# Patient Record
Sex: FEMALE | Race: WHITE | NOT HISPANIC OR LATINO | Employment: PART TIME | URBAN - METROPOLITAN AREA
[De-identification: names, ages, dates, MRNs, and addresses within clinical notes are randomized per-mention and may not be internally consistent; named-entity substitution may affect disease eponyms.]

---

## 2017-08-04 ENCOUNTER — GENERIC CONVERSION - ENCOUNTER (OUTPATIENT)
Dept: OTHER | Facility: OTHER | Age: 62
End: 2017-08-04

## 2017-08-04 DIAGNOSIS — N95.8 OTHER SPECIFIED MENOPAUSAL AND PERIMENOPAUSAL DISORDERS: ICD-10-CM

## 2017-11-01 ENCOUNTER — APPOINTMENT (EMERGENCY)
Dept: RADIOLOGY | Facility: HOSPITAL | Age: 62
DRG: 872 | End: 2017-11-01
Payer: COMMERCIAL

## 2017-11-01 ENCOUNTER — HOSPITAL ENCOUNTER (INPATIENT)
Facility: HOSPITAL | Age: 62
LOS: 4 days | Discharge: HOME | DRG: 872 | End: 2017-11-05
Attending: EMERGENCY MEDICINE | Admitting: INTERNAL MEDICINE
Payer: COMMERCIAL

## 2017-11-01 DIAGNOSIS — N12 PYELONEPHRITIS: Primary | ICD-10-CM

## 2017-11-01 DIAGNOSIS — R78.81 BACTEREMIA: ICD-10-CM

## 2017-11-01 PROBLEM — A41.9 SEPSIS (HCC): Status: ACTIVE | Noted: 2017-11-01

## 2017-11-01 PROBLEM — D64.9 ANEMIA: Status: ACTIVE | Noted: 2017-11-01

## 2017-11-01 LAB
ALBUMIN SERPL BCP-MCNC: 2.8 G/DL (ref 3.5–5)
ALP SERPL-CCNC: 131 U/L (ref 46–116)
ALT SERPL W P-5'-P-CCNC: 39 U/L (ref 12–78)
AMORPH URATE CRY URNS QL MICRO: ABNORMAL /HPF
ANION GAP SERPL CALCULATED.3IONS-SCNC: 10 MMOL/L (ref 4–13)
AST SERPL W P-5'-P-CCNC: 32 U/L (ref 5–45)
BACTERIA UR QL AUTO: ABNORMAL /HPF
BASOPHILS # BLD AUTO: 0 THOUSANDS/ΜL (ref 0–0.1)
BASOPHILS NFR BLD AUTO: 0 % (ref 0–1)
BILIRUB SERPL-MCNC: 0.9 MG/DL (ref 0.2–1)
BILIRUB UR QL STRIP: NEGATIVE
BUN SERPL-MCNC: 21 MG/DL (ref 5–25)
CALCIUM SERPL-MCNC: 9 MG/DL (ref 8.3–10.1)
CHLORIDE SERPL-SCNC: 106 MMOL/L (ref 100–108)
CLARITY UR: ABNORMAL
CO2 SERPL-SCNC: 24 MMOL/L (ref 21–32)
COLOR UR: YELLOW
CREAT SERPL-MCNC: 1.11 MG/DL (ref 0.6–1.3)
EOSINOPHIL # BLD AUTO: 0 THOUSAND/ΜL (ref 0–0.61)
EOSINOPHIL NFR BLD AUTO: 0 % (ref 0–6)
ERYTHROCYTE [DISTWIDTH] IN BLOOD BY AUTOMATED COUNT: 16.3 % (ref 11.6–15.1)
ERYTHROCYTE [SEDIMENTATION RATE] IN BLOOD: 45 MM/HOUR (ref 2–25)
FLUAV AG SPEC QL IA: NEGATIVE
FLUBV AG SPEC QL IA: NEGATIVE
GFR SERPL CREATININE-BSD FRML MDRD: 54 ML/MIN/1.73SQ M
GLUCOSE SERPL-MCNC: 168 MG/DL (ref 65–140)
GLUCOSE UR STRIP-MCNC: ABNORMAL MG/DL
HCT VFR BLD AUTO: 36 % (ref 37–47)
HGB BLD-MCNC: 11.7 G/DL (ref 12–16)
HGB UR QL STRIP.AUTO: ABNORMAL
KETONES UR STRIP-MCNC: NEGATIVE MG/DL
LACTATE SERPL-SCNC: 1.1 MMOL/L (ref 0.5–2)
LEUKOCYTE ESTERASE UR QL STRIP: ABNORMAL
LYMPHOCYTES # BLD AUTO: 0.5 THOUSANDS/ΜL (ref 0.6–4.47)
LYMPHOCYTES NFR BLD AUTO: 7 % (ref 14–44)
MCH RBC QN AUTO: 30 PG (ref 27–31)
MCHC RBC AUTO-ENTMCNC: 32.5 G/DL (ref 31.4–37.4)
MCV RBC AUTO: 92 FL (ref 82–98)
MONOCYTES # BLD AUTO: 0.5 THOUSAND/ΜL (ref 0.17–1.22)
MONOCYTES NFR BLD AUTO: 7 % (ref 4–12)
NEUTROPHILS # BLD AUTO: 6 THOUSANDS/ΜL (ref 1.85–7.62)
NEUTS SEG NFR BLD AUTO: 86 % (ref 43–75)
NITRITE UR QL STRIP: POSITIVE
NON-SQ EPI CELLS URNS QL MICRO: ABNORMAL /HPF
NRBC BLD AUTO-RTO: 0 /100 WBCS
PH UR STRIP.AUTO: 6 [PH] (ref 5–9)
PLATELET # BLD AUTO: 212 THOUSANDS/UL (ref 130–400)
PMV BLD AUTO: 6.6 FL (ref 8.9–12.7)
POTASSIUM SERPL-SCNC: 3.6 MMOL/L (ref 3.5–5.3)
PROT SERPL-MCNC: 6.4 G/DL (ref 6.4–8.2)
PROT UR STRIP-MCNC: ABNORMAL MG/DL
RBC # BLD AUTO: 3.92 MILLION/UL (ref 4.2–5.4)
RBC #/AREA URNS AUTO: ABNORMAL /HPF
SODIUM SERPL-SCNC: 140 MMOL/L (ref 136–145)
SP GR UR STRIP.AUTO: 1.01 (ref 1–1.03)
UROBILINOGEN UR QL STRIP.AUTO: 0.2 E.U./DL
WBC # BLD AUTO: 7 THOUSAND/UL (ref 4.8–10.8)
WBC #/AREA URNS AUTO: ABNORMAL /HPF

## 2017-11-01 PROCEDURE — 81001 URINALYSIS AUTO W/SCOPE: CPT | Performed by: EMERGENCY MEDICINE

## 2017-11-01 PROCEDURE — 99285 EMERGENCY DEPT VISIT HI MDM: CPT

## 2017-11-01 PROCEDURE — 87086 URINE CULTURE/COLONY COUNT: CPT | Performed by: EMERGENCY MEDICINE

## 2017-11-01 PROCEDURE — 80053 COMPREHEN METABOLIC PANEL: CPT | Performed by: EMERGENCY MEDICINE

## 2017-11-01 PROCEDURE — 85652 RBC SED RATE AUTOMATED: CPT | Performed by: EMERGENCY MEDICINE

## 2017-11-01 PROCEDURE — 96365 THER/PROPH/DIAG IV INF INIT: CPT

## 2017-11-01 PROCEDURE — 96361 HYDRATE IV INFUSION ADD-ON: CPT

## 2017-11-01 PROCEDURE — 93005 ELECTROCARDIOGRAM TRACING: CPT | Performed by: EMERGENCY MEDICINE

## 2017-11-01 PROCEDURE — 36415 COLL VENOUS BLD VENIPUNCTURE: CPT | Performed by: EMERGENCY MEDICINE

## 2017-11-01 PROCEDURE — 85025 COMPLETE CBC W/AUTO DIFF WBC: CPT | Performed by: EMERGENCY MEDICINE

## 2017-11-01 PROCEDURE — 87147 CULTURE TYPE IMMUNOLOGIC: CPT | Performed by: EMERGENCY MEDICINE

## 2017-11-01 PROCEDURE — 87798 DETECT AGENT NOS DNA AMP: CPT | Performed by: EMERGENCY MEDICINE

## 2017-11-01 PROCEDURE — 87400 INFLUENZA A/B EACH AG IA: CPT | Performed by: EMERGENCY MEDICINE

## 2017-11-01 PROCEDURE — 87186 SC STD MICRODIL/AGAR DIL: CPT | Performed by: EMERGENCY MEDICINE

## 2017-11-01 PROCEDURE — 71010 HB CHEST X-RAY 1 VIEW FRONTAL (PORTABLE): CPT

## 2017-11-01 PROCEDURE — 74177 CT ABD & PELVIS W/CONTRAST: CPT

## 2017-11-01 PROCEDURE — 83605 ASSAY OF LACTIC ACID: CPT | Performed by: EMERGENCY MEDICINE

## 2017-11-01 PROCEDURE — 87040 BLOOD CULTURE FOR BACTERIA: CPT | Performed by: EMERGENCY MEDICINE

## 2017-11-01 PROCEDURE — 87077 CULTURE AEROBIC IDENTIFY: CPT | Performed by: EMERGENCY MEDICINE

## 2017-11-01 RX ORDER — MAGNESIUM HYDROXIDE/ALUMINUM HYDROXICE/SIMETHICONE 120; 1200; 1200 MG/30ML; MG/30ML; MG/30ML
30 SUSPENSION ORAL EVERY 6 HOURS PRN
Status: DISCONTINUED | OUTPATIENT
Start: 2017-11-01 | End: 2017-11-05 | Stop reason: HOSPADM

## 2017-11-01 RX ORDER — ACETAMINOPHEN 325 MG/1
650 TABLET ORAL ONCE
Status: COMPLETED | OUTPATIENT
Start: 2017-11-01 | End: 2017-11-01

## 2017-11-01 RX ORDER — ONDANSETRON 2 MG/ML
4 INJECTION INTRAMUSCULAR; INTRAVENOUS EVERY 6 HOURS PRN
Status: DISCONTINUED | OUTPATIENT
Start: 2017-11-01 | End: 2017-11-05 | Stop reason: HOSPADM

## 2017-11-01 RX ORDER — LEVOTHYROXINE SODIUM 0.05 MG/1
50 TABLET ORAL
Status: DISCONTINUED | OUTPATIENT
Start: 2017-11-02 | End: 2017-11-05 | Stop reason: HOSPADM

## 2017-11-01 RX ORDER — SODIUM CHLORIDE 9 MG/ML
100 INJECTION, SOLUTION INTRAVENOUS CONTINUOUS
Status: DISCONTINUED | OUTPATIENT
Start: 2017-11-01 | End: 2017-11-04

## 2017-11-01 RX ORDER — ACETAMINOPHEN 325 MG/1
650 TABLET ORAL EVERY 6 HOURS PRN
Status: DISCONTINUED | OUTPATIENT
Start: 2017-11-01 | End: 2017-11-04

## 2017-11-01 RX ADMIN — ACETAMINOPHEN 650 MG: 325 TABLET, FILM COATED ORAL at 15:59

## 2017-11-01 RX ADMIN — CEFTRIAXONE 1000 MG: 1 INJECTION, SOLUTION INTRAVENOUS at 21:20

## 2017-11-01 RX ADMIN — SODIUM CHLORIDE 1000 ML: 0.9 INJECTION, SOLUTION INTRAVENOUS at 17:46

## 2017-11-01 RX ADMIN — IOHEXOL 100 ML: 350 INJECTION, SOLUTION INTRAVENOUS at 20:27

## 2017-11-01 RX ADMIN — ACETAMINOPHEN 650 MG: 325 TABLET, FILM COATED ORAL at 21:42

## 2017-11-01 NOTE — ED PROVIDER NOTES
History  Chief Complaint   Patient presents with    Fever - 9 weeks to 74 years     no appetite, nausea, ws sent to the ED by Dr Delma Link  was given Bystolic 87OT by Dr Delma Link for elevated BP     Patient was sent over from her PMD office today with a fever  Patient states she started Monday afternoon with chills but did not think she had a fever  She had no other symptoms  No congestion sore throat no cough no rashes  Yesterday she was able to go to work for a while but then had chills again  Patient has a urostomy but states that the urine other than looking a little concentrated was normal and not painful or turbid  She has no abdominal pain no diarrhea or vomiting no rash no leg edema or pain  Prior to Admission Medications   Prescriptions Last Dose Informant Patient Reported? Taking? Dapagliflozin Propanediol (FARXIGA PO) 11/1/2017 at Unknown time Self Yes Yes   Sig: Take by mouth daily Indications: Diabetes  levothyroxine 50 mcg tablet 11/1/2017 at Unknown time  Yes Yes   Sig: Take 50 mcg by mouth daily  metFORMIN (GLUCOPHAGE) 500 mg tablet Past Week at Unknown time  Yes Yes   Sig: Take 500 mg by mouth daily        Facility-Administered Medications: None       Past Medical History:   Diagnosis Date    Cholelithiasis     Deep vein thrombosis of left lower extremity ([facility] ) 1/11/2016    Diabetes mellitus ([facility] [address] [facility]     Endometrial cancer ([facility] [address] [facility]     History of urostomy 1/11/2016    Hypothyroid     In vitro fertilization     Kidney calculi     s/p urostomy       Past Surgical History:   Procedure Laterality Date    COLONOSCOPY W/ BIOPSIES N/A 12/2015    HEEL SPUR SURGERY Right 1996    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    LAPAROSCOPIC GASTRIC BANDING N/A 2006    [facility] [place]    [facility]    SLEEVE GASTROPLASTY N/A 09/2015    Dr Bassam Garcia, [facility]       Family History   Problem Relation Age of Onset    No Known Problems Mother     Kidney failure Father     Brain cancer Father     Diabetes Sister     Alcohol abuse Sister     Diabetes Brother      I have reviewed and agree with the history as documented  Social History   Substance Use Topics    Smoking status: Former Smoker     Packs/day: 1 50     Types: Cigarettes     Quit date: 1/11/1986    Smokeless tobacco: Former User    Alcohol use Yes      Comment: 1/month        Review of Systems   Constitutional: Positive for appetite change, chills and fever  Negative for diaphoresis and fatigue  HENT: Negative for congestion, ear pain, rhinorrhea, sore throat and trouble swallowing  Eyes: Negative for pain  Respiratory: Negative for cough and shortness of breath  Cardiovascular: Negative for chest pain and leg swelling  Gastrointestinal: Negative for abdominal pain, diarrhea, nausea and vomiting  Genitourinary: Negative for dysuria and flank pain  Musculoskeletal: Negative for back pain, joint swelling and myalgias  Skin: Negative for rash and wound  Neurological: Negative for headaches  All other systems reviewed and are negative  Physical Exam  ED Triage Vitals [11/01/17 1547]   Temperature Pulse Respirations Blood Pressure SpO2   (!) 102 8 °F (39 3 °C) 96 18 160/77 99 %      Temp Source Heart Rate Source Patient Position - Orthostatic VS BP Location FiO2 (%)   Tympanic Monitor Sitting Right arm --      Pain Score       No Pain           Orthostatic Vital Signs  Vitals:    11/01/17 1547   BP: 160/77   Pulse: 96   Patient Position - Orthostatic VS: Sitting       Physical Exam   Constitutional: She is oriented to person, place, and time  She appears well-developed and well-nourished  HENT:   Head: Normocephalic and atraumatic  Right Ear: External ear normal    Left Ear: External ear normal    Mouth/Throat: Oropharynx is clear and moist    Eyes: Conjunctivae and EOM are normal    Neck: Normal range of motion  Neck supple     Cardiovascular: Normal rate and regular rhythm  Pulmonary/Chest: Effort normal and breath sounds normal  No respiratory distress  She has no wheezes  Abdominal: Soft  Bowel sounds are normal  There is no tenderness  Urostomy site draining clear juan urine  There is no tenderness around the urostomy or anywhere in the abdomen   Musculoskeletal: Normal range of motion  She exhibits no edema or tenderness  Neurological: She is alert and oriented to person, place, and time  Skin: Skin is warm and dry  Psychiatric: She has a normal mood and affect  Her behavior is normal    Vitals reviewed  ED Medications  Medications   acetaminophen (TYLENOL) tablet 650 mg (650 mg Oral Given 11/1/17 5209)       Diagnostic Studies  Results Reviewed     None                 No orders to display              Procedures  Procedures       Phone Contacts  ED Phone Contact    ED Course  ED Course                                MDM  Number of Diagnoses or Management Options  Diagnosis management comments: Patient has fever and chills without an obvious source  Must consider urinary infection primarily I suppose  Will check lactic acid chest blood and urine tests    CritCare Time    Disposition  Final diagnoses:   None     ED Disposition     None      Follow-up Information    None       Patient's Medications   Discharge Prescriptions    No medications on file     No discharge procedures on file      ED Provider  Electronically Signed by           Earl Clinton MD  11/01/17 3005

## 2017-11-02 PROBLEM — N13.30 HYDROURETERONEPHROSIS: Status: ACTIVE | Noted: 2017-11-02

## 2017-11-02 LAB
ANION GAP SERPL CALCULATED.3IONS-SCNC: 7 MMOL/L (ref 4–13)
BASOPHILS # BLD AUTO: 0 THOUSANDS/ΜL (ref 0–0.1)
BASOPHILS NFR BLD AUTO: 0 % (ref 0–1)
BUN SERPL-MCNC: 16 MG/DL (ref 5–25)
CALCIUM SERPL-MCNC: 8.9 MG/DL (ref 8.3–10.1)
CHLORIDE SERPL-SCNC: 111 MMOL/L (ref 100–108)
CO2 SERPL-SCNC: 23 MMOL/L (ref 21–32)
CREAT SERPL-MCNC: 0.88 MG/DL (ref 0.6–1.3)
EOSINOPHIL # BLD AUTO: 0.1 THOUSAND/ΜL (ref 0–0.61)
EOSINOPHIL NFR BLD AUTO: 2 % (ref 0–6)
ERYTHROCYTE [DISTWIDTH] IN BLOOD BY AUTOMATED COUNT: 16.2 % (ref 11.6–15.1)
EST. AVERAGE GLUCOSE BLD GHB EST-MCNC: 169 MG/DL
FLUAV AG SPEC QL: NORMAL
FLUBV AG SPEC QL: NORMAL
GFR SERPL CREATININE-BSD FRML MDRD: 72 ML/MIN/1.73SQ M
GLUCOSE SERPL-MCNC: 117 MG/DL (ref 65–140)
GLUCOSE SERPL-MCNC: 124 MG/DL (ref 65–140)
GLUCOSE SERPL-MCNC: 132 MG/DL (ref 65–140)
GLUCOSE SERPL-MCNC: 200 MG/DL (ref 65–140)
GLUCOSE SERPL-MCNC: 270 MG/DL (ref 65–140)
HBA1C MFR BLD: 7.5 % (ref 4.2–6.3)
HCT VFR BLD AUTO: 36.3 % (ref 37–47)
HGB BLD-MCNC: 11.7 G/DL (ref 12–16)
LYMPHOCYTES # BLD AUTO: 0.7 THOUSANDS/ΜL (ref 0.6–4.47)
LYMPHOCYTES NFR BLD AUTO: 15 % (ref 14–44)
MAGNESIUM SERPL-MCNC: 1.8 MG/DL (ref 1.6–2.6)
MCH RBC QN AUTO: 29.9 PG (ref 27–31)
MCHC RBC AUTO-ENTMCNC: 32.3 G/DL (ref 31.4–37.4)
MCV RBC AUTO: 93 FL (ref 82–98)
MONOCYTES # BLD AUTO: 0.5 THOUSAND/ΜL (ref 0.17–1.22)
MONOCYTES NFR BLD AUTO: 10 % (ref 4–12)
NEUTROPHILS # BLD AUTO: 3.3 THOUSANDS/ΜL (ref 1.85–7.62)
NEUTS SEG NFR BLD AUTO: 73 % (ref 43–75)
NRBC BLD AUTO-RTO: 0 /100 WBCS
PLATELET # BLD AUTO: 195 THOUSANDS/UL (ref 130–400)
PMV BLD AUTO: 6.7 FL (ref 8.9–12.7)
POTASSIUM SERPL-SCNC: 3.8 MMOL/L (ref 3.5–5.3)
RBC # BLD AUTO: 3.92 MILLION/UL (ref 4.2–5.4)
RSV B RNA SPEC QL NAA+PROBE: NORMAL
SODIUM SERPL-SCNC: 141 MMOL/L (ref 136–145)
T4 FREE SERPL-MCNC: 1.06 NG/DL (ref 0.76–1.46)
WBC # BLD AUTO: 4.6 THOUSAND/UL (ref 4.8–10.8)

## 2017-11-02 PROCEDURE — 80048 BASIC METABOLIC PNL TOTAL CA: CPT | Performed by: NURSE PRACTITIONER

## 2017-11-02 PROCEDURE — 82948 REAGENT STRIP/BLOOD GLUCOSE: CPT

## 2017-11-02 PROCEDURE — 85025 COMPLETE CBC W/AUTO DIFF WBC: CPT | Performed by: NURSE PRACTITIONER

## 2017-11-02 PROCEDURE — 83735 ASSAY OF MAGNESIUM: CPT | Performed by: NURSE PRACTITIONER

## 2017-11-02 PROCEDURE — 83036 HEMOGLOBIN GLYCOSYLATED A1C: CPT | Performed by: NURSE PRACTITIONER

## 2017-11-02 PROCEDURE — 84439 ASSAY OF FREE THYROXINE: CPT | Performed by: NURSE PRACTITIONER

## 2017-11-02 PROCEDURE — 87081 CULTURE SCREEN ONLY: CPT | Performed by: INTERNAL MEDICINE

## 2017-11-02 RX ORDER — LACTOBACILLUS ACIDOPHILUS / LACTOBACILLUS BULGARICUS 100 MILLION CFU STRENGTH
1 GRANULES ORAL
Status: DISCONTINUED | OUTPATIENT
Start: 2017-11-02 | End: 2017-11-05 | Stop reason: HOSPADM

## 2017-11-02 RX ADMIN — LACTOBACILLUS ACIDOPHILUS / LACTOBACILLUS BULGARICUS 1 PACKET: 100 MILLION CFU STRENGTH GRANULES at 12:39

## 2017-11-02 RX ADMIN — ACETAMINOPHEN 650 MG: 325 TABLET, FILM COATED ORAL at 12:35

## 2017-11-02 RX ADMIN — ENOXAPARIN SODIUM 40 MG: 40 INJECTION SUBCUTANEOUS at 09:23

## 2017-11-02 RX ADMIN — SODIUM CHLORIDE 125 ML/HR: 0.9 INJECTION, SOLUTION INTRAVENOUS at 06:09

## 2017-11-02 RX ADMIN — CEFTRIAXONE 2000 MG: 2 INJECTION, SOLUTION INTRAVENOUS at 21:29

## 2017-11-02 RX ADMIN — LEVOTHYROXINE SODIUM 50 MCG: 50 TABLET ORAL at 06:02

## 2017-11-02 RX ADMIN — SODIUM CHLORIDE 125 ML/HR: 0.9 INJECTION, SOLUTION INTRAVENOUS at 00:02

## 2017-11-02 RX ADMIN — LACTOBACILLUS ACIDOPHILUS / LACTOBACILLUS BULGARICUS 1 PACKET: 100 MILLION CFU STRENGTH GRANULES at 16:33

## 2017-11-02 RX ADMIN — SODIUM CHLORIDE 100 ML/HR: 0.9 INJECTION, SOLUTION INTRAVENOUS at 19:27

## 2017-11-02 RX ADMIN — INSULIN LISPRO 2 UNITS: 100 INJECTION, SOLUTION INTRAVENOUS; SUBCUTANEOUS at 16:34

## 2017-11-02 RX ADMIN — INSULIN LISPRO 2 UNITS: 100 INJECTION, SOLUTION INTRAVENOUS; SUBCUTANEOUS at 21:30

## 2017-11-02 NOTE — ED NOTES
Patient states that she feels like the fever is back  Temp is 98 6 oral       Macho Tucker RN  11/01/17 6744
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
## 2017-11-02 NOTE — ED CARE HANDOFF
Emergency Department Sign Out Note        Sign out and transfer of care from **Dr Doretha Meyers*  See Separate Emergency Department note  The patient, Nii Sharp, was evaluated by the previous provider for *fever**  Workup Completed:  *none**    ED Course / Workup Pending (followup):  *labs, urine**                          ED Course      Procedures  MDM  Number of Diagnoses or Management Options  Diagnosis management comments: Pt  With pyelonephritis, rigors here, will admit for IV abx  CritCare Time      Disposition  Final diagnoses:   Pyelonephritis     Time reflects when diagnosis was documented in both MDM as applicable and the Disposition within this note     Time User Action Codes Description Comment    74/1/7046 69:07 PM Dalia CAMPBELL Add [E13] Pyelonephritis       ED Disposition     ED Disposition Condition Comment    Admit  Case was discussed with **the hospitalist NP* and the patient's admission status was agreed to be Admission Status: inpatient status         Follow-up Information    None       Patient's Medications   Discharge Prescriptions    No medications on file     No discharge procedures on file         ED Provider  Electronically Signed by

## 2017-11-02 NOTE — CASE MANAGEMENT
Continued Stay Review    Date: 17    Vitals:   Temp (24hrs), Av 2 °F (37 9 °C), Min:98 6 °F (37 °C), Max:102 8 °F (39 3 °C)     HR:  [68-96] 73  Resp:  [16-20] 18  BP: (136-160)/(70-79) 147/70  SpO2:  [95 %-99 %] 95 %  Body mass index is 37 82 kg/m²  HGBA1C   TSH T4 FREE  Medications:   Scheduled Meds:   cefTRIAXone 2,000 mg Intravenous Q24H   enoxaparin 40 mg Subcutaneous Daily   insulin lispro 1-5 Units Subcutaneous HS   insulin lispro 1-6 Units Subcutaneous TID AC   lactobacillus acidophilus-bulgaricus 1 packet Oral TID With Meals   levothyroxine 50 mcg Oral Early Morning     Continuous Infusions:   sodium chloride 100 mL/hr Last Rate: 100 mL/hr (17 0922)     PRN Meds:   acetaminophen    aluminum-magnesium hydroxide-simethicone    influenza vaccine    ondansetron    Abnormal Labs/Diagnostic Results:    WBC Thousand/uL 4 60*   HEMOGLOBIN g/dL 11 7*   HEMATOCRIT % 36 3*   URINE CX   URINE CULTURE   >100,000 cfu/ml Non lactose fermenting gram negative kiah*  --          Age/Sex: 63 y o  female     PER MD  Principal Problem:    Sepsis ([address] )  Active Problems:    Pyelonephritis    Anemia    Hydroureteronephrosis     63 y o  female with a history of T2DM, Endometrial cancer, s/p urostomy, hypothyroidism, DVT who was admitted on 2017 with high fever and left flank pain, persistent to sepsis 2/2 pyelonephritis  She is clinically improving with IV ceftriaxone and IVF hydration  Plan:  Continue ceftriaxone iv (Day#1 on 17)  F/u Ucx  Hold metformin  SSI  Probiotics  Levothyroxine     DVT Prophylaxis: on Lovenox sc  Code Status: Level 1 - Full Code  Disposition: anticipate d/c home once clinically improved
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
## 2017-11-02 NOTE — PROGRESS NOTES
[facility] Progress Note  Patient: Jasmine Kimball 63 y o  female   MRN: [id number]  PCP: Carlita Sheikh DO  Unit/Bed#: [address] [place] [address] Encounter: [id number]  Date Of Visit: 17    Subjective:   She is feeling moderately better  Fever trending down  Less left flank pain    Objective:   Vitals:   Temp (24hrs), Av 2 °F (37 9 °C), Min:98 6 °F (37 °C), Max:102 8 °F (39 3 °C)    HR:  [68-96] 73  Resp:  [16-20] 18  BP: (136-160)/(70-79) 147/70  SpO2:  [95 %-99 %] 95 %  Body mass index is 37 82 kg/m²         Intake/Output Summary (Last 24 hours) at 17 1244  Last data filed at 17 0609   Gross per 24 hour   Intake             2000 ml   Output              400 ml   Net             1600 ml       Physical Exam:   General: NAD  HEENT: EOMI, anicteric, oral moist, no oral thrush or mucosal lesion, neck supple, no mass or JVD  Chest: CTAB, no wheeze/rales  Cardiac: RRR, S1/S2, No murmur  Abd: s/p urostomy at RLQ, S/ND/NT/BS+  MSK: No LE pitting edema, pulses intact  Neuro: AAOx3, moving all extremities  Psychiatric: Mood with normal affect      Additional Data:     Labs:    Results from last 7 days  Lab Units 17  0602   WBC Thousand/uL 4 60*   HEMOGLOBIN g/dL 11 7*   HEMATOCRIT % 36 3*   PLATELETS Thousands/uL 195   NEUTROS PCT % 73   LYMPHS PCT % 15   MONOS PCT % 10   EOS PCT % 2       Results from last 7 days  Lab Units 17  0602 17  1712   SODIUM mmol/L 141 140   POTASSIUM mmol/L 3 8 3 6   CHLORIDE mmol/L 111* 106   CO2 mmol/L 23 24   BUN mg/dL 16 21   CREATININE mg/dL 0 88 1 11   CALCIUM mg/dL 8 9 9 0   TOTAL PROTEIN g/dL  --  6 4   BILIRUBIN TOTAL mg/dL  --  0 90   ALK PHOS U/L  --  131*   ALT U/L  --  39   AST U/L  --  32   GLUCOSE RANDOM mg/dL 124 168*           Recent Results (from the past 24 hour(s))   CBC and differential    Collection Time: 17  5:12 PM   Result Value Ref Range    WBC 7 00 4 80 - 10 80 Thousand/uL    RBC 3 92 (L) 4 20 - 5 40 Million/uL Hemoglobin 11 7 (L) 12 0 - 16 0 g/dL    Hematocrit 36 0 (L) 37 0 - 47 0 %    MCV 92 82 - 98 fL    MCH 30 0 27 0 - 31 0 pg    MCHC 32 5 31 4 - 37 4 g/dL    RDW 16 3 (H) 11 6 - 15 1 %    MPV 6 6 (L) 8 9 - 12 7 fL    Platelets 720 296 - 952 Thousands/uL    nRBC 0 /100 WBCs    Neutrophils Relative 86 (H) 43 - 75 %    Lymphocytes Relative 7 (L) 14 - 44 %    Monocytes Relative 7 4 - 12 %    Eosinophils Relative 0 0 - 6 %    Basophils Relative 0 0 - 1 %    Neutrophils Absolute 6 00 1 85 - 7 62 Thousands/µL    Lymphocytes Absolute 0 50 (L) 0 60 - 4 47 Thousands/µL    Monocytes Absolute 0 50 0 17 - 1 22 Thousand/µL    Eosinophils Absolute 0 00 0 00 - 0 61 Thousand/µL    Basophils Absolute 0 00 0 00 - 0 10 Thousands/µL   Sedimentation rate, automated    Collection Time: 11/01/17  5:12 PM   Result Value Ref Range    Sed Rate 45 (H) 2 - 25 mm/hour   Comprehensive metabolic panel    Collection Time: 11/01/17  5:12 PM   Result Value Ref Range    Sodium 140 136 - 145 mmol/L    Potassium 3 6 3 5 - 5 3 mmol/L    Chloride 106 100 - 108 mmol/L    CO2 24 21 - 32 mmol/L    Anion Gap 10 4 - 13 mmol/L    BUN 21 5 - 25 mg/dL    Creatinine 1 11 0 60 - 1 30 mg/dL    Glucose 168 (H) 65 - 140 mg/dL    Calcium 9 0 8 3 - 10 1 mg/dL    AST 32 5 - 45 U/L    ALT 39 12 - 78 U/L    Alkaline Phosphatase 131 (H) 46 - 116 U/L    Total Protein 6 4 6 4 - 8 2 g/dL    Albumin 2 8 (L) 3 5 - 5 0 g/dL    Total Bilirubin 0 90 0 20 - 1 00 mg/dL    eGFR 54 ml/min/1 73sq m   Rapid Influenza Screen with Reflex PCR (indicated for patients <2mo of age)    Collection Time: 11/01/17  5:14 PM   Result Value Ref Range    Rapid Influenza A Ag Negative Negative, Indeterminate    Rapid Influenza B Ag Negative Negative, Indeterminate   Lactic acid, plasma    Collection Time: 11/01/17  5:14 PM   Result Value Ref Range    LACTIC ACID 1 1 0 5 - 2 0 mmol/L   Influenza A/B and RSV by PCR (Indicated for patients > 2 mo of age)    Collection Time: 11/01/17  5:14 PM   Result Value Ref Range    INFLU A PCR None Detected None Detected    INFLU B PCR None Detected None Detected    RSV PCR None Detected None Detected   UA w Reflex to Microscopic w Reflex to Culture    Collection Time: 11/01/17  7:05 PM   Result Value Ref Range    Color, UA Yellow     Clarity, UA Cloudy     Specific [place], UA 1 010 1 000 - 1 030    pH, UA 6 0 5 0 - 9 0    Leukocytes, UA Small (A) Negative    Nitrite, UA Positive (A) Negative    Protein, UA 30 (1+) (A) Negative mg/dl    Glucose, UA >=1000 (1%) (A) Negative mg/dl    Ketones, UA Negative Negative mg/dl    Urobilinogen, UA 0 2 0 2, 1 0 E U /dl E U /dl    Bilirubin, UA Negative Negative    Blood, UA Small (A) Negative   Urine Microscopic    Collection Time: 11/01/17  7:05 PM   Result Value Ref Range    RBC, UA 4-10 (A) None Seen, 0-5 /hpf    WBC, UA Innumerable (A) None Seen, 0-5, 5-55, 5-65 /hpf    Epithelial Cells None Seen None Seen, Occasional /hpf    Bacteria, UA Innumerable (A) None Seen, Occasional /hpf    AMORPH URATES Occasional /hpf   Urine culture    Collection Time: 11/01/17  7:05 PM   Result Value Ref Range    Urine Culture (A)      >100,000 cfu/ml Non lactose fermenting gram negative kiah   Basic metabolic panel    Collection Time: 11/02/17  6:02 AM   Result Value Ref Range    Sodium 141 136 - 145 mmol/L    Potassium 3 8 3 5 - 5 3 mmol/L    Chloride 111 (H) 100 - 108 mmol/L    CO2 23 21 - 32 mmol/L    Anion Gap 7 4 - 13 mmol/L    BUN 16 5 - 25 mg/dL    Creatinine 0 88 0 60 - 1 30 mg/dL    Glucose 124 65 - 140 mg/dL    Calcium 8 9 8 3 - 10 1 mg/dL    eGFR 72 ml/min/1 73sq m   Magnesium    Collection Time: 11/02/17  6:02 AM   Result Value Ref Range    Magnesium 1 8 1 6 - 2 6 mg/dL   CBC and differential    Collection Time: 11/02/17  6:02 AM   Result Value Ref Range    WBC 4 60 (L) 4 80 - 10 80 Thousand/uL    RBC 3 92 (L) 4 20 - 5 40 Million/uL    Hemoglobin 11 7 (L) 12 0 - 16 0 g/dL    Hematocrit 36 3 (L) 37 0 - 47 0 %    MCV 93 82 - 98 fL    MCH 29 9 27 0 - 31 0 pg    MCHC 32 3 31 4 - 37 4 g/dL    RDW 16 2 (H) 11 6 - 15 1 %    MPV 6 7 (L) 8 9 - 12 7 fL    Platelets 804 561 - 855 Thousands/uL    nRBC 0 /100 WBCs    Neutrophils Relative 73 43 - 75 %    Lymphocytes Relative 15 14 - 44 %    Monocytes Relative 10 4 - 12 %    Eosinophils Relative 2 0 - 6 %    Basophils Relative 0 0 - 1 %    Neutrophils Absolute 3 30 1 85 - 7 62 Thousands/µL    Lymphocytes Absolute 0 70 0 60 - 4 47 Thousands/µL    Monocytes Absolute 0 50 0 17 - 1 22 Thousand/µL    Eosinophils Absolute 0 10 0 00 - 0 61 Thousand/µL    Basophils Absolute 0 00 0 00 - 0 10 Thousands/µL   Hemoglobin A1c    Collection Time: 11/02/17  6:08 AM   Result Value Ref Range    Hemoglobin A1C 7 5 (H) 4 2 - 6 3 %     mg/dl   T4, free    Collection Time: 11/02/17  6:08 AM   Result Value Ref Range    Free T4 1 06 0 76 - 1 46 ng/dL   Fingerstick Glucose (POCT)    Collection Time: 11/02/17  6:53 AM   Result Value Ref Range    POC Glucose 117 65 - 140 mg/dl   Fingerstick Glucose (POCT)    Collection Time: 11/02/17 11:30 AM   Result Value Ref Range    POC Glucose 132 65 - 140 mg/dl       Cultures:     Results from last 7 days  Lab Units 11/01/17  1905 11/01/17  1714   URINE CULTURE  >100,000 cfu/ml Non lactose fermenting gram negative kiah*  --    INFLUENZA A PCR   --  None Detected   INFLUENZA B PCR   --  None Detected   RSV PCR   --  None Detected       Imaging:  Xr Chest 1 View Portable    Result Date: 11/2/2017  Narrative: CHEST INDICATION:  fever  History taken directly from the electronic ordering system  COMPARISON:  8/30/2012 VIEWS:   AP frontal IMAGES:  1 FINDINGS:     Cardiomediastinal silhouette appears unremarkable  The lungs are clear  No pneumothorax or pleural effusion  Visualized osseous structures appear within normal limits for the patient's age  Impression: No active pulmonary disease   As per comments in the PACS workstation, findings are concordant with preliminary interpretation provided by the emergency room physician  Workstation performed: [id number]     Ct Abdomen Pelvis With Contrast    Result Date: 11/1/2017  Narrative: CT ABDOMEN AND PELVIS WITH IV CONTRAST INDICATION:  Pyelonephritis COMPARISON: CT abdomen pelvis 1/11/2016 TECHNIQUE:  CT examination of the abdomen and pelvis was performed  Reformatted images were created in axial, sagittal, and coronal planes  Radiation dose length product (DLP) for this visit:  1535 2 mGy-cm   This examination, like all CT scans performed in the [facility], was performed utilizing techniques to minimize radiation dose exposure, including the use of iterative  reconstruction and automated exposure control  IV Contrast:  100 mL of iohexol (OMNIPAQUE)     Enteric Contrast:  Enteric contrast was not administered  FINDINGS: ABDOMEN LOWER CHEST:  There is bibasilar dependent atelectasis  LIVER/BILIARY TREE:  Unremarkable  GALLBLADDER:  There are gallstone(s) within the gallbladder, without pericholecystic inflammatory changes  SPLEEN:  Unremarkable  PANCREAS:  Unremarkable  ADRENAL GLANDS:  Unremarkable  KIDNEYS/URETERS:  One or more sharply circumscribed subcentimeter renal hypodensities are noted  These lesions are too small to accurately characterize, but are statistically most likely to represent benign cortical renal cyst(s)  According to the guidelines published in the Arnie [employer] Paper of the ACR Incidental Findings Committee (Radiology 2010), no further workup of these lesions is recommended  There is a 4 mm nonobstructing left lower pole renal calculus  There is a right lower quadrant ileostomy  There is mild left-sided hydroureteronephrosis without evident obstructing calculus  STOMACH AND BOWEL:  There is colonic diverticulosis without evidence of acute diverticulitis  No evidence of bowel obstruction  Small bowel herniated into the ileostomy without evident complication  Small hiatal hernia    There are postsurgical changes  of the stomach  APPENDIX:  No findings to suggest appendicitis  ABDOMINOPELVIC CAVITY:  No ascites or free intraperitoneal air  No lymphadenopathy  VESSELS:  Unremarkable for patient's age  PELVIS REPRODUCTIVE ORGANS:  Patient is status post hysterectomy  URINARY BLADDER:  Unremarkable  ABDOMINAL WALL/INGUINAL REGIONS:  Status post right lower quadrant ileostomy  No inguinal hernia  OSSEOUS STRUCTURES:  No acute fracture or destructive osseous lesion  Impression: 1  Mild prominence of the left renal collecting system which is new from prior examination  No evident obstructing calculus  Differential considerations include recently passed calculus versus ascending infection or noncalcified obstructing lesion  2   Nonobstructing 4 mm left lower pole renal calculus  3   Cholelithiasis without other evidence of acute cholecystitis  4   There is herniation of small bowel at the right lower quadrant ileostomy site  No evidence of acute complication   Workstation performed: [id number]     Imaging Reports Reviewed by myself    Last 24 Hours Medication List:     Current Facility-Administered Medications:     acetaminophen (TYLENOL) tablet 650 mg, 650 mg, Oral, Q6H PRN, TRACY Harris, 650 mg at 11/02/17 1235    aluminum-magnesium hydroxide-simethicone (MYLANTA) 200-200-20 mg/5 mL oral suspension 30 mL, 30 mL, Oral, Q6H PRN, TRACY Harris    cefTRIAXone (ROCEPHIN) IVPB (premix) 2,000 mg, 2,000 mg, Intravenous, Q24H, TRACY Harris    enoxaparin (LOVENOX) subcutaneous injection 40 mg, 40 mg, Subcutaneous, Daily, TRACY Harris, 40 mg at 11/02/17 9460    influenza inactivated quadrivalent vaccine (FLULAVAL) IM injection 0 5 mL, 0 5 mL, Intramuscular, Prior to discharge, TRACY Harris    insulin lispro (HumaLOG) 100 units/mL subcutaneous injection 1-5 Units, 1-5 Units, Subcutaneous, HS, TRACY Harris    insulin lispro (HumaLOG) 100 units/mL subcutaneous injection 1-6 Units, 1-6 Units, Subcutaneous, TID AC **AND** Fingerstick Glucose (POCT), , , TID AC, Arturo Canchola CRNP    lactobacillus acidophilus-bulgaricus [facility]) packet 1 packet, 1 packet, Oral, TID With Meals, Noy Perez MD, 1 packet at 11/02/17 1239    levothyroxine tablet 50 mcg, 50 mcg, Oral, Early Morning, ALLAN HarrisNP, 50 mcg at 11/02/17 0602    ondansetron (ZOFRAN) injection 4 mg, 4 mg, Intravenous, Q6H PRN, Arturo Canchola, CRNP    sodium chloride 0 9 % infusion, 100 mL/hr, Intravenous, Continuous, Arturo Lawrencericookie CRNP, Last Rate: 100 mL/hr at 11/02/17 0922, 100 mL/hr at 11/02/17 3540     Assessment and Plans:  Hospital Problem List:   Principal Problem:    Sepsis [facility])  Active Problems:    Pyelonephritis    Anemia    Hydroureteronephrosis    63 y o  female with a history of T2DM, Endometrial cancer, s/p urostomy, hypothyroidism, DVT who was admitted on 11/1/2017 with high fever and left flank pain, persistent to sepsis 2/2 pyelonephritis  She is clinically improving with IV ceftriaxone and IVF hydration  Plan:  Continue ceftriaxone iv (Day#1 on 11/1/17)  F/u Ucx  Hold metformin  SSI  Probiotics  Levothyroxine    DVT Prophylaxis: on Lovenox sc  Code Status: Level 1 - Full Code  Disposition: anticipate d/c home once clinically improved  Patient Centered Rounds: I have performed bedside rounds with nursing staff today  Discussions with Specialists or Other Care Team Provider: Yes    Education and Discussions with Family / Patient:Yes    Time Spent for Care: 20 minutes  More than 50% of total time spent on counseling and coordination of care as described above      Current Length of Stay: 1 day(s)    Current Patient Status: Inpatient     Signed by:  Noy Perez MD  Attending Hospitalist  Page#: [phone] (Hours 7am to 7pm)  11/2/2017 12:44 PM

## 2017-11-02 NOTE — PLAN OF CARE
GENITOURINARY - ADULT     Urinary catheter remains patent Progressing        INFECTION - ADULT     Absence or prevention of progression during hospitalization Progressing        METABOLIC, FLUID AND ELECTROLYTES - ADULT     Glucose maintained within target range Progressing        Potential for Falls     Patient will remain free of falls Progressing        SAFETY ADULT     Patient will remain free of falls Progressing

## 2017-11-02 NOTE — ED CARE HANDOFF
Emergency Department Sign Out Note        Sign out and transfer of care from *Dr Michelle Estrada**  See Separate Emergency Department note  The patient, Eli Cruz, was evaluated by the previous provider for *fever**  Workup Completed:  *none**    ED Course / Workup Pending (followup):  *labs, urine, CT**                          ED Course      Procedures  MDM  Number of Diagnoses or Management Options  Pyelonephritis:   Diagnosis management comments: Will admit for IV abx and pyelonephritis  Pt  With recurrent rigors  CritCare Time      Disposition  Final diagnoses:   Pyelonephritis     Time reflects when diagnosis was documented in both MDM as applicable and the Disposition within this note     Time User Action Codes Description Comment    09/6/5902 34:39 PM Leeann CAMPBELL Add [L92] Pyelonephritis       ED Disposition     ED Disposition Condition Comment    Admit  Case was discussed with **the hospitalist NP* and the patient's admission status was agreed to be Admission Status: inpatient status         Follow-up Information    None       Patient's Medications   Discharge Prescriptions    No medications on file     No discharge procedures on file         ED Provider  Electronically Signed by

## 2017-11-02 NOTE — SEPSIS NOTE
Sepsis Note   Abdon Gutierrez 63 y o  female MRN: [id number]  Unit/Bed#: [facility] Encounter: [id number]            Initial Sepsis Screening     Row Name 11/01/17 7437                Is the patient's history suggestive of a new or worsening infection? (!)  Yes (Proceed)  -CY        Suspected source of infection urinary tract infection  -CY        Are two or more of the following signs & symptoms of infection both present and new to the patient? (!)  Yes (Proceed)  -CY        Indicate SIRS criteria Hyperthemia > 38 3C (100 9F); Tachycardia > 90 bpm  -CY        If the answer is yes to both questions, suspicion of sepsis is present  --        If severe sepsis is present AND tissue hypoperfusion perists in the hour after fluid resuscitation or lactate > 4, the patient meets criteria for SEPTIC SHOCK  --        Are any of the following organ dysfunction criteria present within 6 hours of suspected infection and SIRS criteria that are NOT considered to be chronic conditions?  No  -CY        Organ dysfunction  --        Date of presentation of severe sepsis  --        Time of presentation of severe sepsis  --        Tissue hypoperfusion persists in the hour after crystalloid fluid administration, evidenced, by either:  --        Was hypotension present within one hour of the conclusion of crystalloid fluid administration?  --        Date of presentation of septic shock  --        Time of presentation of septic shock  --          User Key  (r) = Recorded By, (t) = Taken By, (c) = Cosigned By    [address] Name Provider Type    [address], [address] Rody Londono Nurse Practitioner

## 2017-11-03 LAB
ANION GAP SERPL CALCULATED.3IONS-SCNC: 9 MMOL/L (ref 4–13)
BACTERIA UR CULT: ABNORMAL
BASOPHILS # BLD AUTO: 0 THOUSANDS/ΜL (ref 0–0.1)
BASOPHILS NFR BLD AUTO: 1 % (ref 0–1)
BUN SERPL-MCNC: 15 MG/DL (ref 5–25)
CALCIUM SERPL-MCNC: 8.9 MG/DL (ref 8.3–10.1)
CHLORIDE SERPL-SCNC: 112 MMOL/L (ref 100–108)
CO2 SERPL-SCNC: 23 MMOL/L (ref 21–32)
CREAT SERPL-MCNC: 0.88 MG/DL (ref 0.6–1.3)
EOSINOPHIL # BLD AUTO: 0.2 THOUSAND/ΜL (ref 0–0.61)
EOSINOPHIL NFR BLD AUTO: 4 % (ref 0–6)
ERYTHROCYTE [DISTWIDTH] IN BLOOD BY AUTOMATED COUNT: 16.1 % (ref 11.6–15.1)
GFR SERPL CREATININE-BSD FRML MDRD: 72 ML/MIN/1.73SQ M
GLUCOSE SERPL-MCNC: 142 MG/DL (ref 65–140)
GLUCOSE SERPL-MCNC: 146 MG/DL (ref 65–140)
GLUCOSE SERPL-MCNC: 148 MG/DL (ref 65–140)
GLUCOSE SERPL-MCNC: 163 MG/DL (ref 65–140)
GLUCOSE SERPL-MCNC: 249 MG/DL (ref 65–140)
HCT VFR BLD AUTO: 32.2 % (ref 37–47)
HGB BLD-MCNC: 10.5 G/DL (ref 12–16)
LYMPHOCYTES # BLD AUTO: 1 THOUSANDS/ΜL (ref 0.6–4.47)
LYMPHOCYTES NFR BLD AUTO: 22 % (ref 14–44)
MCH RBC QN AUTO: 29.8 PG (ref 27–31)
MCHC RBC AUTO-ENTMCNC: 32.5 G/DL (ref 31.4–37.4)
MCV RBC AUTO: 92 FL (ref 82–98)
MONOCYTES # BLD AUTO: 0.5 THOUSAND/ΜL (ref 0.17–1.22)
MONOCYTES NFR BLD AUTO: 12 % (ref 4–12)
MRSA NOSE QL CULT: NORMAL
NEUTROPHILS # BLD AUTO: 2.8 THOUSANDS/ΜL (ref 1.85–7.62)
NEUTS SEG NFR BLD AUTO: 62 % (ref 43–75)
NRBC BLD AUTO-RTO: 0 /100 WBCS
PLATELET # BLD AUTO: 195 THOUSANDS/UL (ref 130–400)
PMV BLD AUTO: 6.5 FL (ref 8.9–12.7)
POTASSIUM SERPL-SCNC: 3.6 MMOL/L (ref 3.5–5.3)
RBC # BLD AUTO: 3.51 MILLION/UL (ref 4.2–5.4)
SODIUM SERPL-SCNC: 144 MMOL/L (ref 136–145)
WBC # BLD AUTO: 4.4 THOUSAND/UL (ref 4.8–10.8)

## 2017-11-03 PROCEDURE — 87040 BLOOD CULTURE FOR BACTERIA: CPT | Performed by: STUDENT IN AN ORGANIZED HEALTH CARE EDUCATION/TRAINING PROGRAM

## 2017-11-03 PROCEDURE — 80048 BASIC METABOLIC PNL TOTAL CA: CPT | Performed by: STUDENT IN AN ORGANIZED HEALTH CARE EDUCATION/TRAINING PROGRAM

## 2017-11-03 PROCEDURE — 82948 REAGENT STRIP/BLOOD GLUCOSE: CPT

## 2017-11-03 PROCEDURE — 85025 COMPLETE CBC W/AUTO DIFF WBC: CPT | Performed by: STUDENT IN AN ORGANIZED HEALTH CARE EDUCATION/TRAINING PROGRAM

## 2017-11-03 RX ORDER — SUMATRIPTAN 25 MG/1
50 TABLET, FILM COATED ORAL ONCE
Status: COMPLETED | OUTPATIENT
Start: 2017-11-03 | End: 2017-11-03

## 2017-11-03 RX ORDER — IBUPROFEN 200 MG
200 TABLET ORAL ONCE AS NEEDED
Status: DISCONTINUED | OUTPATIENT
Start: 2017-11-03 | End: 2017-11-03

## 2017-11-03 RX ORDER — CIPROFLOXACIN 500 MG/1
500 TABLET, FILM COATED ORAL EVERY 12 HOURS SCHEDULED
Status: DISCONTINUED | OUTPATIENT
Start: 2017-11-03 | End: 2017-11-04

## 2017-11-03 RX ORDER — BUTALBITAL, ACETAMINOPHEN AND CAFFEINE 50; 325; 40 MG/1; MG/1; MG/1
1 TABLET ORAL ONCE AS NEEDED
Status: COMPLETED | OUTPATIENT
Start: 2017-11-03 | End: 2017-11-03

## 2017-11-03 RX ADMIN — SUMATRIPTAN SUCCINATE 50 MG: 25 TABLET ORAL at 13:15

## 2017-11-03 RX ADMIN — ACETAMINOPHEN 650 MG: 325 TABLET, FILM COATED ORAL at 00:22

## 2017-11-03 RX ADMIN — CEFEPIME HYDROCHLORIDE 2000 MG: 2 INJECTION, POWDER, FOR SOLUTION INTRAVENOUS at 21:05

## 2017-11-03 RX ADMIN — VANCOMYCIN HYDROCHLORIDE 1500 MG: 5 INJECTION, POWDER, LYOPHILIZED, FOR SOLUTION INTRAVENOUS at 06:50

## 2017-11-03 RX ADMIN — ACETAMINOPHEN 650 MG: 325 TABLET, FILM COATED ORAL at 10:01

## 2017-11-03 RX ADMIN — INSULIN LISPRO 3 UNITS: 100 INJECTION, SOLUTION INTRAVENOUS; SUBCUTANEOUS at 12:28

## 2017-11-03 RX ADMIN — LACTOBACILLUS ACIDOPHILUS / LACTOBACILLUS BULGARICUS 1 PACKET: 100 MILLION CFU STRENGTH GRANULES at 09:59

## 2017-11-03 RX ADMIN — INSULIN LISPRO 1 UNITS: 100 INJECTION, SOLUTION INTRAVENOUS; SUBCUTANEOUS at 16:56

## 2017-11-03 RX ADMIN — LACTOBACILLUS ACIDOPHILUS / LACTOBACILLUS BULGARICUS 1 PACKET: 100 MILLION CFU STRENGTH GRANULES at 16:55

## 2017-11-03 RX ADMIN — CIPROFLOXACIN 500 MG: 500 TABLET, FILM COATED ORAL at 21:05

## 2017-11-03 RX ADMIN — LEVOTHYROXINE SODIUM 50 MCG: 50 TABLET ORAL at 05:42

## 2017-11-03 RX ADMIN — CEFEPIME HYDROCHLORIDE 2000 MG: 2 INJECTION, POWDER, FOR SOLUTION INTRAVENOUS at 10:36

## 2017-11-03 RX ADMIN — SODIUM CHLORIDE 100 ML/HR: 0.9 INJECTION, SOLUTION INTRAVENOUS at 18:37

## 2017-11-03 RX ADMIN — SODIUM CHLORIDE 100 ML/HR: 0.9 INJECTION, SOLUTION INTRAVENOUS at 05:42

## 2017-11-03 RX ADMIN — ENOXAPARIN SODIUM 40 MG: 40 INJECTION SUBCUTANEOUS at 09:59

## 2017-11-03 RX ADMIN — LACTOBACILLUS ACIDOPHILUS / LACTOBACILLUS BULGARICUS 1 PACKET: 100 MILLION CFU STRENGTH GRANULES at 12:26

## 2017-11-03 RX ADMIN — BUTALBITAL, ACETAMINOPHEN, AND CAFFEINE 1 TABLET: 50; 325; 40 TABLET ORAL at 06:55

## 2017-11-03 RX ADMIN — CIPROFLOXACIN 500 MG: 500 TABLET, FILM COATED ORAL at 10:00

## 2017-11-03 NOTE — SOCIAL WORK
DASH discussion completed  Discussed goals of making sure pt's needs are met upon discharge, pt's preferences are taken into account, pt understands her health condition, medications and symptoms to watch for after returning home and pt is aware of any follow up appointments recommended by hospital physician  SPOKE WITH PT AT THE BEDSIDE  PT LIVES WITH SPOUSE/SO, HAS NO DME OR HHC  PT IS INDEPENDENT WITH HER OWN CARE, DOES DRIVE HERSELF    PT USES [facility] ON [address] IN [place], [place]

## 2017-11-03 NOTE — CONSULTS
Consultation - Infectious Disease   Pari Polo 63 y o  female MRN: [id number]  Unit/Bed#: [address] [place] [address] Encounter: [id number]        History of Present Illness   Physician Requesting Consult: Pretty Snell MD  Reason for Consult / Principal Problem:  Bacteremia    HPI: Pari Polo is a 63y o  year old female who has diabetes mellitus type 2, hypothyroidism, obesity, status post laparoscopic gastric banding(year 2006), sleeve gastroplasty(year 2015), remote history of endometrial cancer, status post radical hysterectomy (1999), urethral stricture from radiation treatment for endometrial cancer, which had required placement of urostomy (year 2004)  Her last hospital admission to [facility] system, in January 2016 for small bowel obstruction  She presented to [facility] Emergency Room on November 1, 2017, with complaints of chills, and fever that had started 2 days prior to seeking medical attention  She had gone to her primary physician Dr Sherrell Griffin, who then referred her to the hospital   In the emergency room she was noted to have a temperature of 102 8 degree F, she was tachycardic,  Urostomy was noted to be draining clear juan urine  She was hospitalized  She has been treated with ceftriaxone  Today, 1 of 2 blood cultures done on admission have revealed growth of gram-positive cocci in clusters  That is the reason for ID consult  Today, on further inquiry, reports that she started to have left flank pain 3 or 4 days prior to the abrupt onset of chills and fever on October 30th  She had been on a 1 week cruise to [facility], from where she had returned on October 28  While on the cruise she had started to experience the left flank pain, did not think much of it  She recalls, during the cruise, being in the hot tub but for only a very short period of time   By Monday October 30th, while she was at work, she started to feel tired and had chills  She went home early    The chills and fever persisted for the next couple of days, therefore, she decided to seek medical attention with primary physician  In the physician's office, the fever was high, there was also high blood pressure, he, therefore referred her to the ER  Since the hospital admission, she has felt better  She has only a mild discomfort in her left flank  She denies any shaking chills, now  She denies having any sore throat, cough or phlegm  She has not had any vomiting or diarrhea  Although, she has had a urostomy for nearly 13 years, she reports having no urinary tract infection during this time  In addition, has no history of renal colic  About a year ago, he was declared free of cancer, and was discharged from gynecological oncologist service  She has had morbid obesity, for which she had undergone gastric banding, lost 60 of 70 lb  A few years later, she had no further weight loss, more recently, she underwent removal of the gastric band and placement of a gastric sleeve  She reports having another 50 or 60 lb weight loss  Last week while on a cruise she gained 6 or 7 lb  She is a hairdresser  She is  lives at home with the   She has not had any children  She denies cigarette smoking, substance abuse, injection drug use or alcohol abuse  There is no history of breast or endometrial cancer in the female members of the family  Her father had brain cancer  Her mother has had skin cancer  The allergy to amoxicillin is described as yeast infection    Inpatient consult to Infectious Diseases  Consult performed by: Valeria Waite ordered by: Raquel Hull          Review of Systems   Constitutional: Positive for chills, fatigue and fever  HENT: Negative  Eyes: Negative  Respiratory: Negative  Cardiovascular: Negative  Gastrointestinal: Negative  Endocrine: Negative  Genitourinary: Positive for flank pain  Musculoskeletal: Positive for back pain  Skin: Negative  Allergic/Immunologic: Negative  Neurological: Positive for headaches  Reports migraine headaches often   Hematological: Negative  Psychiatric/Behavioral: Negative  Historical Information   Past Medical History:   Diagnosis Date    Cholelithiasis     [facility] 01/11/2004    on blood thinner for 3 years   Diabetes mellitus ([facility] )     type 2    Endometrial cancer ([facility] ) 1999    History of urostomy 01/11/2004    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   Hypothyroid     In vitro fertilization      Past Surgical History:   Procedure Laterality Date    COLONOSCOPY W/ BIOPSIES N/A 12/2015    HEEL SPUR SURGERY Right 1996    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    LAPAROSCOPIC [place]    RADICAL HYSTERECTOMY N/A 1999    [facility] endometrial [facility]      SLEEVE GASTROPLASTY N/A 09/2015    Dr Neri Clifton, [facility]     Social History   History   Alcohol Use    Yes     Comment: socially     History   Drug Use No     History   Smoking Status    Former Smoker    Packs/day: 1 50    Years: 10 00    Types: Cigarettes    Quit date: 1/11/1986   Smokeless Tobacco    Former User     Family History:   Family History   Problem Relation Age of Onset    No Known Problems Mother     Kidney failure Father     Brain cancer Father     Diabetes Sister     Alcohol abuse Sister     Diabetes Brother        Meds/Allergies     Current Facility-Administered Medications:     acetaminophen (TYLENOL) tablet 650 mg, 650 mg, Oral, Q6H PRN, TRACY Harris, 650 mg at 11/03/17 0022    aluminum-magnesium hydroxide-simethicone (MYLANTA) 200-200-20 mg/5 mL oral suspension 30 mL, 30 mL, Oral, Q6H PRN, TRACY Harris    cefTRIAXone (ROCEPHIN) IVPB (premix) 2,000 mg, 2,000 mg, Intravenous, Q24H, TRACY Harris, Last Rate: 100 mL/hr at 11/02/17 2129, 2,000 mg at 11/02/17 2129   enoxaparin (LOVENOX) subcutaneous injection 40 mg, 40 mg, Subcutaneous, Daily, TRACY Harris, 40 mg at 11/02/17 0833    influenza inactivated quadrivalent vaccine (FLULAVAL) IM injection 0 5 mL, 0 5 mL, Intramuscular, Prior to discharge, TRACY Harris    insulin lispro (HumaLOG) 100 units/mL subcutaneous injection 1-5 Units, 1-5 Units, Subcutaneous, HS, TRACY Harris, 2 Units at 11/02/17 2130    insulin lispro (HumaLOG) 100 units/mL subcutaneous injection 1-6 Units, 1-6 Units, Subcutaneous, TID AC, 2 Units at 11/02/17 1634 **AND** Fingerstick Glucose (POCT), , , TID AC, TRACY Harris    lactobacillus acidophilus-bulgaricus [facility]) packet 1 packet, 1 packet, Oral, TID With Meals, Rowan Jackson MD, 1 packet at 11/02/17 1633    levothyroxine tablet 50 mcg, 50 mcg, Oral, Early Morning, TRACY Harris, 50 mcg at 11/03/17 0542    ondansetron (ZOFRAN) injection 4 mg, 4 mg, Intravenous, Q6H PRN, TRACY Harris    sodium chloride 0 9 % infusion, 100 mL/hr, Intravenous, Continuous, TRACY Harris, Last Rate: 100 mL/hr at 11/03/17 0542, 100 mL/hr at 11/03/17 0542  Allergies   Allergen Reactions    Amoxicillin      all current active meds have been reviewed    PTA meds:    Prescriptions Prior to Admission   Medication Sig Dispense Refill Last Dose    Dapagliflozin Propanediol (FARXIGA PO) Take by mouth daily Indications: Diabetes  11/1/2017 at 0800    levothyroxine 50 mcg tablet Take 50 mcg by mouth daily     11/1/2017 at 630    metFORMIN (GLUCOPHAGE) 500 mg tablet Take 500 mg by mouth daily as needed     Past Week at Unknown time    and discontinued meds:   Medications Discontinued During This Encounter   Medication Reason    cefTRIAXone (ROCEPHIN) 1,000 mg in dextrose 5 % 50 mL IVPB     ibuprofen (MOTRIN) tablet 200 mg        PHYSICAL EXAM:  Vitals:    11/02/17 1612 11/02/17 2138 11/03/17 0009 11/03/17 0026   BP: 96/60   138/70   Pulse: 63  66    Resp:   16    Temp: 98 6 °F (37 °C) 98 9 °F (37 2 °C) 100 5 °F (38 1 °C)    TempSrc: Oral Oral Tympanic    SpO2: 95%  96%    Weight:       Height:         Body mass index is 37 82 kg/m²  Weight (last 2 days)     Date/Time   Weight    11/01/17 2334  106 (234 35)    11/01/17 1547  104 (230)            Physical Exam  This is a middle-age obese woman, alert and cheerful  Her maximum temperature was 102 8 degree F, in the ER, which is trending down, was 100 5 degree F overnight  Other vital signs have stabilized  HEENT exam:  Normocephalic head, no scleral icterus or conjunctivitis  No nasal or pharyngeal mucosal lesions and teeth are in good repair  Neck:  There is no jugular venous distention, lymphadenopathy, thyromegaly or focal tenderness  The neck is supple  Chest:  The heart sounds are regular with no murmurs  The lungs are clear to auscultation  There are no masses in the breast   Abdomen: There are scars from previous surgeries  There is right-sided urostomy in place, the urine appears clear an juan  There is mild tenderness in the left costovertebral angle  There is no other areas of tenderness and the abdomen is soft  There is no palpable organomegaly  Lower extremities: The calves are supple there is no acute arthritis  There are no ulcerations of the feet  There is no skin or soft tissue infection  Upper extremities:  No track marks or acute arthritis of phlebitis  Intake/Output Summary (Last 24 hours) at 11/03/17 0840  Last data filed at 11/03/17 4303   Gross per 24 hour   Intake             1000 ml   Output             2300 ml   Net            -1300 ml       Invasive Devices:   Peripheral IV 11/02/17 Left; Lower Wrist (Active)   Site Assessment Clean;Dry; Intact 11/2/2017  9:10 PM   Dressing Type Transparent 11/2/2017  9:10 PM   Line Status Infusing 11/2/2017  9:10 PM   Dressing Status Clean;Dry; Intact 11/2/2017  9:10 PM       Urostomy Ureterostomy right RUQ (Active)   Stomal Appliance 2 piece; Intact 11/2/2017  9:10 PM   Peristomal Assessment Clean; Intact 11/2/2017  9:10 PM   Treatment Placement checked 11/2/2017  9:10 PM   Output (mL) 400 mL 11/3/2017  6:37 AM         Lab Results:   Recent Results (from the past 96 hour(s))   Blood culture #1    Collection Time: 11/01/17  5:05 PM   Result Value Ref Range    Blood Culture      Gram Stain Result Gram positive cocci in clusters    Blood culture #2    Collection Time: 11/01/17  5:11 PM   Result Value Ref Range    Blood Culture No Growth at 24 hrs      CBC and differential    Collection Time: 11/01/17  5:12 PM   Result Value Ref Range    WBC 7 00 4 80 - 10 80 Thousand/uL    RBC 3 92 (L) 4 20 - 5 40 Million/uL    Hemoglobin 11 7 (L) 12 0 - 16 0 g/dL    Hematocrit 36 0 (L) 37 0 - 47 0 %    MCV 92 82 - 98 fL    MCH 30 0 27 0 - 31 0 pg    MCHC 32 5 31 4 - 37 4 g/dL    RDW 16 3 (H) 11 6 - 15 1 %    MPV 6 6 (L) 8 9 - 12 7 fL    Platelets 914 622 - 801 Thousands/uL    nRBC 0 /100 WBCs    Neutrophils Relative 86 (H) 43 - 75 %    Lymphocytes Relative 7 (L) 14 - 44 %    Monocytes Relative 7 4 - 12 %    Eosinophils Relative 0 0 - 6 %    Basophils Relative 0 0 - 1 %    Neutrophils Absolute 6 00 1 85 - 7 62 Thousands/µL    Lymphocytes Absolute 0 50 (L) 0 60 - 4 47 Thousands/µL    Monocytes Absolute 0 50 0 17 - 1 22 Thousand/µL    Eosinophils Absolute 0 00 0 00 - 0 61 Thousand/µL    Basophils Absolute 0 00 0 00 - 0 10 Thousands/µL   Sedimentation rate, automated    Collection Time: 11/01/17  5:12 PM   Result Value Ref Range    Sed Rate 45 (H) 2 - 25 mm/hour   Comprehensive metabolic panel    Collection Time: 11/01/17  5:12 PM   Result Value Ref Range    Sodium 140 136 - 145 mmol/L    Potassium 3 6 3 5 - 5 3 mmol/L    Chloride 106 100 - 108 mmol/L    CO2 24 21 - 32 mmol/L    Anion Gap 10 4 - 13 mmol/L    BUN 21 5 - 25 mg/dL    Creatinine 1 11 0 60 - 1 30 mg/dL    Glucose 168 (H) 65 - 140 mg/dL    Calcium 9 0 8 3 - 10 1 mg/dL    AST 32 5 - 45 U/L    ALT 39 12 - 78 U/L    Alkaline Phosphatase 131 (H) 46 - 116 U/L    Total Protein 6 4 6 4 - 8 2 g/dL    Albumin 2 8 (L) 3 5 - 5 0 g/dL    Total Bilirubin 0 90 0 20 - 1 00 mg/dL    eGFR 54 ml/min/1 73sq m   Rapid Influenza Screen with Reflex PCR (indicated for patients <2mo of age)    Collection Time: 11/01/17  5:14 PM   Result Value Ref Range    Rapid Influenza A Ag Negative Negative, Indeterminate    Rapid Influenza B Ag Negative Negative, Indeterminate   Lactic acid, plasma    Collection Time: 11/01/17  5:14 PM   Result Value Ref Range    LACTIC ACID 1 1 0 5 - 2 0 mmol/L   Influenza A/B and RSV by PCR (Indicated for patients > 2 mo of age)    Collection Time: 11/01/17  5:14 PM   Result Value Ref Range    INFLU A PCR None Detected None Detected    INFLU B PCR None Detected None Detected    RSV PCR None Detected None Detected   UA w Reflex to Microscopic w Reflex to Culture    Collection Time: 11/01/17  7:05 PM   Result Value Ref Range    Color, UA Yellow     Clarity, UA Cloudy     Specific [place], UA 1 010 1 000 - 1 030    pH, UA 6 0 5 0 - 9 0    Leukocytes, UA Small (A) Negative    Nitrite, UA Positive (A) Negative    Protein, UA 30 (1+) (A) Negative mg/dl    Glucose, UA >=1000 (1%) (A) Negative mg/dl    Ketones, UA Negative Negative mg/dl    Urobilinogen, UA 0 2 0 2, 1 0 E U /dl E U /dl    Bilirubin, UA Negative Negative    Blood, UA Small (A) Negative   Urine Microscopic    Collection Time: 11/01/17  7:05 PM   Result Value Ref Range    RBC, UA 4-10 (A) None Seen, 0-5 /hpf    WBC, UA Innumerable (A) None Seen, 0-5, 5-55, 5-65 /hpf    Epithelial Cells None Seen None Seen, Occasional /hpf    Bacteria, UA Innumerable (A) None Seen, Occasional /hpf    AMORPH URATES Occasional /hpf   Urine culture    Collection Time: 11/01/17  7:05 PM   Result Value Ref Range    Urine Culture (A)      >100,000 cfu/ml Non lactose fermenting gram negative kiah   Basic metabolic panel    Collection Time: 11/02/17  6:02 AM   Result Value Ref Range    Sodium 141 136 - 145 mmol/L    Potassium 3 8 3 5 - 5 3 mmol/L    Chloride 111 (H) 100 - 108 mmol/L    CO2 23 21 - 32 mmol/L    Anion Gap 7 4 - 13 mmol/L    BUN 16 5 - 25 mg/dL    Creatinine 0 88 0 60 - 1 30 mg/dL    Glucose 124 65 - 140 mg/dL    Calcium 8 9 8 3 - 10 1 mg/dL    eGFR 72 ml/min/1 73sq m   Magnesium    Collection Time: 11/02/17  6:02 AM   Result Value Ref Range    Magnesium 1 8 1 6 - 2 6 mg/dL   CBC and differential    Collection Time: 11/02/17  6:02 AM   Result Value Ref Range    WBC 4 60 (L) 4 80 - 10 80 Thousand/uL    RBC 3 92 (L) 4 20 - 5 40 Million/uL    Hemoglobin 11 7 (L) 12 0 - 16 0 g/dL    Hematocrit 36 3 (L) 37 0 - 47 0 %    MCV 93 82 - 98 fL    MCH 29 9 27 0 - 31 0 pg    MCHC 32 3 31 4 - 37 4 g/dL    RDW 16 2 (H) 11 6 - 15 1 %    MPV 6 7 (L) 8 9 - 12 7 fL    Platelets 555 251 - 559 Thousands/uL    nRBC 0 /100 WBCs    Neutrophils Relative 73 43 - 75 %    Lymphocytes Relative 15 14 - 44 %    Monocytes Relative 10 4 - 12 %    Eosinophils Relative 2 0 - 6 %    Basophils Relative 0 0 - 1 %    Neutrophils Absolute 3 30 1 85 - 7 62 Thousands/µL    Lymphocytes Absolute 0 70 0 60 - 4 47 Thousands/µL    Monocytes Absolute 0 50 0 17 - 1 22 Thousand/µL    Eosinophils Absolute 0 10 0 00 - 0 61 Thousand/µL    Basophils Absolute 0 00 0 00 - 0 10 Thousands/µL   Hemoglobin A1c    Collection Time: 11/02/17  6:08 AM   Result Value Ref Range    Hemoglobin A1C 7 5 (H) 4 2 - 6 3 %     mg/dl   T4, free    Collection Time: 11/02/17  6:08 AM   Result Value Ref Range    Free T4 1 06 0 76 - 1 46 ng/dL   Fingerstick Glucose (POCT)    Collection Time: 11/02/17  6:53 AM   Result Value Ref Range    POC Glucose 117 65 - 140 mg/dl   Fingerstick Glucose (POCT)    Collection Time: 11/02/17 11:30 AM   Result Value Ref Range    POC Glucose 132 65 - 140 mg/dl   Fingerstick Glucose (POCT)    Collection Time: 11/02/17  4:26 PM   Result Value Ref Range    POC Glucose 200 (H) 65 - 140 mg/dl   Fingerstick Glucose (POCT)    Collection Time: 11/02/17  8:52 PM   Result Value Ref Range    POC Glucose 270 (H) 65 - 140 mg/dl   CBC and differential    Collection Time: 11/03/17  6:52 AM   Result Value Ref Range    WBC 4 40 (L) 4 80 - 10 80 Thousand/uL    RBC 3 51 (L) 4 20 - 5 40 Million/uL    Hemoglobin 10 5 (L) 12 0 - 16 0 g/dL    Hematocrit 32 2 (L) 37 0 - 47 0 %    MCV 92 82 - 98 fL    MCH 29 8 27 0 - 31 0 pg    MCHC 32 5 31 4 - 37 4 g/dL    RDW 16 1 (H) 11 6 - 15 1 %    MPV 6 5 (L) 8 9 - 12 7 fL    Platelets 817 591 - 113 Thousands/uL    nRBC 0 /100 WBCs    Neutrophils Relative 62 43 - 75 %    Lymphocytes Relative 22 14 - 44 %    Monocytes Relative 12 4 - 12 %    Eosinophils Relative 4 0 - 6 %    Basophils Relative 1 0 - 1 %    Neutrophils Absolute 2 80 1 85 - 7 62 Thousands/µL    Lymphocytes Absolute 1 00 0 60 - 4 47 Thousands/µL    Monocytes Absolute 0 50 0 17 - 1 22 Thousand/µL    Eosinophils Absolute 0 20 0 00 - 0 61 Thousand/µL    Basophils Absolute 0 00 0 00 - 0 10 Thousands/µL   Basic metabolic panel    Collection Time: 11/03/17  6:52 AM   Result Value Ref Range    Sodium 144 136 - 145 mmol/L    Potassium 3 6 3 5 - 5 3 mmol/L    Chloride 112 (H) 100 - 108 mmol/L    CO2 23 21 - 32 mmol/L    Anion Gap 9 4 - 13 mmol/L    BUN 15 5 - 25 mg/dL    Creatinine 0 88 0 60 - 1 30 mg/dL    Glucose 142 (H) 65 - 140 mg/dL    Calcium 8 9 8 3 - 10 1 mg/dL    eGFR 72 ml/min/1 73sq m   Fingerstick Glucose (POCT)    Collection Time: 11/03/17  7:52 AM   Result Value Ref Range    POC Glucose 148 (H) 65 - 140 mg/dl     Cultures    Results from last 7 days  Lab Units 11/01/17  1905 11/01/17  1714 11/01/17  1711 11/01/17  1705   BLOOD CULTURE   --   --  No Growth at 24 hrs   --    GRAM STAIN RESULT   --   --   --  Gram positive cocci in clusters   URINE CULTURE  >100,000 cfu/ml Non lactose fermenting gram negative kiah*  --   --   --    INFLUENZA A PCR   --  None Detected  --   --    INFLUENZA B PCR   --  None Detected  --   --    RSV PCR   --  None Detected  --   -- HEPATITIS: No results found for: HAV, HEPAIGM, HEPBIGM, HEPBCAB, HBEAG, HEPCAB    PPD: No results found for: PPD    Urine Tox Screen: No results found for: PCP, TCA    I have personally reviewed pertinent labs  Imaging Studies:  Xr Chest 1 View Portable    Result Date: 11/2/2017  Narrative: CHEST INDICATION:  fever  History taken directly from the electronic ordering system  COMPARISON:  8/30/2012 VIEWS:   AP frontal IMAGES:  1 FINDINGS:     Cardiomediastinal silhouette appears unremarkable  The lungs are clear  No pneumothorax or pleural effusion  Visualized osseous structures appear within normal limits for the patient's age  Impression: No active pulmonary disease  As per comments in the PACS workstation, findings are concordant with preliminary interpretation provided by the emergency room physician  Workstation performed: [id number]     Ct Abdomen Pelvis With Contrast    Result Date: 11/1/2017  Narrative: CT ABDOMEN AND PELVIS WITH IV CONTRAST INDICATION:  Pyelonephritis COMPARISON: CT abdomen pelvis 1/11/2016 TECHNIQUE:  CT examination of the abdomen and pelvis was performed  Reformatted images were created in axial, sagittal, and coronal planes  Radiation dose length product (DLP) for this visit:  1535 2 mGy-cm   This examination, like all CT scans performed in the [facility], was performed utilizing techniques to minimize radiation dose exposure, including the use of iterative  reconstruction and automated exposure control  IV Contrast:  100 mL of iohexol (OMNIPAQUE)     Enteric Contrast:  Enteric contrast was not administered  FINDINGS: ABDOMEN LOWER CHEST:  There is bibasilar dependent atelectasis  LIVER/BILIARY TREE:  Unremarkable  GALLBLADDER:  There are gallstone(s) within the gallbladder, without pericholecystic inflammatory changes  SPLEEN:  Unremarkable  PANCREAS:  Unremarkable  ADRENAL GLANDS:  Unremarkable   KIDNEYS/URETERS:  One or more sharply circumscribed subcentimeter renal hypodensities are noted  These lesions are too small to accurately characterize, but are statistically most likely to represent benign cortical renal cyst(s)  According to the guidelines published in the [facility] Paper of the ACR Incidental Findings Committee (Radiology 2010), no further workup of these lesions is recommended  There is a 4 mm nonobstructing left lower pole renal calculus  There is a right lower quadrant ileostomy  There is mild left-sided hydroureteronephrosis without evident obstructing calculus  STOMACH AND BOWEL:  There is colonic diverticulosis without evidence of acute diverticulitis  No evidence of bowel obstruction  Small bowel herniated into the ileostomy without evident complication  Small hiatal hernia  There are postsurgical changes  of the stomach  APPENDIX:  No findings to suggest appendicitis  ABDOMINOPELVIC CAVITY:  No ascites or free intraperitoneal air  No lymphadenopathy  VESSELS:  Unremarkable for patient's age  PELVIS REPRODUCTIVE ORGANS:  Patient is status post hysterectomy  URINARY BLADDER:  Unremarkable  ABDOMINAL WALL/INGUINAL REGIONS:  Status post right lower quadrant ileostomy  No inguinal hernia  OSSEOUS STRUCTURES:  No acute fracture or destructive osseous lesion  Impression: 1  Mild prominence of the left renal collecting system which is new from prior examination  No evident obstructing calculus  Differential considerations include recently passed calculus versus ascending infection or noncalcified obstructing lesion  2   Nonobstructing 4 mm left lower pole renal calculus  3   Cholelithiasis without other evidence of acute cholecystitis  4   There is herniation of small bowel at the right lower quadrant ileostomy site  No evidence of acute complication  Workstation performed: [id number]       EKG, Pathology, and Other Studies: I have personally reviewed pertinent reports          Principal Problem:    Sepsis ([address] )  Active Problems:    Pyelonephritis    Anemia    Hydroureteronephrosis      Assessment/Plan   This is a middle-age woman with obesity, hypothyroidism, diabetes mellitus type 2, poorly controlled, remote history of endometrial cancer, status post total abdominal hysterectomy, subsequent radiation treatment following which she developed urethral stenosis, underwent urostomy procedure done in year 2004  She has also had bariatric surgery, with some success  She presented to the hospital with signs of sepsis, suspected to have left-sided pyelonephritis, microbial etiology, Gram-negative bacilli  The initial report is non fermenting Gram-negative bacilli, which suggests infection with Pseudomonas/Proteus or Serratia / Citrobacter (weak or slow lactose fermenters)  Recommend changing antimicrobial treatment from ceftriaxone to cefepime to cover for Pseudomonas   Add oral ciprofloxacin, which can be used for outpatient treatment for completion of therapy, if, the organism is sensitive to fluoroquinolones  Bacteremia, Gram-positive cocci in clusters most likely are probably the coagulase-negative staphylococci, the skin contaminants  Recommend no specific treatment for this  Thank you for the consultation    I will discuss it with the hospitalist

## 2017-11-03 NOTE — PROGRESS NOTES
[facility] Internal Medicine Progress Note  Patient: Avinash Norman 63 y o  female   MRN: [id number]  PCP: Xiang Burton DO  Unit/Bed#: [address] [place] [address] Encounter: [id number]  Date Of Visit: 17    Subjective:   Still fever 38 1 C am  Less left flank pain  She is feeling better overall    Objective:   Vitals:   Temp (24hrs), Av °F (37 2 °C), Min:97 8 °F (36 6 °C), Max:100 5 °F (38 1 °C)    HR:  [63-69] 69  Resp:  [16-18] 18  BP: ()/(60-73) 139/73  SpO2:  [95 %-96 %] 96 %  Body mass index is 37 82 kg/m²  Intake/Output Summary (Last 24 hours) at 17 1157  Last data filed at 17 3971   Gross per 24 hour   Intake             1000 ml   Output             2300 ml   Net            -1300 ml       Physical Exam:   General: NAD  HEENT: EOMI, anicteric, oral moist, no oral thrush or mucosal lesion, neck supple, no mass or JVD  Chest: CTAB, no wheeze/rales  Cardiac: RRR, S1/S2, No murmur  Abd: S/p urostomy at RLQ, S/ND/NT/BS+  MSK: No LE pitting edema, pulses intact  Neuro: AAOx3, moving all extremities  Psychiatric: Mood with normal affect      Additional Data:     Labs:    Results from last 7 days  Lab Units 17  0652   WBC Thousand/uL 4 40*   HEMOGLOBIN g/dL 10 5*   HEMATOCRIT % 32 2*   PLATELETS Thousands/uL 195   NEUTROS PCT % 62   LYMPHS PCT % 22   MONOS PCT % 12   EOS PCT % 4       Results from last 7 days  Lab Units 17  0652  17  1712   SODIUM mmol/L 144  < > 140   POTASSIUM mmol/L 3 6  < > 3 6   CHLORIDE mmol/L 112*  < > 106   CO2 mmol/L 23  < > 24   BUN mg/dL 15  < > 21   CREATININE mg/dL 0 88  < > 1 11   CALCIUM mg/dL 8 9  < > 9 0   TOTAL PROTEIN g/dL  --   --  6 4   BILIRUBIN TOTAL mg/dL  --   --  0 90   ALK PHOS U/L  --   --  131*   ALT U/L  --   --  39   AST U/L  --   --  32   GLUCOSE RANDOM mg/dL 142*  < > 168*   < > = values in this interval not displayed          Recent Results (from the past 24 hour(s))   Fingerstick Glucose (POCT)    Collection Time: 17  4:26 PM   Result Value Ref Range    POC Glucose 200 (H) 65 - 140 mg/dl   Fingerstick Glucose (POCT)    Collection Time: 11/02/17  8:52 PM   Result Value Ref Range    POC Glucose 270 (H) 65 - 140 mg/dl   CBC and differential    Collection Time: 11/03/17  6:52 AM   Result Value Ref Range    WBC 4 40 (L) 4 80 - 10 80 Thousand/uL    RBC 3 51 (L) 4 20 - 5 40 Million/uL    Hemoglobin 10 5 (L) 12 0 - 16 0 g/dL    Hematocrit 32 2 (L) 37 0 - 47 0 %    MCV 92 82 - 98 fL    MCH 29 8 27 0 - 31 0 pg    MCHC 32 5 31 4 - 37 4 g/dL    RDW 16 1 (H) 11 6 - 15 1 %    MPV 6 5 (L) 8 9 - 12 7 fL    Platelets 001 352 - 157 Thousands/uL    nRBC 0 /100 WBCs    Neutrophils Relative 62 43 - 75 %    Lymphocytes Relative 22 14 - 44 %    Monocytes Relative 12 4 - 12 %    Eosinophils Relative 4 0 - 6 %    Basophils Relative 1 0 - 1 %    Neutrophils Absolute 2 80 1 85 - 7 62 Thousands/µL    Lymphocytes Absolute 1 00 0 60 - 4 47 Thousands/µL    Monocytes Absolute 0 50 0 17 - 1 22 Thousand/µL    Eosinophils Absolute 0 20 0 00 - 0 61 Thousand/µL    Basophils Absolute 0 00 0 00 - 0 10 Thousands/µL   Basic metabolic panel    Collection Time: 11/03/17  6:52 AM   Result Value Ref Range    Sodium 144 136 - 145 mmol/L    Potassium 3 6 3 5 - 5 3 mmol/L    Chloride 112 (H) 100 - 108 mmol/L    CO2 23 21 - 32 mmol/L    Anion Gap 9 4 - 13 mmol/L    BUN 15 5 - 25 mg/dL    Creatinine 0 88 0 60 - 1 30 mg/dL    Glucose 142 (H) 65 - 140 mg/dL    Calcium 8 9 8 3 - 10 1 mg/dL    eGFR 72 ml/min/1 73sq m   Fingerstick Glucose (POCT)    Collection Time: 11/03/17  7:52 AM   Result Value Ref Range    POC Glucose 148 (H) 65 - 140 mg/dl   Fingerstick Glucose (POCT)    Collection Time: 11/03/17 11:28 AM   Result Value Ref Range    POC Glucose 249 (H) 65 - 140 mg/dl       Cultures:     Results from last 7 days  Lab Units 11/01/17  1905 11/01/17  1714 11/01/17  1711 11/01/17  1705   BLOOD CULTURE   --   --  No Growth at 24 hrs   --    GRAM STAIN RESULT   --   --   --  Gram positive cocci in clusters   URINE CULTURE  >100,000 cfu/ml Serratia marcescens*  --   --   --    INFLUENZA A PCR   --  None Detected  --   --    INFLUENZA B PCR   --  None Detected  --   --    RSV PCR   --  None Detected  --   --        Imaging:  Xr Chest 1 View Portable    Result Date: 11/2/2017  Narrative: CHEST INDICATION:  fever  History taken directly from the electronic ordering system  COMPARISON:  8/30/2012 VIEWS:   AP frontal IMAGES:  1 FINDINGS:     Cardiomediastinal silhouette appears unremarkable  The lungs are clear  No pneumothorax or pleural effusion  Visualized osseous structures appear within normal limits for the patient's age  Impression: No active pulmonary disease  As per comments in the PACS workstation, findings are concordant with preliminary interpretation provided by the emergency room physician  Workstation performed: [id number]     Ct Abdomen Pelvis With Contrast    Result Date: 11/1/2017  Narrative: CT ABDOMEN AND PELVIS WITH IV CONTRAST INDICATION:  Pyelonephritis COMPARISON: CT abdomen pelvis 1/11/2016 TECHNIQUE:  CT examination of the abdomen and pelvis was performed  Reformatted images were created in axial, sagittal, and coronal planes  Radiation dose length product (DLP) for this visit:  1535 2 mGy-cm   This examination, like all CT scans performed in the [facility], was performed utilizing techniques to minimize radiation dose exposure, including the use of iterative  reconstruction and automated exposure control  IV Contrast:  100 mL of iohexol (OMNIPAQUE)     Enteric Contrast:  Enteric contrast was not administered  FINDINGS: ABDOMEN LOWER CHEST:  There is bibasilar dependent atelectasis  LIVER/BILIARY TREE:  Unremarkable  GALLBLADDER:  There are gallstone(s) within the gallbladder, without pericholecystic inflammatory changes  SPLEEN:  Unremarkable  PANCREAS:  Unremarkable  ADRENAL GLANDS:  Unremarkable   KIDNEYS/URETERS:  One or more sharply circumscribed subcentimeter renal hypodensities are noted  These lesions are too small to accurately characterize, but are statistically most likely to represent benign cortical renal cyst(s)  According to the guidelines published in the  [place] Paper of the ACR Incidental Findings Committee (Radiology 2010), no further workup of these lesions is recommended  There is a 4 mm nonobstructing left lower pole renal calculus  There is a right lower quadrant ileostomy  There is mild left-sided hydroureteronephrosis without evident obstructing calculus  STOMACH AND BOWEL:  There is colonic diverticulosis without evidence of acute diverticulitis  No evidence of bowel obstruction  Small bowel herniated into the ileostomy without evident complication  Small hiatal hernia  There are postsurgical changes  of the stomach  APPENDIX:  No findings to suggest appendicitis  ABDOMINOPELVIC CAVITY:  No ascites or free intraperitoneal air  No lymphadenopathy  VESSELS:  Unremarkable for patient's age  PELVIS REPRODUCTIVE ORGANS:  Patient is status post hysterectomy  URINARY BLADDER:  Unremarkable  ABDOMINAL WALL/INGUINAL REGIONS:  Status post right lower quadrant ileostomy  No inguinal hernia  OSSEOUS STRUCTURES:  No acute fracture or destructive osseous lesion  Impression: 1  Mild prominence of the left renal collecting system which is new from prior examination  No evident obstructing calculus  Differential considerations include recently passed calculus versus ascending infection or noncalcified obstructing lesion  2   Nonobstructing 4 mm left lower pole renal calculus  3   Cholelithiasis without other evidence of acute cholecystitis  4   There is herniation of small bowel at the right lower quadrant ileostomy site  No evidence of acute complication   Workstation performed: [id number]     Imaging Reports Reviewed by myself    Last 24 Hours Medication List:     Current Facility-Administered Medications:    acetaminophen (TYLENOL) tablet 650 mg, 650 mg, Oral, Q6H PRN, TRACY Harris, 650 mg at 11/03/17 1001    aluminum-magnesium hydroxide-simethicone (MYLANTA) 200-200-20 mg/5 mL oral suspension 30 mL, 30 mL, Oral, Q6H PRN, TRACY Harris    cefepime (MAXIPIME) 2,000 mg in dextrose 5 % 50 mL IVPB, 2,000 mg, Intravenous, Q12H, Kay Bentley MD, Last Rate: 100 mL/hr at 11/03/17 1036, 2,000 mg at 11/03/17 1036    ciprofloxacin (CIPRO) tablet 500 mg, 500 mg, Oral, Q12H [facility], Kay Bentley MD, 500 mg at 11/03/17 1000    enoxaparin (LOVENOX) subcutaneous injection 40 mg, 40 mg, Subcutaneous, Daily, TRACY Harris, 40 mg at 11/03/17 0959    influenza inactivated quadrivalent vaccine (FLULAVAL) IM injection 0 5 mL, 0 5 mL, Intramuscular, Prior to discharge, TRACY Harris    insulin lispro (HumaLOG) 100 units/mL subcutaneous injection 1-5 Units, 1-5 Units, Subcutaneous, HS, TRACY Harris, 2 Units at 11/02/17 2130    insulin lispro (HumaLOG) 100 units/mL subcutaneous injection 1-6 Units, 1-6 Units, Subcutaneous, TID AC, 2 Units at 11/02/17 1634 **AND** Fingerstick Glucose (POCT), , , TID AC, TRACY Harris    lactobacillus acidophilus-bulgaricus [facility]) packet 1 packet, 1 packet, Oral, TID With Meals, Julienne August MD, 1 packet at 11/03/17 0959    levothyroxine tablet 50 mcg, 50 mcg, Oral, Early Morning, TRACY Harris, 50 mcg at 11/03/17 0542    ondansetron (ZOFRAN) injection 4 mg, 4 mg, Intravenous, Q6H PRN, TRACY Harris    sodium chloride 0 9 % infusion, 100 mL/hr, Intravenous, Continuous, TRACY Harris, Last Rate: 100 mL/hr at 11/03/17 0542, 100 mL/hr at 11/03/17 0542    SUMAtriptan (IMITREX) tablet 50 mg, 50 mg, Oral, Once, Julienne August MD     Assessment and Plans:  Hospital Problem List:   Principal Problem:    Sepsis ([facility] [address] )  Active Problems:    Pyelonephritis    Anemia    Hydroureteronephrosis    63 y o  female with a history of T2DM, Endometrial cancer, s/p urostomy, hypothyroidism, DVT who was admitted on 11/1/2017 with high fever and left flank pain, persistent to sepsis 2/2 pyelonephritis  Plan:  ID consult appreciated  Urine cx +ve for GNR non-lactose fermenting  Abx escalated to cefepime iv   F/u urine cx final result  Received vancomycin iv x 1 last night due to 1/2 bottle blood cx +ve for GPC in clusters, questionable contaminant   IVF hydration  Hold metformin  SSI  Probiotics  Levothyroxine    DVT Prophylaxis: on Lovenox sc  Code Status: Level 1 - Full Code  Disposition: anticipate d/c home once clinically improved  Patient Centered Rounds: I have performed bedside rounds with nursing staff today  Discussions with Specialists or Other Care Team Provider: Yes    Education and Discussions with Family / Patient:Yes    Time Spent for Care: 20 minutes  More than 50% of total time spent on counseling and coordination of care as described above      Current Length of Stay: 2 day(s)    Current Patient Status: Inpatient     Signed by:  Caitlin Silva MD  Attending Hospitalist  Page#: [phone] (Hours 7am to 7pm)  11/3/2017 11:57 AM

## 2017-11-03 NOTE — CASE MANAGEMENT
Continued Stay Review    Date: 11/3/17    Vitals:   Temp (24hrs), Av °F (37 2 °C), Min:97 8 °F (36 6 °C), Max:100 5 °F (38 1 °C)     HR:  [63-69] 69  Resp:  [16-18] 18  BP: ()/(60-73) 139/73  SpO2:  [95 %-96 %] 96 %  Body mass index is 37 82 kg/m²         CONSULT ID  BLD CX  IV VANCOMCIN X1  CBC DIFF BMP    Medications:   Scheduled Meds:   cefepime 2,000 mg Intravenous Q12H   ciprofloxacin 500 mg Oral Q12H BRETT   enoxaparin 40 mg Subcutaneous Daily   insulin lispro 1-5 Units Subcutaneous HS   insulin lispro 1-6 Units Subcutaneous TID AC   lactobacillus acidophilus-bulgaricus 1 packet Oral TID With Meals   levothyroxine 50 mcg Oral Early Morning     Continuous Infusions:   sodium chloride 100 mL/hr Last Rate: 100 mL/hr (17 0542)     PRN Meds:   acetaminophen    aluminum-magnesium hydroxide-simethicone    influenza vaccine    ondansetron    Abnormal Labs/Diagnostic Results:  GLUCOSE 142 WC 4 4 H/H 10  2    Age/Sex: 63 y o  female     PER MD  Notified that the pt had positive blood culture with gram positive cocci in clusters  This may be a contaminant however will give a dose of Vancomycin, consult ID and repeat blood cultures  PER ID  This is a middle-age woman with obesity, hypothyroidism, diabetes mellitus type 2, poorly controlled, remote history of endometrial cancer, status post total abdominal hysterectomy, subsequent radiation treatment following which she developed urethral stenosis, underwent urostomy procedure done in year   She has also had bariatric surgery, with some success  She presented to the hospital with signs of sepsis, suspected to have left-sided pyelonephritis, microbial etiology, Gram-negative bacilli  The initial report is non fermenting Gram-negative bacilli, which suggests infection with Pseudomonas/Proteus or Serratia / Citrobacter (weak or slow lactose fermenters)    Recommend changing antimicrobial treatment from ceftriaxone to cefepime to cover for Pseudomonas   Add oral ciprofloxacin, which can be used for outpatient treatment for completion of therapy, if, the organism is sensitive to fluoroquinolones  Bacteremia, Gram-positive cocci in clusters most likely are probably the coagulase-negative staphylococci, the skin contaminants  Recommend no specific treatment for this  PER MD  Still fever 38 1 C am  Less left flank pain  63 y o  female with a history of T2DM, Endometrial cancer, s/p urostomy, hypothyroidism, DVT who was admitted on 11/1/2017 with high fever and left flank pain, persistent to sepsis 2/2 pyelonephritis  Plan:  ID consult appreciated  Urine cx +ve for GNR non-lactose fermenting  Abx escalated to cefepime iv   F/u urine cx final result  Received vancomycin iv x 1 last night due to 1/2 bottle blood cx +ve for GPC in clusters, questionable contaminant   IVF hydration  Hold metformin  SSI  Probiotics  Levothyroxine     DVT Prophylaxis: on Lovenox sc    Code Status: Level 1 - Full Code  Disposition: anticipate d/c home once clinically improved

## 2017-11-03 NOTE — PLAN OF CARE
Problem: DISCHARGE PLANNING - CARE MANAGEMENT  Goal: Discharge to post-acute care or home with appropriate resources  INTERVENTIONS:  - Conduct assessment to determine patient/family and health care team treatment goals, and need for post-acute services based on payer coverage, community resources, and patient preferences, and barriers to discharge  - Address psychosocial, clinical, and financial barriers to discharge as identified in assessment in conjunction with the patient/family and health care team  - Arrange appropriate level of post-acute services according to patient's   needs and preference and payer coverage in collaboration with the physician and health care team  - Communicate with and update the patient/family, physician, and health care team regarding progress on the discharge plan  - Arrange appropriate transportation to post-acute venues  Carlos Wheat    Outcome: Progressing

## 2017-11-03 NOTE — PROGRESS NOTES
Notified that the pt had positive blood culture with gram positive cocci in clusters  This may be a contaminant however will give a dose of Vancomycin, consult ID and repeat blood cultures

## 2017-11-04 PROBLEM — I10 HTN (HYPERTENSION): Status: ACTIVE | Noted: 2017-11-04

## 2017-11-04 PROBLEM — G43.909 MIGRAINE: Status: ACTIVE | Noted: 2017-11-04

## 2017-11-04 LAB
GLUCOSE SERPL-MCNC: 138 MG/DL (ref 65–140)
GLUCOSE SERPL-MCNC: 142 MG/DL (ref 65–140)
GLUCOSE SERPL-MCNC: 171 MG/DL (ref 65–140)
GLUCOSE SERPL-MCNC: 229 MG/DL (ref 65–140)

## 2017-11-04 PROCEDURE — 82948 REAGENT STRIP/BLOOD GLUCOSE: CPT

## 2017-11-04 RX ORDER — BUTALBITAL, ACETAMINOPHEN AND CAFFEINE 50; 325; 40 MG/1; MG/1; MG/1
1 TABLET ORAL EVERY 6 HOURS PRN
Status: DISCONTINUED | OUTPATIENT
Start: 2017-11-04 | End: 2017-11-05 | Stop reason: HOSPADM

## 2017-11-04 RX ORDER — BUTALBITAL, ACETAMINOPHEN AND CAFFEINE 50; 325; 40 MG/1; MG/1; MG/1
1 TABLET ORAL ONCE AS NEEDED
Status: COMPLETED | OUTPATIENT
Start: 2017-11-04 | End: 2017-11-04

## 2017-11-04 RX ORDER — NEBIVOLOL 10 MG/1
10 TABLET ORAL DAILY
Status: ON HOLD | COMMUNITY
End: 2018-11-18 | Stop reason: CLARIF

## 2017-11-04 RX ORDER — BUTALBITAL, ACETAMINOPHEN AND CAFFEINE 50; 325; 40 MG/1; MG/1; MG/1
1 TABLET ORAL ONCE
Status: COMPLETED | OUTPATIENT
Start: 2017-11-04 | End: 2017-11-04

## 2017-11-04 RX ORDER — NEBIVOLOL 10 MG/1
10 TABLET ORAL DAILY
Status: DISCONTINUED | OUTPATIENT
Start: 2017-11-04 | End: 2017-11-05 | Stop reason: HOSPADM

## 2017-11-04 RX ORDER — SULFAMETHOXAZOLE AND TRIMETHOPRIM 800; 160 MG/1; MG/1
1 TABLET ORAL EVERY 12 HOURS SCHEDULED
Status: DISCONTINUED | OUTPATIENT
Start: 2017-11-04 | End: 2017-11-05 | Stop reason: HOSPADM

## 2017-11-04 RX ORDER — METFORMIN HYDROCHLORIDE 500 MG/1
500 TABLET, EXTENDED RELEASE ORAL
Status: DISCONTINUED | OUTPATIENT
Start: 2017-11-04 | End: 2017-11-05 | Stop reason: HOSPADM

## 2017-11-04 RX ORDER — ACETAMINOPHEN 325 MG/1
650 TABLET ORAL EVERY 6 HOURS PRN
Status: DISCONTINUED | OUTPATIENT
Start: 2017-11-04 | End: 2017-11-05 | Stop reason: HOSPADM

## 2017-11-04 RX ADMIN — SODIUM CHLORIDE 100 ML/HR: 0.9 INJECTION, SOLUTION INTRAVENOUS at 03:51

## 2017-11-04 RX ADMIN — BUTALBITAL, ACETAMINOPHEN, AND CAFFEINE 1 TABLET: 50; 325; 40 TABLET ORAL at 09:50

## 2017-11-04 RX ADMIN — ACETAMINOPHEN 650 MG: 325 TABLET, FILM COATED ORAL at 03:50

## 2017-11-04 RX ADMIN — LACTOBACILLUS ACIDOPHILUS / LACTOBACILLUS BULGARICUS 1 PACKET: 100 MILLION CFU STRENGTH GRANULES at 09:46

## 2017-11-04 RX ADMIN — ENOXAPARIN SODIUM 40 MG: 40 INJECTION SUBCUTANEOUS at 09:45

## 2017-11-04 RX ADMIN — METFORMIN HYDROCHLORIDE 500 MG: 500 TABLET, EXTENDED RELEASE ORAL at 20:13

## 2017-11-04 RX ADMIN — LEVOTHYROXINE SODIUM 50 MCG: 50 TABLET ORAL at 05:19

## 2017-11-04 RX ADMIN — SULFAMETHOXAZOLE AND TRIMETHOPRIM 1 TABLET: 800; 160 TABLET ORAL at 12:45

## 2017-11-04 RX ADMIN — NEBIVOLOL HYDROCHLORIDE 10 MG: 10 TABLET ORAL at 09:46

## 2017-11-04 RX ADMIN — CEFTRIAXONE 2000 MG: 2 INJECTION, SOLUTION INTRAVENOUS at 13:29

## 2017-11-04 RX ADMIN — LACTOBACILLUS ACIDOPHILUS / LACTOBACILLUS BULGARICUS 1 PACKET: 100 MILLION CFU STRENGTH GRANULES at 17:33

## 2017-11-04 RX ADMIN — INSULIN LISPRO 1 UNITS: 100 INJECTION, SOLUTION INTRAVENOUS; SUBCUTANEOUS at 12:33

## 2017-11-04 RX ADMIN — LACTOBACILLUS ACIDOPHILUS / LACTOBACILLUS BULGARICUS 1 PACKET: 100 MILLION CFU STRENGTH GRANULES at 12:45

## 2017-11-04 RX ADMIN — BUTALBITAL, ACETAMINOPHEN, AND CAFFEINE 1 TABLET: 50; 325; 40 TABLET ORAL at 05:46

## 2017-11-04 RX ADMIN — SULFAMETHOXAZOLE AND TRIMETHOPRIM 1 TABLET: 800; 160 TABLET ORAL at 20:13

## 2017-11-04 RX ADMIN — CIPROFLOXACIN 500 MG: 500 TABLET, FILM COATED ORAL at 09:47

## 2017-11-04 RX ADMIN — CEFEPIME HYDROCHLORIDE 2000 MG: 2 INJECTION, POWDER, FOR SOLUTION INTRAVENOUS at 12:32

## 2017-11-04 NOTE — PROGRESS NOTES
Progress Note - Infectious Disease   Emi Wagner 63 y o  female MRN: [id number]  Unit/Bed#: [address] [place] [address] Encounter: [id number]      Subjective/Objective   Subjective: The events seen last seen, were noted  Patient reports much less discomfort on the left side  No nausea vomiting  She feels better          Meds/Allergies     Current Facility-Administered Medications:     acetaminophen (TYLENOL) tablet 650 mg, 650 mg, Oral, Q6H PRN, TRACY Harris    aluminum-magnesium hydroxide-simethicone (MYLANTA) 200-200-20 mg/5 mL oral suspension 30 mL, 30 mL, Oral, Q6H PRN, TRACY Harris    butalbital-acetaminophen-caffeine (FIORICET,ESGIC) -40 mg per tablet 1 tablet, 1 tablet, Oral, Q6H PRN, Germain Chambers MD    cefepime (MAXIPIME) 2,000 mg in dextrose 5 % 50 mL IVPB, 2,000 mg, Intravenous, Q12H, Kay Bentley MD, Last Rate: 100 mL/hr at 11/03/17 2105, 2,000 mg at 11/03/17 2105    ciprofloxacin (CIPRO) tablet 500 mg, 500 mg, Oral, Q12H [facility], Kay Bentley MD, 500 mg at 11/04/17 0947    enoxaparin (LOVENOX) subcutaneous injection 40 mg, 40 mg, Subcutaneous, Daily, TRACY Harris, 40 mg at 11/04/17 0945    influenza inactivated quadrivalent vaccine (FLULAVAL) IM injection 0 5 mL, 0 5 mL, Intramuscular, Prior to discharge, TRACY Harris    insulin lispro (HumaLOG) 100 units/mL subcutaneous injection 1-5 Units, 1-5 Units, Subcutaneous, HS, TRACY Harris, 2 Units at 11/02/17 2130    insulin lispro (HumaLOG) 100 units/mL subcutaneous injection 1-6 Units, 1-6 Units, Subcutaneous, TID AC, 1 Units at 11/03/17 1656 **AND** Fingerstick Glucose (POCT), , , TID AC, TRACY Harris    lactobacillus acidophilus-bulgaricus [facility]) packet 1 packet, 1 packet, Oral, TID With Meals, Germain Chambers MD, 1 packet at 11/04/17 0946    levothyroxine tablet 50 mcg, 50 mcg, Oral, Early Morning, TRACY Harris, 50 mcg at 11/04/17 0519    nebivolol (BYSTOLIC) tablet 10 mg, 10 mg, Oral, Daily, Leandro Dylon Weinstein MD, 10 mg at 17 0946    ondansetron (ZOFRAN) injection 4 mg, 4 mg, Intravenous, Q6H PRN, TRACY Harris  Allergies   Allergen Reactions    Amoxicillin      all current active meds have been reviewed    PTA meds:    Prescriptions Prior to Admission   Medication Sig Dispense Refill Last Dose    Dapagliflozin Propanediol (FARXIGA PO) Take by mouth daily Indications: Diabetes  2017 at 0800    levothyroxine 50 mcg tablet Take 50 mcg by mouth daily  2017 at 630    metFORMIN (GLUCOPHAGE) 500 mg tablet Take 500 mg by mouth daily as needed     Past Week at Unknown time    nebivolol (BYSTOLIC) 10 mg tablet Take 10 mg by mouth daily   Past Week at Unknown time    and discontinued meds:   Medications Discontinued During This Encounter   Medication Reason    cefTRIAXone (ROCEPHIN) 1,000 mg in dextrose 5 % 50 mL IVPB     ibuprofen (MOTRIN) tablet 200 mg     cefTRIAXone (ROCEPHIN) IVPB (premix) 2,000 mg     sodium chloride 0 9 % infusion     acetaminophen (TYLENOL) tablet 650 mg        Physical Exam: Physical Exam    HR:  [50-68] 60  Resp:  [18-20] 20  BP: (144-181)/(70-94) 144/80  SpO2:  [96 %-98 %] 96 %  Body mass index is 37 82 kg/m²  Weight (last 2 days)     None        Temp (24hrs), Av 8 °F (37 1 °C), Min:98 2 °F (36 8 °C), Max:100 °F (37 8 °C)  Current: Temperature: 98 2 °F (36 8 °C)AGE@    Intake/Output Summary (Last 24 hours) at 17 1150  Last data filed at 17 0601   Gross per 24 hour   Intake                0 ml   Output             2150 ml   Net            -2150 ml    She is alert and comfortable  Her temperature is showing a downward trend, 102 8 degree F on the 1st hospital day, 100 5 degree F on the 2nd, and 100 degree F overnight yesterday  She is afebrile today  The heart sounds are regular  The lungs are clear  Abdomen is obese and soft and the left costovertebral angle tenderness is much improved  There are no palpable masses  Bowel sounds active    The calves are supple  The IV access was lost, has been reinstated now  Invasive Devices     Peripheral Intravenous Line            Peripheral IV 11/02/17 Left; Lower Wrist 1 day          Drain            Urostomy Ureterostomy right RUQ -- days                            Lab, Imaging and other studies:    Recent Results (from the past 96 hour(s))   Blood culture #1    Collection Time: 11/01/17  5:05 PM   Result Value Ref Range    Blood Culture      Blood Culture Staphylococcus coagulase negative (A)     Gram Stain Result Gram positive cocci in clusters    Blood culture #2    Collection Time: 11/01/17  5:11 PM   Result Value Ref Range    Blood Culture No Growth at 48 hrs      CBC and differential    Collection Time: 11/01/17  5:12 PM   Result Value Ref Range    WBC 7 00 4 80 - 10 80 Thousand/uL    RBC 3 92 (L) 4 20 - 5 40 Million/uL    Hemoglobin 11 7 (L) 12 0 - 16 0 g/dL    Hematocrit 36 0 (L) 37 0 - 47 0 %    MCV 92 82 - 98 fL    MCH 30 0 27 0 - 31 0 pg    MCHC 32 5 31 4 - 37 4 g/dL    RDW 16 3 (H) 11 6 - 15 1 %    MPV 6 6 (L) 8 9 - 12 7 fL    Platelets 675 289 - 814 Thousands/uL    nRBC 0 /100 WBCs    Neutrophils Relative 86 (H) 43 - 75 %    Lymphocytes Relative 7 (L) 14 - 44 %    Monocytes Relative 7 4 - 12 %    Eosinophils Relative 0 0 - 6 %    Basophils Relative 0 0 - 1 %    Neutrophils Absolute 6 00 1 85 - 7 62 Thousands/µL    Lymphocytes Absolute 0 50 (L) 0 60 - 4 47 Thousands/µL    Monocytes Absolute 0 50 0 17 - 1 22 Thousand/µL    Eosinophils Absolute 0 00 0 00 - 0 61 Thousand/µL    Basophils Absolute 0 00 0 00 - 0 10 Thousands/µL   Sedimentation rate, automated    Collection Time: 11/01/17  5:12 PM   Result Value Ref Range    Sed Rate 45 (H) 2 - 25 mm/hour   Comprehensive metabolic panel    Collection Time: 11/01/17  5:12 PM   Result Value Ref Range    Sodium 140 136 - 145 mmol/L    Potassium 3 6 3 5 - 5 3 mmol/L    Chloride 106 100 - 108 mmol/L    CO2 24 21 - 32 mmol/L    Anion Gap 10 4 - 13 mmol/L BUN 21 5 - 25 mg/dL    Creatinine 1 11 0 60 - 1 30 mg/dL    Glucose 168 (H) 65 - 140 mg/dL    Calcium 9 0 8 3 - 10 1 mg/dL    AST 32 5 - 45 U/L    ALT 39 12 - 78 U/L    Alkaline Phosphatase 131 (H) 46 - 116 U/L    Total Protein 6 4 6 4 - 8 2 g/dL    Albumin 2 8 (L) 3 5 - 5 0 g/dL    Total Bilirubin 0 90 0 20 - 1 00 mg/dL    eGFR 54 ml/min/1 73sq m   Rapid Influenza Screen with Reflex PCR (indicated for patients <2mo of age)    Collection Time: 11/01/17  5:14 PM   Result Value Ref Range    Rapid Influenza A Ag Negative Negative, Indeterminate    Rapid Influenza B Ag Negative Negative, Indeterminate   Lactic acid, plasma    Collection Time: 11/01/17  5:14 PM   Result Value Ref Range    LACTIC ACID 1 1 0 5 - 2 0 mmol/L   Influenza A/B and RSV by PCR (Indicated for patients > 2 mo of age)    Collection Time: 11/01/17  5:14 PM   Result Value Ref Range    INFLU A PCR None Detected None Detected    INFLU B PCR None Detected None Detected    RSV PCR None Detected None Detected   UA w Reflex to Microscopic w Reflex to Culture    Collection Time: 11/01/17  7:05 PM   Result Value Ref Range    Color, UA Yellow     Clarity, UA Cloudy     Specific Christopher, UA 1 010 1 000 - 1 030    pH, UA 6 0 5 0 - 9 0    Leukocytes, UA Small (A) Negative    Nitrite, UA Positive (A) Negative    Protein, UA 30 (1+) (A) Negative mg/dl    Glucose, UA >=1000 (1%) (A) Negative mg/dl    Ketones, UA Negative Negative mg/dl    Urobilinogen, UA 0 2 0 2, 1 0 E U /dl E U /dl    Bilirubin, UA Negative Negative    Blood, UA Small (A) Negative   Urine Microscopic    Collection Time: 11/01/17  7:05 PM   Result Value Ref Range    RBC, UA 4-10 (A) None Seen, 0-5 /hpf    WBC, UA Innumerable (A) None Seen, 0-5, 5-55, 5-65 /hpf    Epithelial Cells None Seen None Seen, Occasional /hpf    Bacteria, UA Innumerable (A) None Seen, Occasional /hpf    AMORPH URATES Occasional /hpf   Urine culture    Collection Time: 11/01/17  7:05 PM   Result Value Ref Range    Urine Culture >100,000 cfu/ml Serratia marcescens (A)        Susceptibility    Serratia marcescens - JEREMIAH     Amoxicillin + Clavulanate >16/8 Resistant ug/ml     Ampicillin ($$) >16 00 Resistant ug/ml     Ampicillin + Sulbactam ($) >16/8 Resistant ug/ml     Aztreonam ($$$)  <=8 Susceptible ug/ml     Cefazolin ($) >16 00 Resistant ug/ml     Cefotaxime ($) <=2 00 Susceptible ug/ml     Ceftazidime ($$) <=1 Susceptible ug/ml     Ceftriaxone ($$) <=8 00 Susceptible ug/ml     Cefuroxime ($$) >16 Resistant ug/ml     Ciprofloxacin ($)  <=1 00 Susceptible ug/ml     Ertapenem ($$$) <=2 0 Susceptible ug/ml     Gentamicin ($$) <=4 Susceptible ug/ml     Imipenem <=4 Susceptible ug/ml     Levofloxacin ($) <=2 00 Susceptible ug/ml     Meropenem ($$) <=4 00 Susceptible ug/ml     Nitrofurantoin >64 Resistant ug/ml     Piperacillin + Tazobactam ($$$) <=16 Susceptible ug/ml     Tetracycline 8 Intermediate ug/ml     Tobramycin ($) <=4 Susceptible ug/ml     Trimethoprim + Sulfamethoxazole ($$$) <=2/38 Susceptible ug/ml   MRSA culture    Collection Time: 11/02/17 12:01 AM   Result Value Ref Range    MRSA Culture Only       No Methicillin Resistant Staphlyococcus aureus (MRSA) isolated   Basic metabolic panel    Collection Time: 11/02/17  6:02 AM   Result Value Ref Range    Sodium 141 136 - 145 mmol/L    Potassium 3 8 3 5 - 5 3 mmol/L    Chloride 111 (H) 100 - 108 mmol/L    CO2 23 21 - 32 mmol/L    Anion Gap 7 4 - 13 mmol/L    BUN 16 5 - 25 mg/dL    Creatinine 0 88 0 60 - 1 30 mg/dL    Glucose 124 65 - 140 mg/dL    Calcium 8 9 8 3 - 10 1 mg/dL    eGFR 72 ml/min/1 73sq m   Magnesium    Collection Time: 11/02/17  6:02 AM   Result Value Ref Range    Magnesium 1 8 1 6 - 2 6 mg/dL   CBC and differential    Collection Time: 11/02/17  6:02 AM   Result Value Ref Range    WBC 4 60 (L) 4 80 - 10 80 Thousand/uL    RBC 3 92 (L) 4 20 - 5 40 Million/uL    Hemoglobin 11 7 (L) 12 0 - 16 0 g/dL    Hematocrit 36 3 (L) 37 0 - 47 0 %    MCV 93 82 - 98 fL    MCH 29 9 27 0 - 31 0 pg    MCHC 32 3 31 4 - 37 4 g/dL    RDW 16 2 (H) 11 6 - 15 1 %    MPV 6 7 (L) 8 9 - 12 7 fL    Platelets 345 422 - 409 Thousands/uL    nRBC 0 /100 WBCs    Neutrophils Relative 73 43 - 75 %    Lymphocytes Relative 15 14 - 44 %    Monocytes Relative 10 4 - 12 %    Eosinophils Relative 2 0 - 6 %    Basophils Relative 0 0 - 1 %    Neutrophils Absolute 3 30 1 85 - 7 62 Thousands/µL    Lymphocytes Absolute 0 70 0 60 - 4 47 Thousands/µL    Monocytes Absolute 0 50 0 17 - 1 22 Thousand/µL    Eosinophils Absolute 0 10 0 00 - 0 61 Thousand/µL    Basophils Absolute 0 00 0 00 - 0 10 Thousands/µL   Hemoglobin A1c    Collection Time: 11/02/17  6:08 AM   Result Value Ref Range    Hemoglobin A1C 7 5 (H) 4 2 - 6 3 %     mg/dl   T4, free    Collection Time: 11/02/17  6:08 AM   Result Value Ref Range    Free T4 1 06 0 76 - 1 46 ng/dL   Fingerstick Glucose (POCT)    Collection Time: 11/02/17  6:53 AM   Result Value Ref Range    POC Glucose 117 65 - 140 mg/dl   Fingerstick Glucose (POCT)    Collection Time: 11/02/17 11:30 AM   Result Value Ref Range    POC Glucose 132 65 - 140 mg/dl   Fingerstick Glucose (POCT)    Collection Time: 11/02/17  4:26 PM   Result Value Ref Range    POC Glucose 200 (H) 65 - 140 mg/dl   Fingerstick Glucose (POCT)    Collection Time: 11/02/17  8:52 PM   Result Value Ref Range    POC Glucose 270 (H) 65 - 140 mg/dl   CBC and differential    Collection Time: 11/03/17  6:52 AM   Result Value Ref Range    WBC 4 40 (L) 4 80 - 10 80 Thousand/uL    RBC 3 51 (L) 4 20 - 5 40 Million/uL    Hemoglobin 10 5 (L) 12 0 - 16 0 g/dL    Hematocrit 32 2 (L) 37 0 - 47 0 %    MCV 92 82 - 98 fL    MCH 29 8 27 0 - 31 0 pg    MCHC 32 5 31 4 - 37 4 g/dL    RDW 16 1 (H) 11 6 - 15 1 %    MPV 6 5 (L) 8 9 - 12 7 fL    Platelets 394 402 - 028 Thousands/uL    nRBC 0 /100 WBCs    Neutrophils Relative 62 43 - 75 %    Lymphocytes Relative 22 14 - 44 %    Monocytes Relative 12 4 - 12 %    Eosinophils Relative 4 0 - 6 % Basophils Relative 1 0 - 1 %    Neutrophils Absolute 2 80 1 85 - 7 62 Thousands/µL    Lymphocytes Absolute 1 00 0 60 - 4 47 Thousands/µL    Monocytes Absolute 0 50 0 17 - 1 22 Thousand/µL    Eosinophils Absolute 0 20 0 00 - 0 61 Thousand/µL    Basophils Absolute 0 00 0 00 - 0 10 Thousands/µL   Basic metabolic panel    Collection Time: 11/03/17  6:52 AM   Result Value Ref Range    Sodium 144 136 - 145 mmol/L    Potassium 3 6 3 5 - 5 3 mmol/L    Chloride 112 (H) 100 - 108 mmol/L    CO2 23 21 - 32 mmol/L    Anion Gap 9 4 - 13 mmol/L    BUN 15 5 - 25 mg/dL    Creatinine 0 88 0 60 - 1 30 mg/dL    Glucose 142 (H) 65 - 140 mg/dL    Calcium 8 9 8 3 - 10 1 mg/dL    eGFR 72 ml/min/1 73sq m   Fingerstick Glucose (POCT)    Collection Time: 11/03/17  7:52 AM   Result Value Ref Range    POC Glucose 148 (H) 65 - 140 mg/dl   Fingerstick Glucose (POCT)    Collection Time: 11/03/17 11:28 AM   Result Value Ref Range    POC Glucose 249 (H) 65 - 140 mg/dl   Fingerstick Glucose (POCT)    Collection Time: 11/03/17  4:22 PM   Result Value Ref Range    POC Glucose 163 (H) 65 - 140 mg/dl   Fingerstick Glucose (POCT)    Collection Time: 11/03/17  8:34 PM   Result Value Ref Range    POC Glucose 146 (H) 65 - 140 mg/dl   Fingerstick Glucose (POCT)    Collection Time: 11/04/17  7:43 AM   Result Value Ref Range    POC Glucose 138 65 - 140 mg/dl       Results from last 7 days  Lab Units 11/02/17  0602   MAGNESIUM mg/dL 1 8             No results found for: TROPONINT  ABG:No results found for: PHART, WBM0NIF, PO2ART, GEI5YEA, P2ITTAMU, BEART, SOURCE        Cultures    Results from last 7 days  Lab Units 11/02/17  0001 11/01/17  1905 11/01/17  1714 11/01/17  1711 11/01/17  1705   BLOOD CULTURE   --   --   --  No Growth at 48 hrs     Staphylococcus coagulase negative*   GRAM STAIN RESULT   --   --   --   --  Gram positive cocci in clusters   URINE CULTURE   --  >100,000 cfu/ml Serratia marcescens*  --   --   --    MRSA CULTURE ONLY  No Methicillin Resistant Staphlyococcus aureus (MRSA) isolated  --   --   --   --    INFLUENZA A PCR   --   --  None Detected  --   --    INFLUENZA B PCR   --   --  None Detected  --   --    RSV PCR   --   --  None Detected  --   --       Xr Chest 1 View Portable    Result Date: 11/2/2017  Narrative: CHEST INDICATION:  fever  History taken directly from the electronic ordering system  COMPARISON:  8/30/2012 VIEWS:   AP frontal IMAGES:  1 FINDINGS:     Cardiomediastinal silhouette appears unremarkable  The lungs are clear  No pneumothorax or pleural effusion  Visualized osseous structures appear within normal limits for the patient's age  Impression: No active pulmonary disease  As per comments in the PACS workstation, findings are concordant with preliminary interpretation provided by the emergency room physician  Workstation performed: [id number]     Ct Abdomen Pelvis With Contrast    Result Date: 11/1/2017  Narrative: CT ABDOMEN AND PELVIS WITH IV CONTRAST INDICATION:  Pyelonephritis COMPARISON: CT abdomen pelvis 1/11/2016 TECHNIQUE:  CT examination of the abdomen and pelvis was performed  Reformatted images were created in axial, sagittal, and coronal planes  Radiation dose length product (DLP) for this visit:  1535 2 mGy-cm   This examination, like all CT scans performed in the [facility], was performed utilizing techniques to minimize radiation dose exposure, including the use of iterative  reconstruction and automated exposure control  IV Contrast:  100 mL of iohexol (OMNIPAQUE)     Enteric Contrast:  Enteric contrast was not administered  FINDINGS: ABDOMEN LOWER CHEST:  There is bibasilar dependent atelectasis  LIVER/BILIARY TREE:  Unremarkable  GALLBLADDER:  There are gallstone(s) within the gallbladder, without pericholecystic inflammatory changes  SPLEEN:  Unremarkable  PANCREAS:  Unremarkable  ADRENAL GLANDS:  Unremarkable   KIDNEYS/URETERS:  One or more sharply circumscribed subcentimeter renal hypodensities are noted  These lesions are too small to accurately characterize, but are statistically most likely to represent benign cortical renal cyst(s)  According to the guidelines published in the [facility] Paper of the ACR Incidental Findings Committee (Radiology 2010), no further workup of these lesions is recommended  There is a 4 mm nonobstructing left lower pole renal calculus  There is a right lower quadrant ileostomy  There is mild left-sided hydroureteronephrosis without evident obstructing calculus  STOMACH AND BOWEL:  There is colonic diverticulosis without evidence of acute diverticulitis  No evidence of bowel obstruction  Small bowel herniated into the ileostomy without evident complication  Small hiatal hernia  There are postsurgical changes  of the stomach  APPENDIX:  No findings to suggest appendicitis  ABDOMINOPELVIC CAVITY:  No ascites or free intraperitoneal air  No lymphadenopathy  VESSELS:  Unremarkable for patient's age  PELVIS REPRODUCTIVE ORGANS:  Patient is status post hysterectomy  URINARY BLADDER:  Unremarkable  ABDOMINAL WALL/INGUINAL REGIONS:  Status post right lower quadrant ileostomy  No inguinal hernia  OSSEOUS STRUCTURES:  No acute fracture or destructive osseous lesion  Impression: 1  Mild prominence of the left renal collecting system which is new from prior examination  No evident obstructing calculus  Differential considerations include recently passed calculus versus ascending infection or noncalcified obstructing lesion  2   Nonobstructing 4 mm left lower pole renal calculus  3   Cholelithiasis without other evidence of acute cholecystitis  4   There is herniation of small bowel at the right lower quadrant ileostomy site  No evidence of acute complication  Workstation performed: [id number]     I have personally reviewed pertinent reports        Principal Problem:    Pyelonephritis  Active Problems:    History of urostomy    Sepsis ([address] ) Anemia    Hydroureteronephrosis    HTN (hypertension)    Migraine      Assessment/Plan:  Complicated urinary tract infection with sepsis, suspected left pyelonephritis, due to Serratia marcescens  The organism is sensitive to 3rd generation cephalosporins, fluoroquinolones, trimethoprim sulfamethoxazole  The signs of sepsis are abating  If she continues to improve, discharge can be planned for tomorrow, on oral antimicrobial agent to complete a 10 day course  Trimethoprim sulfamethoxazole may be safer then ciprofloxacin  I will make that change today  Given antimicrobial sensitivities,  ceftriaxone can be resumed and a dose given tomorrow morning prior to discharge  I will discuss with the hospitalist   The patient was counseled

## 2017-11-04 NOTE — PROGRESS NOTES
[facility] Progress Note  Patient: Lukas More 63 y o  female   MRN: [id number]  PCP: Agusto López DO  Unit/Bed#: [address] [place] [address] Encounter: [id number]  Date Of Visit: 17    Subjective:   Fever trending down 37 8 C last night  Feeling better  Left flank pain resolved    Objective:   Vitals:   Temp (24hrs), Av 1 °F (37 3 °C), Min:98 2 °F (36 8 °C), Max:100 °F (37 8 °C)    HR:  [58-68] 58  Resp:  [18-20] 20  BP: (137-181)/(75-94) 137/75  SpO2:  [95 %-96 %] 95 %  Body mass index is 37 82 kg/m²  Intake/Output Summary (Last 24 hours) at 17 1829  Last data filed at 17 1801   Gross per 24 hour   Intake                0 ml   Output             2350 ml   Net            -2350 ml       Physical Exam:   General: NAD  HEENT: EOMI, anicteric, oral moist, neck supple, no mass or JVD  Chest: CTAB, no wheeze/rales  Cardiac: RRR, S1/S2, No murmur  Abd: s/p urostomy at RLQ, S/ND/NT/BS+  MSK: No LE pitting edema, pulses intact  Neuro: AAOx3, moving all extremities  Psychiatric: Mood with normal affect    Additional Data:     Labs:    Results from last 7 days  Lab Units 17  0652   WBC Thousand/uL 4 40*   HEMOGLOBIN g/dL 10 5*   HEMATOCRIT % 32 2*   PLATELETS Thousands/uL 195   NEUTROS PCT % 62   LYMPHS PCT % 22   MONOS PCT % 12   EOS PCT % 4       Results from last 7 days  Lab Units 17  0652  17  1712   SODIUM mmol/L 144  < > 140   POTASSIUM mmol/L 3 6  < > 3 6   CHLORIDE mmol/L 112*  < > 106   CO2 mmol/L 23  < > 24   BUN mg/dL 15  < > 21   CREATININE mg/dL 0 88  < > 1 11   CALCIUM mg/dL 8 9  < > 9 0   TOTAL PROTEIN g/dL  --   --  6 4   BILIRUBIN TOTAL mg/dL  --   --  0 90   ALK PHOS U/L  --   --  131*   ALT U/L  --   --  39   AST U/L  --   --  32   GLUCOSE RANDOM mg/dL 142*  < > 168*   < > = values in this interval not displayed          Recent Results (from the past 24 hour(s))   Fingerstick Glucose (POCT)    Collection Time: 17  8:34 PM   Result Value Ref Range POC Glucose 146 (H) 65 - 140 mg/dl   Fingerstick Glucose (POCT)    Collection Time: 11/04/17  7:43 AM   Result Value Ref Range    POC Glucose 138 65 - 140 mg/dl   Fingerstick Glucose (POCT)    Collection Time: 11/04/17 12:00 PM   Result Value Ref Range    POC Glucose 171 (H) 65 - 140 mg/dl   Fingerstick Glucose (POCT)    Collection Time: 11/04/17  3:26 PM   Result Value Ref Range    POC Glucose 142 (H) 65 - 140 mg/dl       Cultures:     Results from last 7 days  Lab Units 11/03/17  0651 11/03/17  0602 11/01/17  1905 11/01/17  1714 11/01/17  1711 11/01/17  1705   BLOOD CULTURE  No Growth at 24 hrs  No Growth at 24 hrs   --   --  No Growth at 48 hrs  Staphylococcus coagulase negative*   GRAM STAIN RESULT   --   --   --   --   --  Gram positive cocci in clusters   URINE CULTURE   --   --  >100,000 cfu/ml Serratia marcescens*  --   --   --    INFLUENZA A PCR   --   --   --  None Detected  --   --    INFLUENZA B PCR   --   --   --  None Detected  --   --    RSV PCR   --   --   --  None Detected  --   --        Imaging:  Xr Chest 1 View Portable    Result Date: 11/2/2017  Narrative: CHEST INDICATION:  fever  History taken directly from the electronic ordering system  COMPARISON:  8/30/2012 VIEWS:   AP frontal IMAGES:  1 FINDINGS:     Cardiomediastinal silhouette appears unremarkable  The lungs are clear  No pneumothorax or pleural effusion  Visualized osseous structures appear within normal limits for the patient's age  Impression: No active pulmonary disease  As per comments in the PACS workstation, findings are concordant with preliminary interpretation provided by the emergency room physician  Workstation performed: [id number]     Ct Abdomen Pelvis With Contrast    Result Date: 11/1/2017  Narrative: CT ABDOMEN AND PELVIS WITH IV CONTRAST INDICATION:  Pyelonephritis COMPARISON: CT abdomen pelvis 1/11/2016 TECHNIQUE:  CT examination of the abdomen and pelvis was performed       Reformatted images were created in axial, sagittal, and coronal planes  Radiation dose length product (DLP) for this visit:  1535 2 mGy-cm   This examination, like all CT scans performed in the [facility], was performed utilizing techniques to minimize radiation dose exposure, including the use of iterative  reconstruction and automated exposure control  IV Contrast:  100 mL of iohexol (OMNIPAQUE)     Enteric Contrast:  Enteric contrast was not administered  FINDINGS: ABDOMEN LOWER CHEST:  There is bibasilar dependent atelectasis  LIVER/BILIARY TREE:  Unremarkable  GALLBLADDER:  There are gallstone(s) within the gallbladder, without pericholecystic inflammatory changes  SPLEEN:  Unremarkable  PANCREAS:  Unremarkable  ADRENAL GLANDS:  Unremarkable  KIDNEYS/URETERS:  One or more sharply circumscribed subcentimeter renal hypodensities are noted  These lesions are too small to accurately characterize, but are statistically most likely to represent benign cortical renal cyst(s)  According to the guidelines published in the [facility] Paper of the ACR Incidental Findings Committee (Radiology 2010), no further workup of these lesions is recommended  There is a 4 mm nonobstructing left lower pole renal calculus  There is a right lower quadrant ileostomy  There is mild left-sided hydroureteronephrosis without evident obstructing calculus  STOMACH AND BOWEL:  There is colonic diverticulosis without evidence of acute diverticulitis  No evidence of bowel obstruction  Small bowel herniated into the ileostomy without evident complication  Small hiatal hernia  There are postsurgical changes  of the stomach  APPENDIX:  No findings to suggest appendicitis  ABDOMINOPELVIC CAVITY:  No ascites or free intraperitoneal air  No lymphadenopathy  VESSELS:  Unremarkable for patient's age  PELVIS REPRODUCTIVE ORGANS:  Patient is status post hysterectomy  URINARY BLADDER:  Unremarkable   ABDOMINAL WALL/INGUINAL REGIONS:  Status post right lower quadrant ileostomy  No inguinal hernia  OSSEOUS STRUCTURES:  No acute fracture or destructive osseous lesion  Impression: 1  Mild prominence of the left renal collecting system which is new from prior examination  No evident obstructing calculus  Differential considerations include recently passed calculus versus ascending infection or noncalcified obstructing lesion  2   Nonobstructing 4 mm left lower pole renal calculus  3   Cholelithiasis without other evidence of acute cholecystitis  4   There is herniation of small bowel at the right lower quadrant ileostomy site  No evidence of acute complication   Workstation performed: [id number]     Imaging Reports Reviewed by myself    Last 24 Hours Medication List:     Current Facility-Administered Medications:     acetaminophen (TYLENOL) tablet 650 mg, 650 mg, Oral, Q6H PRN, TRACY Harris    aluminum-magnesium hydroxide-simethicone (MYLANTA) 027-377-06 mg/5 mL oral suspension 30 mL, 30 mL, Oral, Q6H PRN, TRACY Harris    butalbital-acetaminophen-caffeine (FIORICET,ESGIC) -40 mg per tablet 1 tablet, 1 tablet, Oral, Q6H PRN, Jennifer Grey MD    cefTRIAXone (ROCEPHIN) IVPB (premix) 2,000 mg, 2,000 mg, Intravenous, Q24H, Kay Bentley MD, Last Rate: 100 mL/hr at 11/04/17 1329, 2,000 mg at 11/04/17 1329    enoxaparin (LOVENOX) subcutaneous injection 40 mg, 40 mg, Subcutaneous, Daily, TRACY Harris, 40 mg at 11/04/17 0945    influenza inactivated quadrivalent vaccine (FLULAVAL) IM injection 0 5 mL, 0 5 mL, Intramuscular, Prior to discharge, TRACY Harris    insulin lispro (HumaLOG) 100 units/mL subcutaneous injection 1-5 Units, 1-5 Units, Subcutaneous, HS, TRACY Harris, 2 Units at 11/02/17 2130    insulin lispro (HumaLOG) 100 units/mL subcutaneous injection 1-6 Units, 1-6 Units, Subcutaneous, TID AC, 1 Units at 11/04/17 1233 **AND** Fingerstick Glucose (POCT), , , TID AC, TRACY Harris    lactobacillus acidophilus-bulgaricus [facility]) packet 1 packet, 1 packet, Oral, TID With Meals, Shereen Holley MD, 1 packet at 11/04/17 1733    levothyroxine tablet 50 mcg, 50 mcg, Oral, Early Morning, TRACY Harris, 50 mcg at 11/04/17 0519    metFORMIN (GLUCOPHAGE-XR) 24 hr tablet 500 mg, 500 mg, Oral, Daily With Dinner, Shereen Holley MD    nebivolol (BYSTOLIC) tablet 10 mg, 10 mg, Oral, Daily, Shereen Holley MD, 10 mg at 11/04/17 0946    ondansetron (ZOFRAN) injection 4 mg, 4 mg, Intravenous, Q6H PRN, TRACY Harris    sulfamethoxazole-trimethoprim (BACTRIM DS) 800-160 mg per tablet 1 tablet, 1 tablet, Oral, Q12H [address], Kay Bentley MD, 1 tablet at 11/04/17 1245     Assessment and Plans:  Hospital Problem List:   Principal Problem:    Pyelonephritis  Active Problems:    Sepsis ([facility] [address] )    Hydroureteronephrosis    History of urostomy    Anemia    HTN (hypertension)    Migraine    63 y o  female with a history of T2DM, Endometrial cancer, s/p urostomy, hypothyroidism, DVT who was admitted on 11/1/2017 with high fever and left flank pain, persistent to sepsis 2/2 pyelonephritis  Complicated UTI due to Serratia marcescens  Left pyelonephritis  Sepsis  History of urostomy  Hydroureteronephrosis, mild, likely chronic   Urine cx - Serratia marcescens  Blood cx - 1/2 bottles showing staph coag negative most likely contaminants  Repeat blood cx [facility]  Now on ceftriaxone iv, Bactrim po  Probiotics  D/c IVF today  If further improving, anticipate d/c home tomorrow  ID consult following appreciated    HTN  Continue home Bystolic    Migraine  Continue Fioricet prn    T2DM  Resume home metformin  SSI    Hypothyroidism  Levothyroxine    DVT Prophylaxis: on Lovenox sc  Code Status: Level 1 - Full Code  Disposition: anticipate d/c home once clinically improved  Patient Centered Rounds: I have performed bedside rounds with nursing staff today      Discussions with Specialists or Other Care Team Provider: Yes    Education and Discussions with Family / Patient:Yes    Time Spent for Care: 20 minutes  More than 50% of total time spent on counseling and coordination of care as described above      Current Length of Stay: 3 day(s)    Current Patient Status: Inpatient     Signed by:  Darren Earl MD  Attending Hospitalist  Page#: [phone] (Hours 7am to 7pm)  11/4/2017 6:29 PM

## 2017-11-04 NOTE — PLAN OF CARE
DISCHARGE PLANNING - CARE MANAGEMENT     Discharge to post-acute care or home with appropriate resources Progressing        GENITOURINARY - ADULT     Urinary catheter remains patent Progressing        INFECTION - ADULT     Absence or prevention of progression during hospitalization Progressing        METABOLIC, FLUID AND ELECTROLYTES - ADULT     Glucose maintained within target range Progressing        Potential for Falls     Patient will remain free of falls Progressing        SAFETY ADULT     Patient will remain free of falls Progressing

## 2017-11-05 VITALS
HEART RATE: 55 BPM | TEMPERATURE: 98.6 F | OXYGEN SATURATION: 95 % | BODY MASS INDEX: 37.66 KG/M2 | DIASTOLIC BLOOD PRESSURE: 72 MMHG | WEIGHT: 234.35 LBS | HEIGHT: 66 IN | SYSTOLIC BLOOD PRESSURE: 139 MMHG | RESPIRATION RATE: 20 BRPM

## 2017-11-05 LAB
BACTERIA BLD CULT: ABNORMAL
GLUCOSE SERPL-MCNC: 148 MG/DL (ref 65–140)
GRAM STN SPEC: ABNORMAL

## 2017-11-05 PROCEDURE — 82948 REAGENT STRIP/BLOOD GLUCOSE: CPT

## 2017-11-05 RX ORDER — SULFAMETHOXAZOLE AND TRIMETHOPRIM 800; 160 MG/1; MG/1
1 TABLET ORAL EVERY 12 HOURS SCHEDULED
Qty: 14 TABLET | Refills: 0 | Status: SHIPPED | OUTPATIENT
Start: 2017-11-05 | End: 2017-11-12

## 2017-11-05 RX ORDER — LACTOBACILLUS ACIDOPHILUS / LACTOBACILLUS BULGARICUS 100 MILLION CFU STRENGTH
1 GRANULES ORAL
Qty: 30 PACKET | Refills: 0 | Status: SHIPPED | OUTPATIENT
Start: 2017-11-05 | End: 2017-11-15

## 2017-11-05 RX ADMIN — CEFTRIAXONE 2000 MG: 2 INJECTION, SOLUTION INTRAVENOUS at 10:53

## 2017-11-05 RX ADMIN — ENOXAPARIN SODIUM 40 MG: 40 INJECTION SUBCUTANEOUS at 08:35

## 2017-11-05 RX ADMIN — LEVOTHYROXINE SODIUM 50 MCG: 50 TABLET ORAL at 05:22

## 2017-11-05 RX ADMIN — SULFAMETHOXAZOLE AND TRIMETHOPRIM 1 TABLET: 800; 160 TABLET ORAL at 08:35

## 2017-11-05 RX ADMIN — LACTOBACILLUS ACIDOPHILUS / LACTOBACILLUS BULGARICUS 1 PACKET: 100 MILLION CFU STRENGTH GRANULES at 08:35

## 2017-11-05 NOTE — PROGRESS NOTES
Progress Note - Infectious Disease   Idris Ledbetter 63 y o  female MRN: [id number]  Unit/Bed#: [address] [place] [address] Encounter: [id number]      Subjective/Objective   Subjective: The events since last seen were noted  She reports a restful night  She has no left-sided flank pain  No hematuria          Meds/Allergies     Current Facility-Administered Medications:     acetaminophen (TYLENOL) tablet 650 mg, 650 mg, Oral, Q6H PRN, TRACY Harris    aluminum-magnesium hydroxide-simethicone (MYLANTA) 200-200-20 mg/5 mL oral suspension 30 mL, 30 mL, Oral, Q6H PRN, TRACY Harris    butalbital-acetaminophen-caffeine (FIORICET,ESGIC) -40 mg per tablet 1 tablet, 1 tablet, Oral, Q6H PRN, Juliet Nielson MD    cefTRIAXone (ROCEPHIN) IVPB (premix) 2,000 mg, 2,000 mg, Intravenous, Q24H, Kay Bentley MD, Last Rate: 100 mL/hr at 11/04/17 1329, 2,000 mg at 11/04/17 1329    enoxaparin (LOVENOX) subcutaneous injection 40 mg, 40 mg, Subcutaneous, Daily, TRACY Harris, 40 mg at 11/04/17 0945    insulin lispro (HumaLOG) 100 units/mL subcutaneous injection 1-5 Units, 1-5 Units, Subcutaneous, HS, TRACY Harris, 2 Units at 11/02/17 2130    insulin lispro (HumaLOG) 100 units/mL subcutaneous injection 1-6 Units, 1-6 Units, Subcutaneous, TID AC, 1 Units at 11/04/17 1233 **AND** Fingerstick Glucose (POCT), , , TID AC, TRACY Harris    lactobacillus acidophilus-bulgaricus [facility]) packet 1 packet, 1 packet, Oral, TID With Meals, Juliet Nielson MD, 1 packet at 11/04/17 1733    levothyroxine tablet 50 mcg, 50 mcg, Oral, Early Morning, TRACY Harris, 50 mcg at 11/05/17 0522    metFORMIN (GLUCOPHAGE-XR) 24 hr tablet 500 mg, 500 mg, Oral, Daily With Dinner, Juliet Nielson MD, 500 mg at 11/04/17 2013    nebivolol (BYSTOLIC) tablet 10 mg, 10 mg, Oral, Daily, Juliet Nielson MD, 10 mg at 11/04/17 0946    ondansetron (ZOFRAN) injection 4 mg, 4 mg, Intravenous, Q6H PRN, TRACY Harris    sulfamethoxazole-trimethoprim (BACTRIM DS) 800-160 mg per tablet 1 tablet, 1 tablet, Oral, Q12H [address], Kay Bentley MD, 1 tablet at 17 2013  Allergies   Allergen Reactions    Amoxicillin      all current active meds have been reviewed    PTA meds:    Prescriptions Prior to Admission   Medication Sig Dispense Refill Last Dose    Dapagliflozin Propanediol (FARXIGA PO) Take by mouth daily Indications: Diabetes  2017 at 0800    levothyroxine 50 mcg tablet Take 50 mcg by mouth daily  2017 at 630    metFORMIN (GLUCOPHAGE) 500 mg tablet Take 500 mg by mouth daily as needed     Past Week at Unknown time    nebivolol (BYSTOLIC) 10 mg tablet Take 10 mg by mouth daily   Past Week at Unknown time    and discontinued meds:   Medications Discontinued During This Encounter   Medication Reason    cefTRIAXone (ROCEPHIN) 1,000 mg in dextrose 5 % 50 mL IVPB     ibuprofen (MOTRIN) tablet 200 mg     cefTRIAXone (ROCEPHIN) IVPB (premix) 2,000 mg     sodium chloride 0 9 % infusion     acetaminophen (TYLENOL) tablet 650 mg     cefepime (MAXIPIME) 2,000 mg in dextrose 5 % 50 mL IVPB     ciprofloxacin (CIPRO) tablet 500 mg     influenza inactivated quadrivalent vaccine (FLULAVAL) IM injection 0 5 mL        Physical Exam: Physical Exam    HR:  [52-58] 52  Resp:  [20] 20  BP: (137-179)/(75-90) 179/90  SpO2:  [95 %-97 %] 97 %  Body mass index is 37 82 kg/m²  Weight (last 2 days)     None        Temp (24hrs), Av 7 °F (37 1 °C), Min:98 2 °F (36 8 °C), Max:99 1 °F (37 3 °C)  Current: Temperature: 98 2 °F (36 8 °C)AGE@    Intake/Output Summary (Last 24 hours) at 17 0752  Last data filed at 17 0526   Gross per 24 hour   Intake              360 ml   Output             1750 ml   Net            -1390 ml    The patient's fever has essentially resolved, from 102 8 degree F down to 99 1 degree F  Other vital signs are stable  She is alert and comfortable  The lungs are clear  Heart sounds are regular    Abdomen is obese and soft and nontender  The urostomy stoma is pink and the urine is clear  There is no left costovertebral angle tenderness any more  There are no palpable masses  The calves are supple  Invasive Devices     Peripheral Intravenous Line            Peripheral IV 11/04/17 Left Antecubital less than 1 day          Drain            Urostomy Ureterostomy right RUQ -- days                            Lab, Imaging and other studies:    Recent Results (from the past 96 hour(s))   Blood culture #1    Collection Time: 11/01/17  5:05 PM   Result Value Ref Range    Blood Culture      Blood Culture Staphylococcus coagulase negative (A)     Gram Stain Result Gram positive cocci in clusters    Blood culture #2    Collection Time: 11/01/17  5:11 PM   Result Value Ref Range    Blood Culture No Growth at 72 hrs      CBC and differential    Collection Time: 11/01/17  5:12 PM   Result Value Ref Range    WBC 7 00 4 80 - 10 80 Thousand/uL    RBC 3 92 (L) 4 20 - 5 40 Million/uL    Hemoglobin 11 7 (L) 12 0 - 16 0 g/dL    Hematocrit 36 0 (L) 37 0 - 47 0 %    MCV 92 82 - 98 fL    MCH 30 0 27 0 - 31 0 pg    MCHC 32 5 31 4 - 37 4 g/dL    RDW 16 3 (H) 11 6 - 15 1 %    MPV 6 6 (L) 8 9 - 12 7 fL    Platelets 494 635 - 889 Thousands/uL    nRBC 0 /100 WBCs    Neutrophils Relative 86 (H) 43 - 75 %    Lymphocytes Relative 7 (L) 14 - 44 %    Monocytes Relative 7 4 - 12 %    Eosinophils Relative 0 0 - 6 %    Basophils Relative 0 0 - 1 %    Neutrophils Absolute 6 00 1 85 - 7 62 Thousands/µL    Lymphocytes Absolute 0 50 (L) 0 60 - 4 47 Thousands/µL    Monocytes Absolute 0 50 0 17 - 1 22 Thousand/µL    Eosinophils Absolute 0 00 0 00 - 0 61 Thousand/µL    Basophils Absolute 0 00 0 00 - 0 10 Thousands/µL   Sedimentation rate, automated    Collection Time: 11/01/17  5:12 PM   Result Value Ref Range    Sed Rate 45 (H) 2 - 25 mm/hour   Comprehensive metabolic panel    Collection Time: 11/01/17  5:12 PM   Result Value Ref Range    Sodium 140 136 - 145 mmol/L Potassium 3 6 3 5 - 5 3 mmol/L    Chloride 106 100 - 108 mmol/L    CO2 24 21 - 32 mmol/L    Anion Gap 10 4 - 13 mmol/L    BUN 21 5 - 25 mg/dL    Creatinine 1 11 0 60 - 1 30 mg/dL    Glucose 168 (H) 65 - 140 mg/dL    Calcium 9 0 8 3 - 10 1 mg/dL    AST 32 5 - 45 U/L    ALT 39 12 - 78 U/L    Alkaline Phosphatase 131 (H) 46 - 116 U/L    Total Protein 6 4 6 4 - 8 2 g/dL    Albumin 2 8 (L) 3 5 - 5 0 g/dL    Total Bilirubin 0 90 0 20 - 1 00 mg/dL    eGFR 54 ml/min/1 73sq m   Rapid Influenza Screen with Reflex PCR (indicated for patients <2mo of age)    Collection Time: 11/01/17  5:14 PM   Result Value Ref Range    Rapid Influenza A Ag Negative Negative, Indeterminate    Rapid Influenza B Ag Negative Negative, Indeterminate   Lactic acid, plasma    Collection Time: 11/01/17  5:14 PM   Result Value Ref Range    LACTIC ACID 1 1 0 5 - 2 0 mmol/L   Influenza A/B and RSV by PCR (Indicated for patients > 2 mo of age)    Collection Time: 11/01/17  5:14 PM   Result Value Ref Range    INFLU A PCR None Detected None Detected    INFLU B PCR None Detected None Detected    RSV PCR None Detected None Detected   UA w Reflex to Microscopic w Reflex to Culture    Collection Time: 11/01/17  7:05 PM   Result Value Ref Range    Color, UA Yellow     Clarity, UA Cloudy     Specific [place], UA 1 010 1 000 - 1 030    pH, UA 6 0 5 0 - 9 0    Leukocytes, UA Small (A) Negative    Nitrite, UA Positive (A) Negative    Protein, UA 30 (1+) (A) Negative mg/dl    Glucose, UA >=1000 (1%) (A) Negative mg/dl    Ketones, UA Negative Negative mg/dl    Urobilinogen, UA 0 2 0 2, 1 0 E U /dl E U /dl    Bilirubin, UA Negative Negative    Blood, UA Small (A) Negative   Urine Microscopic    Collection Time: 11/01/17  7:05 PM   Result Value Ref Range    RBC, UA 4-10 (A) None Seen, 0-5 /hpf    WBC, UA Innumerable (A) None Seen, 0-5, 5-55, 5-65 /hpf    Epithelial Cells None Seen None Seen, Occasional /hpf    Bacteria, UA Innumerable (A) None Seen, Occasional /hpf AMORPH URATES Occasional /hpf   Urine culture    Collection Time: 11/01/17  7:05 PM   Result Value Ref Range    Urine Culture >100,000 cfu/ml Serratia marcescens (A)        Susceptibility    Serratia marcescens - JEREMIAH     Amoxicillin + Clavulanate >16/8 Resistant ug/ml     Ampicillin ($$) >16 00 Resistant ug/ml     Ampicillin + Sulbactam ($) >16/8 Resistant ug/ml     Aztreonam ($$$)  <=8 Susceptible ug/ml     Cefazolin ($) >16 00 Resistant ug/ml     Cefotaxime ($) <=2 00 Susceptible ug/ml     Ceftazidime ($$) <=1 Susceptible ug/ml     Ceftriaxone ($$) <=8 00 Susceptible ug/ml     Cefuroxime ($$) >16 Resistant ug/ml     Ciprofloxacin ($)  <=1 00 Susceptible ug/ml     Ertapenem ($$$) <=2 0 Susceptible ug/ml     Gentamicin ($$) <=4 Susceptible ug/ml     Imipenem <=4 Susceptible ug/ml     Levofloxacin ($) <=2 00 Susceptible ug/ml     Meropenem ($$) <=4 00 Susceptible ug/ml     Nitrofurantoin >64 Resistant ug/ml     Piperacillin + Tazobactam ($$$) <=16 Susceptible ug/ml     Tetracycline 8 Intermediate ug/ml     Tobramycin ($) <=4 Susceptible ug/ml     Trimethoprim + Sulfamethoxazole ($$$) <=2/38 Susceptible ug/ml   MRSA culture    Collection Time: 11/02/17 12:01 AM   Result Value Ref Range    MRSA Culture Only       No Methicillin Resistant Staphlyococcus aureus (MRSA) isolated   Basic metabolic panel    Collection Time: 11/02/17  6:02 AM   Result Value Ref Range    Sodium 141 136 - 145 mmol/L    Potassium 3 8 3 5 - 5 3 mmol/L    Chloride 111 (H) 100 - 108 mmol/L    CO2 23 21 - 32 mmol/L    Anion Gap 7 4 - 13 mmol/L    BUN 16 5 - 25 mg/dL    Creatinine 0 88 0 60 - 1 30 mg/dL    Glucose 124 65 - 140 mg/dL    Calcium 8 9 8 3 - 10 1 mg/dL    eGFR 72 ml/min/1 73sq m   Magnesium    Collection Time: 11/02/17  6:02 AM   Result Value Ref Range    Magnesium 1 8 1 6 - 2 6 mg/dL   CBC and differential    Collection Time: 11/02/17  6:02 AM   Result Value Ref Range    WBC 4 60 (L) 4 80 - 10 80 Thousand/uL    RBC 3 92 (L) 4 20 - 5 40 Million/uL    Hemoglobin 11 7 (L) 12 0 - 16 0 g/dL    Hematocrit 36 3 (L) 37 0 - 47 0 %    MCV 93 82 - 98 fL    MCH 29 9 27 0 - 31 0 pg    MCHC 32 3 31 4 - 37 4 g/dL    RDW 16 2 (H) 11 6 - 15 1 %    MPV 6 7 (L) 8 9 - 12 7 fL    Platelets 624 883 - 066 Thousands/uL    nRBC 0 /100 WBCs    Neutrophils Relative 73 43 - 75 %    Lymphocytes Relative 15 14 - 44 %    Monocytes Relative 10 4 - 12 %    Eosinophils Relative 2 0 - 6 %    Basophils Relative 0 0 - 1 %    Neutrophils Absolute 3 30 1 85 - 7 62 Thousands/µL    Lymphocytes Absolute 0 70 0 60 - 4 47 Thousands/µL    Monocytes Absolute 0 50 0 17 - 1 22 Thousand/µL    Eosinophils Absolute 0 10 0 00 - 0 61 Thousand/µL    Basophils Absolute 0 00 0 00 - 0 10 Thousands/µL   Hemoglobin A1c    Collection Time: 11/02/17  6:08 AM   Result Value Ref Range    Hemoglobin A1C 7 5 (H) 4 2 - 6 3 %     mg/dl   T4, free    Collection Time: 11/02/17  6:08 AM   Result Value Ref Range    Free T4 1 06 0 76 - 1 46 ng/dL   Fingerstick Glucose (POCT)    Collection Time: 11/02/17  6:53 AM   Result Value Ref Range    POC Glucose 117 65 - 140 mg/dl   Fingerstick Glucose (POCT)    Collection Time: 11/02/17 11:30 AM   Result Value Ref Range    POC Glucose 132 65 - 140 mg/dl   Fingerstick Glucose (POCT)    Collection Time: 11/02/17  4:26 PM   Result Value Ref Range    POC Glucose 200 (H) 65 - 140 mg/dl   Fingerstick Glucose (POCT)    Collection Time: 11/02/17  8:52 PM   Result Value Ref Range    POC Glucose 270 (H) 65 - 140 mg/dl   Blood culture    Collection Time: 11/03/17  6:02 AM   Result Value Ref Range    Blood Culture No Growth at 24 hrs  Blood culture    Collection Time: 11/03/17  6:51 AM   Result Value Ref Range    Blood Culture No Growth at 24 hrs      CBC and differential    Collection Time: 11/03/17  6:52 AM   Result Value Ref Range    WBC 4 40 (L) 4 80 - 10 80 Thousand/uL    RBC 3 51 (L) 4 20 - 5 40 Million/uL    Hemoglobin 10 5 (L) 12 0 - 16 0 g/dL    Hematocrit 32 2 (L) 37 0 - 47 0 %    MCV 92 82 - 98 fL    MCH 29 8 27 0 - 31 0 pg    MCHC 32 5 31 4 - 37 4 g/dL    RDW 16 1 (H) 11 6 - 15 1 %    MPV 6 5 (L) 8 9 - 12 7 fL    Platelets 170 097 - 592 Thousands/uL    nRBC 0 /100 WBCs    Neutrophils Relative 62 43 - 75 %    Lymphocytes Relative 22 14 - 44 %    Monocytes Relative 12 4 - 12 %    Eosinophils Relative 4 0 - 6 %    Basophils Relative 1 0 - 1 %    Neutrophils Absolute 2 80 1 85 - 7 62 Thousands/µL    Lymphocytes Absolute 1 00 0 60 - 4 47 Thousands/µL    Monocytes Absolute 0 50 0 17 - 1 22 Thousand/µL    Eosinophils Absolute 0 20 0 00 - 0 61 Thousand/µL    Basophils Absolute 0 00 0 00 - 0 10 Thousands/µL   Basic metabolic panel    Collection Time: 11/03/17  6:52 AM   Result Value Ref Range    Sodium 144 136 - 145 mmol/L    Potassium 3 6 3 5 - 5 3 mmol/L    Chloride 112 (H) 100 - 108 mmol/L    CO2 23 21 - 32 mmol/L    Anion Gap 9 4 - 13 mmol/L    BUN 15 5 - 25 mg/dL    Creatinine 0 88 0 60 - 1 30 mg/dL    Glucose 142 (H) 65 - 140 mg/dL    Calcium 8 9 8 3 - 10 1 mg/dL    eGFR 72 ml/min/1 73sq m   Fingerstick Glucose (POCT)    Collection Time: 11/03/17  7:52 AM   Result Value Ref Range    POC Glucose 148 (H) 65 - 140 mg/dl   Fingerstick Glucose (POCT)    Collection Time: 11/03/17 11:28 AM   Result Value Ref Range    POC Glucose 249 (H) 65 - 140 mg/dl   Fingerstick Glucose (POCT)    Collection Time: 11/03/17  4:22 PM   Result Value Ref Range    POC Glucose 163 (H) 65 - 140 mg/dl   Fingerstick Glucose (POCT)    Collection Time: 11/03/17  8:34 PM   Result Value Ref Range    POC Glucose 146 (H) 65 - 140 mg/dl   Fingerstick Glucose (POCT)    Collection Time: 11/04/17  7:43 AM   Result Value Ref Range    POC Glucose 138 65 - 140 mg/dl   Fingerstick Glucose (POCT)    Collection Time: 11/04/17 12:00 PM   Result Value Ref Range    POC Glucose 171 (H) 65 - 140 mg/dl   Fingerstick Glucose (POCT)    Collection Time: 11/04/17  3:26 PM   Result Value Ref Range    POC Glucose 142 (H) 65 - 140 mg/dl   Fingerstick Glucose (POCT)    Collection Time: 11/04/17  8:16 PM   Result Value Ref Range    POC Glucose 229 (H) 65 - 140 mg/dl       Results from last 7 days  Lab Units 11/02/17  0602   MAGNESIUM mg/dL 1 8             No results found for: TROPONINT  ABG:No results found for: PHART, DPZ0OZV, PO2ART, JLL0SDS, H9EVEZOJ, BEART, SOURCE        Cultures    Results from last 7 days  Lab Units 11/03/17  0651 11/03/17  0602 11/02/17  0001 11/01/17  1905 11/01/17  1714 11/01/17  1711 11/01/17  1705   BLOOD CULTURE  No Growth at 24 hrs  No Growth at 24 hrs   --   --   --  No Growth at 72 hrs  Staphylococcus coagulase negative*   GRAM STAIN RESULT   --   --   --   --   --   --  Gram positive cocci in clusters   URINE CULTURE   --   --   --  >100,000 cfu/ml Serratia marcescens*  --   --   --    MRSA CULTURE ONLY   --   --  No Methicillin Resistant Staphlyococcus aureus (MRSA) isolated  --   --   --   --    INFLUENZA A PCR   --   --   --   --  None Detected  --   --    INFLUENZA B PCR   --   --   --   --  None Detected  --   --    RSV PCR   --   --   --   --  None Detected  --   --       Xr Chest 1 View Portable    Result Date: 11/2/2017  Narrative: CHEST INDICATION:  fever  History taken directly from the electronic ordering system  COMPARISON:  8/30/2012 VIEWS:   AP frontal IMAGES:  1 FINDINGS:     Cardiomediastinal silhouette appears unremarkable  The lungs are clear  No pneumothorax or pleural effusion  Visualized osseous structures appear within normal limits for the patient's age  Impression: No active pulmonary disease  As per comments in the PACS workstation, findings are concordant with preliminary interpretation provided by the emergency room physician   Workstation performed: [id number]     Ct Abdomen Pelvis With Contrast    Result Date: 11/1/2017  Narrative: CT ABDOMEN AND PELVIS WITH IV CONTRAST INDICATION:  Pyelonephritis COMPARISON: CT abdomen pelvis 1/11/2016 TECHNIQUE:  CT examination of the abdomen and pelvis was performed  Reformatted images were created in axial, sagittal, and coronal planes  Radiation dose length product (DLP) for this visit:  1535 2 mGy-cm   This examination, like all CT scans performed in the [facility], was performed utilizing techniques to minimize radiation dose exposure, including the use of iterative  reconstruction and automated exposure control  IV Contrast:  100 mL of iohexol (OMNIPAQUE)     Enteric Contrast:  Enteric contrast was not administered  FINDINGS: ABDOMEN LOWER CHEST:  There is bibasilar dependent atelectasis  LIVER/BILIARY TREE:  Unremarkable  GALLBLADDER:  There are gallstone(s) within the gallbladder, without pericholecystic inflammatory changes  SPLEEN:  Unremarkable  PANCREAS:  Unremarkable  ADRENAL GLANDS:  Unremarkable  KIDNEYS/URETERS:  One or more sharply circumscribed subcentimeter renal hypodensities are noted  These lesions are too small to accurately characterize, but are statistically most likely to represent benign cortical renal cyst(s)  According to the guidelines published in the [facility] Paper of the ACR Incidental Findings Committee (Radiology 2010), no further workup of these lesions is recommended  There is a 4 mm nonobstructing left lower pole renal calculus  There is a right lower quadrant ileostomy  There is mild left-sided hydroureteronephrosis without evident obstructing calculus  STOMACH AND BOWEL:  There is colonic diverticulosis without evidence of acute diverticulitis  No evidence of bowel obstruction  Small bowel herniated into the ileostomy without evident complication  Small hiatal hernia  There are postsurgical changes  of the stomach  APPENDIX:  No findings to suggest appendicitis  ABDOMINOPELVIC CAVITY:  No ascites or free intraperitoneal air  No lymphadenopathy  VESSELS:  Unremarkable for patient's age  PELVIS REPRODUCTIVE ORGANS:  Patient is status post hysterectomy   URINARY BLADDER: Unremarkable  ABDOMINAL WALL/INGUINAL REGIONS:  Status post right lower quadrant ileostomy  No inguinal hernia  OSSEOUS STRUCTURES:  No acute fracture or destructive osseous lesion  Impression: 1  Mild prominence of the left renal collecting system which is new from prior examination  No evident obstructing calculus  Differential considerations include recently passed calculus versus ascending infection or noncalcified obstructing lesion  2   Nonobstructing 4 mm left lower pole renal calculus  3   Cholelithiasis without other evidence of acute cholecystitis  4   There is herniation of small bowel at the right lower quadrant ileostomy site  No evidence of acute complication  Workstation performed: [id number]     I have personally reviewed pertinent reports  Principal Problem:    Pyelonephritis  Active Problems:    History of urostomy    Sepsis ([facility] [address] )    Anemia    Hydroureteronephrosis    HTN (hypertension)    Migraine      Assessment/Plan: This is a middle-age woman with hypertension, obesity, diabetes mellitus type 2, hypothyroidism, endometrial cancer, status post radical hysterectomy, subsequent radiation treatment for causing urethral stricture, treated with urostomy  She was hospitalized with sepsis due to complicated urinary tract infection with Serratia marcescens  The response to treatment has been good  She does not have any ongoing signs of sepsis  The treatment can be completed with oral Bactrim for 1 more week  Discharge can be planned  A dose of ceftriaxone can be administered ,earlier, today, to facilitate discharge  Patient was counseled to follow-up with her primary physician Dr Salcido, after discharge

## 2017-11-05 NOTE — DISCHARGE INSTRUCTIONS
Continue Bactrim DS oral twice daily for 7 more days (10-day course antibiotics) for complicated UTI  Adequate oral fluid intake  Follow up with PCP within 1 week

## 2017-11-05 NOTE — DISCHARGE SUMMARY
Discharge Summary - [address] [facility]  Patient Information: Maicol Hodges 63 y o  female MRN: [id number]  Unit/Bed#: [address] [address] Encounter: [id number]    Admission Date: 11/1/2017  Discharge Date: 11/5/2017    Admitting Physician: Sotero Malagon MD  Discharging Physician/Practitioner: Noy Perez MD  PCP: Alicia Moser DO    Reason for Admission: Fever - 9 weeks to 74 years (no appetite, nausea, ws sent to the ED by Dr Krishna Yoo  was given Bystolic 79BB by Dr Krishna Yoo for elevated BP)      Admission Diagnoses:  Pyelonephritis [N12]  Fever [R50 9]    Discharge Diagnoses:    Pyelonephritis    Sepsis    Hydroureteronephrosis    History of urostomy    Anemia    HTN (hypertension)    Migraine    Consultations During Hospital Stay:  IP CONSULT TO CASE MANAGEMENT  IP CONSULT TO INFECTIOUS DISEASES    Hospital Course:   63 y o  female with a history of T2DM, Endometrial cancer, s/p urostomy, hypothyroidism, DVT who was admitted on 11/1/2017 with high fever and left flank pain, persistent to sepsis 2/2 pyelonephritis      Complicated UTI due to Serratia marcescens  Left pyelonephritis  Sepsis, Likely early stage, resolved  History of urostomy  Hydroureteronephrosis, mild, likely chronic   Ceftriaxone iv initiated, switched to cefepime iv and vancomycin iv due to recurrent fevers  ID consulted  Blood cx - 1/2 bottles showing staph coag negative most likely contaminants  Repeat blood cx NGSF  Urine cx - Serratia marcescens  She is clinically improving, fever gradually resolved, left flank pain resovled  Switched to ceftriaxone iv, Bactrim po, she has been doing well  Continue Bactrim DS bid for 10-day course in total  Continue Probiotics     HTN  Improving with home Bystolic     Migraine  Improving with Fioricet prn     T2DM  On SSI  Resume home regimens     Hypothyroidism  Continue Levothyroxine     DVT Prophylaxis: on Lovenox sc    Code Status: Level 1 - Full Code      Imaging while in hospital:  Xr Chest 1 View Portable    Result Date: 11/2/2017  Narrative: CHEST INDICATION:  fever  History taken directly from the electronic ordering system  COMPARISON:  8/30/2012 VIEWS:   AP frontal IMAGES:  1 FINDINGS:     Cardiomediastinal silhouette appears unremarkable  The lungs are clear  No pneumothorax or pleural effusion  Visualized osseous structures appear within normal limits for the patient's age  Impression: No active pulmonary disease  As per comments in the PACS workstation, findings are concordant with preliminary interpretation provided by the emergency room physician  Workstation performed: [id number]     Ct Abdomen Pelvis With Contrast    Result Date: 11/1/2017  Narrative: CT ABDOMEN AND PELVIS WITH IV CONTRAST INDICATION:  Pyelonephritis COMPARISON: CT abdomen pelvis 1/11/2016 TECHNIQUE:  CT examination of the abdomen and pelvis was performed  Reformatted images were created in axial, sagittal, and coronal planes  Radiation dose length product (DLP) for this visit:  1535 2 mGy-cm   This examination, like all CT scans performed in the [facility], was performed utilizing techniques to minimize radiation dose exposure, including the use of iterative  reconstruction and automated exposure control  IV Contrast:  100 mL of iohexol (OMNIPAQUE)     Enteric Contrast:  Enteric contrast was not administered  FINDINGS: ABDOMEN LOWER CHEST:  There is bibasilar dependent atelectasis  LIVER/BILIARY TREE:  Unremarkable  GALLBLADDER:  There are gallstone(s) within the gallbladder, without pericholecystic inflammatory changes  SPLEEN:  Unremarkable  PANCREAS:  Unremarkable  ADRENAL GLANDS:  Unremarkable  KIDNEYS/URETERS:  One or more sharply circumscribed subcentimeter renal hypodensities are noted  These lesions are too small to accurately characterize, but are statistically most likely to represent benign cortical renal cyst(s)    According to the guidelines published in the [place] Paper of the ACR Incidental Findings Committee (Radiology 2010), no further workup of these lesions is recommended  There is a 4 mm nonobstructing left lower pole renal calculus  There is a right lower quadrant ileostomy  There is mild left-sided hydroureteronephrosis without evident obstructing calculus  STOMACH AND BOWEL:  There is colonic diverticulosis without evidence of acute diverticulitis  No evidence of bowel obstruction  Small bowel herniated into the ileostomy without evident complication  Small hiatal hernia  There are postsurgical changes  of the stomach  APPENDIX:  No findings to suggest appendicitis  ABDOMINOPELVIC CAVITY:  No ascites or free intraperitoneal air  No lymphadenopathy  VESSELS:  Unremarkable for patient's age  PELVIS REPRODUCTIVE ORGANS:  Patient is status post hysterectomy  URINARY BLADDER:  Unremarkable  ABDOMINAL WALL/INGUINAL REGIONS:  Status post right lower quadrant ileostomy  No inguinal hernia  OSSEOUS STRUCTURES:  No acute fracture or destructive osseous lesion  Impression: 1  Mild prominence of the left renal collecting system which is new from prior examination  No evident obstructing calculus  Differential considerations include recently passed calculus versus ascending infection or noncalcified obstructing lesion  2   Nonobstructing 4 mm left lower pole renal calculus  3   Cholelithiasis without other evidence of acute cholecystitis  4   There is herniation of small bowel at the right lower quadrant ileostomy site  No evidence of acute complication  Workstation performed: [id number]       Allergies:    Allergies   Allergen Reactions    Amoxicillin        Discharge Medications:  Current Discharge Medication List      START taking these medications    Details   lactobacillus acidophilus-bulgaricus [facility]) packet Take 1 packet by mouth 3 (three) times a day with meals for 10 days  Qty: 30 packet, Refills: 0      sulfamethoxazole-trimethoprim (BACTRIM DS) 800-160 mg per tablet Take 1 tablet by mouth every 12 (twelve) hours for 7 days  Qty: 14 tablet, Refills: 0           Current Discharge Medication List        Current Discharge Medication List      CONTINUE these medications which have NOT CHANGED    Details   Dapagliflozin Propanediol (FARXIGA PO) Take by mouth daily Indications: Diabetes  levothyroxine 50 mcg tablet Take 50 mcg by mouth daily  metFORMIN (GLUCOPHAGE) 500 mg tablet Take 500 mg by mouth daily as needed        nebivolol (BYSTOLIC) 10 mg tablet Take 10 mg by mouth daily           Current Discharge Medication List          Medications were reconciliated and instructions were given to Ms Arango at bedside prior to the discharge  Discharge Day Visit/Exam:   Subjective:  /72   Pulse 55   Temp 98 6 °F (37 °C) (Oral)   Resp 20   Ht 5' 6" (1 676 m)   Wt 106 kg (234 lb 5 6 oz)   SpO2 95%   Breastfeeding? No   BMI 37 82 kg/m²   General: NAD  HEENT: EOMI, anicteric, oral moist, neck supple, no mass or JVD  Chest: CTAB, no wheeze/rales  Cardiac: RRR, S1/S2, No murmur  Abd: s/p urostomy at RLQ, S/ND/NT/BS+  MSK: No LE pitting edema, pulses intact  Neuro: AAOx3, moving all extremities  Psychiatric: Mood with normal affect       Patient Instructions: Activity: activity as tolerated  Diet: regular diet  Wound Care: none needed    Discharge instructions/Information to patient and family:(Discharge Medications and Follow up):  See after visit summary for information provided to patient and family  Provisions for Follow-Up Care:  See after visit summary for information related to follow-up care and any pertinent home health orders  Follow-up Informations:  Alicia Moser DO  [address] [place] [address]  [phone]    Follow up in 1 week(s)  Complicated UTI     Disposition: Home    Planned Readmission: No     Discharge Statement:  I spent 20 minutes discharging the patient  This time was spent on the day of discharge   I had direct contact with the patient on the day of discharge  Greater than 50% of the total time was spent examining patient, answering all patient questions, arranging and discussing plan of care with patient as well as directly providing post-discharge instructions  Additional time then spent on discharge activities  Discharge Medications:  See after visit summary for reconciled discharge medications provided to patient and family

## 2017-11-06 LAB — BACTERIA BLD CULT: NORMAL

## 2017-11-06 NOTE — CASE MANAGEMENT
Continued Stay Review    Date: 17    Vitals:   Temp (24hrs), Av 1 °F (37 3 °C), Min:98 2 °F (36 8 °C), Max:100 °F (37 8 °C)     HR:  [58-68] 58  Resp:  [18-20] 20  BP: (137-181)/(75-94) 137/75  SpO2:  [95 %-96 %] 95 %  Body mass index is 37 82 kg/m²  Medications:   Scheduled Meds:   cefepime 2,000 mg Intravenous Q12H   ciprofloxacin 500 mg Oral Q12H BRETT   enoxaparin 40 mg Subcutaneous Daily   insulin lispro 1-5 Units Subcutaneous HS   insulin lispro 1-6 Units Subcutaneous TID AC   lactobacillus acidophilus-bulgaricus 1 packet Oral TID With Meals   levothyroxine 50 mcg Oral Early Morning      Continuous Infusions:   sodium chloride 100 mL/hr Last Rate: 100 mL/hr (17 0542)      PRN Meds:   acetaminophen    aluminum-magnesium hydroxide-simethicone    influenza vaccine    ondansetron      Age/Sex: 63 y o  female     PER ID  Complicated urinary tract infection with sepsis, suspected left pyelonephritis, due to Serratia marcescens  The organism is sensitive to 3rd generation cephalosporins, fluoroquinolones, trimethoprim sulfamethoxazole  The signs of sepsis are abating  If she continues to improve, discharge can be planned for tomorrow, on oral antimicrobial agent to complete a 10 day course  Trimethoprim sulfamethoxazole may be safer then ciprofloxacin  I will make that change today  Given antimicrobial sensitivities,  ceftriaxone can be resumed     PER MD  Complicated UTI due to Serratia marcescens  Left pyelonephritis  Sepsis  History of urostomy  Hydroureteronephrosis, mild, likely chronic   Urine cx - Serratia marcescens  Blood cx - 1/2 bottles showing staph coag negative most likely contaminants  Repeat blood cx NGSF    Now on ceftriaxone iv, Bactrim po  Probiotics  D/c IVF today  If further improving, anticipate d/c home tomorrow  ID consult following appreciated     HTN  Continue home Bystolic     Migraine  Continue Fioricet prn     T2DM  Resume home metformin  SSI     Hypothyroidism  Levothyroxine     DVT Prophylaxis: on Lovenox sc  Code Status: Level 1 - Full Code  Disposition: anticipate d/c home once clinically improved

## 2017-11-06 NOTE — CASE MANAGEMENT
Notification of Discharge  This is a Notification of Discharge from our facility [address]  Please be advised that this patient has been discharge from our facility  Below you will find the admission and discharge date and time including the patients disposition  PRESENTATION DATE: 11/1/2017  4:24 PM  IP ADMISSION DATE: 11/1/17 2233  DISCHARGE DATE: 11/5/2017 12:00 PM  DISPOSITION: Home/Self Care    [address] [facility] in the [facility] by Denny Ragsdale for 2017  Network Utilization Review Department  Phone: [phone]; Fax [phone]  ATTENTION: The Network Utilization Review Department is now centralized for our 7 Facilities  Make a note that we have a new phone and fax numbers for our Department  Please call with any questions or concerns to [phone] and carefully follow the prompts so that you are directed to the right person  All voicemails are confidential  Fax any determinations, approvals, denials, and requests for initial or continue stay review clinical to [phone]  Due to HIGH CALL volume, it would be easier if you could please send faxed requests to expedite your requests and in part, help us provide discharge notifications faster

## 2017-11-08 LAB
BACTERIA BLD CULT: NORMAL
BACTERIA BLD CULT: NORMAL

## 2018-01-22 VITALS
BODY MASS INDEX: 31.21 KG/M2 | WEIGHT: 218 LBS | SYSTOLIC BLOOD PRESSURE: 122 MMHG | HEIGHT: 70 IN | DIASTOLIC BLOOD PRESSURE: 80 MMHG

## 2018-05-16 ENCOUNTER — TRANSCRIBE ORDERS (OUTPATIENT)
Dept: ADMINISTRATIVE | Facility: HOSPITAL | Age: 63
End: 2018-05-16

## 2018-05-16 DIAGNOSIS — R52 PAIN: Primary | ICD-10-CM

## 2018-05-29 ENCOUNTER — TRANSCRIBE ORDERS (OUTPATIENT)
Dept: ADMINISTRATIVE | Facility: HOSPITAL | Age: 63
End: 2018-05-29

## 2018-05-29 ENCOUNTER — HOSPITAL ENCOUNTER (OUTPATIENT)
Dept: ULTRASOUND IMAGING | Facility: HOSPITAL | Age: 63
Discharge: HOME | End: 2018-05-29
Payer: COMMERCIAL

## 2018-05-29 ENCOUNTER — HOSPITAL ENCOUNTER (OUTPATIENT)
Dept: RADIOLOGY | Facility: HOSPITAL | Age: 63
Discharge: HOME | End: 2018-05-29
Payer: COMMERCIAL

## 2018-05-29 DIAGNOSIS — M54.2 CERVICALGIA: ICD-10-CM

## 2018-05-29 DIAGNOSIS — R52 PAIN: ICD-10-CM

## 2018-05-29 DIAGNOSIS — M54.2 CERVICALGIA: Primary | ICD-10-CM

## 2018-05-29 PROCEDURE — 72040 X-RAY EXAM NECK SPINE 2-3 VW: CPT

## 2018-05-29 PROCEDURE — 76700 US EXAM ABDOM COMPLETE: CPT

## 2018-11-17 ENCOUNTER — HOSPITAL ENCOUNTER (INPATIENT)
Facility: HOSPITAL | Age: 63
LOS: 1 days | Discharge: HOME | DRG: 390 | End: 2018-11-19
Attending: EMERGENCY MEDICINE | Admitting: SURGERY
Payer: COMMERCIAL

## 2018-11-17 ENCOUNTER — APPOINTMENT (EMERGENCY)
Dept: CT IMAGING | Facility: HOSPITAL | Age: 63
DRG: 390 | End: 2018-11-17
Payer: COMMERCIAL

## 2018-11-17 DIAGNOSIS — K56.609 SMALL BOWEL OBSTRUCTION (HCC): Primary | ICD-10-CM

## 2018-11-17 DIAGNOSIS — Z93.3 COLOSTOMY PRESENT (HCC): ICD-10-CM

## 2018-11-17 LAB
ALBUMIN SERPL BCP-MCNC: 4.1 G/DL (ref 3.5–5)
ALP SERPL-CCNC: 147 U/L (ref 46–116)
ALT SERPL W P-5'-P-CCNC: 33 U/L (ref 12–78)
ANION GAP SERPL CALCULATED.3IONS-SCNC: 11 MMOL/L (ref 4–13)
AST SERPL W P-5'-P-CCNC: 13 U/L (ref 5–45)
BASOPHILS # BLD AUTO: 0.04 THOUSANDS/ΜL (ref 0–0.1)
BASOPHILS NFR BLD AUTO: 0 % (ref 0–1)
BILIRUB SERPL-MCNC: 0.6 MG/DL (ref 0.2–1)
BUN SERPL-MCNC: 28 MG/DL (ref 5–25)
CALCIUM SERPL-MCNC: 10 MG/DL (ref 8.3–10.1)
CHLORIDE SERPL-SCNC: 108 MMOL/L (ref 100–108)
CK SERPL-CCNC: 63 U/L (ref 26–192)
CO2 SERPL-SCNC: 26 MMOL/L (ref 21–32)
CREAT SERPL-MCNC: 1.14 MG/DL (ref 0.6–1.3)
EOSINOPHIL # BLD AUTO: 0.3 THOUSAND/ΜL (ref 0–0.61)
EOSINOPHIL NFR BLD AUTO: 3 % (ref 0–6)
ERYTHROCYTE [DISTWIDTH] IN BLOOD BY AUTOMATED COUNT: 14.9 % (ref 11.6–15.1)
GFR SERPL CREATININE-BSD FRML MDRD: 52 ML/MIN/1.73SQ M
GLUCOSE SERPL-MCNC: 185 MG/DL (ref 65–140)
HCT VFR BLD AUTO: 42.1 % (ref 34.8–46.1)
HGB BLD-MCNC: 13.4 G/DL (ref 11.5–15.4)
IMM GRANULOCYTES # BLD AUTO: 0.03 THOUSAND/UL (ref 0–0.2)
IMM GRANULOCYTES NFR BLD AUTO: 0 % (ref 0–2)
LACTATE SERPL-SCNC: 2.2 MMOL/L (ref 0.5–2)
LIPASE SERPL-CCNC: 129 U/L (ref 73–393)
LYMPHOCYTES # BLD AUTO: 2.37 THOUSANDS/ΜL (ref 0.6–4.47)
LYMPHOCYTES NFR BLD AUTO: 21 % (ref 14–44)
MCH RBC QN AUTO: 30.1 PG (ref 26.8–34.3)
MCHC RBC AUTO-ENTMCNC: 31.8 G/DL (ref 31.4–37.4)
MCV RBC AUTO: 95 FL (ref 82–98)
MONOCYTES # BLD AUTO: 0.5 THOUSAND/ΜL (ref 0.17–1.22)
MONOCYTES NFR BLD AUTO: 4 % (ref 4–12)
NEUTROPHILS # BLD AUTO: 8.18 THOUSANDS/ΜL (ref 1.85–7.62)
NEUTS SEG NFR BLD AUTO: 72 % (ref 43–75)
NRBC BLD AUTO-RTO: 0 /100 WBCS
PLATELET # BLD AUTO: 251 THOUSANDS/UL (ref 149–390)
PMV BLD AUTO: 8.7 FL (ref 8.9–12.7)
POTASSIUM SERPL-SCNC: 4.3 MMOL/L (ref 3.5–5.3)
PROT SERPL-MCNC: 7.7 G/DL (ref 6.4–8.2)
RBC # BLD AUTO: 4.45 MILLION/UL (ref 3.81–5.12)
SODIUM SERPL-SCNC: 145 MMOL/L (ref 136–145)
TROPONIN I SERPL-MCNC: <0.02 NG/ML
TSH SERPL DL<=0.05 MIU/L-ACNC: 4.53 UIU/ML (ref 0.36–3.74)
WBC # BLD AUTO: 11.42 THOUSAND/UL (ref 4.31–10.16)

## 2018-11-17 PROCEDURE — 83605 ASSAY OF LACTIC ACID: CPT | Performed by: PHYSICIAN ASSISTANT

## 2018-11-17 PROCEDURE — 85025 COMPLETE CBC W/AUTO DIFF WBC: CPT

## 2018-11-17 PROCEDURE — 84484 ASSAY OF TROPONIN QUANT: CPT | Performed by: PHYSICIAN ASSISTANT

## 2018-11-17 PROCEDURE — 74177 CT ABD & PELVIS W/CONTRAST: CPT

## 2018-11-17 PROCEDURE — 96375 TX/PRO/DX INJ NEW DRUG ADDON: CPT

## 2018-11-17 PROCEDURE — 93005 ELECTROCARDIOGRAM TRACING: CPT

## 2018-11-17 PROCEDURE — 99285 EMERGENCY DEPT VISIT HI MDM: CPT

## 2018-11-17 PROCEDURE — 84443 ASSAY THYROID STIM HORMONE: CPT | Performed by: PHYSICIAN ASSISTANT

## 2018-11-17 PROCEDURE — C9113 INJ PANTOPRAZOLE SODIUM, VIA: HCPCS | Performed by: PHYSICIAN ASSISTANT

## 2018-11-17 PROCEDURE — 96376 TX/PRO/DX INJ SAME DRUG ADON: CPT

## 2018-11-17 PROCEDURE — 80053 COMPREHEN METABOLIC PANEL: CPT

## 2018-11-17 PROCEDURE — 96365 THER/PROPH/DIAG IV INF INIT: CPT

## 2018-11-17 PROCEDURE — 83690 ASSAY OF LIPASE: CPT

## 2018-11-17 PROCEDURE — 96361 HYDRATE IV INFUSION ADD-ON: CPT

## 2018-11-17 PROCEDURE — 36415 COLL VENOUS BLD VENIPUNCTURE: CPT

## 2018-11-17 PROCEDURE — 82550 ASSAY OF CK (CPK): CPT | Performed by: PHYSICIAN ASSISTANT

## 2018-11-17 RX ORDER — PANTOPRAZOLE SODIUM 40 MG/1
40 INJECTION, POWDER, FOR SOLUTION INTRAVENOUS ONCE
Status: COMPLETED | OUTPATIENT
Start: 2018-11-17 | End: 2018-11-17

## 2018-11-17 RX ORDER — HYDROMORPHONE HCL/PF 1 MG/ML
1 SYRINGE (ML) INJECTION ONCE
Status: COMPLETED | OUTPATIENT
Start: 2018-11-17 | End: 2018-11-17

## 2018-11-17 RX ORDER — ONDANSETRON 2 MG/ML
4 INJECTION INTRAMUSCULAR; INTRAVENOUS ONCE
Status: COMPLETED | OUTPATIENT
Start: 2018-11-17 | End: 2018-11-17

## 2018-11-17 RX ADMIN — SODIUM CHLORIDE 1000 ML: 0.9 INJECTION, SOLUTION INTRAVENOUS at 21:35

## 2018-11-17 RX ADMIN — SODIUM CHLORIDE 8 MG/HR: 9 INJECTION, SOLUTION INTRAVENOUS at 23:02

## 2018-11-17 RX ADMIN — HYDROMORPHONE HYDROCHLORIDE 1 MG: 1 INJECTION, SOLUTION INTRAMUSCULAR; INTRAVENOUS; SUBCUTANEOUS at 22:17

## 2018-11-17 RX ADMIN — IOHEXOL 100 ML: 350 INJECTION, SOLUTION INTRAVENOUS at 22:49

## 2018-11-17 RX ADMIN — ONDANSETRON 4 MG: 2 INJECTION INTRAMUSCULAR; INTRAVENOUS at 21:49

## 2018-11-17 RX ADMIN — PANTOPRAZOLE SODIUM 40 MG: 40 INJECTION, POWDER, FOR SOLUTION INTRAVENOUS at 22:18

## 2018-11-18 ENCOUNTER — APPOINTMENT (INPATIENT)
Dept: RADIOLOGY | Facility: HOSPITAL | Age: 63
DRG: 390 | End: 2018-11-18
Payer: COMMERCIAL

## 2018-11-18 LAB
ANION GAP SERPL CALCULATED.3IONS-SCNC: 12 MMOL/L (ref 4–13)
ANISOCYTOSIS BLD QL SMEAR: PRESENT
ATRIAL RATE: 73 BPM
BASOPHILS # BLD MANUAL: 0 THOUSAND/UL (ref 0–0.1)
BASOPHILS NFR MAR MANUAL: 0 % (ref 0–1)
BUN SERPL-MCNC: 30 MG/DL (ref 5–25)
CALCIUM SERPL-MCNC: 9 MG/DL (ref 8.3–10.1)
CHLORIDE SERPL-SCNC: 112 MMOL/L (ref 100–108)
CO2 SERPL-SCNC: 21 MMOL/L (ref 21–32)
CREAT SERPL-MCNC: 0.97 MG/DL (ref 0.6–1.3)
EOSINOPHIL # BLD MANUAL: 0 THOUSAND/UL (ref 0–0.4)
EOSINOPHIL NFR BLD MANUAL: 0 % (ref 0–6)
ERYTHROCYTE [DISTWIDTH] IN BLOOD BY AUTOMATED COUNT: 15.1 % (ref 11.6–15.1)
GFR SERPL CREATININE-BSD FRML MDRD: 63 ML/MIN/1.73SQ M
GLUCOSE SERPL-MCNC: 126 MG/DL (ref 65–140)
GLUCOSE SERPL-MCNC: 154 MG/DL (ref 65–140)
GLUCOSE SERPL-MCNC: 174 MG/DL (ref 65–140)
GLUCOSE SERPL-MCNC: 174 MG/DL (ref 65–140)
HCT VFR BLD AUTO: 38.9 % (ref 34.8–46.1)
HGB BLD-MCNC: 12.4 G/DL (ref 11.5–15.4)
LYMPHOCYTES # BLD AUTO: 0.54 THOUSAND/UL (ref 0.6–4.47)
LYMPHOCYTES # BLD AUTO: 5 % (ref 14–44)
MAGNESIUM SERPL-MCNC: 2 MG/DL (ref 1.6–2.6)
MCH RBC QN AUTO: 30.1 PG (ref 26.8–34.3)
MCHC RBC AUTO-ENTMCNC: 31.9 G/DL (ref 31.4–37.4)
MCV RBC AUTO: 94 FL (ref 82–98)
MONOCYTES # BLD AUTO: 0.65 THOUSAND/UL (ref 0–1.22)
MONOCYTES NFR BLD: 6 % (ref 4–12)
NEUTROPHILS # BLD MANUAL: 9.58 THOUSAND/UL (ref 1.85–7.62)
NEUTS BAND NFR BLD MANUAL: 18 % (ref 0–8)
NEUTS SEG NFR BLD AUTO: 71 % (ref 43–75)
NRBC BLD AUTO-RTO: 0 /100 WBCS
P AXIS: 62 DEGREES
PLATELET # BLD AUTO: 217 THOUSANDS/UL (ref 149–390)
PLATELET BLD QL SMEAR: ADEQUATE
PMV BLD AUTO: 9.1 FL (ref 8.9–12.7)
POTASSIUM SERPL-SCNC: 4.6 MMOL/L (ref 3.5–5.3)
PR INTERVAL: 182 MS
QRS AXIS: -16 DEGREES
QRSD INTERVAL: 86 MS
QT INTERVAL: 404 MS
QTC INTERVAL: 445 MS
RBC # BLD AUTO: 4.12 MILLION/UL (ref 3.81–5.12)
SODIUM SERPL-SCNC: 145 MMOL/L (ref 136–145)
T WAVE AXIS: 33 DEGREES
TOTAL CELLS COUNTED SPEC: 100
VENTRICULAR RATE: 73 BPM
WBC # BLD AUTO: 10.76 THOUSAND/UL (ref 4.31–10.16)

## 2018-11-18 PROCEDURE — 85027 COMPLETE CBC AUTOMATED: CPT | Performed by: SURGERY

## 2018-11-18 PROCEDURE — 82948 REAGENT STRIP/BLOOD GLUCOSE: CPT

## 2018-11-18 PROCEDURE — 85007 BL SMEAR W/DIFF WBC COUNT: CPT | Performed by: SURGERY

## 2018-11-18 PROCEDURE — 83735 ASSAY OF MAGNESIUM: CPT | Performed by: SURGERY

## 2018-11-18 PROCEDURE — C9113 INJ PANTOPRAZOLE SODIUM, VIA: HCPCS | Performed by: PHYSICIAN ASSISTANT

## 2018-11-18 PROCEDURE — 94760 N-INVAS EAR/PLS OXIMETRY 1: CPT

## 2018-11-18 PROCEDURE — 96366 THER/PROPH/DIAG IV INF ADDON: CPT

## 2018-11-18 PROCEDURE — 96376 TX/PRO/DX INJ SAME DRUG ADON: CPT

## 2018-11-18 PROCEDURE — 93010 ELECTROCARDIOGRAM REPORT: CPT | Performed by: INTERNAL MEDICINE

## 2018-11-18 PROCEDURE — 74018 RADEX ABDOMEN 1 VIEW: CPT

## 2018-11-18 PROCEDURE — 80048 BASIC METABOLIC PNL TOTAL CA: CPT | Performed by: SURGERY

## 2018-11-18 PROCEDURE — 94664 DEMO&/EVAL PT USE INHALER: CPT

## 2018-11-18 RX ORDER — HYDROMORPHONE HCL/PF 1 MG/ML
0.5 SYRINGE (ML) INJECTION EVERY 4 HOURS PRN
Status: DISCONTINUED | OUTPATIENT
Start: 2018-11-18 | End: 2018-11-19 | Stop reason: HOSPADM

## 2018-11-18 RX ORDER — PRAMIPEXOLE DIHYDROCHLORIDE 0.25 MG/1
0.25 TABLET ORAL
Status: DISCONTINUED | OUTPATIENT
Start: 2018-11-18 | End: 2018-11-19 | Stop reason: HOSPADM

## 2018-11-18 RX ORDER — OXYCODONE HYDROCHLORIDE AND ACETAMINOPHEN 5; 325 MG/1; MG/1
1 TABLET ORAL EVERY 4 HOURS PRN
Status: DISCONTINUED | OUTPATIENT
Start: 2018-11-18 | End: 2018-11-19 | Stop reason: HOSPADM

## 2018-11-18 RX ORDER — KETAMINE HYDROCHLORIDE 50 MG/ML
50 INJECTION, SOLUTION, CONCENTRATE INTRAMUSCULAR; INTRAVENOUS ONCE
Status: COMPLETED | OUTPATIENT
Start: 2018-11-18 | End: 2018-11-18

## 2018-11-18 RX ORDER — ONDANSETRON 2 MG/ML
4 INJECTION INTRAMUSCULAR; INTRAVENOUS EVERY 4 HOURS PRN
Status: DISCONTINUED | OUTPATIENT
Start: 2018-11-18 | End: 2018-11-19 | Stop reason: HOSPADM

## 2018-11-18 RX ORDER — PRAMIPEXOLE DIHYDROCHLORIDE 0.25 MG/1
0.25 TABLET ORAL
COMMUNITY

## 2018-11-18 RX ORDER — ACETAMINOPHEN 160 MG/5ML
650 SUSPENSION, ORAL (FINAL DOSE FORM) ORAL EVERY 6 HOURS PRN
Status: DISCONTINUED | OUTPATIENT
Start: 2018-11-18 | End: 2018-11-19 | Stop reason: HOSPADM

## 2018-11-18 RX ORDER — NEBIVOLOL 10 MG/1
10 TABLET ORAL DAILY
Status: DISCONTINUED | OUTPATIENT
Start: 2018-11-18 | End: 2018-11-19 | Stop reason: HOSPADM

## 2018-11-18 RX ORDER — ACETAMINOPHEN 325 MG/1
650 TABLET ORAL EVERY 6 HOURS PRN
Status: DISCONTINUED | OUTPATIENT
Start: 2018-11-18 | End: 2018-11-18

## 2018-11-18 RX ORDER — LACTULOSE 20 G/30ML
20 SOLUTION ORAL ONCE
Status: COMPLETED | OUTPATIENT
Start: 2018-11-18 | End: 2018-11-18

## 2018-11-18 RX ORDER — HEPARIN SODIUM 5000 [USP'U]/ML
5000 INJECTION, SOLUTION INTRAVENOUS; SUBCUTANEOUS EVERY 8 HOURS SCHEDULED
Status: DISCONTINUED | OUTPATIENT
Start: 2018-11-18 | End: 2018-11-19 | Stop reason: HOSPADM

## 2018-11-18 RX ORDER — DOCUSATE SODIUM 100 MG/1
100 CAPSULE, LIQUID FILLED ORAL 2 TIMES DAILY PRN
Status: DISCONTINUED | OUTPATIENT
Start: 2018-11-18 | End: 2018-11-19 | Stop reason: HOSPADM

## 2018-11-18 RX ORDER — KETOROLAC TROMETHAMINE 30 MG/ML
30 INJECTION, SOLUTION INTRAMUSCULAR; INTRAVENOUS ONCE
Status: COMPLETED | OUTPATIENT
Start: 2018-11-18 | End: 2018-11-18

## 2018-11-18 RX ORDER — KETAMINE HYDROCHLORIDE 50 MG/ML
25 INJECTION, SOLUTION, CONCENTRATE INTRAMUSCULAR; INTRAVENOUS ONCE
Status: COMPLETED | OUTPATIENT
Start: 2018-11-18 | End: 2018-11-18

## 2018-11-18 RX ORDER — LEVOTHYROXINE SODIUM 0.05 MG/1
50 TABLET ORAL
Status: DISCONTINUED | OUTPATIENT
Start: 2018-11-18 | End: 2018-11-19 | Stop reason: HOSPADM

## 2018-11-18 RX ORDER — ONDANSETRON 2 MG/ML
4 INJECTION INTRAMUSCULAR; INTRAVENOUS ONCE
Status: COMPLETED | OUTPATIENT
Start: 2018-11-18 | End: 2018-11-18

## 2018-11-18 RX ORDER — HYDROMORPHONE HCL/PF 1 MG/ML
1 SYRINGE (ML) INJECTION ONCE
Status: COMPLETED | OUTPATIENT
Start: 2018-11-18 | End: 2018-11-18

## 2018-11-18 RX ORDER — DIPHENHYDRAMINE HYDROCHLORIDE 50 MG/ML
25 INJECTION INTRAMUSCULAR; INTRAVENOUS EVERY 6 HOURS PRN
Status: DISCONTINUED | OUTPATIENT
Start: 2018-11-18 | End: 2018-11-19 | Stop reason: HOSPADM

## 2018-11-18 RX ORDER — SODIUM CHLORIDE 9 MG/ML
125 INJECTION, SOLUTION INTRAVENOUS CONTINUOUS
Status: DISCONTINUED | OUTPATIENT
Start: 2018-11-18 | End: 2018-11-18 | Stop reason: HOSPADM

## 2018-11-18 RX ORDER — SODIUM CHLORIDE 9 MG/ML
125 INJECTION, SOLUTION INTRAVENOUS CONTINUOUS
Status: DISCONTINUED | OUTPATIENT
Start: 2018-11-18 | End: 2018-11-19

## 2018-11-18 RX ORDER — PRAMIPEXOLE DIHYDROCHLORIDE 0.25 MG/1
0.25 TABLET ORAL
Status: DISCONTINUED | OUTPATIENT
Start: 2018-11-18 | End: 2018-11-18

## 2018-11-18 RX ADMIN — HYDROMORPHONE HYDROCHLORIDE 1 MG: 1 INJECTION, SOLUTION INTRAMUSCULAR; INTRAVENOUS; SUBCUTANEOUS at 00:21

## 2018-11-18 RX ADMIN — SODIUM CHLORIDE 8 MG/HR: 9 INJECTION, SOLUTION INTRAVENOUS at 08:23

## 2018-11-18 RX ADMIN — KETAMINE HYDROCHLORIDE 25 MG: 50 INJECTION, SOLUTION INTRAMUSCULAR; INTRAVENOUS at 01:58

## 2018-11-18 RX ADMIN — KETOROLAC TROMETHAMINE 30 MG: 30 INJECTION, SOLUTION INTRAMUSCULAR at 10:44

## 2018-11-18 RX ADMIN — INSULIN LISPRO 1 UNITS: 100 INJECTION, SOLUTION INTRAVENOUS; SUBCUTANEOUS at 06:48

## 2018-11-18 RX ADMIN — ONDANSETRON 4 MG: 2 INJECTION INTRAMUSCULAR; INTRAVENOUS at 00:21

## 2018-11-18 RX ADMIN — KETAMINE HYDROCHLORIDE 50 MG: 50 INJECTION, SOLUTION INTRAMUSCULAR; INTRAVENOUS at 01:36

## 2018-11-18 RX ADMIN — SODIUM CHLORIDE 1000 ML: 0.9 INJECTION, SOLUTION INTRAVENOUS at 00:57

## 2018-11-18 RX ADMIN — INSULIN LISPRO 1 UNITS: 100 INJECTION, SOLUTION INTRAVENOUS; SUBCUTANEOUS at 12:11

## 2018-11-18 RX ADMIN — Medication 2 SPRAY: at 08:22

## 2018-11-18 RX ADMIN — LIDOCAINE HYDROCHLORIDE: 20 JELLY TOPICAL at 01:50

## 2018-11-18 RX ADMIN — ACETAMINOPHEN 650 MG: 325 TABLET ORAL at 08:28

## 2018-11-18 RX ADMIN — PRAMIPEXOLE DIHYDROCHLORIDE 0.25 MG: 0.25 TABLET ORAL at 17:53

## 2018-11-18 RX ADMIN — LACTULOSE 20 G: 10 SOLUTION ORAL at 10:44

## 2018-11-18 RX ADMIN — LEVOTHYROXINE SODIUM 50 MCG: 50 TABLET ORAL at 05:22

## 2018-11-18 RX ADMIN — HEPARIN SODIUM 5000 UNITS: 5000 INJECTION, SOLUTION INTRAVENOUS; SUBCUTANEOUS at 05:22

## 2018-11-18 RX ADMIN — HEPARIN SODIUM 5000 UNITS: 5000 INJECTION, SOLUTION INTRAVENOUS; SUBCUTANEOUS at 14:02

## 2018-11-18 RX ADMIN — SODIUM CHLORIDE 125 ML/HR: 0.9 INJECTION, SOLUTION INTRAVENOUS at 17:44

## 2018-11-18 RX ADMIN — SODIUM CHLORIDE 125 ML/HR: 0.9 INJECTION, SOLUTION INTRAVENOUS at 03:33

## 2018-11-18 RX ADMIN — SODIUM CHLORIDE 8 MG/HR: 9 INJECTION, SOLUTION INTRAVENOUS at 17:44

## 2018-11-18 RX ADMIN — OXYCODONE AND ACETAMINOPHEN 1 TABLET: 5; 325 TABLET ORAL at 20:27

## 2018-11-18 NOTE — PLAN OF CARE
DISCHARGE PLANNING     Discharge to home or other facility with appropriate resources Progressing        GASTROINTESTINAL - ADULT     Minimal or absence of nausea and/or vomiting Progressing     Maintains or returns to baseline bowel function Progressing     Maintains adequate nutritional intake Progressing     Establish and maintain optimal ostomy function Progressing        METABOLIC, FLUID AND ELECTROLYTES - ADULT     Electrolytes maintained within normal limits Progressing     Fluid balance maintained Progressing     Glucose maintained within target range Progressing        PAIN - ADULT     Verbalizes/displays adequate comfort level or baseline comfort level Progressing        SAFETY ADULT     Patient will remain free of falls Progressing     Maintain or return to baseline ADL function Progressing     Maintain or return mobility status to optimal level Progressing

## 2018-11-18 NOTE — RESPIRATORY THERAPY NOTE
RT Protocol Note  Candy Allen 66 y o  female MRN: [id number]  Unit/Bed#: -01 Encounter: [id number]    Assessment    Active Problems:    * No active hospital problems  *      Home Pulmonary Medications:     11/18/18 0315   Respiratory Protocol   Protocol Initiated? Yes   Protocol Selection Respiratory   Language Barrier? No   Medical & Social History Reviewed? Yes   Diagnostic Studies Reviewed? Yes   Physical Assessment Performed? Yes   Respiratory Plan No distress/Pulmonary history; Discontinue Protocol   Respiratory Assessment   Assessment Type Assess only   General Appearance Alert; Awake   Respiratory Pattern Normal   Chest Assessment Chest expansion symmetrical   Bilateral Breath Sounds Diminished;Clear   Cough Non-productive   Resp Comments pt assessed per protocol  pt states has no pulm hx  B/s dimished clear  will discontinue protocol   Additional Assessments   SpO2 98 %          Past Medical History:   Diagnosis Date    Cholelithiasis     Deep vein thrombosis of left lower extremity ([facility] [address] ) 01/11/2004    on blood thinner for 3 years   Diabetes mellitus ([facility] [address] )     type 2    Endometrial cancer ([facility] [address] ) 1999    History of urostomy 01/11/2004    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      Hypothyroid     In vitro fertilization      Social History     Social History    Marital status: /Civil Union     Spouse name: Francisco Vo Number of children: 0    Years of education: 15     Social History Main Topics    Smoking status: Former Smoker     Packs/day: 1 50     Years: 10 00     Types: Cigarettes     Quit date: 1/11/1986    Smokeless tobacco: Former User    Alcohol use Yes      Comment: socially    Drug use: No    Sexual activity: No      Comment: s/p hysterectomy     Other Topics Concern    None     Social History Narrative    None       Subjective         Objective    Physical Exam:   Assessment Type: Assess only  General Appearance: Alert, Awake  Respiratory Pattern: Normal  Chest Assessment: Chest expansion symmetrical  Bilateral Breath Sounds: Diminished, Clear  Cough: Non-productive    Vitals:  Blood pressure 169/78, pulse 88, temperature 97 7 °F (36 5 °C), temperature source Oral, resp  rate 16, SpO2 98 %, not currently breastfeeding  Imaging and other studies: I have personally reviewed pertinent reports  Plan    Respiratory Plan: No distress/Pulmonary history, Discontinue Protocol        Resp Comments: pt assessed per protocol  pt states has no pulm hx  B/s dimished clear   will discontinue protocol

## 2018-11-18 NOTE — ED NOTES
Patient is resting comfortably   Ketamine is wearing off and she is being prepared for movement to 4th after PA confirms Xray tube placement     Diana Rosario RN  11/18/18 0106

## 2018-11-18 NOTE — ED PROVIDER NOTES
History  Chief Complaint   Patient presents with    Abdominal Pain     pt reports generalized abd pain that feels similar to intestines twisting last year at this time  reports vomiting and severe pain  Carrie Aldana ([id number]) is a 66 y o   female Adult patient, presenting to the Emergency Department, accompanied by , who presents with a chief complaint of Patient presents with: Abdominal Pain: pt reports generalized abd pain that feels similar to intestines twisting last year at this time  Reports vomiting and severe pain  Abdominal Pain  -Started 2 hours PTA  -Patient has a noted history of SBO, which required a colostomy and bowel resection 14 years ago  -Patient also had a urostomy performed at the same time, as well as an illial pouch, due to peritonitis at the time of the prior SBO  -patient describes her abdominal pain as sharp, and primarily located to the periumbilical region, with radiation laterally  -Vomiting, started 2 hours ago, feculant and bile stained  -Patient has not been able to tolerate PO intake for the past 4 hours  -No noted diarrhea  -Continues to be able to pass flattus  -No fevers  -No pain with urination    Medications: As per EHR, which was reviewed  Allergies: No reported drug allergies, No reported food allergies, No reported environmental allergies  Overnight Hospitalizations: As noted in HPI  Vaccinations: Vaccinations UTD, as per Patient/Parent  Past Medical History: As per EHR, which was reviewed  Past Surgical History: As per EHR, which was reviewed  Code Status: Prior            Prior to Admission Medications   Prescriptions Last Dose Informant Patient Reported? Taking? Dapagliflozin Propanediol (FARXIGA PO) 11/18/2018 at 1730 Self Yes Yes   Sig: Take by mouth daily Indications: Diabetes  levothyroxine 50 mcg tablet 11/18/2018 at 1730  Yes Yes   Sig: Take 50 mcg by mouth daily     metFORMIN (GLUCOPHAGE) 500 mg tablet 11/18/2018 at 1730  Yes Yes   Sig: Take 500 mg by mouth daily as needed     pramipexole (MIRAPEX) 0 25 mg tablet   Yes Yes   Sig: Take 0 25 mg by mouth daily at bedtime      Facility-Administered Medications: None       Past Medical History:   Diagnosis Date    Cholelithiasis     Deep vein thrombosis of left lower extremity ([facility] [address] ) 01/11/2004    on blood thinner for 3 years   Diabetes mellitus (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   Hypothyroid     In vitro fertilization        Past Surgical History:   Procedure Laterality Date    COLONOSCOPY W/ BIOPSIES N/A 12/2015    HEEL SPUR SURGERY Right 1996    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    LAPAROSCOPIC GASTRIC BANDING N/A 2006    [facility] [place]    RADICAL HYSTERECTOMY N/A 1999    [facility] endometrial [facility]   SLEEVE GASTROPLASTY N/A 09/2015    Dr Jorge Joyner, [facility]       Family History   Problem Relation Age of Onset    No Known Problems Mother     Kidney failure Father     Brain cancer Father     Diabetes Sister     Alcohol abuse Sister     Diabetes Brother      I have reviewed and agree with the history as documented  Social History   Substance Use Topics    Smoking status: Former Smoker     Packs/day: 1 50     Years: 10 00     Types: Cigarettes     Quit date: 1/11/1986    Smokeless tobacco: Former User    Alcohol use Yes      Comment: socially        Review of Systems  Review of Systems: The Patient/Parent Denies the following: Negative, Except as noted in HPI  Physical Exam  Physical Exam  General: 66 y o  female patient, who appears their stated age, in moderate distress  Skin: No rashes, masses, or lesions noted  HEENT: Atraumatic & Normocephalic  External ears normal, with no noted abnormalities or deformities  Bilateral canals examined, without noted edema or discomfort  No pain while pulling the tragus   TM well visualized bilaterally, with no noted obstruction, effusion, erythema, or air fluid levels  No noted enlargement of the mastoid processes bilaterally  EOMI, PERRL, Conjunctiva without injection bilaterally  No conjunctival drainage noted bilaterally  Nares patent bilaterally, with no noted obstructions, erythema, or drainage  No noted rhinorrhea  Pharynx well visualized, with no exudate noted in the posterior pharynx  Tonsils are not enlarged  Gingival surfaces are within normal limits  Neck: Soft, supple, and non-tender  No enlargement of the anterior cervical, posterior cervical, or occipital lymph notes  Cardiac: Regular rate and rhythm, with no noted murmurs, rubs, or gallops  Pulmonary: Normal Appearance  Clear to ascultation, with no noted rales, rhonchi, or wheezes  Abdomen: Abnormal inspection of the abdomen: colostomy tube in place, Normal percussion to all quadrents, Decreased Intensity Bowel Sounds, Negative pain with light palpation, Positive pain with deep palpation: diffusely, Positive Cough Test (Indicating possible acute abdomen or peritonitis), Positive Heel tap test (Indicating possible acute abdomen or peritonitis), Negative Sims Sign, Negative CVA Tenderness Bilaterally  MSK: Joint ROM grossly normal, actively and passively, to all extremities  No noted joint swelling  Normal Gait  Neuro: Cranial nerves 2-12 grossly intact  Normal sensation grossly  Psych: Normal affect and responsiveness           Vital Signs  ED Triage Vitals   Temperature Pulse Respirations Blood Pressure SpO2   11/17/18 2131 11/17/18 2127 11/17/18 2127 11/17/18 2127 11/17/18 2127   (!) 96 5 °F (35 8 °C) 100 (!) 24 (!) 234/117 100 %      Temp Source Heart Rate Source Patient Position - Orthostatic VS BP Location FiO2 (%)   11/17/18 2127 11/17/18 2127 11/17/18 2322 11/17/18 2322 --   Oral Monitor Sitting Right arm       Pain Score       11/17/18 2127       Worst Possible Pain           Vitals:    11/18/18 0230 11/18/18 0358 11/18/18 2300 11/19/18 0812   BP: 169/78 150/72 144/76 132/70   Pulse: 88 90 73 60   Patient Position - Orthostatic VS: Lying Lying Lying Sitting       Visual Acuity      ED Medications  Medications   sodium chloride 0 9 % bolus 1,000 mL (0 mL Intravenous Stopped 11/18/18 0108)   ondansetron (ZOFRAN) injection 4 mg (4 mg Intravenous Given 11/17/18 2149)   HYDROmorphone (DILAUDID) injection 1 mg (1 mg Intravenous Given 11/17/18 2217)   pantoprazole (PROTONIX) injection 40 mg (40 mg Intravenous Given 11/17/18 2218)   sodium chloride 0 9 % bolus 1,000 mL (0 mL Intravenous Stopped 11/18/18 0233)   iohexol (OMNIPAQUE) 350 MG/ML injection (MULTI-DOSE) 100 mL (100 mL Intravenous Given 11/17/18 2249)   ondansetron (ZOFRAN) injection 4 mg (4 mg Intravenous Given 11/18/18 0021)   HYDROmorphone (DILAUDID) injection 1 mg (1 mg Intravenous Given 11/18/18 0021)   lidocaine (XYLOCAINE) 2 % topical gel ( Topical Given 11/18/18 0150)   ketamine (KETALAR) 50 mg/mL 50 mg (50 mg Intramuscular Given 11/18/18 0136)   ketamine (KETALAR) 50 mg/mL 25 mg (25 mg Intravenous Given 11/18/18 0158)   lactulose 20 g/30 mL oral solution 20 g (20 g Per NG Tube Given 11/18/18 1044)   ketorolac (TORADOL) injection 30 mg (30 mg Intravenous Given 11/18/18 1044)   potassium chloride (K-DUR,KLOR-CON) CR tablet 20 mEq (20 mEq Oral Given 11/19/18 0734)       Diagnostic Studies  Results Reviewed     Procedure Component Value Units Date/Time    Lactic acid, plasma [[id number]]  (Abnormal) Collected:  11/17/18 2309    Lab Status:  Final result Specimen:  Blood from Arm, Right Updated:  11/17/18 2356     LACTIC ACID 2 2 (HH) mmol/L     Narrative:         Result may be elevated if tourniquet was used during collection      Troponin I [[id number]]  (Normal) Collected:  11/17/18 2309    Lab Status:  Final result Specimen:  Blood from Arm, Right Updated:  11/17/18 2335     Troponin I <0 02 ng/mL     CK [[id number]]  (Normal) Collected:  11/17/18 2132    Lab Status: Final result Specimen:  Blood from Arm, Left Updated:  11/17/18 2320     Total CK 63 U/L     TSH, 3rd generation [[id number]]  (Abnormal) Collected:  11/17/18 2132    Lab Status:  Final result Specimen:  Blood from Arm, Left Updated:  11/17/18 2303     TSH 3RD GENERATON 4 530 (H) uIU/mL     Narrative:         Patients undergoing fluorescein dye angiography may retain small amounts of fluorescein in the body for 48-72 hours post procedure  Samples containing fluorescein can produce falsely depressed TSH values  If the patient had this procedure,a specimen should be resubmitted post fluorescein clearance  The recommended reference ranges for TSH during pregnancy are as follows:  First trimester 0 1 to 2 5 uIU/mL  Second trimester  0 2 to 3 0 uIU/mL  Third trimester 0 3 to 3 0 uIU/m      Comprehensive metabolic panel [[id number]]  (Abnormal) Collected:  11/17/18 2132    Lab Status:  Final result Specimen:  Blood from Arm, Left Updated:  11/17/18 2157     Sodium 145 mmol/L      Potassium 4 3 mmol/L      Chloride 108 mmol/L      CO2 26 mmol/L      ANION GAP 11 mmol/L      BUN 28 (H) mg/dL      Creatinine 1 14 mg/dL      Glucose 185 (H) mg/dL      Calcium 10 0 mg/dL      AST 13 U/L      ALT 33 U/L      Alkaline Phosphatase 147 (H) U/L      Total Protein 7 7 g/dL      Albumin 4 1 g/dL      Total Bilirubin 0 60 mg/dL      eGFR 52 ml/min/1 73sq m     Narrative:         National Kidney Disease Education Program recommendations are as follows:  GFR calculation is accurate only with a steady state creatinine  Chronic Kidney disease less than 60 ml/min/1 73 sq  meters  Kidney failure less than 15 ml/min/1 73 sq  meters      Lipase [[id number]]  (Normal) Collected:  11/17/18 2132    Lab Status:  Final result Specimen:  Blood from Arm, Left Updated:  11/17/18 2157     Lipase 129 u/L     CBC and differential [[id number]]  (Abnormal) Collected:  11/17/18 2134    Lab Status:  Final result Specimen:  Blood from Arm, Left Updated: 11/17/18 2140     WBC 11 42 (H) Thousand/uL      RBC 4 45 Million/uL      Hemoglobin 13 4 g/dL      Hematocrit 42 1 %      MCV 95 fL      MCH 30 1 pg      MCHC 31 8 g/dL      RDW 14 9 %      MPV 8 7 (L) fL      Platelets 920 Thousands/uL      nRBC 0 /100 WBCs      Neutrophils Relative 72 %      Immat GRANS % 0 %      Lymphocytes Relative 21 %      Monocytes Relative 4 %      Eosinophils Relative 3 %      Basophils Relative 0 %      Neutrophils Absolute 8 18 (H) Thousands/µL      Immature Grans Absolute 0 03 Thousand/uL      Lymphocytes Absolute 2 37 Thousands/µL      Monocytes Absolute 0 50 Thousand/µL      Eosinophils Absolute 0 30 Thousand/µL      Basophils Absolute 0 04 Thousands/µL                  XR abdomen 1 view kub   Final Result by Reyna Suero MD (11/18 5870)      The tip, and side-port, the nasogastric tube overlie the body of the stomach  Nasogastric tube position appears appropriate  Workstation performed: [id number]         CT abdomen pelvis w contrast   ED Interpretation by Macey Hendrix PA-C (11/18 3031)   FINDINGS:       ABDOMEN       LOWER CHEST:  No clinically significant abnormality identified in the visualized lower chest        LIVER/BILIARY TREE:  Unremarkable        GALLBLADDER:  Physiologically distended gallbladder with approximately 1 3 cm stone within the gallbladder lumen; otherwise unremarkable        SPLEEN:  Unremarkable        PANCREAS:  Moderate fatty replacement; otherwise unremarkable       ADRENAL GLANDS:  Unremarkable        KIDNEYS/URETERS:  Bilateral renal cortical scarring is noted  Tiny nonobstructing stones in the lower pole of the left kidney  There appears to be an ileal conduit in the right lower abdomen but there is no hydronephrosis  The bilateral kidneys    otherwise appear unremarkable        STOMACH AND BOWEL:  Small amount of stool in the colon  Some postsurgical changes are seen in the proximal transverse colon    There are multiple mild to moderately dilated loops of small bowel in the left hemiabdomen  There appears to be a transition to more normal caliber loops of small bowel in the left lower abdomen (coronal image 48, series 601)  The bowel loops distal to this appear collapsed  Some of the collapsed small bowel loops herniate into the area of the right lower quadrant ostomy  Evidence of prior gastric sleeve  Otherwise grossly unremarkable        APPENDIX: Not clearly visualized but no findings to suggest appendicitis        ABDOMINOPELVIC CAVITY:  Multiple postsurgical clips are seen in the abdomen and especially the pelvis  No intraperitoneal free air  No ascites or discrete adenopathy        VESSELS:  Mild atherosclerosis; no aortic aneurysm        PELVIS       REPRODUCTIVE ORGANS:  Uterus not well seen, suspect prior hysterectomy        URINARY BLADDER:  Present and grossly normal in appearance       ABDOMINAL WALL/INGUINAL REGIONS:  Some incisional scarring is seen in the lower abdomen and pelvis anteriorly  Otherwise grossly unremarkable        OSSEOUS STRUCTURES:  No acute fracture or destructive osseous lesion  There is sacralization of L5 on the left  Some facet joint arthropathy is seen in the lower lumbar spine with some mild anterolisthesis of approximately 3 to 4 mm of L4 on L5, probably secondary to facet joint arthropathy at this level  Some multilevel osteophytosis of the spine is noted which is most prominent in the thoracic region        IMPRESSION:       Multiple mild to moderately dilated loops of small bowel in the left hemiabdomen  There appears to be a transition to more normal caliber loops of small bowel in the left lower abdomen (coronal image 48, series 601)  The bowel loops distal to this    appear collapsed and the colon is relatively underdistended    The findings taken together are highly suspicious for a small bowel obstruction        Cholelithiasis without discrete evidence of acute cholecystitis, fatty replacement of pancreas, bilateral renal cortical scarring, left-sided nephrolithiasis without hydronephrosis, and other findings as above  Final Result by Hilda Marks DO (11/18 0031)      Multiple mild to moderately dilated loops of small bowel in the left hemiabdomen  There appears to be a transition to more normal caliber loops of small bowel in the left lower abdomen (coronal image 48, series 601)  The bowel loops distal to this    appear collapsed and the colon is relatively underdistended  The findings taken together are highly suspicious for a small bowel obstruction  Cholelithiasis without discrete evidence of acute cholecystitis, fatty replacement of pancreas, bilateral renal cortical scarring, left-sided nephrolithiasis without hydronephrosis, and other findings as above  Findings discussed with Dr Christopher Lynch by Dr Moira Castellanos at 12:30 AM on 11/18/2018  Workstation performed: [id number]                    Procedures  ECG 12 Lead Documentation  Date/Time: 11/17/2018 10:50 PM  Performed by: Marisol Ramos  Authorized by: Marisol Ramos     Indications / Diagnosis:  Abdominal pain  ECG reviewed by me, the ED Provider: yes    Patient location:  ED  Previous ECG:     Previous ECG:  Compared to current    Comparison ECG info:  1 November 2017, 4:53 p m :  Rate 82, normal sinus rhythm, normal AL interval, normal QRS duration, normal ST segment duration, no noted ST segment elevation or depression, left axis deviation, no noted ectopic beats      Similarity:  No change    Comparison to cardiac monitor: Yes    Interpretation:     Interpretation: normal    Rate:     ECG rate:  73    ECG rate assessment: normal    Rhythm:     Rhythm: sinus rhythm    Ectopy:     Ectopy: none    QRS:     QRS axis:  Left    QRS intervals:  Normal  Conduction:     Conduction: normal    ST segments:     ST segments:  Normal  T waves:     T waves: normal             Phone Contacts  ED Phone Contact    ED Course                               MDM  Number of Diagnoses or Management Options  Colostomy present [facility]): new and requires workup  Small bowel obstruction [facility]): new and requires workup  Diagnosis management comments: Most likely diagnosis is recurrent small bowel obstruction  Primarily consideration in diagnosis include the presence of diffuse abdominal pain, positive cough test, positive periumbilical tenderness, vomiting feculent emesis, as well as evidence of SBO on CT scan  As such, contact was made with the surgical resident , whom agreed that the patient should be admitted, and an NG tube be placed  But, as the patent states that she does not have a desire to have an NG tube placed again  As such, placement was discussed with the patient, including potential sedative agent utilization for its placement, and the patient agreed  As such, the patient was given Ketamine, IV, by the provider, with uneventful placement of the NG tube  Upon its placement, there was noted positive stomach sounds with air bolus, as well as positive aspiration of billious gastric contents  At the time of transfer, the patient was stable in no distress, with no current pain          Amount and/or Complexity of Data Reviewed  Clinical lab tests: ordered and reviewed  Tests in the radiology section of CPT®: ordered and reviewed  Tests in the medicine section of CPT®: reviewed and ordered  Discussion of test results with the performing providers: yes  Decide to obtain previous medical records or to obtain history from someone other than the patient: yes  Obtain history from someone other than the patient: yes  Review and summarize past medical records: yes  Discuss the patient with other providers: yes  Independent visualization of images, tracings, or specimens: yes    Risk of Complications, Morbidity, and/or Mortality  Presenting problems: high  Diagnostic procedures: high  Management options: high    Patient Progress  Patient progress: stable    CritCare Time    Disposition  Final diagnoses:   Small bowel obstruction ([address] )   Colostomy present ([address] )     Time reflects when diagnosis was documented in both MDM as applicable and the Disposition within this note     Time User Action Codes Description Comment    11/18/2018 12:47 AM Chase Leblanc Add [K56 609] Small bowel obstruction ([facility] [address] )     11/18/2018 12:47 AM Tanja Molina Add [Z93 3] Colostomy present [facility])       ED Disposition     ED Disposition Condition Comment    Admit  Case was discussed with General surgery resident and the patient's admission status was agreed to be Admission Status: inpatient status to the service of Dr Leana Goldberg  Follow-up Information    None         Discharge Medication List as of 11/19/2018 12:43 PM      CONTINUE these medications which have NOT CHANGED    Details   Dapagliflozin Propanediol (FARXIGA PO) Take by mouth daily Indications: Diabetes  , Until Discontinued, Historical Med      levothyroxine 50 mcg tablet Take 50 mcg by mouth daily  , Until Discontinued, Historical Med      metFORMIN (GLUCOPHAGE) 500 mg tablet Take 500 mg by mouth daily as needed  , Historical Med      pramipexole (MIRAPEX) 0 25 mg tablet Take 0 25 mg by mouth daily at bedtime, Historical Med         STOP taking these medications       nebivolol (BYSTOLIC) 10 mg tablet Comments:   Reason for Stopping:               Outpatient Discharge Orders  Discharge Diet     Activity as tolerated     Call provider for:  persistent nausea or vomiting     Call provider for:  severe uncontrolled pain         ED Provider  Electronically Signed by           Sadi Lancaster PA-C  11/20/18 0697

## 2018-11-18 NOTE — H&P
H&P Exam - General Surgery   Rigoberto Vaughn 66 y o  female MRN: [id number]  Unit/Bed#: -01 Encounter: [id number]    Assessment/Plan     Assessment:  68-year-old female with history of hysterectomy for endometrial cancer complicated by ureteral stricture from radiation and cystectomy with ileal conduit    Patient presenting with small bowel obstruction      Plan:  NPO and NG tube  Will order Chloraseptic spray  Continue IV fluids  Patient is to be ambulating hallways    It appears on CT scan that patient may have a peristomal hernia in the location of her ileal conduit  There are loops of bowel proximally in the left upper quadrant that appeared to be dilated and it does appear that there is loop of decompressed bowel going into her parastomal hernia  This area is soft and nontender  There is no skin changes  Patient is known to have a parastomal hernia    Will continue conservative management at this time    History of Present Illness     HPI:  Rigoberto Vaughn is a 66 y o  female who presents with 1 day of abdominal pain and nausea  The patient describes the pain as similar to when she previously had a small bowel obstruction at this time last year  She does report vomiting and pain however after be reddened the emergency department placing NG tube she does think that her pain is slightly improved  Patient still not passing any flatus  Patient is a complex past medical and past surgical history including endometrial cancer status post a hysterectomy in 1989  The patient subsequently developed a ureteral stricture following radiation and ultimately required a cystectomy with ileal conduit  She does have a known parastomal hernia  Patient does have a history of a small-bowel obstruction last yea, this is resolved with conservative non operative measures    Review of Systems   Constitutional: Negative for chills and fever  HENT: Negative for congestion      Respiratory: Negative for cough and shortness of breath  Cardiovascular: Negative for chest pain  Gastrointestinal: Positive for abdominal pain, nausea and vomiting  Negative for constipation and diarrhea  Genitourinary: Negative for dysuria and urgency  Ileal conduit has been functioning normally   Musculoskeletal: Negative for arthralgias  Skin: Negative for rash  Neurological: Negative for numbness and headaches  Psychiatric/Behavioral: Negative for confusion  Historical Information   Past Medical History:   Diagnosis Date    Cholelithiasis     Deep vein thrombosis of left lower extremity ([facility] ) 01/11/2004    on blood thinner for 3 years   Diabetes mellitus [facility] )     type 2    Endometrial cancer ([facility] ) 1999    History of urostomy 01/11/2004    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   Hypothyroid     In vitro fertilization      Past Surgical History:   Procedure Laterality Date    COLONOSCOPY W/ BIOPSIES N/A 12/2015    HEEL SPUR SURGERY Right 1996    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    LAPAROSCOPIC GASTRIC BANDING [place]/A 2006    [place]    RADICAL HYSTERECTOMY N/A 1999    [facility]      SLEEVE GASTROPLASTY N/A 09/2015    Dr Gianfranco Francis, [facility]     Social History   History   Alcohol Use    Yes     Comment: socially     History   Drug Use No     History   Smoking Status    Former Smoker    Packs/day: 1 50    Years: 10 00    Types: Cigarettes    Quit date: 1/11/1986   Smokeless Tobacco    Former User     Family History: non-contributory    Meds/Allergies   all medications and allergies reviewed  Allergies   Allergen Reactions    Amoxicillin     Penicillins        Objective   First Vitals:   Blood Pressure: (!) 234/117 (11/17/18 2127)  Pulse: 100 (11/17/18 2127)  Temperature: (!) 96 5 °F (35 8 °C) (11/17/18 2131)  Temp Source: Oral (11/17/18 2127)  Respirations: (!) 24 (11/17/18 2127)  Weight - Scale: 106 kg (233 lb 4 oz) (11/18/18 0304)  SpO2: 100 % (11/17/18 2127)    Current Vitals:   Blood Pressure: 150/72 (11/18/18 0358)  Pulse: 90 (11/18/18 0358)  Temperature: 97 6 °F (36 4 °C) (11/18/18 0358)  Temp Source: Oral (11/18/18 0358)  Respirations: 16 (11/18/18 0358)  Weight - Scale: 106 kg (233 lb 4 oz) (11/18/18 0600)  SpO2: 95 % (11/18/18 0358)      Intake/Output Summary (Last 24 hours) at 11/18/18 0739  Last data filed at 11/18/18 0647   Gross per 24 hour   Intake          2045 17 ml   Output             1300 ml   Net           745 17 ml       Invasive Devices     Peripheral Intravenous Line            Peripheral IV 11/17/18 Left Antecubital less than 1 day          Drain            Urostomy Ureterostomy right RUQ -- days    NG/OG/Enteral Tube Nasogastric 16 Fr Right nares less than 1 day                Physical Exam   Constitutional: She is oriented to person, place, and time  She appears well-developed and well-nourished  HENT:   Head: Normocephalic  Eyes: Pupils are equal, round, and reactive to light  Neck: Normal range of motion  Neck supple  Cardiovascular: Normal rate and regular rhythm  Pulmonary/Chest: Effort normal    Abdominal: Soft  Bowel sounds are normal  She exhibits no distension  There is tenderness  There is no rebound and no guarding  Abdomen is soft and nontender  Minimally distended with some tympany noted left upper quadrant  The iliac conduit is noted to be pink and raised  There is not appear to be any skin changes circumferentially  I could appreciate what appears to be a small peristomal hernia although does appear to be reducible and soft   Neurological: She is alert and oriented to person, place, and time  Skin: Skin is warm  Lab Results:   I have personally reviewed pertinent lab results    , CBC:   Lab Results   Component Value Date    WBC 10 76 (H) 11/18/2018    HGB 12 4 11/18/2018    HCT 38 9 11/18/2018    MCV 94 11/18/2018     11/18/2018    MCH 30 1 11/18/2018    MCHC 31 9 11/18/2018    RDW 15 1 11/18/2018    MPV 9 1 11/18/2018    NRBC 0 11/18/2018   , CMP:   Lab Results   Component Value Date    SODIUM 145 11/18/2018    K 4 6 11/18/2018     (H) 11/18/2018    CO2 21 11/18/2018    BUN 30 (H) 11/18/2018    CREATININE 0 97 11/18/2018    CALCIUM 9 0 11/18/2018    AST 13 11/17/2018    ALT 33 11/17/2018    ALKPHOS 147 (H) 11/17/2018    EGFR 63 11/18/2018     Imaging: I have personally reviewed pertinent reports  EKG, Pathology, and Other Studies: I have personally reviewed pertinent reports  Code Status: Level 1 - Full Code  Advance Directive and Living Will:      Power of :    POLST:      Counseling / Coordination of Care  Total floor / unit time spent today 30 minutes  Greater than 50% of total time was spent with the patient and / or family counseling and / or coordination of care  A description of the counseling / coordination of care:  30

## 2018-11-19 VITALS
HEART RATE: 60 BPM | DIASTOLIC BLOOD PRESSURE: 70 MMHG | BODY MASS INDEX: 37.72 KG/M2 | OXYGEN SATURATION: 97 % | WEIGHT: 233.69 LBS | TEMPERATURE: 98 F | RESPIRATION RATE: 18 BRPM | SYSTOLIC BLOOD PRESSURE: 132 MMHG

## 2018-11-19 LAB
ANION GAP SERPL CALCULATED.3IONS-SCNC: 7 MMOL/L (ref 4–13)
BASOPHILS # BLD AUTO: 0.03 THOUSANDS/ΜL (ref 0–0.1)
BASOPHILS NFR BLD AUTO: 1 % (ref 0–1)
BUN SERPL-MCNC: 26 MG/DL (ref 5–25)
CALCIUM SERPL-MCNC: 8.5 MG/DL (ref 8.3–10.1)
CHLORIDE SERPL-SCNC: 115 MMOL/L (ref 100–108)
CO2 SERPL-SCNC: 23 MMOL/L (ref 21–32)
CREAT SERPL-MCNC: 0.83 MG/DL (ref 0.6–1.3)
EOSINOPHIL # BLD AUTO: 0.31 THOUSAND/ΜL (ref 0–0.61)
EOSINOPHIL NFR BLD AUTO: 6 % (ref 0–6)
ERYTHROCYTE [DISTWIDTH] IN BLOOD BY AUTOMATED COUNT: 15.4 % (ref 11.6–15.1)
GFR SERPL CREATININE-BSD FRML MDRD: 76 ML/MIN/1.73SQ M
GLUCOSE SERPL-MCNC: 117 MG/DL (ref 65–140)
GLUCOSE SERPL-MCNC: 124 MG/DL (ref 65–140)
GLUCOSE SERPL-MCNC: 125 MG/DL (ref 65–140)
GLUCOSE SERPL-MCNC: 152 MG/DL (ref 65–140)
HCT VFR BLD AUTO: 33.8 % (ref 34.8–46.1)
HGB BLD-MCNC: 10.7 G/DL (ref 11.5–15.4)
IMM GRANULOCYTES # BLD AUTO: 0.01 THOUSAND/UL (ref 0–0.2)
IMM GRANULOCYTES NFR BLD AUTO: 0 % (ref 0–2)
LYMPHOCYTES # BLD AUTO: 1.5 THOUSANDS/ΜL (ref 0.6–4.47)
LYMPHOCYTES NFR BLD AUTO: 30 % (ref 14–44)
MCH RBC QN AUTO: 30.2 PG (ref 26.8–34.3)
MCHC RBC AUTO-ENTMCNC: 31.7 G/DL (ref 31.4–37.4)
MCV RBC AUTO: 96 FL (ref 82–98)
MONOCYTES # BLD AUTO: 0.47 THOUSAND/ΜL (ref 0.17–1.22)
MONOCYTES NFR BLD AUTO: 9 % (ref 4–12)
NEUTROPHILS # BLD AUTO: 2.76 THOUSANDS/ΜL (ref 1.85–7.62)
NEUTS SEG NFR BLD AUTO: 54 % (ref 43–75)
NRBC BLD AUTO-RTO: 0 /100 WBCS
PLATELET # BLD AUTO: 177 THOUSANDS/UL (ref 149–390)
PMV BLD AUTO: 8.5 FL (ref 8.9–12.7)
POTASSIUM SERPL-SCNC: 3.9 MMOL/L (ref 3.5–5.3)
RBC # BLD AUTO: 3.54 MILLION/UL (ref 3.81–5.12)
SODIUM SERPL-SCNC: 145 MMOL/L (ref 136–145)
WBC # BLD AUTO: 5.08 THOUSAND/UL (ref 4.31–10.16)

## 2018-11-19 PROCEDURE — C9113 INJ PANTOPRAZOLE SODIUM, VIA: HCPCS | Performed by: PHYSICIAN ASSISTANT

## 2018-11-19 PROCEDURE — 80048 BASIC METABOLIC PNL TOTAL CA: CPT | Performed by: SURGERY

## 2018-11-19 PROCEDURE — 82948 REAGENT STRIP/BLOOD GLUCOSE: CPT

## 2018-11-19 PROCEDURE — 85025 COMPLETE CBC W/AUTO DIFF WBC: CPT | Performed by: SURGERY

## 2018-11-19 RX ORDER — POTASSIUM CHLORIDE 20 MEQ/1
20 TABLET, EXTENDED RELEASE ORAL ONCE
Status: COMPLETED | OUTPATIENT
Start: 2018-11-19 | End: 2018-11-19

## 2018-11-19 RX ORDER — DEXTROSE, SODIUM CHLORIDE, AND POTASSIUM CHLORIDE 5; .45; .15 G/100ML; G/100ML; G/100ML
75 INJECTION INTRAVENOUS CONTINUOUS
Status: DISCONTINUED | OUTPATIENT
Start: 2018-11-19 | End: 2018-11-19 | Stop reason: HOSPADM

## 2018-11-19 RX ADMIN — HEPARIN SODIUM 5000 UNITS: 5000 INJECTION, SOLUTION INTRAVENOUS; SUBCUTANEOUS at 05:16

## 2018-11-19 RX ADMIN — LEVOTHYROXINE SODIUM 50 MCG: 50 TABLET ORAL at 05:16

## 2018-11-19 RX ADMIN — DEXTROSE, SODIUM CHLORIDE, AND POTASSIUM CHLORIDE 75 ML/HR: 5; .45; .15 INJECTION INTRAVENOUS at 08:27

## 2018-11-19 RX ADMIN — SODIUM CHLORIDE 8 MG/HR: 9 INJECTION, SOLUTION INTRAVENOUS at 03:52

## 2018-11-19 RX ADMIN — POTASSIUM CHLORIDE 20 MEQ: 1500 TABLET, EXTENDED RELEASE ORAL at 07:34

## 2018-11-19 NOTE — ASSESSMENT & PLAN NOTE
NGT out    Clears  Decrease IVF  Replete electrolytes  Prn pain control  IS/OOB/ambulate  DVT ppx: [facility]

## 2018-11-19 NOTE — UTILIZATION REVIEW
Initial Clinical Review    Admission: Date/Time/Statement: 11/18/18 @ 0048 Inpatient Written     Orders Placed This Encounter   Procedures    Inpatient Admission (expected length of stay for this patient is greater than two midnights)     Standing Status:   Standing     Number of Occurrences:   1     Order Specific Question:   Admitting Physician     Answer:   Shane Lynne [[id number]]     Order Specific Question:   Level of Care     Answer:   Med Surg [[id number]]     Order Specific Question:   Estimated length of stay     Answer:   More than 2 Midnights     Order Specific Question:   Certification     Answer:   I certify that inpatient services are medically necessary for this patient for a duration of greater than two midnights  See H&P and MD Progress Notes for additional information about the patient's course of treatment  ED: Date/Time/Mode of Arrival:   ED Arrival Information     Expected Arrival Acuity Means of Arrival Escorted By Service Admission Type    - 11/17/2018 20:54 Emergent Walk-In Family Member Surgery-General Emergency    Arrival Complaint    SEVERE ABDOMINAL PAIN          Chief Complaint:   Chief Complaint   Patient presents with    Abdominal Pain     pt reports generalized abd pain that feels similar to intestines twisting last year at this time  reports vomiting and severe pain  History of Illness: Je Hayden is a 66 y o  female who presents with 1 day of abdominal pain and nausea  The patient describes the pain as similar to when she previously had a small bowel obstruction at this time last year  She does report vomiting and pain however after the emergency department placing NG tube she does think that her pain is slightly improved  Patient still not passing any flatus      ED Vital Signs:   ED Triage Vitals   Temperature Pulse Respirations Blood Pressure SpO2   11/17/18 2131 11/17/18 2127 11/17/18 2127 11/17/18 2127 11/17/18 2127   (!) 96 5 °F (35 8 °C) 100 (!) 24 (!) 234/117 100 %      Temp Source Heart Rate Source Patient Position - Orthostatic VS BP Location FiO2 (%)   11/17/18 2127 11/17/18 2127 11/17/18 2322 11/17/18 2322 --   Oral Monitor Sitting Right arm       Pain Score       11/17/18 2127       Worst Possible Pain        Wt Readings from Last 1 Encounters:   11/19/18 106 kg (233 lb 11 oz)       Vital Signs (abnormal):   Date/Time  BP   11/18/18 0215   194/88   11/18/18 0200   207/97   11/18/18 0135   174/89   11/18/18 0058   174/89   11/17/18 2322   186/95     Abnormal Labs/Diagnostic Test Results:   WBC 10 76 (H)      (H)   BUN 30 (H)   ALKPHOS 147 (H)     CT AP:    Multiple mild to moderately dilated loops of small bowel in the left hemiabdomen  There appears to be a transition to more normal caliber loops of small bowel in the left lower abdomen  The bowel loops distal to this   appear collapsed and the colon is relatively underdistended  The findings taken together are highly suspicious for a small bowel obstruction      Cholelithiasis without discrete evidence of acute cholecystitis, fatty replacement of pancreas, bilateral renal cortical scarring, left-sided nephrolithiasis without hydronephrosis  XR Abd KUB:  The tip, and side-port, the nasogastric tube overlie the body of the stomach  Nasogastric tube position appears appropriate      ED Treatment:   Medication Administration from 11/17/2018 2054 to 11/18/2018 0302       Date/Time Order Dose Route Action     11/17/2018 2135 sodium chloride 0 9 % bolus 1,000 mL 1,000 mL Intravenous New Bag     11/17/2018 2149 ondansetron (ZOFRAN) injection 4 mg 4 mg Intravenous Given     11/17/2018 2217 HYDROmorphone (DILAUDID) injection 1 mg 1 mg Intravenous Given     11/17/2018 2218 pantoprazole (PROTONIX) injection 40 mg 40 mg Intravenous Given     11/17/2018 2302 pantoprazole (PROTONIX) 80 mg in sodium chloride 0 9 % 100 mL infusion 8 mg/hr Intravenous New Bag     11/18/2018 0057 sodium chloride 0 9 % bolus 1,000 mL 1,000 mL Intravenous New Bag     11/17/2018 2249 iohexol (OMNIPAQUE) 350 MG/ML injection (MULTI-DOSE) 100 mL 100 mL Intravenous Given     11/18/2018 0021 ondansetron (ZOFRAN) injection 4 mg 4 mg Intravenous Given     11/18/2018 0021 HYDROmorphone (DILAUDID) injection 1 mg 1 mg Intravenous Given     11/18/2018 0150 lidocaine (XYLOCAINE) 2 % topical gel   Topical Given     11/18/2018 0136 ketamine (KETALAR) 50 mg/mL 50 mg 50 mg Intramuscular Given     11/18/2018 0158 ketamine (KETALAR) 50 mg/mL 25 mg 25 mg Intravenous Given          Past Medical/Surgical History:    Active Ambulatory Problems     Diagnosis Date Noted    History of urostomy 01/11/2016    Endometrial cancer ([place] [address] ) 01/11/2016    In vitro fertilization     Sepsis ([place] [address] ) 11/01/2017    Pyelonephritis 11/01/2017    Anemia 11/01/2017    Hydroureteronephrosis 11/02/2017    HTN (hypertension) 11/04/2017    Migraine 11/04/2017     Resolved Ambulatory Problems     Diagnosis Date Noted    Small bowel obstruction ([facility] ) 01/11/2016    Diabetes mellitus ([place] [address] ) 01/11/2016    Hypothyroid 01/11/2016    Deep vein thrombosis of left lower extremity ([facility] ) 01/11/2016     Past Medical History:   Diagnosis Date    Cholelithiasis     [facility] left lower [facility] ) 01/11/2004    Diabetes mellitus ([place] [address] )     Endometrial cancer ([place] [address] ) 1999    History of urostomy 01/11/2004    Hypothyroid     In vitro fertilization        Admitting Diagnosis: Small bowel obstruction ([place] [address] ) [K56 609]  Abdominal pain [R10 9]  Colostomy present ([place] [address] ) [Z93 3]    Age/Sex: 66 y o  female    Assessment:  72-year-old female with history of hysterectomy for endometrial cancer complicated by ureteral stricture from radiation and cystectomy with ileal conduit  Patient presenting with small bowel obstruction     Plan:  NPO and NG tube  Will order Chloraseptic spray  Continue IV fluids  Patient is to be ambulating hallways    Admission Orders:  NPO  NGT  OOB as steffanie  Accuchecks QAC and QHS with coverage  Daily weights, I/O  Sequential compression device    Scheduled Meds:   Current Facility-Administered Medications:  acetaminophen 650 mg Oral Q6H PRN   dextrose 5 % and sodium chloride 0 45 % with KCl 20 mEq/L 75 mL/hr Intravenous Continuous   diphenhydrAMINE 25 mg Intravenous Q6H PRN   docusate sodium 100 mg Oral BID PRN   heparin (porcine) 5,000 Units Subcutaneous Q8H [address]   HYDROmorphone 0 5 mg Intravenous Q4H PRN   insulin lispro 1-5 Units Subcutaneous Q6H [address]   levothyroxine 50 mcg Oral Early Morning   nebivolol 10 mg Oral Daily   ondansetron 4 mg Intravenous Q4H PRN   oxyCODONE-acetaminophen 1 tablet Oral Q4H PRN   pantoprozole (PROTONIX) infusion (Continuous) 8 mg/hr Intravenous Continuous   phenol 2 spray Mouth/Throat Q2H PRN   pramipexole 0 25 mg Oral After Dinner     Continuous Infusions:     NSS @125mlhr    dextrose 5 % and sodium chloride 0 45 % with KCl 20 mEq/L 75 mL/hr Last Rate: 75 mL/hr (11/19/18 0827)   pantoprozole (PROTONIX) infusion (Continuous) 8 mg/hr Last Rate: 8 mg/hr (11/19/18 0352)     PRN Meds:   acetaminophen    diphenhydrAMINE    docusate sodium    HYDROmorphone    ondansetron    oxyCODONE-acetaminophen    phenol    [address] Utilization Review Department  Phone: [phone]; Fax [phone]  [email or web address]  ATTENTION: Please call with any questions or concerns to [phone]  and carefully listen to the prompts so that you are directed to the right person  Send all requests for admission clinical reviews, approved or denied determinations and any other requests to fax [phone]   All voicemails are confidential

## 2018-11-19 NOTE — PROGRESS NOTES
Progress Note - Janeth Hemphill 1955, 66 y o  female MRN: [id number]    Unit/Bed#: -01 Encounter: [id number]    Primary Care Provider: Dylon Álvarez,    Date and time admitted to hospital: 11/17/2018  9:24 PM        * Small bowel obstruction ([address] )   Assessment & Plan    NGT out    Clears  Decrease IVF  Replete electrolytes  Prn pain control  IS/OOB/ambulate  DVT ppx: [facility]                 Subjective/Objective   Chief Complaint:     Subjective: FUNMI  No N/V  +F, no BM  Walking  Objective:     Blood pressure 144/76, pulse 73, temperature 98 5 °F (36 9 °C), temperature source Oral, resp  rate 18, weight 106 kg (233 lb 11 oz), SpO2 97 %, not currently breastfeeding  ,Body mass index is 37 72 kg/m²  Intake/Output Summary (Last 24 hours) at 11/19/18 0713  Last data filed at 11/19/18 4905   Gross per 24 hour   Intake          3602 25 ml   Output             1770 ml   Net          1832 25 ml       Invasive Devices     Peripheral Intravenous Line            Peripheral IV 11/17/18 Left Antecubital 1 day          Drain            Urostomy Ureterostomy right RUQ -- days                Physical Exam: NAD  AAOX3  Normal respiratory effort  Soft, NT, ND  Ileal conduit pink and functioning  No c/c/e      Lab, Imaging and other studies:  I have personally reviewed pertinent lab results    , CBC:   Lab Results   Component Value Date    WBC 5 08 11/19/2018    HGB 10 7 (L) 11/19/2018    HCT 33 8 (L) 11/19/2018    MCV 96 11/19/2018     11/19/2018    MCH 30 2 11/19/2018    MCHC 31 7 11/19/2018    RDW 15 4 (H) 11/19/2018    MPV 8 5 (L) 11/19/2018    NRBC 0 11/19/2018   , CMP:   Lab Results   Component Value Date    SODIUM 145 11/19/2018    K 3 9 11/19/2018     (H) 11/19/2018    CO2 23 11/19/2018    BUN 26 (H) 11/19/2018    CREATININE 0 83 11/19/2018    CALCIUM 8 5 11/19/2018    EGFR 76 11/19/2018     VTE Pharmacologic Prophylaxis: Heparin  VTE Mechanical Prophylaxis: sequential compression device

## 2018-11-21 ENCOUNTER — OFFICE VISIT (OUTPATIENT)
Dept: SURGERY | Facility: CLINIC | Age: 63
End: 2018-11-21
Payer: COMMERCIAL

## 2018-11-21 VITALS
WEIGHT: 226 LBS | RESPIRATION RATE: 18 BRPM | HEART RATE: 60 BPM | SYSTOLIC BLOOD PRESSURE: 152 MMHG | DIASTOLIC BLOOD PRESSURE: 82 MMHG | HEIGHT: 67 IN | BODY MASS INDEX: 35.47 KG/M2

## 2018-11-21 DIAGNOSIS — K56.50 SMALL BOWEL OBSTRUCTION DUE TO ADHESIONS (HCC): Primary | ICD-10-CM

## 2018-11-21 PROCEDURE — 99213 OFFICE O/P EST LOW 20 MIN: CPT | Performed by: SPECIALIST

## 2018-11-21 RX ORDER — FLUCONAZOLE 150 MG/1
TABLET ORAL DAILY
COMMUNITY
Start: 2017-08-25 | End: 2019-01-16

## 2018-11-21 RX ORDER — IBUPROFEN 800 MG/1
TABLET ORAL
Refills: 1 | COMMUNITY
Start: 2018-11-06 | End: 2021-01-19 | Stop reason: HOSPADM

## 2018-11-21 RX ORDER — CELECOXIB 200 MG/1
200 CAPSULE ORAL DAILY
Refills: 0 | COMMUNITY
Start: 2018-09-05 | End: 2019-01-16

## 2018-11-21 RX ORDER — ACETAMINOPHEN AND CODEINE PHOSPHATE 300; 30 MG/1; MG/1
300 TABLET ORAL 2 TIMES DAILY PRN
Refills: 0 | COMMUNITY
Start: 2018-10-24 | End: 2019-01-16

## 2018-11-21 RX ORDER — EMPAGLIFLOZIN 25 MG/1
25 TABLET, FILM COATED ORAL DAILY
Refills: 4 | COMMUNITY
Start: 2018-11-13 | End: 2021-01-19 | Stop reason: HOSPADM

## 2018-11-21 RX ORDER — LEVOTHYROXINE SODIUM 75 MCG
75 TABLET ORAL
COMMUNITY
Start: 2018-10-08

## 2018-11-21 NOTE — PROGRESS NOTES
Anmol Keyes is a previous patient of ours on whom we removed a failed lap band and converted to laparoscopic sleeve gastrectomy September 2015  At that time she weighed 255 lb and currently weighs 226 lb  She has had only fair result post sleeve gastrectomy  She is also status post radical hysterectomy for carcinoma of the uterus  She had subsequent radiation therapy of the pelvis  Unfortunately this caused significant injury of her bladder and urethra which ultimately necessitated a total cystectomy with ileal conduit  She and her conduit were living in peaceful coexistence until fairly recently when she developed small bowel obstruction x2  Most recently she was admitted to [facility] a few days ago with signs and symptoms of a small-bowel obstruction  She underwent CT scan of the abdomen demonstrated dilated small bowel and was felt to be a potential transition point in the left lower quadrant  She improved with conservative therapy i e  NG tube decompression, IV fluids, pain medicine etc   She was started on a diet was discharged and is here today for follow-up visit  Today she says she feels fine  She says her bowels are moving fairly well although she has a little constipated  When admitted she had severe vomiting which has significantly improved  She is tolerating her diet which consists of light fair  Her conduit is functioning well  Her abdomen is not painful  Physical exam:  Middle-aged white female awake alert no distress    Abdomen scars consistent with previous surgery  Midline incision  Laparoscopic incisions  Ileal conduit in right lower quadrant that is functioning  Abdomen is nontender  She has normoactive bowel sounds  CT scan abdomen and pelvis:  Reviewed films  She does have fairly significantly dilated small bowel and what appears to be decompressed bowel distal to this    In the left lower quadrant just above the pelvis there appears to be a possible transition point  She also has a parastomal hernia  Impression:  Recurrent small-bowel obstruction x2  The last time was fairly significant as per the patient  She has had a lot of surgery and of course she has adhesions from this  Plan:  Elective diagnostic laparoscopy with lysis of adhesions would be the best course for her  Of course it is best when she is asymptomatic  She was just discharged few days ago  I would like to do in the next few weeks but she has commitment to the new year  At this time the plan will be as the bring her in early next year in January and schedule for surgery  Of course if she develops any increasing distension nausea vomiting etc she should notify the office and she would be dealt with immediately  This point she is discharged    Will be seen in early January

## 2019-01-02 ENCOUNTER — OFFICE VISIT (OUTPATIENT)
Dept: SURGERY | Facility: CLINIC | Age: 64
End: 2019-01-02
Payer: COMMERCIAL

## 2019-01-02 VITALS
HEIGHT: 66 IN | HEART RATE: 62 BPM | BODY MASS INDEX: 35.03 KG/M2 | DIASTOLIC BLOOD PRESSURE: 76 MMHG | WEIGHT: 218 LBS | SYSTOLIC BLOOD PRESSURE: 124 MMHG | RESPIRATION RATE: 14 BRPM

## 2019-01-02 DIAGNOSIS — K66.0 ABDOMINAL ADHESIONS: Primary | ICD-10-CM

## 2019-01-02 PROCEDURE — 99214 OFFICE O/P EST MOD 30 MIN: CPT | Performed by: SPECIALIST

## 2019-01-02 RX ORDER — ALPRAZOLAM 0.5 MG/1
0.25 TABLET ORAL
Refills: 2 | COMMUNITY
Start: 2018-12-03

## 2019-01-15 NOTE — H&P
Chief Complaint:  Intermittent small bowel obstruction      History of Present Illness: The patient Cecilio Yates is a previous patient of ours on whom we removed a failed lap band and converted to a laparoscopic sleeve gastrectomy September 2015  At that time she weighed 255 lb current weighs about 219  She is status post radical hysterectomy for carcinoma of the uterus  She had subsequent radiation therapy of the pelvis  Unfortunately this caused significant injury to her bladder and urethra which ultimately necessitated a total cystectomy and an ileal conduit  She in her ileal conduit with living and peaceful coexistence until over the past several months when she developed a small-bowel obstruction on 2 separate occasions  Her most recent attack occurred in November and she was admitted to the [facility] for few days  The obligatory CT scan demonstrated some dilated small bowel and was felt to be a potential transition point left lower quadrant of the abdomen  Conservative therapy i e  IV fluids, analgesics, NG tube decompression etc was successful in relieving the bowel obstruction  She was discharged and she was seen in the office at that time to discuss her problem  Obviously she has adhesions from her previous surgery mainly the hysterectomy and cystectomy  Each attack of obstruction was worse than the 1 before  She needs undergo a diagnostic laparoscopy and lysis of adhesions  This of course is better when done elective situation as opposed to an emergency situation  She wanted to wait until the new year and she is here today to schedule a diagnostic laparoscopy lysis of adhesions  Past Medical History:   Past Medical History:   Diagnosis Date    Cholelithiasis     Deep vein thrombosis of left lower extremity ([address] ) 01/11/2004    on blood thinner for 3 years      Diabetes mellitus ([facility] [address] )     type 2    Endometrial cancer ([facility] [address] ) 1999    History of urostomy 01/11/2004 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   Hypothyroid     In vitro fertilization          Past Surgical History:    Past Surgical History:   Procedure Laterality Date    COLONOSCOPY W/ BIOPSIES N/A 12/2015    HEEL SPUR SURGERY Right 1996    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    LAPAROSCOPIC GASTRIC BANDING N/A 2006    [facility] [place]    RADICAL HYSTERECTOMY N/A 1999    [facility] endometrial cancer   SLEEVE GASTROPLASTY N/A 09/2015    Dr Nicole Mayers, [facility]    Marlee Rota THUMB ARTHROPLASTY           Allergies: Allergies   Allergen Reactions    Amoxicillin Rash    Penicillins Rash         Medications:    Current Outpatient Prescriptions:     ALPRAZolam (XANAX) 0 5 mg tablet, Take 0 5 mg by mouth daily, Disp: , Rfl: 2    JARDIANCE 25 MG TABS, Take 25 mg by mouth daily, Disp: , Rfl: 4    metFORMIN (GLUCOPHAGE) 1000 MG tablet, Take by mouth, Disp: , Rfl:     pramipexole (MIRAPEX) 0 25 mg tablet, Take 0 25 mg by mouth daily at bedtime, Disp: , Rfl:     SYNTHROID 75 MCG tablet, , Disp: , Rfl:     acetaminophen-codeine (TYLENOL #3) 300-30 mg per tablet, Take 300 tablets by mouth 2 (two) times a day as needed Every 4 to 6 hours as needed for pain, Disp: , Rfl: 0    celecoxib (CeleBREX) 200 mg capsule, Take 200 mg by mouth daily, Disp: , Rfl: 0    Dapagliflozin Propanediol (FARXIGA PO), Take by mouth daily Indications: Diabetes  , Disp: , Rfl:     fluconazole (DIFLUCAN) 150 mg tablet, Take by mouth Daily, Disp: , Rfl:     ibuprofen (MOTRIN) 800 mg tablet, TAKE 1 TABLET BY MOUTH EVERY 8 HOURS WITH FOOD AS NEEDED FOR PAIN, Disp: , Rfl: 1      Social History:  Social History     History   Alcohol Use    Yes     Comment: socially     History   Drug Use No     History   Smoking Status    Former Smoker    Packs/day: 1 50    Years: 10 00    Types: Cigarettes    Quit date: 1/11/1986   Smokeless Tobacco    Former User         Family History:    Family History   Problem Relation Age of Onset    No Known Problems Mother     Kidney failure Father     Brain cancer Father     Diabetes Sister     Alcohol abuse Sister     Diabetes Brother          Review of Systems:    negative  No weight loss fever chills night sweats chest pain recent nausea vomiting diarrhea constipation shortness of breath    Vitals:  Vitals:    01/02/19 0937   BP: 124/76   Pulse: 62   Resp: 14       Physical Exam:  The patient is a middle-aged moderately obese white female awake alert no distress  Vital signs as above  Skin warm dry  Head normocephalic and atraumatic  Eyes VAHE a m  intact  Ears and nose within normal limits  Throat gag reflex intact  Neck no masses or thyromegaly  Back no CVA or spinal tenderness  Lungs clear to a and P  Cor regular rate and rhythm no murmurs carotid bruits  Abdomen slightly obese multiple scars are noted  She has multiple laparoscopic incisions in the upper abdomen  She has a midline incision from above the umbilicus around it to down below  She has a left upper quadrant transverse incision and she has a Pfannenstiel incision  There is an ileal conduit in the right lower quadrant with a viable stoma and clear yellow urine  Extremities negative CC  Neurologically A&O x3 cranial nerves 2-12 intact      Lab Results: I have personally reviewed pertinent reports  See below  Imaging: I have personally reviewed pertinent reports  EKG, Pathology, and Other Studies: I have personally reviewed pertinent reports  No visits with results within 1 Day(s) from this visit     Latest known visit with results is:   Admission on 11/17/2018, Discharged on 11/19/2018   Component Date Value    WBC 11/17/2018 11 42*    RBC 11/17/2018 4 45     Hemoglobin 11/17/2018 13 4     Hematocrit 11/17/2018 42 1     MCV 11/17/2018 95     MCH 11/17/2018 30 1     MCHC 11/17/2018 31 8     RDW 11/17/2018 14 9     MPV 11/17/2018 8 7*    Platelets 74/91/6630 251     nRBC 11/17/2018 0     Neutrophils Relative 11/17/2018 72     Immat GRANS % 11/17/2018 0     Lymphocytes Relative 11/17/2018 21     Monocytes Relative 11/17/2018 4     Eosinophils Relative 11/17/2018 3     Basophils Relative 11/17/2018 0     Neutrophils Absolute 11/17/2018 8 18*    Immature Grans Absolute 11/17/2018 0 03     Lymphocytes Absolute 11/17/2018 2 37     Monocytes Absolute 11/17/2018 0 50     Eosinophils Absolute 11/17/2018 0 30     Basophils Absolute 11/17/2018 0 04     Sodium 11/17/2018 145     Potassium 11/17/2018 4 3     Chloride 11/17/2018 108     CO2 11/17/2018 26     ANION GAP 11/17/2018 11     BUN 11/17/2018 28*    Creatinine 11/17/2018 1 14     Glucose 11/17/2018 185*    Calcium 11/17/2018 10 0     AST 11/17/2018 13     ALT 11/17/2018 33     Alkaline Phosphatase 11/17/2018 147*    Total Protein 11/17/2018 7 7     Albumin 11/17/2018 4 1     Total Bilirubin 11/17/2018 0 60     eGFR 11/17/2018 52     Lipase 11/17/2018 129     LACTIC ACID 11/17/2018 2 2*    Total CK 11/17/2018 63     Troponin I 11/17/2018 <0 02     TSH 3RD GENERATON 11/17/2018 4 530*    WBC 11/18/2018 10 76*    RBC 11/18/2018 4 12     Hemoglobin 11/18/2018 12 4     Hematocrit 11/18/2018 38 9     MCV 11/18/2018 94     MCH 11/18/2018 30 1     MCHC 11/18/2018 31 9     RDW 11/18/2018 15 1     MPV 11/18/2018 9 1     Platelets 95/37/3662 217     nRBC 11/18/2018 0     Sodium 11/18/2018 145     Potassium 11/18/2018 4 6     Chloride 11/18/2018 112*    CO2 11/18/2018 21     ANION GAP 11/18/2018 12     BUN 11/18/2018 30*    Creatinine 11/18/2018 0 97     Glucose 11/18/2018 174*    Calcium 11/18/2018 9 0     eGFR 11/18/2018 63     Magnesium 11/18/2018 2 0     POC Glucose 11/18/2018 174*    Segmented % 11/18/2018 71     Bands % 11/18/2018 18*    Lymphocytes % 11/18/2018 5*    Monocytes % 11/18/2018 6     Eosinophils, % 11/18/2018 0     Basophils % 11/18/2018 0     Absolute Neutrophils 11/18/2018 9 58*    Lymphocytes Absolute 11/18/2018 0 54*    Monocytes Absolute 11/18/2018 0 65     Eosinophils Absolute 11/18/2018 0 00     Basophils Absolute 11/18/2018 0 00     Total Counted 11/18/2018 100     Anisocytosis 11/18/2018 Present     Platelet Estimate 68/57/4208 Adequate     POC Glucose 11/18/2018 154*    Ventricular Rate 11/17/2018 73     Atrial Rate 11/17/2018 73     CT Interval 11/17/2018 182     QRSD Interval 11/17/2018 86     QT Interval 11/17/2018 404     QTC Interval 11/17/2018 445     P Axis 11/17/2018 62     QRS Axis 11/17/2018 -16     T Wave Axis 11/17/2018 33     POC Glucose 11/18/2018 126     POC Glucose 11/19/2018 125     WBC 11/19/2018 5 08     RBC 11/19/2018 3 54*    Hemoglobin 11/19/2018 10 7*    Hematocrit 11/19/2018 33 8*    MCV 11/19/2018 96     MCH 11/19/2018 30 2     MCHC 11/19/2018 31 7     RDW 11/19/2018 15 4*    MPV 11/19/2018 8 5*    Platelets 16/31/8096 177     nRBC 11/19/2018 0     Neutrophils Relative 11/19/2018 54     Immat GRANS % 11/19/2018 0     Lymphocytes Relative 11/19/2018 30     Monocytes Relative 11/19/2018 9     Eosinophils Relative 11/19/2018 6     Basophils Relative 11/19/2018 1     Neutrophils Absolute 11/19/2018 2 76     Immature Grans Absolute 11/19/2018 0 01     Lymphocytes Absolute 11/19/2018 1 50     Monocytes Absolute 11/19/2018 0 47     Eosinophils Absolute 11/19/2018 0 31     Basophils Absolute 11/19/2018 0 03     Sodium 11/19/2018 145     Potassium 11/19/2018 3 9     Chloride 11/19/2018 115*    CO2 11/19/2018 23     ANION GAP 11/19/2018 7     BUN 11/19/2018 26*    Creatinine 11/19/2018 0 83     Glucose 11/19/2018 124     Calcium 11/19/2018 8 5     eGFR 11/19/2018 76     POC Glucose 11/19/2018 117     POC Glucose 11/19/2018 152*         Impression:  Recurrent small bowel obstructions from adhesions  Plan:  Laparoscopic lysis of adhesions the earliest possible date

## 2019-01-16 ENCOUNTER — ANESTHESIA EVENT (OUTPATIENT)
Dept: PERIOP | Facility: HOSPITAL | Age: 64
End: 2019-01-16
Payer: COMMERCIAL

## 2019-01-16 RX ORDER — CLINDAMYCIN HYDROCHLORIDE 150 MG/1
300 CAPSULE ORAL EVERY 8 HOURS SCHEDULED
COMMUNITY
End: 2021-01-19 | Stop reason: HOSPADM

## 2019-01-16 NOTE — PRE-PROCEDURE INSTRUCTIONS
Pre-op Showering Instructions for Surgery Patients    Before your operation, you play an important role in decreasing your risk for infection by washing with special antiseptic soap  This is an effective way to reduce bacteria on the skin which may help to prevent infections at the surgical site  Please read the following directions in advance  1  In the week before your operation, purchase a 4 ounce bottle of antiseptic soap containing chlorhexidine gluconate (CHG)  4%  Some brand names include: Aplicare®, Endure, and Hibiclens®  The cost is usually less than $5 00   For your convenience, the [employer] carries the soap   It may also be available at your doctors office or pre-admission testing center, and at most retail pharmacies   If you are allergic or sensitive to soaps containing CHG, please let your doctor know so another antiseptic can be suggested   CHG antiseptic soap is for external use only  2  The day before your operation, follow these instructions carefully to get ready   Please clean linens (sheets) on your bed; you should sleep on clean sheets after your evening shower   Get clean towels and washcloth ready - you need enough for 2 showers   Set aside clean underwear, pajamas, and clothing to wear after the showers     Reminders:   DO NOT use any other soap or body rinse on your skin during or after the antiseptic showers   DO NOT use lotion, powder, deodorant, or perfume/aftershave of any kind on your skin after your antiseptic shower   DO NOT shave any body parts in the 24 hours/day before your operation   DO NOT get the antiseptic soap in your eyes, ears, nose, mouth, or vaginal area    3  You will need to shower the night before AND the morning of your surgery  Shower 1:   The first evening before the operation, take the first shower   First, shampoo your hair with regular shampoo and rinse it completely before you use the antiseptic soap   After washing and rinsing your hair, rinse your body   Next, use a clean washcloth to apply the antiseptic soap and wash your body from the neck down to your toes using ½ bottle of the antiseptic soap  You will use the other ½ bottle for the second shower   Clean the area where your incision will be; lather this area well for about 2 minutes   If you are having head or neck surgery, wash areas with the antiseptic soap   Rinse yourself completely with running water   Use a clean towel to dry off   Wear clean underwear and clothing/pajamas  Shower 2   The morning of your operation, take the second shower following the same steps as Shower 1 using the second ½ of the bottle of antiseptic soap   Use clean cloths and towels to wash and dry yourself   Wear clean underwear and clothing    No outpatient prescriptions have been marked as taking for the 1/17/19 encounter [facility] Encounter)

## 2019-01-16 NOTE — ANESTHESIA PREPROCEDURE EVALUATION
Review of Systems/Medical History  Patient summary reviewed  Chart reviewed  No history of anesthetic complications     Cardiovascular  Exercise tolerance (METS): >4,  Hypertension controlled,    Pulmonary  Smoker (quit 86) ex-smoker  , Sleep apnea ,        GI/Hepatic    GERD well controlled, Bariatric surgery (sleeve 15  40lb wt loss),        Kidney stones, Kidney disease (history of pyelonephritis ) , Genitourinary malignancy cystotomy,        Endo/Other  Diabetes type 2 Oral agent, History of thyroid disease , hypothyroidism,      GYN    Hysterectomy, Uterine cancer (had radiation),        Hematology  Anemia (history of transfusions) anemia of chronic disease,     Musculoskeletal       Neurology    Headaches,    Psychology           Physical Exam    Airway    Mallampati score: II         Dental       Cardiovascular  Cardiovascular exam normal    Pulmonary  Pulmonary exam normal     Other Findings        Anesthesia Plan  ASA Score- 3     Anesthesia Type- general with ASA Monitors  Additional Monitors:   Airway Plan: ETT  Comment: Pt has JANNET  Plan Factors-Patient not instructed to abstain from smoking on day of procedure  Patient did not smoke on day of surgery  Induction- intravenous  Postoperative Plan- Plan for postoperative opioid use  Informed Consent- Anesthetic plan and risks discussed with patient and daughter  I personally reviewed this patient with the CRNA  Discussed and agreed on the Anesthesia Plan with the CRNA  Karrie Nissen

## 2019-01-17 ENCOUNTER — HOSPITAL ENCOUNTER (OUTPATIENT)
Facility: HOSPITAL | Age: 64
Setting detail: OUTPATIENT SURGERY
Discharge: HOME | End: 2019-01-17
Attending: SPECIALIST | Admitting: SPECIALIST
Payer: COMMERCIAL

## 2019-01-17 ENCOUNTER — ANESTHESIA (OUTPATIENT)
Dept: PERIOP | Facility: HOSPITAL | Age: 64
End: 2019-01-17
Payer: COMMERCIAL

## 2019-01-17 VITALS
HEART RATE: 52 BPM | DIASTOLIC BLOOD PRESSURE: 73 MMHG | TEMPERATURE: 96.3 F | BODY MASS INDEX: 34.14 KG/M2 | HEIGHT: 67 IN | OXYGEN SATURATION: 99 % | RESPIRATION RATE: 18 BRPM | SYSTOLIC BLOOD PRESSURE: 143 MMHG

## 2019-01-17 DIAGNOSIS — K66.0 ABDOMINAL ADHESIONS: Primary | ICD-10-CM

## 2019-01-17 LAB
ABO GROUP BLD: NORMAL
ABO GROUP BLD: NORMAL
BLD GP AB SCN SERPL QL: NEGATIVE
GLUCOSE SERPL-MCNC: 138 MG/DL (ref 70–99)
RH BLD: NEGATIVE
RH BLD: NEGATIVE
SPECIMEN EXPIRATION DATE: NORMAL

## 2019-01-17 PROCEDURE — 86850 RBC ANTIBODY SCREEN: CPT | Performed by: ANESTHESIOLOGY

## 2019-01-17 PROCEDURE — 86901 BLOOD TYPING SEROLOGIC RH(D): CPT | Performed by: ANESTHESIOLOGY

## 2019-01-17 PROCEDURE — 82948 REAGENT STRIP/BLOOD GLUCOSE: CPT

## 2019-01-17 PROCEDURE — 44180 LAP ENTEROLYSIS: CPT | Performed by: SPECIALIST

## 2019-01-17 PROCEDURE — 86900 BLOOD TYPING SEROLOGIC ABO: CPT | Performed by: ANESTHESIOLOGY

## 2019-01-17 RX ORDER — ROCURONIUM BROMIDE 10 MG/ML
INJECTION, SOLUTION INTRAVENOUS AS NEEDED
Status: DISCONTINUED | OUTPATIENT
Start: 2019-01-17 | End: 2019-01-17 | Stop reason: SURG

## 2019-01-17 RX ORDER — FENTANYL CITRATE/PF 50 MCG/ML
25 SYRINGE (ML) INJECTION
Status: DISCONTINUED | OUTPATIENT
Start: 2019-01-17 | End: 2019-01-17 | Stop reason: HOSPADM

## 2019-01-17 RX ORDER — HEPARIN SODIUM 5000 [USP'U]/ML
INJECTION, SOLUTION INTRAVENOUS; SUBCUTANEOUS AS NEEDED
Status: DISCONTINUED | OUTPATIENT
Start: 2019-01-17 | End: 2019-01-17 | Stop reason: SURG

## 2019-01-17 RX ORDER — LIDOCAINE HYDROCHLORIDE 10 MG/ML
INJECTION, SOLUTION INFILTRATION; PERINEURAL AS NEEDED
Status: DISCONTINUED | OUTPATIENT
Start: 2019-01-17 | End: 2019-01-17 | Stop reason: SURG

## 2019-01-17 RX ORDER — CEFAZOLIN SODIUM 2 G/50ML
2000 SOLUTION INTRAVENOUS ONCE
Status: COMPLETED | OUTPATIENT
Start: 2019-01-17 | End: 2019-01-17

## 2019-01-17 RX ORDER — DIPHENHYDRAMINE HYDROCHLORIDE 50 MG/ML
12.5 INJECTION INTRAMUSCULAR; INTRAVENOUS ONCE
Status: DISCONTINUED | OUTPATIENT
Start: 2019-01-17 | End: 2019-01-17 | Stop reason: HOSPADM

## 2019-01-17 RX ORDER — ONDANSETRON 2 MG/ML
INJECTION INTRAMUSCULAR; INTRAVENOUS AS NEEDED
Status: DISCONTINUED | OUTPATIENT
Start: 2019-01-17 | End: 2019-01-17 | Stop reason: SURG

## 2019-01-17 RX ORDER — KETOROLAC TROMETHAMINE 30 MG/ML
INJECTION, SOLUTION INTRAMUSCULAR; INTRAVENOUS AS NEEDED
Status: DISCONTINUED | OUTPATIENT
Start: 2019-01-17 | End: 2019-01-17 | Stop reason: SURG

## 2019-01-17 RX ORDER — MIDAZOLAM HYDROCHLORIDE 1 MG/ML
INJECTION INTRAMUSCULAR; INTRAVENOUS AS NEEDED
Status: DISCONTINUED | OUTPATIENT
Start: 2019-01-17 | End: 2019-01-17 | Stop reason: SURG

## 2019-01-17 RX ORDER — OXYCODONE HYDROCHLORIDE AND ACETAMINOPHEN 5; 325 MG/1; MG/1
2 TABLET ORAL EVERY 4 HOURS PRN
Status: DISCONTINUED | OUTPATIENT
Start: 2019-01-17 | End: 2019-01-17 | Stop reason: HOSPADM

## 2019-01-17 RX ORDER — MAGNESIUM HYDROXIDE 1200 MG/15ML
LIQUID ORAL AS NEEDED
Status: DISCONTINUED | OUTPATIENT
Start: 2019-01-17 | End: 2019-01-17 | Stop reason: HOSPADM

## 2019-01-17 RX ORDER — FENTANYL CITRATE 50 UG/ML
INJECTION, SOLUTION INTRAMUSCULAR; INTRAVENOUS AS NEEDED
Status: DISCONTINUED | OUTPATIENT
Start: 2019-01-17 | End: 2019-01-17 | Stop reason: SURG

## 2019-01-17 RX ORDER — PROPOFOL 10 MG/ML
INJECTION, EMULSION INTRAVENOUS AS NEEDED
Status: DISCONTINUED | OUTPATIENT
Start: 2019-01-17 | End: 2019-01-17 | Stop reason: SURG

## 2019-01-17 RX ORDER — ONDANSETRON 2 MG/ML
4 INJECTION INTRAMUSCULAR; INTRAVENOUS EVERY 6 HOURS PRN
Status: DISCONTINUED | OUTPATIENT
Start: 2019-01-17 | End: 2019-01-17 | Stop reason: HOSPADM

## 2019-01-17 RX ORDER — SODIUM CHLORIDE, SODIUM LACTATE, POTASSIUM CHLORIDE, CALCIUM CHLORIDE 600; 310; 30; 20 MG/100ML; MG/100ML; MG/100ML; MG/100ML
75 INJECTION, SOLUTION INTRAVENOUS CONTINUOUS
Status: DISCONTINUED | OUTPATIENT
Start: 2019-01-17 | End: 2019-01-17 | Stop reason: HOSPADM

## 2019-01-17 RX ORDER — NEOSTIGMINE METHYLSULFATE 1 MG/ML
INJECTION INTRAVENOUS AS NEEDED
Status: DISCONTINUED | OUTPATIENT
Start: 2019-01-17 | End: 2019-01-17 | Stop reason: SURG

## 2019-01-17 RX ORDER — OXYCODONE HYDROCHLORIDE AND ACETAMINOPHEN 5; 325 MG/1; MG/1
1 TABLET ORAL EVERY 4 HOURS PRN
Qty: 20 TABLET | Refills: 0 | Status: SHIPPED | OUTPATIENT
Start: 2019-01-17 | End: 2019-01-31 | Stop reason: HOSPADM

## 2019-01-17 RX ORDER — FENTANYL CITRATE/PF 50 MCG/ML
12.5 SYRINGE (ML) INJECTION
Status: DISCONTINUED | OUTPATIENT
Start: 2019-01-17 | End: 2019-01-17 | Stop reason: HOSPADM

## 2019-01-17 RX ORDER — SODIUM CHLORIDE 9 MG/ML
INJECTION, SOLUTION INTRAVENOUS AS NEEDED
Status: DISCONTINUED | OUTPATIENT
Start: 2019-01-17 | End: 2019-01-17 | Stop reason: HOSPADM

## 2019-01-17 RX ORDER — OXYCODONE HYDROCHLORIDE AND ACETAMINOPHEN 5; 325 MG/1; MG/1
1 TABLET ORAL EVERY 4 HOURS PRN
Status: DISCONTINUED | OUTPATIENT
Start: 2019-01-17 | End: 2019-01-17 | Stop reason: HOSPADM

## 2019-01-17 RX ORDER — GLYCOPYRROLATE 0.2 MG/ML
INJECTION INTRAMUSCULAR; INTRAVENOUS AS NEEDED
Status: DISCONTINUED | OUTPATIENT
Start: 2019-01-17 | End: 2019-01-17 | Stop reason: SURG

## 2019-01-17 RX ORDER — DEXAMETHASONE SODIUM PHOSPHATE 4 MG/ML
4 INJECTION, SOLUTION INTRA-ARTICULAR; INTRALESIONAL; INTRAMUSCULAR; INTRAVENOUS; SOFT TISSUE ONCE AS NEEDED
Status: COMPLETED | OUTPATIENT
Start: 2019-01-17 | End: 2019-01-17

## 2019-01-17 RX ORDER — BUPIVACAINE HYDROCHLORIDE 5 MG/ML
INJECTION, SOLUTION PERINEURAL AS NEEDED
Status: DISCONTINUED | OUTPATIENT
Start: 2019-01-17 | End: 2019-01-17 | Stop reason: HOSPADM

## 2019-01-17 RX ADMIN — CEFAZOLIN SODIUM 2000 MG: 2 SOLUTION INTRAVENOUS at 08:15

## 2019-01-17 RX ADMIN — DEXAMETHASONE SODIUM PHOSPHATE 4 MG: 4 INJECTION, SOLUTION INTRAMUSCULAR; INTRAVENOUS at 12:44

## 2019-01-17 RX ADMIN — ROCURONIUM BROMIDE 10 MG: 10 INJECTION INTRAVENOUS at 10:39

## 2019-01-17 RX ADMIN — HEPARIN SODIUM 5000 UNITS: 5000 INJECTION, SOLUTION INTRAVENOUS; SUBCUTANEOUS at 08:12

## 2019-01-17 RX ADMIN — KETOROLAC TROMETHAMINE 15 MG: 30 INJECTION, SOLUTION INTRAMUSCULAR; INTRAVENOUS at 11:35

## 2019-01-17 RX ADMIN — FENTANYL CITRATE 50 MCG: 50 INJECTION INTRAMUSCULAR; INTRAVENOUS at 08:52

## 2019-01-17 RX ADMIN — ROCURONIUM BROMIDE 10 MG: 10 INJECTION INTRAVENOUS at 09:50

## 2019-01-17 RX ADMIN — SODIUM CHLORIDE, POTASSIUM CHLORIDE, SODIUM LACTATE AND CALCIUM CHLORIDE 75 ML/HR: 600; 310; 30; 20 INJECTION, SOLUTION INTRAVENOUS at 07:39

## 2019-01-17 RX ADMIN — OXYCODONE HYDROCHLORIDE AND ACETAMINOPHEN 1 TABLET: 5; 325 TABLET ORAL at 14:06

## 2019-01-17 RX ADMIN — FENTANYL CITRATE 50 MCG: 50 INJECTION INTRAMUSCULAR; INTRAVENOUS at 09:08

## 2019-01-17 RX ADMIN — SODIUM CHLORIDE, POTASSIUM CHLORIDE, SODIUM LACTATE AND CALCIUM CHLORIDE: 600; 310; 30; 20 INJECTION, SOLUTION INTRAVENOUS at 10:14

## 2019-01-17 RX ADMIN — FENTANYL CITRATE 50 MCG: 50 INJECTION INTRAMUSCULAR; INTRAVENOUS at 09:50

## 2019-01-17 RX ADMIN — FENTANYL CITRATE 50 MCG: 50 INJECTION INTRAMUSCULAR; INTRAVENOUS at 10:39

## 2019-01-17 RX ADMIN — GLYCOPYRROLATE 0.4 MG: 0.2 INJECTION, SOLUTION INTRAMUSCULAR; INTRAVENOUS at 11:31

## 2019-01-17 RX ADMIN — FENTANYL CITRATE 50 MCG: 50 INJECTION INTRAMUSCULAR; INTRAVENOUS at 09:12

## 2019-01-17 RX ADMIN — PROPOFOL 200 MG: 10 INJECTION, EMULSION INTRAVENOUS at 08:05

## 2019-01-17 RX ADMIN — ONDANSETRON HYDROCHLORIDE 4 MG: 2 INJECTION, SOLUTION INTRAMUSCULAR; INTRAVENOUS at 11:23

## 2019-01-17 RX ADMIN — ROCURONIUM BROMIDE 50 MG: 10 INJECTION INTRAVENOUS at 08:05

## 2019-01-17 RX ADMIN — LIDOCAINE HYDROCHLORIDE 45 MG: 10 INJECTION, SOLUTION INFILTRATION; PERINEURAL at 08:05

## 2019-01-17 RX ADMIN — DEXAMETHASONE SODIUM PHOSPHATE 4 MG: 10 INJECTION INTRAMUSCULAR; INTRAVENOUS at 08:23

## 2019-01-17 RX ADMIN — FENTANYL CITRATE 50 MCG: 50 INJECTION INTRAMUSCULAR; INTRAVENOUS at 08:05

## 2019-01-17 RX ADMIN — NEOSTIGMINE METHYLSULFATE 3 MG: 1 INJECTION INTRAVENOUS at 11:31

## 2019-01-17 RX ADMIN — MIDAZOLAM HYDROCHLORIDE 2 MG: 1 INJECTION, SOLUTION INTRAMUSCULAR; INTRAVENOUS at 08:02

## 2019-01-17 NOTE — OP NOTE
OPERATIVE REPORT  PATIENT NAME: Elis Scott    :  1955  MRN: [id number]  Pt Location:  OR ROOM 07    SURGERY DATE: 2019    Surgeon(s) and Role:     * Mary Lunsford MD - Primary    Preop Diagnosis:  Abdominal adhesions [K66 0] partial small-bowel obstruction    Post-Op Diagnosis Codes:     * Abdominal adhesions [K66 0] partial small-bowel obstruction    Procedure(s) (LRB):  LAPAROSCOPIC LYSIS OF ADHESIONS (N/A)    Specimen(s):  * No specimens in log *    Estimated Blood Loss:   Minimal    Drains:  Urostomy Ureterostomy right RUQ (Active)   Stomal Appliance 1 piece 2019 11:42 AM   Stoma Assessment [place] 2019 11:42 AM   Stoma Shape Round 2019 11:42 AM   Peristomal Assessment Clean 2019 11:42 AM   Number of days:        Anesthesia Type:   General    Operative Indications:  Abdominal adhesions [K66 0]  Partial small-bowel obstruction    Operative Findings:  A significant amount of adhesions from the small bowel to the surrounding small bowel, omentum, anterior abdominal wall  Predominantly in the left lower quadrant  Complications:   None    Procedure and Technique:  Patient brought to operating room placed on the operating table in a supine position  Under adequate general endotracheal anesthesia the abdomen was prepped and draped in usual sterile fashion  The urostomy in the right lower quadrant was bagged prepped and then draped from the field  Small incision made left subcostal area  The Veress needle was inserted and was insufflated with CO2 to 15 mmHg  Needle was removed 5 mm port is inserted  5 mm for 30 degree scope was placed  The abdomen was visually explored  There was noted be no trauma from needle port placement  A significant amount of adhesions were encountered  The omentum was immediately identified and densely adherent to the anterior abdominal wall    The left lower quadrant was visualized and the small bowel appeared to be mildly dilated and kandy a fairly vigorously  A significant amount of the end of the small bowel was adherent to the anterior abdominal wall with some obvious kinking  An additional port is placed in the left lower quadrant and left mid quadrant  These are 5 mm ports placed under direct vision  At that point methodically lysis of adhesions proceeded  Sharp and blunt dissection was used to free up the small bowel mainly from the anterior abdominal wall with there appeared to be the most adhesions and the small bowel appeared to be kinked  These were taken down and additional adhesions were also lysed  Left lower quadrant was cleared of adhesions and dissection proceeded medially  Once again the patient had an ileal conduit and the right lower quadrant so care was taken not to injure this or any of the bowel involved with this  Placing the camera through various ports the abdomen was inspected from various different vantage point  Once again adhesions were lysed  The omentum was fairly densely adherent to the anterior abdominal wall  The majority of this was left alone since it was not contributing to bowel obstruction  The lysis proceeded with out any trauma to the bowel itself  The area was irrigated with saline solution  After proceeding for over an hour and lysing these adhesions the bowel was then run  Ligament of Treitz was identified and the bowel was run distally to this  An additional it fairly significant adhesion was was encountered deep and after carefully retracting the bowel these adhesions were lysed freeing up this bowel  The bowel was run again and what much farther distally where there was no further dilated bowel and was decompressed  The ileal conduit was once again avoided so as not to injure it  The areas irrigated with saline solution  The bowel was inspected and was noted be intact  There was no bleeding noted at this time  At that point all CO2 and ports removed    0 5% Marcaine was used to infiltrate the 5 mm ports  Skin is closed for Monocryl in a subcuticular fashion  Benzoin Steri-Strips applied    Estimated blood was minimal patient tolerated the procedure well delivered to the recovery room stable condition   I was present for the entire procedure    Patient Disposition:  PACU     SIGNATURE: Destiny Slater MD  DATE: January 17, 2019  TIME: 11:52 AM

## 2019-01-17 NOTE — ANESTHESIA POSTPROCEDURE EVALUATION
Post-Op Assessment Note      CV Status:  Stable    Mental Status:  Alert and awake    Hydration Status:  Euvolemic    PONV Controlled:  Controlled    Airway Patency:  Patent  Airway: intubated    Post Op Vitals Reviewed: Yes          Staff: Anesthesiologist, CRNA           BP     Temp     Pulse     Resp      SpO2

## 2019-01-17 NOTE — NURSING NOTE
Dr Rosa Bowie here to see patient and review discharge instructions with both patient and sister  Both verbalized understanding

## 2019-01-17 NOTE — H&P (VIEW-ONLY)
Chief Complaint:  Intermittent small bowel obstruction      History of Present Illness: The patient Lolly Jones is a previous patient of ours on whom we removed a failed lap band and converted to a laparoscopic sleeve gastrectomy September 2015  At that time she weighed 255 lb current weighs about 219  She is status post radical hysterectomy for carcinoma of the uterus  She had subsequent radiation therapy of the pelvis  Unfortunately this caused significant injury to her bladder and urethra which ultimately necessitated a total cystectomy and an ileal conduit  She in her ileal conduit with living and peaceful coexistence until over the past several months when she developed a small-bowel obstruction on 2 separate occasions  Her most recent attack occurred in November and she was admitted to the [facility] for few days  The obligatory CT scan demonstrated some dilated small bowel and was felt to be a potential transition point left lower quadrant of the abdomen  Conservative therapy i e  IV fluids, analgesics, NG tube decompression etc was successful in relieving the bowel obstruction  She was discharged and she was seen in the office at that time to discuss her problem  Obviously she has adhesions from her previous surgery mainly the hysterectomy and cystectomy  Each attack of obstruction was worse than the 1 before  She needs undergo a diagnostic laparoscopy and lysis of adhesions  This of course is better when done elective situation as opposed to an emergency situation  She wanted to wait until the new year and she is here today to schedule a diagnostic laparoscopy lysis of adhesions  Past Medical History:   Past Medical History:   Diagnosis Date    Cholelithiasis     Deep vein thrombosis of left lower extremity ([facility] ) 01/11/2004    on blood thinner for 3 years      Diabetes mellitus ([facility] )     type 2    Endometrial cancer ([facility] [address] ) 1999    History of urostomy 01/11/2004 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   Hypothyroid     In vitro fertilization          Past Surgical History:    Past Surgical History:   Procedure Laterality Date    COLONOSCOPY W/ BIOPSIES N/A 12/2015    HEEL SPUR SURGERY Right 1996    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    LAPAROSCOPIC GASTRIC BANDING N/A 2006    [facility]    RADICAL HYSTERECTOMY N/A 1999    [facility] endometrial cancer   SLEEVE GASTROPLASTY N/A 09/2015    [facility]    Marlee Rota THUMB ARTHROPLASTY           Allergies: Allergies   Allergen Reactions    Amoxicillin Rash    Penicillins Rash         Medications:    Current Outpatient Prescriptions:     ALPRAZolam (XANAX) 0 5 mg tablet, Take 0 5 mg by mouth daily, Disp: , Rfl: 2    JARDIANCE 25 MG TABS, Take 25 mg by mouth daily, Disp: , Rfl: 4    metFORMIN (GLUCOPHAGE) 1000 MG tablet, Take by mouth, Disp: , Rfl:     pramipexole (MIRAPEX) 0 25 mg tablet, Take 0 25 mg by mouth daily at bedtime, Disp: , Rfl:     SYNTHROID 75 MCG tablet, , Disp: , Rfl:     acetaminophen-codeine (TYLENOL #3) 300-30 mg per tablet, Take 300 tablets by mouth 2 (two) times a day as needed Every 4 to 6 hours as needed for pain, Disp: , Rfl: 0    celecoxib (CeleBREX) 200 mg capsule, Take 200 mg by mouth daily, Disp: , Rfl: 0    Dapagliflozin Propanediol (FARXIGA PO), Take by mouth daily Indications: Diabetes  , Disp: , Rfl:     fluconazole (DIFLUCAN) 150 mg tablet, Take by mouth Daily, Disp: , Rfl:     ibuprofen (MOTRIN) 800 mg tablet, TAKE 1 TABLET BY MOUTH EVERY 8 HOURS WITH FOOD AS NEEDED FOR PAIN, Disp: , Rfl: 1      Social History:  Social History     History   Alcohol Use    Yes     Comment: socially     History   Drug Use No     History   Smoking Status    Former Smoker    Packs/day: 1 50    Years: 10 00    Types: Cigarettes    Quit date: 1/11/1986   Smokeless Tobacco    Former User         Family History:    Family History   Problem Relation Age of Onset    No Known Problems Mother     Kidney failure Father     Brain cancer Father     Diabetes Sister     Alcohol abuse Sister     Diabetes Brother          Review of Systems:    negative  No weight loss fever chills night sweats chest pain recent nausea vomiting diarrhea constipation shortness of breath    Vitals:  Vitals:    01/02/19 0937   BP: 124/76   Pulse: 62   Resp: 14       Physical Exam:  The patient is a middle-aged moderately obese white female awake alert no distress  Vital signs as above  Skin warm dry  Head normocephalic and atraumatic  Eyes VAHE a m  intact  Ears and nose within normal limits  Throat gag reflex intact  Neck no masses or thyromegaly  Back no CVA or spinal tenderness  Lungs clear to a and P  Cor regular rate and rhythm no murmurs carotid bruits  Abdomen slightly obese multiple scars are noted  She has multiple laparoscopic incisions in the upper abdomen  She has a midline incision from above the umbilicus around it to down below  She has a left upper quadrant transverse incision and she has a Pfannenstiel incision  There is an ileal conduit in the right lower quadrant with a viable stoma and clear yellow urine  Extremities negative CC  Neurologically A&O x3 cranial nerves 2-12 intact      Lab Results: I have personally reviewed pertinent reports  See below  Imaging: I have personally reviewed pertinent reports  EKG, Pathology, and Other Studies: I have personally reviewed pertinent reports  No visits with results within 1 Day(s) from this visit     Latest known visit with results is:   Admission on 11/17/2018, Discharged on 11/19/2018   Component Date Value    WBC 11/17/2018 11 42*    RBC 11/17/2018 4 45     Hemoglobin 11/17/2018 13 4     Hematocrit 11/17/2018 42 1     MCV 11/17/2018 95     MCH 11/17/2018 30 1     MCHC 11/17/2018 31 8     RDW 11/17/2018 14 9     MPV 11/17/2018 8 7*    Platelets 54/39/2849 251     nRBC 11/17/2018 0     Neutrophils Relative 11/17/2018 72     Immat GRANS % 11/17/2018 0     Lymphocytes Relative 11/17/2018 21     Monocytes Relative 11/17/2018 4     Eosinophils Relative 11/17/2018 3     Basophils Relative 11/17/2018 0     Neutrophils Absolute 11/17/2018 8 18*    Immature Grans Absolute 11/17/2018 0 03     Lymphocytes Absolute 11/17/2018 2 37     Monocytes Absolute 11/17/2018 0 50     Eosinophils Absolute 11/17/2018 0 30     Basophils Absolute 11/17/2018 0 04     Sodium 11/17/2018 145     Potassium 11/17/2018 4 3     Chloride 11/17/2018 108     CO2 11/17/2018 26     ANION GAP 11/17/2018 11     BUN 11/17/2018 28*    Creatinine 11/17/2018 1 14     Glucose 11/17/2018 185*    Calcium 11/17/2018 10 0     AST 11/17/2018 13     ALT 11/17/2018 33     Alkaline Phosphatase 11/17/2018 147*    Total Protein 11/17/2018 7 7     Albumin 11/17/2018 4 1     Total Bilirubin 11/17/2018 0 60     eGFR 11/17/2018 52     Lipase 11/17/2018 129     LACTIC ACID 11/17/2018 2 2*    Total CK 11/17/2018 63     Troponin I 11/17/2018 <0 02     TSH 3RD GENERATON 11/17/2018 4 530*    WBC 11/18/2018 10 76*    RBC 11/18/2018 4 12     Hemoglobin 11/18/2018 12 4     Hematocrit 11/18/2018 38 9     MCV 11/18/2018 94     MCH 11/18/2018 30 1     MCHC 11/18/2018 31 9     RDW 11/18/2018 15 1     MPV 11/18/2018 9 1     Platelets 42/94/4591 217     nRBC 11/18/2018 0     Sodium 11/18/2018 145     Potassium 11/18/2018 4 6     Chloride 11/18/2018 112*    CO2 11/18/2018 21     ANION GAP 11/18/2018 12     BUN 11/18/2018 30*    Creatinine 11/18/2018 0 97     Glucose 11/18/2018 174*    Calcium 11/18/2018 9 0     eGFR 11/18/2018 63     Magnesium 11/18/2018 2 0     POC Glucose 11/18/2018 174*    Segmented % 11/18/2018 71     Bands % 11/18/2018 18*    Lymphocytes % 11/18/2018 5*    Monocytes % 11/18/2018 6     Eosinophils, % 11/18/2018 0     Basophils % 11/18/2018 0     Absolute Neutrophils 11/18/2018 9 58*    Lymphocytes Absolute 11/18/2018 0 54*    Monocytes Absolute 11/18/2018 0 65     Eosinophils Absolute 11/18/2018 0 00     Basophils Absolute 11/18/2018 0 00     Total Counted 11/18/2018 100     Anisocytosis 11/18/2018 Present     Platelet Estimate 99/20/0729 Adequate     POC Glucose 11/18/2018 154*    Ventricular Rate 11/17/2018 73     Atrial Rate 11/17/2018 73     WA Interval 11/17/2018 182     QRSD Interval 11/17/2018 86     QT Interval 11/17/2018 404     QTC Interval 11/17/2018 445     P Axis 11/17/2018 62     QRS Axis 11/17/2018 -16     T Wave Axis 11/17/2018 33     POC Glucose 11/18/2018 126     POC Glucose 11/19/2018 125     WBC 11/19/2018 5 08     RBC 11/19/2018 3 54*    Hemoglobin 11/19/2018 10 7*    Hematocrit 11/19/2018 33 8*    MCV 11/19/2018 96     MCH 11/19/2018 30 2     MCHC 11/19/2018 31 7     RDW 11/19/2018 15 4*    MPV 11/19/2018 8 5*    Platelets 79/46/2795 177     nRBC 11/19/2018 0     Neutrophils Relative 11/19/2018 54     Immat GRANS % 11/19/2018 0     Lymphocytes Relative 11/19/2018 30     Monocytes Relative 11/19/2018 9     Eosinophils Relative 11/19/2018 6     Basophils Relative 11/19/2018 1     Neutrophils Absolute 11/19/2018 2 76     Immature Grans Absolute 11/19/2018 0 01     Lymphocytes Absolute 11/19/2018 1 50     Monocytes Absolute 11/19/2018 0 47     Eosinophils Absolute 11/19/2018 0 31     Basophils Absolute 11/19/2018 0 03     Sodium 11/19/2018 145     Potassium 11/19/2018 3 9     Chloride 11/19/2018 115*    CO2 11/19/2018 23     ANION GAP 11/19/2018 7     BUN 11/19/2018 26*    Creatinine 11/19/2018 0 83     Glucose 11/19/2018 124     Calcium 11/19/2018 8 5     eGFR 11/19/2018 76     POC Glucose 11/19/2018 117     POC Glucose 11/19/2018 152*         Impression:  Recurrent small bowel obstructions from adhesions  Plan:  Laparoscopic lysis of adhesions the earliest possible date

## 2019-01-17 NOTE — ANESTHESIA POSTPROCEDURE EVALUATION
Post-Op Assessment Note      CV Status:  Stable    Mental Status:  Alert and awake    Hydration Status:  Euvolemic    PONV Controlled:  Controlled    Airway Patency:  Patent    Post Op Vitals Reviewed: Yes          Staff: GAEL           BP (!) 187/104 (01/17/19 1142)    Temp  98 2   Pulse  79   Resp 20 (01/17/19 1142)    SpO2   97

## 2019-01-22 ENCOUNTER — TELEPHONE (OUTPATIENT)
Dept: SURGERY | Facility: CLINIC | Age: 64
End: 2019-01-22

## 2019-01-22 NOTE — TELEPHONE ENCOUNTER
Called and spoke to Pt  In regards to post op health  She states that she is doing well, but just today started to have regular B M's    Blayne Ahumada to refer to her Post Op discharge sheet, and call this office for concerns    Reminded about follow up apppointment on January 30, 2019 at 4:15 pm

## 2019-01-27 ENCOUNTER — TELEPHONE (OUTPATIENT)
Dept: OTHER | Facility: OTHER | Age: 64
End: 2019-01-27

## 2019-01-27 ENCOUNTER — HOSPITAL ENCOUNTER (INPATIENT)
Facility: HOSPITAL | Age: 64
LOS: 3 days | Discharge: HOME | DRG: 390 | End: 2019-01-31
Attending: EMERGENCY MEDICINE | Admitting: SURGERY
Payer: COMMERCIAL

## 2019-01-27 ENCOUNTER — APPOINTMENT (EMERGENCY)
Dept: CT IMAGING | Facility: HOSPITAL | Age: 64
DRG: 390 | End: 2019-01-27
Payer: COMMERCIAL

## 2019-01-27 DIAGNOSIS — E87.0 HYPERNATREMIA: ICD-10-CM

## 2019-01-27 DIAGNOSIS — K56.609 SMALL BOWEL OBSTRUCTION (HCC): Primary | ICD-10-CM

## 2019-01-27 DIAGNOSIS — K04.7 TOOTH INFECTION: ICD-10-CM

## 2019-01-27 PROCEDURE — 99285 EMERGENCY DEPT VISIT HI MDM: CPT

## 2019-01-27 PROCEDURE — 96375 TX/PRO/DX INJ NEW DRUG ADDON: CPT

## 2019-01-27 PROCEDURE — 96374 THER/PROPH/DIAG INJ IV PUSH: CPT

## 2019-01-27 PROCEDURE — 93005 ELECTROCARDIOGRAM TRACING: CPT

## 2019-01-27 PROCEDURE — 96372 THER/PROPH/DIAG INJ SC/IM: CPT

## 2019-01-27 PROCEDURE — 36415 COLL VENOUS BLD VENIPUNCTURE: CPT | Performed by: EMERGENCY MEDICINE

## 2019-01-27 PROCEDURE — 85025 COMPLETE CBC W/AUTO DIFF WBC: CPT | Performed by: EMERGENCY MEDICINE

## 2019-01-27 RX ORDER — ACETAMINOPHEN 325 MG/1
975 TABLET ORAL ONCE
Status: COMPLETED | OUTPATIENT
Start: 2019-01-27 | End: 2019-01-27

## 2019-01-27 RX ORDER — ONDANSETRON 2 MG/ML
4 INJECTION INTRAMUSCULAR; INTRAVENOUS ONCE
Status: COMPLETED | OUTPATIENT
Start: 2019-01-27 | End: 2019-01-27

## 2019-01-27 RX ORDER — OLANZAPINE 10 MG/1
5 INJECTION, POWDER, LYOPHILIZED, FOR SOLUTION INTRAMUSCULAR ONCE
Status: COMPLETED | OUTPATIENT
Start: 2019-01-27 | End: 2019-01-27

## 2019-01-27 RX ORDER — HYDROMORPHONE HCL/PF 1 MG/ML
0.5 SYRINGE (ML) INJECTION ONCE
Status: COMPLETED | OUTPATIENT
Start: 2019-01-27 | End: 2019-01-27

## 2019-01-27 RX ADMIN — HYDROMORPHONE HYDROCHLORIDE 0.5 MG: 1 INJECTION, SOLUTION INTRAMUSCULAR; INTRAVENOUS; SUBCUTANEOUS at 23:21

## 2019-01-27 RX ADMIN — ACETAMINOPHEN 975 MG: 325 TABLET ORAL at 23:21

## 2019-01-27 RX ADMIN — WATER 2.1 ML: 1 INJECTION INTRAMUSCULAR; INTRAVENOUS; SUBCUTANEOUS at 23:22

## 2019-01-27 RX ADMIN — OLANZAPINE 5 MG: 10 INJECTION, POWDER, FOR SOLUTION INTRAMUSCULAR at 23:21

## 2019-01-27 RX ADMIN — ONDANSETRON 4 MG: 2 INJECTION INTRAMUSCULAR; INTRAVENOUS at 23:38

## 2019-01-28 ENCOUNTER — APPOINTMENT (INPATIENT)
Dept: RADIOLOGY | Facility: HOSPITAL | Age: 64
DRG: 390 | End: 2019-01-28
Payer: COMMERCIAL

## 2019-01-28 ENCOUNTER — APPOINTMENT (EMERGENCY)
Dept: CT IMAGING | Facility: HOSPITAL | Age: 64
DRG: 390 | End: 2019-01-28
Payer: COMMERCIAL

## 2019-01-28 ENCOUNTER — APPOINTMENT (INPATIENT)
Dept: CT IMAGING | Facility: HOSPITAL | Age: 64
DRG: 390 | End: 2019-01-28
Payer: COMMERCIAL

## 2019-01-28 PROBLEM — K04.7 TOOTH INFECTION: Status: ACTIVE | Noted: 2019-01-28

## 2019-01-28 LAB
ALBUMIN SERPL BCP-MCNC: 3.4 G/DL (ref 3.5–5)
ALP SERPL-CCNC: 116 U/L (ref 46–116)
ALT SERPL W P-5'-P-CCNC: 25 U/L (ref 12–78)
ANION GAP SERPL CALCULATED.3IONS-SCNC: 12 MMOL/L (ref 4–13)
APTT PPP: 21 SECONDS (ref 26–38)
AST SERPL W P-5'-P-CCNC: 10 U/L (ref 5–45)
BACTERIA UR QL AUTO: ABNORMAL /HPF
BASOPHILS # BLD AUTO: 0.07 THOUSANDS/ΜL (ref 0–0.1)
BASOPHILS NFR BLD AUTO: 1 % (ref 0–1)
BILIRUB SERPL-MCNC: 0.8 MG/DL (ref 0.2–1)
BILIRUB UR QL STRIP: NEGATIVE
BUN SERPL-MCNC: 25 MG/DL (ref 5–25)
CALCIUM SERPL-MCNC: 9.5 MG/DL (ref 8.3–10.1)
CHLORIDE SERPL-SCNC: 107 MMOL/L (ref 100–108)
CLARITY UR: ABNORMAL
CO2 SERPL-SCNC: 23 MMOL/L (ref 21–32)
COLOR UR: YELLOW
CREAT SERPL-MCNC: 1.06 MG/DL (ref 0.6–1.3)
EOSINOPHIL # BLD AUTO: 0.42 THOUSAND/ΜL (ref 0–0.61)
EOSINOPHIL NFR BLD AUTO: 3 % (ref 0–6)
ERYTHROCYTE [DISTWIDTH] IN BLOOD BY AUTOMATED COUNT: 14.4 % (ref 11.6–15.1)
GFR SERPL CREATININE-BSD FRML MDRD: 57 ML/MIN/1.73SQ M
GLUCOSE SERPL-MCNC: 118 MG/DL (ref 65–140)
GLUCOSE SERPL-MCNC: 129 MG/DL (ref 65–140)
GLUCOSE SERPL-MCNC: 136 MG/DL (ref 65–140)
GLUCOSE SERPL-MCNC: 193 MG/DL (ref 65–140)
GLUCOSE UR STRIP-MCNC: ABNORMAL MG/DL
HCT VFR BLD AUTO: 43.2 % (ref 34.8–46.1)
HGB BLD-MCNC: 13.7 G/DL (ref 11.5–15.4)
HGB UR QL STRIP.AUTO: NEGATIVE
IMM GRANULOCYTES # BLD AUTO: 0.09 THOUSAND/UL (ref 0–0.2)
IMM GRANULOCYTES NFR BLD AUTO: 1 % (ref 0–2)
INR PPP: 0.99 (ref 0.86–1.17)
KETONES UR STRIP-MCNC: ABNORMAL MG/DL
LEUKOCYTE ESTERASE UR QL STRIP: ABNORMAL
LIPASE SERPL-CCNC: 224 U/L (ref 73–393)
LYMPHOCYTES # BLD AUTO: 1.29 THOUSANDS/ΜL (ref 0.6–4.47)
LYMPHOCYTES NFR BLD AUTO: 8 % (ref 14–44)
MCH RBC QN AUTO: 29.8 PG (ref 26.8–34.3)
MCHC RBC AUTO-ENTMCNC: 31.7 G/DL (ref 31.4–37.4)
MCV RBC AUTO: 94 FL (ref 82–98)
MONOCYTES # BLD AUTO: 0.89 THOUSAND/ΜL (ref 0.17–1.22)
MONOCYTES NFR BLD AUTO: 6 % (ref 4–12)
NEUTROPHILS # BLD AUTO: 12.76 THOUSANDS/ΜL (ref 1.85–7.62)
NEUTS SEG NFR BLD AUTO: 81 % (ref 43–75)
NITRITE UR QL STRIP: POSITIVE
NON-SQ EPI CELLS URNS QL MICRO: ABNORMAL /HPF
NRBC BLD AUTO-RTO: 0 /100 WBCS
PH UR STRIP.AUTO: 8.5 [PH] (ref 4.5–8)
PLATELET # BLD AUTO: 287 THOUSANDS/UL (ref 149–390)
PMV BLD AUTO: 9 FL (ref 8.9–12.7)
POTASSIUM SERPL-SCNC: 4.3 MMOL/L (ref 3.5–5.3)
PROT SERPL-MCNC: 6.8 G/DL (ref 6.4–8.2)
PROT UR STRIP-MCNC: NEGATIVE MG/DL
PROTHROMBIN TIME: 12.8 SECONDS (ref 11.8–14.2)
RBC # BLD AUTO: 4.59 MILLION/UL (ref 3.81–5.12)
RBC #/AREA URNS AUTO: ABNORMAL /HPF
SODIUM SERPL-SCNC: 142 MMOL/L (ref 136–145)
SP GR UR STRIP.AUTO: 1.01 (ref 1–1.03)
TRI-PHOS CRY URNS QL MICRO: ABNORMAL /HPF
UROBILINOGEN UR QL STRIP.AUTO: 0.2 E.U./DL
WBC # BLD AUTO: 15.52 THOUSAND/UL (ref 4.31–10.16)
WBC #/AREA URNS AUTO: ABNORMAL /HPF

## 2019-01-28 PROCEDURE — 96376 TX/PRO/DX INJ SAME DRUG ADON: CPT

## 2019-01-28 PROCEDURE — 36415 COLL VENOUS BLD VENIPUNCTURE: CPT | Performed by: EMERGENCY MEDICINE

## 2019-01-28 PROCEDURE — 82948 REAGENT STRIP/BLOOD GLUCOSE: CPT

## 2019-01-28 PROCEDURE — 74177 CT ABD & PELVIS W/CONTRAST: CPT

## 2019-01-28 PROCEDURE — 70487 CT MAXILLOFACIAL W/DYE: CPT

## 2019-01-28 PROCEDURE — 85610 PROTHROMBIN TIME: CPT | Performed by: EMERGENCY MEDICINE

## 2019-01-28 PROCEDURE — 83690 ASSAY OF LIPASE: CPT | Performed by: EMERGENCY MEDICINE

## 2019-01-28 PROCEDURE — 85730 THROMBOPLASTIN TIME PARTIAL: CPT | Performed by: EMERGENCY MEDICINE

## 2019-01-28 PROCEDURE — 96374 THER/PROPH/DIAG INJ IV PUSH: CPT

## 2019-01-28 PROCEDURE — 80053 COMPREHEN METABOLIC PANEL: CPT | Performed by: EMERGENCY MEDICINE

## 2019-01-28 PROCEDURE — 71045 X-RAY EXAM CHEST 1 VIEW: CPT

## 2019-01-28 PROCEDURE — 81001 URINALYSIS AUTO W/SCOPE: CPT | Performed by: EMERGENCY MEDICINE

## 2019-01-28 RX ORDER — ONDANSETRON 2 MG/ML
4 INJECTION INTRAMUSCULAR; INTRAVENOUS EVERY 4 HOURS PRN
Status: DISCONTINUED | OUTPATIENT
Start: 2019-01-28 | End: 2019-01-31 | Stop reason: HOSPADM

## 2019-01-28 RX ORDER — PANTOPRAZOLE SODIUM 40 MG/1
40 INJECTION, POWDER, FOR SOLUTION INTRAVENOUS
Status: DISCONTINUED | OUTPATIENT
Start: 2019-01-28 | End: 2019-01-28

## 2019-01-28 RX ORDER — ONDANSETRON 2 MG/ML
4 INJECTION INTRAMUSCULAR; INTRAVENOUS EVERY 4 HOURS PRN
Status: DISCONTINUED | OUTPATIENT
Start: 2019-01-28 | End: 2019-01-28

## 2019-01-28 RX ORDER — SODIUM CHLORIDE, SODIUM LACTATE, POTASSIUM CHLORIDE, CALCIUM CHLORIDE 600; 310; 30; 20 MG/100ML; MG/100ML; MG/100ML; MG/100ML
125 INJECTION, SOLUTION INTRAVENOUS CONTINUOUS
Status: DISCONTINUED | OUTPATIENT
Start: 2019-01-28 | End: 2019-01-28

## 2019-01-28 RX ORDER — CLINDAMYCIN PHOSPHATE 600 MG/50ML
600 INJECTION INTRAVENOUS EVERY 8 HOURS
Status: DISCONTINUED | OUTPATIENT
Start: 2019-01-28 | End: 2019-01-31

## 2019-01-28 RX ORDER — LORAZEPAM 2 MG/ML
0.5 INJECTION INTRAMUSCULAR ONCE
Status: COMPLETED | OUTPATIENT
Start: 2019-01-28 | End: 2019-01-28

## 2019-01-28 RX ORDER — SODIUM CHLORIDE, SODIUM LACTATE, POTASSIUM CHLORIDE, CALCIUM CHLORIDE 600; 310; 30; 20 MG/100ML; MG/100ML; MG/100ML; MG/100ML
50 INJECTION, SOLUTION INTRAVENOUS CONTINUOUS
Status: DISCONTINUED | OUTPATIENT
Start: 2019-01-28 | End: 2019-01-31

## 2019-01-28 RX ORDER — PRAMIPEXOLE DIHYDROCHLORIDE 0.25 MG/1
0.25 TABLET ORAL
Status: DISCONTINUED | OUTPATIENT
Start: 2019-01-28 | End: 2019-01-31 | Stop reason: HOSPADM

## 2019-01-28 RX ORDER — HYDROMORPHONE HCL/PF 1 MG/ML
1 SYRINGE (ML) INJECTION
Status: DISCONTINUED | OUTPATIENT
Start: 2019-01-28 | End: 2019-01-31

## 2019-01-28 RX ORDER — HYDROMORPHONE HCL/PF 1 MG/ML
0.5 SYRINGE (ML) INJECTION
Status: DISCONTINUED | OUTPATIENT
Start: 2019-01-28 | End: 2019-01-31

## 2019-01-28 RX ORDER — HYDROMORPHONE HCL/PF 1 MG/ML
0.2 SYRINGE (ML) INJECTION
Status: DISCONTINUED | OUTPATIENT
Start: 2019-01-28 | End: 2019-01-31

## 2019-01-28 RX ORDER — LIDOCAINE HYDROCHLORIDE 20 MG/ML
JELLY TOPICAL ONCE
Status: COMPLETED | OUTPATIENT
Start: 2019-01-28 | End: 2019-01-28

## 2019-01-28 RX ORDER — HYDROMORPHONE HCL/PF 1 MG/ML
0.5 SYRINGE (ML) INJECTION EVERY 6 HOURS PRN
Status: DISCONTINUED | OUTPATIENT
Start: 2019-01-28 | End: 2019-01-28

## 2019-01-28 RX ORDER — LORAZEPAM 2 MG/ML
1 INJECTION INTRAMUSCULAR ONCE
Status: COMPLETED | OUTPATIENT
Start: 2019-01-28 | End: 2019-01-28

## 2019-01-28 RX ADMIN — CLINDAMYCIN PHOSPHATE 600 MG: 600 INJECTION, SOLUTION INTRAVENOUS at 09:37

## 2019-01-28 RX ADMIN — IOHEXOL 100 ML: 350 INJECTION, SOLUTION INTRAVENOUS at 02:00

## 2019-01-28 RX ADMIN — LORAZEPAM 1 MG: 2 INJECTION INTRAMUSCULAR; INTRAVENOUS at 04:46

## 2019-01-28 RX ADMIN — LORAZEPAM 0.5 MG: 2 INJECTION INTRAMUSCULAR; INTRAVENOUS at 05:30

## 2019-01-28 RX ADMIN — LIDOCAINE HYDROCHLORIDE: 20 JELLY TOPICAL at 04:46

## 2019-01-28 RX ADMIN — HYDROMORPHONE HYDROCHLORIDE 0.2 MG: 1 INJECTION, SOLUTION INTRAMUSCULAR; INTRAVENOUS; SUBCUTANEOUS at 11:37

## 2019-01-28 RX ADMIN — HYDROMORPHONE HYDROCHLORIDE 0.5 MG: 1 INJECTION, SOLUTION INTRAMUSCULAR; INTRAVENOUS; SUBCUTANEOUS at 00:39

## 2019-01-28 RX ADMIN — ENOXAPARIN SODIUM 40 MG: 40 INJECTION SUBCUTANEOUS at 09:39

## 2019-01-28 RX ADMIN — HYDROMORPHONE HYDROCHLORIDE 0.5 MG: 1 INJECTION, SOLUTION INTRAMUSCULAR; INTRAVENOUS; SUBCUTANEOUS at 16:10

## 2019-01-28 RX ADMIN — PRAMIPEXOLE DIHYDROCHLORIDE 0.25 MG: 0.25 TABLET ORAL at 21:09

## 2019-01-28 RX ADMIN — CLINDAMYCIN PHOSPHATE 600 MG: 600 INJECTION, SOLUTION INTRAVENOUS at 16:11

## 2019-01-28 RX ADMIN — SODIUM CHLORIDE, SODIUM LACTATE, POTASSIUM CHLORIDE, AND CALCIUM CHLORIDE 125 ML/HR: .6; .31; .03; .02 INJECTION, SOLUTION INTRAVENOUS at 09:34

## 2019-01-28 RX ADMIN — TOPICAL ANESTHETIC 2 SPRAY: 200 SPRAY DENTAL; PERIODONTAL at 04:46

## 2019-01-28 RX ADMIN — HYDROMORPHONE HYDROCHLORIDE 0.5 MG: 1 INJECTION, SOLUTION INTRAMUSCULAR; INTRAVENOUS; SUBCUTANEOUS at 21:25

## 2019-01-28 RX ADMIN — IOHEXOL 85 ML: 350 INJECTION, SOLUTION INTRAVENOUS at 17:22

## 2019-01-28 NOTE — TELEPHONE ENCOUNTER
Patient states her pain is worse then before her surgery  She is on her way to [facility] at this time

## 2019-01-28 NOTE — H&P
H&P Exam - General Surgery   Elis Scott 66 y o  female MRN: [id number]  Unit/Bed#:  Encounter: [id number]    Assessment/Plan     66year old female    * Small bowel obstruction ([address] )   Assessment & Plan    - 01/17/2019 (Dr Bebe Ramirez) elective diagnostic laparoscopy with lysis of adhesions  - patient now admitted with ileus vs small bowel obstruction, presumably from adhesions that were incompletely lysed in OR  - NG tube to medium continuous suction  - check KUB for NG tube placement since patient hx of gastric sleeve  - throat spray, OK for sips of clears  - LR @ 125mL/hr  - UGI w/ SBFT tomorrow   - U/A sample taken in ED was from ileal conduit urine per patient, and it would be expected to have positive bacteria  - patient refusing PICC line at this time, will continue with PIV access     Tooth infection   Assessment & Plan    - continue clindamycin (started as oral prescription outpatient)  - consult OMFS (dental does not come to [facility]) since patient reports multiple crowns and root canals and that she has ongoing significant tooth pain with pieces of crown falling off     Diabetes [facility])   Assessment & Plan    Lab Results   Component Value Date    HGBA1C 7 5 (H) 11/02/2017       No results for input(s): POCGLU in the last 72 hours  Blood Sugar Average: Last 72 hrs:     - obtain Hgb A1c tomorrow   - insulin correction scale while NG tube in place         Hypothyroidism   Assessment & Plan    - hold home synthroid while NPO with NGT           Charbel Max MD PGY-5  8:59 AM  01/28/19      History of Present Illness     HPI:  Elis Scott is a 66 y o  female who presents with abdominal pain  The patient is a former lap band patient who had a sleeve gastrectomy performed in 2015  The patient has lost weight since that time  The patient had a radical hysterectomy for uterine cancer  She did require radiation afterwards    There was bladder urethral injury which required a cystectomy and ileal conduit creation  The patient has had multiple bowel obstructions since this time  Most recently, she was admitted to our hospital in November of last year with a bowel obstruction  She did have a transition point left lower quadrant  She was successfully decompressed with NG tube and was discharged  She returned to her bariatric surgeon and 2 weeks ago on 01/17/2019 (Dr Yordan Yañez) she had a elective diagnostic laparoscopy with lysis of adhesions, most of which would left lower quadrant per the op report  Since this time, the patient reports she has had ongoing abdominal pain which is worsened  She is now constipated and felt she had obstruction so she came to emergency department yesterday  Of note, the patient also had a tooth ache which she was evaluated for at a urgent care center was started on oral clindamycin as an outpatient  She reports that she has had multiple root canals and crowns placed in the past, and that a crown on the left side of her mouth has been falling off in pieces  Review of Systems   Constitutional: Negative for fever  HENT: Negative for sore throat  Eyes: Negative for visual disturbance  Respiratory: Negative for shortness of breath  Cardiovascular: Negative for chest pain  Gastrointestinal: Positive for abdominal pain and nausea  Negative for vomiting  Endocrine: Negative for polyuria  Genitourinary: Negative for dysuria  Musculoskeletal: Negative for arthralgias  Skin: Negative for rash  Allergic/Immunologic: Negative for environmental allergies  Neurological: Negative for dizziness  Hematological: Negative for adenopathy  Psychiatric/Behavioral: The patient is not nervous/anxious  Historical Information   Past Medical History:   Diagnosis Date    Anemia     Bowel obstruction ([facility] ) 2018    Cholelithiasis     Deep vein thrombosis of left lower extremity ([facility] ) 01/11/2004    on blood thinner for 3 years      Diabetes mellitus ([facility] ) type 2    Endometrial cancer ([facility] [address] ) 1999    GERD (gastroesophageal reflux disease)     History of transfusion 1999    History of urostomy 01/11/2004    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   Hypothyroid     In vitro fertilization     Kidney stone     Migraines     Muscle weakness     abdominal    Sleep apnea     in past prior to weight loss     Past Surgical History:   Procedure Laterality Date    COLONOSCOPY      COLONOSCOPY W/ BIOPSIES N/A 12/2015    HEEL SPUR SURGERY Right 1996    HYSTERECTOMY      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    LAPAROSCOPIC GASTRIC BANDING N/A 2006    Latoya Mckeon    NV LAP, SURG ENTEROLYSIS N/A 1/17/2019    Procedure: LAPAROSCOPIC LYSIS OF ADHESIONS;  Surgeon: Christina Rich MD;  Location: [facility] MAIN OR;  Service: General    RADICAL HYSTERECTOMY N/A [address] [place] [address] endometrial cancer      SLEEVE GASTROPLASTY N/A 09/2015    Dr Argelia Erickson, [address]       Social History   History   Alcohol Use    Yes     Comment: socially     History   Drug Use No     History   Smoking Status    Former Smoker    Packs/day: 1 50    Years: 10 00    Types: Cigarettes    Quit date: 1/11/1986   Smokeless Tobacco    Former User     Family History: non-contributory    Meds/Allergies   all medications and allergies reviewed  Allergies   Allergen Reactions    Amoxicillin Rash    Penicillins Rash       Objective   First Vitals:   Blood Pressure: (!) 179/101 (01/27/19 2247)  Pulse: 87 (01/27/19 2247)  Temperature: 97 9 °F (36 6 °C) (01/27/19 2247)  Temp Source: Oral (01/27/19 2247)  Respirations: 22 (01/27/19 2247)  Height: 5' 6" (167 6 cm) (01/28/19 0604)  Weight - Scale: 101 kg (221 lb 9 oz) (01/27/19 2248)  SpO2: 99 % (01/27/19 2247)    Current Vitals:   Blood Pressure: 157/76 (01/28/19 0700)  Pulse: 97 (01/28/19 0700)  Temperature: 98 4 °F (36 9 °C) (01/28/19 0700)  Temp Source: Oral (01/28/19 0700)  Respirations: 20 (01/28/19 0700)  Height: 5' 6" (167 6 cm) (01/28/19 0604)  Weight - Scale: 101 kg (222 lb 7 1 oz) (01/28/19 0604)  SpO2: 97 % (01/28/19 0700)      Intake/Output Summary (Last 24 hours) at 01/28/19 0859  Last data filed at 01/28/19 0601   Gross per 24 hour   Intake                0 ml   Output              375 ml   Net             -375 ml       Invasive Devices     Peripheral Intravenous Line            Peripheral IV 01/28/19 Left Arm less than 1 day          Drain            Urostomy Ureterostomy right RUQ -- days    NG/OG/Enteral Tube 18 Fr Left nares less than 1 day                Physical Exam   Constitutional: She is oriented to person, place, and time  She appears well-developed and well-nourished  HENT:   Head: Normocephalic and atraumatic  Tenderness along left mandible   Eyes: Pupils are equal, round, and reactive to light  Neck: Normal range of motion  No tracheal deviation present  Cardiovascular: Normal rate and regular rhythm  Pulmonary/Chest: Effort normal  She has no wheezes  Abdominal: Soft  She exhibits no distension  There is tenderness (midepigastric)  There is no guarding  Reducible hernia right abdomen  Ileal conduit stoma on right abdomen pink w/ light-yellow urine in bag  Incisions c/d/i   Musculoskeletal: Normal range of motion  She exhibits no edema  Neurological: She is alert and oriented to person, place, and time  No cranial nerve deficit  Skin: Skin is warm  No rash noted  Psychiatric: She has a normal mood and affect  Vitals reviewed        Lab Results:   CBC:   Lab Results   Component Value Date    WBC 15 52 (H) 01/27/2019    HGB 13 7 01/27/2019    HCT 43 2 01/27/2019    MCV 94 01/27/2019     01/27/2019    MCH 29 8 01/27/2019    MCHC 31 7 01/27/2019    RDW 14 4 01/27/2019    MPV 9 0 01/27/2019    NRBC 0 01/27/2019   , CMP:   Lab Results   Component Value Date    SODIUM 142 01/28/2019    K 4 3 01/28/2019     01/28/2019 CO2 23 01/28/2019    BUN 25 01/28/2019    CREATININE 1 06 01/28/2019    CALCIUM 9 5 01/28/2019    AST 10 01/28/2019    ALT 25 01/28/2019    ALKPHOS 116 01/28/2019    EGFR 57 01/28/2019   , Coagulation:   Lab Results   Component Value Date    INR 0 99 01/28/2019   , Urinalysis:   Lab Results   Component Value Date    COLORU Yellow 01/28/2019    CLARITYU Slightly Cloudy 01/28/2019    SPECGRAV 1 010 01/28/2019    PHUR 8 5 (H) 01/28/2019    LEUKOCYTESUR (A) 01/28/2019     Elevated glucose may cause decreased leukocyte values  See urine microscopic for [facility] result/    NITRITE Positive (A) 01/28/2019    GLUCOSEU >=1000 (1%) (A) 01/28/2019    KETONESU Trace (A) 01/28/2019    BILIRUBINUR Negative 01/28/2019    BLOODU Negative 01/28/2019   , Amylase: No results found for: AMYLASE, Lipase:   Lab Results   Component Value Date    LIPASE 224 01/28/2019     Imaging: I have personally reviewed pertinent films in PACS     CT abdomen pelvis with contrast   Final Result by Carollee Olszewski, DO (01/28 0690)      Multiple mildly dilated loops of small bowel are seen extending to the left midabdomen  There is a transition to essentially collapsed small bowel loops in the left mid/lower abdomen, the distal colon is essentially collapsed  Findings taken together    are suspicious for developing small bowel obstruction in the appropriate clinical setting  Cholelithiasis without discrete evidence of acute cholecystitis, colonic diverticulosis, left-sided nephrolithiasis, and other findings as above        Findings are consistent with the preliminary report from [facility] which was provided shortly after completion of the exam                Workstation performed: [id number]         FL UGI/sm bowel    (Results Pending)   XR chest portable    (Results Pending)     EKG, Pathology, and Other Studies: I have personally reviewed pertinent films in PACS         Code Status: Level 1 - Full Code  Advance Directive and Living Will: Power of :    POLST:      Counseling / Coordination of Care  Total floor / unit time spent today 30 minutes  Greater than 50% of total time was spent with the patient and / or family counseling and / or coordination of care  A description of the counseling / coordination of care: plan of care

## 2019-01-28 NOTE — ASSESSMENT & PLAN NOTE
- 01/17/2019 (Dr Светлана Aleajndre) elective diagnostic laparoscopy with lysis of adhesions  - patient now admitted with ileus vs small bowel obstruction, presumably from adhesions that were incompletely lysed in OR  - ileus/SBO has resolved  - recommend soft diet for two weeks  - patient will f/u with Dr Светлана Alejandre  - d/c home today

## 2019-01-28 NOTE — ED NOTES
Called  to give report, receiving nurse not able to take report @ the moment, will call back for report in 5 mns       Luna Mitchell, RN  01/28/19 3868

## 2019-01-28 NOTE — PROGRESS NOTES
Oral and Maxillofacial Surgery Note:    67 y/o F admitted to [facility] due to abdominal pain  Per report she also has dental pain and "crumbling crown "  She was seen at an urgent care and given Clindamycin  [address] was consulted but unable to evaluate at [facility]  OMS consulted  Please obtain CT Facial Bones with contrast to evaluate for abscess  If chronic caries and no facial swelling may have extraction as outpatient  If tooth is restorable, patient will need to establish care with a General Dentist for restorative treatment      Brijesh Barr DDS

## 2019-01-28 NOTE — ED PROVIDER NOTES
History  Chief Complaint   Patient presents with    Abdominal Pain     Pt presents to ED for evaluation and treatment of abdominal pain which began a few hours ago  Recent abdominal surgery with lysis of adhesions  HPI      Patient is a 56-year-old female that reports to the emergency department with abdominal pain, achy, severe  She reports a history of prior episodes of abdominal pain, similar to this  She has a history of abdominal surgery with lysis of adhesions  No chest pain  No shortness of breath  No dysuria, hematuria, vaginal discharge, itching, burning  On exam, she does have abdominal tenderness  Medical decision makin-year-old female, will workup for acute intra-abdominal process, obstruction, diverticulitis, volvulus  MDM well appearing 66 yof, will admit for small bowel obstruction  Dicussed plan with surgery  Prior to Admission Medications   Prescriptions Last Dose Informant Patient Reported? Taking?    ALPRAZolam (XANAX) 0 5 mg tablet  Self Yes Yes   Sig: Take 0 5 mg by mouth daily at bedtime as needed     JARDIANCE 25 MG TABS  Self Yes Yes   Sig: Take 25 mg by mouth daily   SYNTHROID 75 MCG tablet   Yes Yes   Sig: Take 75 mcg by mouth daily in the early morning     clindamycin (CLEOCIN) 150 mg capsule   Yes Yes   Sig: Take 300 mg by mouth every 8 (eight) hours   ibuprofen (MOTRIN) 800 mg tablet   Yes Yes   Sig: TAKE 1 TABLET BY MOUTH EVERY 8 HOURS WITH FOOD AS NEEDED FOR PAIN   metFORMIN (GLUCOPHAGE) 1000 MG tablet  Self Yes Yes   Sig: Take 500 mg by mouth 2 (two) times a day with meals     oxyCODONE-acetaminophen (PERCOCET) 5-325 mg per tablet   No Yes   Sig: Take 1 tablet by mouth every 4 (four) hours as needed for severe pain for up to 10 days Max Daily Amount: 6 tablets   pramipexole (MIRAPEX) 0 25 mg tablet  Self Yes Yes   Sig: Take 0 25 mg by mouth daily at bedtime      Facility-Administered Medications: None       Past Medical History:   Diagnosis Date  Anemia     Bowel obstruction ([facility] ) 2018    Cholelithiasis     Deep vein thrombosis of left lower extremity ([facility] ) 01/11/2004    on blood thinner for 3 years   Diabetes mellitus ([facility] )     type 2    Endometrial cancer ([facility] ) 1999    GERD (gastroesophageal reflux disease)     History of transfusion 1999    History of urostomy 01/11/2004    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   Hypothyroid     In vitro fertilization     Kidney stone     Migraines     Muscle weakness     abdominal    Sleep apnea     in past prior to weight loss       Past Surgical History:   Procedure Laterality Date    COLONOSCOPY      COLONOSCOPY W/ BIOPSIES N/A 12/2015    HEEL SPUR SURGERY Right 1996    HYSTERECTOMY      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    LAPAROSCOPIC GASTRIC BANDING N/A 2006    [facility] LAP, SURG ENTEROLYSIS N/A 1/17/2019    Procedure: LAPAROSCOPIC LYSIS OF ADHESIONS;  Surgeon: Ernie Kendrick MD;  Location: [address] [place] [address];  Service: General    RADICAL HYSTERECTOMY N/A [address] [facility] endometrial cancer   SLEEVE GASTROPLASTY N/A 09/2015    Dr Maira Peterson, [facility]    [facility]         Family History   Problem Relation Age of Onset    No Known Problems Mother     Kidney failure Father     Brain cancer Father     Diabetes Sister     Alcohol abuse Sister     Diabetes Brother      I have reviewed and agree with the history as documented  Social History   Substance Use Topics    Smoking status: Former Smoker     Packs/day: 1 50     Years: 10 00     Types: Cigarettes     Quit date: 1/11/1986    Smokeless tobacco: Former User    Alcohol use Yes      Comment: socially        Review of Systems   Gastrointestinal: Positive for abdominal pain  All other systems reviewed and are negative  Physical Exam  Physical Exam   Constitutional: She is oriented to person, place, and time   She appears well-developed and well-nourished  HENT:   Head: Normocephalic and atraumatic  Right Ear: External ear normal    Left Ear: External ear normal    Eyes: Conjunctivae and EOM are normal    Neck: Normal range of motion  Neck supple  No JVD present  No tracheal deviation present  Cardiovascular: Normal rate, regular rhythm and normal heart sounds  Pulmonary/Chest: Effort normal  No respiratory distress  She has no wheezes  She has no rales  Abdominal: Soft  Bowel sounds are normal  There is tenderness  There is no rebound and no guarding  Musculoskeletal: She exhibits no edema or tenderness  Neurological: She is alert and oriented to person, place, and time  Skin: Skin is warm and dry  No rash noted  No erythema  Psychiatric: She has a normal mood and affect  Thought content normal    Nursing note and vitals reviewed        Vital Signs  ED Triage Vitals [01/27/19 2247]   Temperature Pulse Respirations Blood Pressure SpO2   97 9 °F (36 6 °C) 87 22 (!) 179/101 99 %      Temp Source Heart Rate Source Patient Position - Orthostatic VS BP Location FiO2 (%)   Oral Monitor Lying Right arm --      Pain Score       Worst Possible Pain           Vitals:    01/28/19 0245 01/28/19 0246 01/28/19 0512 01/28/19 0604   BP:  (!) 171/79 141/91 144/78   Pulse: 104 (!) 106 (!) 109 98   Patient Position - Orthostatic VS:  Lying Sitting Lying       Visual Acuity      ED Medications  Medications   HYDROmorphone (DILAUDID) injection 0 5 mg (0 5 mg Intravenous Given 1/28/19 0039)   lactated ringers infusion (not administered)   ondansetron (ZOFRAN) injection 4 mg (not administered)   enoxaparin (LOVENOX) subcutaneous injection 40 mg (not administered)   pantoprazole (PROTONIX) injection 40 mg (not administered)   phenol (CHLORASEPTIC) 1 4 % mucosal liquid 1 spray (not administered)   OLANZapine (ZyPREXA) IM injection 5 mg (5 mg Intramuscular Given 1/27/19 2321)   acetaminophen (TYLENOL) tablet 975 mg (975 mg Oral Given 1/27/19 2321)   HYDROmorphone (DILAUDID) injection 0 5 mg (0 5 mg Intravenous Given 1/27/19 2321)   sterile water injection **ADS Override Pull** (2 1 mL  Given 1/27/19 2322)   ondansetron (ZOFRAN) injection 4 mg (4 mg Intravenous Given 1/27/19 2338)   iohexol (OMNIPAQUE) 350 MG/ML injection (MULTI-DOSE) 100 mL (100 mL Intravenous Given 1/28/19 0200)   lidocaine (URO-JET) 2 % topical gel ( Topical Given 1/28/19 0446)   benzocaine (HURRICAINE) 20 % mucosal spray 2 spray (2 sprays Mucosal Given 1/28/19 0446)   LORazepam (ATIVAN) 2 mg/mL injection 1 mg (1 mg Intravenous Given 1/28/19 0446)   LORazepam (ATIVAN) 2 mg/mL injection 0 5 mg (0 5 mg Intravenous Given 1/28/19 0530)       Diagnostic Studies  Results Reviewed     Procedure Component Value Units Date/Time    Urine Microscopic [[id number]]  (Abnormal) Collected:  01/28/19 0340    Lab Status:  Final result Specimen:  Urine Updated:  01/28/19 0343     RBC, UA None Seen /hpf      WBC, UA 4-10 (A) /hpf      Epithelial Cells None Seen /hpf      Bacteria, UA Innumerable (A) /hpf      Triplep Phos Deneen, UA Occasional /hpf     UA w Reflex to Microscopic w Reflex to Culture [[id number]]  (Abnormal) Collected:  01/28/19 0340    Lab Status:  Final result Specimen:  Urine Updated:  01/28/19 0341     Color, UA Yellow     Clarity, UA Slightly Cloudy     Specific [place], UA 1 010     pH, UA 8 5 (H)     Leukocytes, UA Elevated glucose may cause decreased leukocyte values   See urine microscopic for [facility] result/ (A)     Nitrite, UA Positive (A)     Protein, UA Negative mg/dl      Glucose, UA >=1000 (1%) (A) mg/dl      Ketones, UA Trace (A) mg/dl      Urobilinogen, UA 0 2 E U /dl      Bilirubin, UA Negative     Blood, UA Negative    Comprehensive metabolic panel [[id number]]  (Abnormal) Collected:  01/28/19 0039    Lab Status:  Final result Specimen:  Blood from Arm, Left Updated:  01/28/19 0106     Sodium 142 mmol/L      Potassium 4 3 mmol/L      Chloride 107 mmol/L      CO2 23 mmol/L ANION GAP 12 mmol/L      BUN 25 mg/dL      Creatinine 1 06 mg/dL      Glucose 193 (H) mg/dL      Calcium 9 5 mg/dL      AST 10 U/L      ALT 25 U/L      Alkaline Phosphatase 116 U/L      Total Protein 6 8 g/dL      Albumin 3 4 (L) g/dL      Total Bilirubin 0 80 mg/dL      eGFR 57 ml/min/1 73sq m     Narrative:         National Kidney Disease Education Program recommendations are as follows:  GFR calculation is accurate only with a steady state creatinine  Chronic Kidney disease less than 60 ml/min/1 73 sq  meters  Kidney failure less than 15 ml/min/1 73 sq  meters      Lipase [[id number]]  (Normal) Collected:  01/28/19 0039    Lab Status:  Final result Specimen:  Blood from Arm, Left Updated:  01/28/19 0106     Lipase 224 u/L     APTT [[id number]]  (Abnormal) Collected:  01/28/19 0039    Lab Status:  Final result Specimen:  Blood from Arm, Left Updated:  01/28/19 0103     PTT 21 (L) seconds     Protime-INR [[id number]]  (Normal) Collected:  01/28/19 0039    Lab Status:  Final result Specimen:  Blood from Arm, Left Updated:  01/28/19 0103     Protime 12 8 seconds      INR 0 99    CBC and differential [[id number]]  (Abnormal) Collected:  01/27/19 2349    Lab Status:  Final result Specimen:  Blood from Arm, Left Updated:  01/28/19 0006     WBC 15 52 (H) Thousand/uL      RBC 4 59 Million/uL      Hemoglobin 13 7 g/dL      Hematocrit 43 2 %      MCV 94 fL      MCH 29 8 pg      MCHC 31 7 g/dL      RDW 14 4 %      MPV 9 0 fL      Platelets 931 Thousands/uL      nRBC 0 /100 WBCs      Neutrophils Relative 81 (H) %      Immat GRANS % 1 %      Lymphocytes Relative 8 (L) %      Monocytes Relative 6 %      Eosinophils Relative 3 %      Basophils Relative 1 %      Neutrophils Absolute 12 76 (H) Thousands/µL      Immature Grans Absolute 0 09 Thousand/uL      Lymphocytes Absolute 1 29 Thousands/µL      Monocytes Absolute 0 89 Thousand/µL      Eosinophils Absolute 0 42 Thousand/µL      Basophils Absolute 0 07 Thousands/µL CT abdomen pelvis with contrast    (Results Pending)              Procedures  Procedures       Phone Contacts  ED Phone Contact    ED Course  ED Course as of Jan 28 0638   Mon Jan 28, 2019   5723 MPRESSION:  Small bowel loops are dilated measuring up to 4 1 cm with a collapsed appearance of distal loops  Findings may reflect a small bowel obstruction and clinical correlation is recommended  Consider  effusions are likely etiology  6047 Dr Fernanda Mayberry is patients surgeon                                MDM  CritCare Time    Disposition  Final diagnoses:   Small bowel obstruction ([address] )     Time reflects when diagnosis was documented in both MDM as applicable and the Disposition within this note     Time User Action Codes Description Comment    1/28/2019  4:47 AM Wellington Avalos Add [K56 609] Small bowel obstruction [facility])       ED Disposition     ED Disposition Condition Comment    Admit  Case was discussed with ivonne and the patient's admission status was agreed to be Admission Status: inpatient status to the service of Dr Carter Purchase           Follow-up Information    None         Current Discharge Medication List      CONTINUE these medications which have NOT CHANGED    Details   ALPRAZolam (XANAX) 0 5 mg tablet Take 0 5 mg by mouth daily at bedtime as needed    Refills: 2      clindamycin (CLEOCIN) 150 mg capsule Take 300 mg by mouth every 8 (eight) hours      ibuprofen (MOTRIN) 800 mg tablet TAKE 1 TABLET BY MOUTH EVERY 8 HOURS WITH FOOD AS NEEDED FOR PAIN  Refills: 1      JARDIANCE 25 MG TABS Take 25 mg by mouth daily  Refills: 4      metFORMIN (GLUCOPHAGE) 1000 MG tablet Take 500 mg by mouth 2 (two) times a day with meals        oxyCODONE-acetaminophen (PERCOCET) 5-325 mg per tablet Take 1 tablet by mouth every 4 (four) hours as needed for severe pain for up to 10 days Max Daily Amount: 6 tablets  Qty: 20 tablet, Refills: 0    Associated Diagnoses: Abdominal adhesions      pramipexole (MIRAPEX) 0 25 mg tablet Take 0 25 mg by mouth daily at bedtime      SYNTHROID 75 MCG tablet Take 75 mcg by mouth daily in the early morning             No discharge procedures on file      ED Provider  Electronically Signed by           Vassie Opitz, MD  01/28/19 3046

## 2019-01-28 NOTE — UTILIZATION REVIEW
Network Utilization Review Department  Phone: [phone]; Fax [phone]  [email or web address]  ATTENTION: Please call with any questions or concerns to [phone]  and carefully listen to the prompts so that you are directed to the right person  Send all requests for admission clinical reviews, approved or denied determinations and any other requests to fax [phone]  All voicemails are confidential   Initial Clinical Review    Admission: Date/Time/Statement:  Inpatient 1/28/19 @ 0448   Orders Placed This Encounter   Procedures    Inpatient Admission (expected length of stay for this patient is greater than two midnights)     Standing Status:   Standing     Number of Occurrences:   1     Order Specific Question:   Admitting Physician     Answer:   Purvi Hoang [[id number]]     Order Specific Question:   Level of Care     Answer:   Med Surg [[id number]]     Order Specific Question:   Estimated length of stay     Answer:   More than 2 Midnights     Order Specific Question:   Certification     Answer:   I certify that inpatient services are medically necessary for this patient for a duration of greater than two midnights  See H&P and MD Progress Notes for additional information about the patient's course of treatment  ED: Date/Time/Mode of Arrival:   ED Arrival Information     Expected Arrival Acuity Means of Arrival Escorted By Service Admission Type    - 1/27/2019 22:36 Emergent Walk-In Family Member Surgery-General Emergency    Arrival Complaint    ABD PAIN        Chief Complaint:   Chief Complaint   Patient presents with    Abdominal Pain     Pt presents to ED for evaluation and treatment of abdominal pain which began a few hours ago  Recent abdominal surgery with lysis of adhesions  History of Illness: 66 y o  Female presents with worsening achy, severe abdominal pain & subjective constipation   PMHx of gastric sleeve ( Lap band) in 2015, total KENYATTA for uterine cancer requiring a cystectomy and ileal conduit creation  The patient has experienced multiple bowel obstructions since this time  She has returned to her Bariatric surgeon 2 weeks ago & on 1/17/19 had an elective diagnostic laparoscopy with lysis of adhesions, most in left lower quadrant  She incidentally reports being on Oral Clindamycin for a tooth ache (on left side of her mouth she reports a crown falling into pieces)  ED Vital Signs:   ED Triage Vitals [01/27/19 2247]   Temperature Pulse Respirations Blood Pressure SpO2   97 9 °F (36 6 °C) 87 22 (!) 179/101 99 %      Temp Source Heart Rate Source Patient Position - Orthostatic VS BP Location FiO2 (%)   Oral Monitor Lying Right arm --      Pain Score       Worst Possible Pain        Wt Readings from Last 1 Encounters:   01/28/19 101 kg (222 lb 7 1 oz)     Vital Signs (abnormal):   01/28/19 0512  109 141/91   01/28/19 0246  106  171/79   01/28/19 0200 100  171/79   01/27/19 2247 87  179/101         Pertinent Labs/Diagnostic Test Results: 1/27 glucose 193, albumi 3 4, wbc 15 52, ANC 12 76, PTT 21;    1/28/2019 urinalysis:   Color, UA Yellow    Clarity, UA Slightly Cloudy    Specific [place], UA 1 010    pH, UA 8 5     Leukocytes, UA Elevated glucose may cause decreased leukocyt         Nitrite, UA Positive     Protein, UA Negative    Glucose, UA >=1000 (1%)     Ketones, UA Trace     Bacteria innumerable;  CT abdomen/pelvis:  Multiple mildly dilated loops of small bowel are seen extending to the left midabdomen  Cassandria Cantu is a transition to essentially collapsed small bowel loops in the left mid/lower abdomen, the distal colon is essentially collapsed   Findings taken together   are suspicious for developing small bowel obstruction in the appropriate clinical setting    ED Treatment:   Medication Administration from 01/27/2019 2236 to 01/28/2019 0604       Date/Time Order Dose Route Action     01/27/2019 2321 OLANZapine (ZyPREXA) IM injection 5 mg 5 mg Intramuscular Given 01/27/2019 2321 acetaminophen (TYLENOL) tablet 975 mg 975 mg Oral Given     01/27/2019 2321 HYDROmorphone (DILAUDID) injection 0 5 mg 0 5 mg Intravenous Given     01/27/2019 2322 sterile water injection **ADS Override Pull** 2 1 mL  Given     01/27/2019 2338 ondansetron (ZOFRAN) injection 4 mg 4 mg Intravenous Given     01/28/2019 0039 HYDROmorphone (DILAUDID) injection 0 5 mg 0 5 mg Intravenous Given     01/28/2019 0446 lidocaine (URO-JET) 2 % topical gel   Topical Given     01/28/2019 0446 benzocaine (HURRICAINE) 20 % mucosal spray 2 spray 2 spray Mucosal Given     01/28/2019 0446 LORazepam (ATIVAN) 2 mg/mL injection 1 mg 1 mg Intravenous Given     01/28/2019 0530 LORazepam (ATIVAN) 2 mg/mL injection 0 5 mg 0 5 mg Intravenous Given        Past Medical/Surgical History:    Active Ambulatory Problems     Diagnosis Date Noted    Small bowel obstruction ([facility] ) 01/11/2016    Diabetes ([facility] ) 01/11/2016    History of urostomy 01/11/2016    Endometrial cancer ([facility] ) 01/11/2016    Hypothyroidism 01/11/2016    In vitro fertilization     Sepsis ([facility] ) 11/01/2017    Pyelonephritis 11/01/2017    Anemia 11/01/2017    Hydroureteronephrosis 11/02/2017    HTN (hypertension) 11/04/2017    Migraine 11/04/2017    Abdominal adhesions 01/02/2019       Past Medical History:   Diagnosis Date    Anemia     Bowel obstruction ([facility] ) 2018    Cholelithiasis     Deep vein thrombosis of left lower extremity ([facility] ) 01/11/2004    Diabetes mellitus ([facility] )     Endometrial cancer ([facility] ) 1999    GERD (gastroesophageal reflux disease)     History of transfusion 1999    History of urostomy 01/11/2004    Hypothyroid     In vitro fertilization     Kidney stone     Migraines     Muscle weakness     Sleep apnea      Admitting Diagnosis: Small bowel obstruction ([facility] ) [K56 609]  Abdominal pain [R10 9]     Age/Sex: 66 y o  female     Assessment/Plan: 1/28/2019 General surgery:  66year old female         * Small bowel obstruction ([facility] ) Assessment & Plan     - 01/17/2019 (Dr Fadumo Mendoaz) elective diagnostic laparoscopy with lysis of adhesions  - patient now admitted with ileus vs small bowel obstruction, presumably from adhesions that were incompletely lysed in OR  - NG tube to medium continuous suction  - check KUB for NG tube placement since patient hx of gastric sleeve  - throat spray, OK for sips of clears  - LR @ 125mL/hr  - UGI w/ SBFT tomorrow   - U/A sample taken in ED was from ileal conduit urine per patient, and it would be expected to have positive bacteria  - patient refusing PICC line at this time, will continue with PIV access      Tooth infection   Assessment & Plan     - continue clindamycin (started as oral prescription outpatient)  - consult OMFS (dental does not come to [facility]) since patient reports multiple crowns and root canals and that she has ongoing significant tooth pain with pieces of crown falling off      Diabetes [facility])   Assessment & Plan             Lab Results   Component Value Date     HGBA1C 7 5 (H) 11/02/2017         No results for input(s): POCGLU in the last 72 hours      Blood Sugar Average: Last 72 hrs:     - obtain Hgb A1c tomorrow   - insulin correction scale while NG tube in place          Admission Orders:  Peripheral IV  Cardio-pulmonary monitoring  NPO  NGT to lo suction  FL UGI sm bowel  XR chest  I/O  Vital routine  Inpatient to Oral and Maxillofacial surgery  Cough & deep breath  scd      Scheduled Meds:   Current Facility-Administered Medications:  clindamycin 600 mg Intravenous Q8H Laxmi Storm MD Last Rate: 600 mg (01/28/19 0937)   enoxaparin 40 mg Subcutaneous Daily Chi Mitchell DO    HYDROmorphone 0 2 mg Intravenous Q3H PRN Laxmi Storm MD    HYDROmorphone 0 5 mg Intravenous Q3H PRN Laxmi Storm MD    HYDROmorphone 1 mg Intravenous Q3H PRN Laxmi Storm MD    insulin lispro 1-6 Units Subcutaneous Q6H [address] Laxmi Storm MD    lactated ringers 125 mL/hr Intravenous Continuous Kelsey Funes MD Last Rate: 125 mL/hr (01/28/19 0934)   ondansetron 4 mg Intravenous Q4H PRN Kelsey Funes MD    phenol 1 spray Mouth/Throat Q2H PRN Kelsey Funes MD      Continuous Infusions:   lactated ringers 125 mL/hr Last Rate: 125 mL/hr (01/28/19 0934)

## 2019-01-28 NOTE — ASSESSMENT & PLAN NOTE
Lab Results   Component Value Date    HGBA1C 7 5 (H) 11/02/2017       No results for input(s): POCGLU in the last 72 hours      Blood Sugar Average: Last 72 hrs:     - obtain Hgb A1c 7 1 at admission  - restart home diabetes meds

## 2019-01-29 ENCOUNTER — APPOINTMENT (INPATIENT)
Dept: RADIOLOGY | Facility: HOSPITAL | Age: 64
DRG: 390 | End: 2019-01-29
Payer: COMMERCIAL

## 2019-01-29 LAB
ANION GAP SERPL CALCULATED.3IONS-SCNC: 7 MMOL/L (ref 4–13)
ATRIAL RATE: 70 BPM
BASOPHILS # BLD AUTO: 0.03 THOUSANDS/ΜL (ref 0–0.1)
BASOPHILS NFR BLD AUTO: 0 % (ref 0–1)
BUN SERPL-MCNC: 21 MG/DL (ref 5–25)
CALCIUM SERPL-MCNC: 9.1 MG/DL (ref 8.3–10.1)
CHLORIDE SERPL-SCNC: 109 MMOL/L (ref 100–108)
CO2 SERPL-SCNC: 27 MMOL/L (ref 21–32)
CREAT SERPL-MCNC: 0.84 MG/DL (ref 0.6–1.3)
EOSINOPHIL # BLD AUTO: 0.38 THOUSAND/ΜL (ref 0–0.61)
EOSINOPHIL NFR BLD AUTO: 5 % (ref 0–6)
ERYTHROCYTE [DISTWIDTH] IN BLOOD BY AUTOMATED COUNT: 14.6 % (ref 11.6–15.1)
EST. AVERAGE GLUCOSE BLD GHB EST-MCNC: 157 MG/DL
GFR SERPL CREATININE-BSD FRML MDRD: 75 ML/MIN/1.73SQ M
GLUCOSE SERPL-MCNC: 102 MG/DL (ref 65–140)
GLUCOSE SERPL-MCNC: 120 MG/DL (ref 65–140)
GLUCOSE SERPL-MCNC: 129 MG/DL (ref 65–140)
GLUCOSE SERPL-MCNC: 129 MG/DL (ref 65–140)
GLUCOSE SERPL-MCNC: 239 MG/DL (ref 65–140)
HBA1C MFR BLD: 7.1 % (ref 4.2–6.3)
HCT VFR BLD AUTO: 39 % (ref 34.8–46.1)
HGB BLD-MCNC: 12.2 G/DL (ref 11.5–15.4)
IMM GRANULOCYTES # BLD AUTO: 0.01 THOUSAND/UL (ref 0–0.2)
IMM GRANULOCYTES NFR BLD AUTO: 0 % (ref 0–2)
LYMPHOCYTES # BLD AUTO: 1.17 THOUSANDS/ΜL (ref 0.6–4.47)
LYMPHOCYTES NFR BLD AUTO: 15 % (ref 14–44)
MAGNESIUM SERPL-MCNC: 2 MG/DL (ref 1.6–2.6)
MCH RBC QN AUTO: 29.9 PG (ref 26.8–34.3)
MCHC RBC AUTO-ENTMCNC: 31.3 G/DL (ref 31.4–37.4)
MCV RBC AUTO: 96 FL (ref 82–98)
MONOCYTES # BLD AUTO: 0.74 THOUSAND/ΜL (ref 0.17–1.22)
MONOCYTES NFR BLD AUTO: 9 % (ref 4–12)
NEUTROPHILS # BLD AUTO: 5.66 THOUSANDS/ΜL (ref 1.85–7.62)
NEUTS SEG NFR BLD AUTO: 71 % (ref 43–75)
NRBC BLD AUTO-RTO: 0 /100 WBCS
P AXIS: 58 DEGREES
PLATELET # BLD AUTO: 241 THOUSANDS/UL (ref 149–390)
PMV BLD AUTO: 8.6 FL (ref 8.9–12.7)
POTASSIUM SERPL-SCNC: 4.4 MMOL/L (ref 3.5–5.3)
PR INTERVAL: 168 MS
QRS AXIS: -29 DEGREES
QRSD INTERVAL: 74 MS
QT INTERVAL: 372 MS
QTC INTERVAL: 401 MS
RBC # BLD AUTO: 4.08 MILLION/UL (ref 3.81–5.12)
SODIUM SERPL-SCNC: 143 MMOL/L (ref 136–145)
T WAVE AXIS: 48 DEGREES
VENTRICULAR RATE: 70 BPM
WBC # BLD AUTO: 7.99 THOUSAND/UL (ref 4.31–10.16)

## 2019-01-29 PROCEDURE — 82948 REAGENT STRIP/BLOOD GLUCOSE: CPT

## 2019-01-29 PROCEDURE — 74245 HB X-RAY UPPER GI&SMALL INTEST: CPT

## 2019-01-29 PROCEDURE — 83735 ASSAY OF MAGNESIUM: CPT | Performed by: SURGERY

## 2019-01-29 PROCEDURE — 80048 BASIC METABOLIC PNL TOTAL CA: CPT | Performed by: SURGERY

## 2019-01-29 PROCEDURE — 85025 COMPLETE CBC W/AUTO DIFF WBC: CPT | Performed by: SURGERY

## 2019-01-29 PROCEDURE — 93010 ELECTROCARDIOGRAM REPORT: CPT | Performed by: INTERNAL MEDICINE

## 2019-01-29 PROCEDURE — 83036 HEMOGLOBIN GLYCOSYLATED A1C: CPT | Performed by: SURGERY

## 2019-01-29 RX ORDER — FLUCONAZOLE 100 MG/1
150 TABLET ORAL ONCE
Status: COMPLETED | OUTPATIENT
Start: 2019-01-29 | End: 2019-01-29

## 2019-01-29 RX ADMIN — PRAMIPEXOLE DIHYDROCHLORIDE 0.25 MG: 0.25 TABLET ORAL at 20:27

## 2019-01-29 RX ADMIN — FLUCONAZOLE 150 MG: 100 TABLET ORAL at 21:27

## 2019-01-29 RX ADMIN — INSULIN LISPRO 3 UNITS: 100 INJECTION, SOLUTION INTRAVENOUS; SUBCUTANEOUS at 20:08

## 2019-01-29 RX ADMIN — HYDROMORPHONE HYDROCHLORIDE 0.5 MG: 1 INJECTION, SOLUTION INTRAMUSCULAR; INTRAVENOUS; SUBCUTANEOUS at 15:40

## 2019-01-29 RX ADMIN — ENOXAPARIN SODIUM 40 MG: 40 INJECTION SUBCUTANEOUS at 11:45

## 2019-01-29 RX ADMIN — CLINDAMYCIN PHOSPHATE 600 MG: 600 INJECTION, SOLUTION INTRAVENOUS at 11:44

## 2019-01-29 RX ADMIN — CLINDAMYCIN PHOSPHATE 600 MG: 600 INJECTION, SOLUTION INTRAVENOUS at 00:26

## 2019-01-29 RX ADMIN — CLINDAMYCIN PHOSPHATE 600 MG: 600 INJECTION, SOLUTION INTRAVENOUS at 16:39

## 2019-01-29 RX ADMIN — HYDROMORPHONE HYDROCHLORIDE 0.5 MG: 1 INJECTION, SOLUTION INTRAMUSCULAR; INTRAVENOUS; SUBCUTANEOUS at 11:44

## 2019-01-29 RX ADMIN — Medication 1 SPRAY: at 15:44

## 2019-01-29 RX ADMIN — HYDROMORPHONE HYDROCHLORIDE 0.2 MG: 1 INJECTION, SOLUTION INTRAMUSCULAR; INTRAVENOUS; SUBCUTANEOUS at 07:20

## 2019-01-29 RX ADMIN — HYDROMORPHONE HYDROCHLORIDE 0.2 MG: 1 INJECTION, SOLUTION INTRAMUSCULAR; INTRAVENOUS; SUBCUTANEOUS at 01:26

## 2019-01-29 NOTE — PROGRESS NOTES
Progress Note -Surgery ANGY Knotts Harrell 66 y o  female MRN: [id number]  Unit/Bed#: -Tomy Encounter: [id number]    ASSESSMENT/PLAN:  Problem List     * (Principal)Small bowel obstruction (HCC)    Diabetes ([address] )    Lab Results   Component Value Date    HGBA1C 7 1 (H) 01/29/2019       Recent Labs      01/28/19   1759  01/29/19   0013  01/29/19   0746  01/29/19   1208   POCGLU  129  129  120  102       Blood Sugar Average: Last 72 hrs:  (P) 812 [id number]        History of urostomy    Endometrial cancer (HCC) (Chronic)    Hypothyroidism (Chronic)    HTN (hypertension)    Abdominal adhesions    Tooth infection   65 yo F with history of ovarian ca, s/p urostomy admitted with obstruction on cat scan  SBFT this am pending  She has had no drainage from NGT  Denies flatus or BM  WBC has normalized  · Encouraged OOB and ambulation  · Pain meds PRN  · Continue NGT  · Follow up on SBFT  · Abx for tooth infection  · Continue NPO and RISS     VTE Pharmacologic Prophylaxis: Sequential compression device (Venodyne)  and Enoxaparin (Lovenox)    Subjective/Objective     Subjective:   Feels tired, not sleeping well  No N/V   NGT in place with minimal drainage    Objective/Physical Exam: Blood pressure 137/87, pulse 84, temperature 98 7 °F (37 1 °C), temperature source Oral, resp  rate 18, height 5' 6" (1 676 m), weight 101 kg (222 lb 7 1 oz), SpO2 96 %, not currently breastfeeding  ,Body mass index is 35 9 kg/m²      General appearance: alert and oriented, in no acute distress, NGT in place  Heart: regular rate and rhythm, S1, S2 normal, no murmur, click, rub or gallop  Lungs: clear to auscultation bilaterally  Abdomen: soft, non-tender; bowel sounds normal; no masses,  no organomegaly, urostomy functioning well    Current Facility-Administered Medications:     clindamycin (CLEOCIN) IVPB (premix) 600 mg, 600 mg, Intravenous, Q8H, Angela Galarza MD, Last Rate: 100 mL/hr at 01/29/19 1144, 600 mg at 01/29/19 1144   enoxaparin (LOVENOX) subcutaneous injection 40 mg, 40 mg, Subcutaneous, Daily, Chi Mitchell, DO, 40 mg at 01/29/19 1145    HYDROmorphone (DILAUDID) injection 0 2 mg, 0 2 mg, Intravenous, Q3H PRN, Maribeth Howell MD, 0 2 mg at 01/29/19 0720    HYDROmorphone (DILAUDID) injection 0 5 mg, 0 5 mg, Intravenous, Q3H PRN, Maribeth Howell MD, 0 5 mg at 01/29/19 1144    HYDROmorphone (DILAUDID) injection 1 mg, 1 mg, Intravenous, Q3H PRN, Maribeth Howell MD    insulin lispro (HumaLOG) 100 units/mL subcutaneous injection 1-6 Units, 1-6 Units, Subcutaneous, Q6H [address] **AND** Fingerstick Glucose (POCT), , , Q6H, Maribeth Howell MD    lactated ringers infusion, 125 mL/hr, Intravenous, Continuous, Maribeth Howell MD, Last Rate: 125 mL/hr at 01/28/19 0934, 125 mL/hr at 01/28/19 0934    ondansetron (ZOFRAN) injection 4 mg, 4 mg, Intravenous, Q4H PRN, Maribeth Howell MD    phenol (CHLORASEPTIC) 1 4 % mucosal liquid 1 spray, 1 spray, Mouth/Throat, Q2H PRN, Maribeth Howell MD    pramipexole (MIRAPEX) tablet 0 25 mg, 0 25 mg, Oral, HS, Keisha Chow PA-C, 0 25 mg at 01/28/19 2109      Intake/Output Summary (Last 24 hours) at 01/29/19 1255  Last data filed at 01/29/19 0801   Gross per 24 hour   Intake           259 58 ml   Output             2445 ml   Net         -2185 42 ml       Lab, Imaging and other studies:  Admission on 01/27/2019   Component Date Value Ref Range Status    Sodium 01/28/2019 142  136 - 145 mmol/L Final    Potassium 01/28/2019 4 3  3 5 - 5 3 mmol/L Final    Chloride 01/28/2019 107  100 - 108 mmol/L Final    CO2 01/28/2019 23  21 - 32 mmol/L Final    ANION GAP 01/28/2019 12  4 - 13 mmol/L Final    BUN 01/28/2019 25  5 - 25 mg/dL Final    Creatinine 01/28/2019 1 06  0 60 - 1 30 mg/dL Final    Glucose 01/28/2019 193* 65 - 140 mg/dL Final    Calcium 01/28/2019 9 5  8 3 - 10 1 mg/dL Final    AST 01/28/2019 10  5 - 45 U/L Final    ALT 01/28/2019 25  12 - 78 U/L Final    Alkaline Phosphatase 01/28/2019 116  46 - 116 U/L Final    Total Protein 01/28/2019 6 8  6 4 - 8 2 g/dL Final    Albumin 01/28/2019 3 4* 3 5 - 5 0 g/dL Final    Total Bilirubin 01/28/2019 0 80  0 20 - 1 00 mg/dL Final    eGFR 01/28/2019 57  ml/min/1 73sq m Final    WBC 01/27/2019 15 52* 4 31 - 10 16 Thousand/uL Final    RBC 01/27/2019 4 59  3 81 - 5 12 Million/uL Final    Hemoglobin 01/27/2019 13 7  11 5 - 15 4 g/dL Final    Hematocrit 01/27/2019 43 2  34 8 - 46 1 % Final    MCV 01/27/2019 94  82 - 98 fL Final    MCH 01/27/2019 29 8  26 8 - 34 3 pg Final    MCHC 01/27/2019 31 7  31 4 - 37 4 g/dL Final    RDW 01/27/2019 14 4  11 6 - 15 1 % Final    MPV 01/27/2019 9 0  8 9 - 12 7 fL Final    Platelets 88/18/9874 287  149 - 390 Thousands/uL Final    nRBC 01/27/2019 0  /100 WBCs Final    Neutrophils Relative 01/27/2019 81* 43 - 75 % Final    Immat GRANS % 01/27/2019 1  0 - 2 % Final    Lymphocytes Relative 01/27/2019 8* 14 - 44 % Final    Monocytes Relative 01/27/2019 6  4 - 12 % Final    Eosinophils Relative 01/27/2019 3  0 - 6 % Final    Basophils Relative 01/27/2019 1  0 - 1 % Final    Neutrophils Absolute 01/27/2019 12 76* 1 85 - 7 62 Thousands/µL Final    Immature Grans Absolute 01/27/2019 0 09  0 00 - 0 20 Thousand/uL Final    Lymphocytes Absolute 01/27/2019 1 29  0 60 - 4 47 Thousands/µL Final    Monocytes Absolute 01/27/2019 0 89  0 17 - 1 22 Thousand/µL Final    Eosinophils Absolute 01/27/2019 0 42  0 00 - 0 61 Thousand/µL Final    Basophils Absolute 01/27/2019 0 07  0 00 - 0 10 Thousands/µL Final    Protime 01/28/2019 12 8  11 8 - 14 2 seconds Final    INR 01/28/2019 0 99  0 86 - 1 17 Final    PTT 01/28/2019 21* 26 - 38 seconds Final    Lipase 01/28/2019 224  73 - 393 u/L Final    Color, UA 01/28/2019 Yellow   Final    Clarity,  01/28/2019 Slightly Cloudy   Final    Specific [place],  01/28/2019 1 010  1 003 - 1 030 Final    pH,  01/28/2019 8 5* 4 5 - 8 0 Final    Leukocytes,  01/28/2019 Elevated glucose may cause decreased leukocyte values   See urine microscopic for [facility] result/* Negative Final    Nitrite, UA 01/28/2019 Positive* Negative Final    Protein, UA 01/28/2019 Negative  Negative mg/dl Final    Glucose, UA 01/28/2019 >=1000 (1%)* Negative mg/dl Final    Ketones, UA 01/28/2019 Trace* Negative mg/dl Final    Urobilinogen, UA 01/28/2019 0 2  0 2, 1 0 E U /dl E U /dl Final    Bilirubin, UA 01/28/2019 Negative  Negative Final    Blood, UA 01/28/2019 Negative  Negative Final    RBC, UA 01/28/2019 None Seen  None Seen, 0-5 /hpf Final    WBC, UA 01/28/2019 4-10* None Seen, 0-5, 5-55, 5-65 /hpf Final    Epithelial Cells 01/28/2019 None Seen  None Seen, Occasional /hpf Final    Bacteria, UA 01/28/2019 Innumerable* None Seen, Occasional /hpf Final    Triplep Phos Denene, UA 01/28/2019 Occasional  /hpf Final    POC Glucose 01/28/2019 136  65 - 140 mg/dl Final    POC Glucose 01/28/2019 118  65 - 140 mg/dl Final    POC Glucose 01/28/2019 129  65 - 140 mg/dl Final    POC Glucose 01/29/2019 129  65 - 140 mg/dl Final    Sodium 01/29/2019 143  136 - 145 mmol/L Final    Potassium 01/29/2019 4 4  3 5 - 5 3 mmol/L Final    Chloride 01/29/2019 109* 100 - 108 mmol/L Final    CO2 01/29/2019 27  21 - 32 mmol/L Final    ANION GAP 01/29/2019 7  4 - 13 mmol/L Final    BUN 01/29/2019 21  5 - 25 mg/dL Final    Creatinine 01/29/2019 0 84  0 60 - 1 30 mg/dL Final    Glucose 01/29/2019 129  65 - 140 mg/dL Final    Calcium 01/29/2019 9 1  8 3 - 10 1 mg/dL Final    eGFR 01/29/2019 75  ml/min/1 73sq m Final    WBC 01/29/2019 7 99  4 31 - 10 16 Thousand/uL Final    RBC 01/29/2019 4 08  3 81 - 5 12 Million/uL Final    Hemoglobin 01/29/2019 12 2  11 5 - 15 4 g/dL Final    Hematocrit 01/29/2019 39 0  34 8 - 46 1 % Final    MCV 01/29/2019 96  82 - 98 fL Final    MCH 01/29/2019 29 9  26 8 - 34 3 pg Final    MCHC 01/29/2019 31 3* 31 4 - 37 4 g/dL Final    RDW 01/29/2019 14 6  11 6 - 15 1 % Final    MPV 01/29/2019 8 6* 8 9 - 12 7 fL Final    Platelets 62/52/8519 241  149 - 390 Thousands/uL Final    nRBC 01/29/2019 0  /100 WBCs Final    Neutrophils Relative 01/29/2019 71  43 - 75 % Final    Immat GRANS % 01/29/2019 0  0 - 2 % Final    Lymphocytes Relative 01/29/2019 15  14 - 44 % Final    Monocytes Relative 01/29/2019 9  4 - 12 % Final    Eosinophils Relative 01/29/2019 5  0 - 6 % Final    Basophils Relative 01/29/2019 0  0 - 1 % Final    Neutrophils Absolute 01/29/2019 5 66  1 85 - 7 62 Thousands/µL Final    Immature Grans Absolute 01/29/2019 0 01  0 00 - 0 20 Thousand/uL Final    Lymphocytes Absolute 01/29/2019 1 17  0 60 - 4 47 Thousands/µL Final    Monocytes Absolute 01/29/2019 0 74  0 17 - 1 22 Thousand/µL Final    Eosinophils Absolute 01/29/2019 0 38  0 00 - 0 61 Thousand/µL Final    Basophils Absolute 01/29/2019 0 03  0 00 - 0 10 Thousands/µL Final    Magnesium 01/29/2019 2 0  1 6 - 2 6 mg/dL Final    Hemoglobin A1C 01/29/2019 7 1* 4 2 - 6 3 % Final    EAG 01/29/2019 157  mg/dl Final    POC Glucose 01/29/2019 120  65 - 140 mg/dl Final    POC Glucose 01/29/2019 102  65 - 140 mg/dl Final

## 2019-01-29 NOTE — CONSULTS
Patient Name: Ed Rodriguez YOB: 1955    Medical Record No : [id number]     Admit/Registration Date: 1/27/2019 10:39 PM  Date of Consult: 01/28/19      Oral and Maxillofacial Surgery Consult Note    Assessment:  66 y o  female with chronic carious and fractured tooth #19 and ongoing root canal re-treatment teeth #12,13,14  Patient has established care with a general dentist Dr Jaylan Shields and endodontist Dr Irvin Larios  She has plans to have extraction of tooth #19 with her general dentist      Plan/Recs:  -Complete Clindamycin course as rx by general dentist (1 wk course)  -Maintain good oral hygiene  -Follow up with general dentist upon discharge, may be seen at [address] [place] [address],[address] [place] [address] for extraction of tooth #19 if she so desires  -No inpatient surgical interventions planned    -----------------------------------------    Chief Complaint:  Dental Pain    HPI:  66 y o  female  admitted to [facility] due to abdominal pain  Per report she also has dental pain and "crumbling crown "  She was seen at an urgent care and given Clindamycin because her general dentist (Dr Marry Ovalles) office was closed that day  She reports having ongoing treatment with her general dentist as well as an endodontist for upper left molar root canal re-treatments and has plans for extraction lower left molar as outpatient  [address] was consulted but unable to evaluate at [facility]  [facility] consulted  Pre-admission records reviewed  PMH/PSH/Meds/Allergies Reviewed  Past Medical History:   Diagnosis Date    Anemia     Bowel obstruction ([facility] [address] ) 2018    Cholelithiasis     Deep vein thrombosis of left lower extremity ([facility] [address] ) 01/11/2004    on blood thinner for 3 years   Diabetes mellitus ([facility] [address] )     type 2    Endometrial cancer ([facility] [address] ) 1999    GERD (gastroesophageal reflux disease)     History of transfusion 1999    History of urostomy 01/11/2004    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      Hypothyroid  In vitro fertilization     Kidney stone     Migraines     Muscle weakness     abdominal    Sleep apnea     in past prior to weight loss     Past Surgical History:   Procedure Laterality Date    COLONOSCOPY      COLONOSCOPY W/ BIOPSIES N/A 12/2015    HEEL SPUR SURGERY Right 1996    HYSTERECTOMY      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    LAPAROSCOPIC GASTRIC BANDING N/A 2006    Latoya Mckeon    AL LAP, SURG ENTEROLYSIS N/A 1/17/2019    Procedure: LAPAROSCOPIC LYSIS OF ADHESIONS;  Surgeon: Ernie Kendrick MD;  Location: [address] [place] [address];  Service: General    RADICAL HYSTERECTOMY N/A [address] [place] [address] endometrial cancer   SLEEVE GASTROPLASTY N/A 09/2015    Dr Maira [facility], [facility]    [address] ARTHROPLASTY         Allergies   Allergen Reactions    Amoxicillin Rash    Penicillins Rash       Social History     Social History    Marital status: /Civil Union     Spouse name: Des Diaz Number of children: 0    Years of education: 15     Occupational History    Not on file       Social History Main Topics    Smoking status: Former Smoker     Packs/day: 1 50     Years: 10 00     Types: Cigarettes     Quit date: 1/11/1986    Smokeless tobacco: Former User    Alcohol use Yes      Comment: socially    Drug use: No    Sexual activity: No      Comment: s/p hysterectomy     Other Topics Concern    Not on file     Social History Narrative    No narrative on file       Scheduled Medications    Current Facility-Administered Medications:  clindamycin 600 mg Intravenous Q8H Malinda Morales MD Last Rate: 600 mg (01/28/19 1611)   enoxaparin 40 mg Subcutaneous Daily Chi Mitchell DO    HYDROmorphone 0 2 mg Intravenous Q3H PRN Malinda Morales MD    HYDROmorphone 0 5 mg Intravenous Q3H PRN Malinda Morales MD    HYDROmorphone 1 mg Intravenous Q3H PRN Malinda Morales MD    insulin lispro 1-6 Units Subcutaneous Q6H [address] Malinda Morales MD    lactated ringers 125 mL/hr Intravenous Continuous Genoveva Villegas MD Last Rate: 125 mL/hr (01/28/19 0934)   ondansetron 4 mg Intravenous Q4H PRN Genoveva Villegas MD    phenol 1 spray Mouth/Throat Q2H PRN Genoveva Villegas MD    pramipexole 0 25 mg Oral HS Dilia Aburto PA-C        PRN Medications  HYDROmorphone    HYDROmorphone    HYDROmorphone    ondansetron    phenol    Medication Infusions    lactated ringers 125 mL/hr Last Rate: 125 mL/hr (01/28/19 0934)       Review of Systems:   All systems reviewed and were negative except for: Ears, nose, mouth, throat, and face: positive for LL intermittent tooth pain; denies facial swelling     Vitals:    Temp:  [97 7 °F (36 5 °C)-99 4 °F (37 4 °C)] 99 4 °F (37 4 °C)  HR:  [] 90  Resp:  [16-22] 16  BP: (134-179)/() 171/82  Wt Readings from Last 1 Encounters:   01/28/19 101 kg (222 lb 7 1 oz)     Ht Readings from Last 1 Encounters:   01/28/19 5' 6" (1 676 m)     Body mass index is 35 9 kg/m²  Respiratory    Lab Data (Last 4 hours)    None         O2/Vent Data (Last 4 hours)    None              Patient Lines/Drains/Airways Status    Active Airway     None              I/O:  Current Diet Order:        Diet Orders            Start     Ordered    01/28/19 0844  Room Service  Once     Question:  Type of Service  Answer:  Room Service - Appropriate with Assistance    01/28/19 0843    01/28/19 0755  Diet NPO; Sips of clear liquids  Diet effective now     Question Answer Comment   Diet Type NPO    NPO Except: Sips of clear liquids    RD to adjust diet per protocol?  Yes        01/28/19 0757           Intake/Output Summary (Last 24 hours) at 01/28/19 2202  Last data filed at 01/28/19 2145   Gross per 24 hour   Intake           643 75 ml   Output             1800 ml   Net         -1156 25 ml       Labs:    Results from last 7 days  Lab Units 01/27/19  2349   WBC Thousand/uL 15 52*   HEMOGLOBIN g/dL 13 7   HEMATOCRIT % 43 2   PLATELETS Thousands/uL 287       Results from last 7 days  Lab Units 01/28/19  0039   POTASSIUM mmol/L 4 3   CHLORIDE mmol/L 107   CO2 mmol/L 23   BUN mg/dL 25   CREATININE mg/dL 1 06   CALCIUM mg/dL 9 5       Results from last 7 days  Lab Units 01/28/19  0039   INR  0 99   PTT seconds 21*         Pain Management Panel     There is no flowsheet data to display  Imaging:   CT Facial Bones: chronically carious and root canal treated tooth #19; rct tx teeth #12,13,14; no abscess/collections observed, no facial edema  IMPRESSION (official read):  Dental caries with no significant periapical lucency or osseous abnormality of the maxilla or mandible  No superimposed soft tissue infection  Physical Exam:   General: Integment: skin warm and dry, patient is WD/WN, Voice quality: slightly muffled due to NGT, in NAD  Neuro Exam: AAO x 3, CN V,VII grossly intact  Head: Normocephalic  Face: No facial edema  Ears: Pinna wnl bilaterally, no otorrhea, hearing grossly intact   Eyes/Periorbital: Pupils equal, round, reactive to light and Extraocular movements intact  Nose: Left NGT present, rhinorrhea right nares  Oral Exam:Lips and mucosal surfaces wnl, floor of mouth is soft with no palpable masses, tongue protrusion is midline and has full range of motion, no pharyngeal edema or exudate   Dentalalveolar Exam: multiple restorations present; grossly carious and fractured tooth #19-no vestibular edema, non mobile and occlusion stable, OJ 4 cm, lateral excursive movements wnl   Lymph/Neck Exam: Neck is soft, trachea is midline  Cardiovascular: regular rate and rhythm   Respiratory: symmetric chest rise   GI/Genitourinary: Defer   Extremities: No gross peripheral edema       Brijesh Camarena DDS

## 2019-01-29 NOTE — QUICK NOTE
I reviewed small-bowel follow-through with Radiology  Contrast did get through to the right colon  However it did take approximately 8 hr  Upon review of the CT scan she may have some mild radiation changes evidence by some thickened mid small bowel  NG tube removed    Will start clear liquids, toast, crackers

## 2019-01-30 LAB
GLUCOSE SERPL-MCNC: 195 MG/DL (ref 65–140)
GLUCOSE SERPL-MCNC: 248 MG/DL (ref 65–140)
GLUCOSE SERPL-MCNC: 89 MG/DL (ref 65–140)
GLUCOSE SERPL-MCNC: 94 MG/DL (ref 65–140)
GLUCOSE SERPL-MCNC: 97 MG/DL (ref 65–140)

## 2019-01-30 PROCEDURE — 82948 REAGENT STRIP/BLOOD GLUCOSE: CPT

## 2019-01-30 RX ADMIN — CLINDAMYCIN PHOSPHATE 600 MG: 600 INJECTION, SOLUTION INTRAVENOUS at 01:22

## 2019-01-30 RX ADMIN — INSULIN LISPRO 2 UNITS: 100 INJECTION, SOLUTION INTRAVENOUS; SUBCUTANEOUS at 11:41

## 2019-01-30 RX ADMIN — CLINDAMYCIN PHOSPHATE 600 MG: 600 INJECTION, SOLUTION INTRAVENOUS at 16:40

## 2019-01-30 RX ADMIN — ENOXAPARIN SODIUM 40 MG: 40 INJECTION SUBCUTANEOUS at 09:37

## 2019-01-30 RX ADMIN — SODIUM CHLORIDE, SODIUM LACTATE, POTASSIUM CHLORIDE, AND CALCIUM CHLORIDE 125 ML/HR: .6; .31; .03; .02 INJECTION, SOLUTION INTRAVENOUS at 03:34

## 2019-01-30 RX ADMIN — SODIUM CHLORIDE, SODIUM LACTATE, POTASSIUM CHLORIDE, AND CALCIUM CHLORIDE 50 ML/HR: .6; .31; .03; .02 INJECTION, SOLUTION INTRAVENOUS at 14:03

## 2019-01-30 RX ADMIN — CLINDAMYCIN PHOSPHATE 600 MG: 600 INJECTION, SOLUTION INTRAVENOUS at 09:38

## 2019-01-30 RX ADMIN — PRAMIPEXOLE DIHYDROCHLORIDE 0.25 MG: 0.25 TABLET ORAL at 20:03

## 2019-01-30 NOTE — PROGRESS NOTES
Progress Note - General Surgery   Wanda Tucker 66 y o  female MRN: [id number]  Unit/Bed#: -01 Encounter: [id number]    Assessment:  63F with SBO  SBFT demonstrates distal contrast  She had NAEON    Plan:  -advance diet to fulls, toast, crackers  -advance further if tolerating later today  -probable discharge 1/31    Subjective/Objective   Chief Complaint: none    Subjective: having BMs and flatus  Denies nausea, vomiting, fevers, chills    Objective:   Blood pressure 149/99, pulse 79, temperature 98 4 °F (36 9 °C), temperature source Oral, resp  rate 18, height 5' 6" (1 676 m), weight 101 kg (222 lb 7 1 oz), SpO2 96 %, not currently breastfeeding  ,Body mass index is 35 9 kg/m²  Intake/Output Summary (Last 24 hours) at 01/30/19 0743  Last data filed at 01/29/19 1610   Gross per 24 hour   Intake                0 ml   Output             1000 ml   Net            -1000 ml       Invasive Devices     Peripheral Intravenous Line            Long-Dwell Peripheral IV (Midline) 08/28/62 Left Basilic 1 day          Drain            Urostomy Ureterostomy right RUQ -- days    NG/OG/Enteral Tube 18 Fr Left nares 2 days                Physical Exam:   General: No acute distress  HEENT: Normocephalic, atraumatic   Neck: Normal ROM  No tracheal deviation  Cardio: Normal rate, regular rhythm  Pulm: Normal respiratory effort  Abdomen: Soft, mildly tender in RLQ, non-distended  Ostomy with ileal conduit in place in RLQ with urine and gas in bag at this time  Extremities: GARRIDO, No edema  Neuro: Cranial nerves II-XII intact  Psych: Normal affect      Lab, Imaging and other studies:I have personally reviewed pertinent lab results      VTE Pharmacologic Prophylaxis: Enoxaparin (Lovenox)  VTE Mechanical Prophylaxis: sequential compression device

## 2019-01-31 VITALS
WEIGHT: 222.44 LBS | HEART RATE: 59 BPM | RESPIRATION RATE: 18 BRPM | HEIGHT: 66 IN | SYSTOLIC BLOOD PRESSURE: 149 MMHG | OXYGEN SATURATION: 98 % | TEMPERATURE: 97.9 F | BODY MASS INDEX: 35.75 KG/M2 | DIASTOLIC BLOOD PRESSURE: 84 MMHG

## 2019-01-31 LAB
GLUCOSE SERPL-MCNC: 121 MG/DL (ref 65–140)
GLUCOSE SERPL-MCNC: 279 MG/DL (ref 65–140)

## 2019-01-31 PROCEDURE — 82948 REAGENT STRIP/BLOOD GLUCOSE: CPT

## 2019-01-31 RX ORDER — CLINDAMYCIN HYDROCHLORIDE 150 MG/1
300 CAPSULE ORAL EVERY 8 HOURS SCHEDULED
Status: DISCONTINUED | OUTPATIENT
Start: 2019-01-31 | End: 2019-01-31

## 2019-01-31 RX ORDER — ALPRAZOLAM 0.5 MG/1
0.5 TABLET ORAL
Status: DISCONTINUED | OUTPATIENT
Start: 2019-01-31 | End: 2019-01-31 | Stop reason: HOSPADM

## 2019-01-31 RX ORDER — LEVOTHYROXINE SODIUM 0.07 MG/1
75 TABLET ORAL
Status: DISCONTINUED | OUTPATIENT
Start: 2019-01-31 | End: 2019-01-31 | Stop reason: HOSPADM

## 2019-01-31 RX ORDER — ALPRAZOLAM 0.5 MG/1
0.5 TABLET ORAL
Status: DISCONTINUED | OUTPATIENT
Start: 2019-01-31 | End: 2019-01-31

## 2019-01-31 RX ORDER — CLINDAMYCIN HYDROCHLORIDE 150 MG/1
300 CAPSULE ORAL EVERY 8 HOURS SCHEDULED
Status: DISCONTINUED | OUTPATIENT
Start: 2019-01-31 | End: 2019-01-31 | Stop reason: HOSPADM

## 2019-01-31 RX ADMIN — ALPRAZOLAM 0.5 MG: 0.5 TABLET ORAL at 01:05

## 2019-01-31 RX ADMIN — CLINDAMYCIN PHOSPHATE 600 MG: 600 INJECTION, SOLUTION INTRAVENOUS at 00:50

## 2019-01-31 RX ADMIN — CLINDAMYCIN PHOSPHATE 600 MG: 600 INJECTION, SOLUTION INTRAVENOUS at 08:11

## 2019-01-31 RX ADMIN — ENOXAPARIN SODIUM 40 MG: 40 INJECTION SUBCUTANEOUS at 08:11

## 2019-01-31 RX ADMIN — INSULIN LISPRO 4 UNITS: 100 INJECTION, SOLUTION INTRAVENOUS; SUBCUTANEOUS at 11:32

## 2019-01-31 NOTE — NURSING NOTE
Patient awake and alert this am, OOB in bathroom changing iileostomy appliance, offered c/o slight cramping in abdomen,  PRN pain med offered at this time patient refused stating " Its not that bad", patient sitting in bed side recliner at this time with call bell in reach

## 2019-01-31 NOTE — PROGRESS NOTES
Progress Note - General Surgery  Simmons Jhonatan 66 y o  female MRN: [id number]  Unit/Bed#: -Tomy Encounter: [id number]    Assessment/Plan:  66y o -year-old female     * Small bowel obstruction ([address] )   Assessment & Plan    - 01/17/2019 (Dr Reynold Resendez) elective diagnostic laparoscopy with lysis of adhesions  - patient now admitted with ileus vs small bowel obstruction, presumably from adhesions that were incompletely lysed in OR  - ileus/SBO has resolved  - recommend soft diet for two weeks  - patient will f/u with Dr Reynold Resendez  - d/c home today     Tooth infection   Assessment & Plan    - continue clindamycin (started as oral prescription outpatient)  - f/u with dentist     Diabetes [facility])   Assessment & Plan    Lab Results   Component Value Date    HGBA1C 7 5 (H) 11/02/2017       No results for input(s): POCGLU in the last 72 hours  Blood Sugar Average: Last 72 hrs:     - obtain Hgb A1c 7 1 at admission  - restart home diabetes meds         Hypothyroidism   Assessment & Plan    - restart home synthroid           Sharon Parker MD PGY-5  9:14 AM  01/31/19      Subjective:  Patient feels some fullness in upper abdomen but is otherwise feeling well  Mostly improved  Passing flatus  Tolerating moderate amount of PO  Objective:  Patient Vitals for the past 24 hrs:   BP Temp Temp src Pulse Resp SpO2   01/31/19 0805 149/84 97 9 °F (36 6 °C) Oral 59 18 98 %   01/30/19 2258 133/75 98 °F (36 7 °C) Oral 70 18 99 %   01/30/19 1529 146/85 98 3 °F (36 8 °C) Oral 59 18 98 %          Diet Orders            Start     Ordered    01/30/19 1458  Diet Surgical; Surgical Soft/Lite Meal  Diet effective now     Question Answer Comment   Diet Type Surgical    Surgical Surgical Soft/Lite Meal    RD to adjust diet per protocol?  Yes        01/30/19 1457    01/28/19 0844  Room Service  Once     Question:  Type of Service  Answer:  Room Service - Appropriate with Assistance    01/28/19 0843          I/O       01/29 0701 - 01/30 0700 01/30 0701 - 01/31 0700 01/31 0701 - 02/01 0700    Urine (mL/kg/hr) 1000 (0 4) 1400 (0 6)     Emesis/NG output 0      Total Output 1000 1400      Net -1000 -1400                   Physical Exam:  General: NAD  Cardiovascular: RRR  Respiratory: breath sounds b/l  Abdomen: soft, ND, NT, incision c/d/i  Extremities: no edema    Medications:    Current Facility-Administered Medications:  ALPRAZolam 0 5 mg Oral HS PRN Miguel Wheeler MD    clindamycin 600 mg Intravenous Q8H Miguel Wheeler MD Last Rate: 600 mg (01/31/19 1160)   enoxaparin 40 mg Subcutaneous Daily Chi Mitchell DO    HYDROmorphone 0 2 mg Intravenous Q3H PRN Miguel Wheeler MD    HYDROmorphone 0 5 mg Intravenous Q3H PRN Miguel Wheeler MD    HYDROmorphone 1 mg Intravenous Q3H PRN Miguel Wheeler MD    insulin lispro 1-6 Units Subcutaneous TID AC Miguel Wheeler, MD    ondansetron 4 mg Intravenous Q4H PRN Miguel Wheeler, MD    phenol 1 spray Mouth/Throat Q2H PRN Miguel Wheeler, MD    pramipexole 0 25 mg Oral HS Keisha Chow PA-C         ALPRAZolam 0 5 mg HS PRN   HYDROmorphone 0 2 mg Q3H PRN   HYDROmorphone 0 5 mg Q3H PRN   HYDROmorphone 1 mg Q3H PRN   ondansetron 4 mg Q4H PRN   phenol 1 spray Q2H PRN       Laboratory results:   CBC: No results found for: WBC, HGB, HCT, MCV, PLT, ADJUSTEDWBC, MCH, MCHC, RDW, MPV, NRBC, CMP: No results found for: SODIUM, K, CL, CO2, ANIONGAP, BUN, CREATININE, GLUCOSE, CALCIUM, AST, ALT, ALKPHOS, PROT, BILITOT, EGFR, Coagulation: No results found for: PT, INR, APTT, Urinalysis: No results found for: COLORU, CLARITYU, SPECGRAV, PHUR, LEUKOCYTESUR, NITRITE, PROTEINUA, GLUCOSEU, KETONESU, BILIRUBINUR, BLOODU, Amylase: No results found for: AMYLASE, Lipase: No results found for: LIPASE    Imaging results:  FL UGI/sm bowel   Final Result by Inocencia Laws DO (01/29 1737)   Could not evaluate the esophagus -since contrast was administered via the NG tube        Unremarkable stomach status post gastric sleeve bariatric surgery  Normal proximal small bowel caliber and normal peristalsis  Irregular luminal narrowing of loops of mid small bowel in the left lower abdomen as seen on the prior CT which are nonobstructing  Normal sized small bowel loops in the right lower quadrant with loop of unobstructed small bowel in a right lower abdominal wall parastomal hernia  Normal-appearing terminal ileum  Opacification of large bowel after 8 hours confirms there is not a complete obstruction  I personally discussed this study with Dr Brittany Zaldivar on 1/29/2019 at 4:11 PM                Workstation performed: [id number]         CT facial bones w contrast   Final Result by Maikol Bustamante DO (01/28 1932)      Dental caries with no significant periapical lucency or osseous abnormality of the maxilla or mandible  No superimposed soft tissue infection  Workstation performed: [id number]         XR chest portable   Final Result by Joya Torres MD (01/28 9557)      No acute cardiopulmonary disease  Nasogastric tube tip overlying the stomach  Workstation performed: [id number]         CT abdomen pelvis with contrast   Final Result by Toby Uriarte DO (01/28 9330)      Multiple mildly dilated loops of small bowel are seen extending to the left midabdomen  There is a transition to essentially collapsed small bowel loops in the left mid/lower abdomen, the distal colon is essentially collapsed  Findings taken together    are suspicious for developing small bowel obstruction in the appropriate clinical setting  Cholelithiasis without discrete evidence of acute cholecystitis, colonic diverticulosis, left-sided nephrolithiasis, and other findings as above        Findings are consistent with the preliminary report from [facility] which was provided shortly after completion of the exam                Workstation performed: [id number]

## 2019-01-31 NOTE — DISCHARGE SUMMARY
Discharge Summary - Samara Rajput 66 y o  female MRN: [id number]    Unit/Bed#: -01 Encounter: [id number]    Admission Date: 1/27/2019     Discharge Date: 01/31/19    Admitting Diagnosis: Small bowel obstruction ([address] ) [K56 609]  Abdominal pain [R10 9]    Secondary Diagnosis:   Past Medical History:   Diagnosis Date    Anemia     Bowel obstruction ([place] [address] ) 2018    Cholelithiasis     Deep vein thrombosis of left lower extremity ([place] [address] ) 01/11/2004    on blood thinner for 3 years   Diabetes mellitus ([place] [address] )     type 2    Endometrial cancer ([place] [address] ) 1999    GERD (gastroesophageal reflux disease)     History of transfusion 1999    History of urostomy 01/11/2004    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   Hypothyroid     In vitro fertilization     Kidney stone     Migraines     Muscle weakness     abdominal    Sleep apnea     in past prior to weight loss       Discharge Diagnosis: Same    Procedures Performed:     Consults: Oral and Maxillofacial Surgery    Hospital Course: The patient is a 30-year-old female who has a history of sleeve gastrectomy performed in 2015  The patient also had a radical hysterectomy for uterine cancer in the past, which required radiation afterwards  The patient intraoperatively in her hysterectomy had a bladder urethral injury which required a cystectomy and ileal conduit creation  The patient has a history of bowel obstructions after these events  Most recently, patient had been admitted of juan with a bowel obstruction which resolved with NG tube  The patient follow up with her bariatric surgeon, Dr Israel Luong, and she underwent a elective diagnostic laparoscopy with lysis of adhesions 2 weeks prior to presenting to the emergency department  The patient was admitted for treatment of an ileus after her operation    Of note, the patient also had a toothache with crown pieces falling off which she has been diagnosed as an outpatient at a walk-in clinic as having a tooth abscess for which she was prescribed oral clindamycin  Upon admission to the hospital, an NG tube was placed  Oral maxillofacial surgery consulted for her potential tooth abscess, and they felt that her symptoms were likely due to a chronic dental haider as well as the tooth fracture  The patient has a dentist, Dr Nain Tejada, and has plans for subsequent tooth extraction  OMFS recommended completing the clindamycin course and follow up with her general dentist   The patient began having bowel function again, and the NG tube was discontinued  She was advanced to regular diet and discharged home  She has plans to follow up with her bariatric surgeon, Dr Mcarthur Quinn  She may follow up with general surgery as needed  Disposition: none  Call physician for temperature over 101, wound redness or discharge, vomiting, or intolerance to diet  Discharge Medications:  See after visit summary for reconciled discharge medications provided to patient and family  This text is generated with voice recognition software  There may be translation, syntax,  or grammatical errors  If you have any questions, please contact the dictating provider

## 2019-01-31 NOTE — DISCHARGE INSTRUCTIONS
Complete your outpatient tooth abscess antibiotics and follow up with your dentist       Bowel Obstruction   WHAT YOU NEED TO KNOW:   A bowel obstruction occurs when your large or small intestine is completely or partly blocked  The blockage prevents food and waste from passing through normally  DISCHARGE INSTRUCTIONS:   Follow up with your healthcare provider as directed:  Write down your questions so you remember to ask them during your visits  Contact your healthcare provider if:   · You have nausea and are vomiting  · Your abdomen is enlarged  · You cannot pass a bowel movement or gas  · You lose weight without trying  · You have blood in your bowel movement  · You have questions or concerns about your condition or care  Return to the emergency department if:   · You have severe abdominal pain that does not get better  · Your heart is beating faster than normal for you  · You have a fever  © 2017 [address] Information is for End User's use only and may not be sold, redistributed or otherwise used for commercial purposes  All illustrations and images included in CareNotes® are the copyrighted property of A D A M , Inc  or Denny Ragsdale  The above information is an  only  It is not intended as medical advice for individual conditions or treatments  Talk to your doctor, nurse or pharmacist before following any medical regimen to see if it is safe and effective for you

## 2019-02-01 ENCOUNTER — DOCUMENTATION (OUTPATIENT)
Dept: OBGYN CLINIC | Facility: CLINIC | Age: 64
End: 2019-02-01

## 2019-02-04 DIAGNOSIS — B37.2 YEAST DERMATITIS: Primary | ICD-10-CM

## 2019-02-04 RX ORDER — FLUCONAZOLE 150 MG/1
150 TABLET ORAL DAILY
Qty: 5 TABLET | Refills: 1 | Status: SHIPPED | OUTPATIENT
Start: 2019-02-04 | End: 2019-02-09

## 2019-02-05 ENCOUNTER — TELEPHONE (OUTPATIENT)
Dept: SURGERY | Facility: CLINIC | Age: 64
End: 2019-02-05

## 2019-02-05 NOTE — TELEPHONE ENCOUNTER
I spoke with Salas Rizo today  She is feeling better since her discharge from the hospital on 1/31/19    She will call the office to reschedule her postop visit after she takes care of some dental work that needs to be done first

## 2019-03-08 NOTE — UTILIZATION REVIEW
The portal is showing a start date of 11/17/18, this is INCORRECT  Please update request to start on 11/18/18, and send us an updated determination once the change has taken place  UR Dept fax [phone]  #[phone]    Notification of Inpatient Admission/Inpatient Authorization Request  This is a Notification of Inpatient Admission/Request for Inpatient Authorization for our facility [address] [place] [address],[address]  Be advised that this patient was admitted to our facility under Inpatient Status  Please contact the Utilization Review Department where the patient is receiving care services for additional admission information  Place of Service Code: 24   Place of Service Name: Inpatient Hospital  Presentation Date & Time: 11/17/2018  9:24 PM  Inpatient Admission Date & Time: 11/18/18 0048  Discharge Date & Time: 11/19/2018  4:11 PM   Discharge Disposition (if discharged): Home/Self Care  Attending Physician: Ramses Small [[id number]]  Admission Orders (From admission, onward)    Ordered        11/18/18 0048  Inpatient Admission (expected length of stay for this patient is greater than two midnights)  Once     Order ID Start Status   [id number] 11/18/18 0049 Completed                Facility: [facility]  Utilization Review Department  Phone: [phone]; Fax [phone]  [email or web address]  ATTENTION: Please call with any questions or concerns to [phone]  and carefully listen to the prompts so that you are directed to the right person  Send all requests for admission clinical reviews, approved or denied determinations and any other requests to fax [phone]   All voicemails are confidential        Leontine Kocher, RN   Registered Nurse   Utilization Review   Utilization Review   Addendum   Date of Service:  11/19/2018 12:59 PM                        Show:Clear all  [x]Manual[x]Template[x]Copied    Added by:  Siva Cedeno RN      []Agustin for details      Initial Clinical Review     Admission: Date/Time/Statement: 11/18/18 @ 0048 Inpatient Written            Orders Placed This Encounter   Procedures    Inpatient Admission (expected length of stay for this patient is greater than two midnights)       Standing Status:   Standing       Number of Occurrences:   1       Order Specific Question:   Admitting Physician       Answer:   Jonn Bobby [[id number]]       Order Specific Question:   Level of Care       Answer:   Med Surg [16]       Order Specific Question:   Estimated length of stay       Answer:   More than 2 Midnights       Order Specific Question:   Certification       Answer:   I certify that inpatient services are medically necessary for this patient for a duration of greater than two midnights  See H&P and MD Progress Notes for additional information about the patient's course of treatment             ED: Date/Time/Mode of Arrival:   ED Arrival Information      Expected Arrival Acuity Means of Arrival Escorted By Service Admission Type     - 11/17/2018 20:54 Emergent Walk-In [facility] Surgery-General Emergency     Arrival Complaint     SEVERE ABDOMINAL PAIN             Chief Complaint:        Chief Complaint   Patient presents with    Abdominal Pain       pt reports generalized abd pain that feels similar to intestines twisting last year at this time   reports vomiting and severe pain          History of Illness: Douglas Arango is a 66 y o  female who presents with 1 day of abdominal pain and nausea   The patient describes the pain as similar to when she previously had a small bowel obstruction at this time last year  Ranjityvon Prince does report vomiting and pain however after the emergency department placing NG tube she does think that her pain is slightly improved   Patient still not passing any flatus      ED Vital Signs:            ED Triage Vitals   Temperature Pulse Respirations Blood Pressure SpO2   11/17/18 2131 11/17/18 2127 11/17/18 2127 11/17/18 2127 11/17/18 2127   (!) 96 5 °F (35 8 °C) 100 (!) 24 (!) 234/117 100 %       Temp Source Heart Rate Source Patient Position - Orthostatic VS BP Location FiO2 (%)   11/17/18 2127 11/17/18 2127 11/17/18 2322 11/17/18 2322 --   Oral Monitor Sitting Right arm         Pain Score           11/17/18 2127           Worst Possible Pain                Wt Readings from Last 1 Encounters:   11/19/18 106 kg (233 lb 11 oz)         Vital Signs (abnormal):   Date/Time   BP   11/18/18 0215    194/88   11/18/18 0200    207/97   11/18/18 0135    174/89   11/18/18 0058    174/89   11/17/18 2322    186/95      Abnormal Labs/Diagnostic Test Results:   WBC 10 76 (H)       (H)   BUN 30 (H)   ALKPHOS 147 (H)      CT AP:    Multiple mild to moderately dilated loops of small bowel in the left hemiabdomen   There appears to be a transition to more normal caliber loops of small bowel in the left lower abdomen   The bowel loops distal to this   appear collapsed and the colon is relatively underdistended   The findings taken together are highly suspicious for a small bowel obstruction      Cholelithiasis without discrete evidence of acute cholecystitis, fatty replacement of pancreas, bilateral renal cortical scarring, left-sided nephrolithiasis without hydronephrosis      XR Abd KUB:  The tip, and side-port, the nasogastric tube overlie the body of the stomach   Nasogastric tube position appears appropriate      ED Treatment:             Medication Administration from 11/17/2018 2054 to 11/18/2018 0302        Date/Time Order Dose Route Action       11/17/2018 2135 sodium chloride 0 9 % bolus 1,000 mL 1,000 mL Intravenous New Bag       11/17/2018 2149 ondansetron (ZOFRAN) injection 4 mg 4 mg Intravenous Given       11/17/2018 2217 HYDROmorphone (DILAUDID) injection 1 mg 1 mg Intravenous Given       11/17/2018 2218 pantoprazole (PROTONIX) injection 40 mg 40 mg Intravenous Given       11/17/2018 2302 pantoprazole (PROTONIX) 80 mg in sodium chloride 0 9 % 100 mL infusion 8 mg/hr Intravenous New Bag       11/18/2018 0057 sodium chloride 0 9 % bolus 1,000 mL 1,000 mL Intravenous New Bag       11/17/2018 2249 iohexol (OMNIPAQUE) 350 MG/ML injection (MULTI-DOSE) 100 mL 100 mL Intravenous Given       11/18/2018 0021 ondansetron (ZOFRAN) injection 4 mg 4 mg Intravenous Given       11/18/2018 0021 HYDROmorphone (DILAUDID) injection 1 mg 1 mg Intravenous Given       11/18/2018 0150 lidocaine (XYLOCAINE) 2 % topical gel   Topical Given       11/18/2018 0136 ketamine (KETALAR) 50 mg/mL 50 mg 50 mg Intramuscular Given       11/18/2018 0158 ketamine (KETALAR) 50 mg/mL 25 mg 25 mg Intravenous Given             Past Medical/Surgical History:         Active Ambulatory Problems     Diagnosis Date Noted    History of urostomy 01/11/2016    Endometrial cancer ([place] [address] ) 01/11/2016    In vitro fertilization      Sepsis ([facility] ) 11/01/2017    Pyelonephritis 11/01/2017    Anemia 11/01/2017    Hydroureteronephrosis 11/02/2017    HTN (hypertension) 11/04/2017    Migraine 11/04/2017           Resolved Ambulatory Problems     Diagnosis Date Noted    Small bowel obstruction ([facility] ) 01/11/2016    Diabetes mellitus ([facility] ) 01/11/2016    Hypothyroid 01/11/2016    Deep vein thrombosis of left lower extremity ([facility] ) 01/11/2016           Past Medical History:   Diagnosis Date    Cholelithiasis      [facility] ) 01/11/2004    Diabetes mellitus ([place] [address] )      Endometrial cancer ([place] [address] ) 1999    History of urostomy 01/11/2004    Hypothyroid      In vitro fertilization           Admitting Diagnosis: Small bowel obstruction (HCC) [K56 609]  Abdominal pain [R10 9]  Colostomy present ([place] [address] ) [Z93 3]     Age/Sex: 66 y o  female     Assessment:  71-year-old female with history of hysterectomy for endometrial cancer complicated by ureteral stricture from radiation and cystectomy with ileal conduit  Patient presenting with small bowel obstruction     Plan:  NPO and NG tube  Will order Chloraseptic spray  Continue IV fluids  Patient is to be ambulating hallways     Admission Orders:  NPO  NGT  OOB as steffanie  Accuchecks QAC and QHS with coverage  Daily weights, I/O  Sequential compression device     Scheduled Meds:   Current Facility-Administered Medications:  acetaminophen 650 mg Oral Q6H PRN   dextrose 5 % and sodium chloride 0 45 % with KCl 20 mEq/L 75 mL/hr Intravenous Continuous   diphenhydrAMINE 25 mg Intravenous Q6H PRN   docusate sodium 100 mg Oral BID PRN   heparin (porcine) 5,000 Units Subcutaneous Q8H [address]   HYDROmorphone 0 5 mg Intravenous Q4H PRN   insulin lispro 1-5 Units Subcutaneous Q6H [address]   levothyroxine 50 mcg Oral Early Morning   nebivolol 10 mg Oral Daily   ondansetron 4 mg Intravenous Q4H PRN   oxyCODONE-acetaminophen 1 tablet Oral Q4H PRN   pantoprozole (PROTONIX) infusion (Continuous) 8 mg/hr Intravenous Continuous   phenol 2 spray Mouth/Throat Q2H PRN   pramipexole 0 25 mg Oral After Dinner      Continuous Infusions:      NSS @125mlhr     dextrose 5 % and sodium chloride 0 45 % with KCl 20 mEq/L 75 mL/hr Last Rate: 75 mL/hr (11/19/18 0827)   pantoprozole (PROTONIX) infusion (Continuous) 8 mg/hr Last Rate: 8 mg/hr (11/19/18 0352)      PRN Meds:   acetaminophen    diphenhydrAMINE    docusate sodium    HYDROmorphone    ondansetron    oxyCODONE-acetaminophen    phenol     [address]  Utilization Review Department  Phone: [phone]; Fax [phone]  [email or web address]  ATTENTION: Please call with any questions or concerns to [phone]  and carefully listen to the prompts so that you are directed to the right person  Send all requests for admission clinical reviews, approved or denied determinations and any other requests to fax [phone]   All voicemails are confidential              Revision History

## 2019-05-21 ENCOUNTER — OFFICE VISIT (OUTPATIENT)
Dept: UROLOGY | Facility: AMBULATORY SURGERY CENTER | Age: 64
End: 2019-05-21
Payer: COMMERCIAL

## 2019-05-21 VITALS
HEIGHT: 67 IN | WEIGHT: 220.6 LBS | DIASTOLIC BLOOD PRESSURE: 98 MMHG | BODY MASS INDEX: 34.62 KG/M2 | HEART RATE: 70 BPM | SYSTOLIC BLOOD PRESSURE: 138 MMHG

## 2019-05-21 DIAGNOSIS — C54.1 MALIGNANT NEOPLASM OF ENDOMETRIUM (HCC): Primary | ICD-10-CM

## 2019-05-21 PROCEDURE — 99244 OFF/OP CNSLTJ NEW/EST MOD 40: CPT | Performed by: UROLOGY

## 2019-10-07 DIAGNOSIS — B37.2 YEAST DERMATITIS: Primary | ICD-10-CM

## 2019-10-07 RX ORDER — FLUCONAZOLE 150 MG/1
150 TABLET ORAL DAILY PRN
Qty: 12 TABLET | Refills: 1 | Status: SHIPPED | OUTPATIENT
Start: 2019-10-07 | End: 2019-10-10

## 2020-06-01 ENCOUNTER — TELEMEDICINE (OUTPATIENT)
Dept: UROLOGY | Facility: AMBULATORY SURGERY CENTER | Age: 65
End: 2020-06-01
Payer: COMMERCIAL

## 2020-06-01 DIAGNOSIS — Z98.890 HISTORY OF UROSTOMY: Primary | ICD-10-CM

## 2020-06-01 PROCEDURE — 99213 OFFICE O/P EST LOW 20 MIN: CPT | Performed by: NURSE PRACTITIONER

## 2020-11-13 DIAGNOSIS — Z20.822 CLOSE EXPOSURE TO COVID-19 VIRUS: ICD-10-CM

## 2020-11-13 PROCEDURE — U0003 INFECTIOUS AGENT DETECTION BY NUCLEIC ACID (DNA OR RNA); SEVERE ACUTE RESPIRATORY SYNDROME CORONAVIRUS 2 (SARS-COV-2) (CORONAVIRUS DISEASE [COVID-19]), AMPLIFIED PROBE TECHNIQUE, MAKING USE OF HIGH THROUGHPUT TECHNOLOGIES AS DESCRIBED BY CMS-2020-01-R: HCPCS | Performed by: FAMILY MEDICINE

## 2020-11-14 LAB — SARS-COV-2 RNA SPEC QL NAA+PROBE: NOT DETECTED

## 2020-11-27 DIAGNOSIS — Z20.822 CLOSE EXPOSURE TO COVID-19 VIRUS: ICD-10-CM

## 2020-11-27 PROCEDURE — U0003 INFECTIOUS AGENT DETECTION BY NUCLEIC ACID (DNA OR RNA); SEVERE ACUTE RESPIRATORY SYNDROME CORONAVIRUS 2 (SARS-COV-2) (CORONAVIRUS DISEASE [COVID-19]), AMPLIFIED PROBE TECHNIQUE, MAKING USE OF HIGH THROUGHPUT TECHNOLOGIES AS DESCRIBED BY CMS-2020-01-R: HCPCS | Performed by: FAMILY MEDICINE

## 2020-11-28 LAB — SARS-COV-2 RNA SPEC QL NAA+PROBE: DETECTED

## 2020-12-03 DIAGNOSIS — U07.1 LAB TEST POSITIVE FOR DETECTION OF COVID-19 VIRUS: ICD-10-CM

## 2020-12-03 PROCEDURE — U0003 INFECTIOUS AGENT DETECTION BY NUCLEIC ACID (DNA OR RNA); SEVERE ACUTE RESPIRATORY SYNDROME CORONAVIRUS 2 (SARS-COV-2) (CORONAVIRUS DISEASE [COVID-19]), AMPLIFIED PROBE TECHNIQUE, MAKING USE OF HIGH THROUGHPUT TECHNOLOGIES AS DESCRIBED BY CMS-2020-01-R: HCPCS | Performed by: FAMILY MEDICINE

## 2020-12-04 LAB — SARS-COV-2 RNA SPEC QL NAA+PROBE: NOT DETECTED

## 2021-01-15 ENCOUNTER — APPOINTMENT (EMERGENCY)
Dept: CT IMAGING | Facility: HOSPITAL | Age: 66
DRG: 872 | End: 2021-01-15
Payer: COMMERCIAL

## 2021-01-15 ENCOUNTER — HOSPITAL ENCOUNTER (INPATIENT)
Facility: HOSPITAL | Age: 66
LOS: 4 days | Discharge: HOME | DRG: 872 | End: 2021-01-19
Attending: EMERGENCY MEDICINE | Admitting: INTERNAL MEDICINE
Payer: COMMERCIAL

## 2021-01-15 ENCOUNTER — APPOINTMENT (EMERGENCY)
Dept: RADIOLOGY | Facility: HOSPITAL | Age: 66
DRG: 872 | End: 2021-01-15
Payer: COMMERCIAL

## 2021-01-15 DIAGNOSIS — A41.9 SEPSIS (HCC): ICD-10-CM

## 2021-01-15 DIAGNOSIS — N12 PYELONEPHRITIS: Primary | ICD-10-CM

## 2021-01-15 DIAGNOSIS — N13.30 HYDROURETERONEPHROSIS: ICD-10-CM

## 2021-01-15 DIAGNOSIS — N13.30 HYDRONEPHROSIS: ICD-10-CM

## 2021-01-15 LAB
ALBUMIN SERPL BCP-MCNC: 3.9 G/DL (ref 3.5–5)
ALP SERPL-CCNC: 135 U/L (ref 46–116)
ALT SERPL W P-5'-P-CCNC: 22 U/L (ref 12–78)
ANION GAP SERPL CALCULATED.3IONS-SCNC: 11 MMOL/L (ref 4–13)
AST SERPL W P-5'-P-CCNC: 11 U/L (ref 5–45)
BASOPHILS # BLD AUTO: 0.03 THOUSANDS/ΜL (ref 0–0.1)
BASOPHILS NFR BLD AUTO: 0 % (ref 0–1)
BILIRUB SERPL-MCNC: 0.38 MG/DL (ref 0.2–1)
BUN SERPL-MCNC: 34 MG/DL (ref 5–25)
CALCIUM SERPL-MCNC: 10.2 MG/DL (ref 8.3–10.1)
CHLORIDE SERPL-SCNC: 107 MMOL/L (ref 100–108)
CO2 SERPL-SCNC: 22 MMOL/L (ref 21–32)
CREAT SERPL-MCNC: 1.17 MG/DL (ref 0.6–1.3)
EOSINOPHIL # BLD AUTO: 0.2 THOUSAND/ΜL (ref 0–0.61)
EOSINOPHIL NFR BLD AUTO: 3 % (ref 0–6)
ERYTHROCYTE [DISTWIDTH] IN BLOOD BY AUTOMATED COUNT: 16.2 % (ref 11.6–15.1)
GFR SERPL CREATININE-BSD FRML MDRD: 50 ML/MIN/1.73SQ M
GLUCOSE SERPL-MCNC: 199 MG/DL (ref 65–140)
HCT VFR BLD AUTO: 41.7 % (ref 34.8–46.1)
HGB BLD-MCNC: 12.9 G/DL (ref 11.5–15.4)
IMM GRANULOCYTES # BLD AUTO: 0.02 THOUSAND/UL (ref 0–0.2)
IMM GRANULOCYTES NFR BLD AUTO: 0 % (ref 0–2)
LACTATE SERPL-SCNC: 1.5 MMOL/L (ref 0.5–2)
LACTATE SERPL-SCNC: 2.9 MMOL/L (ref 0.5–2)
LYMPHOCYTES # BLD AUTO: 0.68 THOUSANDS/ΜL (ref 0.6–4.47)
LYMPHOCYTES NFR BLD AUTO: 9 % (ref 14–44)
MCH RBC QN AUTO: 29.3 PG (ref 26.8–34.3)
MCHC RBC AUTO-ENTMCNC: 30.9 G/DL (ref 31.4–37.4)
MCV RBC AUTO: 95 FL (ref 82–98)
MONOCYTES # BLD AUTO: 0.04 THOUSAND/ΜL (ref 0.17–1.22)
MONOCYTES NFR BLD AUTO: 1 % (ref 4–12)
NEUTROPHILS # BLD AUTO: 6.56 THOUSANDS/ΜL (ref 1.85–7.62)
NEUTS SEG NFR BLD AUTO: 87 % (ref 43–75)
NRBC BLD AUTO-RTO: 0 /100 WBCS
PLATELET # BLD AUTO: 256 THOUSANDS/UL (ref 149–390)
PMV BLD AUTO: 9.7 FL (ref 8.9–12.7)
POTASSIUM SERPL-SCNC: 4.2 MMOL/L (ref 3.5–5.3)
PROT SERPL-MCNC: 8 G/DL (ref 6.4–8.2)
RBC # BLD AUTO: 4.4 MILLION/UL (ref 3.81–5.12)
SODIUM SERPL-SCNC: 140 MMOL/L (ref 136–145)
WBC # BLD AUTO: 7.53 THOUSAND/UL (ref 4.31–10.16)

## 2021-01-15 PROCEDURE — 80053 COMPREHEN METABOLIC PANEL: CPT | Performed by: EMERGENCY MEDICINE

## 2021-01-15 PROCEDURE — 99285 EMERGENCY DEPT VISIT HI MDM: CPT | Performed by: EMERGENCY MEDICINE

## 2021-01-15 PROCEDURE — 96376 TX/PRO/DX INJ SAME DRUG ADON: CPT

## 2021-01-15 PROCEDURE — 96361 HYDRATE IV INFUSION ADD-ON: CPT

## 2021-01-15 PROCEDURE — G1004 CDSM NDSC: HCPCS

## 2021-01-15 PROCEDURE — 36415 COLL VENOUS BLD VENIPUNCTURE: CPT | Performed by: EMERGENCY MEDICINE

## 2021-01-15 PROCEDURE — 83605 ASSAY OF LACTIC ACID: CPT | Performed by: EMERGENCY MEDICINE

## 2021-01-15 PROCEDURE — 85025 COMPLETE CBC W/AUTO DIFF WBC: CPT | Performed by: EMERGENCY MEDICINE

## 2021-01-15 PROCEDURE — 96365 THER/PROPH/DIAG IV INF INIT: CPT

## 2021-01-15 PROCEDURE — 74177 CT ABD & PELVIS W/CONTRAST: CPT

## 2021-01-15 PROCEDURE — 99285 EMERGENCY DEPT VISIT HI MDM: CPT

## 2021-01-15 PROCEDURE — 96375 TX/PRO/DX INJ NEW DRUG ADDON: CPT

## 2021-01-15 PROCEDURE — 93005 ELECTROCARDIOGRAM TRACING: CPT

## 2021-01-15 PROCEDURE — 71045 X-RAY EXAM CHEST 1 VIEW: CPT

## 2021-01-15 RX ORDER — MORPHINE SULFATE 10 MG/ML
6 INJECTION, SOLUTION INTRAMUSCULAR; INTRAVENOUS ONCE
Status: COMPLETED | OUTPATIENT
Start: 2021-01-15 | End: 2021-01-15

## 2021-01-15 RX ORDER — HYDROMORPHONE HCL/PF 1 MG/ML
1 SYRINGE (ML) INJECTION ONCE
Status: COMPLETED | OUTPATIENT
Start: 2021-01-15 | End: 2021-01-15

## 2021-01-15 RX ORDER — ONDANSETRON 2 MG/ML
4 INJECTION INTRAMUSCULAR; INTRAVENOUS ONCE
Status: COMPLETED | OUTPATIENT
Start: 2021-01-15 | End: 2021-01-15

## 2021-01-15 RX ADMIN — MORPHINE SULFATE 6 MG: 10 INJECTION INTRAVENOUS at 18:28

## 2021-01-15 RX ADMIN — SODIUM CHLORIDE 1000 ML: 0.9 INJECTION, SOLUTION INTRAVENOUS at 19:16

## 2021-01-15 RX ADMIN — IOHEXOL 100 ML: 350 INJECTION, SOLUTION INTRAVENOUS at 19:48

## 2021-01-15 RX ADMIN — CEFEPIME HYDROCHLORIDE 2000 MG: 2 INJECTION, POWDER, FOR SOLUTION INTRAVENOUS at 21:08

## 2021-01-15 RX ADMIN — ONDANSETRON 4 MG: 2 INJECTION INTRAMUSCULAR; INTRAVENOUS at 18:25

## 2021-01-15 RX ADMIN — HYDROMORPHONE HYDROCHLORIDE 1 MG: 1 INJECTION, SOLUTION INTRAMUSCULAR; INTRAVENOUS; SUBCUTANEOUS at 19:13

## 2021-01-15 RX ADMIN — MORPHINE SULFATE 6 MG: 10 INJECTION INTRAVENOUS at 20:36

## 2021-01-15 RX ADMIN — SODIUM CHLORIDE 1000 ML: 0.9 INJECTION, SOLUTION INTRAVENOUS at 18:25

## 2021-01-16 PROBLEM — R79.89 ELEVATED LACTIC ACID LEVEL: Status: ACTIVE | Noted: 2021-01-16

## 2021-01-16 LAB
ANION GAP SERPL CALCULATED.3IONS-SCNC: 10 MMOL/L (ref 4–13)
ATRIAL RATE: 106 BPM
BACTERIA UR QL AUTO: ABNORMAL /HPF
BASOPHILS # BLD MANUAL: 0 THOUSAND/UL (ref 0–0.1)
BASOPHILS NFR MAR MANUAL: 0 % (ref 0–1)
BILIRUB UR QL STRIP: NEGATIVE
BUN SERPL-MCNC: 29 MG/DL (ref 5–25)
CALCIUM SERPL-MCNC: 8.8 MG/DL (ref 8.3–10.1)
CHLORIDE SERPL-SCNC: 109 MMOL/L (ref 100–108)
CLARITY UR: ABNORMAL
CO2 SERPL-SCNC: 21 MMOL/L (ref 21–32)
COLOR UR: YELLOW
CREAT SERPL-MCNC: 1.17 MG/DL (ref 0.6–1.3)
EOSINOPHIL # BLD MANUAL: 0 THOUSAND/UL (ref 0–0.4)
EOSINOPHIL NFR BLD MANUAL: 0 % (ref 0–6)
ERYTHROCYTE [DISTWIDTH] IN BLOOD BY AUTOMATED COUNT: 16.4 % (ref 11.6–15.1)
GFR SERPL CREATININE-BSD FRML MDRD: 50 ML/MIN/1.73SQ M
GLUCOSE SERPL-MCNC: 121 MG/DL (ref 65–140)
GLUCOSE SERPL-MCNC: 122 MG/DL (ref 65–140)
GLUCOSE SERPL-MCNC: 158 MG/DL (ref 65–140)
GLUCOSE SERPL-MCNC: 166 MG/DL (ref 65–140)
GLUCOSE SERPL-MCNC: 196 MG/DL (ref 65–140)
GLUCOSE SERPL-MCNC: 229 MG/DL (ref 65–140)
GLUCOSE UR STRIP-MCNC: ABNORMAL MG/DL
HCT VFR BLD AUTO: 35.3 % (ref 34.8–46.1)
HGB BLD-MCNC: 10.7 G/DL (ref 11.5–15.4)
HGB UR QL STRIP.AUTO: ABNORMAL
KETONES UR STRIP-MCNC: ABNORMAL MG/DL
LEUKOCYTE ESTERASE UR QL STRIP: ABNORMAL
LYMPHOCYTES # BLD AUTO: 0.19 THOUSAND/UL (ref 0.6–4.47)
LYMPHOCYTES # BLD AUTO: 1 % (ref 14–44)
MCH RBC QN AUTO: 29.2 PG (ref 26.8–34.3)
MCHC RBC AUTO-ENTMCNC: 30.3 G/DL (ref 31.4–37.4)
MCV RBC AUTO: 96 FL (ref 82–98)
METAMYELOCYTES NFR BLD MANUAL: 6 % (ref 0–1)
MONOCYTES # BLD AUTO: 0.39 THOUSAND/UL (ref 0–1.22)
MONOCYTES NFR BLD: 2 % (ref 4–12)
NEUTROPHILS # BLD MANUAL: 17.71 THOUSAND/UL (ref 1.85–7.62)
NEUTS BAND NFR BLD MANUAL: 7 % (ref 0–8)
NEUTS SEG NFR BLD AUTO: 84 % (ref 43–75)
NITRITE UR QL STRIP: NEGATIVE
NON-SQ EPI CELLS URNS QL MICRO: ABNORMAL /HPF
NRBC BLD AUTO-RTO: 0 /100 WBCS
P AXIS: 57 DEGREES
PH UR STRIP.AUTO: 6.5 [PH] (ref 4.5–8)
PLATELET # BLD AUTO: 204 THOUSANDS/UL (ref 149–390)
PLATELET BLD QL SMEAR: ADEQUATE
PMV BLD AUTO: 8.9 FL (ref 8.9–12.7)
POLYCHROMASIA BLD QL SMEAR: PRESENT
POTASSIUM SERPL-SCNC: 5 MMOL/L (ref 3.5–5.3)
PR INTERVAL: 174 MS
PROCALCITONIN SERPL-MCNC: 40.13 NG/ML
PROT UR STRIP-MCNC: ABNORMAL MG/DL
QRS AXIS: -12 DEGREES
QRSD INTERVAL: 74 MS
QT INTERVAL: 320 MS
QTC INTERVAL: 415 MS
RBC # BLD AUTO: 3.67 MILLION/UL (ref 3.81–5.12)
RBC #/AREA URNS AUTO: ABNORMAL /HPF
SODIUM SERPL-SCNC: 140 MMOL/L (ref 136–145)
SP GR UR STRIP.AUTO: 1.01 (ref 1–1.03)
T WAVE AXIS: 29 DEGREES
TOTAL CELLS COUNTED SPEC: 100
UROBILINOGEN UR QL STRIP.AUTO: 0.2 E.U./DL
VENTRICULAR RATE: 101 BPM
WBC # BLD AUTO: 19.46 THOUSAND/UL (ref 4.31–10.16)
WBC #/AREA URNS AUTO: ABNORMAL /HPF

## 2021-01-16 PROCEDURE — 93010 ELECTROCARDIOGRAM REPORT: CPT | Performed by: INTERNAL MEDICINE

## 2021-01-16 PROCEDURE — 84145 PROCALCITONIN (PCT): CPT | Performed by: PHYSICIAN ASSISTANT

## 2021-01-16 PROCEDURE — 36415 COLL VENOUS BLD VENIPUNCTURE: CPT | Performed by: PHYSICIAN ASSISTANT

## 2021-01-16 PROCEDURE — 81001 URINALYSIS AUTO W/SCOPE: CPT

## 2021-01-16 PROCEDURE — 82948 REAGENT STRIP/BLOOD GLUCOSE: CPT

## 2021-01-16 PROCEDURE — 99232 SBSQ HOSP IP/OBS MODERATE 35: CPT | Performed by: PHYSICIAN ASSISTANT

## 2021-01-16 PROCEDURE — 87077 CULTURE AEROBIC IDENTIFY: CPT

## 2021-01-16 PROCEDURE — 99223 1ST HOSP IP/OBS HIGH 75: CPT | Performed by: INTERNAL MEDICINE

## 2021-01-16 PROCEDURE — 85027 COMPLETE CBC AUTOMATED: CPT | Performed by: PHYSICIAN ASSISTANT

## 2021-01-16 PROCEDURE — 80048 BASIC METABOLIC PNL TOTAL CA: CPT | Performed by: PHYSICIAN ASSISTANT

## 2021-01-16 PROCEDURE — 87186 SC STD MICRODIL/AGAR DIL: CPT

## 2021-01-16 PROCEDURE — 87086 URINE CULTURE/COLONY COUNT: CPT

## 2021-01-16 PROCEDURE — 85007 BL SMEAR W/DIFF WBC COUNT: CPT | Performed by: PHYSICIAN ASSISTANT

## 2021-01-16 RX ORDER — SODIUM CHLORIDE 9 MG/ML
75 INJECTION, SOLUTION INTRAVENOUS CONTINUOUS
Status: DISCONTINUED | OUTPATIENT
Start: 2021-01-16 | End: 2021-01-18

## 2021-01-16 RX ORDER — ONDANSETRON 2 MG/ML
4 INJECTION INTRAMUSCULAR; INTRAVENOUS EVERY 6 HOURS PRN
Status: DISCONTINUED | OUTPATIENT
Start: 2021-01-16 | End: 2021-01-19 | Stop reason: HOSPADM

## 2021-01-16 RX ORDER — ALPRAZOLAM 0.5 MG/1
0.5 TABLET ORAL
Status: DISCONTINUED | OUTPATIENT
Start: 2021-01-16 | End: 2021-01-19 | Stop reason: HOSPADM

## 2021-01-16 RX ORDER — PRAMIPEXOLE DIHYDROCHLORIDE 0.25 MG/1
0.25 TABLET ORAL
Status: DISCONTINUED | OUTPATIENT
Start: 2021-01-16 | End: 2021-01-19 | Stop reason: HOSPADM

## 2021-01-16 RX ORDER — ACETAMINOPHEN 325 MG/1
650 TABLET ORAL EVERY 6 HOURS PRN
Status: DISCONTINUED | OUTPATIENT
Start: 2021-01-16 | End: 2021-01-19 | Stop reason: HOSPADM

## 2021-01-16 RX ORDER — LEVOTHYROXINE SODIUM 0.07 MG/1
75 TABLET ORAL
Status: DISCONTINUED | OUTPATIENT
Start: 2021-01-16 | End: 2021-01-19 | Stop reason: HOSPADM

## 2021-01-16 RX ORDER — DIPHENHYDRAMINE HYDROCHLORIDE 50 MG/ML
25 INJECTION INTRAMUSCULAR; INTRAVENOUS EVERY 8 HOURS PRN
Status: DISCONTINUED | OUTPATIENT
Start: 2021-01-16 | End: 2021-01-19 | Stop reason: HOSPADM

## 2021-01-16 RX ORDER — FLUCONAZOLE 100 MG/1
200 TABLET ORAL DAILY
Status: DISCONTINUED | OUTPATIENT
Start: 2021-01-16 | End: 2021-01-17

## 2021-01-16 RX ORDER — METOCLOPRAMIDE HYDROCHLORIDE 5 MG/ML
10 INJECTION INTRAMUSCULAR; INTRAVENOUS EVERY 8 HOURS SCHEDULED
Status: DISCONTINUED | OUTPATIENT
Start: 2021-01-16 | End: 2021-01-17

## 2021-01-16 RX ORDER — MAGNESIUM HYDROXIDE/ALUMINUM HYDROXICE/SIMETHICONE 120; 1200; 1200 MG/30ML; MG/30ML; MG/30ML
30 SUSPENSION ORAL EVERY 6 HOURS PRN
Status: DISCONTINUED | OUTPATIENT
Start: 2021-01-16 | End: 2021-01-19 | Stop reason: HOSPADM

## 2021-01-16 RX ORDER — PANTOPRAZOLE SODIUM 40 MG/1
40 TABLET, DELAYED RELEASE ORAL
Status: DISCONTINUED | OUTPATIENT
Start: 2021-01-16 | End: 2021-01-19 | Stop reason: HOSPADM

## 2021-01-16 RX ADMIN — FLUCONAZOLE 200 MG: 100 TABLET ORAL at 10:32

## 2021-01-16 RX ADMIN — ENOXAPARIN SODIUM 40 MG: 40 INJECTION SUBCUTANEOUS at 10:32

## 2021-01-16 RX ADMIN — METOCLOPRAMIDE HYDROCHLORIDE 10 MG: 5 INJECTION INTRAMUSCULAR; INTRAVENOUS at 19:54

## 2021-01-16 RX ADMIN — PRAMIPEXOLE DIHYDROCHLORIDE 0.25 MG: 0.25 TABLET ORAL at 21:35

## 2021-01-16 RX ADMIN — INSULIN LISPRO 1 UNITS: 100 INJECTION, SOLUTION INTRAVENOUS; SUBCUTANEOUS at 22:59

## 2021-01-16 RX ADMIN — ACETAMINOPHEN 650 MG: 325 TABLET, FILM COATED ORAL at 11:04

## 2021-01-16 RX ADMIN — ACETAMINOPHEN 650 MG: 325 TABLET, FILM COATED ORAL at 22:59

## 2021-01-16 RX ADMIN — SODIUM CHLORIDE 75 ML/HR: 0.9 INJECTION, SOLUTION INTRAVENOUS at 19:55

## 2021-01-16 RX ADMIN — LEVOTHYROXINE SODIUM 75 MCG: 75 TABLET ORAL at 06:55

## 2021-01-16 RX ADMIN — PANTOPRAZOLE SODIUM 40 MG: 40 TABLET, DELAYED RELEASE ORAL at 19:54

## 2021-01-16 RX ADMIN — CEFTRIAXONE SODIUM 2000 MG: 10 INJECTION, POWDER, FOR SOLUTION INTRAVENOUS at 10:33

## 2021-01-16 RX ADMIN — ACETAMINOPHEN 650 MG: 325 TABLET, FILM COATED ORAL at 06:50

## 2021-01-16 RX ADMIN — CEFEPIME HYDROCHLORIDE 2000 MG: 2 INJECTION, POWDER, FOR SOLUTION INTRAVENOUS at 23:04

## 2021-01-16 NOTE — ASSESSMENT & PLAN NOTE
Presents with L flank plain that radiates to the LUQ  Pt has right ureteral diversion ileostomy  Tachycardic, otherwise VS stable  CT abdomen - increasing hydronephrosis, minimal on the right, moderate on the left on the left now with new delayed nephrogram and perinephric fluid suggesting leak   Caliber of the left ureter changes as it traverses the midline, possibly adhesion   No intraluminal filling defects   Nonobstructing stable left lower pole calculus  UA - small leukocytes, negative nitrites, innumerable WBC on micro  Lactic 2 9 --> 1 5  Received 3L NS bolus in ED  Cefepime 2g q12h x 10 days  Will also give prophylactic fluconazole as pt has hx of yeast infections with abx  Urology consult  May require nephrostomy drain

## 2021-01-16 NOTE — ASSESSMENT & PLAN NOTE
Lab Results   Component Value Date    HGBA1C 7 1 (H) 01/29/2019       Recent Labs     01/16/21  0053   POCGLU 229*       Blood Sugar Average: Last 72 hrs:  (P) 229     · In the setting of complicated urinary tract infections, Jardiance must be completely discontinued  · Hold Metformin  · Monitor on Accu-Cheks with sliding scale coverage and consider addition of Lantus during hospitalization if needed    Thus far her sugars have been stable  · Repeat A1c

## 2021-01-16 NOTE — ED PROVIDER NOTES
History  Chief Complaint   Patient presents with    Abdominal Pain     pt complains of abdomen pain and L flank pain pt has urostomy     62 yo female with h/o right ureteral diversion ileostomy and cystectomy (2004) and multiple SBO presents with sudden onset left flank pain associated with nausea and vomiting  No h/o fever  No change in quality and quantity of urosotomy output  Pain does radiate to LUQ but no radiation down to labia or groin  No change in bowel movements  Denies sick contacts, travel to COVID endemic areas, or changes in taste/smell  History provided by:  Relative, medical records and patient   used: No    Abdominal Pain  Pain location:  L flank  Pain quality: sharp    Pain radiates to:  Epigastric region and LUQ  Onset quality:  Sudden  Timing:  Constant  Progression:  Worsening  Chronicity:  New  Context: previous surgery and retching    Context: not recent travel, not sick contacts and not suspicious food intake    Relieved by:  Nothing  Worsened by:  Nothing  Ineffective treatments:  None tried  Associated symptoms: anorexia, nausea and vomiting    Associated symptoms: no chest pain, no chills, no diarrhea, no dysuria, no fever, no hematemesis, no hematochezia, no hematuria, no shortness of breath, no sore throat, no vaginal bleeding and no vaginal discharge    Risk factors: being elderly    Risk factors: no recent hospitalization        Prior to Admission Medications   Prescriptions Last Dose Informant Patient Reported? Taking?    ALPRAZolam (XANAX) 0 5 mg tablet Past Week at Unknown time Self Yes Yes   Sig: Take 0 5 mg by mouth daily at bedtime as needed     JARDIANCE 25 MG TABS 1/15/2021 at Unknown time Self Yes Yes   Sig: Take 25 mg by mouth daily   SYNTHROID 75 MCG tablet 1/15/2021 at Unknown time  Yes Yes   Sig: Take 75 mcg by mouth daily in the early morning     clindamycin (CLEOCIN) 150 mg capsule Not Taking at Unknown time  Yes No   Sig: Take 300 mg by mouth every 8 (eight) hours   ibuprofen (MOTRIN) 800 mg tablet Not Taking at Unknown time  Yes No   Sig: TAKE 1 TABLET BY MOUTH EVERY 8 HOURS WITH FOOD AS NEEDED FOR PAIN   metFORMIN (GLUCOPHAGE) 1000 MG tablet 1/15/2021 at Unknown time Self Yes Yes   Sig: Take 500 mg by mouth 2 (two) times a day with meals     pramipexole (MIRAPEX) 0 25 mg tablet 1/14/2021 at Unknown time Self Yes Yes   Sig: Take 0 25 mg by mouth daily at bedtime      Facility-Administered Medications: None       Past Medical History:   Diagnosis Date    Anemia     Back pain     Bowel obstruction ([facility] ) 2018    Cholelithiasis     [facility] lower [facility] ) 01/11/2004    on blood thinner for 3 years   Diabetes mellitus ([facility] )     type 2    Endometrial cancer ([facility] ) 1999    GERD (gastroesophageal reflux disease)     Gross hematuria     History of transfusion 1999    History of urostomy 01/11/2004    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   Hypothyroid     In vitro fertilization     Kidney stone     Migraines     Muscle weakness     abdominal    Personal history of irradiation     Renal cyst     Renal stone     Sleep apnea     in past prior to weight loss       Past Surgical History:   Procedure Laterality Date    COLONOSCOPY      COLONOSCOPY W/ BIOPSIES N/A 12/2015    HEEL SPUR SURGERY Right 1996    HYSTERECTOMY      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    LAPAROSCOPIC GASTRIC BANDING N/A 2006    [place], [address] OTHER SURGICAL HISTORY  2004    Urine shunt to intestine, transverse colon    TX LAP, SURG ENTEROLYSIS N/A 1/17/2019    Procedure: LAPAROSCOPIC LYSIS OF ADHESIONS;  Surgeon: Lissy Padilla MD;  Location: [address] [place] [address] OR;  Service: Shana Carter [address] [place] [address] endometrial cancer      SLEEVE GASTROPLASTY N/A 09/2015    Dr Yamile Landers, [facility]    URETER REVISION  2010    WEILBY THUMB ARTHROPLASTY      [address] [facility] Family History   Problem Relation Age of Onset    No Known Problems Mother     Kidney failure Father     Brain cancer Father     Kidney cancer Father     Diabetes Sister     Alcohol abuse Sister     Diabetes Brother      I have reviewed and agree with the history as documented  E-Cigarette/Vaping     E-Cigarette/Vaping Substances     Social History     Tobacco Use    Smoking status: Former Smoker     Packs/day: 1 50     Years: 10 00     Pack years: 15 00     Types: Cigarettes     Quit date: 1986     Years since quittin 0    Smokeless tobacco: Former User   Substance Use Topics    Alcohol use: Yes     Comment: socially    Drug use: No       Review of Systems   Constitutional: Negative for chills and fever  HENT: Negative for sore throat  Respiratory: Negative for shortness of breath  Cardiovascular: Negative for chest pain  Gastrointestinal: Positive for abdominal pain, anorexia, nausea and vomiting  Negative for diarrhea, hematemesis and hematochezia  Genitourinary: Negative for dysuria, hematuria, vaginal bleeding and vaginal discharge  All other systems reviewed and are negative  Physical Exam  Physical Exam  Vitals signs and nursing note reviewed  Constitutional:       General: She is in acute distress  Appearance: She is well-developed  She is not diaphoretic  HENT:      Head: Normocephalic and atraumatic  Eyes:      Conjunctiva/sclera: Conjunctivae normal    Neck:      Musculoskeletal: Normal range of motion  Cardiovascular:      Rate and Rhythm: Normal rate and regular rhythm  Heart sounds: Normal heart sounds  No murmur  Pulmonary:      Effort: Pulmonary effort is normal  No respiratory distress  Breath sounds: Normal breath sounds  Abdominal:      Palpations: Abdomen is soft  Tenderness: There is abdominal tenderness in the epigastric area and left upper quadrant  There is left CVA tenderness   There is no right CVA tenderness, guarding or rebound  Negative signs include Sims's sign and McBurney's sign  Musculoskeletal: Normal range of motion  General: No deformity  Skin:     General: Skin is warm and dry  Capillary Refill: Capillary refill takes less than 2 seconds  Neurological:      Mental Status: She is alert and oriented to person, place, and time  Psychiatric:         Behavior: Behavior normal          Thought Content:  Thought content normal          Judgment: Judgment normal          Vital Signs  ED Triage Vitals   Temperature Pulse Respirations Blood Pressure SpO2   01/15/21 1831 01/15/21 1744 01/15/21 1744 01/15/21 1835 01/15/21 1744   99 2 °F (37 3 °C) 100 16 137/67 97 %      Temp Source Heart Rate Source Patient Position - Orthostatic VS BP Location FiO2 (%)   01/15/21 1831 -- 01/15/21 1835 01/15/21 1835 --   Oral  Lying Right arm       Pain Score       01/15/21 1744       Worst Possible Pain           Vitals:    01/15/21 1744 01/15/21 1835   BP:  137/67   Pulse: 100    Patient Position - Orthostatic VS:  Lying         Visual Acuity      ED Medications  Medications   sodium chloride 0 9 % bolus 1,000 mL (1,000 mL Intravenous New Bag 1/15/21 1825)   sodium chloride 0 9 % bolus 1,000 mL (has no administration in time range)   HYDROmorphone (DILAUDID) injection 1 mg (has no administration in time range)   morphine (PF) 10 mg/mL injection 6 mg (6 mg Intravenous Given 1/15/21 1828)   ondansetron (ZOFRAN) injection 4 mg (4 mg Intravenous Given 1/15/21 1825)       Diagnostic Studies  Results Reviewed     Procedure Component Value Units Date/Time    CBC and differential [[id number]]  (Abnormal) Collected: 01/15/21 1850    Lab Status: Final result Specimen: Blood from Arm, Right Updated: 01/15/21 1902     WBC 7 53 Thousand/uL      RBC 4 40 Million/uL      Hemoglobin 12 9 g/dL      Hematocrit 41 7 %      MCV 95 fL      MCH 29 3 pg      MCHC 30 9 g/dL      RDW 16 2 %      MPV 9 7 fL      Platelets 884 Thousands/uL nRBC 0 /100 WBCs      Neutrophils Relative 87 %      Immat GRANS % 0 %      Lymphocytes Relative 9 %      Monocytes Relative 1 %      Eosinophils Relative 3 %      Basophils Relative 0 %      Neutrophils Absolute 6 56 Thousands/µL      Immature Grans Absolute 0 02 Thousand/uL      Lymphocytes Absolute 0 68 Thousands/µL      Monocytes Absolute 0 04 Thousand/µL      Eosinophils Absolute 0 20 Thousand/µL      Basophils Absolute 0 03 Thousands/µL     Comprehensive metabolic panel [[id number]] Collected: 01/15/21 1850    Lab Status: In process Specimen: Blood from Arm, Right Updated: 01/15/21 1855    Lactic acid [[id number]] Collected: 01/15/21 1850    Lab Status:  In process Specimen: Blood from Arm, Right Updated: 01/15/21 1855                 CT abdomen pelvis with contrast    (Results Pending)              Procedures  ECG 12 Lead Documentation Only    Date/Time: 1/15/2021 7:11 PM  Performed by: Davie Mike MD  Authorized by: Davie Mike MD     Indications / Diagnosis:  Tachycardia  ECG reviewed by me, the ED Provider: yes    Patient location:  ED  Previous ECG:     Previous ECG:  Compared to current    Comparison ECG info:  1/27/2019    Similarity:  No change    Comparison to cardiac monitor: Yes    Interpretation:     Interpretation: normal    Rate:     ECG rate:  101 BPM    ECG rate assessment: tachycardic    Rhythm:     Rhythm: sinus rhythm    Ectopy:     Ectopy: none    QRS:     QRS axis:  Normal  Conduction:     Conduction: normal    ST segments:     ST segments:  Normal  T waves:     T waves: normal    Comments:      No acute ischemia             ED Course  ED Course as of Nabil 15 2220   Fri Nabil 15, 2021   1930 wnl   CBC and differential(!)   1930 Mildly elevated glucose, calcium, and alk phos, mild CRISTINA   Comprehensive metabolic panel(!)   2646 elevated   LACTIC ACID(!!): 2 9 2055 IMPRESSION:     Patient with known right ureteral diversion ileostomy, increasing hydronephrosis, minimal on the right, moderate on the left on the left now with new delayed nephrogram and perinephric fluid suggesting leak  Caliber of the left ureter changes as it   traverses the midline, possibly adhesion  No intraluminal filling defects  Nonobstructing stable left lower pole calculus      Cholelithiasis without evidence of cholecystitis      Nonobstructing loop of small bowel herniated within the right-sided ostomy site      The study was marked in EPIC for immediate notification       CT abdomen pelvis with contrast   2104 CT findings discussed with urology who will review CT and get back  Will initiate antibiotics        2117 Discussed with Dr Yessi Lorea, possibly pyelo versus ureteral stricture (less likely) recommends admission, fluids and antibiotics possible IR placement of nephrostomy  No need to for transfer at this point  2142 mproved   LACTIC ACID: 1 5                                           MDM  Number of Diagnoses or Management Options  Hydronephrosis:   Pyelonephritis:   Diagnosis management comments: 62 yo female with h/o right ureteral diversion ileostomy and cystectomy also multiple SBO 2/2 adhesions presents now with acute left flank pain, CT with increasing hydronephrosis, discussed with urology who think pyelo versus stricture, may need IR placement of nephrostomy but for now antibiotics and admission           Amount and/or Complexity of Data Reviewed  Clinical lab tests: ordered and reviewed  Tests in the radiology section of CPT®: ordered and reviewed  Tests in the medicine section of CPT®: ordered and reviewed  Review and summarize past medical records: yes  Independent visualization of images, tracings, or specimens: yes    Risk of Complications, Morbidity, and/or Mortality  Presenting problems: moderate  Diagnostic procedures: moderate  Management options: moderate    Patient Progress  Patient progress: improved      Disposition  Final diagnoses:   None     ED Disposition     None      Follow-up Information    None         Patient's Medications   Discharge Prescriptions    No medications on file     No discharge procedures on file      PDMP Review     None          ED Provider  Electronically Signed by           Cari Hung MD  01/15/21 8477

## 2021-01-16 NOTE — UTILIZATION REVIEW
Initial Clinical Review    Admission: Date/Time/Statement:   Admission Orders (From admission, onward)     Ordered        01/15/21 2143  Inpatient Admission  Once                   Orders Placed This Encounter   Procedures    Inpatient Admission     Standing Status:   Standing     Number of Occurrences:   1     Order Specific Question:   Level of Care     Answer:   Med Surg [16]     Order Specific Question:   Estimated length of stay     Answer:   More than 2 Midnights     Order Specific Question:   Certification     Answer:   I certify that inpatient services are medically necessary for this patient for a duration of greater than two midnights  See H&P and MD Progress Notes for additional information about the patient's course of treatment  ED Arrival Information     Expected Arrival Acuity Means of Arrival Escorted By Service Admission Type    - 1/15/2021 17:26 Urgent Walk-In Spouse General Medicine Urgent    Arrival Complaint    ABDOMINAL PAIN        Chief Complaint   Patient presents with    Abdominal Pain     pt complains of abdomen pain and L flank pain pt has urostomy     Assessment/Plan: this is a 63year old female from home to ED admitted inpatient due to pyelonephritis  History of right ureteral diversion ileostomy dn cystectomy  Presented due to sudden left flank pain with nausea and vomiting starting prior to arrival    On exam abdominal tenderness in epigastric and left upper quadrant  Left CVA tenderness  Lactic acid 2 9  Ct abdomen showed  increasing hydronephrosis, minimal on the right, moderate on the left on the left now with new delayed nephrogram and perinephric fluid suggesting leak  In the ED given IV analgesia and antiemetic, 3 liter IVF bolus, urine culture sent and cefepime started  Urology consulted  Plan is  Ceftriaxone IV and follow cultures, start fluconazole as history of yeast infections  May need nephrostomy drain       1/16/2021 per urology - Patient with left pyelonephritis, left hydronephrosis with history of Ileal conduit urinary diversion (2004) secondary to ureteral strictures/pelvic radiation for uterine cancer  Plan is continued hydration, broad spectrum antibiotics, pain control  If patient becomes febrile or decompensates, may need left percutaneous nephrostomy  ED Triage Vitals   Temperature Pulse Respirations Blood Pressure SpO2   01/15/21 1831 01/15/21 1744 01/15/21 1744 01/15/21 1835 01/15/21 1744   99 2 °F (37 3 °C) 100 16 137/67 97 %      Temp Source Heart Rate Source Patient Position - Orthostatic VS BP Location FiO2 (%)   01/15/21 1831 01/15/21 2349 01/15/21 1835 01/15/21 1835 --   Oral Monitor Lying Right arm       Pain Score       01/15/21 1744       Worst Possible Pain          Wt Readings from Last 1 Encounters:   01/15/21 103 kg (226 lb)     Additional Vital Signs:   01/16/21 0838  98 5 °F (36 9 °C)  81  18  129/60  87  100 %      Nasal cannula  Lying   01/16/21 0816    75  18  93/55    100 %      None (Room air)  Lying   01/15/21 2349    102  18  110/56  77  97 %  28  2 L/min  Nasal cannula  Lying   01/15/21 2230    106Abnormal   18  154/83  113  96 %  28  2 L/min  Nasal cannula     01/15/21 2130    110Abnormal   18  138/74  99  98 %           01/15/21 2031    104  18  127/64  88  91 %      None (Room air)     01/15/21 1930    106Abnormal     169/62  89  96 %      None (Room air         Pertinent Labs/Diagnostic Test Results:   1/15/2021  Ct abdomen Patient with known right ureteral diversion ileostomy, increasing hydronephrosis, minimal on the right, moderate on the left on the left now with new delayed nephrogram and perinephric fluid suggesting leak  Caliber of the left ureter changes as it traverses the midline, possibly adhesion  No intraluminal filling defects  Nonobstructing stable left lower pole calculus    Cholelithiasis without evidence of cholecystitis    Nonobstructing loop of small bowel herniated within the right-sided ostomy site  1/15/2021 CxR - No acute cardiopulmonary disease      1/15/2021 EKG Sinus tachycardia  Low voltage QRS  Borderline ECG  When compared with ECG of 27-JAN-2019 23:15,  No significant change was found    Results from last 7 days   Lab Units 01/16/21  0515 01/15/21  1850   WBC Thousand/uL 19 46* 7 53   HEMOGLOBIN g/dL 10 7* 12 9   HEMATOCRIT % 35 3 41 7   PLATELETS Thousands/uL 204 256   NEUTROS ABS Thousands/µL  --  6 56   BANDS PCT % 7  --      Results from last 7 days   Lab Units 01/16/21  0515 01/15/21  1850   SODIUM mmol/L 140 140   POTASSIUM mmol/L 5 0 4 2   CHLORIDE mmol/L 109* 107   CO2 mmol/L 21 22   ANION GAP mmol/L 10 11   BUN mg/dL 29* 34*   CREATININE mg/dL 1 17 1 17   EGFR ml/min/1 73sq m 50 50   CALCIUM mg/dL 8 8 10 2*     Results from last 7 days   Lab Units 01/15/21  1850   AST U/L 11   ALT U/L 22   ALK PHOS U/L 135*   TOTAL PROTEIN g/dL 8 0   ALBUMIN g/dL 3 9   TOTAL BILIRUBIN mg/dL 0 38     Results from last 7 days   Lab Units 01/16/21  1059 01/16/21  0739 01/16/21  0053   POC GLUCOSE mg/dl 121 158* 229*     Results from last 7 days   Lab Units 01/16/21  0515 01/15/21  1850   GLUCOSE RANDOM mg/dL 196* 199*     Results from last 7 days   Lab Units 01/16/21  0515   PROCALCITONIN ng/ml 40 13*     Results from last 7 days   Lab Units 01/15/21  2104 01/15/21  1850   LACTIC ACID mmol/L 1 5 2 9*     Results from last 7 days   Lab Units 01/16/21  0047   CLARITY UA  Cloudy   COLOR UA  Yellow   SPEC GRAV UA  1 010   PH UA  6 5   GLUCOSE UA mg/dl >=1000 (1%)*   KETONES UA mg/dl Trace*   BLOOD UA  Moderate*   PROTEIN UA mg/dl 30 (1+)*   NITRITE UA  Negative   BILIRUBIN UA  Negative   UROBILINOGEN UA E U /dl 0 2   LEUKOCYTES UA  Small*   WBC UA /hpf Innumerable*   RBC UA /hpf 1-2   BACTERIA UA /hpf Occasional   EPITHELIAL CELLS WET PREP /hpf None Seen     Results from last 7 days   Lab Units 01/16/21  0515   TOTAL COUNTED  100     ED Treatment:   Medication Administration from 01/15/2021 1726 to 01/16/2021 0836       Date/Time Order Dose Route Action Comments     01/15/2021 1828 morphine (PF) 10 mg/mL injection 6 mg 6 mg Intravenous Given      01/15/2021 1825 sodium chloride 0 9 % bolus 1,000 mL 1,000 mL Intravenous New Bag      01/15/2021 1825 ondansetron (ZOFRAN) injection 4 mg 4 mg Intravenous Given      01/15/2021 1916 sodium chloride 0 9 % bolus 1,000 mL 1,000 mL Intravenous New Bag      01/15/2021 1913 HYDROmorphone (DILAUDID) injection 1 mg 1 mg Intravenous Given      01/15/2021 2036 morphine (PF) 10 mg/mL injection 6 mg 6 mg Intravenous Given      01/15/2021 2108 cefepime (MAXIPIME) 2 g/50 mL dextrose IVPB 2,000 mg Intravenous New Bag      01/16/2021 0655 levothyroxine tablet 75 mcg 75 mcg Oral Given      01/16/2021 0650 acetaminophen (TYLENOL) tablet 650 mg 650 mg Oral Given         Past Medical History:   Diagnosis Date    Anemia     Back pain     [facility] 2018    Cholelithiasis     Deep vein thrombosis of left lower extremity ([facility] ) 01/11/2004    on blood thinner for 3 years   Diabetes mellitus ([facility] )     type 2    Endometrial cancer ([facility] ) 1999    GERD (gastroesophageal reflux disease)     Gross hematuria     History of transfusion 1999    History of urostomy 01/11/2004    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      Hypothyroid     In vitro fertilization     Kidney stone     Migraines     Muscle weakness     abdominal    Personal history of irradiation     Renal cyst     Renal stone     Sleep apnea     in past prior to weight loss     Present on Admission:   Endometrial cancer ([facility] )   Hypothyroidism   Type 2 diabetes mellitus ([facility] )   Pyelonephritis      Admitting Diagnosis: Hydronephrosis [N13 30]  Abdominal pain [R10 9]  Pyelonephritis [N12]  Age/Sex: 63 y o  female  Admission Orders:  1/15/2021 2143 inpatient   Scheduled Medications:  cefTRIAXone, 2,000 mg, Intravenous, Q24H  Empagliflozin, 25 mg, Oral, Daily  enoxaparin, 40 mg, Subcutaneous, Daily  fluconazole, 200 mg, Oral, Daily  insulin lispro, 1-6 Units, Subcutaneous, 4x Daily (AC & HS)  levothyroxine, 75 mcg, Oral, Early Morning  pramipexole, 0 25 mg, Oral, HS  sodium chloride, 1,000 mL, Intravenous, Once 1/16/2021 0234       Continuous IV Infusions: none     PRN Meds:  acetaminophen, 650 mg, Oral, Q6H PRN - used x 2   ALPRAZolam, 0 5 mg, Oral, HS PRN  aluminum-magnesium hydroxide-simethicone, 30 mL, Oral, Q6H PRN  ondansetron, 4 mg, Intravenous, Q6H PRN        IP CONSULT TO UROLOGY      Network Utilization Review Department  ATTENTION: Please call with any questions or concerns to [phone] and carefully listen to the prompts so that you are directed to the right person  All voicemails are confidential   Tatianna Pagan all requests for admission clinical reviews, approved or denied determinations and any other requests to dedicated fax number below belonging to the campus where the patient is receiving treatment   List of dedicated fax numbers for the Facilities:  [address] DENIALS (Administrative/Medical Necessity) [phone]   [address] (Maternity/NICU/Pediatrics) [phone]   [address] Gildardo Cabello 0692 (Priya Lamb "[address]) [address] [place] [address] Roberto Lemon 1481 [address] [place] [address] [phone]

## 2021-01-16 NOTE — ASSESSMENT & PLAN NOTE
Presents with L flank plain that radiates to the LUQ  Pt has right ureteral diversion ileostomy  Tachycardic, otherwise VS stable  CT abdomen - increasing hydronephrosis, minimal on the right, moderate on the left on the left now with new delayed nephrogram and perinephric fluid suggesting leak   Caliber of the left ureter changes as it traverses the midline, possibly adhesion   No intraluminal filling defects   Nonobstructing stable left lower pole calculus  UA - small leukocytes, negative nitrites, innumerable WBC on micro  Lactic 2 9 --> 1 5  Received 3L NS bolus in ED  Cefepime 2g q12h x 10 days  Urology consult  May require nephrostomy drain

## 2021-01-16 NOTE — H&P
H&P- Patricia Ramp 1955, 63 y o  female MRN: [id number]    Unit/Bed#: FT 04 Encounter: [id number]    Primary Care Provider: Senthil Wade DO   Date and time admitted to hospital: 1/15/2021  5:48 PM        * Pyelonephritis  Assessment & Plan  Presents with L flank plain that radiates to the LUQ  Pt has right ureteral diversion ileostomy  Tachycardic, otherwise VS stable  CT abdomen - increasing hydronephrosis, minimal on the right, moderate on the left on the left now with new delayed nephrogram and perinephric fluid suggesting leak   Caliber of the left ureter changes as it traverses the midline, possibly adhesion   No intraluminal filling defects   Nonobstructing stable left lower pole calculus  UA - small leukocytes, negative nitrites, innumerable WBC on micro  Lactic 2 9 --> 1 5  Received 3L NS bolus in ED  Ceftriaxone 2g daily  Will also give prophylactic fluconazole as pt has hx of yeast infections with abx  Urology consult  May require nephrostomy drain      Elevated lactic acid level  Assessment & Plan  Secondary to infx vs dehydration  Initial lactic acid 2 9 --> 1 5  Pt received 3L NS bolus    Type 2 diabetes mellitus ([facility] [address] )  Assessment & Plan  Lab Results   Component Value Date    HGBA1C 7 1 (H) 01/29/2019       Recent Labs     01/16/21  0053   POCGLU 229*       Blood Sugar Average: Last 72 hrs:  (P) 229     Continue Jardiance  Hold Metformin  Sliding scale  Accuchecks with meals and qHS  UA - glucose >1000, +1 protein, trace ketones  Will likely need to start ACEi for proteinuria after acute illness resolves      Hypothyroidism  Assessment & Plan  Continue synthroid 75mcg daily    Endometrial cancer ([facility] [address] )  Assessment & Plan  S/p total hysterectomy and bilateral salpingoophorectomy     VTE Prophylaxis: Enoxaparin (Lovenox)  / sequential compression device   Code Status:  Level 1-full code  Discussion with family:  Discussed with patient    Anticipated Length of Stay:  Patient will be admitted on an Inpatient basis with an anticipated length of stay of  greater than 2 midnights  Justification for Hospital Stay:  Sepsis secondary to pyelonephritis with complicated surgical history requiring further evaluation by Urology    Total Time for Visit, including Counseling / Coordination of Care: 30 minutes  Greater than 50% of this total time spent on direct patient counseling and coordination of care  Chief Complaint:   Left flank pain    History of Present Illness:    Robyn Faith is a 63 y o  female with PMH of endometrial cancer s/p KENYATTA and b/l salpingo-oophorectomy, T2DM, hypothyroidism, hx of SBO due to adhesions who presents with left flank pain x1 day  Patient reports 9/10 pain that started in the left flank and radiated to left upper quadrant of the abdomen  Patient has a right ureteral diversion ileostomy which is draining clear yellow non-turbid urine  No change in the amount of output from ileostomy  Patient denies fevers, chills, nausea, vomiting, diarrhea, chest pain, shortness of breath  Denies sick contacts  Review of Systems:    Review of Systems   Constitutional: Negative for appetite change, chills, fatigue and fever  HENT: Negative for ear pain, sore throat and trouble swallowing  Eyes: Negative for visual disturbance  Respiratory: Negative for cough, chest tightness, shortness of breath and wheezing  Cardiovascular: Negative for chest pain, palpitations and leg swelling  Gastrointestinal: Positive for abdominal pain (LLQ)  Negative for abdominal distention, diarrhea, nausea and vomiting  Endocrine: Negative  Genitourinary: Positive for flank pain (left)  Negative for dysuria  Musculoskeletal: Negative for gait problem and myalgias  Skin: Negative for pallor  Allergic/Immunologic: Negative for immunocompromised state  Neurological: Negative for dizziness, syncope, light-headedness, numbness and headaches         Past Medical and Surgical History:     Past Medical History:   Diagnosis Date    Anemia     Back pain     Bowel obstruction ([facility] [address] ) 2018    Cholelithiasis     Deep vein thrombosis of left lower extremity ([facility] [address] ) 01/11/2004    on blood thinner for 3 years   [email or web address]     type 2    Endometrial cancer ([facility] ) 1999    GERD (gastroesophageal reflux disease)     Gross hematuria     History of transfusion 1999    History of urostomy 01/11/2004    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   Hypothyroid     In vitro fertilization     Kidney stone     Migraines     Muscle weakness     abdominal    Personal history of irradiation     Renal cyst     Renal stone     Sleep apnea     in past prior to weight loss       Past Surgical History:   Procedure Laterality Date    COLONOSCOPY      COLONOSCOPY W/ BIOPSIES N/A 12/2015    HEEL SPUR SURGERY Right 1996    HYSTERECTOMY      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    LAPAROSCOPIC GASTRIC BANDING N/A 2006    [place], [address] OTHER SURGICAL HISTORY  2004    Urine shunt to intestine, transverse colon    [place] LAP, SURG ENTEROLYSIS N/A 1/17/2019    Procedure: LAPAROSCOPIC LYSIS OF ADHESIONS;  Surgeon: Reema Sanabria MD;  Location: [facility] MAIN OR;  Service: [address] endometrial cancer   SLEEVE GASTROPLASTY N/A 09/2015    Dr Encarnacion Montgomery, [facility]    URETER REVISION  2010    WEILBY THUMB ARTHROPLASTY      [address]         Meds/Allergies:    Prior to Admission medications    Medication Sig Start Date End Date Taking?  Authorizing Provider   ALPRAZolam [place]) 0 5 mg tablet Take 0 5 mg by mouth daily at bedtime as needed   12/3/18  Yes Historical Provider, MD   JARDIANCE 25 MG TABS Take 25 mg by mouth daily 11/13/18  Yes Historical Provider, MD   metFORMIN (GLUCOPHAGE) 1000 MG tablet Take 500 mg by mouth 2 (two) times a day with meals     Yes Historical Provider, MD   pramipexole (MIRAPEX) 0 25 mg tablet Take 0 25 mg by mouth daily at bedtime   Yes Historical Provider, MD   SYNTHROID 75 MCG tablet Take 75 mcg by mouth daily in the early morning   10/8/18  Yes Historical Provider, MD   clindamycin (CLEOCIN) 150 mg capsule Take 300 mg by mouth every 8 (eight) hours    Historical Provider, MD   ibuprofen (MOTRIN) 800 mg tablet TAKE 1 TABLET BY MOUTH EVERY 8 HOURS WITH FOOD AS NEEDED FOR PAIN 18   Historical Provider, MD     I have reviewed home medications with patient personally  Allergies: Allergies   Allergen Reactions    Amoxicillin Rash    Penicillins Rash       Social History:     Marital Status: /Civil Union   Occupation:  Noncontributory  Patient Pre-hospital Living Situation:  Independent  Patient Pre-hospital Level of Mobility:  Independent  Patient Pre-hospital Diet Restrictions:  Diabetic  Substance Use History:   Social History     Substance and Sexual Activity   Alcohol Use Yes    Comment: socially     Social History     Tobacco Use   Smoking Status Former Smoker    Packs/day: 1 50    Years: 10 00    Pack years: 15 00    Types: Cigarettes    Quit date: 1986    Years since quittin 0   Smokeless Tobacco Former User     Social History     Substance and Sexual Activity   Drug Use No       Family History:    non-contributory    Physical Exam:     Vitals:   Blood Pressure: 110/56 (01/15/21 2349)  Pulse: 102 (01/15/21 2349)  Temperature: 99 2 °F (37 3 °C) (01/15/21 1831)  Temp Source: Oral (01/15/21 1831)  Respirations: 18 (01/15/21 2349)  Height: 5' 6 5" (168 9 cm) (01/15/21 1744)  Weight - Scale: 103 kg (226 lb) (01/15/21 1744)  SpO2: 97 % (01/15/21 2349)    Physical Exam  Vitals signs and nursing note reviewed  Constitutional:       Appearance: Normal appearance  HENT:      Head: Normocephalic and atraumatic  Mouth/Throat:      Mouth: Mucous membranes are moist       Pharynx: Oropharynx is clear  No oropharyngeal exudate     Eyes:      Extraocular Movements: Extraocular movements intact  Cardiovascular:      Rate and Rhythm: Normal rate and regular rhythm  Pulses: Normal pulses  Heart sounds: Normal heart sounds  No murmur  No friction rub  No gallop  Pulmonary:      Effort: Pulmonary effort is normal  No respiratory distress  Breath sounds: Normal breath sounds  No stridor  No wheezing or rales  Abdominal:      General: Abdomen is flat  Bowel sounds are normal  There is no distension  Palpations: Abdomen is soft  Tenderness: There is abdominal tenderness (TTP LUQ)  There is left CVA tenderness  Musculoskeletal:      Right lower leg: No edema  Left lower leg: No edema  Skin:     General: Skin is warm and dry  Neurological:      General: No focal deficit present  Mental Status: She is alert and oriented to person, place, and time  Additional Data:     Lab Results: I have personally reviewed pertinent reports  Results from last 7 days   Lab Units 01/15/21  1850   WBC Thousand/uL 7 53   HEMOGLOBIN g/dL 12 9   HEMATOCRIT % 41 7   PLATELETS Thousands/uL 256   NEUTROS PCT % 87*   LYMPHS PCT % 9*   MONOS PCT % 1*   EOS PCT % 3     Results from last 7 days   Lab Units 01/15/21  1850   SODIUM mmol/L 140   POTASSIUM mmol/L 4 2   CHLORIDE mmol/L 107   CO2 mmol/L 22   BUN mg/dL 34*   CREATININE mg/dL 1 17   ANION GAP mmol/L 11   CALCIUM mg/dL 10 2*   ALBUMIN g/dL 3 9   TOTAL BILIRUBIN mg/dL 0 38   ALK PHOS U/L 135*   ALT U/L 22   AST U/L 11   GLUCOSE RANDOM mg/dL 199*         Results from last 7 days   Lab Units 01/16/21  0053   POC GLUCOSE mg/dl 229*         Results from last 7 days   Lab Units 01/15/21  2104 01/15/21  1850   LACTIC ACID mmol/L 1 5 2 9*       Imaging: I have personally reviewed pertinent reports        XR chest 1 view portable   ED Interpretation by Oanh Baker MD (01/15 2147)   nad      CT abdomen pelvis with contrast   Final Result by Catrina Villa MD (01/15 2034)      Patient with known right ureteral diversion ileostomy, increasing hydronephrosis, minimal on the right, moderate on the left on the left now with new delayed nephrogram and perinephric fluid suggesting leak  Caliber of the left ureter changes as it    traverses the midline, possibly adhesion  No intraluminal filling defects  Nonobstructing stable left lower pole calculus  Cholelithiasis without evidence of cholecystitis  Nonobstructing loop of small bowel herniated within the right-sided ostomy site  The study was marked in [facility] for immediate notification  Workstation performed: [id number]             EKG, Pathology, and Other Studies Reviewed on Admission:   · EKG:  NSR,     Allscripts / Epic Records Reviewed: Yes     ** Please Note: This note has been constructed using a voice recognition system   **

## 2021-01-16 NOTE — ASSESSMENT & PLAN NOTE
Lab Results   Component Value Date    HGBA1C 7 1 (H) 01/29/2019       Recent Labs     01/16/21  0053   POCGLU 229*       Blood Sugar Average: Last 72 hrs:  (P) 229     Continue Jardiance  Hold Metformin  Sliding scale  Accuchecks with meals and qHS  UA - glucose >1000, +1 protein, trace ketones  Will likely need to start ACEi for proteinuria after acute illness resolves

## 2021-01-16 NOTE — CONSULTS
UROLOGY CONSULTATION NOTE     Patient Identifiers: Patricia Bradshaw (MRN [id number])  Service Requesting Consultation:  Internal Medicine  Service Providing Consultation:  Urology, Nori Fowler PA-C    Date of Service: 1/16/2021    Reason for Consultation:  Urostomy, hydronephrosis    History of Present Illness:     Patricia Bradshaw is a 63 y o  old female patient known by Dr Safia Farris with a history uterine cancer status post TAHBSO followed by pelvic radiation  Patient developed bilateral ureteral strictures and ultimately underwent diverting ileal conduit performed in 2004 by Dr Ivy Flores  Patient did have some intermittent episodes of small-bowel obstruction thereafter  Patient presented to the emergency department yesterday, 01/15/2020 secondary to left flank pain radiating to LUQ, nausea, and vomiting  She denies any change in urostomy output  Patient was noted to have lactic acidosis initially 2 9 upon presentation decreasing down to 1 5 with normal saline bolus  A CT of the abdomen and pelvis was obtained revealing minimal right hydronephrosis however moderate left hydronephrosis with a delayed nephrogram and perinephric fluid  Patient's renal function remains relatively preserved at 1 17, previously 0 84 in 2019  She does demonstrate increase in leukocytosis overnight with the white blood cell up to 19  Patient was very mildly tachycardic overnight, however vital signs stable this morning and patient remains afebrile  Patient states that upon presentation, pain 10/10, now down to 3/10 and intermittent  Did have some nausea and nonbilious emesis yesterday, however improving  Denies any fevers but has intermittent chills  Denies any hematuria per her urostomy  Denies any urethral drainage       Past Medical, Past Surgical History:     Past Medical History:   Diagnosis Date    Anemia     Back pain     Bowel obstruction ([address] ) 2018    Cholelithiasis     Deep vein thrombosis of left lower extremity ([facility] [address] ) 01/11/2004    on blood thinner for 3 years   Diabetes mellitus ([facility] [address] )     type 2    [facility]    GERD (gastroesophageal reflux disease)     Gross hematuria     History of transfusion 1999    History of urostomy 01/11/2004    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   Hypothyroid     In vitro fertilization     Kidney stone     Migraines     Muscle weakness     abdominal    Personal history of irradiation     Renal cyst     Renal stone     Sleep apnea     in past prior to weight loss   :    Past Surgical History:   Procedure Laterality Date    COLONOSCOPY      COLONOSCOPY W/ BIOPSIES N/A 12/2015    HEEL SPUR SURGERY Right 1996    HYSTERECTOMY      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    LAPAROSCOPIC GASTRIC BANDING N/A 2006    [place], [address] OTHER SURGICAL HISTORY  2004    Urine shunt to intestine, transverse colon    CT LAP, SURG ENTEROLYSIS N/A 1/17/2019    Procedure: LAPAROSCOPIC LYSIS OF ADHESIONS;  Surgeon: Benny Thomas MD;  Location: [address] [place] [address];  Service: [address] endometrial cancer      SLEEVE GASTROPLASTY N/A 09/2015    Dr Yanni Cardenas, [facility]    URETER REVISION  2010    WEILBY THUMB ARTHROPLASTY      [facility] ARTHROPLASTY     :    Medications, Allergies:     Current Facility-Administered Medications:     acetaminophen (TYLENOL) tablet 650 mg, 650 mg, Oral, Q6H PRN, Dave Marques PA-C, 650 mg at 01/16/21 0650    ALPRAZolam Loye Lessen) tablet 0 5 mg, 0 5 mg, Oral, HS PRN, Dave Marques PA-C    aluminum-magnesium hydroxide-simethicone (MYLANTA) oral suspension 30 mL, 30 mL, Oral, Q6H PRN, Dave Marques PA-C    cefTRIAXone (ROCEPHIN) 2,000 mg in dextrose 5 % 50 mL IVPB, 2,000 mg, Intravenous, Q24H, Dave Marques PA-C    Empagliflozin TABS 25 mg, 25 mg, Oral, Daily, Dave Marques PA-C    enoxaparin (LOVENOX) subcutaneous injection 40 mg, 40 mg, Subcutaneous, Daily, CHECO Wallace    fluconazole (DIFLUCAN) tablet 200 mg, 200 mg, Oral, Daily, CHECO Wallace    insulin lispro (HumaLOG) 100 units/mL subcutaneous injection 1-6 Units, 1-6 Units, Subcutaneous, 4x Daily (AC & HS) **AND** Fingerstick Glucose (POCT), , , 4x Daily AC and at bedtime, CHECO Wallace    levothyroxine tablet 75 mcg, 75 mcg, Oral, Early Morning, CHECO Wallace, 75 mcg at 21 0655    ondansetron (ZOFRAN) injection 4 mg, 4 mg, Intravenous, Q6H PRN, CHECO Wallace    pramipexole (MIRAPEX) tablet 0 25 mg, 0 25 mg, Oral, HS, CHECO Wallace    sodium chloride 0 9 % bolus 1,000 mL, 1,000 mL, Intravenous, Once, Mirna Ovalles MD    Allergies: Allergies   Allergen Reactions    Amoxicillin Rash    Penicillins Rash   :    Social and Family History:   Social History:   Social History     Tobacco Use    Smoking status: Former Smoker     Packs/day: 1 50     Years: 10 00     Pack years: 15 00     Types: Cigarettes     Quit date: 1986     Years since quittin 0    Smokeless tobacco: Former User   Substance Use Topics    Alcohol use: Yes     Comment: socially    Drug use: No        Social History     Tobacco Use   Smoking Status Former Smoker    Packs/day: 1 50    Years: 10 00    Pack years: 15 00    Types: Cigarettes    Quit date: 1986    Years since quittin 0   Smokeless Tobacco Former User       Family History:  Family History   Problem Relation Age of Onset    No Known Problems Mother     Kidney failure Father     Brain cancer Father     Kidney cancer Father     Diabetes Sister     Alcohol abuse Sister     Diabetes Brother    :     Review of Systems:     General: positive chills, negative fever  Cardiac: Negative for chest pain  Pulmonary: Negative for shortness of breath  Gastrointestinal: Abdominal pain negative    Nausea, vomiting, or diarrhea positive,  Genitourinary: left flank pain  All other systems queried were negative  Physical Exam:   General: Patient is pleasant and in NAD  Awake and alert  BP 93/55 (BP Location: Right arm)   Pulse 75   Temp 99 2 °F (37 3 °C) (Oral)   Resp 18   Ht 5' 6 5" (1 689 m)   Wt 103 kg (226 lb)   SpO2 100%   BMI 35 93 kg/m²   Cardiac: Peripheral edema: negative  Pulmonary: Non-labored breathing  Abdomen: Soft, non-tender, non-distended  RLQ urostomy, pink/patent stoma drainage clear yellow urine  Genitourinary: positive CVA tenderness, negative suprapubic tenderness  Labs:     Lab Results   Component Value Date    HGB 10 7 (L) 01/16/2021    HCT 35 3 01/16/2021    WBC 19 46 (H) 01/16/2021     01/16/2021   ]    Lab Results   Component Value Date    K 5 0 01/16/2021     (H) 01/16/2021    CO2 21 01/16/2021    BUN 29 (H) 01/16/2021    CREATININE 1 17 01/16/2021    CALCIUM 8 8 01/16/2021   ]    Imaging:     CT ABDOMEN AND PELVIS WITH IV CONTRAST     INDICATION:   l;eft flank pain      COMPARISON:  January 2019     TECHNIQUE:  CT examination of the abdomen and pelvis was performed  Axial, sagittal, and coronal 2D reformatted images were created from the source data and submitted for interpretation      Radiation dose length product (DLP) for this visit:  1014 mGy-cm     This examination, like all CT scans performed in the [facility], was performed utilizing techniques to minimize radiation dose exposure, including the use of iterative   reconstruction and automated exposure control      IV Contrast:  100 mL of iohexol (OMNIPAQUE)  Enteric Contrast:  Enteric contrast was not administered      FINDINGS:     ABDOMEN     LOWER CHEST:  No clinically significant abnormality identified in the visualized lower chest      LIVER/BILIARY TREE:  Unremarkable      GALLBLADDER:  There are gallstone(s) within the gallbladder, without pericholecystic inflammatory changes      SPLEEN:  Unremarkable      PANCREAS:  Unremarkable      ADRENAL GLANDS: Unremarkable      KIDNEYS/URETERS:  There is increasing hydronephrosis, mild to moderate on the left  Mild fullness on the right  On the left also showing mild relative delayed nephrogram and moderate perinephric fluid stranding suggesting possible urine leak  The left   ureter is dilated to the point at which it traverses the midline, possibly adhesion at this level  Right-sided ileal conduit and ureterostomy noted        Nonobstructing 6 mm calculus noted in the left lower pole        STOMACH AND BOWEL:  Loop of nonobstructed small bowel noted herniated within the right ileostomy site  Prior gastric sleeve procedure noted  Bowel otherwise within normal limits      APPENDIX:  Not identified      ABDOMINOPELVIC CAVITY:  No ascites  No pneumoperitoneum  No lymphadenopathy      VESSELS:  Unremarkable for patient's age      PELVIS     REPRODUCTIVE ORGANS:  Surgical changes of prior hysterectomy      URINARY BLADDER:  Unremarkable      ABDOMINAL WALL/INGUINAL REGIONS:  Unremarkable      OSSEOUS STRUCTURES:  No acute fracture or destructive osseous lesion      IMPRESSION:     Patient with known right ureteral diversion ileostomy, increasing hydronephrosis, minimal on the right, moderate on the left on the left now with new delayed nephrogram and perinephric fluid suggesting leak  Caliber of the left ureter changes as it   traverses the midline, possibly adhesion  No intraluminal filling defects    Nonobstructing stable left lower pole calculus      Cholelithiasis without evidence of cholecystitis      Nonobstructing loop of small bowel herniated within the right-sided ostomy site        ASSESSMENT:     Left Pyelonephritis  Left Hydronephrosis  Ileal conduit urinary diversion (2004) secondary to ureteral strictures/pelvic radiation for uterine cancer     PLAN:     - continue hydration, broad spectrum antibiotics, pain control, and monitoring I&O  - if pain worsens, renal function worsens, patient becomes febrile or decompensates, may require left percutaneous nephrostomy tube placement with interventional radiology  Reviewed with patient and she verbalized understanding    - if improving over the next 24-48 hours with conservative measures, then may pursue outpatient loopogram  Radiology unable to perform over weekend  - I reviewed with patient her outpatient diabetic medication of Jardiance is an SGLT2 inhibitor  We discussed avoiding this in the future as she can have increased risk of pyelonephritis, especially given her history of urinary diversion  Patient verbalized understanding  Thank you for allowing me to participate in this patients care  Please do not hesitate to call with any additional questions    [employer], PA-C

## 2021-01-16 NOTE — ASSESSMENT & PLAN NOTE
Presents with L flank plain that radiates to the LUQ  Pt has right ureteral diversion ileostomy  Tachycardic, otherwise VS stable  CT abdomen - increasing hydronephrosis, minimal on the right, moderate on the left on the left now with new delayed nephrogram and perinephric fluid suggesting leak   Caliber of the left ureter changes as it traverses the midline, possibly adhesion   No intraluminal filling defects   Nonobstructing stable left lower pole calculus  UA - small leukocytes, negative nitrites, innumerable WBC on micro  Lactic 2 9 --> 1 5  Received 3L NS bolus in ED  Ceftriaxone 2g daily  Will also give prophylactic fluconazole as pt has hx of yeast infections with abx  Urology consult  May require nephrostomy drain

## 2021-01-16 NOTE — PLAN OF CARE
Problem: Potential for Falls  Goal: Patient will remain free of falls  Description: INTERVENTIONS:  - Assess patient frequently for physical needs  -  Identify cognitive and physical deficits and behaviors that affect risk of falls    -  [place] fall precautions as indicated by assessment   - Educate patient/family on patient safety including physical limitations  - Instruct patient to call for assistance with activity based on assessment  - Modify environment to reduce risk of injury  - Consider OT/PT consult to assist with strengthening/mobility  Outcome: Progressing     Problem: PAIN - ADULT  Goal: Verbalizes/displays adequate comfort level or baseline comfort level  Description: Interventions:  - Encourage patient to monitor pain and request assistance  - Assess pain using appropriate pain scale  - Administer analgesics based on type and severity of pain and evaluate response  - Implement non-pharmacological measures as appropriate and evaluate response  - Consider cultural and social influences on pain and pain management  - Notify physician/advanced practitioner if interventions unsuccessful or patient reports new pain  Outcome: Progressing     Problem: INFECTION - ADULT  Goal: Absence or prevention of progression during hospitalization  Description: INTERVENTIONS:  - Assess and monitor for signs and symptoms of infection  - Monitor lab/diagnostic results  - Monitor all insertion sites, i e  indwelling lines, tubes, and drains  - Monitor endotracheal if appropriate and nasal secretions for changes in amount and color  - [place] appropriate cooling/warming therapies per order  - Administer medications as ordered  - Instruct and encourage patient and family to use good hand hygiene technique  - Identify and instruct in appropriate isolation precautions for identified infection/condition  Outcome: Progressing

## 2021-01-16 NOTE — ED NOTES
Patient placed on 3L [facility], [address] [place] [address]  01/15/21 1931

## 2021-01-17 ENCOUNTER — APPOINTMENT (INPATIENT)
Dept: RADIOLOGY | Facility: HOSPITAL | Age: 66
DRG: 872 | End: 2021-01-17
Attending: RADIOLOGY
Payer: COMMERCIAL

## 2021-01-17 PROBLEM — E66.812 CLASS 2 OBESITY IN ADULT: Status: ACTIVE | Noted: 2021-01-17

## 2021-01-17 PROBLEM — E66.9 CLASS 2 OBESITY IN ADULT: Status: ACTIVE | Noted: 2021-01-17

## 2021-01-17 LAB
ANION GAP SERPL CALCULATED.3IONS-SCNC: 11 MMOL/L (ref 4–13)
BASOPHILS # BLD AUTO: 0.04 THOUSANDS/ΜL (ref 0–0.1)
BASOPHILS NFR BLD AUTO: 0 % (ref 0–1)
BUN SERPL-MCNC: 21 MG/DL (ref 5–25)
CALCIUM SERPL-MCNC: 9.2 MG/DL (ref 8.3–10.1)
CHLORIDE SERPL-SCNC: 105 MMOL/L (ref 100–108)
CO2 SERPL-SCNC: 20 MMOL/L (ref 21–32)
CREAT SERPL-MCNC: 1.31 MG/DL (ref 0.6–1.3)
EOSINOPHIL # BLD AUTO: 0.02 THOUSAND/ΜL (ref 0–0.61)
EOSINOPHIL NFR BLD AUTO: 0 % (ref 0–6)
ERYTHROCYTE [DISTWIDTH] IN BLOOD BY AUTOMATED COUNT: 16.5 % (ref 11.6–15.1)
GFR SERPL CREATININE-BSD FRML MDRD: 43 ML/MIN/1.73SQ M
GLUCOSE SERPL-MCNC: 139 MG/DL (ref 65–140)
GLUCOSE SERPL-MCNC: 146 MG/DL (ref 65–140)
GLUCOSE SERPL-MCNC: 169 MG/DL (ref 65–140)
GLUCOSE SERPL-MCNC: 198 MG/DL (ref 65–140)
GLUCOSE SERPL-MCNC: 99 MG/DL (ref 65–140)
HCT VFR BLD AUTO: 34.4 % (ref 34.8–46.1)
HGB BLD-MCNC: 10.8 G/DL (ref 11.5–15.4)
IMM GRANULOCYTES # BLD AUTO: 0.06 THOUSAND/UL (ref 0–0.2)
IMM GRANULOCYTES NFR BLD AUTO: 1 % (ref 0–2)
LYMPHOCYTES # BLD AUTO: 0.78 THOUSANDS/ΜL (ref 0.6–4.47)
LYMPHOCYTES NFR BLD AUTO: 6 % (ref 14–44)
MCH RBC QN AUTO: 29.5 PG (ref 26.8–34.3)
MCHC RBC AUTO-ENTMCNC: 31.4 G/DL (ref 31.4–37.4)
MCV RBC AUTO: 94 FL (ref 82–98)
MONOCYTES # BLD AUTO: 0.78 THOUSAND/ΜL (ref 0.17–1.22)
MONOCYTES NFR BLD AUTO: 6 % (ref 4–12)
NEUTROPHILS # BLD AUTO: 11.3 THOUSANDS/ΜL (ref 1.85–7.62)
NEUTS SEG NFR BLD AUTO: 87 % (ref 43–75)
NRBC BLD AUTO-RTO: 0 /100 WBCS
PLATELET # BLD AUTO: 176 THOUSANDS/UL (ref 149–390)
PMV BLD AUTO: 9.7 FL (ref 8.9–12.7)
POTASSIUM SERPL-SCNC: 4 MMOL/L (ref 3.5–5.3)
PROCALCITONIN SERPL-MCNC: 32.92 NG/ML
RBC # BLD AUTO: 3.66 MILLION/UL (ref 3.81–5.12)
SODIUM SERPL-SCNC: 136 MMOL/L (ref 136–145)
WBC # BLD AUTO: 12.98 THOUSAND/UL (ref 4.31–10.16)

## 2021-01-17 PROCEDURE — 82948 REAGENT STRIP/BLOOD GLUCOSE: CPT

## 2021-01-17 PROCEDURE — 99232 SBSQ HOSP IP/OBS MODERATE 35: CPT | Performed by: INTERNAL MEDICINE

## 2021-01-17 PROCEDURE — 85025 COMPLETE CBC W/AUTO DIFF WBC: CPT | Performed by: PHYSICIAN ASSISTANT

## 2021-01-17 PROCEDURE — 84145 PROCALCITONIN (PCT): CPT | Performed by: PHYSICIAN ASSISTANT

## 2021-01-17 PROCEDURE — 99232 SBSQ HOSP IP/OBS MODERATE 35: CPT | Performed by: UROLOGY

## 2021-01-17 PROCEDURE — 87040 BLOOD CULTURE FOR BACTERIA: CPT | Performed by: PHYSICIAN ASSISTANT

## 2021-01-17 PROCEDURE — 80048 BASIC METABOLIC PNL TOTAL CA: CPT | Performed by: PHYSICIAN ASSISTANT

## 2021-01-17 PROCEDURE — NC001 PR NO CHARGE: Performed by: RADIOLOGY

## 2021-01-17 RX ORDER — METOCLOPRAMIDE HYDROCHLORIDE 5 MG/ML
10 INJECTION INTRAMUSCULAR; INTRAVENOUS EVERY 6 HOURS PRN
Status: DISCONTINUED | OUTPATIENT
Start: 2021-01-17 | End: 2021-01-19 | Stop reason: HOSPADM

## 2021-01-17 RX ORDER — HEPARIN SODIUM 5000 [USP'U]/ML
5000 INJECTION, SOLUTION INTRAVENOUS; SUBCUTANEOUS EVERY 8 HOURS SCHEDULED
Status: DISCONTINUED | OUTPATIENT
Start: 2021-01-17 | End: 2021-01-17

## 2021-01-17 RX ADMIN — CEFEPIME HYDROCHLORIDE 2000 MG: 2 INJECTION, POWDER, FOR SOLUTION INTRAVENOUS at 16:42

## 2021-01-17 RX ADMIN — ACETAMINOPHEN 650 MG: 325 TABLET, FILM COATED ORAL at 11:30

## 2021-01-17 RX ADMIN — INSULIN LISPRO 2 UNITS: 100 INJECTION, SOLUTION INTRAVENOUS; SUBCUTANEOUS at 14:20

## 2021-01-17 RX ADMIN — METOCLOPRAMIDE HYDROCHLORIDE 10 MG: 5 INJECTION INTRAMUSCULAR; INTRAVENOUS at 13:04

## 2021-01-17 RX ADMIN — PRAMIPEXOLE DIHYDROCHLORIDE 0.25 MG: 0.25 TABLET ORAL at 22:10

## 2021-01-17 RX ADMIN — MORPHINE SULFATE 1 MG: 2 INJECTION, SOLUTION INTRAMUSCULAR; INTRAVENOUS at 13:03

## 2021-01-17 RX ADMIN — FLUCONAZOLE 200 MG: 100 TABLET ORAL at 11:32

## 2021-01-17 RX ADMIN — LEVOTHYROXINE SODIUM 75 MCG: 75 TABLET ORAL at 06:14

## 2021-01-17 RX ADMIN — PANTOPRAZOLE SODIUM 40 MG: 40 TABLET, DELAYED RELEASE ORAL at 06:14

## 2021-01-17 RX ADMIN — PANTOPRAZOLE SODIUM 40 MG: 40 TABLET, DELAYED RELEASE ORAL at 16:51

## 2021-01-17 NOTE — SEDATION DOCUMENTATION
Called in for evaluation for PCN with anesthesia support  However, patient had a full breakfast at 1000 per the primary RN so procedure will be done at a later time

## 2021-01-17 NOTE — PROGRESS NOTES
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 strictures/pelvic radiation for uterine cancer     Plan:   - febrile overnight with persistent flank pain  - will plan for IR placement of left percutaneous nephrostomy tube  Reviewed with the patient future goals for anterograde pyelogram and capping trial once infection has resolved  Patient verbalized understanding  -NPO until PCN placed  Subjective:     Febrile overnight, Tmax 102, chills, and sweating  Improved this morning   Slightly worsening and constant left flank pain rating 4-5/10    Objective:     VITALS:    Vitals:    01/17/21 0724   BP: 130/83   Pulse: 93   Resp: 16   Temp: 98 6 °F (37 °C)   SpO2: 97%           LABS:  Lab Results   Component Value Date    HGB 10 8 (L) 01/17/2021    HCT 34 4 (L) 01/17/2021    WBC 12 98 (H) 01/17/2021     01/17/2021   ]    Lab Results   Component Value Date    K 5 0 01/16/2021     (H) 01/16/2021    CO2 21 01/16/2021    BUN 29 (H) 01/16/2021    CREATININE 1 17 01/16/2021    CALCIUM 8 8 01/16/2021   ]    INPATIENT MEDS:    Current Facility-Administered Medications:     acetaminophen (TYLENOL) tablet 650 mg, 650 mg, Oral, Q6H PRN, Minerva Vaughn PA-C, 650 mg at 01/16/21 9629    ALPRAZolam (XANAX) tablet 0 5 mg, 0 5 mg, Oral, HS PRN, Minerva Vaughn PA-C    aluminum-magnesium hydroxide-simethicone (MYLANTA) oral suspension 30 mL, 30 mL, Oral, Q6H PRN, Minerva Vaughn PA-C    cefepime (MAXIPIME) 2,000 mg in dextrose 5 % 50 mL IVPB, 2,000 mg, Intravenous, Q12H, Wilber Gomez MD, Stopped at 01/16/21 6724    diphenhydrAMINE (BENADRYL) injection 25 mg, 25 mg, Intravenous, Q8H PRN, Wilber Gomez MD    fluconazole (DIFLUCAN) tablet 200 mg, 200 mg, Oral, Daily, Minerva Vaughn PA-C, 200 mg at 01/16/21 1191   insulin lispro (HumaLOG) 100 units/mL subcutaneous injection 1-6 Units, 1-6 Units, Subcutaneous, 4x Daily (AC & HS), 1 Units at 01/16/21 2259 **AND** Fingerstick Glucose (POCT), , , 4x Daily AC and at bedtime, Alexander Costa PA-C    levothyroxine tablet 75 mcg, 75 mcg, Oral, Early Morning, Alexander Costa PA-C, 75 mcg at 01/17/21 3784    metoclopramide (REGLAN) injection 10 mg, 10 mg, Intravenous, Q8H [address], Esau Willis MD, 10 mg at 01/16/21 1954    morphine injection 1 mg, 1 mg, Intravenous, Q2H PRN, Yvrose Myers PA-C    ondansetron (ZOFRAN) injection 4 mg, 4 mg, Intravenous, Q6H PRN, Alexander Costa PA-C    pantoprazole (PROTONIX) EC tablet 40 mg, 40 mg, Oral, BID AC, Esau Willis MD, 40 mg at 01/17/21 1904    pramipexole (MIRAPEX) tablet 0 25 mg, 0 25 mg, Oral, HS, Alexander Costa PA-C, 0 25 mg at 01/16/21 2135    sodium chloride 0 9 % infusion, 75 mL/hr, Intravenous, Continuous, Esau Willis MD, Last Rate: 75 mL/hr at 01/16/21 1955, 75 mL/hr at 01/16/21 1955      Physical Exam:   /83   Pulse 93   Temp 98 6 °F (37 °C)   Resp 16   Ht 5' 6 5" (1 689 m)   Wt 103 kg (226 lb)   SpO2 97%   BMI 35 93 kg/m²   GEN: slightly diaphoretic on examination, sheets slightly damp, A&Ox3  RESP: breathing comfortably with no accessory muscle use  CV: no significant peripheral edema  ABD: soft, non-tender, non-distended   Left CVA pain  DRAINS: urinary diversion draining clear yellow urine

## 2021-01-17 NOTE — PROGRESS NOTES
Progress Note - Melquiades Hassan 1955, 63 y o  female MRN: [id number]    Unit/Bed#: W -01 Encounter: [id number]    Primary Care Provider: Anjelica Leblanc DO   Date and time admitted to hospital: 1/15/2021  5:48 PM  * Sepsis due to pyelonephritis [facility])  Assessment & Plan  · Presented with L flank plain  Suspected pyelonephritis is source of sepsis  Met sepsis criteria present on admission due to fever, leukocytosis, lactic acidosis, tachycardia  · CT A/P "Increasing hydronephrosis, minimal on the right, moderate on the left on the left now with new delayed nephrogram and perinephric fluid suggesting leak  Caliber of the left ureter changes as it traverses the midline, possibly adhesion  No intraluminal filling defects  Nonobstructing stable left lower pole calculus "  · Continue IV broad-spectrum antibiotics with cefepime until urine culture becomes available  · Continue IV fluids and supportive measures for pain  · Need blood cultures which are being sent today  · Urology consult appreciated--they have asked for IR consult for percutaneous nephrostomy  · Trend CBC BMP  · Procal 40 1      Type 2 diabetes mellitus ([facility] [address] )  Assessment & Plan  Lab Results   Component Value Date    HGBA1C 7 1 (H) 01/29/2019       Recent Labs     01/16/21  0053   POCGLU 229*       Blood Sugar Average: Last 72 hrs:  (P) 229     · In the setting of complicated urinary tract infections, Jardiance must be completely discontinued  · Hold Metformin  · Monitor on Accu-Cheks with sliding scale coverage and consider addition of Lantus during hospitalization if needed    Thus far her sugars have been stable  · Repeat A1c      Class 2 obesity in adult  Assessment & Plan  · BMI 35 9, this serves as a comorbidity to her overall state    History of urostomy  Assessment & Plan  · 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 strictures/pelvic radiation for uterine cancer     VTE Pharmacologic Prophylaxis:   Pharmacologic:  VTE score calculated personally noted to be 5  Patient should have addition of DVT prophylaxis immediately  Will add subcu heparin  Mechanical VTE Prophylaxis in Place: Yes    Patient Centered Rounds: I have performed bedside rounds with nursing staff today  Discussions with Specialists or Other Care Team Provider:  Rounded with urology    Education and Discussions with Family / Patient:  Offered but patient declined    Time Spent for Care: 30 minutes  More than 50% of total time spent on counseling and coordination of care as described above  Current Length of Stay: 2 day(s)    Current Patient Status: Inpatient   Certification Statement: The patient will continue to require additional inpatient hospital stay due to Sepsis    Discharge Plan:  Not stable for discharge due to need for ongoing IV antibiotic    Code Status: Level 1 - Full Code      Subjective:   Patient complaining of pain and asking if she can have IV morphine again  Objective:     Vitals:   Temp (24hrs), Av 7 °F (38 2 °C), Min:98 6 °F (37 °C), Max:102 2 °F (39 °C)    Temp:  [98 6 °F (37 °C)-102 2 °F (39 °C)] 98 6 °F (37 °C)  HR:  [] 93  Resp:  [16] 16  BP: (130-142)/(82-83) 130/83  SpO2:  [93 %-97 %] 97 %  Body mass index is 35 93 kg/m²  Input and Output Summary (last 24 hours): Intake/Output Summary (Last 24 hours) at 2021 1148  Last data filed at 2021 0900  Gross per 24 hour   Intake 1817 5 ml   Output 1905 ml   Net -87 5 ml       Physical Exam:     Physical Exam  Vitals signs reviewed  Constitutional:       General: She is not in acute distress  Appearance: She is obese  She is not ill-appearing, toxic-appearing or diaphoretic  Eyes:      Conjunctiva/sclera: Conjunctivae normal    Cardiovascular:      Rate and Rhythm: Normal rate and regular rhythm  Heart sounds: No murmur  Pulmonary:      Effort: Pulmonary effort is normal  No respiratory distress  Breath sounds: Normal breath sounds   No stridor  No wheezing or rhonchi  Abdominal:      General: Bowel sounds are normal  There is no distension  Palpations: Abdomen is soft  Tenderness: There is no abdominal tenderness  There is no guarding  Skin:     General: Skin is warm and dry  Coloration: Skin is not jaundiced or pale  Findings: No bruising, erythema, lesion or rash  Neurological:      General: No focal deficit present  Mental Status: She is alert  Comments: Awake alert interactive no evidence of confusion   Psychiatric:         Mood and Affect: Mood normal          Thought Content: Thought content normal          Additional Data:     Labs:    Results from last 7 days   Lab Units 01/17/21  0853 01/16/21  0515   WBC Thousand/uL 12 98* 19 46*   HEMOGLOBIN g/dL 10 8* 10 7*   HEMATOCRIT % 34 4* 35 3   PLATELETS Thousands/uL 176 204   BANDS PCT %  --  7   NEUTROS PCT % 87*  --    LYMPHS PCT % 6*  --    LYMPHO PCT %  --  1*   MONOS PCT % 6  --    MONO PCT %  --  2*   EOS PCT % 0 0     Results from last 7 days   Lab Units 01/16/21  0515 01/15/21  1850   SODIUM mmol/L 140 140   POTASSIUM mmol/L 5 0 4 2   CHLORIDE mmol/L 109* 107   CO2 mmol/L 21 22   BUN mg/dL 29* 34*   CREATININE mg/dL 1 17 1 17   ANION GAP mmol/L 10 11   CALCIUM mg/dL 8 8 10 2*   ALBUMIN g/dL  --  3 9   TOTAL BILIRUBIN mg/dL  --  0 38   ALK PHOS U/L  --  135*   ALT U/L  --  22   AST U/L  --  11   GLUCOSE RANDOM mg/dL 196* 199*         Results from last 7 days   Lab Units 01/17/21  1046 01/17/21  0754 01/16/21  2140 01/16/21  1656 01/16/21  1059 01/16/21  0739 01/16/21  0053   POC GLUCOSE mg/dl 198* 139 166* 122 121 158* 229*         Results from last 7 days   Lab Units 01/16/21  0515 01/15/21  2104 01/15/21  1850   LACTIC ACID mmol/L  --  1 5 2 9*   PROCALCITONIN ng/ml 40 13*  --   --      * I Have Reviewed All Lab Data Listed Above  * Additional Pertinent Lab Tests Reviewed:  [address] Admission Reviewed    Imaging:    Imaging Reports Reviewed Today Include: CT scan  Imaging Personally Reviewed by Myself Includes:      Recent Cultures (last 7 days):     Results from last 7 days   Lab Units 01/16/21  0047   URINE CULTURE  Culture results to follow  Last 24 Hours Medication List:   Current Facility-Administered Medications   Medication Dose Route Frequency Provider Last Rate    acetaminophen  650 mg Oral Q6H PRN Levonne [place], [facility]      ALPRAZolam  0 5 mg Oral HS PRN Levonne [place], [facility]      aluminum-magnesium hydroxide-simethicone  30 mL Oral Q6H PRN Levonne [place], [facility]      cefepime  2,000 mg Intravenous Q12H Kin Bar MD Stopped (01/16/21 2324)    diphenhydrAMINE  25 mg Intravenous Q8H PRN Kin Bar MD      fluconazole  200 mg Oral Daily Levonne Javon Gomez      insulin lispro  1-6 Units Subcutaneous 4x Daily (AC & HS) Levonne [place], [facility]      levothyroxine  75 mcg Oral Early Morning Levonne [place], [facility]      metoclopramide  10 mg Intravenous [facility] Kin Bar MD      morphine injection  1 mg Intravenous Q2H PRN Yvrose Myers PA-C      ondansetron  4 mg Intravenous Q6H PRN Levonne [place], [facility]      pantoprazole  40 mg Oral BID AC Kin Bar MD      pramipexole  0 25 mg Oral HS Levonne [place], [facility]      sodium chloride  75 mL/hr Intravenous Continuous Kin Bar MD 75 mL/hr (01/16/21 1955)        Today, Patient Was Seen By: Iraj Larkin PA-C    ** Please Note: Dictation voice to text software may have been used in the creation of this document   **

## 2021-01-17 NOTE — ASSESSMENT & PLAN NOTE
· Presented with L flank plain  Suspected pyelonephritis is source of sepsis  Met sepsis criteria present on admission due to fever, leukocytosis, lactic acidosis, tachycardia  · CT A/P "Increasing hydronephrosis, minimal on the right, moderate on the left on the left now with new delayed nephrogram and perinephric fluid suggesting leak  Caliber of the left ureter changes as it traverses the midline, possibly adhesion  No intraluminal filling defects    Nonobstructing stable left lower pole calculus "  · Continue IV broad-spectrum antibiotics with cefepime until urine culture becomes available  · Continue IV fluids and supportive measures for pain  · Need blood cultures which are being sent today  · Urology consult appreciated--they have asked for IR consult for percutaneous nephrostomy  · Trend CBC BMP  · Procal 40 1

## 2021-01-17 NOTE — ASSESSMENT & PLAN NOTE
· 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 strictures/pelvic radiation for uterine cancer

## 2021-01-17 NOTE — CONSULTS
INTERPROFESSIONAL (PHONE) CONSULTATION - Interventional Radiology  José Miguel Whelan 63 y o  female MRN: [id number]  Unit/Bed#: W -01 Encounter: [id number]    IR has been consulted to evaluate the patient, determine the appropriate procedure, and whether or not a procedure can and should be performed regarding the care of José Miguel Whelan  We were consulted by urology concerning José Miguel Whelan, and to possibly perform a percutaneous nephrostomy if medically appropriate for the patient  Consults  01/17/21      Assessment/Recommendation:    64 yo female presenting to the [place] [address] Emergency room with abdominal pain  History of right ureteral diversion ileostomy and cystectomy (2004) and multiple SBO presents with sudden onset left flank pain associated with nausea and vomiting  No h/o fever  No change in quality and quantity of urosotomy output  Pain does radiate to LUQ but no radiation down to labia or groin  No change in bowel movements  Denies sick contacts, travel to COVID endemic areas, or changes in taste/smell  Patient had a CT scan which demonstrated mild  Hydronephrosis on the right and moderate on the left  Urology service is following and giving antibiotics  Will work with Urology service to coordinate timing and urgency  Total time spent in review of data, discussion with requesting provider and rendering advice was 35 minutes     Patient or appropriate family member was verbally informed by urology of this consultative service on their behalf to provide more timely access to specialty care in lieu of an in person consultation  They were informed it is a billable service unless the it was determined an in person follow up was medically necessary by me within the next 14 days at which time only the in person consult would be billed  Verbal consent was obtained  Thank you for allowing Interventional Radiology to participate in the care of José Miguel Whelan   Please don't hesitate to call or TigerText us with any questions       Karlee Gerard, DO

## 2021-01-18 ENCOUNTER — APPOINTMENT (INPATIENT)
Dept: RADIOLOGY | Facility: HOSPITAL | Age: 66
DRG: 872 | End: 2021-01-18
Attending: RADIOLOGY
Payer: COMMERCIAL

## 2021-01-18 ENCOUNTER — ANESTHESIA (INPATIENT)
Dept: RADIOLOGY | Facility: HOSPITAL | Age: 66
DRG: 872 | End: 2021-01-18
Payer: COMMERCIAL

## 2021-01-18 ENCOUNTER — ANESTHESIA EVENT (INPATIENT)
Dept: RADIOLOGY | Facility: HOSPITAL | Age: 66
DRG: 872 | End: 2021-01-18
Payer: COMMERCIAL

## 2021-01-18 VITALS — HEART RATE: 105 BPM

## 2021-01-18 LAB
ANION GAP SERPL CALCULATED.3IONS-SCNC: 10 MMOL/L (ref 4–13)
BACTERIA UR CULT: ABNORMAL
BASOPHILS # BLD AUTO: 0.03 THOUSANDS/ΜL (ref 0–0.1)
BASOPHILS NFR BLD AUTO: 0 % (ref 0–1)
BUN SERPL-MCNC: 20 MG/DL (ref 5–25)
CALCIUM SERPL-MCNC: 9.7 MG/DL (ref 8.3–10.1)
CHLORIDE SERPL-SCNC: 110 MMOL/L (ref 100–108)
CO2 SERPL-SCNC: 21 MMOL/L (ref 21–32)
CREAT SERPL-MCNC: 1.01 MG/DL (ref 0.6–1.3)
EOSINOPHIL # BLD AUTO: 0.19 THOUSAND/ΜL (ref 0–0.61)
EOSINOPHIL NFR BLD AUTO: 2 % (ref 0–6)
ERYTHROCYTE [DISTWIDTH] IN BLOOD BY AUTOMATED COUNT: 16.1 % (ref 11.6–15.1)
EST. AVERAGE GLUCOSE BLD GHB EST-MCNC: 157 MG/DL
GFR SERPL CREATININE-BSD FRML MDRD: 59 ML/MIN/1.73SQ M
GLUCOSE SERPL-MCNC: 108 MG/DL (ref 65–140)
GLUCOSE SERPL-MCNC: 114 MG/DL (ref 65–140)
GLUCOSE SERPL-MCNC: 134 MG/DL (ref 65–140)
GLUCOSE SERPL-MCNC: 194 MG/DL (ref 65–140)
GLUCOSE SERPL-MCNC: 98 MG/DL (ref 65–140)
GLUCOSE SERPL-MCNC: 99 MG/DL (ref 65–140)
HBA1C MFR BLD: 7.1 %
HCT VFR BLD AUTO: 33.4 % (ref 34.8–46.1)
HGB BLD-MCNC: 10.5 G/DL (ref 11.5–15.4)
IMM GRANULOCYTES # BLD AUTO: 0.04 THOUSAND/UL (ref 0–0.2)
IMM GRANULOCYTES NFR BLD AUTO: 0 % (ref 0–2)
INR PPP: 1.06 (ref 0.84–1.19)
LYMPHOCYTES # BLD AUTO: 0.91 THOUSANDS/ΜL (ref 0.6–4.47)
LYMPHOCYTES NFR BLD AUTO: 10 % (ref 14–44)
MCH RBC QN AUTO: 29.2 PG (ref 26.8–34.3)
MCHC RBC AUTO-ENTMCNC: 31.4 G/DL (ref 31.4–37.4)
MCV RBC AUTO: 93 FL (ref 82–98)
MONOCYTES # BLD AUTO: 0.88 THOUSAND/ΜL (ref 0.17–1.22)
MONOCYTES NFR BLD AUTO: 10 % (ref 4–12)
NEUTROPHILS # BLD AUTO: 7.06 THOUSANDS/ΜL (ref 1.85–7.62)
NEUTS SEG NFR BLD AUTO: 78 % (ref 43–75)
NRBC BLD AUTO-RTO: 0 /100 WBCS
PLATELET # BLD AUTO: 170 THOUSANDS/UL (ref 149–390)
PMV BLD AUTO: 9 FL (ref 8.9–12.7)
POTASSIUM SERPL-SCNC: 4.1 MMOL/L (ref 3.5–5.3)
PROTHROMBIN TIME: 13.9 SECONDS (ref 11.6–14.5)
RBC # BLD AUTO: 3.59 MILLION/UL (ref 3.81–5.12)
SODIUM SERPL-SCNC: 141 MMOL/L (ref 136–145)
WBC # BLD AUTO: 9.11 THOUSAND/UL (ref 4.31–10.16)

## 2021-01-18 PROCEDURE — C1894 INTRO/SHEATH, NON-LASER: HCPCS

## 2021-01-18 PROCEDURE — 50432 PLMT NEPHROSTOMY CATHETER: CPT | Performed by: RADIOLOGY

## 2021-01-18 PROCEDURE — 82948 REAGENT STRIP/BLOOD GLUCOSE: CPT

## 2021-01-18 PROCEDURE — 83036 HEMOGLOBIN GLYCOSYLATED A1C: CPT | Performed by: PHYSICIAN ASSISTANT

## 2021-01-18 PROCEDURE — 85610 PROTHROMBIN TIME: CPT | Performed by: RADIOLOGY

## 2021-01-18 PROCEDURE — C1729 CATH, DRAINAGE: HCPCS

## 2021-01-18 PROCEDURE — 80048 BASIC METABOLIC PNL TOTAL CA: CPT | Performed by: PHYSICIAN ASSISTANT

## 2021-01-18 PROCEDURE — 50432 PLMT NEPHROSTOMY CATHETER: CPT

## 2021-01-18 PROCEDURE — 0T9130Z DRAINAGE OF LEFT KIDNEY WITH DRAINAGE DEVICE, PERCUTANEOUS APPROACH: ICD-10-PCS | Performed by: RADIOLOGY

## 2021-01-18 PROCEDURE — 99232 SBSQ HOSP IP/OBS MODERATE 35: CPT | Performed by: PHYSICIAN ASSISTANT

## 2021-01-18 PROCEDURE — 85025 COMPLETE CBC W/AUTO DIFF WBC: CPT | Performed by: PHYSICIAN ASSISTANT

## 2021-01-18 PROCEDURE — 87086 URINE CULTURE/COLONY COUNT: CPT | Performed by: RADIOLOGY

## 2021-01-18 RX ORDER — CLINDAMYCIN PHOSPHATE 900 MG/50ML
INJECTION INTRAVENOUS AS NEEDED
Status: DISCONTINUED | OUTPATIENT
Start: 2021-01-18 | End: 2021-01-18

## 2021-01-18 RX ORDER — HYDROMORPHONE HCL/PF 1 MG/ML
0.5 SYRINGE (ML) INJECTION
Status: DISCONTINUED | OUTPATIENT
Start: 2021-01-18 | End: 2021-01-19 | Stop reason: HOSPADM

## 2021-01-18 RX ORDER — SODIUM CHLORIDE 9 MG/ML
INJECTION, SOLUTION INTRAVENOUS CONTINUOUS PRN
Status: DISCONTINUED | OUTPATIENT
Start: 2021-01-18 | End: 2021-01-18

## 2021-01-18 RX ORDER — ONDANSETRON 2 MG/ML
INJECTION INTRAMUSCULAR; INTRAVENOUS AS NEEDED
Status: DISCONTINUED | OUTPATIENT
Start: 2021-01-18 | End: 2021-01-18

## 2021-01-18 RX ORDER — METOCLOPRAMIDE HYDROCHLORIDE 5 MG/ML
10 INJECTION INTRAMUSCULAR; INTRAVENOUS ONCE AS NEEDED
Status: DISCONTINUED | OUTPATIENT
Start: 2021-01-18 | End: 2021-01-19 | Stop reason: HOSPADM

## 2021-01-18 RX ORDER — HEPARIN SODIUM 5000 [USP'U]/ML
5000 INJECTION, SOLUTION INTRAVENOUS; SUBCUTANEOUS EVERY 8 HOURS SCHEDULED
Status: DISCONTINUED | OUTPATIENT
Start: 2021-01-18 | End: 2021-01-19 | Stop reason: HOSPADM

## 2021-01-18 RX ORDER — LIDOCAINE WITH 8.4% SOD BICARB 0.9%(10ML)
SYRINGE (ML) INJECTION CODE/TRAUMA/SEDATION MEDICATION
Status: COMPLETED | OUTPATIENT
Start: 2021-01-18 | End: 2021-01-18

## 2021-01-18 RX ORDER — LEVOFLOXACIN 750 MG/1
750 TABLET ORAL EVERY 24 HOURS
Status: DISCONTINUED | OUTPATIENT
Start: 2021-01-18 | End: 2021-01-18

## 2021-01-18 RX ORDER — FENTANYL CITRATE 50 UG/ML
INJECTION, SOLUTION INTRAMUSCULAR; INTRAVENOUS AS NEEDED
Status: DISCONTINUED | OUTPATIENT
Start: 2021-01-18 | End: 2021-01-18

## 2021-01-18 RX ORDER — ONDANSETRON 2 MG/ML
4 INJECTION INTRAMUSCULAR; INTRAVENOUS ONCE AS NEEDED
Status: DISCONTINUED | OUTPATIENT
Start: 2021-01-18 | End: 2021-01-19 | Stop reason: HOSPADM

## 2021-01-18 RX ORDER — LEVOFLOXACIN 750 MG/1
750 TABLET ORAL EVERY 24 HOURS
Status: DISCONTINUED | OUTPATIENT
Start: 2021-01-18 | End: 2021-01-19 | Stop reason: HOSPADM

## 2021-01-18 RX ORDER — PROPOFOL 10 MG/ML
INJECTION, EMULSION INTRAVENOUS AS NEEDED
Status: DISCONTINUED | OUTPATIENT
Start: 2021-01-18 | End: 2021-01-18

## 2021-01-18 RX ORDER — SUCCINYLCHOLINE/SOD CL,ISO/PF 100 MG/5ML
SYRINGE (ML) INTRAVENOUS AS NEEDED
Status: DISCONTINUED | OUTPATIENT
Start: 2021-01-18 | End: 2021-01-18

## 2021-01-18 RX ORDER — SODIUM CHLORIDE 9 MG/ML
75 INJECTION, SOLUTION INTRAVENOUS CONTINUOUS
Status: DISCONTINUED | OUTPATIENT
Start: 2021-01-18 | End: 2021-01-18

## 2021-01-18 RX ORDER — LIDOCAINE HYDROCHLORIDE 10 MG/ML
INJECTION, SOLUTION EPIDURAL; INFILTRATION; INTRACAUDAL; PERINEURAL AS NEEDED
Status: DISCONTINUED | OUTPATIENT
Start: 2021-01-18 | End: 2021-01-18

## 2021-01-18 RX ADMIN — Medication 5 ML: at 16:35

## 2021-01-18 RX ADMIN — FENTANYL CITRATE 25 MCG: 50 INJECTION, SOLUTION INTRAMUSCULAR; INTRAVENOUS at 16:36

## 2021-01-18 RX ADMIN — SODIUM CHLORIDE: 9 INJECTION, SOLUTION INTRAVENOUS at 16:15

## 2021-01-18 RX ADMIN — LEVOTHYROXINE SODIUM 75 MCG: 75 TABLET ORAL at 06:07

## 2021-01-18 RX ADMIN — IOHEXOL 12 ML: 350 INJECTION, SOLUTION INTRAVENOUS at 16:48

## 2021-01-18 RX ADMIN — LEVOFLOXACIN 750 MG: 750 TABLET, FILM COATED ORAL at 17:57

## 2021-01-18 RX ADMIN — PROPOFOL 170 MG: 10 INJECTION, EMULSION INTRAVENOUS at 16:17

## 2021-01-18 RX ADMIN — PRAMIPEXOLE DIHYDROCHLORIDE 0.25 MG: 0.25 TABLET ORAL at 20:58

## 2021-01-18 RX ADMIN — ONDANSETRON 4 MG: 2 INJECTION INTRAMUSCULAR; INTRAVENOUS at 16:43

## 2021-01-18 RX ADMIN — CLINDAMYCIN IN 5 PERCENT DEXTROSE 900 MG: 18 INJECTION, SOLUTION INTRAVENOUS at 16:26

## 2021-01-18 RX ADMIN — PANTOPRAZOLE SODIUM 40 MG: 40 TABLET, DELAYED RELEASE ORAL at 17:57

## 2021-01-18 RX ADMIN — CEFEPIME HYDROCHLORIDE 2000 MG: 2 INJECTION, POWDER, FOR SOLUTION INTRAVENOUS at 00:30

## 2021-01-18 RX ADMIN — INSULIN LISPRO 2 UNITS: 100 INJECTION, SOLUTION INTRAVENOUS; SUBCUTANEOUS at 22:19

## 2021-01-18 RX ADMIN — MORPHINE SULFATE 1 MG: 2 INJECTION, SOLUTION INTRAMUSCULAR; INTRAVENOUS at 20:32

## 2021-01-18 RX ADMIN — LIDOCAINE HYDROCHLORIDE 50 MG: 10 INJECTION, SOLUTION EPIDURAL; INFILTRATION; INTRACAUDAL at 16:17

## 2021-01-18 RX ADMIN — HEPARIN SODIUM 5000 UNITS: 5000 INJECTION INTRAVENOUS; SUBCUTANEOUS at 20:57

## 2021-01-18 RX ADMIN — Medication 100 MG: at 16:17

## 2021-01-18 RX ADMIN — FENTANYL CITRATE 25 MCG: 50 INJECTION, SOLUTION INTRAMUSCULAR; INTRAVENOUS at 16:42

## 2021-01-18 RX ADMIN — PANTOPRAZOLE SODIUM 40 MG: 40 TABLET, DELAYED RELEASE ORAL at 06:08

## 2021-01-18 RX ADMIN — FENTANYL CITRATE 50 MCG: 50 INJECTION, SOLUTION INTRAMUSCULAR; INTRAVENOUS at 16:17

## 2021-01-18 NOTE — ANESTHESIA POSTPROCEDURE EVALUATION
Post-Op Assessment Note    CV Status:  Stable  Pain Score: 0    Pain management: adequate     Mental Status:  Alert and awake   Hydration Status:  Euvolemic   PONV Controlled:  Controlled   Airway Patency:  Patent      Post Op Vitals Reviewed: Yes      Staff: CRNA         No complications documented      /75 (01/18/21 1704)    Temp 98 3 °F (36 8 °C) (01/18/21 1704)    Pulse 93 (01/18/21 1704)   Resp 16 (01/18/21 1704)    SpO2 99 % (01/18/21 1704)

## 2021-01-18 NOTE — PLAN OF CARE
Problem: Potential for Falls  Goal: Patient will remain free of falls  Description: INTERVENTIONS:  - Assess patient frequently for physical needs  -  Identify cognitive and physical deficits and behaviors that affect risk of falls    -  [place] fall precautions as indicated by assessment   - Educate patient/family on patient safety including physical limitations  - Instruct patient to call for assistance with activity based on assessment  - Modify environment to reduce risk of injury  - Consider OT/PT consult to assist with strengthening/mobility  Outcome: Progressing     Problem: PAIN - ADULT  Goal: Verbalizes/displays adequate comfort level or baseline comfort level  Description: Interventions:  - Encourage patient to monitor pain and request assistance  - Assess pain using appropriate pain scale  - Administer analgesics based on type and severity of pain and evaluate response  - Implement non-pharmacological measures as appropriate and evaluate response  - Consider cultural and social influences on pain and pain management  - Notify physician/advanced practitioner if interventions unsuccessful or patient reports new pain  Outcome: Progressing     Problem: INFECTION - ADULT  Goal: Absence or prevention of progression during hospitalization  Description: INTERVENTIONS:  - Assess and monitor for signs and symptoms of infection  - Monitor lab/diagnostic results  - Monitor all insertion sites, i e  indwelling lines, tubes, and drains  - Monitor endotracheal if appropriate and nasal secretions for changes in amount and color  - [place] appropriate cooling/warming therapies per order  - Administer medications as ordered  - Instruct and encourage patient and family to use good hand hygiene technique  - Identify and instruct in appropriate isolation precautions for identified infection/condition  Outcome: Progressing

## 2021-01-18 NOTE — ANESTHESIA PREPROCEDURE EVALUATION
Procedure:  IR NEPHROSTOMY TUBE PLACEMENT    Relevant Problems   CARDIO   (+) HTN (hypertension)      ENDO   (+) Hypothyroidism   (+) Type 2 diabetes mellitus (HCC)      GI/HEPATIC   (+) Small bowel obstruction (HCC)      /RENAL   (+) Hydroureteronephrosis      GYN   (+) Endometrial cancer (HCC)      HEMATOLOGY   (+) Anemia      Other   (+) Class 2 obesity in adult        Physical Exam    Airway    Mallampati score: II  TM Distance: >3 FB  Neck ROM: full     Dental       Cardiovascular      Pulmonary      Other Findings        Anesthesia Plan  ASA Score- 2     Anesthesia Type- general with ASA Monitors  Additional Monitors:   Airway Plan: ETT  Plan Factors-    Chart reviewed  Induction- intravenous  Postoperative Plan-     Informed Consent- Anesthetic plan and risks discussed with patient  I personally reviewed this patient with the CRNA  Discussed and agreed on the Anesthesia Plan with the CRNA  Gordo Cornell

## 2021-01-18 NOTE — ASSESSMENT & PLAN NOTE
Lab Results   Component Value Date    HGBA1C 7 1 (H) 01/18/2021       Recent Labs     01/17/21  1629 01/17/21  2122 01/18/21  0751 01/18/21  1112   POCGLU 99 146* 134 98       Blood Sugar Average: Last 72 hrs:  (P) 137 [id number]     · In the setting of complicated urinary tract infections, Jardiance must be completely discontinued  · Hold Metformin during hospital stay    Resume at discharge  · Overall blood sugars reasonably well controlled

## 2021-01-18 NOTE — SEDATION DOCUMENTATION
Procedure ended  8fr left PCN tube placed without incident  Dressing to site  Patient tolerated well and transferred to PACU via bed

## 2021-01-18 NOTE — DISCHARGE INSTRUCTIONS
Nephrostomy Tube Care     WHAT YOU NEED TO KNOW:   A nephrostomy tube is a catheter (thin plastic tube) that is inserted through your skin and into your kidney  The nephrostomy tube drains urine from your kidney into a collecting bag outside your body  You may need a nephrostomy tube when something is blocking the normal flow of urine  A nephrostomy tube may be used for a short or a long period of time  The nephrostomy tube comes out of your back, so you will need someone to help care for your nephrostomy tube  DISCHARGE INSTRUCTIONS:      How to clean the skin around the nephrostomy tube and change the bandage:  Since the nephrostomy tube comes out of your back, you will not be able to care for it by yourself  Ask someone to follow the general directions below to check and care for your nephrostomy tube  Gather the items you will need  Disposable (single use) under-pad, and a clean washcloth  ¨ Plain soap, warm water, and new medical gloves  ¨ Sterile gauze bandages  ¨ Clear adhesive dressing or medical tape  ¨ Skin barrier  ¨ Protective skin film  ¨ Trash bag  · Remove the old bandage, and check the tube entry site  ¨ Have the patient lie on his side with the nephrostomy tube entry site facing up  Place the under-pad where it will catch drainage as you are working with the nephrostomy tube  ¨ Wash your hands with soap and water  Put on new medical gloves  ¨ Gently remove the old bandage, without pulling on the tube  Do this by holding the skin beside the tube with one hand  With the other hand, gently remove sticky tape and the skin barrier by pulling in the same direction as hair growth  Do not touch the side of the bandage that is placed over or around the tube  Throw the bandage and skin barrier away in a trash bag  ¨ Look for signs of infection, such as skin redness and swelling  Report any skin changes to healthcare providers  ¨ Clean the tube entry site      ¨ Hold the tube in place to keep it from being pulled out while you are cleaning around it  ¨ You will need to clean the area twice  For the first cleaning, wet a new gauze bandage with soap and water  Begin at the entry site of the tube  Wipe the skin in circles, moving away from the entry site  Remove blood and any other material with the gauze  Do this as often as needed  Use a new gauze bandage each time you clean the area, moving away from the entry site  ¨ For the second cleaning, wet a new gauze bandage with water  Begin at the entry site of the tube  Wipe the skin in circles, moving away from the entry site  Use a new gauze bandage each time you clean the area, moving away from the entry site  ¨ Gently pat the skin with a clean washcloth to dry it  · Apply the skin barrier and bandages  ¨ Roll up a bandage to make it thick, and place it under  the place where the tube enters the skin  Place it to support the tube, and stop it from kinking or bending  Tape the bandage in place, and apply more bandages if directed by a healthcare provider  ¨ Bring the tubing forward to the front and tape it to the skin  Do not stretch the tube tight, because this may pull the nephrostomy tube out  How often to change the bandage  Change the bandage around the tube, every other day  If your bandages  get dirty or wet, change them right away, and as often as needed  If your nephrostomy tube is to be used for a long period of time, the tube needs to be changed every 2 to 3 months  Healthcare providers will tell you when you need to make an appointment to have your tube changed  How to care for the urine drainage bag:   · Ask if you need to measure and write down how much urine is in the bag before you empty it  Drain urine out of the drainage bag when it is ½ to ? full  Open the spout at the bottom of the bag to empty the urine into the toilet  · You may need to detach the drainage bag from the nephrostomy tube to change it    If so, attach a new drainage bag tightly to the nephrostomy tube  ·   How to prevent problems with your nephrostomy tube:   · Change bandages, directed  This helps to prevent infection  Throw away or clean your drainage bag as directed by your healthcare provider  · Wipe the connecting ends of the drainage bag with alcohol before you reconnect the bag to the tube  This helps prevent infection  Keep the tube taped to your skin and connected to a drainage bag placed below the level of your kidneys  This helps prevent urine from backing up into your kidneys  You may wear a small drainage bag strapped to your leg to let you move around more easily  · Check the catheter to be sure it is in place after you change your clothes or do other activities  Do not wear tight clothing over the tube  Place the tubing over your thigh rather than under it when you are sitting down  Be sure that nothing is pulling on the nephrostomy tube when you move around  · Change positions if you see little or no urine in your drainage bag  Check to see if the urine tube is twisted or bent  Be sure that you are not sitting or lying on the tube  If there are no kinks and there is little or no urine in the drainage bag, tell your healthcare provider  · Flush out the tube as directed  Some tubes get flushed one time a day with 10 mls of NSS You will be given a prescription for the flushes  To flush the nephrostomy tube, clean both connections with alcohol swap  Twist off the drainage bag tube and twist the saline syringe into the nephrostomy tube and flush briskly  Remove the syringe and twist the drainage bag tube back into the nephrostomy tube  · Keep the site covered while you shower  Tape a piece of clear adhesive plastic over the dressing to keep it dry while you shower  Do not take tub baths      Contact Interventional Radiology at [phone] [facility] PATIENTS: Contact Interventional Radiology at [phone]) [facility] PATIENTS: Contact Interventional Radiology at [phone]) if:  · The skin around the nephrostomy tube is red, swollen, itches, or has a rash  · You have a fever greater than 101 or chills  · You have lower back or hip pain  · There are changes in how your urine looks or smells  · You have little or no urine draining from the nephrostomy tube  · You have nausea and are vomiting  · The black toney on your tube has moved, or the tube is longer than when it was put in    · You have questions or concerns about your condition or care  · The nephrostomy tube comes out completely  · There is blood, pus, or a bad smell coming from the place where the tube enters your skin  · Urine is leaking around the tube  The following pharmacies carry the flush syringes  [facility]                     [facility]       [address]  Phone [phone]            Phone [phone]  [address] & [facility]                      [address]                                 [phone]  Phone [phone]            Phone [phone]    [facility]                                                                         [facility] [phone]  [address]   Phone [phone]

## 2021-01-18 NOTE — BRIEF OP NOTE (RAD/CATH)
INTERVENTIONAL RADIOLOGY PROCEDURE NOTE    Date: 1/18/2021    Procedure: IR NEPHROSTOMY TUBE PLACEMENT    Preoperative diagnosis:   1  Pyelonephritis    2  Hydronephrosis         Postoperative diagnosis: Same  Surgeon: Miguel Snell MD     Assistant: None  No qualified resident was available  Blood loss: 1 mL    Specimens: 15 mL purulent urine aspirate sent to lab  Findings: Successful left PCN placement using 8 5 Fr catheter  Complications: None immediate      Anesthesia: local and general

## 2021-01-18 NOTE — ASSESSMENT & PLAN NOTE
· Presented with L flank plain  Suspected pyelonephritis is source of sepsis  Met sepsis criteria present on admission due to fever, leukocytosis, lactic acidosis, tachycardia  · CT A/P "Increasing hydronephrosis, minimal on the right, moderate on the left on the left now with new delayed nephrogram and perinephric fluid suggesting leak  Caliber of the left ureter changes as it traverses the midline, possibly adhesion  No intraluminal filling defects  Nonobstructing stable left lower pole calculus "  · Received 4 doses of IV cefepime; reviewed final urine culture and sensitivity, greater than 100,000 colonies of Enterococcus faecalis and Serratia marcescens both of which are susceptible to Levaquin  Would transition to p o   Levaquin to complete a 7-10 day course    · Continue IV fluids and supportive measures for pain  · Blood cultures negative at 24 hours  · Urology consult appreciated--they asked for IR consult for percutaneous nephrostomy which will be done late this afternoon  · Trended CBC/BMP; noted resolution of leukocytosis  · Procal 40 1 now improving

## 2021-01-18 NOTE — PROGRESS NOTES
Progress Note - Priyanka Amaya 1955, 63 y o  female MRN: [id number]    Unit/Bed#: W -01 Encounter: [id number]    Primary Care Provider: Ruben Simmons, DO   Date and time admitted to hospital: 1/15/2021  5:48 PM  * Sepsis due to pyelonephritis [facility])  Assessment & Plan  · Presented with L flank plain  Suspected pyelonephritis is source of sepsis  Met sepsis criteria present on admission due to fever, leukocytosis, lactic acidosis, tachycardia  · CT A/P "Increasing hydronephrosis, minimal on the right, moderate on the left on the left now with new delayed nephrogram and perinephric fluid suggesting leak  Caliber of the left ureter changes as it traverses the midline, possibly adhesion  No intraluminal filling defects  Nonobstructing stable left lower pole calculus "  · Received 4 doses of IV cefepime; reviewed final urine culture and sensitivity, greater than 100,000 colonies of Enterococcus faecalis and Serratia marcescens both of which are susceptible to Levaquin  Would transition to p o  Levaquin to complete a 7-10 day course    · Continue IV fluids and supportive measures for pain  · Blood cultures negative at 24 hours  · Urology consult appreciated--they asked for IR consult for percutaneous nephrostomy which will be done late this afternoon  · Trended CBC/BMP; noted resolution of leukocytosis  · Procal 40 1 now improving      Type 2 diabetes mellitus [facility])  Assessment & Plan  Lab Results   Component Value Date    HGBA1C 7 1 (H) 01/18/2021       Recent Labs     01/17/21  1629 01/17/21  2122 01/18/21  0751 01/18/21  1112   POCGLU 99 146* 134 98       Blood Sugar Average: Last 72 hrs:  (P) 626 [id number]     · In the setting of complicated urinary tract infections, Jardiance must be completely discontinued  · Hold Metformin during hospital stay    Resume at discharge  · Overall blood sugars reasonably well controlled      Class 2 obesity in adult  Assessment & Plan  · BMI 35 9, this serves as a comorbidity to her overall state    History of urostomy  Assessment & Plan  · 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/pelvic radiation for uterine cancer     VTE Pharmacologic Prophylaxis:   Pharmacologic: Heparin  Mechanical VTE Prophylaxis in Place: Yes    Patient Centered Rounds: I have performed bedside rounds with nursing staff today  Discussions with Specialists or Other Care Team Provider: case mgmt, pharmacy, IR    Education and Discussions with Family / Patient:  at work    Time Spent for Care: 25 min  More than 50% of total time spent on counseling and coordination of care as described above  Current Length of Stay: 3 day(s)    Current Patient Status: Inpatient   Certification Statement: The patient will continue to require additional inpatient hospital stay due to PCN later today, await blood cultures to be 48 hours    Discharge Plan: anticipate dc tomorrow    Code Status: Level 1 - Full Code    Subjective:   Patient reports she is no longer having any pain and overall is feeling much better  No additional events of fever noted  Biggest complaint this morning was being nothing by mouth all day waiting for procedure this afternoon as she is very thirsty    Objective:     Vitals:   Temp (24hrs), Av 1 °F (36 7 °C), Min:97 1 °F (36 2 °C), Max:98 6 °F (37 °C)    Temp:  [97 1 °F (36 2 °C)-98 6 °F (37 °C)] 98 1 °F (36 7 °C)  HR:  [79-86] 79  Resp:  [15] 15  BP: (126-130)/(71-76) 126/76  SpO2:  [95 %-99 %] 96 %  Body mass index is 35 93 kg/m²  Input and Output Summary (last 24 hours): Intake/Output Summary (Last 24 hours) at 2021 1309  Last data filed at 2021 0900  Gross per 24 hour   Intake 582 5 ml   Output 2625 ml   Net -2042 5 ml       Physical Exam:     Physical Exam  Vitals signs reviewed  Constitutional:       General: She is not in acute distress  Appearance: Normal appearance  She is obese   She is not ill-appearing, toxic-appearing or diaphoretic  Comments: Well appearing   Eyes:      General: No scleral icterus  Right eye: No discharge  Left eye: No discharge  Conjunctiva/sclera: Conjunctivae normal    Cardiovascular:      Rate and Rhythm: Normal rate and regular rhythm  Heart sounds: No murmur  Pulmonary:      Effort: No respiratory distress  Breath sounds: No wheezing  Abdominal:      General: Bowel sounds are normal  There is no distension  Palpations: Abdomen is soft  Tenderness: There is no abdominal tenderness  There is no right CVA tenderness, left CVA tenderness or guarding  Musculoskeletal:      Right lower leg: No edema  Left lower leg: No edema  Skin:     General: Skin is warm and dry  Coloration: Skin is not jaundiced or pale  Findings: No bruising or lesion  Neurological:      General: No focal deficit present  Mental Status: She is alert  Comments: Awake alert interactive, good historian no confusion   Psychiatric:         Mood and Affect: Mood normal          Behavior: Behavior normal          Thought Content: Thought content normal          Judgment: Judgment normal            Additional Data:     Labs:    Results from last 7 days   Lab Units 01/18/21  0608  01/16/21  0515   WBC Thousand/uL 9 11   < > 19 46*   HEMOGLOBIN g/dL 10 5*   < > 10 7*   HEMATOCRIT % 33 4*   < > 35 3   PLATELETS Thousands/uL 170   < > 204   BANDS PCT %  --   --  7   NEUTROS PCT % 78*   < >  --    LYMPHS PCT % 10*   < >  --    LYMPHO PCT %  --   --  1*   MONOS PCT % 10   < >  --    MONO PCT %  --   --  2*   EOS PCT % 2   < > 0    < > = values in this interval not displayed       Results from last 7 days   Lab Units 01/18/21  0608  01/15/21  1850   SODIUM mmol/L 141   < > 140   POTASSIUM mmol/L 4 1   < > 4 2   CHLORIDE mmol/L 110*   < > 107   CO2 mmol/L 21   < > 22   BUN mg/dL 20   < > 34*   CREATININE mg/dL 1 01   < > 1 17   ANION GAP mmol/L 10   < > 11 CALCIUM mg/dL 9 7   < > 10 2*   ALBUMIN g/dL  --   --  3 9   TOTAL BILIRUBIN mg/dL  --   --  0 38   ALK PHOS U/L  --   --  135*   ALT U/L  --   --  22   AST U/L  --   --  11   GLUCOSE RANDOM mg/dL 114   < > 199*    < > = values in this interval not displayed  Results from last 7 days   Lab Units 01/18/21  1247   INR  1 06     Results from last 7 days   Lab Units 01/18/21  1112 01/18/21  0751 01/17/21  2122 01/17/21  1629 01/17/21  1046 01/17/21  0754 01/16/21  2140 01/16/21  1656 01/16/21  1059 01/16/21  0739 01/16/21  0053   POC GLUCOSE mg/dl 98 134 146* 99 198* 139 166* 122 121 158* 229*     Results from last 7 days   Lab Units 01/18/21  0608   HEMOGLOBIN A1C % 7 1*     Results from last 7 days   Lab Units 01/17/21  1303 01/16/21  0515 01/15/21  2104 01/15/21  1850   LACTIC ACID mmol/L  --   --  1 5 2 9*   PROCALCITONIN ng/ml 32 92* 40 13*  --   --        * I Have Reviewed All Lab Data Listed Above  * Additional Pertinent Lab Tests Reviewed: Vesna 68 Admission Reviewed    Imaging:    Imaging Reports Reviewed Today Include:   Imaging Personally Reviewed by Myself Includes:      Recent Cultures (last 7 days):     Results from last 7 days   Lab Units 01/17/21  1303 01/17/21  0854 01/16/21  0047   BLOOD CULTURE  Received in Microbiology Lab  Culture in Progress   No Growth at 24 hrs   --    URINE CULTURE   --   --  >100,000 cfu/ml Enterococcus faecalis*  >100,000 cfu/ml Serratia marcescens*  10,000-19,000 cfu/ml        Last 24 Hours Medication List:   Current Facility-Administered Medications   Medication Dose Route Frequency Provider Last Rate    acetaminophen  650 mg Oral Q6H PRN Genoveva Johana PA-C      ALPRAZolam  0 5 mg Oral HS PRN Genoveva Johana, PA-C      aluminum-magnesium hydroxide-simethicone  30 mL Oral Q6H PRN Genoveva Johana PA-C      diphenhydrAMINE  25 mg Intravenous Q8H PRN Gregor Saldana MD      heparin (porcine)  5,000 Units Subcutaneous [facility] Daria Frances [facility] Myers      insulin lispro  1-6 Units Subcutaneous 4x Daily (AC & HS) Dave Marques PA-C      levofloxacin  750 mg Oral Q24H Yvrose Myers PA-C      levothyroxine  75 mcg Oral Early Morning Javon Mckeon      metoclopramide  10 mg Intravenous Q6H PRN Christine Erazo PA-C      morphine injection  1 mg Intravenous Q2H PRN Yvrose Myers PA-C      ondansetron  4 mg Intravenous Q6H PRN Dave Marques PA-C      pantoprazole  40 mg Oral BID AC Lucille Henriquez MD      pramipexole  0 25 mg Oral HS Dave Marques PA-C      sodium chloride  75 mL/hr Intravenous Continuous Lucille Henriquez MD 75 mL/hr (01/17/21 1400)        Today, Patient Was Seen By: Christine Erazo PA-C    ** Please Note: Dictation voice to text software may have been used in the creation of this document   **

## 2021-01-19 ENCOUNTER — TELEPHONE (OUTPATIENT)
Dept: UROLOGY | Facility: AMBULATORY SURGERY CENTER | Age: 66
End: 2021-01-19

## 2021-01-19 ENCOUNTER — TELEPHONE (OUTPATIENT)
Dept: UROLOGY | Facility: CLINIC | Age: 66
End: 2021-01-19

## 2021-01-19 VITALS
BODY MASS INDEX: 35.47 KG/M2 | DIASTOLIC BLOOD PRESSURE: 91 MMHG | HEART RATE: 70 BPM | WEIGHT: 226 LBS | HEIGHT: 67 IN | SYSTOLIC BLOOD PRESSURE: 149 MMHG | RESPIRATION RATE: 16 BRPM | OXYGEN SATURATION: 97 % | TEMPERATURE: 98.3 F

## 2021-01-19 PROBLEM — R51.9 HEADACHE: Status: ACTIVE | Noted: 2021-01-19

## 2021-01-19 LAB
BACTERIA UR CULT: NORMAL
GLUCOSE SERPL-MCNC: 111 MG/DL (ref 65–140)

## 2021-01-19 PROCEDURE — 82948 REAGENT STRIP/BLOOD GLUCOSE: CPT

## 2021-01-19 PROCEDURE — 99239 HOSP IP/OBS DSCHRG MGMT >30: CPT | Performed by: PHYSICIAN ASSISTANT

## 2021-01-19 RX ORDER — ACETAMINOPHEN 325 MG/1
650 TABLET ORAL EVERY 6 HOURS PRN
Refills: 0
Start: 2021-01-19

## 2021-01-19 RX ORDER — LEVOFLOXACIN 750 MG/1
750 TABLET ORAL EVERY 24 HOURS
Qty: 6 TABLET | Refills: 0 | Status: SHIPPED | OUTPATIENT
Start: 2021-01-19 | End: 2021-01-25

## 2021-01-19 RX ADMIN — LEVOTHYROXINE SODIUM 75 MCG: 75 TABLET ORAL at 05:51

## 2021-01-19 RX ADMIN — HEPARIN SODIUM 5000 UNITS: 5000 INJECTION INTRAVENOUS; SUBCUTANEOUS at 05:51

## 2021-01-19 RX ADMIN — PANTOPRAZOLE SODIUM 40 MG: 40 TABLET, DELAYED RELEASE ORAL at 06:00

## 2021-01-19 RX ADMIN — ACETAMINOPHEN 650 MG: 325 TABLET, FILM COATED ORAL at 08:17

## 2021-01-19 NOTE — ASSESSMENT & PLAN NOTE
· Presented with L flank plain  Suspected pyelonephritis is source of sepsis  Met sepsis criteria present on admission due to fever, leukocytosis, lactic acidosis, tachycardia  Now improving  · CT A/P "Increasing hydronephrosis, minimal on the right, moderate on the left on the left now with new delayed nephrogram and perinephric fluid suggesting leak  Caliber of the left ureter changes as it traverses the midline, possibly adhesion  No intraluminal filling defects  Nonobstructing stable left lower pole calculus "  · Received 4 days of antbx (IV CTX to cefepime to levaquin); reviewed final urine culture and sensitivity, greater than 100,000 colonies of Enterococcus faecalis and Serratia marcescens both of which are susceptible to Levaquin  Would transition to p o  Levaquin for 6 more days  I spoke with patient about this and reviewed potential benefits and side effects of the antibiotic    Patient was agreeable  · Provided IV fluids and supportive measures for pain  · Blood cultures negative at >24 hours  · Urology consult appreciated--they asked for IR consult for percutaneous nephrostomy which was done late yesterday afternoon  · Trended CBC/BMP; noted resolution of leukocytosis  · Procal 40 1 initially now improving

## 2021-01-19 NOTE — ASSESSMENT & PLAN NOTE
· Patient complaining of headache but believes related to being in the hospital   Continue p r n   Tylenol

## 2021-01-19 NOTE — DISCHARGE SUMMARY
[facility] 1955[facility] 63 [facility]  [facility] MRN: [id number]    Unit/Bed#: W -01 Encounter: [id number]    Primary Care Provider: Karmen Watts DO   Date and time admitted to hospital: 1/15/2021  5:48 PM  * Sepsis due to pyelonephritis [facility])  Assessment & Plan  · Presented with L flank plain  Suspected pyelonephritis is source of sepsis  Met sepsis criteria present on admission due to fever, leukocytosis, lactic acidosis, tachycardia  Now improving  · CT A/P "Increasing hydronephrosis, minimal on the right, moderate on the left on the left now with new delayed nephrogram and perinephric fluid suggesting leak  Caliber of the left ureter changes as it traverses the midline, possibly adhesion  No intraluminal filling defects  Nonobstructing stable left lower pole calculus "  · Received 4 days of antbx (IV CTX to cefepime to levaquin); reviewed final urine culture and sensitivity, greater than 100,000 colonies of Enterococcus faecalis and Serratia marcescens both of which are susceptible to Levaquin  Would transition to p o  Levaquin for 6 more days  I spoke with patient about this and reviewed potential benefits and side effects of the antibiotic  Patient was agreeable  · Provided IV fluids and supportive measures for pain  · Blood cultures negative at >24 hours  · Urology consult appreciated--they asked for IR consult for percutaneous nephrostomy which was done late yesterday afternoon  · Trended CBC/BMP; noted resolution of leukocytosis  · Procal 40 1 initially now improving      Headache  Assessment & Plan  · Patient complaining of headache but believes related to being in the hospital   Continue p r n   Tylenol    Type 2 diabetes mellitus [facility])  Assessment & Plan  Lab Results   Component Value Date    HGBA1C 7 1 (H) 01/18/2021       Recent Labs     01/18/21  1343 01/18/21  1746 01/18/21  2130 01/19/21  0710   POCGLU 99 108 194* 111       Blood Sugar Average: Last 72 hrs:  (P) 533 [id number] · In the setting of complicated urinary tract infections, Jardiance must be completely discontinued  · Held Metformin during hospital stay  Resumed at discharge, consider increasing dose in future  · Overall blood sugars reasonably well controlled      Class 2 obesity in adult  Assessment & Plan  · BMI 35 9, this serves as a comorbidity to her overall state    History of urostomy  Assessment & Plan  · 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 strictures/pelvic radiation for uterine cancer     Discharging Physician / Practitioner: Holden Gannon PA-C  PCP: Ruben Simmons,   Admission Date:   Admission Orders (From admission, onward)     Ordered        01/15/21 2143  Inpatient Admission  Once                   Discharge Date: 01/19/21    Resolved Problems  Date Reviewed: 1/19/2021    None          Consultations During Hospital Stay:  · Urology    Procedures Performed:   · Left Percutaneous nephrostomy  · CT scan abdomen and pelvis with contrast  · Chest x-ray    Significant Findings / Test Results:   · As above    Incidental Findings:   · None     Test Results Pending at Discharge (will require follow up): · None     Outpatient Tests Requested:  · None    Complications:  None    Reason for Admission:  Flank pain    Hospital Course:     Priyanka Amaya is a 63 y o  female patient with underlying history of morbid obesity, type 2 diabetes, and urostomy who originally presented to the hospital on 1/15/2021 due to left flank pain radiating into the left upper quadrant  CT scan of the abdomen and pelvis on admission showed increasing hydronephrosis on the left  Patient met sepsis criteria on admission and was diagnosed with pyelonephritis  She was started on ceftriaxone and subsequently escalated to IV cefepime due to ongoing fever    She was seen in consultation by Urology who recommended that IR place a percutaneous nephrostomy on the left which was then done on the late afternoon January 18th   Patient was responding well to antibiotics and urine culture came back showing sensitivity to both species for Levaquin  She was transitioned to Levaquin and recommended to complete a 10 day course of antibiotics  In the meantime she was also taken off Jardiance due to this severe urinary infection and can follow-up with her PCP for further diabetes management though her A1c showed good control at 7 1    Please see above list of diagnoses and related plan for additional information  Condition at Discharge: stable     Discharge Day Visit / Exam:     Subjective:  Patient complaining of a headache which she attributes to simply being in the hospital   Having a little bit of pain at the site of the nephrostomy but otherwise doing quite well  She is pleased by how clear the urine is coming out and is not having any fever or chills  She feels well and is ready to go  Vitals: Blood Pressure: 149/91 (01/19/21 0712)  Pulse: 70 (01/19/21 0712)  Temperature: 98 3 °F (36 8 °C) (01/19/21 0712)  Temp Source: Oral (01/18/21 0749)  Respirations: 16 (01/19/21 0712)  Height: 5' 6 5" (168 9 cm) (01/15/21 1744)  Weight - Scale: 103 kg (226 lb) (01/15/21 1744)  SpO2: 97 % (01/19/21 8405)  Exam:   Physical Exam  Vitals signs reviewed  Constitutional:       General: She is not in acute distress  Appearance: She is obese  She is not ill-appearing, toxic-appearing or diaphoretic  Comments: Well-appearing female, nontoxic and not in any distress   HENT:      Nose: No congestion  Eyes:      General: No scleral icterus  Right eye: No discharge  Left eye: No discharge  Conjunctiva/sclera: Conjunctivae normal    Cardiovascular:      Rate and Rhythm: Normal rate and regular rhythm  Heart sounds: No murmur  Pulmonary:      Effort: No respiratory distress  Breath sounds: No stridor  No wheezing or rhonchi  Abdominal:      General: Bowel sounds are normal  There is no distension  Palpations: Abdomen is soft  Tenderness: There is no abdominal tenderness  There is no guarding  Genitourinary:     Comments: Percutaneous nephrostomy draining clear yellow urine  Dressing in place  Musculoskeletal:      Right lower leg: No edema  Skin:     General: Skin is warm and dry  Coloration: Skin is not jaundiced or pale  Findings: No bruising, erythema, lesion or rash  Neurological:      General: No focal deficit present  Mental Status: She is alert  Psychiatric:         Mood and Affect: Mood normal          Behavior: Behavior normal          Thought Content: Thought content normal          Judgment: Judgment normal          Discussion with Family:  Patient declined,  at work    Discharge instructions/Information to patient and family:   See after visit summary for information provided to patient and family  Provisions for Follow-Up Care:  See after visit summary for information related to follow-up care and any pertinent home health orders  Disposition:  Home    Planned Readmission: none     Discharge Statement:  I spent 35 minutes discharging the patient  This time was spent on the day of discharge  I had direct contact with the patient on the day of discharge  Greater than 50% of the total time was spent examining patient, answering all patient questions, arranging and discussing plan of care with patient as well as directly providing post-discharge instructions  Additional time then spent on discharge activities  Bedside nursing rounds performed  Case discussed with Interventional Radiology and case management    Discharge Medications:  See after visit summary for reconciled discharge medications provided to patient and family        ** Please Note: This note has been constructed using a voice recognition system **

## 2021-01-19 NOTE — UTILIZATION REVIEW
Notification of Discharge  This is a Notification of Discharge from our facility [address]  Please be advised that this patient has been discharge from our facility  Below you will find the admission and discharge date and time including the patients disposition  PRESENTATION DATE: 1/15/2021  5:48 PM  OBS ADMISSION DATE:   IP ADMISSION DATE: 1/15/21 2143   DISCHARGE DATE: 1/19/2021 10:13 AM  DISPOSITION: Home/Self Care Home/Self Care   Admission Orders listed below:  Admission Orders (From admission, onward)     Ordered        01/15/21 2143  Inpatient Admission  Once                   Please contact the UR Department if additional information is required to close this patient's authorization/case  [address] [employer] Utilization Review Department  Main: [phone] x carefully listen to the prompts  All voicemails are confidential   [email or web address]  Send all requests for admission clinical reviews, approved or denied determinations and any other requests to dedicated fax number below belonging to the campus where the patient is receiving treatment   List of dedicated fax numbers:  [address] DENIALS (Administrative/Medical Necessity) [phone]   [address] (Maternity/NICU/Pediatrics) [phone]   Ayla Yan [phone]   Kalyani Bai [phone]   [facility] [phone]   [address] [phone]   [facility]  [phone]   [address] [facility] [address]  [address] [facility] [address] [facility] [phone]

## 2021-01-19 NOTE — DISCHARGE INSTR - AVS FIRST PAGE
Dear He Garcia,     It was our pleasure to care for you here at [facility]  It is our hope that we were always able to exceed the expected standards for your care during your stay  You were hospitalized due to kidney infection  You were cared for on the left 4th floor by Alona Candelario PA-C under the service of Israel Garner MD with the [facility] who covers for your primary care physician (PCP), Michael Juarez DO, while you were hospitalized  If you have any questions or concerns related to this hospitalization, you may contact us at [phone]  For follow up as well as any medication refills, we recommend that you follow up with your primary care physician  A registered nurse will reach out to you by phone within a few days after your discharge to answer any additional questions that you may have after going home  However, at this time we provide for you here, the most important instructions / recommendations at discharge:     · Notable Medication Adjustments -   ·  due to the seriousness kidney infection you got, stop Jardiance  You can continue metformin  Talk to your doctor about possibly needing to increase the dose in the future  · Starting today take 6 more doses of Levaquin 750 mg daily to finish your antibiotic treatment regimen  · Testing Required after Discharge -   · Monitor your blood sugars  · Important follow up information -   · Continue Tylenol as needed for headache  · Other Instructions -   · Follow-up with Interventional Radiology and Neurology for percutaneous nephrostomy management  · Please review this entire after visit summary as additional general instructions including medication list, appointments, activity, diet, any pertinent wound care, and other additional recommendations from your care team that may be provided for you        Sincerely,     Alona Candelario PA-C

## 2021-01-19 NOTE — ASSESSMENT & PLAN NOTE
Lab Results   Component Value Date    HGBA1C 7 1 (H) 01/18/2021       Recent Labs     01/18/21  1343 01/18/21  1746 01/18/21  2130 01/19/21  0710   POCGLU 99 108 194* 111       Blood Sugar Average: Last 72 hrs:  (P) 818 [id number]     · In the setting of complicated urinary tract infections, Jardiance must be completely discontinued  · Held Metformin during hospital stay    Resumed at discharge, consider increasing dose in future  · Overall blood sugars reasonably well controlled

## 2021-01-19 NOTE — TELEPHONE ENCOUNTER
Patient requesting to see Dr Mandy Cisse for follow up  Patient had IR tube placement yesterday stated instructs to be seen in one week

## 2021-01-19 NOTE — PLAN OF CARE
Problem: Potential for Falls  Goal: Patient will remain free of falls  Description: INTERVENTIONS:  - Assess patient frequently for physical needs  -  Identify cognitive and physical deficits and behaviors that affect risk of falls    -  [place] fall precautions as indicated by assessment   - Educate patient/family on patient safety including physical limitations  - Instruct patient to call for assistance with activity based on assessment  - Modify environment to reduce risk of injury  - Consider OT/PT consult to assist with strengthening/mobility  Outcome: Progressing     Problem: PAIN - ADULT  Goal: Verbalizes/displays adequate comfort level or baseline comfort level  Description: Interventions:  - Encourage patient to monitor pain and request assistance  - Assess pain using appropriate pain scale  - Administer analgesics based on type and severity of pain and evaluate response  - Implement non-pharmacological measures as appropriate and evaluate response  - Consider cultural and social influences on pain and pain management  - Notify physician/advanced practitioner if interventions unsuccessful or patient reports new pain  Outcome: Progressing     Problem: INFECTION - ADULT  Goal: Absence or prevention of progression during hospitalization  Description: INTERVENTIONS:  - Assess and monitor for signs and symptoms of infection  - Monitor lab/diagnostic results  - Monitor all insertion sites, i e  indwelling lines, tubes, and drains  - Monitor endotracheal if appropriate and nasal secretions for changes in amount and color  - [place] appropriate cooling/warming therapies per order  - Administer medications as ordered  - Instruct and encourage patient and family to use good hand hygiene technique  - Identify and instruct in appropriate isolation precautions for identified infection/condition  Outcome: Progressing

## 2021-01-19 NOTE — CASE MANAGEMENT
CM spoke with patient at the bedside  CM introduced self and role  CM discussed VNA at discharge for SN needs  Patient educated on [place] [facility]  Patient provided teaching and extra supplies at the bedside per nursing  Patient declined VNA referral  Patient stated she feels comfortable caring for the PCN tube  Patient has been caring for there urostomy at home without difficultly  Patient stated her  is at home to help  Patient has transport at discharge  No other questions/concerns noted at this time  Nursing updated with plan of care

## 2021-01-21 NOTE — TELEPHONE ENCOUNTER
Patient called back to confirm but wanted to know why she can not be seen in 1 week    Advised as per doctor she is to come in on 02/01

## 2021-01-21 NOTE — TELEPHONE ENCOUNTER
Please advise patient her 1/27/21 appt changed to 2/1/21 at 4pm  Dr Agata Bales wanted to ensure he had enough time for discussion with her, and the 2/1/21 appt is a 30 minute spot

## 2021-01-22 LAB
BACTERIA BLD CULT: NORMAL
BACTERIA BLD CULT: NORMAL

## 2021-01-31 ENCOUNTER — TELEPHONE (OUTPATIENT)
Dept: UROLOGY | Facility: CLINIC | Age: 66
End: 2021-01-31
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
## 2021-02-01 ENCOUNTER — OFFICE VISIT (OUTPATIENT)
Dept: UROLOGY | Facility: CLINIC | Age: 66
End: 2021-02-01
Payer: COMMERCIAL

## 2021-02-01 DIAGNOSIS — N99.89 URETERAL-ILEAL LOOP ANASTOMOTIC STRICTURE: Primary | ICD-10-CM

## 2021-02-01 PROCEDURE — 99214 OFFICE O/P EST MOD 30 MIN: CPT | Performed by: UROLOGY

## 2021-02-01 RX ORDER — LEVOFLOXACIN 5 MG/ML
750 INJECTION, SOLUTION INTRAVENOUS ONCE
Status: CANCELLED | OUTPATIENT
Start: 2021-02-01 | End: 2021-02-01

## 2021-02-01 NOTE — PROGRESS NOTES
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    #2 Status post ileal conduit urinary diversion decades ago with outside provider  - patient believes she underwent to subsequent surgical revisions likely related to stomal complications    #3 Severe ureterocele in [facility] anastomotic stricture, left side associated with obstructive pyelonephritis  - s/p PCN    Had extensive conversation with the patient and her  today  I showed them the images from her CT scan and antegrade nephrostogram   He understands that her left kidney is not draining properly into her ileal conduit and she is at risk for additional infections and loss of the kidney  Today we discussed 2 options which include:   -exploratory laparotomy with revision/reimplantation of left ureter, which I believe would be best accomplished by a reconstructive urologist   -attempted endoscopic management with antegrade ureteroscopy    The pros and cons, success rates, and expected recovery time and risks of each modality were discussed extensively  At the present time Paige Schwartz would like to move forward with attempted endoscopic management  This is certainly very reasonable, certainly less morbid, though we did discuss that has a lower rate of long-term and short-term success for  He would be comfortable with a retrograde single-J stent if needed to maintain patency if the procedure is successful  Discussed options for management of the patient's ureteral stone  We discussed surgical options including ureteroscopy and shock wave lithotripsy  In addition we discussed conservative management with medical expulsive therapy  The patient has elected to undergo ureteroscopy  I discussed with the patients risks and benefits and alternatives to ureteroscopy with laser lithotripsy    The risks include bleeding, infection, injury to the urethra, bladder, ureter or kidney, risk of a staged procedure, risk of stricture, risk ofrecurrent stricture, injury to the conduit necessitating laparotomy, risk of loss of kidney, risks of anesthesia including DVT, PE, MI and death  The patient understands that a ureteral stent will likely be left in place at the time of the procedure  We reviewed the expected postoperative care  Patient will be scheduled for percutaneous antegrade left ureteroscopy with laser incision and/or balloon dilation of ureteral stricture, and exchange or removal of her nephrostomy/nephroureteral stent placement      Problem List Items Addressed This Visit     None      Visit Diagnoses     Ureteral-ileal loop anastomotic stricture    -  Primary    Relevant Orders    Case request operating room: anterograde  URETEROSCOPY WITH treatment of ureteral stricture INSERTION STENT URETERAL, removal vs exchange  of nephrostomy (Completed)               Reason for visit is   Chief Complaint   Patient presents with    Virtual Regular Visit        Encounter provider Sheri Sunshine MD    Provider located at [address] [place] [id number]      Recent Visits  Date Type Provider Dept   01/31/21 Telephone [facility] [place] Comment recent visits within past 7 days and meeting all other requirements     Today's Visits  Date Type Provider Dept   02/01/21 Office Visit Sheri Sunshine MD [facility]   Showing today's visits and meeting all other requirements     Future Appointments  No visits were found meeting these conditions  Showing future appointments within next 150 days and meeting all other requirements        The patient was identified by name and date of birth  Rafita Dao was informed that this is a telemedicine visit and that the visit is being conducted through [facility] and patient was informed that this is not a secure, HIPAA-compliant platform  She agrees to proceed  Lian Abdi My office door was closed  No one else was in the room  She acknowledged consent and understanding of privacy and security of the video platform  The patient has agreed to participate and understands they can discontinue the visit at any time  Patient is aware this is a billable service  Subjective  Cora Sweeney is a 63 y o  female returns in follow-up for left ureteral-enteric anastomotic stricture status post nephrostomy tube   In brief this is a very pleasant 61-year-old patient who developed defunctionalized bladder and likely ureteral stricture disease following prior external beam and brachytherapy for gynecologic cancer  He underwent a cystectomy with ileal conduit urinary diversion many years ago with Dr Amberly Reid    He recently was admitted to the hospital with a complicated urinary tract infection and moderate hydroureteronephrosis on the left side  He underwent percutaneous nephrostomy tube placement  Anterograde nephrostogram reveals severe stenosis of the anastomosis  Patient infectious symptoms have resolved  She has significant discomfort from the 2  She has had no additional fevers or systemic infectious symptoms  HPI     Past Medical History:   Diagnosis Date    Anemia     Back pain     Bowel obstruction ([address] ) 2018    Cholelithiasis     Deep vein thrombosis of left lower extremity ([facility] ) 01/11/2004    on blood thinner for 3 years   Diabetes mellitus ([facility] )     type 2    Endometrial cancer ([facility] ) 1999    GERD (gastroesophageal reflux disease)     Gross hematuria     History of transfusion 1999    History of urostomy 01/11/2004    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      Hypothyroid     In vitro fertilization     Kidney stone     Migraines     Muscle weakness     abdominal    Personal history of irradiation     Renal cyst     Renal stone     Sleep apnea     in past prior to weight loss       Past Surgical History:   Procedure Laterality Date    COLONOSCOPY      COLONOSCOPY W/ BIOPSIES N/A 12/2015    HEEL SPUR SURGERY Right 1996    HYSTERECTOMY      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    IR NEPHROSTOMY TUBE PLACEMENT  1/18/2021    LAPAROSCOPIC GASTRIC BANDING N/A 2006    [place], [address] OTHER SURGICAL HISTORY  2004    Urine shunt to intestine, transverse colon    CO LAP, SURG ENTEROLYSIS N/A 1/17/2019    Procedure: LAPAROSCOPIC LYSIS OF ADHESIONS;  Surgeon: Linda Acosta MD;  Location: [address] [place] [address] OR;  Service: General    RADICAL HYSTERECTOMY N/A 1999    [facility] endometrial cancer   SLEEVE GASTROPLASTY N/A 09/2015    Dr Ashish Chavira, [facility]    URETER REVISION  2010    WEILBY THUMB ARTHROPLASTY      WEILBY THUMB ARTHROPLASTY         Current Outpatient Medications   Medication Sig Dispense Refill    acetaminophen (TYLENOL) 325 mg tablet Take 2 tablets (650 mg total) by mouth every 6 (six) hours as needed for mild pain, headaches or fever  0    ALPRAZolam (XANAX) 0 5 mg tablet Take 0 5 mg by mouth daily at bedtime as needed    2    metFORMIN (GLUCOPHAGE) 1000 MG tablet Take 500 mg by mouth 2 (two) times a day with meals        pramipexole (MIRAPEX) 0 25 mg tablet Take 0 25 mg by mouth daily at bedtime      SYNTHROID 75 MCG tablet Take 75 mcg by mouth daily in the early morning         No current facility-administered medications for this visit  Allergies   Allergen Reactions    Amoxicillin Rash    Penicillins Rash       Review of Systems   Constitutional: Negative for activity change and fatigue  HENT: Negative for congestion  Eyes: Negative for visual disturbance  Respiratory: Negative for shortness of breath and wheezing  Cardiovascular: Negative for chest pain and leg swelling  Gastrointestinal: Negative for abdominal pain  Endocrine: Negative for polyuria  Genitourinary: Positive for flank pain  Negative for dysuria, hematuria and urgency     Musculoskeletal: Negative for back pain    Allergic/Immunologic: Negative for immunocompromised state  Neurological: Negative for dizziness and numbness  Psychiatric/Behavioral: Negative for dysphoric mood  All other systems reviewed and are negative  Video Exam    There were no vitals filed for this visit  Physical Exam  Vitals signs and nursing note reviewed  Exam conducted with a chaperone present  Constitutional:       General: She is not in acute distress  Appearance: She is well-developed  HENT:      Head: Normocephalic and atraumatic  Neck:      Musculoskeletal: Normal range of motion  Pulmonary:      Effort: Pulmonary effort is normal       Breath sounds: Normal breath sounds  Abdominal:      General: There is no distension  Palpations: Abdomen is soft  Tenderness: There is no abdominal tenderness  Genitourinary:     Comments: Negative CVA tenderness    Negative Suprapubic Tenderness  Musculoskeletal: Normal range of motion  Skin:     General: Skin is warm  Neurological:      Mental Status: She is alert and oriented to person, place, and time  Cranial Nerves: No cranial nerve deficit  Psychiatric:         Behavior: Behavior normal           I spent 35 minutes with patient today in which greater than 50% of the time was spent in counseling/coordination of care regarding minimally invasive and reconstructive iptio s      VIRTUAL VISIT DISCLAIMER    Brandon Coy acknowledges that she has consented to an online visit or consultation  She understands that the online visit is based solely on information provided by her, and that, in the absence of a face-to-face physical evaluation by the physician, the diagnosis she receives is both limited and provisional in terms of accuracy and completeness  This is not intended to replace a full medical face-to-face evaluation by the physician  Brandon Coy understands and accepts these terms

## 2021-02-02 ENCOUNTER — PREP FOR PROCEDURE (OUTPATIENT)
Dept: UROLOGY | Facility: CLINIC | Age: 66
End: 2021-02-02

## 2021-02-02 ENCOUNTER — TELEPHONE (OUTPATIENT)
Dept: UROLOGY | Facility: CLINIC | Age: 66
End: 2021-02-02

## 2021-02-02 NOTE — TELEPHONE ENCOUNTER
Spoke with patient and she is sched for 2/16 at [facility] with [address]  She is aware she will need a  and the hospital will contact her day prior with time of arrival  No PATs or clearances are needed  I am mailing her a surgical packet and she will contact us with any questions or concerns

## 2021-02-08 ENCOUNTER — ANESTHESIA EVENT (OUTPATIENT)
Dept: PERIOP | Facility: HOSPITAL | Age: 66
End: 2021-02-08
Payer: COMMERCIAL

## 2021-02-08 RX ORDER — PANTOPRAZOLE SODIUM 40 MG/1
40 TABLET, DELAYED RELEASE ORAL
COMMUNITY

## 2021-02-08 NOTE — TELEPHONE ENCOUNTER
Dr Jade Whaley office called to advised patient is scheduled for pre clearance today at 4:45 pm   Advised ekg and labs r complete  Office asked for ekg to be faxed over to [phone]    Sent

## 2021-02-08 NOTE — TELEPHONE ENCOUNTER
Spoke to Dr Wood office they are calling patient to roslyn her in for med clearance due to her retaining fluid  I am pending phone call from their office in regards to status of appt

## 2021-02-08 NOTE — PRE-PROCEDURE INSTRUCTIONS
Pre-Surgery Instructions:   Medication Instructions    acetaminophen (TYLENOL) 325 mg tablet Instructed patient per Anesthesia Guidelines   ALPRAZolam (XANAX) 0 5 mg tablet Instructed to take per normal schedule    Ascorbic Acid (VITAMIN C PO) Instructed patient per Anesthesia Guidelines   BIOTIN PO Instructed patient per Anesthesia Guidelines   Calcium Polycarbophil (FIBER-CAPS PO) Instructed patient per Anesthesia Guidelines   Coenzyme Q10 (CO Q-10 PO) Instructed patient per Anesthesia Guidelines   Cyanocobalamin (B-12 PO) Instructed patient per Anesthesia Guidelines   metFORMIN (GLUCOPHAGE) 1000 MG tablet Instructed to take per normal schedule except DOS    pantoprazole (PROTONIX) 40 mg tablet Instructed to take per normal schedule including DOS with sips water    pramipexole (MIRAPEX) 0 25 mg tablet Instructed to take per normal schedule    Probiotic Product (PROBIOTIC DAILY PO) Instructed to take per normal schedule except DOS    SYNTHROID 75 MCG tablet Instructed to take per normal schedule except DOS    Preop Instructions per Nancy Ovens protocol,medications per anesthesia guidelines and showering instructions per Nancy Ovens protocol using her own soap/Shampoo reviewed  Pt  Verbalized understanding of current visitor restrictions  Instructed to avoid all ASA and OTC Vit/Supp 1 week prior to surgery and to avoid NSAIDs 3 days prior to surgery  Tylenol ok to take prn  Pt  Verbalized an understanding of all instructions reviewed and offers no concerns at this time

## 2021-02-16 ENCOUNTER — ANESTHESIA (OUTPATIENT)
Dept: PERIOP | Facility: HOSPITAL | Age: 66
End: 2021-02-16
Payer: COMMERCIAL

## 2021-02-16 ENCOUNTER — TELEPHONE (OUTPATIENT)
Dept: UROLOGY | Facility: CLINIC | Age: 66
End: 2021-02-16

## 2021-02-16 ENCOUNTER — HOSPITAL ENCOUNTER (OUTPATIENT)
Facility: HOSPITAL | Age: 66
Setting detail: OUTPATIENT SURGERY
Discharge: HOME | End: 2021-02-16
Attending: UROLOGY | Admitting: UROLOGY
Payer: COMMERCIAL

## 2021-02-16 ENCOUNTER — APPOINTMENT (OUTPATIENT)
Dept: RADIOLOGY | Facility: HOSPITAL | Age: 66
End: 2021-02-16
Payer: COMMERCIAL

## 2021-02-16 VITALS
BODY MASS INDEX: 36.88 KG/M2 | TEMPERATURE: 98.1 F | SYSTOLIC BLOOD PRESSURE: 141 MMHG | HEART RATE: 64 BPM | RESPIRATION RATE: 18 BRPM | DIASTOLIC BLOOD PRESSURE: 66 MMHG | WEIGHT: 235 LBS | HEIGHT: 67 IN | OXYGEN SATURATION: 94 %

## 2021-02-16 VITALS — HEART RATE: 81 BPM

## 2021-02-16 DIAGNOSIS — N13.30 HYDROURETERONEPHROSIS: Primary | ICD-10-CM

## 2021-02-16 DIAGNOSIS — N99.89 URETERAL-ILEAL LOOP ANASTOMOTIC STRICTURE: Primary | ICD-10-CM

## 2021-02-16 LAB
GLUCOSE SERPL-MCNC: 145 MG/DL (ref 65–140)
GLUCOSE SERPL-MCNC: 147 MG/DL (ref 65–140)

## 2021-02-16 PROCEDURE — C1894 INTRO/SHEATH, NON-LASER: HCPCS | Performed by: UROLOGY

## 2021-02-16 PROCEDURE — C1726 CATH, BAL DIL, NON-VASCULAR: HCPCS | Performed by: UROLOGY

## 2021-02-16 PROCEDURE — 50706 BALLOON DILATE URTRL STRIX: CPT | Performed by: UROLOGY

## 2021-02-16 PROCEDURE — 76000 FLUOROSCOPY <1 HR PHYS/QHP: CPT

## 2021-02-16 PROCEDURE — C1758 CATHETER, URETERAL: HCPCS | Performed by: UROLOGY

## 2021-02-16 PROCEDURE — C1769 GUIDE WIRE: HCPCS | Performed by: UROLOGY

## 2021-02-16 PROCEDURE — 50387 CHANGE NEPHROURETERAL CATH: CPT | Performed by: UROLOGY

## 2021-02-16 PROCEDURE — 50951 ENDOSCOPY OF URETER: CPT | Performed by: UROLOGY

## 2021-02-16 PROCEDURE — NC001 PR NO CHARGE: Performed by: UROLOGY

## 2021-02-16 PROCEDURE — 82948 REAGENT STRIP/BLOOD GLUCOSE: CPT

## 2021-02-16 RX ORDER — FENTANYL CITRATE/PF 50 MCG/ML
25 SYRINGE (ML) INJECTION
Status: DISCONTINUED | OUTPATIENT
Start: 2021-02-16 | End: 2021-02-16 | Stop reason: HOSPADM

## 2021-02-16 RX ORDER — SODIUM CHLORIDE, SODIUM LACTATE, POTASSIUM CHLORIDE, CALCIUM CHLORIDE 600; 310; 30; 20 MG/100ML; MG/100ML; MG/100ML; MG/100ML
100 INJECTION, SOLUTION INTRAVENOUS CONTINUOUS
Status: DISCONTINUED | OUTPATIENT
Start: 2021-02-16 | End: 2021-02-16 | Stop reason: HOSPADM

## 2021-02-16 RX ORDER — FENTANYL CITRATE 50 UG/ML
INJECTION, SOLUTION INTRAMUSCULAR; INTRAVENOUS AS NEEDED
Status: DISCONTINUED | OUTPATIENT
Start: 2021-02-16 | End: 2021-02-16

## 2021-02-16 RX ORDER — OXYCODONE HYDROCHLORIDE 5 MG/1
5 TABLET ORAL EVERY 4 HOURS PRN
Qty: 5 TABLET | Refills: 0 | Status: SHIPPED | OUTPATIENT
Start: 2021-02-16 | End: 2021-02-21

## 2021-02-16 RX ORDER — SUCCINYLCHOLINE/SOD CL,ISO/PF 100 MG/5ML
SYRINGE (ML) INTRAVENOUS AS NEEDED
Status: DISCONTINUED | OUTPATIENT
Start: 2021-02-16 | End: 2021-02-16

## 2021-02-16 RX ORDER — OXYCODONE HYDROCHLORIDE 5 MG/1
5 TABLET ORAL EVERY 4 HOURS PRN
Status: DISCONTINUED | OUTPATIENT
Start: 2021-02-16 | End: 2021-02-16 | Stop reason: HOSPADM

## 2021-02-16 RX ORDER — ONDANSETRON 2 MG/ML
4 INJECTION INTRAMUSCULAR; INTRAVENOUS ONCE AS NEEDED
Status: COMPLETED | OUTPATIENT
Start: 2021-02-16 | End: 2021-02-16

## 2021-02-16 RX ORDER — SODIUM CHLORIDE, SODIUM LACTATE, POTASSIUM CHLORIDE, CALCIUM CHLORIDE 600; 310; 30; 20 MG/100ML; MG/100ML; MG/100ML; MG/100ML
INJECTION, SOLUTION INTRAVENOUS CONTINUOUS PRN
Status: DISCONTINUED | OUTPATIENT
Start: 2021-02-16 | End: 2021-02-16

## 2021-02-16 RX ORDER — LEVOFLOXACIN 5 MG/ML
750 INJECTION, SOLUTION INTRAVENOUS ONCE
Status: COMPLETED | OUTPATIENT
Start: 2021-02-16 | End: 2021-02-16

## 2021-02-16 RX ORDER — TAMSULOSIN HYDROCHLORIDE 0.4 MG/1
0.4 CAPSULE ORAL
Qty: 15 CAPSULE | Refills: 0 | Status: ON HOLD | OUTPATIENT
Start: 2021-02-16 | End: 2021-03-23

## 2021-02-16 RX ORDER — DOCUSATE SODIUM 100 MG/1
100 CAPSULE, LIQUID FILLED ORAL 2 TIMES DAILY
Qty: 30 CAPSULE | Refills: 0 | Status: SHIPPED | OUTPATIENT
Start: 2021-02-16 | End: 2021-04-21

## 2021-02-16 RX ORDER — NAPROXEN 500 MG/1
500 TABLET ORAL 2 TIMES DAILY WITH MEALS
Qty: 10 TABLET | Refills: 0 | Status: SHIPPED | OUTPATIENT
Start: 2021-02-16 | End: 2021-02-21

## 2021-02-16 RX ORDER — SODIUM CHLORIDE 9 MG/ML
INJECTION, SOLUTION INTRAVENOUS AS NEEDED
Status: DISCONTINUED | OUTPATIENT
Start: 2021-02-16 | End: 2021-02-16 | Stop reason: HOSPADM

## 2021-02-16 RX ORDER — PROPOFOL 10 MG/ML
INJECTION, EMULSION INTRAVENOUS AS NEEDED
Status: DISCONTINUED | OUTPATIENT
Start: 2021-02-16 | End: 2021-02-16

## 2021-02-16 RX ORDER — NITROFURANTOIN 25; 75 MG/1; MG/1
100 CAPSULE ORAL 2 TIMES DAILY
Qty: 14 CAPSULE | Refills: 0 | Status: SHIPPED | OUTPATIENT
Start: 2021-02-16 | End: 2021-02-23

## 2021-02-16 RX ORDER — HYDROMORPHONE HCL/PF 1 MG/ML
0.2 SYRINGE (ML) INJECTION
Status: DISCONTINUED | OUTPATIENT
Start: 2021-02-16 | End: 2021-02-16 | Stop reason: HOSPADM

## 2021-02-16 RX ORDER — PROMETHAZINE HYDROCHLORIDE 25 MG/ML
12.5 INJECTION, SOLUTION INTRAMUSCULAR; INTRAVENOUS ONCE AS NEEDED
Status: DISCONTINUED | OUTPATIENT
Start: 2021-02-16 | End: 2021-02-16 | Stop reason: HOSPADM

## 2021-02-16 RX ORDER — DEXAMETHASONE SODIUM PHOSPHATE 4 MG/ML
INJECTION, SOLUTION INTRA-ARTICULAR; INTRALESIONAL; INTRAMUSCULAR; INTRAVENOUS; SOFT TISSUE AS NEEDED
Status: DISCONTINUED | OUTPATIENT
Start: 2021-02-16 | End: 2021-02-16

## 2021-02-16 RX ORDER — ROCURONIUM BROMIDE 10 MG/ML
INJECTION, SOLUTION INTRAVENOUS AS NEEDED
Status: DISCONTINUED | OUTPATIENT
Start: 2021-02-16 | End: 2021-02-16

## 2021-02-16 RX ORDER — ACETAMINOPHEN 325 MG/1
650 TABLET ORAL EVERY 4 HOURS PRN
Qty: 30 TABLET | Refills: 0
Start: 2021-02-16

## 2021-02-16 RX ORDER — LIDOCAINE HYDROCHLORIDE 10 MG/ML
INJECTION, SOLUTION EPIDURAL; INFILTRATION; INTRACAUDAL; PERINEURAL AS NEEDED
Status: DISCONTINUED | OUTPATIENT
Start: 2021-02-16 | End: 2021-02-16

## 2021-02-16 RX ORDER — ONDANSETRON 2 MG/ML
INJECTION INTRAMUSCULAR; INTRAVENOUS AS NEEDED
Status: DISCONTINUED | OUTPATIENT
Start: 2021-02-16 | End: 2021-02-16

## 2021-02-16 RX ORDER — MIDAZOLAM HYDROCHLORIDE 2 MG/2ML
INJECTION, SOLUTION INTRAMUSCULAR; INTRAVENOUS AS NEEDED
Status: DISCONTINUED | OUTPATIENT
Start: 2021-02-16 | End: 2021-02-16

## 2021-02-16 RX ORDER — LABETALOL 20 MG/4 ML (5 MG/ML) INTRAVENOUS SYRINGE
10 ONCE
Status: DISCONTINUED | OUTPATIENT
Start: 2021-02-16 | End: 2021-02-16 | Stop reason: HOSPADM

## 2021-02-16 RX ADMIN — FENTANYL CITRATE 25 MCG: 50 INJECTION INTRAMUSCULAR; INTRAVENOUS at 14:04

## 2021-02-16 RX ADMIN — FENTANYL CITRATE 25 MCG: 50 INJECTION INTRAMUSCULAR; INTRAVENOUS at 14:40

## 2021-02-16 RX ADMIN — ONDANSETRON 4 MG: 2 INJECTION INTRAMUSCULAR; INTRAVENOUS at 12:16

## 2021-02-16 RX ADMIN — Medication 100 MG: at 12:10

## 2021-02-16 RX ADMIN — ROCURONIUM BROMIDE 25 MG: 10 SOLUTION INTRAVENOUS at 12:20

## 2021-02-16 RX ADMIN — ONDANSETRON 4 MG: 2 INJECTION INTRAMUSCULAR; INTRAVENOUS at 13:50

## 2021-02-16 RX ADMIN — FENTANYL CITRATE 25 MCG: 50 INJECTION INTRAMUSCULAR; INTRAVENOUS at 14:29

## 2021-02-16 RX ADMIN — LIDOCAINE HYDROCHLORIDE 50 MG: 10 INJECTION, SOLUTION EPIDURAL; INFILTRATION; INTRACAUDAL at 12:10

## 2021-02-16 RX ADMIN — MIDAZOLAM HYDROCHLORIDE 1 MG: 1 INJECTION, SOLUTION INTRAMUSCULAR; INTRAVENOUS at 12:08

## 2021-02-16 RX ADMIN — PROPOFOL 150 MG: 10 INJECTION, EMULSION INTRAVENOUS at 12:10

## 2021-02-16 RX ADMIN — MIDAZOLAM HYDROCHLORIDE 1 MG: 1 INJECTION, SOLUTION INTRAMUSCULAR; INTRAVENOUS at 12:06

## 2021-02-16 RX ADMIN — IOHEXOL 130 ML: 240 INJECTION, SOLUTION INTRATHECAL; INTRAVASCULAR; INTRAVENOUS; ORAL at 13:44

## 2021-02-16 RX ADMIN — LEVOFLOXACIN 750 MG: 5 INJECTION, SOLUTION INTRAVENOUS at 11:47

## 2021-02-16 RX ADMIN — FENTANYL CITRATE 100 MCG: 50 INJECTION, SOLUTION INTRAMUSCULAR; INTRAVENOUS at 12:10

## 2021-02-16 RX ADMIN — FENTANYL CITRATE 25 MCG: 50 INJECTION INTRAMUSCULAR; INTRAVENOUS at 14:34

## 2021-02-16 RX ADMIN — SUGAMMADEX 200 MG: 100 INJECTION, SOLUTION INTRAVENOUS at 13:30

## 2021-02-16 RX ADMIN — SODIUM CHLORIDE, SODIUM LACTATE, POTASSIUM CHLORIDE, AND CALCIUM CHLORIDE: .6; .31; .03; .02 INJECTION, SOLUTION INTRAVENOUS at 12:06

## 2021-02-16 RX ADMIN — PROPOFOL 50 MG: 10 INJECTION, EMULSION INTRAVENOUS at 13:21

## 2021-02-16 RX ADMIN — DEXAMETHASONE SODIUM PHOSPHATE 4 MG: 4 INJECTION INTRA-ARTICULAR; INTRALESIONAL; INTRAMUSCULAR; INTRAVENOUS; SOFT TISSUE at 12:16

## 2021-02-16 NOTE — ANESTHESIA PREPROCEDURE EVALUATION
Procedure:  anterograde  URETEROSCOPY WITH treatment of ureteral stricture INSERTION STENT URETERAL, removal vs exchange  of nephrostomy (Left Bladder)    Relevant Problems   CARDIO   (+) Deep vein thrombosis of left lower extremity (HCC) (Resolved)   (+) HTN (hypertension)   (+) Migraine      ENDO   (+) Hypothyroidism   (+) Type 2 diabetes mellitus (HCC)      GI/HEPATIC   (+) Small bowel obstruction (HCC)      /RENAL   (+) Hydroureteronephrosis      GYN   (+) Endometrial cancer (HCC)      HEMATOLOGY   (+) Anemia      NEURO/PSYCH   (+) Headache      Other   (+) Class 2 obesity in adult   (+) History of urostomy   (+) Pyelonephritis   (+) Sepsis due to pyelonephritis [facility])        Physical Exam    Airway    Mallampati score: II  TM Distance: >3 FB  Neck ROM: full     Dental   No notable dental hx implants,     Cardiovascular  Rhythm: regular, Rate: normal, Cardiovascular exam normal    Pulmonary  Pulmonary exam normal     Other Findings        Anesthesia Plan  ASA Score- 3     Anesthesia Type- general with ASA Monitors  Additional Monitors:   Airway Plan: ETT  Plan Factors-Exercise tolerance (METS): >4 METS  Chart reviewed  Existing labs reviewed  Patient summary reviewed  Patient is not a current smoker  Patient did not smoke on day of surgery  Induction- intravenous and rapid sequence induction  Postoperative Plan- Plan for postoperative opioid use  Planned trial extubation    Informed Consent- Anesthetic plan and risks discussed with patient  I personally reviewed this patient with the CRNA  Discussed and agreed on the Anesthesia Plan with the CRNA  Pascual Chapman
patient's belongings returned

## 2021-02-16 NOTE — OP NOTE
Operative Note     PATIENT:  Cora Sweeney (MRN [id number])    DATE OF PROCEDURE:   2/16/2021    PRE-OP DIAGNOSIS:   1)  Left uretero enteric anastomotic stricture   2  End stage radiation cystitis/radiation ureteritis, status post  priorcystectomy and ileal conduit   3  Indwelling left nephrostomy tube  4  Recent history of urosepsis    POST-OP DIAGNOSIS:   1)  Left uretero enteric anastomotic stricture   2  End stage radiation cystitis/radiation ureteritis, status post  priorcystectomy and ileal conduit  3  Indwelling left nephrostomy tube  4  Recent history of urosepsis    PROCEDURES PERFORMED:  1)   Left Percutaneous nephroscopy  2)  Left Anterograde ureteroscopy  3)  Antegrade nephrostogram with fluoroscopic interpretation   4  Balloon dilation of ureteral stricture  5  Removal of nephrostomy tube  6  Placement of percutaneous nephroureteral stent (PCNU)    SURGEON:  Shelly Zamorano MD    ASSISTANTS:    NOTE:  There were no qualified teaching residents to assist with this case    ANESTHESIA: General     COMPLICATIONS:   None    ANTIBIOTICS:   Levaquin    INTRAOPERATIVE THROMBOEMBOLISM PROPHYLAXIS:  Pneumatic compression stockings       OPERATIVE FINDINGS:  -  Initial antegrade nephrostogram did demonstrate ureteral stricture at the level of the ureterocele interrogate anastomosis  When pressurized, there was proper transit of contrast into the ileal conduit  - percutaneous nephroscopy as well as antegrade ureteroscopy is performed  There are no evidence of carcinoma within the upper tract or ureter   - the site of stricture is visualized and appears consistent with an ischemic stricture  -The stricture is successfully treated with balloon dilation    Follow-up antegrade nephrostogram shows no extravasation outside of the urinary tract   -A postoperative percutaneous nephroureteral stent (PCNU)  Is placed postoperatively and left capped      INDICATIONS FOR PROCEDURE:  Cora Sweeney is an 63 y o  old female  With a distant history of a recalcitrant defunctionalized bladder as well as radiation ureteritis  She has a history of radiation treatment for endometrial cancer many decades ago  She ultimately underwent an ileal conduit urinary diversion with an outside urologist  She recently presented with urosepsis and imaging findings of  Left hydroureteronephrosis  She was managed acutely with a nephrostomy tube  Of note antegrade nephrostogram from the time of tube placement showed the absence of any contrast transit down into the ileal conduit  I reviewed options with the patient and she ultimately elected for attempted endoscopic management through a percutaneous antegrade approach  We discussed the procedure in detail, the alternatives, and the risks, and they signed informed consent to proceed  PROCEDURE IN DETAIL:   The patient was identified and brought to the OR  Antibiotic prophylaxis and DVT prophylaxis were administered  General anesthesia was induced on the stretcher  A Carl catheter was placed in the standard fashion  Patient was then repositioned prone on the hybrid operating room table with care taken to pad all pressure points  The flank was prepped and draped in the usual sterile fashion using DuraPrep  I began the procedure by performing a anterograde nephrostogram   Findings as described above  Specifically there was appropriate transit of contrast into the ileal conduit however this was visualized only when the system was injected under high pressure  No extravasation was noted  I next proceeded to thread a Bard Solo Plus wire through the nephrostomy tube and down the ureter  This was accomplished using an angle tip open-ended catheter  I was able to advance this wire through the  Stricture in directly into the ileal conduit, confirming its positioning under fluoroscopic guidance  Next a dual-lumen catheter was introduced and a 2nd working wire was placed    This wire was left coiled within the distal ureter  At this point after dilating the tract I was able to place a 10/12 [email or web address] ureteral access sheath in an antegrade fashion down into the distal ureter  I then introduced a 5 3 [employer] Vashti flexible ureteral scope through this  I was able to evaluate the distal ureter under vision  The site of stricture was noted and photographed  There was no evidence of urothelial carcinoma here  This appeared to be an ischemic stricture  Under vision I then cannulated the stricture with a 2nd working wire and this was seen to enter the ileal conduit under fluoroscopy  I repeated an antegrade nephrostogram again at this point and confirmed that there was no extravasation  Intraoperative findings do suggest a stricture that would be amenable to a balloon dilation  An 25 [place]ian by 4 cm balloon was selected and under fluoroscopy positioned to span the length of the stricture which is present at the anastomosis to the ileal conduit  This is dilated to 18 atmospheres under fluoroscopic guidance, and left to remain open for 5 minutes  Before deflating and removing  Antegrade nephrostogram was again performed which shows more robust drainage of contrast into the healthy appearing ileal conduit  Again no extravasation is noted  I did elect to drain the system with a percutaneous nephroureteral stent, 8 [email or web address]  The distal coil was positioned into the ileal conduit and the proximal coil positioned in the left renal pelvis  I did a left to to leave the tube capped at the conclusion of the case  ESTIMATED BLOOD LOSS:  Minimal      DRAINS:   Nephrostomy Left 8 Fr   (Active)       Urostomy Ureterostomy right RUQ (Active)       Percutaneous Nephroureteral Tube (PCNU) Right 1 8 5 Fr 22 Cm (Active)       SPECIMENS:   * No orders in the log *     IMPLANTS:   * No implants in log *     COMPLICATIONS: none    DISPOSITION: PACU      PLAN:    Patient will be discharged home with the nephroureteral stent clamped neck is     I will arrange for a tube check with Interventional Radiology, antegrade nephrostogram with planned removal of PCNU,  And will then arrange for CT urogram 6 weeks thereafter

## 2021-02-16 NOTE — TELEPHONE ENCOUNTER
Patient is status post successful dilation of ureteral stricture involving her ileal conduit    Please help to schedule the following:  -antegrade nephrostogram with Interventional Radiology in 3 weeks  Her nephroureteral stent can be removed at that time  Order has been placed    -CT urogram 6 weeks thereafter  -can follow-up with me or AP after the CT

## 2021-02-16 NOTE — DISCHARGE INSTRUCTIONS
Mrs Desi Alonzo:    Your surgery went very well the scar tissue within your left ureter was successfully treated using a scope and a balloon to dilate  Please keep your nephroureteral stent clean and dry  It does not need to be flushed     I will schedule you for a removal of this tube with Interventional Radiology in approximately 3 weeks,  And we will then follow with a CT scan 6 weeks after to ensure good drainage of the kidney     take care, be well, please call with any questions     Hilary Patel MD,PhD  [facility]  [phone]          Ask your healthcare provider how your skin should be cleaned and what skin barriers and attachment devices to use  · Gather the items you will need  ? Disposable (single use) under-pad, and a clean washcloth    ? Plain soap, warm water, and new medical gloves    ? Sterile gauze bandages    ? Clear adhesive dressing or medical tape    ? Skin barrier    ? Tube attachment device     ? Protective skin film    ? Hydrogen peroxide and saline solution (if ordered by a healthcare provider)    ? Medicine for your skin (if ordered by a healthcare provider)     ? Trash bag    · Check the tube entry site  ? Place the under-pad where it will catch drainage as you are working with the nephrostomy tube  ? Wash your hands with soap and water  Put on new medical gloves  ? Gently remove the old bandage and skin barrier  Do this by holding the skin beside the tube with one hand  With the other hand, gently remove sticky tape and the skin barrier by pulling in the same direction as hair growth  Do not touch the side of the bandage that is placed over or around the tube  Throw the bandage and skin barrier away in a trash bag     ? Look for signs of infection, such as skin redness and swelling  Report any skin changes to healthcare providers  ? There may be a black toney on the tube to toney the place where the tube enters the skin   Check to see that the black toney is next to the skin  If it is further down the tube, the tube has moved  A healthcare provider needs to put it back in  · Clean the catheter site  ? Hold the tube in place to keep it from being pulled out while you are cleaning around it  ? You will need to clean the area twice  For the first cleaning, wet a new gauze bandage with soap and water  You may be directed to use hydrogen peroxide instead  Begin at the entry site of the tube  Wipe the skin in circles, moving away from the entry site  Remove blood and any other material with the gauze  Do this as often as needed  Use a new gauze bandage each time you clean the area, moving away from the entry site  ? For the second cleaning, wet a new gauze bandage with water  You may be directed to use saline solution instead  Begin at the entry site of the tube  Wipe the skin in circles, moving away from the entry site  Use a new gauze bandage each time you clean the area, moving away from the entry site  ? Gently pat the skin with a clean washcloth to dry it  Put medicine on the skin if directed by a healthcare provider  · Apply the skin barrier and bandages  ? Cut an opening in the center of the skin barrier large enough to fit around the tube  Cut a slit from the outside edge of the skin barrier to the center opening so that you can fit it around the nephrostomy tube  Place the skin barrier around the nephrostomy tube  Make sure the skin barrier is not touching stitches that may be holding the tube in place  Put a protective skin film over the skin barrier if directed by a healthcare provider  ? Roll up a bandage to make it thick, and wrap it around the place where the tube enters the skin  Place it to support the tube, and stop it from kinking or bending  Tape the bandage in place, and apply more bandages if directed by a healthcare provider  ?  An attachment device may be placed over the bandages to help keep the nephrostomy tube in place  ? Bring the tubing forward to the front and tape it to the skin  Do not stretch the tube tight, because this may pull the nephrostomy tube out  How often to change the bandage, skin barrier, and tube attachment device:  Change the bandage around the tube, the bolsters, skin barriers, and tube attachments at least every 7 days  If your bandages, barriers, or devices get dirty or wet, change them right away, and as often as needed  If your nephrostomy tube is to be used for a long period of time, the tube needs to be changed every 2 to 3 months  Healthcare providers will tell you when you need to make an appointment to have your tube changed  Other things to know:   · Drink 2 to 3 liters of liquid each day  unless you were told to limit liquids because of another condition  This amount is equal to about 8 to 12 (eight-ounce) cups of liquid  There should be 30 to 60 milliliters of urine draining into the bag each hour  A large amount of urine that drains over a shorter period of time should be reported  For example, 2,000 milliliters (2 liters) of urine draining out over 8 hours could be a sign of problems  · Keep the site covered while you shower  Tape a piece of clear adhesive plastic over the dressing to keep it dry while you shower  Do not take tub baths  Contact your healthcare provider if:   · The skin around the nephrostomy tube is red, swollen, itches, or has a rash  · You have a fever  · You have lower back or hip pain  · There are changes in how your urine looks or smells  · You have large amounts of urine draining into the drainage bag over a short period of time  · You have little or no urine draining from the nephrostomy tube  · You have nausea and are vomiting  · The black toney on your tube has moved, or the tube is longer than when it was put in      · You have questions or concerns about your condition or care      Return to the emergency department if:   · The nephrostomy tube comes out completely  · There is blood, pus, or a bad smell coming from the place where the tube enters your skin  · Urine is leaking around the tube 10 days after the tube was placed  © Copyright [address] Information is for End User's use only and may not be sold, redistributed or otherwise used for commercial purposes  All illustrations and images included in CareNotes® are the copyrighted property of A [employer] M [employer]  or [employer]  The above information is an  only  It is not intended as medical advice for individual conditions or treatments  Talk to your doctor, nurse or pharmacist before following any medical regimen to see if it is safe and effective for you

## 2021-02-16 NOTE — H&P
UROLOGY HISTORY AND PHYSICAL     Patient Identifiers: Jose Luis Parks (MRN [id number])      Date of Service: 2/16/2021        ASSESSMENT:     63 y o  old female with   Left ureteral enteric anastomotic stricture, indwelling left percutaneous nephrostomy tube presents for antegrade ureteroscopy and attempted endoscopic management  discussed risks benefits and alternatives  Informed consent has been obtained        PLAN:      to OR for left percutaneous antegrade ureteroscopy with attempted balloon dilation and placement of a percutaneous nephroureteral stent and exchange of nephrostomy tube      History of Present Illness:     Jose Luis Parks is a 63 y o  old with a history of  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    Past Medical, Past Surgical History:     Past Medical History:   Diagnosis Date    Anemia     Back pain     Bowel obstruction ([facility] [address] ) 4422,6109    Cholelithiasis     Deep vein thrombosis of left lower extremity ([facility] [address] ) 01/11/2004    on blood thinner for 3 years   Diabetes mellitus ([facility] [address] )     type 2    Disease of thyroid gland     Endometrial cancer ([facility] [address] ) 1999    GERD (gastroesophageal reflux disease)     Gross hematuria     History of transfusion 1999    Post Hysterectomy    History of urostomy 01/11/2004    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      Hypothyroid     In vitro fertilization     Kidney stone     Migraines     Muscle weakness     abdominal    Personal history of irradiation     Renal cyst     Sleep apnea     Resolved due to Weight Loss   :    Past Surgical History:   Procedure Laterality Date    ACHILLES TENDON REPAIR Right     COLONOSCOPY      COLONOSCOPY W/ BIOPSIES N/A 12/2015    HEEL SPUR SURGERY Right 1996    HYSTERECTOMY      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    IR NEPHROSTOMY TUBE Writer spoke with patient and she states she is no longer eligible for the $ 25 co pay cards for [address]  She had to get a supplemetal insurance for medicare now medications not covered and will cost over 4000 a month Patient will call her Insurance to see what is covered . And let our ofice know.   PLACEMENT  2021    LAPAROSCOPIC GASTRIC BANDING N/A     [place], [address] OTHER SURGICAL HISTORY      Urine shunt to intestine, transverse colon    [place], SURG ENTEROLYSIS N/A 2019    Procedure: LAPAROSCOPIC LYSIS OF ADHESIONS;  Surgeon: Mina Rose MD;  Location: [address] [place] [address] OR;  Service: Shana  Bo Carter [address] [place] [address] endometrial cancer   SLEEVE GASTROPLASTY N/A 2015    Dr Kae Villarreal, [facility]    URETER REVISION      WEILBY THUMB ARTHROPLASTY      WEILBY THUMB ARTHROPLASTY     :    Medications, Allergies:     Current Facility-Administered Medications:     levofloxacin (LEVAQUIN) IVPB (premix in dextrose) 750 mg 150 mL, 750 mg, Intravenous, Once, Ayan Jacobs MD    Allergies: Allergies   Allergen Reactions    Amoxicillin Rash    Penicillins Rash    Adhesive [Medical Tape] Rash   :    Social and Family History:   Social History:   Social History     Tobacco Use    Smoking status: Former Smoker     Packs/day: 1 50     Years: 10 00     Pack years: 15 00     Types: Cigarettes     Quit date: 1986     Years since quittin 1    Smokeless tobacco: Former User   Substance Use Topics    Alcohol use: Yes     Frequency: Monthly or less     Drinks per session: 1 or 2     Binge frequency: Never     Comment: Rare Socially    Drug use: No        Social History     Tobacco Use   Smoking Status Former Smoker    Packs/day: 1 50    Years: 10 00    Pack years: 15 00    Types: Cigarettes    Quit date: 1986    Years since quittin 1   Smokeless Tobacco Former User       Family History:  Family History   Problem Relation Age of Onset    No Known Problems Mother     Kidney failure Father     Brain cancer Father     Kidney cancer Father     Diabetes Sister     Alcohol abuse Sister     Diabetes Brother    :     Review of Systems:     General: Fever, chills, or night sweats: negative  Cardiac: Negative for chest pain      Pulmonary: Negative for shortness of breath  Gastrointestinal: Abdominal pain negative  Nausea, vomiting, or diarrhea negative  Genitourinary: See HPI above  Patient does nothave hematuria  All other systems queried were negative  Physical Exam:   General: Patient is pleasant and in NAD  Awake and alert  BP (!) 198/87   Pulse 77   Temp 97 9 °F (36 6 °C) (Temporal)   Resp 20   Ht 5' 6 5" (1 689 m)   Wt 107 kg (235 lb)   SpO2 98%   BMI 37 36 kg/m²   Cardiac: Peripheral edema: negative  Pulmonary: Non-labored breathing  Abdomen: Soft, non-tender, non-distended  No surgical scars  No masses, tenderness, hernias noted  Genitourinary: negative CVA tenderness, negative suprapubic tenderness  Labs:     Lab Results   Component Value Date    HGB 10 5 (L) 01/18/2021    HCT 33 4 (L) 01/18/2021    WBC 9 11 01/18/2021     01/18/2021   ]    Lab Results   Component Value Date    K 4 1 01/18/2021     (H) 01/18/2021    CO2 21 01/18/2021    BUN 20 01/18/2021    CREATININE 1 01 01/18/2021    CALCIUM 9 7 01/18/2021   ]    Imaging:   I personally reviewed the images and report of the following studies, and reviewed them with the patient:        Thank you for allowing me to participate in this patients care  Please do not hesitate to call with any additional questions    Ericka Chavez MD

## 2021-02-16 NOTE — ANESTHESIA POSTPROCEDURE EVALUATION
Post-Op Assessment Note    CV Status:  Stable  Pain Score: 0    Pain management: adequate     Mental Status:  Sleepy and awake   Hydration Status:  Euvolemic   PONV Controlled:  Controlled   Airway Patency:  Patent      Post Op Vitals Reviewed: Yes      Staff: CRNA         No complications documented      BP  186/105   Temp   99 2   Pulse 82 nsr   Resp 16   SpO2 100% FM 4lpm

## 2021-02-17 DIAGNOSIS — N13.30 HYDROURETERONEPHROSIS: Primary | ICD-10-CM

## 2021-02-18 NOTE — TELEPHONE ENCOUNTER
IR apt is scheduled for 3/11  I spoke to pt and she will call central scheduling to schedule CT around 4/22  When she has apt she will call the office to schedule fu

## 2021-02-28 ENCOUNTER — TELEPHONE (OUTPATIENT)
Dept: OTHER | Facility: HOSPITAL | Age: 66
End: 2021-02-28

## 2021-02-28 ENCOUNTER — NURSE TRIAGE (OUTPATIENT)
Dept: OTHER | Facility: OTHER | Age: 66
End: 2021-02-28

## 2021-02-28 DIAGNOSIS — R31.9 URINARY TRACT INFECTION WITH HEMATURIA, SITE UNSPECIFIED: Primary | ICD-10-CM

## 2021-02-28 DIAGNOSIS — N23 RENAL COLIC ON LEFT SIDE: Primary | ICD-10-CM

## 2021-02-28 DIAGNOSIS — N39.0 URINARY TRACT INFECTION WITH HEMATURIA, SITE UNSPECIFIED: Primary | ICD-10-CM

## 2021-02-28 RX ORDER — OXYCODONE HYDROCHLORIDE 5 MG/1
5 TABLET ORAL EVERY 6 HOURS
Qty: 5 TABLET | Refills: 0 | Status: SHIPPED | OUTPATIENT
Start: 2021-02-28 | End: 2021-03-02

## 2021-02-28 RX ORDER — LEVOFLOXACIN 750 MG/1
750 TABLET ORAL EVERY 24 HOURS
Qty: 5 TABLET | Refills: 0 | Status: SHIPPED | OUTPATIENT
Start: 2021-02-28 | End: 2021-03-05

## 2021-02-28 NOTE — TELEPHONE ENCOUNTER
Communicated with on-call provider TRACY John for [place] [address] [facility] and informed of patient's symptoms  She stated she would look at patient's chart and start her on an antibiotic, and that patient should call office for appointment if her symptoms worsen after starting medication  Called patient back and informed of above  I confirmed patient's preferred pharmacy; patient also requested a refill of oxycodone 5 mg tablets if possible  Communicated patient's request to TRACY

## 2021-02-28 NOTE — TELEPHONE ENCOUNTER
Reason for Disposition   [1] Caller has URGENT question AND [2] triager unable to answer question    Answer Assessment - Initial Assessment Questions  1  LOCATION: "Where does it hurt?" (e g , left, right)      Left    2  ONSET: "When did the pain start?"      Saturday 2/27/21, but worsened overnight    3  SEVERITY: "How bad is the pain?" (e g , Scale 1-10; mild, moderate, or severe)    - MILD (1-3): doesn't interfere with normal activities     - MODERATE (4-7): interferes with normal activities or awakens from sleep     - SEVERE (8-10): excruciating pain and patient unable to do normal activities (stays in bed)              7/10 and worsening (took oxycodone at 0700 this morning)    4  PATTERN: "Does the pain come and go, or is it constant?"       Constant    5  CAUSE: "What do you think is causing the pain?"      Recent urologic procedure    6  OTHER SYMPTOMS:  "Do you have any other symptoms?" (e g , fever, abdominal pain, vomiting, leg weakness, burning with urination, blood in urine)      One episode of vomiting, has some blood in urine but this is not new since procedure, temperature up to approx 100F    Answer Assessment - Initial Assessment Questions  1  SYMPTOM: "What's the main symptom you're concerned about?" (e g , pain, fever, vomiting)      L flank pain, low grade temperature (approx 100F), 1 episode of vomiting    2  ONSET: "When did symptoms start?"      Saturday 2/27/21    3  SURGERY: "What surgery was performed?"      Ureteroscopy, dilation of stricture, stent placement    4  DATE of SURGERY: "When was surgery performed?"       2/16/21    5  ANESTHESIA: " What type of anesthesia did you have?" (e g , general, spinal, epidural, local)        6  PAIN: "Is there any pain?" If so, ask: "How bad is it?"  (Scale 1-10; or mild, moderate, severe)      7/10 L flank pain    7   FEVER: "Do you have a fever?" If so, ask: "What is your temperature, how was it measured, and when did it start?"      Low grade temp of approx 100 0 F    8  VOMITING: "Is there any vomiting?" If yes, ask: "How many times?"      One episode of vomiting    9  BLEEDING: "Is there any bleeding?" If so, ask: "How much?" and "Where?"      Has blood in urine but this has been constant since procedure, and is not worse    10   OTHER SYMPTOMS: "Do you have any other symptoms?" (e g , drainage from wound, painful urination, constipation)        No other symptoms    Protocols used: POST-OP SYMPTOMS AND QUESTIONS-ADULT-, FLANK PAIN-ADULT-

## 2021-02-28 NOTE — TELEPHONE ENCOUNTER
Patient called in to healthcall RN  Had left stent placement 2/16  Having increase left flank pain, low grade temp and one episode of vomiting  Urine cultures reviewed from 1/16/21  Levaquin ordered based off previous culture  Instructed to come to ED for evaluation if symptoms worsen and patient has no resolution with antibiotics

## 2021-02-28 NOTE — TELEPHONE ENCOUNTER
Regardin64 Y/O POST OP VOMIT TEMP 99 9 PAIN  ----- Message from Masood Sauceda sent at 2021 11:32 AM EST -----  She had a procedure on  now began to have a lot of pain the meds are only helping slightly   She has been vomiting and fever of 99 9

## 2021-03-09 NOTE — TELEPHONE ENCOUNTER
Patient called in and advised that she has a nephrostomy tube and would  Like to know how long she has to keep that in for because it cause her pain standing up to long  Please advise

## 2021-03-09 NOTE — TELEPHONE ENCOUNTER
Called and spoke with patient at this time  Advised appt with interventional radiology on 3/11/21 is for antegrade nephrostogram and PCNU removal  Patient grateful to hear this  She had no further questions or concerns

## 2021-03-11 ENCOUNTER — PREP FOR PROCEDURE (OUTPATIENT)
Dept: INTERVENTIONAL RADIOLOGY/VASCULAR | Facility: CLINIC | Age: 66
End: 2021-03-11

## 2021-03-11 ENCOUNTER — HOSPITAL ENCOUNTER (OUTPATIENT)
Dept: RADIOLOGY | Facility: HOSPITAL | Age: 66
Discharge: HOME | End: 2021-03-11
Attending: RADIOLOGY | Admitting: RADIOLOGY
Payer: COMMERCIAL

## 2021-03-11 DIAGNOSIS — N13.30 HYDROURETERONEPHROSIS: ICD-10-CM

## 2021-03-11 DIAGNOSIS — N13.30 HYDROURETERONEPHROSIS: Primary | ICD-10-CM

## 2021-03-11 DIAGNOSIS — Z98.890 HISTORY OF UROSTOMY: Primary | ICD-10-CM

## 2021-03-11 PROCEDURE — 75984 XRAY CONTROL CATHETER CHANGE: CPT

## 2021-03-11 PROCEDURE — 50387 CHANGE NEPHROURETERAL CATH: CPT | Performed by: RADIOLOGY

## 2021-03-11 PROCEDURE — C1769 GUIDE WIRE: HCPCS

## 2021-03-11 PROCEDURE — 50688 CHANGE OF URETER TUBE/STENT: CPT

## 2021-03-11 PROCEDURE — 87086 URINE CULTURE/COLONY COUNT: CPT | Performed by: RADIOLOGY

## 2021-03-11 PROCEDURE — 87186 SC STD MICRODIL/AGAR DIL: CPT | Performed by: RADIOLOGY

## 2021-03-11 RX ORDER — SODIUM CHLORIDE 9 MG/ML
10 INJECTION INTRAVENOUS DAILY
Qty: 300 ML | Refills: 3 | Status: SHIPPED | OUTPATIENT
Start: 2021-03-11 | End: 2021-04-10

## 2021-03-11 RX ORDER — LIDOCAINE WITH 8.4% SOD BICARB 0.9%(10ML)
SYRINGE (ML) INJECTION CODE/TRAUMA/SEDATION MEDICATION
Status: COMPLETED | OUTPATIENT
Start: 2021-03-11 | End: 2021-03-11

## 2021-03-11 RX ORDER — LEVOFLOXACIN 5 MG/ML
500 INJECTION, SOLUTION INTRAVENOUS ONCE
Status: CANCELLED | OUTPATIENT
Start: 2021-03-11

## 2021-03-11 RX ADMIN — Medication 14 ML: at 09:02

## 2021-03-11 RX ADMIN — IOHEXOL 20 ML: 350 INJECTION, SOLUTION INTRAVENOUS at 09:34

## 2021-03-11 NOTE — PROCEDURES
Antegrade PCNU tube to ileostomy successfully changed  Patient tolerated fairly well and D/C home in stable condition  AVS reviewed

## 2021-03-11 NOTE — DISCHARGE INSTRUCTIONS
Nephrostomy Tube Care     WHAT YOU NEED TO KNOW:   A nephrostomy tube is a catheter (thin plastic tube) that is inserted through your skin and into your kidney  The nephrostomy tube drains urine from your kidney into a collecting bag outside your body  You may need a nephrostomy tube when something is blocking the normal flow of urine  A nephrostomy tube may be used for a short or a long period of time  The nephrostomy tube comes out of your back, so you will need someone to help care for your nephrostomy tube  DISCHARGE INSTRUCTIONS:      How to clean the skin around the nephrostomy tube and change the bandage:  Since the nephrostomy tube comes out of your back, you will not be able to care for it by yourself  Ask someone to follow the general directions below to check and care for your nephrostomy tube  Gather the items you will need  Disposable (single use) under-pad, and a clean washcloth  ¨ Plain soap, warm water, and new medical gloves  ¨ Sterile gauze bandages  ¨ Clear adhesive dressing or medical tape  ¨ Skin barrier  ¨ Protective skin film  ¨ Trash bag  · Remove the old bandage, and check the tube entry site  ¨ Have the patient lie on his side with the nephrostomy tube entry site facing up  Place the under-pad where it will catch drainage as you are working with the nephrostomy tube  ¨ Wash your hands with soap and water  Put on new medical gloves  ¨ Gently remove the old bandage, without pulling on the tube  Do this by holding the skin beside the tube with one hand  With the other hand, gently remove sticky tape and the skin barrier by pulling in the same direction as hair growth  Do not touch the side of the bandage that is placed over or around the tube  Throw the bandage and skin barrier away in a trash bag  ¨ Look for signs of infection, such as skin redness and swelling  Report any skin changes to healthcare providers  ¨ Clean the tube entry site      ¨ Hold the tube in place to keep it from being pulled out while you are cleaning around it  ¨ You will need to clean the area twice  For the first cleaning, wet a new gauze bandage with soap and water  Begin at the entry site of the tube  Wipe the skin in circles, moving away from the entry site  Remove blood and any other material with the gauze  Do this as often as needed  Use a new gauze bandage each time you clean the area, moving away from the entry site  ¨ For the second cleaning, wet a new gauze bandage with water  Begin at the entry site of the tube  Wipe the skin in circles, moving away from the entry site  Use a new gauze bandage each time you clean the area, moving away from the entry site  ¨ Gently pat the skin with a clean washcloth to dry it  · Apply the skin barrier and bandages  ¨ Roll up a bandage to make it thick, and place it under  the place where the tube enters the skin  Place it to support the tube, and stop it from kinking or bending  Tape the bandage in place, and apply more bandages if directed by a healthcare provider  ¨ Bring the tubing forward to the front and tape it to the skin  Do not stretch the tube tight, because this may pull the nephrostomy tube out  How often to change the bandage  Change the bandage around the tube, every other day  If your bandages  get dirty or wet, change them right away, and as often as needed  If your nephrostomy tube is to be used for a long period of time, the tube needs to be changed every 2 to 3 months  Healthcare providers will tell you when you need to make an appointment to have your tube changed  How to care for the urine drainage bag:   · Ask if you need to measure and write down how much urine is in the bag before you empty it  Drain urine out of the drainage bag when it is ½ to ? full  Open the spout at the bottom of the bag to empty the urine into the toilet  · You may need to detach the drainage bag from the nephrostomy tube to change it    If so, attach a new drainage bag tightly to the nephrostomy tube  ·   How to prevent problems with your nephrostomy tube:   · Change bandages, directed  This helps to prevent infection  Throw away or clean your drainage bag as directed by your healthcare provider  · Wipe the connecting ends of the drainage bag with alcohol before you reconnect the bag to the tube  This helps prevent infection  Keep the tube taped to your skin and connected to a drainage bag placed below the level of your kidneys  This helps prevent urine from backing up into your kidneys  You may wear a small drainage bag strapped to your leg to let you move around more easily  · Check the catheter to be sure it is in place after you change your clothes or do other activities  Do not wear tight clothing over the tube  Place the tubing over your thigh rather than under it when you are sitting down  Be sure that nothing is pulling on the nephrostomy tube when you move around  · Change positions if you see little or no urine in your drainage bag  Check to see if the urine tube is twisted or bent  Be sure that you are not sitting or lying on the tube  If there are no kinks and there is little or no urine in the drainage bag, tell your healthcare provider  · Flush out the tube as directed  Some tubes get flushed one time a day with 10 mls of NSS You will be given a prescription for the flushes  To flush the nephrostomy tube, clean both connections with alcohol swap  Twist off the drainage bag tube and twist the saline syringe into the nephrostomy tube and flush briskly  Remove the syringe and twist the drainage bag tube back into the nephrostomy tube  · Keep the site covered while you shower  Tape a piece of clear adhesive plastic over the dressing to keep it dry while you shower  Do not take tub baths      Contact Interventional Radiology at [phone] [facility] PATIENTS: Contact Interventional Radiology at [phone]) David Ackerman PATIENTS: Contact Interventional Radiology at [phone]) if:  · The skin around the nephrostomy tube is red, swollen, itches, or has a rash  · You have a fever greater than 101 or chills  · You have lower back or hip pain  · There are changes in how your urine looks or smells  · You have little or no urine draining from the nephrostomy tube  · You have nausea and are vomiting  · The black toney on your tube has moved, or the tube is longer than when it was put in    · You have questions or concerns about your condition or care  · The nephrostomy tube comes out completely  · There is blood, pus, or a bad smell coming from the place where the tube enters your skin  · Urine is leaking around the tube  The following pharmacies carry the flush syringes  [facility]                     [facility]       [address] [facility]                    [id number] [place] [address]  Phone [phone]            Phone [phone]  [address] & [facility]                      [address]                                 [phone]  Phone [phone]            Phone [phone]    [facility]                                                                         [facility] [phone]  [facility] [address]    [facility]   Phone [phone]

## 2021-03-11 NOTE — BRIEF OP NOTE (RAD/CATH)
INTERVENTIONAL RADIOLOGY PROCEDURE NOTE    Date: 3/11/2021    Procedure: IR NEPHROURETERAL STENT CHECK/CHANGE/REPOSITION    Preoperative diagnosis:   1  Hydroureteronephrosis         Postoperative diagnosis: Same  Surgeon: Rosendo Rodrigues MD     Assistant: None  No qualified resident was available  Blood loss: 0    Specimens: cloudy urine for culture    Findings:     Previously placed right PCNU is in place    On initial injection there is no contrast passage into the conduit from the tube, only hydronephrosis results    Urine was cloudy this was sent for culture    Tube removed over wire and sheath injection from the mid ureter reveals hydronephrosis but positive contrast passage in the distal ureter    Injection from the kidney unfortunately results in further hydronephrosis with to and fro motion in the proximal ureter and no passage into the conduit    Given lack of forward flow from the kidney without a tube I elected to replace a 8 5 x 22 PCNU    Injection of this reveals flow through the lumen into the conduit    We will plan for daily 10 cc flushes and discuss next steps with urology    Patient will require sedation for future exchanges/manipulation    Complications: None immediate      Anesthesia:  Local

## 2021-03-12 ENCOUNTER — TELEPHONE (OUTPATIENT)
Dept: UROLOGY | Facility: CLINIC | Age: 66
End: 2021-03-12

## 2021-03-12 NOTE — TELEPHONE ENCOUNTER
Patient was to have antegrade nephrostogram yesterday and PCNU removal  Per IR notes:  Specimens: cloudy urine for culture     Findings:      Previously placed right PCNU is in place     On initial injection there is no contrast passage into the conduit from the tube, only hydronephrosis results     Urine was cloudy this was sent for culture     Tube removed over wire and sheath injection from the mid ureter reveals hydronephrosis but positive contrast passage in the distal ureter     Injection from the kidney unfortunately results in further hydronephrosis with to and fro motion in the proximal ureter and no passage into the conduit     Given lack of forward flow from the kidney without a tube I elected to replace a 8 5 x 22 PCNU     Injection of this reveals flow through the lumen into the conduit     We will plan for daily 10 cc flushes and discuss next steps with urology     Patient will require sedation for future exchanges/manipulation     Plan: We capped the tube and instructed the patient to flush 10 mL every day  She was instructed to convert to bag drainage for increasing pain, fever or chills  After discussion with Dr Mallory Andrade, plan is to bring the patient back for conversion to a retrograde percutaneous nephroureterostomy through the conduit  She will require sedation for future manipulation of this catheter due to discomfort  Workstation performed: [id number]    Patient called into call center at this time with complaints of confusion with nephrostomy tube flushes  [facility] back line was called and call transferred to RIGID  Patient reports she does not have a bag attached anymore and was told to flush nephrostomy tube  She assumes they meant daily  Reviewed yes, she should flush with 10mL saline daily  She reports she can't figure out how to get the cap off to flush it  Offered a quick nurse visit for flushing teaching  She accepted

## 2021-03-13 LAB — BACTERIA UR CULT: ABNORMAL

## 2021-03-15 ENCOUNTER — TELEPHONE (OUTPATIENT)
Dept: RADIOLOGY | Facility: HOSPITAL | Age: 66
End: 2021-03-15

## 2021-03-15 NOTE — PRE-PROCEDURE INSTRUCTIONS
Phone Consult completed:Pre procedure instructions reviewed with verbal understanding  Allergies,meds,NPO, and ride  Approximate arrival time given,SDS phone call evening before procedure   COVID screening completed !st shot received

## 2021-03-22 ENCOUNTER — TELEPHONE (OUTPATIENT)
Dept: SURGERY | Facility: HOSPITAL | Age: 66
End: 2021-03-22

## 2021-03-23 ENCOUNTER — HOSPITAL ENCOUNTER (OUTPATIENT)
Dept: RADIOLOGY | Facility: HOSPITAL | Age: 66
Discharge: HOME | End: 2021-03-23
Attending: RADIOLOGY | Admitting: RADIOLOGY
Payer: COMMERCIAL

## 2021-03-23 VITALS
WEIGHT: 220 LBS | OXYGEN SATURATION: 100 % | BODY MASS INDEX: 34.53 KG/M2 | HEIGHT: 67 IN | SYSTOLIC BLOOD PRESSURE: 147 MMHG | HEART RATE: 69 BPM | TEMPERATURE: 97.7 F | RESPIRATION RATE: 13 BRPM | DIASTOLIC BLOOD PRESSURE: 66 MMHG

## 2021-03-23 DIAGNOSIS — N13.30 HYDROURETERONEPHROSIS: ICD-10-CM

## 2021-03-23 PROCEDURE — 75984 XRAY CONTROL CATHETER CHANGE: CPT | Performed by: RADIOLOGY

## 2021-03-23 PROCEDURE — C1894 INTRO/SHEATH, NON-LASER: HCPCS

## 2021-03-23 PROCEDURE — C1769 GUIDE WIRE: HCPCS

## 2021-03-23 PROCEDURE — 75984 XRAY CONTROL CATHETER CHANGE: CPT

## 2021-03-23 PROCEDURE — 99024 POSTOP FOLLOW-UP VISIT: CPT | Performed by: RADIOLOGY

## 2021-03-23 PROCEDURE — C1729 CATH, DRAINAGE: HCPCS

## 2021-03-23 PROCEDURE — 50688 CHANGE OF URETER TUBE/STENT: CPT | Performed by: RADIOLOGY

## 2021-03-23 PROCEDURE — 50688 CHANGE OF URETER TUBE/STENT: CPT

## 2021-03-23 RX ORDER — DIPHENHYDRAMINE HYDROCHLORIDE 50 MG/ML
INJECTION INTRAMUSCULAR; INTRAVENOUS CODE/TRAUMA/SEDATION MEDICATION
Status: COMPLETED | OUTPATIENT
Start: 2021-03-23 | End: 2021-03-23

## 2021-03-23 RX ORDER — LEVOFLOXACIN 5 MG/ML
500 INJECTION, SOLUTION INTRAVENOUS ONCE
Status: COMPLETED | OUTPATIENT
Start: 2021-03-23 | End: 2021-03-23

## 2021-03-23 RX ORDER — FENTANYL CITRATE 50 UG/ML
INJECTION, SOLUTION INTRAMUSCULAR; INTRAVENOUS CODE/TRAUMA/SEDATION MEDICATION
Status: COMPLETED | OUTPATIENT
Start: 2021-03-23 | End: 2021-03-23

## 2021-03-23 RX ORDER — MIDAZOLAM HYDROCHLORIDE 2 MG/2ML
INJECTION, SOLUTION INTRAMUSCULAR; INTRAVENOUS CODE/TRAUMA/SEDATION MEDICATION
Status: COMPLETED | OUTPATIENT
Start: 2021-03-23 | End: 2021-03-23

## 2021-03-23 RX ADMIN — MIDAZOLAM 0.5 MG: 1 INJECTION INTRAMUSCULAR; INTRAVENOUS at 09:09

## 2021-03-23 RX ADMIN — LEVOFLOXACIN 500 MG: 5 INJECTION, SOLUTION INTRAVENOUS at 08:18

## 2021-03-23 RX ADMIN — MIDAZOLAM 0.5 MG: 1 INJECTION INTRAMUSCULAR; INTRAVENOUS at 08:57

## 2021-03-23 RX ADMIN — FENTANYL CITRATE 25 MCG: 50 INJECTION INTRAMUSCULAR; INTRAVENOUS at 09:09

## 2021-03-23 RX ADMIN — FENTANYL CITRATE 25 MCG: 50 INJECTION INTRAMUSCULAR; INTRAVENOUS at 08:57

## 2021-03-23 RX ADMIN — MIDAZOLAM 0.5 MG: 1 INJECTION INTRAMUSCULAR; INTRAVENOUS at 08:48

## 2021-03-23 RX ADMIN — MIDAZOLAM 0.5 MG: 1 INJECTION INTRAMUSCULAR; INTRAVENOUS at 08:31

## 2021-03-23 RX ADMIN — MIDAZOLAM 0.5 MG: 1 INJECTION INTRAMUSCULAR; INTRAVENOUS at 08:39

## 2021-03-23 RX ADMIN — FENTANYL CITRATE 25 MCG: 50 INJECTION INTRAMUSCULAR; INTRAVENOUS at 08:48

## 2021-03-23 RX ADMIN — FENTANYL CITRATE 25 MCG: 50 INJECTION INTRAMUSCULAR; INTRAVENOUS at 08:39

## 2021-03-23 RX ADMIN — DIPHENHYDRAMINE HYDROCHLORIDE 25 MG: 50 INJECTION, SOLUTION INTRAMUSCULAR; INTRAVENOUS at 09:01

## 2021-03-23 RX ADMIN — IOHEXOL 20 ML: 350 INJECTION, SOLUTION INTRAVENOUS at 09:30

## 2021-03-23 RX ADMIN — FENTANYL CITRATE 25 MCG: 50 INJECTION INTRAMUSCULAR; INTRAVENOUS at 08:31

## 2021-03-23 NOTE — NURSING NOTE
Pt reports being a difficult stick  Two RNs Theodora Rucker and Glenny wells) examined pt's arms for potential IV sites  No potential IV sites noted on R arm  Olivier made attempt to insert IV on L arm  No IV access obtained  MUKUND Vogt) made aware

## 2021-03-23 NOTE — H&P
Interventional Radiology  History and Physical 3/23/2021     Elana Pan   1955   [id number]    Assessment/Plan:    65F w/ conduit and stricture - now w/ R PCNU    She has done well capped other than pain    Will convert to retrograde tube    Risks of sepsis, loss of access discussed    Will give levofloxain basec on prior culture    sedation  NPO  MP2 ASA3    Problem List Items Addressed This Visit        Genitourinary    Hydroureteronephrosis    Relevant Orders    IR nephroureteral stent check/change/reposition             Subjective:     Patient ID: Elana Pan is a 63 y o  female  History of Present Illness    As above      BP (!) 171/77   Pulse 77   Temp 97 7 °F (36 5 °C) (Oral)   Resp 21   Ht 5' 6 5" (1 689 m)   Wt 99 8 kg (220 lb)   SpO2 99%   BMI 34 98 kg/m²       Review of Systems      Past Medical History:   Diagnosis Date    Anemia     Back pain     Bowel obstruction ([address] ) 5443,4750    Cholelithiasis     Deep vein thrombosis of left lower extremity ([address] ) 01/11/2004    on blood thinner for 3 years   Diabetes mellitus ([address] )     type 2    Disease of thyroid gland     Endometrial cancer ([address] ) 1999    GERD (gastroesophageal reflux disease)     Gross hematuria     History of transfusion 1999    Post Hysterectomy    History of urostomy 01/11/2004    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      Hypothyroid     In vitro fertilization     Kidney stone     Migraines     Muscle weakness     abdominal    Personal history of irradiation     Renal cyst     Sleep apnea     Resolved due to Weight Loss        Past Surgical History:   Procedure Laterality Date    ACHILLES TENDON REPAIR Right     COLONOSCOPY      COLONOSCOPY W/ BIOPSIES N/A 12/2015    HEEL SPUR SURGERY Right 1996    HYSTERECTOMY      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    IR NEPHROSTOMY TUBE PLACEMENT  1/18/2021    LAPAROSCOPIC GASTRIC BANDING N/A 2006    Brian Garrett  OTHER SURGICAL HISTORY      Urine shunt to intestine, transverse colon    PCNL Left 2021    Procedure: NEPHROLITHOTOMY  PERCUTANEOUS (PCNL); Surgeon: Jerome Robbins MD;  [facility];  Service: Urology    SC CYSTO/URETERO W/LITHOTRIPSY &INDWELL STENT INSRT Left 2021    Procedure: anterograde  URETEROSCOPY with dilation of ureteral stricture, INSERTION STENT URETERAL, exchange  of nephrostomy tube; Surgeon: Jerome Robbins MD;  [facility];  Service: Urology    SC LAP, SURG ENTEROLYSIS N/A 2019    Procedure: LAPAROSCOPIC LYSIS OF ADHESIONS;  Surgeon: Shelley Merritt MD;  Location: [facility] MAIN OR;  Service: Shana Medina[address] endometrial cancer      SLEEVE GASTROPLASTY N/A 2015    Dr Kurtis Ratliff[facility]    URETER REVISION      [facility]      [address] THUMB ARTHROPLASTY          Social History     Tobacco Use   Smoking Status Former Smoker    Packs/day: 1 50    Years: 10 00    Pack years: 15 00    Types: Cigarettes    Quit date: 1986    Years since quittin 2   Smokeless Tobacco Former User        Social History     Substance and Sexual Activity   Alcohol Use Yes    Frequency: Monthly or less    Drinks per session: 1 or 2    Binge frequency: Never    Comment: Rare Socially        Social History     Substance and Sexual Activity   Drug Use No        Allergies   Allergen Reactions    Amoxicillin Rash    Penicillins Rash    Adhesive [Medical Tape] Rash       Current Facility-Administered Medications   Medication Dose Route Frequency Provider Last Rate Last Admin    fentanyl citrate (PF) 100 MCG/2ML   Intravenous Code/Trauma/Sedation Med Jaime Chin MD   25 mcg at 21 0839    levofloxacin (LEVAQUIN) IVPB (premix in dextrose) 500 mg 100 mL  500 mg Intravenous Once Jaime Chin  mL/hr at 21 0818 500 mg at 21 0818    midazolam (VERSED) injection    Code/Trauma/Sedation Med Rosendo Rodrigues MD   0 5 mg at 03/23/21 0839          Objective:    Vitals:    03/23/21 0729 03/23/21 0830 03/23/21 0835 03/23/21 0840   BP: (!) 178/89 (!) 183/88 (!) 175/82 (!) 171/77   BP Location: Right arm      Pulse: 72 69 75 77   Resp: 18 14 12 21   Temp: 97 7 °F (36 5 °C)      TempSrc: Oral      SpO2: 100% 99% 100% 99%   Weight: 99 8 kg (220 lb)      Height: 5' 6 5" (1 689 m)           Physical Exam      No results found for: BNP   Lab Results   Component Value Date    WBC 9 11 01/18/2021    HGB 10 5 (L) 01/18/2021    HCT 33 4 (L) 01/18/2021    MCV 93 01/18/2021     01/18/2021     Lab Results   Component Value Date    INR 1 06 01/18/2021    INR 0 99 01/28/2019    PROTIME 13 9 01/18/2021    PROTIME 12 8 01/28/2019     Lab Results   Component Value Date    PTT 21 (L) 01/28/2019         I have personally reviewed pertinent imaging and laboratory results  Code Status: Prior  Advance Directive and Living Will:      Power of :    POLST:      This text is generated with voice recognition software  There may be translation, syntax,  or grammatical errors  If you have any questions, please contact the dictating provider

## 2021-03-23 NOTE — BRIEF OP NOTE (RAD/CATH)
INTERVENTIONAL RADIOLOGY PROCEDURE NOTE    Date: 3/23/2021    Procedure: IR NEPHROURETERAL STENT CHECK/CHANGE/REPOSITION    Preoperative diagnosis:   1  Hydroureteronephrosis         Postoperative diagnosis: Same  Surgeon: Hugo Zhong MD     Assistant: None  No qualified resident was available  Blood loss: 0    Specimens: 0     Findings:  Conversion of percutaneous nephroureterostomy to retrograde 10 Persian 45 cm nephroureterostomy through the conduit    Complications: None immediate    Antibiotics:  Levofloxacin  Anesthesia: conscious sedation

## 2021-03-23 NOTE — DISCHARGE INSTRUCTIONS
Nephrostomy Tube Care     WHAT YOU NEED TO KNOW:   A nephrostomy tube is a catheter (thin plastic tube) that is inserted through your skin and into your kidney  The nephrostomy tube drains urine from your kidney into a collecting bag outside your body  You may need a nephrostomy tube when something is blocking the normal flow of urine  A nephrostomy tube may be used for a short or a long period of time  The nephrostomy tube comes out of your back, so you will need someone to help care for your nephrostomy tube  DISCHARGE INSTRUCTIONS:     FLUSH 10cc into the nephrostomy every day     How to clean the skin around the nephrostomy tube and change the bandage:  Since the nephrostomy tube comes out of your back, you will not be able to care for it by yourself  Ask someone to follow the general directions below to check and care for your nephrostomy tube  Gather the items you will need  Disposable (single use) under-pad, and a clean washcloth  ¨ Plain soap, warm water, and new medical gloves  ¨ Sterile gauze bandages  ¨ Clear adhesive dressing or medical tape  ¨ Skin barrier  ¨ Protective skin film  ¨ Trash bag  · Remove the old bandage, and check the tube entry site  ¨ Have the patient lie on his side with the nephrostomy tube entry site facing up  Place the under-pad where it will catch drainage as you are working with the nephrostomy tube  ¨ Wash your hands with soap and water  Put on new medical gloves  ¨ Gently remove the old bandage, without pulling on the tube  Do this by holding the skin beside the tube with one hand  With the other hand, gently remove sticky tape and the skin barrier by pulling in the same direction as hair growth  Do not touch the side of the bandage that is placed over or around the tube  Throw the bandage and skin barrier away in a trash bag  ¨ Look for signs of infection, such as skin redness and swelling  Report any skin changes to healthcare providers    ¨ Clean the tube entry site  ¨ Hold the tube in place to keep it from being pulled out while you are cleaning around it  ¨ You will need to clean the area twice  For the first cleaning, wet a new gauze bandage with soap and water  Begin at the entry site of the tube  Wipe the skin in circles, moving away from the entry site  Remove blood and any other material with the gauze  Do this as often as needed  Use a new gauze bandage each time you clean the area, moving away from the entry site  ¨ For the second cleaning, wet a new gauze bandage with water  Begin at the entry site of the tube  Wipe the skin in circles, moving away from the entry site  Use a new gauze bandage each time you clean the area, moving away from the entry site  ¨ Gently pat the skin with a clean washcloth to dry it  · Apply the skin barrier and bandages  ¨ Roll up a bandage to make it thick, and place it under  the place where the tube enters the skin  Place it to support the tube, and stop it from kinking or bending  Tape the bandage in place, and apply more bandages if directed by a healthcare provider  ¨ Bring the tubing forward to the front and tape it to the skin  Do not stretch the tube tight, because this may pull the nephrostomy tube out  How often to change the bandage  Change the bandage around the tube, every other day  If your bandages  get dirty or wet, change them right away, and as often as needed  If your nephrostomy tube is to be used for a long period of time, the tube needs to be changed every 2 to 3 months  Healthcare providers will tell you when you need to make an appointment to have your tube changed  How to care for the urine drainage bag:   · Ask if you need to measure and write down how much urine is in the bag before you empty it  Drain urine out of the drainage bag when it is ½ to ? full  Open the spout at the bottom of the bag to empty the urine into the toilet     · You may need to detach the drainage bag from the nephrostomy tube to change it    If so, attach a new drainage bag tightly to the nephrostomy tube  ·   How to prevent problems with your nephrostomy tube:   · Change bandages, directed  This helps to prevent infection  Throw away or clean your drainage bag as directed by your healthcare provider  · Wipe the connecting ends of the drainage bag with alcohol before you reconnect the bag to the tube  This helps prevent infection  Keep the tube taped to your skin and connected to a drainage bag placed below the level of your kidneys  This helps prevent urine from backing up into your kidneys  You may wear a small drainage bag strapped to your leg to let you move around more easily  · Check the catheter to be sure it is in place after you change your clothes or do other activities  Do not wear tight clothing over the tube  Place the tubing over your thigh rather than under it when you are sitting down  Be sure that nothing is pulling on the nephrostomy tube when you move around  · Change positions if you see little or no urine in your drainage bag  Check to see if the urine tube is twisted or bent  Be sure that you are not sitting or lying on the tube  If there are no kinks and there is little or no urine in the drainage bag, tell your healthcare provider  · Flush out the tube as directed  Some tubes get flushed one time a day with 10 mls of NSS You will be given a prescription for the flushes  To flush the nephrostomy tube, clean both connections with alcohol swap  Twist off the drainage bag tube and twist the saline syringe into the nephrostomy tube and flush briskly  Remove the syringe and twist the drainage bag tube back into the nephrostomy tube  · Keep the site covered while you shower  Tape a piece of clear adhesive plastic over the dressing to keep it dry while you shower  Do not take tub baths      Contact Interventional Radiology at [phone] [facility] PATIENTS: Contact Interventional Radiology at [phone]) Neda Dubin PATIENTS: Contact Interventional Radiology at [phone]) if:  · The skin around the nephrostomy tube is red, swollen, itches, or has a rash  · You have a fever greater than 101 or chills  · You have lower back or hip pain  · There are changes in how your urine looks or smells  · You have little or no urine draining from the nephrostomy tube  · You have nausea and are vomiting  · The black toney on your tube has moved, or the tube is longer than when it was put in    · You have questions or concerns about your condition or care  · The nephrostomy tube comes out completely  · There is blood, pus, or a bad smell coming from the place where the tube enters your skin  · Urine is leaking around the tube  The following pharmacies carry the flush syringes  [facility]                     [facility]       [address]  Phone [phone]            Phone [phone]  [address] & [facility]                      [address]                                 [phone]  Phone [phone]            Phone [phone]    [facility]                                                                         [facility] [phone]  [facility] [facility]   Phone [phone]        Nephrostomy Tube Care     WHAT YOU NEED TO KNOW:   A nephrostomy tube is a catheter (thin plastic tube) that is inserted through your skin and into your kidney   The nephrostomy tube drains urine from your kidney into a collecting bag outside your body  You may need a nephrostomy tube when something is blocking the normal flow of urine  A nephrostomy tube may be used for a short or a long period of time  The nephrostomy tube comes out of your back, so you will need someone to help care for your nephrostomy tube  DISCHARGE INSTRUCTIONS:     Resume your normal diet  Small sips of flat soda will help with mild nausea  How to clean the skin around the nephrostomy tube and change the bandage:  Since the nephrostomy tube comes out of your back, you will not be able to care for it by yourself  Ask someone to follow the general directions below to check and care for your nephrostomy tube  Gather the items you will need  Disposable (single use) under-pad, and a clean washcloth  ¨ Plain soap, warm water, and new medical gloves  ¨ Sterile gauze bandages  ¨ Clear adhesive dressing or medical tape  ¨ Skin barrier  ¨ Protective skin film  ¨ Trash bag  · Remove the old bandage, and check the tube entry site  ¨ Have the patient lie on his side with the nephrostomy tube entry site facing up  Place the under-pad where it will catch drainage as you are working with the nephrostomy tube  ¨ Wash your hands with soap and water  Put on new medical gloves  ¨ Gently remove the old bandage, without pulling on the tube  Do this by holding the skin beside the tube with one hand  With the other hand, gently remove sticky tape and the skin barrier by pulling in the same direction as hair growth  Do not touch the side of the bandage that is placed over or around the tube  Throw the bandage and skin barrier away in a trash bag  ¨ Look for signs of infection, such as skin redness and swelling  Report any skin changes to healthcare providers  ¨ Clean the tube entry site  ¨ Hold the tube in place to keep it from being pulled out while you are cleaning around it  ¨ You will need to clean the area twice   For the first cleaning, wet a new gauze bandage with soap and water  Begin at the entry site of the tube  Wipe the skin in circles, moving away from the entry site  Remove blood and any other material with the gauze  Do this as often as needed  Use a new gauze bandage each time you clean the area, moving away from the entry site  ¨ For the second cleaning, wet a new gauze bandage with water  Begin at the entry site of the tube  Wipe the skin in circles, moving away from the entry site  Use a new gauze bandage each time you clean the area, moving away from the entry site  ¨ Gently pat the skin with a clean washcloth to dry it  · Apply the skin barrier and bandages  ¨ Roll up a bandage to make it thick, and place it under  the place where the tube enters the skin  Place it to support the tube, and stop it from kinking or bending  Tape the bandage in place, and apply more bandages if directed by a healthcare provider  ¨ Bring the tubing forward to the front and tape it to the skin  Do not stretch the tube tight, because this may pull the nephrostomy tube out  How often to change the bandage  Change the bandage around the tube, every other day  If your bandages  get dirty or wet, change them right away, and as often as needed  If your nephrostomy tube is to be used for a long period of time, the tube needs to be changed every 2 to 3 months  Healthcare providers will tell you when you need to make an appointment to have your tube changed  How to care for the urine drainage bag:   · Ask if you need to measure and write down how much urine is in the bag before you empty it  Drain urine out of the drainage bag when it is ½ to ? full  Open the spout at the bottom of the bag to empty the urine into the toilet  · You may need to detach the drainage bag from the nephrostomy tube to change it    If so, attach a new drainage bag tightly to the nephrostomy tube     ·   How to prevent problems with your nephrostomy tube: · Change bandages, directed  This helps to prevent infection  Throw away or clean your drainage bag as directed by your healthcare provider  · Wipe the connecting ends of the drainage bag with alcohol before you reconnect the bag to the tube  This helps prevent infection  Keep the tube taped to your skin and connected to a drainage bag placed below the level of your kidneys  This helps prevent urine from backing up into your kidneys  You may wear a small drainage bag strapped to your leg to let you move around more easily  · Check the catheter to be sure it is in place after you change your clothes or do other activities  Do not wear tight clothing over the tube  Place the tubing over your thigh rather than under it when you are sitting down  Be sure that nothing is pulling on the nephrostomy tube when you move around  · Change positions if you see little or no urine in your drainage bag  Check to see if the urine tube is twisted or bent  Be sure that you are not sitting or lying on the tube  If there are no kinks and there is little or no urine in the drainage bag, tell your healthcare provider  · Flush out the tube as directed  Some tubes get flushed one time a day with 10 mls of NSS You will be given a prescription for the flushes  To flush the nephrostomy tube, clean both connections with alcohol swap  Twist off the drainage bag tube and twist the saline syringe into the nephrostomy tube and flush briskly  Remove the syringe and twist the drainage bag tube back into the nephrostomy tube  · Keep the site covered while you shower  Tape a piece of clear adhesive plastic over the dressing to keep it dry while you shower  Do not take tub baths      Contact Interventional Radiology at [phone] [facility] PATIENTS: Contact Interventional Radiology at [phone]) [facility] PATIENTS: Contact Interventional Radiology at [phone]) if:  · The skin around the nephrostomy tube is red, swollen, itches, or has a rash  · You have a fever  · You have lower back or hip pain  · There are changes in how your urine looks or smells  · You have little or no urine draining from the nephrostomy tube  · You have nausea and are vomiting  · The black toney on your tube has moved, or the tube is longer than when it was put in    · You have questions or concerns about your condition or care  · The nephrostomy tube comes out completely  · There is blood, pus, or a bad smell coming from the place where the tube enters your skin  · Urine is leaking around the tube  Tube Capping Trial        If your tube is capped and has no drainage bag, the urine will flow through the ureter         and into the bladder for you to urinate normally  This is called a capping trial                    Continue to flush your tube one time per day  You will have an extra drainage bag to       keep at home in case the capping trial fails  Call the IR department if you experience        any of the following: Pain, fever greater than 101  Persistent nausea or vomiting  The following pharmacies carry the flush syringes  [facility]                     [facility]       [address]  Phone [phone]            Phone [phone]  [address] & [facility]                      [address] [facility]                                 [phone]  Phone [phone]            Phone [phone]    [facility] [phone]  [address]    [facility]   Phone [phone]

## 2021-03-23 NOTE — SEDATION DOCUMENTATION
Patient tolerated internalization of PCNU tube  Clean dry dressing to left flank  New ostomy bag applied to an ilial conduit  Bed rest start time now  Verbal report given short stay

## 2021-04-05 ENCOUNTER — TELEPHONE (OUTPATIENT)
Dept: UROLOGY | Facility: CLINIC | Age: 66
End: 2021-04-05

## 2021-04-06 ENCOUNTER — OFFICE VISIT (OUTPATIENT)
Dept: UROLOGY | Facility: CLINIC | Age: 66
End: 2021-04-06
Payer: COMMERCIAL

## 2021-04-06 DIAGNOSIS — N99.89 URETERAL-ILEAL LOOP ANASTOMOTIC STRICTURE: ICD-10-CM

## 2021-04-06 DIAGNOSIS — Z98.890 HISTORY OF UROSTOMY: Primary | ICD-10-CM

## 2021-04-06 PROCEDURE — 99214 OFFICE O/P EST MOD 30 MIN: CPT | Performed by: UROLOGY

## 2021-04-06 NOTE — TELEPHONE ENCOUNTER
Pt w ureteral stricture, recent successful IR procedure, but still has a stent  Her  has a followup with me Tuesday at 4:15  Lets ask Mrs Miah Siegel if she would like to be seen at that time as well so I can review plan of care with her? For timing, she Can be double booked w her       Thx

## 2021-04-06 NOTE — PROGRESS NOTES
Referring Physician: Carlos Street DO  A copy of this note was sent to the referring physician  Diagnoses and all orders for this visit:    History of urostomy    Ureteral-ileal loop anastomotic stricture            Assessment and plan:       #1 complex defunctionalized bladder and ureteral stricture disease     #2 Status post ileal conduit urinary diversion decades ago with outside provider  - patient believes she underwent to subsequent surgical revisions likely related to stomal complications     #3 Severe ureteroenteric anastomotic stricture, left side associated with obstructive pyelonephritis  - s/p Antegrade ureteroscopy with balloon dilation  -  Currently with retrograde single-J ureteral stent     I reviewed the images with the patient including her intraoperative images, and subsequent antegrade nephrostogram   I believe there remains an excellent chance that the patient can have good antegrade drainage without her permanent stent  We discussed repeating imaging to ensure resolution of hydronephrosis and consideration of a trial without the stent afterwards  He understands that if she develops recurrent stricture ring, this would require nephrostomy tube replacement and either again converting to a permanent retrograde single-J ureteral stent or referral to a reconstructive urologist for consideration of conduit revision  Her preference is for a trial without the stent, And I agree this is very reasonable  Plan is as follows:  -Patient is scheduled for a CT scan on April 23rd   - he will follow-up with our advanced practitioner team shortly thereafter  I will review the scans prior to this date  If the CT shows resolved hydronephrosis, her ureteral stent can be removed in the office  He was instructed to bring a fresh stomal appliance    We would plan to administer 1 g of Rocephin intramuscular at the time of the procedure and obtain a renal ultrasound 6 weeks after removal            Saintclair Lah, MD      Chief Complaint      follow-up ureteral stricture      History of Present Illness     Jen Montano is a 63 y o  Female with a complex urologic history returns in follow-up  In brief she developed significant defunctionalized bladder and ureteral stricture disease following surgical resection and pelvic radiation therapy for advanced endometrial cancer  She underwent a cystectomy and ileal conduit urinary diversion many years ago with Dr Yonatan Locke  He did well for decades and was recently admitted with new onset left hydroureteronephrosis and pending urosepsis  He was initially managed with a percutaneous nephrostomy tube  After reviewing options she ultimately elected for an anterograde attempted endoscopic management  Antegrade ureteroscopy with balloon dilation of stricture was performed by me on February 16th  Procedure revealed successful antegrade nephrostogram both before and after balloon dilation  However subsequent antegrade nephrostogram showed persistent obstruction in the patient with symptomatic  A percutaneous nephroureteral stent was placed successfully and this was recently converted to a retrograde ureteral catheter  she is feeling well  She has  returned to regular work and family activities  SHe presents today to discuss next steps    Detailed Urologic History     - please refer to HPI    Review of Systems     Review of Systems   Constitutional: Negative for activity change and fatigue  HENT: Negative for congestion  Eyes: Negative for visual disturbance  Respiratory: Negative for shortness of breath and wheezing  Cardiovascular: Negative for chest pain and leg swelling  Gastrointestinal: Negative for abdominal pain  Endocrine: Positive for polyuria  Genitourinary: Positive for dysuria  Negative for flank pain, hematuria and urgency  Musculoskeletal: Negative for back pain     Allergic/Immunologic: Negative for immunocompromised state  Neurological: Negative for dizziness and numbness  Psychiatric/Behavioral: Negative for dysphoric mood  All other systems reviewed and are negative  Allergies     Allergies   Allergen Reactions    Amoxicillin Rash    Penicillins Rash    Adhesive [Medical Tape] Rash       Physical Exam     Physical Exam  Constitutional:       Appearance: She is well-developed  HENT:      Head: Normocephalic and atraumatic  Eyes:      Pupils: Pupils are equal, round, and reactive to light  Neck:      Musculoskeletal: Normal range of motion  Cardiovascular:      Comments: Negative lower extremity edema  Pulmonary:      Effort: Pulmonary effort is normal       Breath sounds: Normal breath sounds  Abdominal:      Palpations: Abdomen is soft  Genitourinary:     Vagina: Normal       Comments: Single-J ureteral stent is noted  Prior nephrostomy site is noted and is well healed, no drainage  Musculoskeletal: Normal range of motion  Skin:     General: Skin is warm  Neurological:      Mental Status: She is alert and oriented to person, place, and time  Vital Signs  There were no vitals filed for this visit        Current Medications       Current Outpatient Medications:     acetaminophen (TYLENOL) 325 mg tablet, Take 2 tablets (650 mg total) by mouth every 6 (six) hours as needed for mild pain, headaches or fever, Disp: , Rfl: 0    acetaminophen (TYLENOL) 325 mg tablet, Take 2 tablets (650 mg total) by mouth every 4 (four) hours as needed for mild pain, Disp: 30 tablet, Rfl: 0    ALPRAZolam (XANAX) 0 5 mg tablet, Take 0 25 mg by mouth daily at bedtime as needed Takes 1/2 0 5mg = 0 25 mg @ bedtime prn, Disp: , Rfl: 2    Ascorbic Acid (VITAMIN C PO), Take by mouth daily, Disp: , Rfl:     BIOTIN PO, Take by mouth daily, Disp: , Rfl:     Calcium Polycarbophil (FIBER-CAPS PO), Take by mouth daily, Disp: , Rfl:     Coenzyme Q10 (CO Q-10 PO), Take by mouth daily, Disp: , Rfl:     Cyanocobalamin (B-12 PO), Take by mouth daily, Disp: , Rfl:     docusate sodium (COLACE) 100 mg capsule, Take 1 capsule (100 mg total) by mouth 2 (two) times a day for 15 days, Disp: 30 capsule, Rfl: 0    metFORMIN (GLUCOPHAGE) 1000 MG tablet, Take 500 mg by mouth 2 (two) times a day with meals  , Disp: , Rfl:     naproxen (NAPROSYN) 500 mg tablet, Take 1 tablet (500 mg total) by mouth 2 (two) times a day with meals for 5 days For pain, Disp: 10 tablet, Rfl: 0    pantoprazole (PROTONIX) 40 mg tablet, Take 40 mg by mouth daily in the early morning, Disp: , Rfl:     pramipexole (MIRAPEX) 0 25 mg tablet, Take 0 25 mg by mouth daily at bedtime, Disp: , Rfl:     Probiotic Product (PROBIOTIC DAILY PO), Take by mouth, Disp: , Rfl:     sodium chloride, PF, 0 9 %, 10 mL by Intracatheter route daily, Disp: 300 mL, Rfl: 3    SYNTHROID 75 MCG tablet, Take 75 mcg by mouth daily in the early morning  , Disp: , Rfl:       Active Problems     Patient Active Problem List   Diagnosis    [facility] obstruction [facility]    Type 2 diabetes mellitus ([facility]    History of urostomy    Endometrial cancer ([facility]    Hypothyroidism    In vitro fertilization    Sepsis due to pyelonephritis ([facility] )    Pyelonephritis    Anemia    Hydroureteronephrosis    HTN (hypertension)    Migraine    Abdominal adhesions    Tooth infection    Elevated lactic acid level    Class 2 obesity in adult    Headache    Ureteral-ileal loop anastomotic stricture         Past Medical History     Past Medical History:   Diagnosis Date    Anemia     Back pain     Bowel obstruction ([address] ) 5803,4842    Cholelithiasis     Deep vein thrombosis of left lower extremity ([address] ) 01/11/2004    on blood thinner for 3 years      Diabetes mellitus ([address] )     type 2    Disease of thyroid gland     Endometrial cancer ([address] ) 1999    GERD (gastroesophageal reflux disease)     Gross hematuria     History of transfusion 1999    Post Hysterectomy    History of urostomy 01/11/2004    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   Hypothyroid     In vitro fertilization     Kidney stone     Migraines     Muscle weakness     abdominal    Personal history of irradiation     Renal cyst     Sleep apnea     Resolved due to Weight Loss         Surgical History     Past Surgical History:   Procedure Laterality Date    ACHILLES TENDON REPAIR Right     COLONOSCOPY      COLONOSCOPY W/ BIOPSIES N/A 12/2015    HEEL SPUR SURGERY Right 1996    HYSTERECTOMY      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    IR NEPHROSTOMY TUBE PLACEMENT  1/18/2021    LAPAROSCOPIC GASTRIC BANDING N/A 2006    [place], [address] OTHER SURGICAL HISTORY  2004    Urine shunt to intestine, transverse colon    PCNL Left 2/16/2021    Procedure: NEPHROLITHOTOMY  PERCUTANEOUS (PCNL); Surgeon: Theodore Carmichael MD;  [facility];  Service: Urology    ME CYSTO/URETERO W/LITHOTRIPSY &INDWELL STENT INSRT Left 2/16/2021    Procedure: anterograde  URETEROSCOPY with dilation of ureteral stricture, INSERTION STENT URETERAL, exchange  of nephrostomy tube; Surgeon: Theodore Carmichael MD;  [facility];  Service: Urology    ME LAP, SURG ENTEROLYSIS N/A 1/17/2019    Procedure: LAPAROSCOPIC LYSIS OF ADHESIONS;  Surgeon: Aretha Lou MD;  Location: [address] [place] [address] MAIN OR;  Service: Shana Soriano [address] endometrial cancer      SLEEVE GASTROPLASTY N/A 09/2015    Dr Shahbaz Mendez, [facility]    URETER REVISION  2010   [address] ARTHROPLASTY           Family History     Family History   Problem Relation Age of Onset    No Known Problems Mother     Kidney failure Father     Brain cancer Father     Kidney cancer Father     Diabetes Sister     Alcohol abuse Sister     Diabetes Brother          Social History     Social History     Social History     Tobacco Use   Smoking Status Former Smoker    Packs/day: 1 50    Years: 10 00    Pack years: 15 00    Types: Cigarettes    Quit date: 1986    Years since quittin 2   Smokeless Tobacco Former User         Pertinent Lab Values     Lab Results   Component Value Date    CREATININE 2021       No results found for: PSA    @RESULTRCNT(1H])@      Pertinent Imaging      - n/a    Portions of the record may have been created with voice recognition software   Occasional wrong word or "sound a like" substitutions may have occurred due to the inherent limitations of voice recognition software   Read the chart carefully and recognize, using context, where substitutions have occurred

## 2021-04-21 DIAGNOSIS — N13.30 HYDROURETERONEPHROSIS: ICD-10-CM

## 2021-04-21 RX ORDER — DOCUSATE SODIUM 100 MG/1
100 CAPSULE, LIQUID FILLED ORAL 2 TIMES DAILY
Qty: 30 CAPSULE | Refills: 0 | Status: SHIPPED | OUTPATIENT
Start: 2021-04-21 | End: 2021-05-06

## 2021-04-23 ENCOUNTER — HOSPITAL ENCOUNTER (OUTPATIENT)
Dept: CT IMAGING | Facility: HOSPITAL | Age: 66
Discharge: HOME | End: 2021-04-23
Attending: UROLOGY
Payer: COMMERCIAL

## 2021-04-23 DIAGNOSIS — N99.89 URETERAL-ILEAL LOOP ANASTOMOTIC STRICTURE: ICD-10-CM

## 2021-04-23 PROCEDURE — G1004 CDSM NDSC: HCPCS

## 2021-04-23 PROCEDURE — 74178 CT ABD&PLV WO CNTR FLWD CNTR: CPT

## 2021-04-23 RX ADMIN — IOHEXOL 100 ML: 350 INJECTION, SOLUTION INTRAVENOUS at 10:22

## 2021-05-04 ENCOUNTER — TELEPHONE (OUTPATIENT)
Dept: UROLOGY | Facility: CLINIC | Age: 66
End: 2021-05-04

## 2021-05-04 NOTE — TELEPHONE ENCOUNTER
----- Message from Alida Domingo MD sent at 4/30/2021  8:53 PM EDT -----  Regarding: stent pull  Would you be willing to pull a stent from her ileal conduit? It comes right out (like a cystectomy stent but no stitch)   She should have rocephin during removal    She is seeing keven for this but not for another week  Can be done anytime next week based on her CT    I would then like to see her in 3-4 weeks with a JANEY    Thank you!

## 2021-05-04 NOTE — TELEPHONE ENCOUNTER
Called and left detailed message per consent  Advised Dr Inocencio Corona advised we can remove stent sooner based on CT results and this can be a nurse visit  Asking for a call back to schedule nurse visit for tomorrow, otherwise can keep scheduled appt with Vic KESSLER on 5/6/21

## 2021-05-05 ENCOUNTER — PROCEDURE VISIT (OUTPATIENT)
Dept: UROLOGY | Facility: CLINIC | Age: 66
End: 2021-05-05
Payer: COMMERCIAL

## 2021-05-05 VITALS — HEIGHT: 67 IN | BODY MASS INDEX: 34.53 KG/M2 | WEIGHT: 220 LBS | RESPIRATION RATE: 18 BRPM

## 2021-05-05 DIAGNOSIS — N23 RENAL COLIC ON LEFT SIDE: ICD-10-CM

## 2021-05-05 DIAGNOSIS — N13.30 HYDROURETERONEPHROSIS: Primary | ICD-10-CM

## 2021-05-05 DIAGNOSIS — N99.89 URETERAL-ILEAL LOOP ANASTOMOTIC STRICTURE: ICD-10-CM

## 2021-05-05 PROCEDURE — NC001 PR NO CHARGE

## 2021-05-05 PROCEDURE — 96372 THER/PROPH/DIAG INJ SC/IM: CPT

## 2021-05-05 RX ORDER — CEFTRIAXONE 1 G/1
1000 INJECTION, POWDER, FOR SOLUTION INTRAMUSCULAR; INTRAVENOUS EVERY 24 HOURS
Status: SHIPPED | OUTPATIENT
Start: 2021-05-05

## 2021-05-05 RX ORDER — DULAGLUTIDE 0.75 MG/.5ML
0.75 INJECTION, SOLUTION SUBCUTANEOUS WEEKLY
COMMUNITY
Start: 2021-03-25

## 2021-05-05 RX ORDER — CEFTRIAXONE 1 G/1
1000 INJECTION, POWDER, FOR SOLUTION INTRAMUSCULAR; INTRAVENOUS EVERY 24 HOURS
Status: DISCONTINUED | OUTPATIENT
Start: 2021-05-05 | End: 2021-05-05

## 2021-05-05 RX ADMIN — CEFTRIAXONE 1000 MG: 1 INJECTION, POWDER, FOR SOLUTION INTRAMUSCULAR; INTRAVENOUS at 14:59

## 2021-05-05 NOTE — PROGRESS NOTES
5/5/2021    Kylee Jaeger is a 61 y o  female  [id number]    Diagnosis:  Chief Complaint     history of urostomy; ureteral-ileal loop anastomotic stricture          Patient of Dr Rina Chandler in the [place] office  Patient presents for single J ureteral stent removal from urostomy stoma s/p placement on 3/11/21  Recent CT reviewed by Dr Rina Chandler and plan for removal and trial without stent as hydronephrosis is resolved  Plan:  Patient will follow up as scheduled in 4 week with Dr Dayton Schwartz to be done prior  Procedure:  Medication administration:    Patient given 1G rocephin prior to removal per instructions by Dr Rina Chandler  Patient tolerated this well  Administrations This Visit     cefTRIAXone (ROCEPHIN) injection 1,000 mg     Admin Date  05/05/2021 Action  Given Dose  1000 mg Route  Intramuscular Administered By  Therese Hatchet, RN                Single J ureteral stent noted to be protruding from stoma  Stent was cut and then removed without incident  Patient tolerated well  Post stent removal instructions reviewed   She was happy to come in earlier for her removal  YOSELYN Ho PA-C was present in room during removal      Vitals:    05/05/21 1502   Resp: 18   Weight: 99 8 kg (220 lb)   Height: 5' 6 5" (1 689 m)         Therese Hatchet, RN BSN

## 2021-05-21 ENCOUNTER — HOSPITAL ENCOUNTER (OUTPATIENT)
Dept: ULTRASOUND IMAGING | Facility: HOSPITAL | Age: 66
Discharge: HOME | End: 2021-05-21
Payer: COMMERCIAL

## 2021-05-21 DIAGNOSIS — N13.30 HYDROURETERONEPHROSIS: ICD-10-CM

## 2021-05-21 PROCEDURE — 76770 US EXAM ABDO BACK WALL COMP: CPT

## 2021-06-07 ENCOUNTER — OFFICE VISIT (OUTPATIENT)
Dept: UROLOGY | Facility: CLINIC | Age: 66
End: 2021-06-07
Payer: COMMERCIAL

## 2021-06-07 VITALS
BODY MASS INDEX: 35.51 KG/M2 | DIASTOLIC BLOOD PRESSURE: 72 MMHG | HEIGHT: 66 IN | SYSTOLIC BLOOD PRESSURE: 134 MMHG | HEART RATE: 97 BPM

## 2021-06-07 DIAGNOSIS — N99.89 URETERAL-ILEAL LOOP ANASTOMOTIC STRICTURE: Primary | ICD-10-CM

## 2021-06-07 PROCEDURE — 99213 OFFICE O/P EST LOW 20 MIN: CPT | Performed by: UROLOGY

## 2021-06-07 RX ORDER — BLOOD-GLUCOSE METER
KIT MISCELLANEOUS
COMMUNITY
Start: 2021-05-14

## 2021-06-07 NOTE — PROGRESS NOTES
Referring Physician: Kennedy Amador DO  A copy of this note was sent to the referring physician  Diagnoses and all orders for this visit:    Ureteral-ileal loop anastomotic stricture  -     US kidney and bladder; Future  -     Creatinine, serum; Future    Other orders  -     FREESTYLE LITE test strip            Assessment and plan:       #1 complex defunctionalized bladder and ureteral stricture disease     #2 Status post ileal conduit urinary diversion decades ago with outside provider  - patient believes she underwent to subsequent surgical revisions likely related to stomal complications     #3 Severe ureteroenteric anastomotic stricture, left side associated with obstructive pyelonephritis  - s/p Antegrade ureteroscopy with balloon dilation  -   Follow-up ultrasound shows complete resolution of hydronephrosis    [facility]  Is doing very well from a urologic perspective  Following her minimally invasive endoscopic management of her ischemic left ureteral enteric anastomotic stricture, he is completely asymptomatic, and has resolution of hydronephrosis on ultrasound  This point I have recommended continued close monitoring  Shee will follow up for repeat renal ultrasound and creatinine in 3 months  If that remains negative we will plan for imaging twice yearly going forward        Awais Estrada MD      Chief Complaint      follow-up ureteral stricture      History of Present Illness     Abdifatah Rincon is a 61 y o  Female with a complex urologic history returns in follow-up  In brief she developed significant defunctionalized bladder and ureteral stricture disease following surgical resection and pelvic radiation therapy for advanced endometrial cancer  She underwent a cystectomy and ileal conduit urinary diversion many years ago with Dr Shantal Mendez  SHe did well for decades and was recently admitted with new onset left hydroureteronephrosis and pending urosepsis    He was initially managed with a percutaneous nephrostomy tube  After reviewing options she ultimately elected for an anterograde attempted endoscopic management  Antegrade ureteroscopy with balloon dilation of stricture was performed by me on February 16th  Procedure revealed successful antegrade nephrostogram both before and after balloon dilation  follow-up antegrade nephrostogram did show some persistent poor drainage and she was ultimately converted to a retrograde single-J ureteral stent for approximately 4 weeks time  This has since been removed  She presents with a follow-up renal ultrasound  She remains asymptomatic, she has no pain in her abdomen or flank  Shee has had no hematuria or interval UTIs    Detailed Urologic History     - please refer to HPI    Review of Systems     Review of Systems   Constitutional: Negative for activity change and fatigue  HENT: Negative for congestion  Eyes: Negative for visual disturbance  Respiratory: Negative for shortness of breath and wheezing  Cardiovascular: Negative for chest pain and leg swelling  Gastrointestinal: Negative for abdominal pain  Endocrine: Positive for polyuria  Genitourinary: Positive for dysuria  Negative for flank pain, hematuria and urgency  Musculoskeletal: Negative for back pain  Allergic/Immunologic: Negative for immunocompromised state  Neurological: Negative for dizziness and numbness  Psychiatric/Behavioral: Negative for dysphoric mood  All other systems reviewed and are negative  Allergies     Allergies   Allergen Reactions    Amoxicillin Rash    Penicillins Rash    Adhesive [Medical Tape] Rash       Physical Exam     Physical Exam  Constitutional:       Appearance: She is well-developed  HENT:      Head: Normocephalic and atraumatic  Eyes:      Pupils: Pupils are equal, round, and reactive to light  Neck:      Musculoskeletal: Normal range of motion     Cardiovascular:      Comments: Negative lower extremity edema  Pulmonary:      Effort: Pulmonary effort is normal       Breath sounds: Normal breath sounds  Abdominal:      Palpations: Abdomen is soft  Genitourinary:     Vagina: Normal    Musculoskeletal: Normal range of motion  Skin:     General: Skin is warm  Neurological:      Mental Status: She is alert and oriented to person, place, and time               Vital Signs  Vitals:    06/07/21 1440   BP: 134/72   Pulse: 97   Height: 5' 6" (1 676 m)         Current Medications       Current Outpatient Medications:     acetaminophen (TYLENOL) 325 mg tablet, Take 2 tablets (650 mg total) by mouth every 6 (six) hours as needed for mild pain, headaches or fever, Disp: , Rfl: 0    acetaminophen (TYLENOL) 325 mg tablet, Take 2 tablets (650 mg total) by mouth every 4 (four) hours as needed for mild pain, Disp: 30 tablet, Rfl: 0    ALPRAZolam (XANAX) 0 5 mg tablet, Take 0 25 mg by mouth daily at bedtime as needed Takes 1/2 0 5mg = 0 25 mg @ bedtime prn, Disp: , Rfl: 2    Ascorbic Acid (VITAMIN C PO), Take by mouth daily, Disp: , Rfl:     BIOTIN PO, Take by mouth daily, Disp: , Rfl:     Calcium Polycarbophil (FIBER-CAPS PO), Take by mouth daily, Disp: , Rfl:     Coenzyme Q10 (CO Q-10 PO), Take by mouth daily, Disp: , Rfl:     Cyanocobalamin (B-12 PO), Take by mouth daily, Disp: , Rfl:     FREESTYLE LITE test strip, , Disp: , Rfl:     metFORMIN (GLUCOPHAGE) 1000 MG tablet, Take 500 mg by mouth 2 (two) times a day with meals  , Disp: , Rfl:     pantoprazole (PROTONIX) 40 mg tablet, Take 40 mg by mouth daily in the early morning, Disp: , Rfl:     pramipexole (MIRAPEX) 0 25 mg tablet, Take 0 25 mg by mouth daily at bedtime, Disp: , Rfl:     Probiotic Product (PROBIOTIC DAILY PO), Take by mouth, Disp: , Rfl:     SYNTHROID 75 MCG tablet, Take 75 mcg by mouth daily in the early morning  , Disp: , Rfl:     Trulicity 8 80 AN/3 8MY SOPN, Inject 0 75 mg under the skin once a week, Disp: , Rfl:     docusate sodium (COLACE) 100 mg capsule, TAKE 1 CAPSULE (100 MG TOTAL) BY MOUTH 2 (TWO) TIMES A DAY FOR 15 DAYS (Patient not taking: Reported on 5/5/2021), Disp: 30 capsule, Rfl: 0    naproxen (NAPROSYN) 500 mg tablet, Take 1 tablet (500 mg total) by mouth 2 (two) times a day with meals for 5 days For pain, Disp: 10 tablet, Rfl: 0    sodium chloride, PF, 0 9 %, 10 mL by Intracatheter route daily, Disp: 300 mL, Rfl: 3    Current Facility-Administered Medications:     cefTRIAXone (ROCEPHIN) injection 1,000 mg, 1,000 mg, Intramuscular, Q24H, Oren Jef, PA-C, 1,000 mg at 05/05/21 9575      Active Problems     Patient Active Problem List   Diagnosis    Small bowel obstruction (HCC)    Type 2 diabetes mellitus ([address] )    History of urostomy    Endometrial cancer ([facility]    Hypothyroidism    In vitro fertilization    Sepsis due to pyelonephritis ([address] )    Pyelonephritis    Anemia    Hydroureteronephrosis    HTN (hypertension)    Migraine    Abdominal adhesions    Tooth infection    Elevated lactic acid level    Class 2 obesity in adult    Headache    Ureteral-ileal loop anastomotic stricture         Past Medical History     Past Medical History:   Diagnosis Date    Anemia     Back pain     Bowel obstruction ([address] ) 5499,1573    Cholelithiasis     Deep vein thrombosis of left lower extremity ([address] ) 01/11/2004    on blood thinner for 3 years   Diabetes mellitus ([address] )     type 2    Disease of thyroid gland     Endometrial cancer ([address] ) 1999    GERD (gastroesophageal reflux disease)     Gross hematuria     History of transfusion 1999    Post Hysterectomy    History of urostomy 01/11/2004    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      Hypothyroid     In vitro fertilization     Kidney stone     Migraines     Muscle weakness     abdominal    Personal history of irradiation     Renal cyst     Sleep apnea     Resolved due to Weight Loss         Surgical History     Past Surgical History: Procedure Laterality Date    ACHILLES TENDON REPAIR Right     COLONOSCOPY      COLONOSCOPY W/ BIOPSIES N/A 2015    HEEL SPUR SURGERY Right     HYSTERECTOMY      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    IR NEPHROSTOMY TUBE PLACEMENT  2021    LAPAROSCOPIC GASTRIC BANDING N/A     [facility], [address] OTHER SURGICAL HISTORY      Urine shunt to intestine, transverse colon    PCNL Left 2021    Procedure: NEPHROLITHOTOMY  PERCUTANEOUS (PCNL); Surgeon: Ezra Hammond MD;  [facility];  Service: Urology    ID CYSTO/URETERO W/LITHOTRIPSY &INDWELL STENT INSRT Left 2021    Procedure: anterograde  URETEROSCOPY with dilation of ureteral stricture, INSERTION STENT URETERAL, exchange  of nephrostomy tube; Surgeon: Ezra Hammond MD;  [facility];  Service: Urology    ID LAP, SURG ENTEROLYSIS N/A 2019    Procedure: LAPAROSCOPIC LYSIS OF ADHESIONS;  Surgeon: Matthew Araujo MD;  Location: [facility] MAIN OR;  Service: Shana  [address] endometrial cancer      SLEEVE GASTROPLASTY N/A 2015    Dr Nelson Lopes, [facility]    URETER REVISION      [address] THUMB ARTHROPLASTY      [address] THUMB ARTHROPLASTY           Family History     Family History   Problem Relation Age of Onset    No Known Problems Mother     Kidney failure Father     Brain cancer Father     Kidney cancer Father     Diabetes Sister     Alcohol abuse Sister     Diabetes Brother          Social History     Social History     Social History     Tobacco Use   Smoking Status Former Smoker    Packs/day: 1 50    Years: 10 00    Pack years: 15 00    Types: Cigarettes    Quit date: 1986    Years since quittin 4   Smokeless Tobacco Former User         Pertinent Lab Values     Lab Results   Component Value Date    CREATININE 1 2021       No results found for: PSA    @RESULTRCNT(1H])@      Pertinent Imaging      - n/a    Portions of the record may have been created with voice recognition software   Occasional wrong word or "sound a like" substitutions may have occurred due to the inherent limitations of voice recognition software   Read the chart carefully and recognize, using context, where substitutions have occurred

## 2021-08-23 ENCOUNTER — HOSPITAL ENCOUNTER (OUTPATIENT)
Dept: ULTRASOUND IMAGING | Facility: HOSPITAL | Age: 66
Discharge: HOME | End: 2021-08-23
Attending: UROLOGY
Payer: COMMERCIAL

## 2021-08-23 DIAGNOSIS — N99.89 URETERAL-ILEAL LOOP ANASTOMOTIC STRICTURE: ICD-10-CM

## 2021-08-23 PROCEDURE — 76770 US EXAM ABDO BACK WALL COMP: CPT

## 2021-10-02 ENCOUNTER — IMMUNIZATIONS (OUTPATIENT)
Dept: FAMILY MEDICINE CLINIC | Facility: HOSPITAL | Age: 66
End: 2021-10-02

## 2021-10-02 DIAGNOSIS — Z23 ENCOUNTER FOR IMMUNIZATION: Primary | ICD-10-CM

## 2021-10-02 PROCEDURE — 0001A SARS-COV-2 / COVID-19 MRNA VACCINE (PFIZER-BIONTECH) 30 MCG: CPT

## 2021-10-02 PROCEDURE — 91300 SARS-COV-2 / COVID-19 MRNA VACCINE (PFIZER-BIONTECH) 30 MCG: CPT

## 2022-04-01 ENCOUNTER — TELEPHONE (OUTPATIENT)
Dept: GASTROENTEROLOGY | Facility: CLINIC | Age: 67
End: 2022-04-01

## 2022-04-01 ENCOUNTER — PREP FOR PROCEDURE (OUTPATIENT)
Dept: GASTROENTEROLOGY | Facility: CLINIC | Age: 67
End: 2022-04-01

## 2022-04-01 DIAGNOSIS — Z86.010 HISTORY OF COLON POLYPS: Primary | ICD-10-CM

## 2022-04-01 NOTE — TELEPHONE ENCOUNTER
Donal Solo 29 Assessment    Name: Berlin Carson  YOB: 1955  Last Height: 5' 6" (1 676 m)  Last weight: 99 8 kg (220lbs)  BMI: 35 51 kg/m²  Procedure: Colon  Diagnosis: hx of polyps, poor prep  Date of procedure: 6/6/22  Prep: mag citrate, miralax, dul, 2 days clear liquids  Responsible : yes  Phone#: [phone]  Name completing form: [facility]  Date form completed: 04/01/22    If the patient answers yes to any of these questions, schedule in a hospital  Are you pregnant: No  Do you rely on a wheelchair for mobility: No  Have you been diagnosed with End Stage Renal Disease (ESRD): No  Do you need oxygen during the day: No  Have you had a heart attack or stroke within the past three months: No  Have you had a seizure within the past three months: No  Have you ever been informed by anesthesia that you have a difficult airway: No  Additional Questions  Have you had any cardiac testing or are under the care of a Cardiologist (see cardiac list): No  Cardiac list:   Do you have an implanted cardiac defibrillator: No (Comment:  This patient should be scheduled in the hospital)    Have any bleeding problems, such as anemia or hemophilia (If patient has H&H result below 8, schedule in hospital   H&H must be within 30 days of procedure): No    Had an organ transplant within the past 3 months: No    Do you have any present infections: No  Do you get short of breath when walking a few blocks: No  Have you been diagnosed with diabetes: Yes  Comments (provide cardiac provider information if applicable):

## 2022-05-27 ENCOUNTER — TELEPHONE (OUTPATIENT)
Dept: GASTROENTEROLOGY | Facility: CLINIC | Age: 67
End: 2022-05-27

## 2022-05-27 NOTE — TELEPHONE ENCOUNTER
Spoke to pt's  confirming pt's colonoscopy/EGD scheduled on 6/6/22 with Dr Yusuf Velez at [facility]  I informed that [facility] would be calling her 1-2 days prior with arrival time  I informed pt of clear liquid diet 2 days prior as well as doing the bowel cleansing preparation  I also informed that pt will need a  the day of the procedure due to being under sedation  Pt did not have any questions

## 2022-06-03 ENCOUNTER — NURSE TRIAGE (OUTPATIENT)
Dept: OTHER | Facility: OTHER | Age: 67
End: 2022-06-03

## 2022-06-03 NOTE — TELEPHONE ENCOUNTER
Answer Assessment - Initial Assessment Questions  1  REASON FOR CALL or QUESTION: "What is your reason for calling today?" or "How can I best help you?" or "What question do you have that I can help answer?"     " I have guestions about my prep "  started going over 2 day prep with Summer Pipes and times to start prep are different than our 2 day instructions so she has more questions and needs to know what time the Colonoscopy is      Protocols used: INFORMATION ONLY CALL - NO TRIAGE[facility]

## 2022-06-03 NOTE — TELEPHONE ENCOUNTER
Regarding: Colonscopy prep instruction  ----- Message from Adrianna eSaman RN sent at 6/3/2022 10:31 AM EDT -----  "I have a colonoscopy on Monday and have to do the two day prep   I have some questions about how to take my prep "

## 2022-06-06 ENCOUNTER — ANESTHESIA EVENT (OUTPATIENT)
Dept: GASTROENTEROLOGY | Facility: AMBULATORY SURGERY CENTER | Age: 67
End: 2022-06-06

## 2022-06-06 ENCOUNTER — ANESTHESIA (OUTPATIENT)
Dept: GASTROENTEROLOGY | Facility: AMBULATORY SURGERY CENTER | Age: 67
End: 2022-06-06

## 2022-06-06 ENCOUNTER — HOSPITAL ENCOUNTER (OUTPATIENT)
Dept: GASTROENTEROLOGY | Facility: AMBULATORY SURGERY CENTER | Age: 67
Discharge: HOME | End: 2022-06-06
Payer: COMMERCIAL

## 2022-06-06 VITALS
DIASTOLIC BLOOD PRESSURE: 92 MMHG | OXYGEN SATURATION: 100 % | WEIGHT: 219 LBS | TEMPERATURE: 96.4 F | HEART RATE: 68 BPM | RESPIRATION RATE: 18 BRPM | SYSTOLIC BLOOD PRESSURE: 138 MMHG | BODY MASS INDEX: 34.37 KG/M2 | HEIGHT: 67 IN

## 2022-06-06 DIAGNOSIS — Z86.010 HISTORY OF COLON POLYPS: ICD-10-CM

## 2022-06-06 PROCEDURE — 45380 COLONOSCOPY AND BIOPSY: CPT | Performed by: INTERNAL MEDICINE

## 2022-06-06 PROCEDURE — 88305 TISSUE EXAM BY PATHOLOGIST: CPT | Performed by: PATHOLOGY

## 2022-06-06 PROCEDURE — 45385 COLONOSCOPY W/LESION REMOVAL: CPT | Performed by: INTERNAL MEDICINE

## 2022-06-06 RX ORDER — SODIUM CHLORIDE 9 MG/ML
INJECTION, SOLUTION INTRAVENOUS CONTINUOUS PRN
Status: DISCONTINUED | OUTPATIENT
Start: 2022-06-06 | End: 2022-06-06

## 2022-06-06 RX ORDER — SODIUM CHLORIDE 9 MG/ML
100 INJECTION, SOLUTION INTRAVENOUS CONTINUOUS
Status: DISCONTINUED | OUTPATIENT
Start: 2022-06-06 | End: 2022-06-10 | Stop reason: HOSPADM

## 2022-06-06 RX ORDER — PROPOFOL 10 MG/ML
INJECTION, EMULSION INTRAVENOUS AS NEEDED
Status: DISCONTINUED | OUTPATIENT
Start: 2022-06-06 | End: 2022-06-06

## 2022-06-06 RX ORDER — LIDOCAINE HYDROCHLORIDE 10 MG/ML
INJECTION, SOLUTION EPIDURAL; INFILTRATION; INTRACAUDAL; PERINEURAL AS NEEDED
Status: DISCONTINUED | OUTPATIENT
Start: 2022-06-06 | End: 2022-06-06

## 2022-06-06 RX ADMIN — PROPOFOL 50 MG: 10 INJECTION, EMULSION INTRAVENOUS at 09:14

## 2022-06-06 RX ADMIN — PROPOFOL 50 MG: 10 INJECTION, EMULSION INTRAVENOUS at 09:08

## 2022-06-06 RX ADMIN — PROPOFOL 50 MG: 10 INJECTION, EMULSION INTRAVENOUS at 09:17

## 2022-06-06 RX ADMIN — SODIUM CHLORIDE: 9 INJECTION, SOLUTION INTRAVENOUS at 08:53

## 2022-06-06 RX ADMIN — PROPOFOL 100 MG: 10 INJECTION, EMULSION INTRAVENOUS at 09:07

## 2022-06-06 RX ADMIN — PROPOFOL 30 MG: 10 INJECTION, EMULSION INTRAVENOUS at 09:10

## 2022-06-06 RX ADMIN — PROPOFOL 50 MG: 10 INJECTION, EMULSION INTRAVENOUS at 09:12

## 2022-06-06 RX ADMIN — PROPOFOL 20 MG: 10 INJECTION, EMULSION INTRAVENOUS at 09:09

## 2022-06-06 RX ADMIN — LIDOCAINE HYDROCHLORIDE 50 MG: 10 INJECTION, SOLUTION EPIDURAL; INFILTRATION; INTRACAUDAL; PERINEURAL at 09:07

## 2022-06-06 NOTE — INTERVAL H&P NOTE
H&P reviewed  After examining the patient I find no changes in the patients condition since the H&P had been written      Vitals:    06/06/22 0833   BP: 166/99   Pulse: 72   Resp: 18   Temp: (!) 96 4 °F (35 8 °C)   SpO2: 99%

## 2022-06-06 NOTE — H&P
History and Physical - [facility]  Sam Avery 74 y o  female MRN: [id number]                  HPI: Sam Avery is a 74y o  year old female who presents for colonoscopy  History of colon polyp  History of uterine cancer  Status post radiation therapy  Patient urostomy  REVIEW OF SYSTEMS: Per the HPI, and otherwise unremarkable  Historical Information   Past Medical History:   Diagnosis Date    Anemia     Back pain     Bowel obstruction ([facility] [facility] ) 7667,1961    Cancer ([facility] )     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    Cholelithiasis     Colon polyp     Deep vein thrombosis of left lower extremity ([facility] ) 01/11/2004    on blood thinner for 3 years   Diabetes mellitus ([facility] )     type 2 BS 6/6/22 @ 0600 was 145    Disease of thyroid gland     Endometrial cancer ([facility] [facility] ) 1999    GERD (gastroesophageal reflux disease)     Gross hematuria     Hiatal hernia     History of transfusion 1999    Post Hysterectomy    History of urostomy 01/11/2004    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   Hypothyroid     In vitro fertilization     Kidney stone     Migraines     Muscle weakness     abdominal    Personal history of irradiation     Renal cyst     Sleep apnea     Resolved due to Weight Loss     Past Surgical History:   Procedure Laterality Date    ACHILLES TENDON REPAIR Right     APPENDECTOMY      COLONOSCOPY      COLONOSCOPY W/ BIOPSIES N/A 12/2015    EGD      HEEL SPUR SURGERY Right 1996    HYSTERECTOMY      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    IR NEPHROSTOMY TUBE PLACEMENT  01/18/2021    LAPAROSCOPIC GASTRIC BANDING N/A 2006    [place], [address] OTHER SURGICAL HISTORY  2004    Urine shunt to intestine, transverse colon    PCNL Left 02/16/2021    Procedure: NEPHROLITHOTOMY  PERCUTANEOUS (PCNL);   Surgeon: Michele West MD;  [facility];  Service: Urology    NV CYSTO/URETERO W/LITHOTRIPSY &INDWELL STENT INSRT Left 2021    Procedure: anterograde  URETEROSCOPY with dilation of ureteral stricture, INSERTION STENT URETERAL, exchange  of nephrostomy tube; Surgeon: Sheri Sunshine MD;  [facility];  Service: Urology    SC LAP, SURG ENTEROLYSIS N/A 2019    Procedure: LAPAROSCOPIC LYSIS OF ADHESIONS;  Surgeon: Lavinia Romero MD;  Location: [address] [place] [address] MAIN OR;  Service: Shana Carter [address] [place] [address] endometrial cancer      SLEEVE GASTROPLASTY N/A 2015    Dr Isaiah More, [facility]    URETER REVISION      [facility] [address] THUMB ARTHROPLASTY       Social History   Social History     Substance and Sexual Activity   Alcohol Use Yes    Comment: Rare Socially     Social History     Substance and Sexual Activity   Drug Use No     Social History     Tobacco Use   Smoking Status Former Smoker    Packs/day: 1 50    Years: 10 00    Pack years: 15 00    Types: Cigarettes    Quit date: 1986    Years since quittin 4   Smokeless Tobacco Former User     Family History   Problem Relation Age of Onset    No Known Problems Mother     Kidney failure Father     Brain cancer Father     Kidney cancer Father     Diabetes Sister     Alcohol abuse Sister     Diabetes Brother        Meds/Allergies       Current Outpatient Medications:     ALPRAZolam (XANAX) 0 5 mg tablet    BIOTIN PO    Cyanocobalamin (B-12 PO)    metFORMIN (GLUCOPHAGE) 1000 MG tablet    pantoprazole (PROTONIX) 40 mg tablet    pramipexole (MIRAPEX) 0 25 mg tablet    Probiotic Product (PROBIOTIC DAILY PO)    SYNTHROID 75 MCG tablet    Trulicity 8 71 TK/4 2HE SOPN    acetaminophen (TYLENOL) 325 mg tablet    acetaminophen (TYLENOL) 325 mg tablet    Ascorbic Acid (VITAMIN C PO)    Calcium Polycarbophil (FIBER-CAPS PO)    Coenzyme Q10 (CO Q-10 PO)    docusate sodium (COLACE) 100 mg capsule    FREESTYLE LITE test strip    naproxen (NAPROSYN) 500 mg tablet    sodium chloride, PF, 0 9 %    Current Facility-Administered Medications:     cefTRIAXone (ROCEPHIN) injection 1,000 mg, 1,000 mg, Intramuscular, Q24H, 1,000 mg at 05/05/21 9037    Allergies   Allergen Reactions    Amoxicillin Rash    Penicillins Rash    Adhesive [Medical Tape] Rash       Objective     /99   Pulse 72   Temp (!) 96 4 °F (35 8 °C) (Temporal)   Resp 18   Ht 5' 7" (1 702 m)   Wt 99 3 kg (219 lb)   SpO2 99%   BMI 34 30 kg/m²       PHYSICAL EXAM    Gen: NAD  Head: NCAT  CV: RRR  CHEST: Clear  ABD: soft, NT/ND  EXT: no edema      ASSESSMENT/PLAN:  This is a 74y o  year old female here for colonoscopy, and she is stable and optimized for her procedure

## 2022-06-06 NOTE — ANESTHESIA PREPROCEDURE EVALUATION
Procedure:  COLONOSCOPY    Relevant Problems   CARDIO   (+) HTN (hypertension)   (+) Migraine      ENDO   (+) Hypothyroidism   (+) Type 2 diabetes mellitus (HCC)      GI/HEPATIC   (+) Small bowel obstruction (HCC)      /RENAL   (+) Hydroureteronephrosis      GYN   (+) Endometrial cancer (HCC)      HEMATOLOGY   (+) Anemia      NEURO/PSYCH   (+) Headache   (+) Migraine        Physical Exam    Airway    Mallampati score: II  TM Distance: >3 FB  Neck ROM: full     Dental       Cardiovascular      Pulmonary      Other Findings        Anesthesia Plan  ASA Score- 2     Anesthesia Type- IV sedation with anesthesia with ASA Monitors  Additional Monitors:   Airway Plan:           Plan Factors-Exercise tolerance (METS): >4 METS  Chart reviewed  EKG reviewed  Imaging results reviewed  Existing labs reviewed  Patient summary reviewed  Induction- intravenous  Postoperative Plan- Plan for postoperative opioid use  Informed Consent- Anesthetic plan and risks discussed with patient  I personally reviewed this patient with the CRNA  Discussed and agreed on the Anesthesia Plan with the CRNA  Gordo Cornell

## 2022-06-06 NOTE — DISCHARGE SUMMARY
Discharge Summary - Lona Reeves 74 y o  female MRN: [id number]    Unit/Bed#:  Encounter: [id number]    Admission Date:  06/06/2022    Admitting Diagnosis: History of colon polyps [Z86 010]    HPI:  History of colon polyp  History of uterine cancer and surgery  History of radiation treatment    Procedures Performed: No orders of the defined types were placed in this encounter  Summary of Hospital Course: Tolerated procedure    Significant Findings, Care, Treatment and Services Provided:  Colon polyp removed  Diverticulosis noted  Questionable anastomotic site distal to the cecum  Complications:  None    Discharge Diagnosis:  See above    Medical Problems             Resolved Problems  Date Reviewed: 1/19/2021   None                 Condition at Discharge: good         Discharge instructions/Information to patient and family:   See after visit summary for information provided to patient and family  Provisions for Follow-Up Care:  See after visit summary for information related to follow-up care and any pertinent home health orders        PCP: Melissa Merchant DO    Disposition: Home

## 2022-06-06 NOTE — ANESTHESIA POSTPROCEDURE EVALUATION
Post-Op Assessment Note    CV Status:  Stable  Pain Score: 0    Pain management: adequate     Mental Status:  Arousable   Hydration Status:  Euvolemic   PONV Controlled:  Controlled   Airway Patency:  Patent      Post Op Vitals Reviewed: Yes      Staff: Anesthesiologist, CRNA         No complications documented      BP   106/65   Temp     Pulse 72   Resp 12   SpO2   96

## 2022-06-14 ENCOUNTER — OFFICE VISIT (OUTPATIENT)
Dept: UROLOGY | Facility: CLINIC | Age: 67
End: 2022-06-14
Payer: COMMERCIAL

## 2022-06-14 VITALS
WEIGHT: 220 LBS | BODY MASS INDEX: 34.53 KG/M2 | DIASTOLIC BLOOD PRESSURE: 76 MMHG | OXYGEN SATURATION: 99 % | HEART RATE: 81 BPM | SYSTOLIC BLOOD PRESSURE: 124 MMHG | HEIGHT: 67 IN

## 2022-06-14 DIAGNOSIS — N13.1 HYDRONEPHROSIS WITH URETERAL STRICTURE, NOT ELSEWHERE CLASSIFIED: Primary | ICD-10-CM

## 2022-06-14 PROCEDURE — 99213 OFFICE O/P EST LOW 20 MIN: CPT | Performed by: PHYSICIAN ASSISTANT

## 2022-06-14 RX ORDER — DULAGLUTIDE 4.5 MG/.5ML
INJECTION, SOLUTION SUBCUTANEOUS
COMMUNITY
Start: 2022-05-24

## 2022-10-12 PROBLEM — A41.9 SEPSIS (HCC): Status: RESOLVED | Noted: 2017-11-01 | Resolved: 2022-10-12

## 2022-10-12 PROBLEM — N12 PYELONEPHRITIS: Status: RESOLVED | Noted: 2017-11-01 | Resolved: 2022-10-12

## 2023-04-03 ENCOUNTER — APPOINTMENT (EMERGENCY)
Dept: VASCULAR ULTRASOUND | Facility: HOSPITAL | Age: 68
End: 2023-04-03

## 2023-04-03 ENCOUNTER — HOSPITAL ENCOUNTER (EMERGENCY)
Facility: HOSPITAL | Age: 68
Discharge: HOME | End: 2023-04-03
Attending: EMERGENCY MEDICINE

## 2023-04-03 ENCOUNTER — APPOINTMENT (EMERGENCY)
Dept: RADIOLOGY | Facility: HOSPITAL | Age: 68
End: 2023-04-03

## 2023-04-03 VITALS
OXYGEN SATURATION: 99 % | BODY MASS INDEX: 34.46 KG/M2 | RESPIRATION RATE: 18 BRPM | HEART RATE: 80 BPM | SYSTOLIC BLOOD PRESSURE: 195 MMHG | WEIGHT: 220 LBS | DIASTOLIC BLOOD PRESSURE: 98 MMHG | TEMPERATURE: 98.1 F

## 2023-04-03 DIAGNOSIS — I82.409 DVT (DEEP VENOUS THROMBOSIS) (HCC): Primary | ICD-10-CM

## 2023-04-03 LAB
ANION GAP SERPL CALCULATED.3IONS-SCNC: 7 MMOL/L (ref 4–13)
BASOPHILS # BLD AUTO: 0.05 THOUSANDS/ÂΜL (ref 0–0.1)
BASOPHILS NFR BLD AUTO: 1 % (ref 0–1)
BUN SERPL-MCNC: 30 MG/DL (ref 5–25)
CALCIUM SERPL-MCNC: 10.3 MG/DL (ref 8.4–10.2)
CHLORIDE SERPL-SCNC: 110 MMOL/L (ref 96–108)
CO2 SERPL-SCNC: 23 MMOL/L (ref 21–32)
CREAT SERPL-MCNC: 1.12 MG/DL (ref 0.6–1.3)
EOSINOPHIL # BLD AUTO: 0.26 THOUSAND/ÂΜL (ref 0–0.61)
EOSINOPHIL NFR BLD AUTO: 4 % (ref 0–6)
ERYTHROCYTE [DISTWIDTH] IN BLOOD BY AUTOMATED COUNT: 15.3 % (ref 11.6–15.1)
GFR SERPL CREATININE-BSD FRML MDRD: 51 ML/MIN/1.73SQ M
GLUCOSE SERPL-MCNC: 98 MG/DL (ref 65–140)
HCT VFR BLD AUTO: 36.2 % (ref 34.8–46.1)
HGB BLD-MCNC: 11.4 G/DL (ref 11.5–15.4)
IMM GRANULOCYTES # BLD AUTO: 0.02 THOUSAND/UL (ref 0–0.2)
IMM GRANULOCYTES NFR BLD AUTO: 0 % (ref 0–2)
LYMPHOCYTES # BLD AUTO: 2.12 THOUSANDS/ÂΜL (ref 0.6–4.47)
LYMPHOCYTES NFR BLD AUTO: 29 % (ref 14–44)
MCH RBC QN AUTO: 30.6 PG (ref 26.8–34.3)
MCHC RBC AUTO-ENTMCNC: 31.5 G/DL (ref 31.4–37.4)
MCV RBC AUTO: 97 FL (ref 82–98)
MONOCYTES # BLD AUTO: 0.62 THOUSAND/ÂΜL (ref 0.17–1.22)
MONOCYTES NFR BLD AUTO: 8 % (ref 4–12)
NEUTROPHILS # BLD AUTO: 4.37 THOUSANDS/ÂΜL (ref 1.85–7.62)
NEUTS SEG NFR BLD AUTO: 58 % (ref 43–75)
NRBC BLD AUTO-RTO: 0 /100 WBCS
PLATELET # BLD AUTO: 227 THOUSANDS/UL (ref 149–390)
PMV BLD AUTO: 9 FL (ref 8.9–12.7)
POTASSIUM SERPL-SCNC: 4.6 MMOL/L (ref 3.5–5.3)
RBC # BLD AUTO: 3.72 MILLION/UL (ref 3.81–5.12)
SODIUM SERPL-SCNC: 140 MMOL/L (ref 135–147)
URATE SERPL-MCNC: 7.1 MG/DL (ref 2–7.5)
WBC # BLD AUTO: 7.44 THOUSAND/UL (ref 4.31–10.16)

## 2023-04-03 RX ORDER — RIVAROXABAN 15 MG-20MG
KIT ORAL
Qty: 51 EACH | Refills: 0 | Status: SHIPPED | OUTPATIENT
Start: 2023-04-03

## 2023-04-03 RX ADMIN — RIVAROXABAN 15 MG: 15 TABLET, FILM COATED ORAL at 20:45

## 2023-04-04 ENCOUNTER — TELEPHONE (OUTPATIENT)
Dept: HEMATOLOGY ONCOLOGY | Facility: CLINIC | Age: 68
End: 2023-04-04

## 2023-04-04 LAB — DEPRECATED AT III PPP: 117 % OF NORMAL (ref 92–136)

## 2023-04-04 NOTE — TELEPHONE ENCOUNTER
I called Elida Corbin to schedule a new patient appointment with Hematology/Medical Oncology or Surgical Oncology  Call Outcome:  I left a voicemail making patient aware of their referral and instructing them to call back [facility] phone number [phone] to schedule    Referral Outcome:   Another attempt to schedule will be made to schedule patient

## 2023-04-04 NOTE — ED PROVIDER NOTES
History  Chief Complaint   Patient presents with   • Foot Swelling     Pt reports R foot swelling since Saturday, states had pedicure done and that was when it started  Hx diabetes, denies fevers/redness      65-year-old female presents to the emergency department with reports of right lower extremity pain  States that she came back from a cruise 2 weeks ago and has noticed over the past several days that her right lower extremity seems to be slightly swollen around her ankle  States that it is also tender to touch  First noticed symptoms when she was getting a pedicure done 2 to 3 days ago  Has not noticed any redness of the lower extremity  [facility] prior to arrival and was unsure of a diagnosis  Patient states that she has a history of a previous DVT in the left lower extremity after a surgery approximately 22 years ago  States that she took a blood thinner for 1 year prior to this being discontinued  Has not had any other clots since that time  No history of gout  History provided by:  Patient   used: No        Prior to Admission Medications   Prescriptions Last Dose Informant Patient Reported? Taking?    ALPRAZolam (XANAX) 0 5 mg tablet   Yes No   Sig: Take 0 25 mg by mouth daily at bedtime as needed Takes 1/2 0 5mg = 0 25 mg @ bedtime prn   Ascorbic Acid (VITAMIN C PO)   Yes No   Sig: Take by mouth daily   BIOTIN PO   Yes No   Sig: Take by mouth daily   Calcium Polycarbophil (FIBER-CAPS PO)   Yes No   Sig: Take by mouth daily   Coenzyme Q10 (CO Q-10 PO)   Yes No   Sig: Take by mouth daily   Cyanocobalamin (B-12 PO)   Yes No   Sig: Take by mouth daily   FREESTYLE LITE test strip   Yes No   Probiotic Product (PROBIOTIC DAILY PO)   Yes No   Sig: Take by mouth   SYNTHROID 75 MCG tablet   Yes No   Sig: Take 75 mcg by mouth daily in the early morning     Trulicity 7 04 IU/5 8UP SOPN   Yes No   Sig: Inject 0 75 mg under the skin once a week   Patient not taking: Reported on 7/16/2965   Trulicity 4 5 QP/0 9WY SOPN   Yes No   Sig: INJECT UNDER THE SKIN 4 5 MG WEEKLY AS DIRECTED   acetaminophen (TYLENOL) 325 mg tablet   No No   Sig: Take 2 tablets (650 mg total) by mouth every 6 (six) hours as needed for mild pain, headaches or fever   Patient not taking: Reported on 6/14/2022   acetaminophen (TYLENOL) 325 mg tablet   No No   Sig: Take 2 tablets (650 mg total) by mouth every 4 (four) hours as needed for mild pain   Patient not taking: Reported on 6/14/2022   docusate sodium (COLACE) 100 mg capsule   No No   Sig: TAKE 1 CAPSULE (100 MG TOTAL) BY MOUTH 2 (TWO) TIMES A DAY FOR 15 DAYS   Patient not taking: Reported on 5/5/2021   metFORMIN (GLUCOPHAGE) 1000 MG tablet   Yes No   Sig: Take 500 mg by mouth 2 (two) times a day with meals     naproxen (NAPROSYN) 500 mg tablet   No No   Sig: Take 1 tablet (500 mg total) by mouth 2 (two) times a day with meals for 5 days For pain   pantoprazole (PROTONIX) 40 mg tablet   Yes No   Sig: Take 40 mg by mouth daily in the early morning   pramipexole (MIRAPEX) 0 25 mg tablet   Yes No   Sig: Take 0 25 mg by mouth daily at bedtime   sodium chloride, PF, 0 9 %   No No   Sig: 10 mL by Intracatheter route daily      Facility-Administered Medications Last Administration Doses Remaining   cefTRIAXone (ROCEPHIN) injection 1,000 mg 5/5/2021  2:59 PM           Past Medical History:   Diagnosis Date   • Anemia    • Back pain    • Bowel obstruction (HCC) 3015,8102   • Cancer ([facility] )     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   • Cholelithiasis    • Colon polyp    • Deep vein thrombosis of left lower extremity ([facility] ) 01/11/2004    on blood thinner for 3 years     • Diabetes mellitus ([facility] )     type 2 BS 6/6/22 @ 0600 was 145   • Disease of thyroid gland    • Endometrial cancer ([facility] ) 1999   • GERD (gastroesophageal reflux disease)    • Gross hematuria    • Hiatal hernia    • History of transfusion 1999    Post Hysterectomy   • History of urostomy 01/11/2004    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  • Hypothyroid    • In vitro fertilization    • Kidney stone    • Migraines    • Muscle weakness     abdominal   • Personal history of irradiation    • Renal cyst    • Sleep apnea     Resolved due to Weight Loss       Past Surgical History:   Procedure Laterality Date   • ACHILLES TENDON REPAIR Right    • APPENDECTOMY     • COLONOSCOPY      6/6/22   • COLONOSCOPY W/ BIOPSIES N/A 12/2015   • EGD     • HEEL SPUR SURGERY Right 1996   • HYSTERECTOMY     • 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   • IR NEPHROSTOMY TUBE PLACEMENT  01/18/2021   • LAPAROSCOPIC GASTRIC BANDING N/A 2006    [place], [place]   • OTHER SURGICAL HISTORY  2004    Urine shunt to intestine, transverse colon   • PCNL Left 02/16/2021    Procedure: NEPHROLITHOTOMY  PERCUTANEOUS (PCNL); Surgeon: Claudeen Pretzel, MD;  Location: [facility];  Service: Urology   • TX CYSTO/URETERO W/LITHOTRIPSY &INDWELL STENT INSRT Left 02/16/2021    Procedure: anterograde  URETEROSCOPY with dilation of ureteral stricture, INSERTION STENT URETERAL, exchange  of nephrostomy tube; Surgeon: Claudeen Pretzel, MD;  Location: [facility];  Service: Urology   • TX LAPAROSCOPY ENTEROLYSIS SEPARATE PROCEDURE N/A 01/17/2019    Procedure: LAPAROSCOPIC LYSIS OF ADHESIONS;  Surgeon: Lamonte Domingo MD;  Location: [address] [place] [address] MAIN OR;  Service: General   • RADICAL HYSTERECTOMY N/A [address] [place], [place] [address]for endometrial cancer  • SLEEVE GASTROPLASTY N/A 09/2015    Dr Gladis Canas, [facility]   • URETER REVISION  2010   • WEILBY THUMB ARTHROPLASTY     • WEILBY THUMB ARTHROPLASTY         Family History   Problem Relation Age of Onset   • No Known Problems Mother    • Kidney failure Father    • Brain cancer Father    • Kidney cancer Father    • Diabetes Sister    • Alcohol abuse Sister    • Diabetes Brother      I have reviewed and agree with the history as documented      E-Cigarette/Vaping   • E-Cigarette Use Never User E-Cigarette/Vaping Substances     Social History     Tobacco Use   • Smoking status: Former     Packs/day: 1 50     Years: 10 00     Pack years: 15 00     Types: Cigarettes     Quit date: 1986     Years since quittin 2   • Smokeless tobacco: Former   Vaping Use   • Vaping Use: Never used   Substance Use Topics   • Alcohol use: Yes     Comment: Rare Socially   • Drug use: No       Review of Systems   Constitutional: Negative for chills and fever  HENT: Negative for congestion, dental problem and sore throat  Respiratory: Negative for cough  Cardiovascular: Positive for leg swelling  Negative for chest pain  Gastrointestinal: Negative for abdominal pain  Musculoskeletal: Positive for arthralgias  Negative for back pain and neck pain  Ankle pain, leg pain   Skin: Negative for rash and wound  All other systems reviewed and are negative  Physical Exam  Physical Exam  Vitals and nursing note reviewed  Constitutional:       Appearance: She is well-developed  HENT:      Head: Normocephalic and atraumatic  Cardiovascular:      Rate and Rhythm: Normal rate and regular rhythm  Pulmonary:      Effort: Pulmonary effort is normal  No respiratory distress  Breath sounds: No wheezing, rhonchi or rales  Musculoskeletal:      Right lower le+ Edema present  Left lower le+ Edema present  Comments: Mild edema of lower extremities which seems to be worse in the right distal lower extremity  No posterior calf pain to palpation  Negative Homans' sign  No distal sensory or circulatory deficit  Skin:     General: Skin is warm and dry  Neurological:      Mental Status: She is alert and oriented to person, place, and time     Psychiatric:         Mood and Affect: Mood normal          Behavior: Behavior normal          Vital Signs  ED Triage Vitals   Temperature Pulse Respirations Blood Pressure SpO2   23 1824 23 1824 23 18223 18223 1824   98 1 °F (36 7 °C) 80 18 (!) 195/98 99 %      Temp Source Heart Rate Source Patient Position - Orthostatic VS BP Location FiO2 (%)   04/03/23 1824 04/03/23 1824 04/03/23 1824 04/03/23 1824 --   Oral Monitor Sitting Right arm       Pain Score       04/03/23 1826       10 - Worst Possible Pain           Vitals:    04/03/23 1824   BP: (!) 195/98   Pulse: 80   Patient Position - Orthostatic VS: Sitting         Visual Acuity      ED Medications  Medications   rivaroxaban (XARELTO) tablet 15 mg (has no administration in time range)       Diagnostic Studies  Results Reviewed     Procedure Component Value Units Date/Time    Antithrombin III Activity [[id number]] Collected: 04/03/23 2041    Lab Status: No result Specimen: Blood from Arm, Right     Cardiolipin antibody [[id number]] Collected: 04/03/23 2041    Lab Status: No result Specimen: Blood from Arm, Right     Factor 5 leiden [[id number]] Collected: 04/03/23 2041    Lab Status: No result Specimen: Blood from Arm, Right     Lupus anticoagulant [[id number]] Collected: 04/03/23 2041    Lab Status: No result Specimen: Blood from Arm, Right     Protein C activity [[id number]] Collected: 04/03/23 2041    Lab Status: No result Specimen: Blood from Arm, Right     Protein S activity [[id number]] Collected: 04/03/23 2041    Lab Status: No result Specimen: Blood from Arm, Right     Protein S Antigen, Total & Free [[id number]] Collected: 04/03/23 2041    Lab Status: No result Specimen: Blood from Arm, Right     Prothrombin gene mutation [[id number]] Collected: 04/03/23 2041    Lab Status: No result Specimen: Blood from Arm, Right     Beta-2 glycoprotein antibodies [[id number]] Collected: 04/03/23 2041    Lab Status: No result Specimen: Blood from Arm, Right     Basic metabolic panel [[id number]]  (Abnormal) Collected: 04/03/23 2000    Lab Status: Final result Specimen: Blood from Arm, Left Updated: 04/03/23 2026     Sodium 140 mmol/L      Potassium 4 6 mmol/L      Chloride 110 mmol/L CO2 23 mmol/L      ANION GAP 7 mmol/L      BUN 30 mg/dL      Creatinine 1 12 mg/dL      Glucose 98 mg/dL      Calcium 10 3 mg/dL      eGFR 51 ml/min/1 73sq m     Narrative:      [place] guidelines for Chronic Kidney Disease (CKD):   •  Stage 1 with normal or high GFR (GFR > 90 mL/min/1 73 square meters)  •  Stage 2 Mild CKD (GFR = 60-89 mL/min/1 73 square meters)  •  Stage 3A Moderate CKD (GFR = 45-59 mL/min/1 73 square meters)  •  Stage 3B Moderate CKD (GFR = 30-44 mL/min/1 73 square meters)  •  Stage 4 Severe CKD (GFR = 15-29 mL/min/1 73 square meters)  •  Stage 5 End Stage CKD (GFR <15 mL/min/1 73 square meters)  Note: GFR calculation is accurate only with a steady state creatinine    Uric acid [[id number]]  (Normal) Collected: 04/03/23 2000    Lab Status: Final result Specimen: Blood from Arm, Left Updated: 04/03/23 2026     Uric Acid 7 1 mg/dL     CBC and differential [[id number]]  (Abnormal) Collected: 04/03/23 2000    Lab Status: Final result Specimen: Blood from Arm, Left Updated: 04/03/23 2007     WBC 7 44 Thousand/uL      RBC 3 72 Million/uL      Hemoglobin 11 4 g/dL      Hematocrit 36 2 %      MCV 97 fL      MCH 30 6 pg      MCHC 31 5 g/dL      RDW 15 3 %      MPV 9 0 fL      Platelets 328 Thousands/uL      nRBC 0 /100 WBCs      Neutrophils Relative 58 %      Immat GRANS % 0 %      Lymphocytes Relative 29 %      Monocytes Relative 8 %      Eosinophils Relative 4 %      Basophils Relative 1 %      Neutrophils Absolute 4 37 Thousands/µL      Immature Grans Absolute 0 02 Thousand/uL      Lymphocytes Absolute 2 12 Thousands/µL      Monocytes Absolute 0 62 Thousand/µL      Eosinophils Absolute 0 26 Thousand/µL      Basophils Absolute 0 05 Thousands/µL                  XR ankle 3+ views RIGHT   ED Interpretation by Tyson Pillai PA-C (04/03 1932)   No fracture or signs of bony destruction      VAS lower limb venous duplex study, unilateral/limited    (Results Pending) Procedures  Procedures         ED Course                                             Medical Decision Making  Differential diagnosis includes but not limited to: DVT, gout, arthritis, bursitis, tendinitis  Cellulitis less likely    DVT (deep venous thrombosis) ([facility] ): acute illness or injury  Amount and/or Complexity of Data Reviewed  Labs: ordered  Decision-making details documented in ED Course  Radiology: ordered and independent interpretation performed  Decision-making details documented in ED Course  Risk  Prescription drug management  Disposition  Final diagnoses:   DVT (deep venous thrombosis) ([facility] )     Time reflects when diagnosis was documented in both MDM as applicable and the Disposition within this note     Time User Action Codes Description Comment    4/3/2023  8:04 PM lEmer Ruiz Add [I82 [id number]] DVT (deep venous thrombosis) [facility])       ED Disposition     ED Disposition   Discharge    Condition   Stable    Date/Time   Mon Apr 3, 2023  8:01 PM    Comment   Joaquín Arango discharge to home/self care  Follow-up Information     [facility] Specialties Details [facility] DO [facility]   [address] [address] [phone]            Patient's Medications   Discharge Prescriptions    RIVAROXABAN (XARELTO STARTER PACK) 15 & 20 MG STARTER PACK    Take 15 mg by mouth twice daily for 21 days, then 20 mg once daily thereafter         Start Date: 4/3/2023  End Date: --       Order Dose: --       Quantity: 46 each    Refills: 0           PDMP Review     None          ED Provider  Electronically Signed by           Lisa Montoya PA-C  04/03/23 2043

## 2023-04-05 ENCOUNTER — TELEPHONE (OUTPATIENT)
Dept: HEMATOLOGY ONCOLOGY | Facility: CLINIC | Age: 68
End: 2023-04-05

## 2023-04-05 NOTE — TELEPHONE ENCOUNTER
Appointment Confirmation (to confirm pre existing appointments - ONLY)  No need to route   Who is calling to confirm?   Patient   ` N/A   Appointment with  TRACY Sherman   Appointment date & time 4/11/23 @ 2pm   Appointment location [facility]   Patient verbilized understanding Yes

## 2023-04-06 LAB
APTT SCREEN TO CONFIRM RATIO: 1.04 RATIO (ref 0–1.34)
CONFIRM APTT/NORMAL: 43.1 SEC (ref 0–47.6)
LA PPP-IMP: NORMAL
PROT S ACT/NOR PPP: 133 % (ref 61–136)
PROT S PPP-ACNC: 137 % (ref 60–150)
SCREEN APTT: 20.4 SEC (ref 0–43.5)
SCREEN DRVVT: 41.7 SEC (ref 0–47)
THROMBIN TIME: 17 SEC (ref 0–23)

## 2023-04-26 ENCOUNTER — HOSPITAL ENCOUNTER (OUTPATIENT)
Dept: ULTRASOUND IMAGING | Facility: HOSPITAL | Age: 68
Discharge: HOME | End: 2023-04-26

## 2023-04-26 DIAGNOSIS — N13.1 HYDRONEPHROSIS WITH URETERAL STRICTURE, NOT ELSEWHERE CLASSIFIED: ICD-10-CM

## 2023-06-23 ENCOUNTER — APPOINTMENT (EMERGENCY)
Dept: VASCULAR ULTRASOUND | Facility: HOSPITAL | Age: 68
DRG: 301 | End: 2023-06-23
Payer: COMMERCIAL

## 2023-06-23 ENCOUNTER — APPOINTMENT (EMERGENCY)
Dept: RADIOLOGY | Facility: HOSPITAL | Age: 68
DRG: 301 | End: 2023-06-23
Payer: COMMERCIAL

## 2023-06-23 ENCOUNTER — HOSPITAL ENCOUNTER (INPATIENT)
Facility: HOSPITAL | Age: 68
LOS: 4 days | Discharge: HOME | DRG: 301 | End: 2023-06-27
Attending: EMERGENCY MEDICINE | Admitting: INTERNAL MEDICINE
Payer: COMMERCIAL

## 2023-06-23 DIAGNOSIS — M87.9 BONE INFARCTION (HCC): ICD-10-CM

## 2023-06-23 DIAGNOSIS — I82.411 ACUTE DEEP VEIN THROMBOSIS (DVT) OF FEMORAL VEIN OF RIGHT LOWER EXTREMITY (HCC): Primary | ICD-10-CM

## 2023-06-23 DIAGNOSIS — M79.604 RIGHT LEG PAIN: ICD-10-CM

## 2023-06-23 DIAGNOSIS — S86.009A ACHILLES TENDON INJURY: ICD-10-CM

## 2023-06-23 LAB
ALBUMIN SERPL BCP-MCNC: 3.9 G/DL (ref 3.5–5)
ALP SERPL-CCNC: 112 U/L (ref 34–104)
ALT SERPL W P-5'-P-CCNC: 16 U/L (ref 7–52)
ANION GAP SERPL CALCULATED.3IONS-SCNC: 5 MMOL/L
APTT PPP: 28 SECONDS (ref 23–37)
AST SERPL W P-5'-P-CCNC: 15 U/L (ref 13–39)
BASOPHILS # BLD AUTO: 0.06 THOUSANDS/ÂΜL (ref 0–0.1)
BASOPHILS NFR BLD AUTO: 1 % (ref 0–1)
BILIRUB SERPL-MCNC: 0.45 MG/DL (ref 0.2–1)
BUN SERPL-MCNC: 36 MG/DL (ref 5–25)
CALCIUM SERPL-MCNC: 10.4 MG/DL (ref 8.4–10.2)
CHLORIDE SERPL-SCNC: 112 MMOL/L (ref 96–108)
CO2 SERPL-SCNC: 23 MMOL/L (ref 21–32)
CREAT SERPL-MCNC: 1.12 MG/DL (ref 0.6–1.3)
EOSINOPHIL # BLD AUTO: 0.34 THOUSAND/ÂΜL (ref 0–0.61)
EOSINOPHIL NFR BLD AUTO: 5 % (ref 0–6)
ERYTHROCYTE [DISTWIDTH] IN BLOOD BY AUTOMATED COUNT: 13.3 % (ref 11.6–15.1)
GFR SERPL CREATININE-BSD FRML MDRD: 51 ML/MIN/1.73SQ M
GLUCOSE SERPL-MCNC: 108 MG/DL (ref 65–140)
HCT VFR BLD AUTO: 34.8 % (ref 34.8–46.1)
HGB BLD-MCNC: 11.1 G/DL (ref 11.5–15.4)
IMM GRANULOCYTES # BLD AUTO: 0.02 THOUSAND/UL (ref 0–0.2)
IMM GRANULOCYTES NFR BLD AUTO: 0 % (ref 0–2)
INR PPP: 1.21 (ref 0.84–1.19)
LYMPHOCYTES # BLD AUTO: 2.18 THOUSANDS/ÂΜL (ref 0.6–4.47)
LYMPHOCYTES NFR BLD AUTO: 30 % (ref 14–44)
MCH RBC QN AUTO: 30.7 PG (ref 26.8–34.3)
MCHC RBC AUTO-ENTMCNC: 31.9 G/DL (ref 31.4–37.4)
MCV RBC AUTO: 96 FL (ref 82–98)
MONOCYTES # BLD AUTO: 0.65 THOUSAND/ÂΜL (ref 0.17–1.22)
MONOCYTES NFR BLD AUTO: 9 % (ref 4–12)
NEUTROPHILS # BLD AUTO: 4.15 THOUSANDS/ÂΜL (ref 1.85–7.62)
NEUTS SEG NFR BLD AUTO: 55 % (ref 43–75)
NRBC BLD AUTO-RTO: 0 /100 WBCS
PLATELET # BLD AUTO: 228 THOUSANDS/UL (ref 149–390)
PMV BLD AUTO: 8.9 FL (ref 8.9–12.7)
POTASSIUM SERPL-SCNC: 4.5 MMOL/L (ref 3.5–5.3)
PROT SERPL-MCNC: 6.8 G/DL (ref 6.4–8.4)
PROTHROMBIN TIME: 15.5 SECONDS (ref 11.6–14.5)
RBC # BLD AUTO: 3.62 MILLION/UL (ref 3.81–5.12)
SODIUM SERPL-SCNC: 140 MMOL/L (ref 135–147)
WBC # BLD AUTO: 7.4 THOUSAND/UL (ref 4.31–10.16)

## 2023-06-23 PROCEDURE — 93971 EXTREMITY STUDY: CPT | Performed by: SURGERY

## 2023-06-23 PROCEDURE — 73564 X-RAY EXAM KNEE 4 OR MORE: CPT

## 2023-06-23 PROCEDURE — 36415 COLL VENOUS BLD VENIPUNCTURE: CPT

## 2023-06-23 PROCEDURE — 99284 EMERGENCY DEPT VISIT MOD MDM: CPT

## 2023-06-23 PROCEDURE — 85025 COMPLETE CBC W/AUTO DIFF WBC: CPT

## 2023-06-23 PROCEDURE — 85610 PROTHROMBIN TIME: CPT

## 2023-06-23 PROCEDURE — 73502 X-RAY EXAM HIP UNI 2-3 VIEWS: CPT

## 2023-06-23 PROCEDURE — 96374 THER/PROPH/DIAG INJ IV PUSH: CPT

## 2023-06-23 PROCEDURE — 93971 EXTREMITY STUDY: CPT

## 2023-06-23 PROCEDURE — 85730 THROMBOPLASTIN TIME PARTIAL: CPT

## 2023-06-23 PROCEDURE — 80053 COMPREHEN METABOLIC PANEL: CPT

## 2023-06-23 RX ORDER — HYDROCODONE BITARTRATE AND ACETAMINOPHEN 5; 325 MG/1; MG/1
1 TABLET ORAL ONCE
Status: COMPLETED | OUTPATIENT
Start: 2023-06-23 | End: 2023-06-23

## 2023-06-23 RX ORDER — ENOXAPARIN SODIUM 100 MG/ML
1 INJECTION SUBCUTANEOUS ONCE
Status: COMPLETED | OUTPATIENT
Start: 2023-06-23 | End: 2023-06-23

## 2023-06-23 RX ORDER — ALPRAZOLAM 0.25 MG/1
0.25 TABLET ORAL
Status: DISCONTINUED | OUTPATIENT
Start: 2023-06-23 | End: 2023-06-27 | Stop reason: HOSPADM

## 2023-06-23 RX ORDER — RIBOFLAVIN (VITAMIN B2) 100 MG
100 TABLET ORAL DAILY
Status: DISCONTINUED | OUTPATIENT
Start: 2023-06-24 | End: 2023-06-23 | Stop reason: RX

## 2023-06-23 RX ORDER — PRAMIPEXOLE DIHYDROCHLORIDE 0.5 MG/1
0.5 TABLET ORAL
Status: DISCONTINUED | OUTPATIENT
Start: 2023-06-23 | End: 2023-06-27 | Stop reason: HOSPADM

## 2023-06-23 RX ORDER — MULTIVIT WITH MINERALS/LUTEIN
125 TABLET ORAL DAILY
Status: DISCONTINUED | OUTPATIENT
Start: 2023-06-24 | End: 2023-06-27 | Stop reason: HOSPADM

## 2023-06-23 RX ORDER — ONDANSETRON 2 MG/ML
4 INJECTION INTRAMUSCULAR; INTRAVENOUS EVERY 6 HOURS PRN
Status: DISCONTINUED | OUTPATIENT
Start: 2023-06-23 | End: 2023-06-27 | Stop reason: HOSPADM

## 2023-06-23 RX ORDER — INSULIN LISPRO 100 [IU]/ML
1-6 INJECTION, SOLUTION INTRAVENOUS; SUBCUTANEOUS
Status: DISCONTINUED | OUTPATIENT
Start: 2023-06-24 | End: 2023-06-24

## 2023-06-23 RX ORDER — SACCHAROMYCES BOULARDII 250 MG
250 CAPSULE ORAL DAILY
Status: DISCONTINUED | OUTPATIENT
Start: 2023-06-24 | End: 2023-06-27 | Stop reason: HOSPADM

## 2023-06-23 RX ORDER — PANTOPRAZOLE SODIUM 40 MG/1
40 TABLET, DELAYED RELEASE ORAL
Status: DISCONTINUED | OUTPATIENT
Start: 2023-06-24 | End: 2023-06-27 | Stop reason: HOSPADM

## 2023-06-23 RX ORDER — NIACIN 100 MG
100 TABLET ORAL
Status: DISCONTINUED | OUTPATIENT
Start: 2023-06-23 | End: 2023-06-27 | Stop reason: HOSPADM

## 2023-06-23 RX ORDER — LEVOTHYROXINE SODIUM 0.07 MG/1
75 TABLET ORAL
Status: DISCONTINUED | OUTPATIENT
Start: 2023-06-24 | End: 2023-06-27 | Stop reason: HOSPADM

## 2023-06-23 RX ORDER — HYDROMORPHONE HCL/PF 1 MG/ML
0.5 SYRINGE (ML) INJECTION ONCE
Status: COMPLETED | OUTPATIENT
Start: 2023-06-23 | End: 2023-06-23

## 2023-06-23 RX ORDER — ACETAMINOPHEN 325 MG/1
650 TABLET ORAL EVERY 6 HOURS PRN
Status: DISCONTINUED | OUTPATIENT
Start: 2023-06-23 | End: 2023-06-27 | Stop reason: HOSPADM

## 2023-06-23 RX ORDER — HYDRALAZINE HYDROCHLORIDE 20 MG/ML
5 INJECTION INTRAMUSCULAR; INTRAVENOUS EVERY 6 HOURS PRN
Status: DISCONTINUED | OUTPATIENT
Start: 2023-06-23 | End: 2023-06-27 | Stop reason: HOSPADM

## 2023-06-23 RX ADMIN — ENOXAPARIN SODIUM 100 MG: 80 INJECTION SUBCUTANEOUS at 20:17

## 2023-06-23 RX ADMIN — HYDROMORPHONE HYDROCHLORIDE 0.5 MG: 1 INJECTION, SOLUTION INTRAMUSCULAR; INTRAVENOUS; SUBCUTANEOUS at 19:35

## 2023-06-23 RX ADMIN — ALPRAZOLAM 0.25 MG: 0.25 TABLET ORAL at 22:01

## 2023-06-23 RX ADMIN — HYDROCODONE BITARTRATE AND ACETAMINOPHEN 1 TABLET: 5; 325 TABLET ORAL at 18:06

## 2023-06-23 NOTE — ED PROVIDER NOTES
History  Chief Complaint   Patient presents with   • Leg Pain     Pt reports hx of blood clots in R leg  Reports over the last two days she has increased pain in R leg, difficulty walking  -SOB, -CP, -numbness/tingling in leg  Takes Xarelto  HPI Pt is a 80 y/o f presenting to the ED with rle pain, edema, with decreased range of motion in flexion  Pt is anticoagulated on xeralto - has not missed any doses from DVT in February  Hx of DM, uterine cancer with urostomy s/p cystectomy/hysterectomy about 23 years ago  No n/v/d, fevers, chills, ams, chest pain, dyspnea, weakness  Denies any other symptoms  Prior to Admission Medications   Prescriptions Last Dose Informant Patient Reported? Taking? ALPRAZolam (XANAX) 0 5 mg tablet  Self Yes No   Sig: Take 0 25 mg by mouth daily at bedtime as needed Takes 1/2 0 5mg = 0 25 mg @ bedtime prn   Ascorbic Acid (VITAMIN C PO)  Self Yes No   Sig: Take by mouth daily   BIOTIN PO  Self Yes No   Sig: Take by mouth daily   Cyanocobalamin (B-12 PO)  Self Yes No   Sig: Take by mouth daily   FREESTYLE LITE test strip  Self Yes No   Probiotic Product (PROBIOTIC DAILY PO)  Self Yes No   Sig: Take by mouth   SYNTHROID 75 MCG tablet  Self Yes No   Sig: Take 75 mcg by mouth daily in the early morning     Trulicity 4 5 SM/0 9MK SOPN  Self Yes No   Sig: INJECT UNDER THE SKIN 4 5 MG WEEKLY AS DIRECTED   metFORMIN (GLUCOPHAGE) 1000 MG tablet  Self Yes No   Sig: Take 500 mg by mouth 2 (two) times a day with meals     pantoprazole (PROTONIX) 40 mg tablet  Self Yes No   Sig: Take 40 mg by mouth daily in the early morning   pramipexole (MIRAPEX) 0 5 mg tablet  Self Yes No   rivaroxaban (Xarelto) 20 mg tablet   No No   Sig: Take 1 tablet (20 mg total) by mouth daily with breakfast   scopolamine (TRANSDERM-SCOP) 1 mg/3 days TD 72 hr patch  Self Yes No   Sig: Place 1 patch on the skin every third day As needed for cruises pt states        Facility-Administered Medications Last Administration Doses Remaining   cefTRIAXone (ROCEPHIN) injection 1,000 mg 5/5/2021  2:59 PM           Past Medical History:   Diagnosis Date   • Anemia    • Back pain    • Bowel obstruction (HCC) [id number]   • Cancer ([facility] )     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   • Cholelithiasis    • Colon polyp    • Deep vein thrombosis of left lower extremity ([facility] ) 01/11/2004    on blood thinner for 3 years  • Diabetes mellitus ([place] [id number] )     type 2 BS 6/6/22 @ 0600 was 145   • Disease of thyroid gland    • Endometrial cancer ([place] [address] ) 1999   • GERD (gastroesophageal reflux disease)    • Gross hematuria    • Hiatal hernia    • History of transfusion 1999    Post Hysterectomy   • History of urostomy 01/11/2004    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  • Hypothyroid    • In vitro fertilization    • Kidney stone    • Migraines    • Muscle weakness     abdominal   • Personal history of irradiation    • Renal cyst    • Sleep apnea     Resolved due to Weight Loss       Past Surgical History:   Procedure Laterality Date   • ACHILLES TENDON REPAIR Right    • APPENDECTOMY     • COLONOSCOPY      6/6/22   • COLONOSCOPY W/ BIOPSIES N/A 12/2015   • EGD     • HEEL SPUR SURGERY Right 1996   • HYSTERECTOMY     • 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   • IR NEPHROSTOMY TUBE PLACEMENT  01/18/2021   • LAPAROSCOPIC GASTRIC BANDING N/A 2006    [place], [place]   • OTHER SURGICAL HISTORY  2004    Urine shunt to intestine, transverse colon   • PCNL Left 02/16/2021    Procedure: NEPHROLITHOTOMY  PERCUTANEOUS (PCNL); Surgeon: Ladan Manriquez MD;  Location: [facility];  Service: Urology   • ME CYSTO/URETERO W/LITHOTRIPSY &INDWELL STENT INSRT Left 02/16/2021    Procedure: anterograde  URETEROSCOPY with dilation of ureteral stricture, INSERTION STENT URETERAL, exchange  of nephrostomy tube;   Surgeon: Ladan Manriquez MD;  Location: [facility];  Service: Urology   • ME LAPAROSCOPY ENTEROLYSIS SEPARATE PROCEDURE N/A 2019    Procedure: LAPAROSCOPIC LYSIS OF ADHESIONS;  Surgeon: Yaritza Baca MD;  Location: [address] [place] [address] OR;  Service: General   [facility]  • SLEEVE GASTROPLASTY N/A 2015    [facility]   • URETER REVISION     • WEILBY THUMB ARTHROPLASTY     • WEILBY THUMB ARTHROPLASTY         Family History   Problem Relation Age of Onset   • No Known Problems Mother    • Kidney failure Father    • Brain cancer Father    • Kidney cancer Father    • Diabetes Sister    • Alcohol abuse Sister    • Diabetes Brother      I have reviewed and agree with the history as documented  E-Cigarette/Vaping   • E-Cigarette Use Never User      E-Cigarette/Vaping Substances     Social History     Tobacco Use   • Smoking status: Former     Packs/day: 1 50     Years: 10 00     Total pack years: 15 00     Types: Cigarettes     Quit date: 1986     Years since quittin 4   • Smokeless tobacco: Former   Vaping Use   • Vaping Use: Never used   Substance Use Topics   • Alcohol use: Yes     Comment: Rare Socially   • Drug use: No        Review of Systems   Cardiovascular: Positive for leg swelling (RLE)  Musculoskeletal: Positive for joint swelling and myalgias (hamstring, popliteal fossa)  All other systems reviewed and are negative        Physical Exam  ED Triage Vitals   Temperature Pulse Respirations Blood Pressure SpO2   23 1624 23 1624 23 1624 23 1624 23 1624   97 9 °F (36 6 °C) 86 16 (!) 196/94 98 %      Temp Source Heart Rate Source Patient Position - Orthostatic VS BP Location FiO2 (%)   23 1624 23 1624 23 1624 23 1624 --   Oral Monitor Sitting Right arm       Pain Score       23 1806       8             Orthostatic Vital Signs  Vitals:    23 1624 23 1934 237   BP: (!) 196/94 (!) 188/94 (!) 181/85   Pulse: 86 68 65   Patient Position - Orthostatic VS: Sitting Sitting Sitting Physical Exam  Vitals and nursing note reviewed  Constitutional:       General: She is in acute distress (mild, due to discomfort right posterior thigh/knee)  Appearance: She is not ill-appearing, toxic-appearing or diaphoretic  HENT:      Head: Normocephalic and atraumatic  Nose: Nose normal       Mouth/Throat:      Mouth: Mucous membranes are moist       Pharynx: Oropharynx is clear  Eyes:      Extraocular Movements: Extraocular movements intact  Conjunctiva/sclera: Conjunctivae normal       Pupils: Pupils are equal, round, and reactive to light  Cardiovascular:      Rate and Rhythm: Normal rate and regular rhythm  Pulses: Normal pulses  Heart sounds: Normal heart sounds  Pulmonary:      Effort: Pulmonary effort is normal  No respiratory distress  Breath sounds: Normal breath sounds  No stridor  No wheezing, rhonchi or rales  Chest:      Chest wall: No tenderness  Abdominal:      General: There is no distension  Palpations: Abdomen is soft  Tenderness: There is no abdominal tenderness  There is no right CVA tenderness, left CVA tenderness, guarding or rebound  Musculoskeletal:         General: Tenderness (Mild, right IT band) present  No deformity or signs of injury  Cervical back: Normal range of motion and neck supple  No rigidity or tenderness  Right lower leg: Edema (scant) present  Left lower leg: No edema  Comments: Decreased ROM of RLE with flexion due to pain in posterior knee   Skin:     General: Skin is warm and dry  Coloration: Skin is not jaundiced or pale  Findings: No bruising, erythema, lesion or rash  Neurological:      General: No focal deficit present  Mental Status: She is alert and oriented to person, place, and time  Cranial Nerves: No cranial nerve deficit  Sensory: No sensory deficit  Motor: No weakness        Coordination: Coordination normal    Psychiatric:         Mood and Affect: Mood normal          Behavior: Behavior normal          ED Medications  Medications   HYDROcodone-acetaminophen (NORCO) 5-325 mg per tablet 1 tablet (1 tablet Oral Given 6/23/23 1806)   HYDROmorphone (DILAUDID) injection 0 5 mg (0 5 mg Intravenous Given 6/23/23 1935)   enoxaparin (LOVENOX) subcutaneous injection 100 mg (100 mg Subcutaneous Given 6/23/23 2017)       Diagnostic Studies  Results Reviewed     Procedure Component Value Units Date/Time    Comprehensive metabolic panel [[id number]]  (Abnormal) Collected: 06/23/23 1929    Lab Status: Final result Specimen: Blood from Arm, Right Updated: 06/23/23 1959     Sodium 140 mmol/L      Potassium 4 5 mmol/L      Chloride 112 mmol/L      CO2 23 mmol/L      ANION GAP 5 mmol/L      BUN 36 mg/dL      Creatinine 1 12 mg/dL      Glucose 108 mg/dL      Calcium 10 4 mg/dL      AST 15 U/L      ALT 16 U/L      Alkaline Phosphatase 112 U/L      Total Protein 6 8 g/dL      Albumin 3 9 g/dL      Total Bilirubin 0 45 mg/dL      eGFR 51 ml/min/1 73sq m     Narrative:      [place] guidelines for Chronic Kidney Disease (CKD):   •  Stage 1 with normal or high GFR (GFR > 90 mL/min/1 73 square meters)  •  Stage 2 Mild CKD (GFR = 60-89 mL/min/1 73 square meters)  •  Stage 3A Moderate CKD (GFR = 45-59 mL/min/1 73 square meters)  •  Stage 3B Moderate CKD (GFR = 30-44 mL/min/1 73 square meters)  •  Stage 4 Severe CKD (GFR = 15-29 mL/min/1 73 square meters)  •  Stage 5 End Stage CKD (GFR <15 mL/min/1 73 square meters)  Note: GFR calculation is accurate only with a steady state creatinine    Protime-INR [[id number]]  (Abnormal) Collected: 06/23/23 1929    Lab Status: Final result Specimen: Blood from Arm, Right Updated: 06/23/23 1956     Protime 15 5 seconds      INR 1 21    APTT [[id number]]  (Normal) Collected: 06/23/23 1929    Lab Status: Final result Specimen: Blood from Arm, Right Updated: 06/23/23 1956     PTT 28 seconds     CBC and differential [[id number]]  (Abnormal) Collected: 06/23/23 1929    Lab Status: Final result Specimen: Blood from Arm, Right Updated: 06/23/23 1945     WBC 7 40 Thousand/uL      RBC 3 62 Million/uL      Hemoglobin 11 1 g/dL      Hematocrit 34 8 %      MCV 96 fL      MCH 30 7 pg      MCHC 31 9 g/dL      RDW 13 3 %      MPV 8 9 fL      Platelets 685 Thousands/uL      nRBC 0 /100 WBCs      Neutrophils Relative 55 %      Immat GRANS % 0 %      Lymphocytes Relative 30 %      Monocytes Relative 9 %      Eosinophils Relative 5 %      Basophils Relative 1 %      Neutrophils Absolute 4 15 Thousands/µL      Immature Grans Absolute 0 02 Thousand/uL      Lymphocytes Absolute 2 18 Thousands/µL      Monocytes Absolute 0 65 Thousand/µL      Eosinophils Absolute 0 34 Thousand/µL      Basophils Absolute 0 06 Thousands/µL                  XR hip/pelv 2-3 vws right   ED Interpretation by Jonel Wynn DO (06/23 1938)   No acute bony abnormality      XR knee 4+ views Right injury   Final Result by Sherwin Jimenez MD (06/23 1831)      No acute osseous abnormality  Workstation performed: [id number]         VAS lower limb venous duplex study, unilateral/limited    (Results Pending)         Procedures  Procedures      ED Course       Xray right hip/knee + venous doppler of RLE ordered  Rhodelia for patient's discomfort  Positive for acute, non-occlusive DVT in right femoral vein  Pt on xeralto without missing doses, will need to be admitted to transfer anticoagulation and work-up possible source  Pt admitted to [facility]  lovenox administered, platelets in appropriate range  Medical Decision Making  Pt is a 80 y/o f presenting to the ED with rle pain, edema, with decreased range of motion in flexion  Hx DVT, on xeralto since February 2023  Xray right hip/knee + venous doppler of RLE ordered  Rhodelia for patient's discomfort  Positive for acute, non-occlusive DVT in right femoral vein   Pt on xeralto without missing doses, will need to be admitted to transfer anticoagulation and work-up possible source  Pt admitted to [facility]  lovenox administered, platelets in appropriate range  Amount and/or Complexity of Data Reviewed  Labs: ordered  Radiology: ordered and independent interpretation performed  Risk  Prescription drug management  Decision regarding hospitalization  DDx including but not limited to: Baker's cyst, bursitis, arthritis, sprain, strain, DVT (anticoagulated), vasculitis, PAD, rhabdomyolysis, myositis, fracture, contusion, metabolic abnormality, radiculopathy, oncogenic process      Disposition  Final diagnoses:   Right leg pain   Bone infarction ([address] ) - Possible, distal right femur   Acute deep vein thrombosis (DVT) of femoral vein of right lower extremity ([facility] ) - On xeralto     Time reflects when diagnosis was documented in both MDM as applicable and the Disposition within this note     Time User Action Codes Description Comment    6/23/2023  6:48 PM Lit Coaster Add [A52 381] Right leg pain     6/23/2023  6:48 PM Lit Coaster Add [M87 9] Bone infarction ([facility] )     6/23/2023  6:48 PM Lit Coaster Modify [M87 9] Bone infarction [facility]) Possible, distal right femur    6/23/2023  7:01 PM Lit Coaster Add [I82 411] Acute deep vein thrombosis (DVT) of femoral vein of right lower extremity ([address] )     6/23/2023  7:29 PM Lit Coaster Modify [W75 916] Acute deep vein thrombosis (DVT) of femoral vein of right lower extremity ([address] ) On xeralto    6/23/2023  8:15 PM Lit Coaster Modify [B51 430] Right leg pain     6/23/2023  8:15 PM Lit Coaster Modify [E23 901] Acute deep vein thrombosis (DVT) of femoral vein of right lower extremity ([address] ) On xeralto      ED Disposition     ED Disposition   Admit    Condition   Stable    Date/Time   Fri Jun 23, 2023  8:15 PM    Comment   Case was discussed with TAB and the patient's admission status was agreed to be Admission Status: inpatient status to the service of Dr Reyes Bloomer  Follow-up Information    None         Current Discharge Medication List      CONTINUE these medications which have NOT CHANGED    Details   ALPRAZolam (XANAX) 0 5 mg tablet Take 0 25 mg by mouth daily at bedtime as needed Takes 1/2 0 5mg = 0 25 mg @ bedtime prn  Refills: 2      Ascorbic Acid (VITAMIN C PO) Take by mouth daily      BIOTIN PO Take by mouth daily      Cyanocobalamin (B-12 PO) Take by mouth daily      FREESTYLE LITE test strip       metFORMIN (GLUCOPHAGE) 1000 MG tablet Take 500 mg by mouth 2 (two) times a day with meals        pantoprazole (PROTONIX) 40 mg tablet Take 40 mg by mouth daily in the early morning      pramipexole (MIRAPEX) 0 5 mg tablet       Probiotic Product (PROBIOTIC DAILY PO) Take by mouth      rivaroxaban (Xarelto) 20 mg tablet Take 1 tablet (20 mg total) by mouth daily with breakfast  Qty: 30 tablet, Refills: 2    Associated Diagnoses: Acute deep vein thrombosis (DVT) of calf muscle vein of right lower extremity (HCC)      scopolamine (TRANSDERM-SCOP) 1 mg/3 days TD 72 hr patch Place 1 patch on the skin every third day As needed for cruises pt states  SYNTHROID 75 MCG tablet Take 75 mcg by mouth daily in the early morning        Trulicity 4 5 AI/5 0NX SOPN INJECT UNDER THE SKIN 4 5 MG WEEKLY AS DIRECTED               PDMP Review     None           ED Provider  Attending physically available and evaluated Cecilio Cornelius I managed the patient along with the ED Attending      Electronically Signed by         Benjie Pugh DO  06/23/23 5114

## 2023-06-23 NOTE — ED ATTENDING ATTESTATION
6/23/2023  ICorrina DO, saw and evaluated the patient  I have discussed the patient with the resident/non-physician practitioner and agree with the resident's/non-physician practitioner's findings, Plan of Care, and MDM as documented in the resident's/non-physician practitioner's note, except where noted  All available labs and Radiology studies were reviewed  I was present for key portions of any procedure(s) performed by the resident/non-physician practitioner and I was immediately available to provide assistance  At this point I agree with the current assessment done in the Emergency Department  I have conducted an independent evaluation of this patient a history and physical is as follows:    ED Course    79year old female presents with one day history of right leg pain localized to the posterior knee and thigh  Pain radiates from prox calf to hip and hurts at rest and with weight bearing,  Patient reports no trauma or recent fall  Patient notes mild increased swelling of the right lower extremity  Right knee feels stiff with ROM  Patient also reports acute on chronic right hip pain along the proximal IT band  No SOB or chest pain  No fever or chills    No skin redness or rash  Pt has hx of DVT dx'd this year, she has been compliant with Xarelto  PmhX: uterine cancer, diabetes, migraine headache, GERD, anxiety, restless leg syndrome, ileal conduit, ureteral stricture, DVT    Physical exam: Patient is awake and alert, mild distress  Pupils are equal and reactive  Mucous brains are moist   Neck is supple  Heart is regular rate and rhythm without ectopy  Abdomen soft nontender  Right lower extremity with tenderness with palpation of the right greater trochanter and proximal IT band  There is moderate swelling of the right thigh, knee and proximal calf  No palpable cords noted  Decreased range of motion of the knee due to pain and swelling  No skin erythema or rash    No effusion appreciated  Left lower extremity is nontender  2+ bounding dorsal pedal pulses noted bilateral     Assessment: 82-year-old female presents with 1 day history of right lower extremity pain  Differential diagnosis includes but is not limited to acute trochanteric bursitis, knee pain referred from hip pathology, arthritis, gout, DVT  Plan: We will check x-ray of the right hip and knee, right lower extremity venous duplex to evaluate for DVT  Update: Patient's pain into his buttocks does demonstrate acute nonocclusive DVT of the right femoral vein  This is concerning due to the patient's history of chronic anticoagulation  Patient did not have DVT localized to this area on venous duplex when initially diagnosed with thrombosis in April  Will admit to internal medicine service for treatment with Lovenox, patient may need to be transition to an alternative anticoagulation regimen      Critical Care Time  Procedures

## 2023-06-24 ENCOUNTER — APPOINTMENT (INPATIENT)
Dept: NON INVASIVE DIAGNOSTICS | Facility: HOSPITAL | Age: 68
DRG: 301 | End: 2023-06-24
Payer: COMMERCIAL

## 2023-06-24 PROBLEM — M87.9 BONE INFARCTION (HCC): Status: ACTIVE | Noted: 2023-06-24

## 2023-06-24 LAB
ALBUMIN SERPL BCP-MCNC: 3.3 G/DL (ref 3.5–5)
ALP SERPL-CCNC: 92 U/L (ref 34–104)
ALT SERPL W P-5'-P-CCNC: 13 U/L (ref 7–52)
ANION GAP SERPL CALCULATED.3IONS-SCNC: 4 MMOL/L
AORTIC ROOT: 2.8 CM
APICAL FOUR CHAMBER EJECTION FRACTION: 55 %
ASCENDING AORTA: 3.1 CM
AST SERPL W P-5'-P-CCNC: 11 U/L (ref 13–39)
BASOPHILS # BLD AUTO: 0.03 THOUSANDS/ÂΜL (ref 0–0.1)
BASOPHILS NFR BLD AUTO: 1 % (ref 0–1)
BILIRUB SERPL-MCNC: 0.53 MG/DL (ref 0.2–1)
BUN SERPL-MCNC: 30 MG/DL (ref 5–25)
CALCIUM ALBUM COR SERPL-MCNC: 10.1 MG/DL (ref 8.3–10.1)
CALCIUM SERPL-MCNC: 9.5 MG/DL (ref 8.4–10.2)
CHLORIDE SERPL-SCNC: 114 MMOL/L (ref 96–108)
CO2 SERPL-SCNC: 23 MMOL/L (ref 21–32)
CREAT SERPL-MCNC: 0.93 MG/DL (ref 0.6–1.3)
E WAVE DECELERATION TIME: 222 MS
EOSINOPHIL # BLD AUTO: 0.39 THOUSAND/ÂΜL (ref 0–0.61)
EOSINOPHIL NFR BLD AUTO: 8 % (ref 0–6)
ERYTHROCYTE [DISTWIDTH] IN BLOOD BY AUTOMATED COUNT: 13.2 % (ref 11.6–15.1)
FERRITIN SERPL-MCNC: 12 NG/ML (ref 11–307)
FOLATE SERPL-MCNC: >22.3 NG/ML
FRACTIONAL SHORTENING: 28 % (ref 28–44)
GFR SERPL CREATININE-BSD FRML MDRD: 64 ML/MIN/1.73SQ M
GGT SERPL-CCNC: 13 U/L (ref 5–85)
GLUCOSE SERPL-MCNC: 115 MG/DL (ref 65–140)
GLUCOSE SERPL-MCNC: 116 MG/DL (ref 65–140)
GLUCOSE SERPL-MCNC: 174 MG/DL (ref 65–140)
GLUCOSE SERPL-MCNC: 238 MG/DL (ref 65–140)
GLUCOSE SERPL-MCNC: 92 MG/DL (ref 65–140)
GLUCOSE SERPL-MCNC: 93 MG/DL (ref 65–140)
HCT VFR BLD AUTO: 33.5 % (ref 34.8–46.1)
HGB BLD-MCNC: 10.5 G/DL (ref 11.5–15.4)
IMM GRANULOCYTES # BLD AUTO: 0.02 THOUSAND/UL (ref 0–0.2)
IMM GRANULOCYTES NFR BLD AUTO: 0 % (ref 0–2)
INTERVENTRICULAR SEPTUM IN DIASTOLE (PARASTERNAL SHORT AXIS VIEW): 1.2 CM
INTERVENTRICULAR SEPTUM: 1.2 CM (ref 0.6–1.1)
IRON SATN MFR SERPL: 13 % (ref 15–50)
IRON SERPL-MCNC: 39 UG/DL (ref 50–170)
LAAS-AP2: 14.3 CM2
LAAS-AP4: 14.5 CM2
LEFT ATRIUM AREA SYSTOLE SINGLE PLANE A4C: 15.2 CM2
LEFT ATRIUM SIZE: 3.6 CM
LEFT ATRIUM VOLUME (MOD BIPLANE): 38 ML
LEFT INTERNAL DIMENSION IN SYSTOLE: 2.3 CM (ref 2.1–4)
LEFT VENTRICULAR INTERNAL DIMENSION IN DIASTOLE: 3.2 CM (ref 3.5–6)
LEFT VENTRICULAR POSTERIOR WALL IN END DIASTOLE: 1.2 CM
LEFT VENTRICULAR STROKE VOLUME: 22 ML
LVSV (TEICH): 22 ML
LYMPHOCYTES # BLD AUTO: 1.63 THOUSANDS/ÂΜL (ref 0.6–4.47)
LYMPHOCYTES NFR BLD AUTO: 32 % (ref 14–44)
MCH RBC QN AUTO: 30.3 PG (ref 26.8–34.3)
MCHC RBC AUTO-ENTMCNC: 31.3 G/DL (ref 31.4–37.4)
MCV RBC AUTO: 97 FL (ref 82–98)
MONOCYTES # BLD AUTO: 0.48 THOUSAND/ÂΜL (ref 0.17–1.22)
MONOCYTES NFR BLD AUTO: 10 % (ref 4–12)
MV E'TISSUE VEL-SEP: 5 CM/S
MV PEAK A VEL: 0.87 M/S
MV PEAK E VEL: 71 CM/S
MV STENOSIS PRESSURE HALF TIME: 64 MS
MV VALVE AREA P 1/2 METHOD: 3.44 CM2
NEUTROPHILS # BLD AUTO: 2.52 THOUSANDS/ÂΜL (ref 1.85–7.62)
NEUTS SEG NFR BLD AUTO: 49 % (ref 43–75)
NRBC BLD AUTO-RTO: 0 /100 WBCS
PLATELET # BLD AUTO: 193 THOUSANDS/UL (ref 149–390)
PMV BLD AUTO: 9 FL (ref 8.9–12.7)
POTASSIUM SERPL-SCNC: 4 MMOL/L (ref 3.5–5.3)
PROT SERPL-MCNC: 6 G/DL (ref 6.4–8.4)
RBC # BLD AUTO: 3.46 MILLION/UL (ref 3.81–5.12)
RIGHT ATRIUM AREA SYSTOLE A4C: 12.9 CM2
RIGHT VENTRICLE ID DIMENSION: 2.9 CM
SL CV LEFT ATRIUM LENGTH A2C: 4.4 CM
SL CV LV EF: 60
SL CV PED ECHO LEFT VENTRICLE DIASTOLIC VOLUME (MOD BIPLANE) 2D: 40 ML
SL CV PED ECHO LEFT VENTRICLE SYSTOLIC VOLUME (MOD BIPLANE) 2D: 18 ML
SODIUM SERPL-SCNC: 141 MMOL/L (ref 135–147)
TIBC SERPL-MCNC: 311 UG/DL (ref 250–450)
TR MAX PG: 12 MMHG
TR PEAK VELOCITY: 1.7 M/S
TRICUSPID ANNULAR PLANE SYSTOLIC EXCURSION: 2.1 CM
TRICUSPID VALVE PEAK REGURGITATION VELOCITY: 1.73 M/S
TSH SERPL DL<=0.05 MIU/L-ACNC: 3.24 UIU/ML (ref 0.45–4.5)
VIT B12 SERPL-MCNC: 2640 PG/ML (ref 180–914)
WBC # BLD AUTO: 5.07 THOUSAND/UL (ref 4.31–10.16)

## 2023-06-24 PROCEDURE — 81240 F2 GENE: CPT

## 2023-06-24 PROCEDURE — 93306 TTE W/DOPPLER COMPLETE: CPT | Performed by: INTERNAL MEDICINE

## 2023-06-24 PROCEDURE — 86146 BETA-2 GLYCOPROTEIN ANTIBODY: CPT

## 2023-06-24 PROCEDURE — 85303 CLOT INHIBIT PROT C ACTIVITY: CPT

## 2023-06-24 PROCEDURE — 85705 THROMBOPLASTIN INHIBITION: CPT

## 2023-06-24 PROCEDURE — 85732 THROMBOPLASTIN TIME PARTIAL: CPT

## 2023-06-24 PROCEDURE — 82607 VITAMIN B-12: CPT

## 2023-06-24 PROCEDURE — 82948 REAGENT STRIP/BLOOD GLUCOSE: CPT

## 2023-06-24 PROCEDURE — 85300 ANTITHROMBIN III ACTIVITY: CPT

## 2023-06-24 PROCEDURE — 83550 IRON BINDING TEST: CPT

## 2023-06-24 PROCEDURE — 85025 COMPLETE CBC W/AUTO DIFF WBC: CPT

## 2023-06-24 PROCEDURE — 81241 F5 GENE: CPT

## 2023-06-24 PROCEDURE — 93306 TTE W/DOPPLER COMPLETE: CPT

## 2023-06-24 PROCEDURE — 83540 ASSAY OF IRON: CPT

## 2023-06-24 PROCEDURE — 85613 RUSSELL VIPER VENOM DILUTED: CPT

## 2023-06-24 PROCEDURE — 85305 CLOT INHIBIT PROT S TOTAL: CPT

## 2023-06-24 PROCEDURE — 99223 1ST HOSP IP/OBS HIGH 75: CPT | Performed by: INTERNAL MEDICINE

## 2023-06-24 PROCEDURE — 86147 CARDIOLIPIN ANTIBODY EA IG: CPT

## 2023-06-24 PROCEDURE — 80053 COMPREHEN METABOLIC PANEL: CPT

## 2023-06-24 PROCEDURE — 82977 ASSAY OF GGT: CPT

## 2023-06-24 PROCEDURE — 82746 ASSAY OF FOLIC ACID SERUM: CPT

## 2023-06-24 PROCEDURE — 82728 ASSAY OF FERRITIN: CPT

## 2023-06-24 PROCEDURE — 84443 ASSAY THYROID STIM HORMONE: CPT

## 2023-06-24 PROCEDURE — 85670 THROMBIN TIME PLASMA: CPT

## 2023-06-24 PROCEDURE — 85598 HEXAGNAL PHOSPH PLTLT NEUTRL: CPT

## 2023-06-24 PROCEDURE — 85306 CLOT INHIBIT PROT S FREE: CPT

## 2023-06-24 RX ORDER — HYDROCODONE BITARTRATE AND ACETAMINOPHEN 5; 325 MG/1; MG/1
1 TABLET ORAL EVERY 6 HOURS PRN
Status: DISCONTINUED | OUTPATIENT
Start: 2023-06-24 | End: 2023-06-26

## 2023-06-24 RX ORDER — DABIGATRAN ETEXILATE 150 MG/1
150 CAPSULE ORAL EVERY 12 HOURS SCHEDULED
Status: DISCONTINUED | OUTPATIENT
Start: 2023-06-24 | End: 2023-06-27 | Stop reason: HOSPADM

## 2023-06-24 RX ORDER — INSULIN LISPRO 100 [IU]/ML
1-6 INJECTION, SOLUTION INTRAVENOUS; SUBCUTANEOUS
Status: DISCONTINUED | OUTPATIENT
Start: 2023-06-24 | End: 2023-06-27 | Stop reason: HOSPADM

## 2023-06-24 RX ADMIN — ALPRAZOLAM 0.25 MG: 0.25 TABLET ORAL at 23:10

## 2023-06-24 RX ADMIN — DABIGATRAN ETEXILATE MESYLATE 150 MG: 150 CAPSULE ORAL at 17:56

## 2023-06-24 RX ADMIN — PANTOPRAZOLE SODIUM 40 MG: 40 TABLET, DELAYED RELEASE ORAL at 05:24

## 2023-06-24 RX ADMIN — Medication 250 MG: at 08:46

## 2023-06-24 RX ADMIN — LEVOTHYROXINE SODIUM 75 MCG: 75 TABLET ORAL at 05:24

## 2023-06-24 RX ADMIN — PRAMIPEXOLE DIHYDROCHLORIDE 0.5 MG: 0.5 TABLET ORAL at 17:50

## 2023-06-24 RX ADMIN — HYDROCODONE BITARTRATE AND ACETAMINOPHEN 1 TABLET: 5; 325 TABLET ORAL at 11:46

## 2023-06-24 RX ADMIN — ACETAMINOPHEN 650 MG: 325 TABLET, FILM COATED ORAL at 07:20

## 2023-06-24 RX ADMIN — INSULIN LISPRO 3 UNITS: 100 INJECTION, SOLUTION INTRAVENOUS; SUBCUTANEOUS at 16:08

## 2023-06-24 RX ADMIN — VITAM B12 50 MCG: 100 TAB at 08:46

## 2023-06-24 RX ADMIN — HYDROCODONE BITARTRATE AND ACETAMINOPHEN 1 TABLET: 5; 325 TABLET ORAL at 02:29

## 2023-06-24 RX ADMIN — RIVAROXABAN 15 MG: 15 TABLET, FILM COATED ORAL at 08:46

## 2023-06-24 RX ADMIN — Medication 125 MG: at 08:46

## 2023-06-24 NOTE — PLAN OF CARE
Problem: Potential for Falls  Goal: Patient will remain free of falls  Description: INTERVENTIONS:  - Educate patient/family on patient safety including physical limitations  - Instruct patient to call for assistance with activity   - Consult OT/PT to assist with strengthening/mobility   - Keep Call bell within reach  - Keep bed low and locked with side rails adjusted as appropriate  - Keep care items and personal belongings within reach  - Initiate and maintain comfort rounds  - Make Fall Risk Sign visible to staff  - Offer Toileting every  Hours, in advance of need  - Initiate/Maintain alarm  - Obtain necessary fall risk management equipment:   - Apply yellow socks and bracelet for high fall risk patients  - Consider moving patient to room near nurses station  Outcome: Progressing     Problem: MOBILITY - ADULT  Goal: Maintain or return to baseline ADL function  Description: INTERVENTIONS:  -  Assess patient's ability to carry out ADLs; assess patient's baseline for ADL function and identify physical deficits which impact ability to perform ADLs (bathing, care of mouth/teeth, toileting, grooming, dressing, etc )  - Assess/evaluate cause of self-care deficits   - Assess range of motion  - Assess patient's mobility; develop plan if impaired  - Assess patient's need for assistive devices and provide as appropriate  - Encourage maximum independence but intervene and supervise when necessary  - Involve family in performance of ADLs  - Assess for home care needs following discharge   - Consider OT consult to assist with ADL evaluation and planning for discharge  - Provide patient education as appropriate  Outcome: Progressing  Goal: Maintains/Returns to pre admission functional level  Description: INTERVENTIONS:  - Perform BMAT or MOVE assessment daily    - Set and communicate daily mobility goal to care team and patient/family/caregiver     - Collaborate with rehabilitation services on mobility goals if consulted  - Perform Range of Motion  times a day  - Reposition patient every  hours    - Dangle patient  times a day  - Stand patienT times a day  - Ambulate patient  times a day  - Out of bed to chair  times a day   - Out of bed for meals  times a day  - Out of bed for toileting  - Record patient progress and toleration of activity level   Outcome: Progressing

## 2023-06-24 NOTE — PLAN OF CARE
Problem: Potential for Falls  Goal: Patient will remain free of falls  Description: INTERVENTIONS:  - Educate patient/family on patient safety including physical limitations  - Instruct patient to call for assistance with activity   - Consult OT/PT to assist with strengthening/mobility   - Keep Call bell within reach  - Keep bed low and locked with side rails adjusted as appropriate  - Keep care items and personal belongings within reach  - Initiate and maintain comfort rounds  - Make Fall Risk Sign visible to staff  - Offer Toileting every  Hours, in advance of need  - Initiate/Maintain alarm  - Obtain necessary fall risk management equipment:   - Apply yellow socks and bracelet for high fall risk patients  - Consider moving patient to room near nurses station  Outcome: Progressing     Problem: MOBILITY - ADULT  Goal: Maintain or return to baseline ADL function  Description: INTERVENTIONS:  -  Assess patient's ability to carry out ADLs; assess patient's baseline for ADL function and identify physical deficits which impact ability to perform ADLs (bathing, care of mouth/teeth, toileting, grooming, dressing, etc )  - Assess/evaluate cause of self-care deficits   - Assess range of motion  - Assess patient's mobility; develop plan if impaired  - Assess patient's need for assistive devices and provide as appropriate  - Encourage maximum independence but intervene and supervise when necessary  - Involve family in performance of ADLs  - Assess for home care needs following discharge   - Consider OT consult to assist with ADL evaluation and planning for discharge  - Provide patient education as appropriate  Outcome: Progressing  Goal: Maintains/Returns to pre admission functional level  Description: INTERVENTIONS:  - Perform BMAT or MOVE assessment daily    - Set and communicate daily mobility goal to care team and patient/family/caregiver     - Collaborate with rehabilitation services on mobility goals if consulted  - Perform Range of Motion  times a day  - Reposition patient every  hours    - Dangle patient  times a day  - Stand patient  times a day  - Ambulate patient  times a day  - Out of bed to chair  times a day   - Out of bed for meals  times a day  - Out of bed for toileting  - Record patient progress and toleration of activity level   Outcome: Progressing     Problem: PAIN - ADULT  Goal: Verbalizes/displays adequate comfort level or baseline comfort level  Description: Interventions:  - Encourage patient to monitor pain and request assistance  - Assess pain using appropriate pain scale  - Administer analgesics based on type and severity of pain and evaluate response  - Implement non-pharmacological measures as appropriate and evaluate response  - Consider cultural and social influences on pain and pain management  - Notify physician/advanced practitioner if interventions unsuccessful or patient reports new pain  Outcome: Progressing     Problem: SAFETY ADULT  Goal: Patient will remain free of falls  Description: INTERVENTIONS:  - Educate patient/family on patient safety including physical limitations  - Instruct patient to call for assistance with activity   - Consult OT/PT to assist with strengthening/mobility   - Keep Call bell within reach  - Keep bed low and locked with side rails adjusted as appropriate  - Keep care items and personal belongings within reach  - Initiate and maintain comfort rounds  - Make Fall Risk Sign visible to staff  - Offer Toileting every  Hours, in advance of need  - Initiate/Maintain alarm  - Obtain necessary fall risk management equipment:   - Apply yellow socks and bracelet for high fall risk patients  - Consider moving patient to room near nurses station  Outcome: Progressing  Goal: Maintain or return to baseline ADL function  Description: INTERVENTIONS:  -  Assess patient's ability to carry out ADLs; assess patient's baseline for ADL function and identify physical deficits which impact ability to perform ADLs (bathing, care of mouth/teeth, toileting, grooming, dressing, etc )  - Assess/evaluate cause of self-care deficits   - Assess range of motion  - Assess patient's mobility; develop plan if impaired  - Assess patient's need for assistive devices and provide as appropriate  - Encourage maximum independence but intervene and supervise when necessary  - Involve family in performance of ADLs  - Assess for home care needs following discharge   - Consider OT consult to assist with ADL evaluation and planning for discharge  - Provide patient education as appropriate  Outcome: Progressing  Goal: Maintains/Returns to pre admission functional level  Description: INTERVENTIONS:  - Perform BMAT or MOVE assessment daily    - Set and communicate daily mobility goal to care team and patient/family/caregiver  - Collaborate with rehabilitation services on mobility goals if consulted  - Perform Range of Motion  times a day  - Reposition patient every  hours    - Dangle patient  times a day  - Stand patient  times a day  - Ambulate patient  times a day  - Out of bed to chair  times a day   - Out of bed for meal times a day  - Out of bed for toileting  - Record patient progress and toleration of activity level   Outcome: Progressing     Problem: METABOLIC, FLUID AND ELECTROLYTES - ADULT  Goal: Electrolytes maintained within normal limits  Description: INTERVENTIONS:  - Monitor labs and assess patient for signs and symptoms of electrolyte imbalances  - Administer electrolyte replacement as ordered  - Monitor response to electrolyte replacements, including repeat lab results as appropriate  - Instruct patient on fluid and nutrition as appropriate  Outcome: Progressing  Goal: Fluid balance maintained  Description: INTERVENTIONS:  - Monitor labs   - Monitor I/O and WT  - Instruct patient on fluid and nutrition as appropriate  - Assess for signs & symptoms of volume excess or deficit  Outcome: Progressing  Goal: Glucose maintained within target range  Description: INTERVENTIONS:  - Monitor Blood Glucose as ordered  - Assess for signs and symptoms of hyperglycemia and hypoglycemia  - Administer ordered medications to maintain glucose within target range  - Assess nutritional intake and initiate nutrition service referral as needed  Outcome: Progressing     Problem: CARDIOVASCULAR - ADULT  Goal: Maintains optimal cardiac output and hemodynamic stability  Description: INTERVENTIONS:  - Monitor I/O, vital signs and rhythm  - Monitor for S/S and trends of decreased cardiac output  - Administer and titrate ordered vasoactive medications to optimize hemodynamic stability  - Assess quality of pulses, skin color and temperature  - Assess for signs of decreased coronary artery perfusion  - Instruct patient to report change in severity of symptoms  Outcome: Progressing  Goal: Absence of cardiac dysrhythmias or at baseline rhythm  Description: INTERVENTIONS:  - Continuous cardiac monitoring, vital signs, obtain 12 lead EKG if ordered  - Administer antiarrhythmic and heart rate control medications as ordered  - Monitor electrolytes and administer replacement therapy as ordered  Outcome: Progressing

## 2023-06-24 NOTE — ASSESSMENT & PLAN NOTE
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

## 2023-06-24 NOTE — CONSULTS
Oncology Consult Note  Ceci Robbins 79 y o  female MRN: [id number]  Unit/Bed#: S -01 Encounter: [id number]      Presenting Complaint: Recurrent DVT of the right lower extremity while she was on rivaroxaban 20 mg p o  daily    History of Presenting Illness: Ceci Robbins is seen for initial consultation 6/23/2023 at the referral of No ref  provider found   70-year-old  female with history of uterine adenocarcinoma status post hysterectomy in 1999 followed by radiation therapy in 2000, with bowel obstruction, urinary stent secondary to radiation scar formation, cholelithiasis, hiatal hernia, type 2 diabetes mellitus, right lower extremity weakness from previous hysterectomy with limping    She presented initially on April 2023 with pain in the right ankle area while she was on a cruise    Also stated that she was getting a pedicure  3 days prior to that presentation    Diagnosed with DVT of the peroneal veins initiated on rivaroxaban and has been taking it faithfully 20 mg p o  daily on 428 she had pain again and diagnosed with ruptured Achilles tendon with retraction about 6 5 cm with hemorrhage and the ruptured Achilles tendon tract after she sustained a fall    She had been having pain in the ankle area with limping unable to bear weight admitted yesterday with venous Doppler of the right side showing acute nonocclusive deep venous thrombosis in the mid to distal femoral vein and one of the paired posterior tibial vein, no evidence of left lower extremity DVT    The patient reported history of DVT of the left lower extremity when she was diagnosed with uterus cancer status post hysterectomy treated with anticoagulation for 1 year    She does not smoke or drink    Negative family history for thrombosis    Currently on Lovenox          Review of Systems - As stated in the HPI otherwise the fourteen point review of systems was negative      Past Medical History:   Diagnosis Date   • Anemia    • Back pain    • Bowel obstruction ([place] [address] ) [id number]   • Cancer ([place] [address] )     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   • Cholelithiasis    • Colon polyp    • Deep vein thrombosis of left lower extremity ([place] [address] ) 2004    on blood thinner for 3 years  • Diabetes mellitus ([place] [address] )     type 2 BS 22 @ 0600 was 145   • Disease of thyroid gland    • Endometrial cancer ([place] [address] )    • GERD (gastroesophageal reflux disease)    • Gross hematuria    • Hiatal hernia    • History of transfusion     Post Hysterectomy   • History of urostomy 2004    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     • Hypothyroid    • In vitro fertilization    • Kidney stone    • Migraines    • Muscle weakness     abdominal   • Personal history of irradiation    • Renal cyst    • Sleep apnea     Resolved due to Weight Loss       Social History     Socioeconomic History   • Marital status: /Civil Union     Spouse name: Washington Looney   • Number of children: 0   • Years of education: 15   • Highest education level: None   Occupational History   • Occupation: Hair styist    Tobacco Use   • Smoking status: Former     Packs/day: 1 50     Years: 10 00     Total pack years: 15 00     Types: Cigarettes     Quit date: 1986     Years since quittin 4   • Smokeless tobacco: Former   Vaping Use   • Vaping Use: Never used   Substance and Sexual Activity   • Alcohol use: Yes     Comment: Rare Socially   • Drug use: No   • Sexual activity: Never     Comment: s/p hysterectomy   Other Topics Concern   • None   Social History Narrative   • None     Social Determinants of Health     Financial Resource Strain: Not on file   Food Insecurity: Not on file   Transportation Needs: Not on file   Physical Activity: Not on file   Stress: Not on file   Social Connections: Not on file   Intimate Partner Violence: Not on file   Housing Stability: Not on file       Family History   Problem Relation Age of Onset   • No Known Problems Mother    • Kidney failure Father    • Brain cancer Father    • Kidney cancer Father    • Diabetes Sister    • Alcohol abuse Sister    • Diabetes Brother        Allergies   Allergen Reactions   • Amoxicillin Rash   • Penicillins Rash   • Adhesive [Medical Tape] Rash         Current Facility-Administered Medications:   •  acetaminophen (TYLENOL) tablet 650 mg, 650 mg, Oral, Q6H PRN, Fitz Mcgregor MD, 650 mg at 06/24/23 0720  •  ALPRAZolam Percell Lesches) tablet 0 25 mg, 0 25 mg, Oral, HS PRN, Fitz Mcgregor MD, 0 25 mg at 06/23/23 2201  •  ascorbic acid (VITAMIN C) tablet 125 mg, 125 mg, Oral, Daily, Fitz Mcgregor MD, 125 mg at 06/24/23 7947  •  cyanocobalamin (VITAMIN B-12) tablet 50 mcg, 50 mcg, Oral, Daily, Fitz Mcgregor MD, 50 mcg at 06/24/23 0846  •  dabigatran etexilate (PRADAXA) capsule 150 mg, 150 mg, Oral, Q12H [address], Chris Nur MD  •  hydrALAZINE (APRESOLINE) injection 5 mg, 5 mg, Intravenous, Q6H PRN, Fitz Mcgregor MD  •  HYDROcodone-acetaminophen ([facility]) 5-325 mg per tablet 1 tablet, 1 tablet, Oral, Q6H PRN, Fitz Mcgregor MD, 1 tablet at 06/24/23 1146  •  insulin lispro (HumaLOG) 100 units/mL subcutaneous injection 1-6 Units, 1-6 Units, Subcutaneous, 4x Daily (AC & HS) **AND** Fingerstick Glucose (POCT), , , 4x Daily AC and at bedtime, Chris Nur MD  •  levothyroxine tablet 75 mcg, 75 mcg, Oral, Early Morning, Fitz Mcgregor MD, 75 mcg at 06/24/23 0289  •  niacin tablet 100 mg, 100 mg, Oral, HS, Fitz Mcgregor MD  •  ondansetron [facility]) injection 4 mg, 4 mg, Intravenous, Q6H PRN, Fitz Mcgregor MD  •  pantoprazole (PROTONIX) EC tablet 40 mg, 40 mg, Oral, Early Morning, Fitz Mcgregor MD, 40 mg at 06/24/23 2737  •  pramipexole (MIRAPEX) tablet 0 5 mg, 0 5 mg, Oral, HS, Fitz Mcgregor MD  •  saccharomyces boulardii (FLORASTOR) capsule 250 mg, 250 mg, Oral, Daily, Fitz Mcgregor MD, 250 mg at 06/24/23 0846      BP (!) 171/90   Pulse 63   Temp 98 1 °F (36 7 "°C)   Resp 17   Ht 5' 7\" (1 702 m)   Wt 99 8 kg (220 lb 0 3 oz)   SpO2 97%   BMI 34 46 kg/m²       General Appearance:    Alert, oriented        Eyes:    PERRL   Ears:    Normal external ear canals, both ears   Nose:   Nares normal, septum midline   Throat:   Mucosa moist  Pharynx without injection  Neck:   Supple       Lungs:     Clear to auscultation bilaterally   Chest Wall:    No tenderness or deformity    Heart:    Regular rate and rhythm       Abdomen:     Soft, non-tender, bowel sounds +, no organomegaly           Extremities:  Edema and tenderness of the right ankle, varicose veins grade 1-2 of the right lower extremity, dorsiflexion is weak       Skin:   no rash or icterus      Lymph nodes:   Cervical, supraclavicular, and axillary nodes normal   Neurologic:   CNII-XII intact, normal strength, sensation and reflexes     Throughout               Recent Results (from the past 48 hour(s))   CBC and differential    Collection Time: 06/23/23  7:29 PM   Result Value Ref Range    WBC 7 40 4 31 - 10 16 Thousand/uL    RBC 3 62 (L) 3 81 - 5 12 Million/uL    Hemoglobin 11 1 (L) 11 5 - 15 4 g/dL    Hematocrit 34 8 34 8 - 46 1 %    MCV 96 82 - 98 fL    MCH 30 7 26 8 - 34 3 pg    MCHC 31 9 31 4 - 37 4 g/dL    RDW 13 3 11 6 - 15 1 %    MPV 8 9 8 9 - 12 7 fL    Platelets 657 135 - 949 Thousands/uL    nRBC 0 /100 WBCs    Neutrophils Relative 55 43 - 75 %    Immat GRANS % 0 0 - 2 %    Lymphocytes Relative 30 14 - 44 %    Monocytes Relative 9 4 - 12 %    Eosinophils Relative 5 0 - 6 %    Basophils Relative 1 0 - 1 %    Neutrophils Absolute 4 15 1 85 - 7 62 Thousands/µL    Immature Grans Absolute 0 02 0 00 - 0 20 Thousand/uL    Lymphocytes Absolute 2 18 0 60 - 4 47 Thousands/µL    Monocytes Absolute 0 65 0 17 - 1 22 Thousand/µL    Eosinophils Absolute 0 34 0 00 - 0 61 Thousand/µL    Basophils Absolute 0 06 0 00 - 0 10 Thousands/µL   Comprehensive metabolic panel    Collection Time: 06/23/23  7:29 PM   Result Value Ref " Range    Sodium 140 135 - 147 mmol/L    Potassium 4 5 3 5 - 5 3 mmol/L    Chloride 112 (H) 96 - 108 mmol/L    CO2 23 21 - 32 mmol/L    ANION GAP 5 mmol/L    BUN 36 (H) 5 - 25 mg/dL    Creatinine 1 12 0 60 - 1 30 mg/dL    Glucose 108 65 - 140 mg/dL    Calcium 10 4 (H) 8 4 - 10 2 mg/dL    AST 15 13 - 39 U/L    ALT 16 7 - 52 U/L    Alkaline Phosphatase 112 (H) 34 - 104 U/L    Total Protein 6 8 6 4 - 8 4 g/dL    Albumin 3 9 3 5 - 5 0 g/dL    Total Bilirubin 0 45 0 20 - 1 00 mg/dL    eGFR 51 ml/min/1 73sq m   Protime-INR    Collection Time: 06/23/23  7:29 PM   Result Value Ref Range    Protime 15 5 (H) 11 6 - 14 5 seconds    INR 1 21 (H) 0 84 - 1 19   APTT    Collection Time: 06/23/23  7:29 PM   Result Value Ref Range    PTT 28 23 - 37 seconds   TSH, 3rd generation    Collection Time: 06/24/23  5:13 AM   Result Value Ref Range    TSH 3RD GENERATON 3 244 0 450 - 4 500 uIU/mL   Comprehensive metabolic panel    Collection Time: 06/24/23  5:13 AM   Result Value Ref Range    Sodium 141 135 - 147 mmol/L    Potassium 4 0 3 5 - 5 3 mmol/L    Chloride 114 (H) 96 - 108 mmol/L    CO2 23 21 - 32 mmol/L    ANION GAP 4 mmol/L    BUN 30 (H) 5 - 25 mg/dL    Creatinine 0 93 0 60 - 1 30 mg/dL    Glucose 93 65 - 140 mg/dL    Calcium 9 5 8 4 - 10 2 mg/dL    Corrected Calcium 10 1 8 3 - 10 1 mg/dL    AST 11 (L) 13 - 39 U/L    ALT 13 7 - 52 U/L    Alkaline Phosphatase 92 34 - 104 U/L    Total Protein 6 0 (L) 6 4 - 8 4 g/dL    Albumin 3 3 (L) 3 5 - 5 0 g/dL    Total Bilirubin 0 53 0 20 - 1 00 mg/dL    eGFR 64 ml/min/1 73sq m   CBC and differential    Collection Time: 06/24/23  5:13 AM   Result Value Ref Range    WBC 5 07 4 31 - 10 16 Thousand/uL    RBC 3 46 (L) 3 81 - 5 12 Million/uL    Hemoglobin 10 5 (L) 11 5 - 15 4 g/dL    Hematocrit 33 5 (L) 34 8 - 46 1 %    MCV 97 82 - 98 fL    MCH 30 3 26 8 - 34 3 pg    MCHC 31 3 (L) 31 4 - 37 4 g/dL    RDW 13 2 11 6 - 15 1 %    MPV 9 0 8 9 - 12 7 fL    Platelets 885 026 - 532 Thousands/uL    nRBC 0 /100 WBCs    Neutrophils Relative 49 43 - 75 %    Immat GRANS % 0 0 - 2 %    Lymphocytes Relative 32 14 - 44 %    Monocytes Relative 10 4 - 12 %    Eosinophils Relative 8 (H) 0 - 6 %    Basophils Relative 1 0 - 1 %    Neutrophils Absolute 2 52 1 85 - 7 62 Thousands/µL    Immature Grans Absolute 0 02 0 00 - 0 20 Thousand/uL    Lymphocytes Absolute 1 63 0 60 - 4 47 Thousands/µL    Monocytes Absolute 0 48 0 17 - 1 22 Thousand/µL    Eosinophils Absolute 0 39 0 00 - 0 61 Thousand/µL    Basophils Absolute 0 03 0 00 - 0 10 Thousands/µL   Gamma GT    Collection Time: 06/24/23  5:13 AM   Result Value Ref Range    GGT 13 5 - 85 U/L   Iron Saturation %    Collection Time: 06/24/23  5:13 AM   Result Value Ref Range    Iron Saturation 13 (L) 15 - 50 %    TIBC 311 250 - 450 ug/dL    Iron 39 (L) 50 - 170 ug/dL   Fingerstick Glucose (POCT)    Collection Time: 06/24/23  7:16 AM   Result Value Ref Range    POC Glucose 92 65 - 140 mg/dl   Fingerstick Glucose (POCT)    Collection Time: 06/24/23 11:05 AM   Result Value Ref Range    POC Glucose 115 65 - 140 mg/dl   Echo complete w/ contrast if indicated    Collection Time: 06/24/23 11:43 AM   Result Value Ref Range    LA size 3 6 cm    Triscuspid Valve Regurgitation Peak Gradient 12 0 mmHg    Tricuspid valve peak regurgitation velocity 1 73 m/s    LVPWd 1 20 cm    Left Atrium Area-systolic Apical Two Chamber 14 3 cm2    Left Atrium Area-systolic Four Chamber 97 0 cm2    MV E' Tissue Velocity Septal 5 cm/s    Tricuspid annular plane systolic excursion 4 33 cm    TR Peak Davy 1 7 m/s    IVSd 7 19 cm    LV DIASTOLIC VOLUME (MOD BIPLANE) 2D 40 mL    LEFT VENTRICLE SYSTOLIC VOLUME (MOD BIPLANE) 2D 18 mL    Left ventricular stroke volume (2D) 22 00 mL    A4C EF 55 %    LA length (A2C) 4 40 cm    LVIDd 3 20 cm    IVS 1 2 cm    LVIDS 2 30 cm    FS 28 28 - 44 %    LA volume (BP) 38 mL    Asc Ao 3 1 cm    Ao root 2 80 cm    RVID d 2 9 cm    MV valve area p 1/2 method 3 44 cm2    E wave deceleration time 222 ms    MV Peak E Davy 71 cm/s    MV Peak A Davy 0 87 m/s    DANILO A4C 15 2 cm2    RAA A4C 12 9 cm2    MV stenosis pressure 1/2 time 64 ms    LVSV, 2D 22 mL    LV EF 60          Echo complete w/ contrast if indicated    Result Date: 6/24/2023  Narrative: •  Left Ventricle: Left ventricular cavity size is normal  Wall thickness is mildly increased  There is mild concentric hypertrophy  The left ventricular ejection fraction is 60%  Systolic function is normal  Wall motion is normal  Diastolic function is mildly abnormal, consistent with grade I (abnormal) relaxation  •  Mitral Valve: There is trace regurgitation  XR hip/pelv 2-3 vws right    Result Date: 6/24/2023  Narrative: RIGHT HIP INDICATION:   new painful ROM  COMPARISON:  None VIEWS:  XR HIP/PELV 2-3 VWS RIGHT W PELVIS IF PERFORMED Images: 3 FINDINGS: There is no acute fracture or dislocation  Mild to moderate arthritic changes in both hip joints  No lytic or blastic osseous lesion  Multiple surgical clips in the lower abdomen and pelvis  Degenerative changes in the lower lumbar spine  Impression: No acute osseous abnormality  Workstation performed: [id number]     VAS lower limb venous duplex study, unilateral/limited    Result Date: 6/23/2023  Narrative:  [facility] REPORT [facility]INICAL: Indications: Patient presents with right lower extremity pain and swelling x few days  Operative History: Patient denies cardiovascular surgeries Risk Factors The patient has history of HTN, Diabetes, DVT, cancer and previous smoking (quit >10yrs ago)  FINDINGS:  Right       Impression              FV Mid      Non Occlusive Thrombus  FV Dist     Non Occlusive Thrombus  PostTibial  Non Occlusive Thrombus     CONCLUSION:  Impression:  RIGHT LOWER LIMB There is evidence of acute non-occlusive deep vein thrombosis in the mid-distal femoral vein and one of the paired posterior tibial veins  No evidence of superficial thrombophlebitis noted   Popliteal, posterior tibial and anterior tibial arterial Doppler waveforms are triphasic  LEFT LOWER LIMB LIMITED Evaluation shows no evidence of thrombus in the common femoral vein  Doppler evaluation shows a normal response to augmentation maneuvers  Technical findings were given to Samantha Rivera DO via [employer] and posted to chart  SIGNATURE: Electronically Signed by: Alessio Carrasco MD, [address] on 2023-06-23 10:52:51 PM    XR knee 4+ views Right injury    Result Date: 6/23/2023  Narrative: RIGHT KNEE INDICATION:   restricted ROM, pain, new onset  COMPARISON:  None VIEWS:  XR KNEE 4+ VW RIGHT INJURY Images: 4 FINDINGS: There is no acute fracture or dislocation  There is no joint effusion  Moderate tricompartmental degenerative arthritis Probable bone infarction distal femoral shaft Soft tissues are unremarkable  Impression: No acute osseous abnormality  Workstation performed: [id number]     DXA BONE DENSITY SCAN STANDARD 2 SITES    Result Date: 6/16/2023  Narrative: A DXA bone mineral density examination was performed with a Hologic scanner  History: Screening for osteoporosis [Z13 820 (ICD-10-CM)]  Postmenopause Comparison studies: None Findings: The lumbar spine was examined at L1, L2, L3, and L4 The bone density of the lumbar spine is 0 998 g/cm2 T-score lumbar spine: -0 4 This is within the normal range Left hip The bone density of the femoral neck is 0 735 g/cm2 T-score femoral neck: -1 0 This is within the normal range The bone density of the total hip is 0 811 g/cm2 T-score total hip: -1 1 This is 86% of normal    Impression: Impression: The examination is reviewed using the World Health Organization criteria  1  The lumbar spine and femoral neck have bone mineral density that is in the normal range  2  The hip total has bone mineral density of osteopenia with measurements that show increased risk for fracture  WHO Fracture Risk Assessment Tool (FRAX) estimates 10 year fracture risk as follows:  * Major osteoporotic fracture risk without prior fracture is 7 7%  * Major osteoporotic fracture risk with prior fracture is 13%  * Hip fracture risk without prior fracture is 0 6%  * Hip fracture risk with prior fracture is 0 9%  The National Osteoporosis Foundation recommends that FDA approved medical therapies be considered in postmenopausal women and men age greater than or equal to 48 years with the followin   Hip or vertebral fracture; 2   T score of less than or equal to -2 5 at the spine or hip; 3   Osteopenia and 10 year fracture probability by FRAX of greater than or equal to 20% for major osteoporotic fracture or equal to 3% for hip fracture  Comment: All treatment decisions, including pharmacologic treatment, require clinical judgment and consideration of individual patient factors, including patient preferences, comorbidities, previous drug use and risk factors not captured in the FRAX model, e g  fragility, falls, vitamin D deficiency, increased bone turnover, and interval significant decline in bone mineral density  Workstation:[id number]    ECOG :0      Assessment and plan:  28-year-old  female with history of uterus cancer in  status post hysterectomy followed by radiation therapy and scar formation of the ureter with stent placement, she developed DVT of the left lower extremity at that time treated with anticoagulation for 1 year    The patient went on a cruise on 2023 she was active and came back with DVT of the paired peroneal vein of the right lower extremity, treated with rivaroxaban had been taking it 20 mg p o  daily    Later on she was diagnosed with ruptured Achilles tendon of the right lower extremity after a fall with retraction about 6 cm    In the past week she had more tenderness in the ankle area with a newly diagnosed distal femoral vein DVT and 1 of paired peroneal veins of the right lower extremity    1  I agree on thrombosis panel  2    She needs to repeat MRI of the ankle and orthopedic consult  3    Switch to Pradaxa 150 mg p o  twice daily unless if she was found to have antiphospholipid positive antibodies you might start with warfarin to keep INR between 2-3

## 2023-06-24 NOTE — ASSESSMENT & PLAN NOTE
HTN on admission with resolution without intervention  Not on home BP medications    - hydralazine PRN for SBP >180

## 2023-06-24 NOTE — PHYSICAL THERAPY NOTE
PHYSICAL THERAPY CANCELLATION NOTE          Patient Name: Huber Watkins  SSGSV'O Date: 6/24/2023 06/24/23 1230   Note Type   Note type Cancelled Session   Additional Comments PT eval orders received, chart review performed  Pt w/ recent dx of R ruptured Achilles tendon (4/28 per Care Everywhere documentation) and is currently admitted due to acute DVT of femoral vein of RLE  Will hold PT eval/mobilizing pt at this time due to pt currently pending inpatient consult to Orthopedics  PT will continue to follow           Ml Anna PT, DPT  06/24/23

## 2023-06-24 NOTE — OCCUPATIONAL THERAPY NOTE
Occupational Therapy Cancellation note        Patient Name: Wally Cano  [id number] Date: 6/24/2023 06/24/23 1300   Note Type   Note type Cancelled Session   Cancel Reasons Medical status   Additional Comments OT orders recieved, chart reviewed  pt with R ruptured achilles tendon and admitted for acute DVT of RLE   Will hold OT evaluation at this time pending orthopedics consult, will f/u pending established Shana Munroe 46, OT

## 2023-06-24 NOTE — H&P
[facility]  H&P  Name: Sarahy Gannon 79 y o  female I MRN: [id number]  Unit/Bed#: S -01 I Date of Admission: 6/23/2023   Date of Service: 6/24/2023 I Hospital Day: 1      Assessment/Plan   * Acute deep vein thrombosis (DVT) of femoral vein of right lower extremity [facility])  Assessment & Plan  Recent unprovoked DVT in  R posterior tibial and peroneal veins in the calf in 4/2023 for which she was started on xarelto 15mg QD  Evaluated by hematology outpt with plan to complete thrombophilia workup in early July 2023  Prior thrombosis panel without Factor 5 Leiden deficiency, prothombin mutation, or lupus anticoagulant; and normal antithrombin 3 activity and protein S antigen levels  Protein C and S, beta2 glycoprotein, and cardiolipin Ab were unable to be run at the time  Has been compliant with xarelto and now presenting with unprovoked R sided acute non-occlusive deep vein thrombosis in the mid-distal femoral vein and one of the paired posterior tibial veins      Xarelto 100 given in ED    - Start Xarelto 15mg BID  - Hematology consulted  - No prior echo on file, obtain echo while admitted  - PT/OT evaluation and treatment for ambulatory difficulty    Bone infarction [facility])  Assessment & Plan  XR Knee showing Probable bone infarction distal femoral shaft  Pain well controlled with Norco in ED  ALP on admission 112    - GGT in AM  - Consider additional workup with CTA  - PT/OT  - Pain control with tylenol 650 for mild and Norco 5-325 for moderate/severe    HTN (hypertension)  Assessment & Plan  HTN on admission with resolution without intervention  Not on home BP medications    - hydralazine PRN for SBP >180    Anemia  Assessment & Plan  Chronic normocytic anemia with hgb 10 5-11 5     - iron panel, folate, B12 levels  - monitor with AM CBC    Hypothyroidism  Assessment & Plan  Continue PTA levothyroxine    Type 2 diabetes mellitus ([address] )  Assessment & Plan  Lab Results   Component "Value Date    HGBA1C 7 1 (H) 01/18/2021       No results for input(s): \"POCGLU\" in the last 72 hours  Blood Sugar Average: Last 72 hrs:     Taking metformin and trulicity as outpatient  - SSI while inpatient       VTE Pharmacologic Prophylaxis: VTE Score: 6 Moderate Risk (Score 3-4) - Pharmacological DVT Prophylaxis Ordered: rivaroxaban (Xarelto)  Code Status: Level 1  Discussion with family: Patient declined call to   Would like her  contacted tomorrow  Anticipated Length of Stay: Patient will be admitted on an inpatient basis with an anticipated length of stay of greater than 2 midnights secondary to acute DVT  Chief Complaint: R leg pain    History of Present Illness:  Alfie Stephens is a 79 y o  female with a PMH of DVT x2 - one in 2002 after surgery and one unprovoked in April 2023 for which she is taking xarelto 15 QD, endometrial carcinoma s p  hysterectomy in 1999, urostomy s p  radiation induced stricture in 2004, T2DM, hypothyroid, anemia, migraine, and HTN who presents with pain in her R thigh since last night and ambulatory dysfunction secondary leg pain and stiffness  She reports pain with movement of her R knee and feeling \"like a brick\" is stuck to the bottom of her R foot  Denies recent illness or injury, current HA, chest pain, SOB, change in sensation of extremities (does report some residual numbness in R leg from hysterectomy), visual disturbance, abd pain, N/V/D, fever  Pt reports she recently went on a cruise to the [facility] 5/21 which left from [facility] and denies extended periods of inactivity  She reports she has been taking xarelto as prescribed  Review of Systems:  Review of Systems   Constitutional: Negative for chills and fever  HENT: Negative for ear pain and sore throat  Eyes: Negative for pain and visual disturbance  Respiratory: Negative for cough and shortness of breath  Cardiovascular: Negative for chest pain and palpitations   " Gastrointestinal: Negative for abdominal pain, diarrhea, nausea and vomiting  Genitourinary: Negative for dysuria and hematuria  Musculoskeletal: Negative for back pain  R thigh pain with resulting stiffness of R knee and diminished ability to ambulate   Skin: Negative for color change and rash  Neurological: Negative for seizures, syncope and headaches  All other systems reviewed and are negative  Past Medical and Surgical History:   Past Medical History:   Diagnosis Date   • Anemia    • Back pain    • Bowel obstruction ([place] [id number] ) 4332,5625   • Cancer ([place] [address] )     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   • Cholelithiasis    • Colon polyp    • Deep vein thrombosis of left lower extremity ([place] [id number] ) 01/11/2004    on blood thinner for 3 years  • Diabetes mellitus ([place] [address] )     type 2 BS 6/6/22 @ 0600 was 145   • Disease of thyroid gland    • Endometrial cancer ([place] [address] ) 1999   • GERD (gastroesophageal reflux disease)    • Gross hematuria    • Hiatal hernia    • History of transfusion 1999    Post Hysterectomy   • History of urostomy 01/11/2004    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  • Hypothyroid    • In vitro fertilization    • Kidney stone    • Migraines    • Muscle weakness     abdominal   • Personal history of irradiation    • Renal cyst    • Sleep apnea     Resolved due to Weight Loss       Past Surgical History:   Procedure Laterality Date   • ACHILLES TENDON REPAIR Right    • APPENDECTOMY     • COLONOSCOPY      6/6/22   • COLONOSCOPY W/ BIOPSIES N/A 12/2015   • EGD     • HEEL SPUR SURGERY Right 1996   • HYSTERECTOMY     • 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   • IR NEPHROSTOMY TUBE PLACEMENT  01/18/2021   • LAPAROSCOPIC GASTRIC BANDING N/A 2006    [place], [place]   • OTHER SURGICAL HISTORY  2004    Urine shunt to intestine, transverse colon   • PCNL Left 02/16/2021    Procedure: NEPHROLITHOTOMY  PERCUTANEOUS (PCNL);   Surgeon: Himanshu Santillan MD;  Location[facility];  Service: Urology   • LA CYSTO/URETERO W/LITHOTRIPSY &INDWELL STENT INSRT Left 02/16/2021    Procedure: anterograde  URETEROSCOPY with dilation of ureteral stricture, INSERTION STENT URETERAL, exchange  of nephrostomy tube; Surgeon: Jose Manuel Faust MD;  Location: [facility];  Service: Urology   • LA LAPAROSCOPY ENTEROLYSIS SEPARATE PROCEDURE N/A 01/17/2019    Procedure: LAPAROSCOPIC LYSIS OF ADHESIONS;  Surgeon: Chin Dela Cruz MD;  Location: [address] [place] [address] MAIN OR;  Service: General   • [facility] [address] [facility]  • SLEEVE GASTROPLASTY N/A 09/2015    Dr Arturo Walker, [facility]   • URETER REVISION  2010   • WEILBY THUMB ARTHROPLASTY     • WEILBY THUMB ARTHROPLASTY         Meds/Allergies:  Prior to Admission medications    Medication Sig Start Date End Date Taking?  Authorizing Provider   ALPRAZolam Thania Solid) 0 5 mg tablet Take 0 25 mg by mouth daily at bedtime as needed Takes 1/2 0 5mg = 0 25 mg @ bedtime prn 12/3/18  Yes Historical Provider, MD   Ascorbic Acid (VITAMIN C PO) Take by mouth daily   Yes Historical Provider, MD   BIOTIN PO Take by mouth daily   Yes Historical Provider, MD   Cyanocobalamin (B-12 PO) Take by mouth daily   Yes Historical Provider, MD   metFORMIN (GLUCOPHAGE) 1000 MG tablet Take 500 mg by mouth 2 (two) times a day with meals     Yes Historical Provider, MD   pantoprazole (PROTONIX) 40 mg tablet Take 40 mg by mouth daily in the early morning   Yes Historical Provider, MD   pramipexole (MIRAPEX) 0 5 mg tablet  3/11/23  Yes Historical Provider, MD   Probiotic Product (PROBIOTIC DAILY PO) Take by mouth   Yes Historical Provider, MD   rivaroxaban (Xarelto) 20 mg tablet Take 1 tablet (20 mg total) by mouth daily with breakfast 4/11/23  Yes TRACY Pickett   SYNTHROID 75 MCG tablet Take 75 mcg by mouth daily in the early morning   10/8/18  Yes Historical Provider, MD   Trulicity 4 5 RV/3 6AV SOPN INJECT UNDER THE SKIN 4 5 MG WEEKLY AS DIRECTED 22  Yes Historical Provider, MD   FREESTYLE LITE test strip  21   Historical Provider, MD   scopolamine (TRANSDERM-SCOP) 1 mg/3 days TD 72 hr patch Place 1 patch on the skin every third day As needed for cruises pt states  22  Historical Provider, MD     I have reviewed home medications with patient personally  Allergies: Allergies   Allergen Reactions   • Amoxicillin Rash   • Penicillins Rash   • Adhesive [Medical Tape] Rash       Social History:  Marital Status: /Civil Union   Occupation: Retired   Patient Pre-hospital Living Situation: Home  Patient Pre-hospital Level of Mobility: walks  Patient Pre-hospital Diet Restrictions: none  Substance Use History:   Social History     Substance and Sexual Activity   Alcohol Use Yes    Comment: Rare Socially     Social History     Tobacco Use   Smoking Status Former   • Packs/day: 1 50   • Years: 10 00   • Total pack years: 15 00   • Types: Cigarettes   • Quit date: 1986   • Years since quittin 4   Smokeless Tobacco Former     Social History     Substance and Sexual Activity   Drug Use No       Family History:  Family History   Problem Relation Age of Onset   • No Known Problems Mother    • Kidney failure Father    • Brain cancer Father    • Kidney cancer Father    • Diabetes Sister    • Alcohol abuse Sister    • Diabetes Brother        Physical Exam:     Vitals:   Blood Pressure: 133/80 (23 2233)  Pulse: 69 (23 2233)  Temperature: 97 5 °F (36 4 °C) (23)  Temp Source: Oral (23 1624)  Respirations: 16 (23 2233)  SpO2: 96 % (23)    Physical Exam  Vitals and nursing note reviewed  Constitutional:       General: She is not in acute distress  Appearance: She is well-developed  She is not ill-appearing  HENT:      Head: Normocephalic and atraumatic     Eyes:      Conjunctiva/sclera: Conjunctivae normal    Cardiovascular:      Rate and Rhythm: Normal rate and regular rhythm  Pulses: Normal pulses  Heart sounds: No murmur heard  Pulmonary:      Effort: Pulmonary effort is normal  No respiratory distress  Breath sounds: Normal breath sounds  Abdominal:      General: Bowel sounds are normal       Palpations: Abdomen is soft  There is no mass  Tenderness: There is no abdominal tenderness  Musculoskeletal:         General: Tenderness (R thigh tender to deep palpation and tenderness with passive movement of R knee, no hip pain, no overlying skin changes, no edema  R knee flexion restricted secondary pain, however when asked she is able to bend the knee with a grimmace) present  No swelling  Cervical back: Neck supple  No rigidity or tenderness  Right lower leg: No edema  Left lower leg: No edema  Lymphadenopathy:      Cervical: No cervical adenopathy  Skin:     General: Skin is warm and dry  Capillary Refill: Capillary refill takes less than 2 seconds  Neurological:      Mental Status: She is alert and oriented to person, place, and time  Mental status is at baseline     Psychiatric:         Mood and Affect: Mood normal           Additional Data:     Lab Results:  Results from last 7 days   Lab Units 06/23/23 1929   WBC Thousand/uL 7 40   HEMOGLOBIN g/dL 11 1*   HEMATOCRIT % 34 8   PLATELETS Thousands/uL 228   NEUTROS PCT % 55   LYMPHS PCT % 30   MONOS PCT % 9   EOS PCT % 5     Results from last 7 days   Lab Units 06/23/23 1929   SODIUM mmol/L 140   POTASSIUM mmol/L 4 5   CHLORIDE mmol/L 112*   CO2 mmol/L 23   BUN mg/dL 36*   CREATININE mg/dL 1 12   ANION GAP mmol/L 5   CALCIUM mg/dL 10 4*   ALBUMIN g/dL 3 9   TOTAL BILIRUBIN mg/dL 0 45   ALK PHOS U/L 112*   ALT U/L 16   AST U/L 15   GLUCOSE RANDOM mg/dL 108     Results from last 7 days   Lab Units 06/23/23 1929   INR  1 21*                   Lines/Drains:  Invasive Devices     Peripheral Intravenous Line  Duration           Peripheral IV 06/23/23 Right Antecubital <1 day Drain  Duration           Urostomy Ureterostomy right RUQ -- days    Nephrostomy Left 8 Fr  886 days    Percutaneous Nephroureteral Tube (PCNU) Left 1 8 5 Fr 22 Cm 822 days                    Imaging: Reviewed radiology reports from this admission including: xray(s) and ultrasound(s) and Personally reviewed the following imaging: xray(s)  VAS lower limb venous duplex study, unilateral/limited   Final Result by Severo Pier, MD (06/23 3072)      XR hip/pelv 2-3 vws right   ED Interpretation by Zak Nash DO (06/23 1938)   No acute bony abnormality      XR knee 4+ views Right injury   Final Result by Shannan Serna MD (06/23 1831)      No acute osseous abnormality  Workstation performed: [id number]             EKG and Other Studies Reviewed on Admission:   · EKG: No EKG obtained  ** Please Note: This note has been constructed using a voice recognition system   **

## 2023-06-24 NOTE — ASSESSMENT & PLAN NOTE
"Lab Results   Component Value Date    HGBA1C 7 1 (H) 01/18/2021       No results for input(s): \"POCGLU\" in the last 72 hours      Blood Sugar Average: Last 72 hrs:     Taking metformin and trulicity as outpatient  - SSI while inpatient  "

## 2023-06-24 NOTE — ASSESSMENT & PLAN NOTE
Recent unprovoked DVT in  R posterior tibial and peroneal veins in the calf in 4/2023 for which she was started on xarelto 15mg QD  Evaluated by hematology outpt with plan to complete thrombophilia workup in early July 2023  Prior thrombosis panel without Factor 5 Leiden deficiency, prothombin mutation, or lupus anticoagulant; and normal antithrombin 3 activity and protein S antigen levels  Protein C and S, beta2 glycoprotein, and cardiolipin Ab were unable to be run at the time  Has been compliant with xarelto and now presenting with unprovoked R sided acute non-occlusive deep vein thrombosis in the mid-distal femoral vein and one of the paired posterior tibial veins      Xarelto 100 given in ED    - Start Xarelto 15mg BID  - Hematology consulted  - No prior echo on file, obtain echo while admitted  - PT/OT evaluation and treatment for ambulatory difficulty

## 2023-06-24 NOTE — ASSESSMENT & PLAN NOTE
Chronic normocytic anemia with hgb 10 5-11 5     - iron panel, folate, B12 levels  - monitor with AM CBC

## 2023-06-25 ENCOUNTER — APPOINTMENT (INPATIENT)
Dept: MRI IMAGING | Facility: HOSPITAL | Age: 68
DRG: 301 | End: 2023-06-25
Payer: COMMERCIAL

## 2023-06-25 LAB
ANION GAP SERPL CALCULATED.3IONS-SCNC: 5 MMOL/L
BASOPHILS # BLD AUTO: 0.03 THOUSANDS/ÂΜL (ref 0–0.1)
BASOPHILS NFR BLD AUTO: 1 % (ref 0–1)
BUN SERPL-MCNC: 24 MG/DL (ref 5–25)
CALCIUM SERPL-MCNC: 9.7 MG/DL (ref 8.4–10.2)
CHLORIDE SERPL-SCNC: 112 MMOL/L (ref 96–108)
CO2 SERPL-SCNC: 23 MMOL/L (ref 21–32)
CREAT SERPL-MCNC: 0.82 MG/DL (ref 0.6–1.3)
DEPRECATED AT III PPP: 88 % OF NORMAL (ref 92–136)
EOSINOPHIL # BLD AUTO: 0.38 THOUSAND/ÂΜL (ref 0–0.61)
EOSINOPHIL NFR BLD AUTO: 6 % (ref 0–6)
ERYTHROCYTE [DISTWIDTH] IN BLOOD BY AUTOMATED COUNT: 13.1 % (ref 11.6–15.1)
GFR SERPL CREATININE-BSD FRML MDRD: 74 ML/MIN/1.73SQ M
GLUCOSE SERPL-MCNC: 105 MG/DL (ref 65–140)
GLUCOSE SERPL-MCNC: 108 MG/DL (ref 65–140)
GLUCOSE SERPL-MCNC: 108 MG/DL (ref 65–140)
GLUCOSE SERPL-MCNC: 129 MG/DL (ref 65–140)
GLUCOSE SERPL-MCNC: 141 MG/DL (ref 65–140)
HCT VFR BLD AUTO: 34.6 % (ref 34.8–46.1)
HGB BLD-MCNC: 11 G/DL (ref 11.5–15.4)
IMM GRANULOCYTES # BLD AUTO: 0.01 THOUSAND/UL (ref 0–0.2)
IMM GRANULOCYTES NFR BLD AUTO: 0 % (ref 0–2)
LYMPHOCYTES # BLD AUTO: 1.7 THOUSANDS/ÂΜL (ref 0.6–4.47)
LYMPHOCYTES NFR BLD AUTO: 27 % (ref 14–44)
MAGNESIUM SERPL-MCNC: 1.8 MG/DL (ref 1.9–2.7)
MCH RBC QN AUTO: 30.6 PG (ref 26.8–34.3)
MCHC RBC AUTO-ENTMCNC: 31.8 G/DL (ref 31.4–37.4)
MCV RBC AUTO: 96 FL (ref 82–98)
MONOCYTES # BLD AUTO: 0.54 THOUSAND/ÂΜL (ref 0.17–1.22)
MONOCYTES NFR BLD AUTO: 9 % (ref 4–12)
NEUTROPHILS # BLD AUTO: 3.6 THOUSANDS/ÂΜL (ref 1.85–7.62)
NEUTS SEG NFR BLD AUTO: 57 % (ref 43–75)
NRBC BLD AUTO-RTO: 0 /100 WBCS
PLATELET # BLD AUTO: 205 THOUSANDS/UL (ref 149–390)
PMV BLD AUTO: 9.1 FL (ref 8.9–12.7)
POTASSIUM SERPL-SCNC: 4.3 MMOL/L (ref 3.5–5.3)
RBC # BLD AUTO: 3.6 MILLION/UL (ref 3.81–5.12)
SODIUM SERPL-SCNC: 140 MMOL/L (ref 135–147)
WBC # BLD AUTO: 6.26 THOUSAND/UL (ref 4.31–10.16)

## 2023-06-25 PROCEDURE — 83735 ASSAY OF MAGNESIUM: CPT

## 2023-06-25 PROCEDURE — 82948 REAGENT STRIP/BLOOD GLUCOSE: CPT

## 2023-06-25 PROCEDURE — 99232 SBSQ HOSP IP/OBS MODERATE 35: CPT | Performed by: INTERNAL MEDICINE

## 2023-06-25 PROCEDURE — 85025 COMPLETE CBC W/AUTO DIFF WBC: CPT

## 2023-06-25 PROCEDURE — 97116 GAIT TRAINING THERAPY: CPT

## 2023-06-25 PROCEDURE — 97163 PT EVAL HIGH COMPLEX 45 MIN: CPT

## 2023-06-25 PROCEDURE — 80048 BASIC METABOLIC PNL TOTAL CA: CPT

## 2023-06-25 PROCEDURE — 73721 MRI JNT OF LWR EXTRE W/O DYE: CPT

## 2023-06-25 RX ADMIN — DABIGATRAN ETEXILATE MESYLATE 150 MG: 150 CAPSULE ORAL at 08:35

## 2023-06-25 RX ADMIN — ALPRAZOLAM 0.25 MG: 0.25 TABLET ORAL at 21:25

## 2023-06-25 RX ADMIN — HYDROCODONE BITARTRATE AND ACETAMINOPHEN 1 TABLET: 5; 325 TABLET ORAL at 08:38

## 2023-06-25 RX ADMIN — DABIGATRAN ETEXILATE MESYLATE 150 MG: 150 CAPSULE ORAL at 21:20

## 2023-06-25 RX ADMIN — Medication 250 MG: at 08:34

## 2023-06-25 RX ADMIN — PANTOPRAZOLE SODIUM 40 MG: 40 TABLET, DELAYED RELEASE ORAL at 06:07

## 2023-06-25 RX ADMIN — IRON SUCROSE 300 MG: 20 INJECTION, SOLUTION INTRAVENOUS at 12:30

## 2023-06-25 RX ADMIN — LEVOTHYROXINE SODIUM 75 MCG: 75 TABLET ORAL at 06:07

## 2023-06-25 RX ADMIN — PRAMIPEXOLE DIHYDROCHLORIDE 0.5 MG: 0.5 TABLET ORAL at 21:20

## 2023-06-25 RX ADMIN — HYDROCODONE BITARTRATE AND ACETAMINOPHEN 1 TABLET: 5; 325 TABLET ORAL at 15:12

## 2023-06-25 RX ADMIN — Medication 125 MG: at 08:35

## 2023-06-25 NOTE — PROGRESS NOTES
[facility]  Progress Note  Name: Alejandra Larry  MRN: [id number]  Unit/Bed#: S -64 I Date of Admission: 6/23/2023   Date of Service: 6/25/2023 I Hospital Day: 2    Assessment/Plan   * Acute deep vein thrombosis (DVT) of femoral vein of right lower extremity [facility])  Assessment & Plan  Recent unprovoked DVT in  R posterior tibial and peroneal veins in the calf in 4/2023 for which she was started on xarelto 15mg QD  Has been compliant with xarelto and now presenting with unprovoked R sided acute non-occlusive deep vein thrombosis in the mid-distal femoral vein and one of the paired posterior tibial veins      Thrombosis panel:   - Antithrombin III: 88% of normal (low)    Plan:  · Anticoagulation switched to Pradaxa 150mg BID by hematology  · Thrombosis panel pending    Bone infarction [facility])  Assessment & Plan  XR Knee showing Probable bone infarction distal femoral shaft  Pain well controlled with Norco in ED  ALP on admission 112  GGT 13    - will ask Ortho for recs  - Consider additional workup with CTA vs arterial duplex  - PT/OT  - Pain control with tylenol 650 for mild and Norco 5-325 for moderate/severe    HTN (hypertension)  Assessment & Plan  HTN on admission with resolution without intervention  Not on home BP medications    - hydralazine PRN for SBP >180    Anemia  Assessment & Plan  · Chronic normocytic anemia with hgb 10 5-11 5  · B12: 2600, Folate >22   · Iron deficiency    - monitor with AM CBC  - start venofer    Hypothyroidism  Assessment & Plan  Continue PTA levothyroxine    Type 2 diabetes mellitus [facility])  Assessment & Plan  Lab Results   Component Value Date    HGBA1C 7 1 (H) 01/18/2021       Recent Labs     06/24/23  1919 06/24/23  2209 06/25/23  0729 06/25/23  1054   POCGLU 174* 116 105 108       Blood Sugar Average: Last 72 hrs:  (P) 135 [id number]   Taking metformin and trulicity as outpatient  - SSI while inpatient           VTE Pharmacologic Prophylaxis: VTE Score: 6 High Risk (Score >/= 5) - Pharmacological DVT Prophylaxis Ordered: dabigatran (Pradaxa)  Sequential Compression Devices Ordered  Patient Centered Rounds: I performed bedside rounds with nursing staff today  Discussions with Specialists or Other Care Team Provider: Orthopedics, PT note reviewed    Education and Discussions with Family / Patient: Patient declined call to   Total Time Spent on Date of Encounter in care of patient: 25 minutes This time was spent on one or more of the following: performing physical exam; counseling and coordination of care; obtaining or reviewing history; documenting in the medical record; reviewing/ordering tests, medications or procedures; communicating with other healthcare professionals and discussing with patient's family/caregivers  Current Length of Stay: 2 day(s)  Current Patient Status: Inpatient   Certification Statement: The patient will continue to require additional inpatient hospital stay due to trouble ambulating due to pain  Discharge Plan: Anticipate discharge tomorrow to home  outpatient rehab    Code Status: Level 1 - Full Code    Subjective:   Patient seen and examined this morning  She states that her pain and swelling in her right lower extremity is improved, however present still  She had to slide her foot while she walked to the bathroom this morning  She is worried about going home with persistent pain  She went for MRI of the right ankle this morning, pending read  Objective:     Vitals:   Temp (24hrs), Av 3 °F (36 8 °C), Min:98 °F (36 7 °C), Max:98 6 °F (37 °C)    Temp:  [98 °F (36 7 °C)-98 6 °F (37 °C)] 98 °F (36 7 °C)  HR:  [63-76] 63  Resp:  [17-18] 18  BP: (130-175)/(55-91) 156/74  SpO2:  [92 %-97 %] 96 %  Body mass index is 34 46 kg/m²  Input and Output Summary (last 24 hours):      Intake/Output Summary (Last 24 hours) at 2023 1202  Last data filed at 2023 0601  Gross per 24 hour Intake --   Output 1800 ml   Net -1800 ml       Physical Exam:   Physical Exam  Vitals and nursing note reviewed  Constitutional:       General: She is not in acute distress  Appearance: She is well-developed  HENT:      Head: Normocephalic and atraumatic  Eyes:      Extraocular Movements: Extraocular movements intact  Cardiovascular:      Rate and Rhythm: Normal rate and regular rhythm  Heart sounds: No murmur heard  Pulmonary:      Effort: Pulmonary effort is normal  No respiratory distress  Breath sounds: Normal breath sounds  Abdominal:      General: Abdomen is flat  Bowel sounds are normal  There is no distension  Palpations: Abdomen is soft  Tenderness: There is no abdominal tenderness  Musculoskeletal:         General: No swelling  Cervical back: Neck supple  Right lower leg: Edema present  Left lower leg: No edema  Skin:     General: Skin is warm and dry  Capillary Refill: Capillary refill takes less than 2 seconds  Neurological:      Mental Status: She is alert and oriented to person, place, and time     Psychiatric:         Mood and Affect: Mood normal           Additional Data:     Labs:  Results from last 7 days   Lab Units 06/25/23  0431   WBC Thousand/uL 6 26   HEMOGLOBIN g/dL 11 0*   HEMATOCRIT % 34 6*   PLATELETS Thousands/uL 205   NEUTROS PCT % 57   LYMPHS PCT % 27   MONOS PCT % 9   EOS PCT % 6     Results from last 7 days   Lab Units 06/25/23  0431 06/24/23  0513   SODIUM mmol/L 140 141   POTASSIUM mmol/L 4 3 4 0   CHLORIDE mmol/L 112* 114*   CO2 mmol/L 23 23   BUN mg/dL 24 30*   CREATININE mg/dL 0 82 0 93   ANION GAP mmol/L 5 4   CALCIUM mg/dL 9 7 9 5   ALBUMIN g/dL  --  3 3*   TOTAL BILIRUBIN mg/dL  --  0 53   ALK PHOS U/L  --  92   ALT U/L  --  13   AST U/L  --  11*   GLUCOSE RANDOM mg/dL 108 93     Results from last 7 days   Lab Units 06/23/23  1929   INR  1 21*     Results from last 7 days   Lab Units 06/25/23  1054 06/25/23  0729 06/24/23  2209 06/24/23  1919 06/24/23  1543 06/24/23  1105 06/24/23  0716   POC GLUCOSE mg/dl 108 105 116 174* 238* 115 92               Lines/Drains:  Invasive Devices     Peripheral Intravenous Line  Duration           Peripheral IV 06/23/23 Right Antecubital 1 day          Drain  Duration           Urostomy Ureterostomy right RUQ -- days    Nephrostomy Left 8 Fr  887 days    Percutaneous Nephroureteral Tube (PCNU) Left 1 8 5 Fr 22 Cm 824 days                      Imaging: Reviewed radiology reports from this admission including: xray(s) and MRI, VAS duplex    Recent Cultures (last 7 days):         Last 24 Hours Medication List:   Current Facility-Administered Medications   Medication Dose Route Frequency Provider Last Rate   • acetaminophen  650 mg Oral Q6H PRN Judy Tony MD     • ALPRAZolam  0 25 mg Oral HS PRN Jduy Tony MD     • ascorbic acid  125 mg Oral Daily Judy Tony MD     • dabigatran etexilate  150 mg Oral Q12H [address] Chris Nur MD     • hydrALAZINE  5 mg Intravenous Q6H PRN Judy Tony MD     • HYDROcodone-acetaminophen  1 tablet Oral Q6H PRN Judy Tony MD     • insulin lispro  1-6 Units Subcutaneous 4x Daily (AC & HS) Chris Nur MD     • iron sucrose  300 mg Intravenous Once Rodrigo Kohler DO     • levothyroxine  75 mcg Oral Early Morning Judy Tony MD     • niacin  100 mg Oral HS Judy Tony MD     • ondansetron  4 mg Intravenous Q6H PRN Judy Tony MD     • pantoprazole  40 mg Oral Early Morning Judy Tony MD     • pramipexole  0 5 mg Oral HS Judy Tony MD     • saccharomyces boulardii  250 mg Oral Daily Judy Tony MD          Today, Patient Was Seen By: Rodrigo Kohler DO    **Please Note: This note may have been constructed using a voice recognition system  **

## 2023-06-25 NOTE — PLAN OF CARE
Problem: Potential for Falls  Goal: Patient will remain free of falls  Description: INTERVENTIONS:  - Educate patient/family on patient safety including physical limitations  - Instruct patient to call for assistance with activity   - Consult OT/PT to assist with strengthening/mobility   - Keep Call bell within reach  - Keep bed low and locked with side rails adjusted as appropriate  - Keep care items and personal belongings within reach  - Initiate and maintain comfort rounds  - Make Fall Risk Sign visible to staff  - Offer Toileting every  Hours, in advance of need  - Initiate/Maintain alarm  - Obtain necessary fall risk management equipment:   - Apply yellow socks and bracelet for high fall risk patients  - Consider moving patient to room near nurses station  Outcome: Progressing     Problem: MOBILITY - ADULT  Goal: Maintain or return to baseline ADL function  Description: INTERVENTIONS:  -  Assess patient's ability to carry out ADLs; assess patient's baseline for ADL function and identify physical deficits which impact ability to perform ADLs (bathing, care of mouth/teeth, toileting, grooming, dressing, etc )  - Assess/evaluate cause of self-care deficits   - Assess range of motion  - Assess patient's mobility; develop plan if impaired  - Assess patient's need for assistive devices and provide as appropriate  - Encourage maximum independence but intervene and supervise when necessary  - Involve family in performance of ADLs  - Assess for home care needs following discharge   - Consider OT consult to assist with ADL evaluation and planning for discharge  - Provide patient education as appropriate  Outcome: Progressing  Goal: Maintains/Returns to pre admission functional level  Description: INTERVENTIONS:  - Perform BMAT or MOVE assessment daily    - Set and communicate daily mobility goal to care team and patient/family/caregiver     - Collaborate with rehabilitation services on mobility goals if consulted  - Perform Range of Motion  times a day  - Reposition patient every  hours    - Dangle patient  times a day  - Stand patient  times a day  - Ambulate patient  times a day  - Out of bed to chair  times a day   - Out of bed for meals  times a day  - Out of bed for toileting  - Record patient progress and toleration of activity level   Outcome: Progressing     Problem: PAIN - ADULT  Goal: Verbalizes/displays adequate comfort level or baseline comfort level  Description: Interventions:  - Encourage patient to monitor pain and request assistance  - Assess pain using appropriate pain scale  - Administer analgesics based on type and severity of pain and evaluate response  - Implement non-pharmacological measures as appropriate and evaluate response  - Consider cultural and social influences on pain and pain management  - Notify physician/advanced practitioner if interventions unsuccessful or patient reports new pain  Outcome: Progressing     Problem: SAFETY ADULT  Goal: Patient will remain free of falls  Description: INTERVENTIONS:  - Educate patient/family on patient safety including physical limitations  - Instruct patient to call for assistance with activity   - Consult OT/PT to assist with strengthening/mobility   - Keep Call bell within reach  - Keep bed low and locked with side rails adjusted as appropriate  - Keep care items and personal belongings within reach  - Initiate and maintain comfort rounds  - Make Fall Risk Sign visible to staff  - Offer Toileting every  Hours, in advance of need  - Initiate/Maintain alarm  - Obtain necessary fall risk management equipment:   - Apply yellow socks and bracelet for high fall risk patients  - Consider moving patient to room near nurses station  Outcome: Progressing  Goal: Maintain or return to baseline ADL function  Description: INTERVENTIONS:  -  Assess patient's ability to carry out ADLs; assess patient's baseline for ADL function and identify physical deficits which impact ability to perform ADLs (bathing, care of mouth/teeth, toileting, grooming, dressing, etc )  - Assess/evaluate cause of self-care deficits   - Assess range of motion  - Assess patient's mobility; develop plan if impaired  - Assess patient's need for assistive devices and provide as appropriate  - Encourage maximum independence but intervene and supervise when necessary  - Involve family in performance of ADLs  - Assess for home care needs following discharge   - Consider OT consult to assist with ADL evaluation and planning for discharge  - Provide patient education as appropriate  Outcome: Progressing  Goal: Maintains/Returns to pre admission functional level  Description: INTERVENTIONS:  - Perform BMAT or MOVE assessment daily    - Set and communicate daily mobility goal to care team and patient/family/caregiver  - Collaborate with rehabilitation services on mobility goals if consulted  - Perform Range of Motion  times a day  - Reposition patient every  hours    - Dangle patient  times a day  - Stand patient  times a day  - Ambulate patient  times a day  - Out of bed to chair  times a day   - Out of bed for meals times a day  - Out of bed for toileting  - Record patient progress and toleration of activity level   Outcome: Progressing     Problem: METABOLIC, FLUID AND ELECTROLYTES - ADULT  Goal: Electrolytes maintained within normal limits  Description: INTERVENTIONS:  - Monitor labs and assess patient for signs and symptoms of electrolyte imbalances  - Administer electrolyte replacement as ordered  - Monitor response to electrolyte replacements, including repeat lab results as appropriate  - Instruct patient on fluid and nutrition as appropriate  Outcome: Progressing  Goal: Fluid balance maintained  Description: INTERVENTIONS:  - Monitor labs   - Monitor I/O and WT  - Instruct patient on fluid and nutrition as appropriate  - Assess for signs & symptoms of volume excess or deficit  Outcome: Progressing  Goal: Glucose maintained within target range  Description: INTERVENTIONS:  - Monitor Blood Glucose as ordered  - Assess for signs and symptoms of hyperglycemia and hypoglycemia  - Administer ordered medications to maintain glucose within target range  - Assess nutritional intake and initiate nutrition service referral as needed  Outcome: Progressing     Problem: CARDIOVASCULAR - ADULT  Goal: Maintains optimal cardiac output and hemodynamic stability  Description: INTERVENTIONS:  - Monitor I/O, vital signs and rhythm  - Monitor for S/S and trends of decreased cardiac output  - Administer and titrate ordered vasoactive medications to optimize hemodynamic stability  - Assess quality of pulses, skin color and temperature  - Assess for signs of decreased coronary artery perfusion  - Instruct patient to report change in severity of symptoms  Outcome: Progressing  Goal: Absence of cardiac dysrhythmias or at baseline rhythm  Description: INTERVENTIONS:  - Continuous cardiac monitoring, vital signs, obtain 12 lead EKG if ordered  - Administer antiarrhythmic and heart rate control medications as ordered  - Monitor electrolytes and administer replacement therapy as ordered  Outcome: Progressing

## 2023-06-25 NOTE — PHYSICAL THERAPY NOTE
"   PHYSICAL THERAPY EVALUATION  NAME:  Edda Dawkins  DATE: 06/25/23    AGE:   79 y o  Mrn:   [id number]  Principal problem: Principal Problem:    Acute deep vein thrombosis (DVT) of femoral vein of right lower extremity (HCC)  Active Problems:    Type 2 diabetes mellitus (HCC)    Hypothyroidism    Anemia    HTN (hypertension)    Bone infarction ([facility] [address] )      Vitals:    06/24/23 2212 06/25/23 0100 06/25/23 0236 06/25/23 0729   BP: (!) 175/89 140/57 150/58 149/91   Pulse: 71 72 72 63   Resp: 18 17 17 18   Temp: 98 4 °F (36 9 °C) 98 4 °F (36 9 °C) 98 6 °F (37 °C) 98 °F (36 7 °C)   TempSrc:       SpO2: 97% 94% 97% 96%   Weight:       Height:           Length Of Stay: 2  Performed at least 2 patient identifiers during session: Name and Birthday  PHYSICAL THERAPY EVALUATION :    06/25/23 0915   PT Last Visit   PT Visit Date 06/25/23   Note Type   Note type Evaluation   Pain Assessment   Pain Assessment Tool 0-10   Pain Score 5   Pain Location/Orientation Orientation: Right;Location: Foot  (foot/ankle)   Multiple Pain Sites Yes  (R anterior knee/distal femur)   Restrictions/Precautions   Weight Bearing Precautions Per Order Yes   RLE Weight Bearing Per Order WBAT   Other Precautions Bed Alarm; Chair Alarm; Fall Risk;Pain  (urostomy)   Home Living   Type of [address] [place] [address] One level  (3 KATERINA w/ B rails, but has been using 2 hands on one rail prior to this admission)   Home Equipment Crutches  (has only crutches which she \"threw on a closet (for fear of falling)\"  Pt reports primarily furniture cruising at home)   Prior Function   Level of [place] Independent with functional mobility; Independent with ADLs   Lives With Spouse   IADLs Independent with medication management; Independent with meal prep   Falls in the last 6 months 1 to 4  (April)   Vocational   (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)   General   Additional Pertinent History Acute deep vein thrombosis (DVT) " of femoral vein of right lower extremity, Bone infarction R distal femoral shaft, and achilles rupture (s/p fall--pt reports since 4/2023, but ortho not consulted after ED visit, no boot)   Family/Caregiver Present No   Cognition   Overall Cognitive Status WFL   Arousal/Participation Alert   Memory Within functional limits   Following Commands Follows one step commands with increased time or repetition  (MMT)   Subjective   Subjective Upon entering room, patient standing in bathroom and attempting to pivot to a transport chair  Patient reportedly was unable to ambulate on her bed to the bathroom due to pain  RUE Assessment   RUE Assessment WFL   LUE Assessment   LUE Assessment WFL   RLE Overall AROM   R Knee Extension Limited in sitting   RLE Overall PROM   R Knee Flexion limited due to pain   Strength RLE   R Hip Flexion   (Tested to 3)   R Knee Extension   (tested to 3)   R Ankle Dorsiflexion   (tested to 3, but pt unable to perform AROM inv/EV due to pain, ? motor planning)   R Ankle Plantar Flexion   (performed in GA position, but painful)   LLE Assessment   LLE Assessment   (hipflexion, knee ext, ankle df 4/4+)   Vision-Basic Assessment   Current Vision No visual deficits  (wears contacts)   Light Touch   RLE Light Touch Absent   RLE Light Touch Comments Great toe, impaired distally, + paresthesias   LLE Light Touch Absent   LLE Light Touch Comments   (Great toe and 5th toe, impaired distally, + paresthesias)   Bed Mobility   Supine to Sit Unable to assess   Transfers   Sit to Stand 5  Supervision   Additional items Assist x 1; Increased time required;Verbal cues;Armrests   Stand to Sit 5  Supervision   Additional items Assist x 1; Increased time required;Verbal cues  (instruction for technique to approach transport WC)   Stand pivot 5  Supervision   Additional items Verbal cues; Increased time required;Armrests  (instruction for technique to approach transport WC)   Ambulation/Elevation   Gait pattern "Decreased R stance; Antalgic  (using BUE support of furniture for 1' step)   Gait Assistance 5  Supervision   Additional items Assist x 1;Verbal cues   Assistive Device   (B fixed furniture support)   Distance 1'   Balance   Static Sitting Good   Static Standing Fair -  (standing tolerance 2 min w /UE support)   Ambulatory Fair -   Endurance Deficit   Endurance Deficit Yes   Endurance Deficit Description limited amb distance and standing tolerance   Activity Tolerance   Activity Tolerance Patient limited by pain; Patient limited by fatigue   Medical Staff Made Aware spoke to Dr Lisa Turcios before and after session including ? WB restrictions, and pt's pain pattern with increased pain @ anterior/post knee vs ankle   Nurse Made Aware spoke to [facility] RN before and after session     Assessment:   Pt is a 79 y o  female seen for PT evaluation s/p admit to [facility] on 6/23/2023 w/ Acute deep vein thrombosis (DVT) of femoral vein of right lower extremity ([address] )  Additionally pt also has R achilles tear, R distal femoral bone infarct  Order placed for PT, but Ortho consult now switched to amb referral   SLIM providing WB recommendations, no boot ordered  Prior to admission since April 2023 ED visit: Pt lived with spouse in a 1 floor set up, 3 steps to enter  SHe reports being seen by orthopedics nor having a boot since initial injury  Was \"hobbling around\" without DME prior to admission  Upon evaluation: Pt was supervision for transfers and steppage ambulation holding onto bilateral furniture support  Pt's clinical presentation is currently unstable/unpredictable given the functional mobility deficits above, especially (but not limited to) decreased ROM, gait deviations and pain, and combined with medical complications of abnormal H&H, readmission to hospital, fear/retreat and + DVT  She is at risk for falls based on hx of falls, impaired balance, limited sensation/neuropathy and obesity,      " During this admission, pt would benefit from continued skilled inpatient PT in the acute care setting in order to address deficits as defined above to maximize function and mobility  Recommendations:    From a PT standpoint, recommend next several sessions focus on mobility training including using a rolling walker for ambulation, stair training  [facility] need recommendations for RLE baced on patient's presentation during evaluation/treatment (boot?)    Nursing Recommendations:   Mobility Plan as of 06/25/23: Pt is one person assist with w/c to/from [facility] and bathroom  Prognosis Fair   Problem List Decreased strength;Decreased range of motion;Decreased endurance; Impaired balance;Pain;Obesity; Impaired sensation;Decreased mobility  (gait deviations)   Barriers to Discharge Decreased caregiver support; Inaccessible home environment   Goals   Patient Goals to go home soon, but not today, to be more mobile by August for my cruise   STG Expiration Date 07/05/23   Short Term Goal #1 Pt will: Perform bed mobility tasks with modified I to prepare for transfers and reposition in bed  Perform transfers with modified I to improve ease of transfers  Perform ambulation with LRAD for up to 48' with S to promote proper use of assistive device and promote proper hand placement and approach  Perform 3 stairs w/ railing and w/no more than min A to return to home with KATERINA  PT Treatment Day 1   Plan   Treatment/Interventions Functional transfer training;LE strengthening/ROM; Therapeutic exercise; Endurance training;Patient/family training;Equipment eval/education; Bed mobility;Gait training;Cognitive reorientation;Elevations; Spoke to nursing;Spoke to case management   PT Frequency 4-6x/wk   Recommendation   PT Discharge Recommendation (S)  Home with outpatient rehabilitation  (pending mobility progress, pain control, performance on stairs)   Equipment Recommended Walker   Additional Comments Pt reports hesitancy to using crutches and prefers to use B hands on one rail   Additional Comments 2 Co-morbidities affecting pt's physical performance at time of assessment include but are not limited to:  Back pain, Cancer, Deep vein thrombosis of left lower extremity (01/11/2004), Diabetes mellitus, Endometrial cancer,  Radical hysterectomy (N/A, 1999); Heel spur surgery (Right, 1996); Achilles tendon repair (Right); cysto/uretero w/lithotripsy &indwell stent insrt (Left, 02/16/2021)  Personal factors affecting pt at time of IE include: steps to enter environment, past experience, inability to perform current job functions, inability to ambulate household distances, inability to navigate community distances and recent fall(s)  [facility] Basic Mobility Inpatient   Turning in Flat Bed Without Bedrails 4   Lying on Back to Sitting on Edge of Flat Bed Without Bedrails 3   Moving Bed to Chair 3   Standing Up From Chair Using Arms 3   Walk in Room 2   Climb 3-5 Stairs With Railing 1   Basic Mobility Inpatient Raw Score 16   Basic Mobility Standardized Score 38 32   Highest Level Of Mobility   JH-HLM Goal 5: Stand one or more mins   JH-HLM Achieved 6: Walk 10 steps or more  (during treatment portion of session)   Additional Treatment Session   Start Time 0905   End Time 0915   Treatment Assessment Pt needed initial steadying A and instruction to using rolling walker, but was limited to only 10' due to pain  Pt able to verbalize proper step sequencing, but uncertain if she is able to perform at this time  Equipment Use rolling walker   Additional Treatment Day 1   Exercises   Neuro re-ed Transfers S w/ stabilization of transport chair/walker  After demonstration/education: Pt performed amb w/rolling walker for 10' total with 180 turn  Antalgic gait w/limited R step length, WB into RLE  Walker shortened for improved UE support   Pt able to verbalize proper stair sequencing prior to instruction   End of Consult "  Patient Position at End of Consult Other (comment); Bedside chair; All needs within reach  (Pt in bathroom in transport [facility] w/bathroom call bell in reach)   (Please find full objective findings from PT assessment regarding body systems outlined above)  The patient's -Merged with [facility] Basic Mobility Inpatient Short Form Raw Score is 16  A Raw score of less than or equal to 16 suggests the patient may benefit from discharge to post-acute rehabilitation services, which DOES NOT coincide with CURRENT above PT recommendations as long as pt can make mobility progress in next 1-2 days including stairs        However please refer to therapist recommendation for discharge planning given other factors that may influence destination  Adapted from [employer] “6-Clicks” Basic Mobility and Daily Activity Scores With Discharge Destination  Physical Therapy, 2021;101:1-9  DOI: [id number]    Portions of the record may have been created with voice recognition software  Occasional wrong word or \"sound a like\" substitutions may have occurred due to the inherent limitations of voice recognition software    Read the chart carefully and recognize, using context, where substitutions have occurred    "

## 2023-06-25 NOTE — UTILIZATION REVIEW
"Initial Clinical Review    Admission: Date/Time/Statement:   Admission Orders (From admission, onward)     Ordered        06/23/23 2016  INPATIENT ADMISSION  Once                      Orders Placed This Encounter   Procedures   • INPATIENT ADMISSION     Standing Status:   Standing     Number of Occurrences:   1     Order Specific Question:   Level of Care     Answer:   Med Surg [16]     Order Specific Question:   Estimated length of stay     Answer:   More than 2 Midnights     Order Specific Question:   Certification     Answer:   I certify that inpatient services are medically necessary for this patient for a duration of greater than two midnights  See H&P and MD Progress Notes for additional information about the patient's course of treatment  ED Arrival Information     Expected   -    Arrival   6/23/2023 16:21    Acuity   Urgent            Means of arrival   Walk-In    Escorted by   Self    Service   Hospitalist    Admission type   Emergency            Arrival complaint   Leg Pain            Chief Complaint   Patient presents with   • Leg Pain     Pt reports hx of blood clots in R leg  Reports over the last two days she has increased pain in R leg, difficulty walking  -SOB, -CP, -numbness/tingling in leg  Takes Xarelto  Initial Presentation: 79 y o  female to ED presents self reports R thigh  Pain w ambulatory dysfunction 2ndary to pain w stiffness; reports pain w movement of R knee feeling like\" a brick\" stuck to the bottom of the R foot   Reports obtaining Pedicure then taking 5/21 cruise but denies inactivity; report compliance to Xarelto, reports Fall w ruptures Achilles tendon tract RLE 4/2023 & limping since, no Family HX of DVT    PMH  DVT x2 - one in 2002 after surgery and one unprovoked in April 2023 for which she is taking xarelto 15 QD, endometrial carcinoma s p  hysterectomy in 1999, urostomy s p  radiation induced stricture in 2004, T2DM, hypothyroid, anemia, migraine, and HTN  EXAM  VAS " reveals RLE acute non occlusive thrombosis/ one of the paired posterior tibial veins; XR Knee Probable bone infarction distal femoral shaft; labs w anemia    Date: 6/24/2023   Day 2:   Inpatient admission due to acute DVT of Femoral Vein of RLE   Xarelto 15mg BID, given dose Lovenox w consult hematology, PT/OT eval, pain management, AM CBC, SSI inpatient    Hematology/Oncology  Obtain thrombosis panel, MRI ankle & ORTHO consult, switch to Pradaxa PO daily unless found w antiphospholipid + antibx then start Warfarin w INR 2-3 goal  ED Triage Vitals   Temperature Pulse Respirations Blood Pressure SpO2   06/23/23 1624 06/23/23 1624 06/23/23 1624 06/23/23 1624 06/23/23 1624   97 9 °F (36 6 °C) 86 16 (!) 196/94 98 %      Temp Source Heart Rate Source Patient Position - Orthostatic VS BP Location FiO2 (%)   06/23/23 1624 06/23/23 1624 06/23/23 1624 06/23/23 1624 --   Oral Monitor Sitting Right arm       Pain Score       06/23/23 1806       8          Wt Readings from Last 1 Encounters:   06/24/23 99 8 kg (220 lb 0 3 oz)     Additional Vital Signs:   Date/Time Temp Pulse Resp BP MAP (mmHg) SpO2 O2 Device Patient Position - Orthostatic VS   06/25/23 07:29:10 98 °F (36 7 °C) 63 18 149/91 110 96 % -- --   06/25/23 0236 98 6 °F (37 °C) 72 17 150/58 97 97 % -- --   06/25/23 0100 98 4 °F (36 9 °C) 72 17 140/57 97 94 % -- --   06/24/23 22:12:06 98 4 °F (36 9 °C) 71 18 175/89 Abnormal  118 97 % -- --   06/24/23 19:12:51 98 4 °F (36 9 °C) 76 17 139/77 98 94 % -- --   06/24/23 15:16:17 98 1 °F (36 7 °C) 74 18 130/55 80 92 % -- --   06/24/23 11:33:46 98 1 °F (36 7 °C) 63 -- 171/90 Abnormal  117 97 % -- --   06/24/23 07:18:23 97 5 °F (36 4 °C) 63 17 132/86 101 98 % -- --   06/24/23 02:27:46 97 6 °F (36 4 °C) -- -- 150/76 101 -- -- --   06/23/23 22:33:49 97 5 °F (36 4 °C) 69 16 133/80 98 96 % -- --   06/23/23 21:11:36 -- 66 -- 187/98 Abnormal  128 98 % -- --   06/23/23 2057 -- 65 18 181/85 Abnormal  122 98 % None (Room air) Sitting "  06/23/23 2000 -- -- -- -- -- -- None (Room air) --   06/23/23 1934 -- 68 16 188/94 Abnormal  134 98 % None (Room air) Sitting   06/23/23 1700 -- -- -- -- -- -- None (Room air) --   06/23/23 1624 97 9 °F (36 6 °C) 86 16 196/94 Abnormal  135 98 % None (Room air) Sitting     Weights (last 14 days)    Date/Time Weight Height   06/24/23 11:33:46 99 8 kg (220 lb 0 3 oz) 5' 7\" (1 702 m)       Pertinent Labs/Diagnostic Test Results:   VAS lower limb venous duplex study, unilateral/limited   Final Result by Renuka Ford MD (06/23 4642)   RIGHT LOWER LIMB  There is evidence of acute non-occlusive deep vein thrombosis in the mid-distal  femoral vein and one of the paired posterior tibial veins  No evidence of superficial thrombophlebitis noted  Popliteal, posterior tibial and anterior tibial arterial Doppler waveforms are  triphasic  LEFT LOWER LIMB LIMITED  Evaluation shows no evidence of thrombus in the common femoral vein  Doppler evaluation shows a normal response to augmentation maneuvers  XR hip/pelv 2-3 vws right   ED Interpretation by Wicho Harrison DO (06/23 1938)   No acute bony abnormality      Final Result by Alexandre Reeves MD (06/24 1947)      No acute osseous abnormality  XR knee 4+ views Right injury   Final Result by Spenser Watt MD (06/23 1831)      No acute osseous abnormality        MRI inpatient order    (Results Pending)        no ecg obtained    Results from last 7 days   Lab Units 06/24/23  0513 06/23/23 1929   WBC Thousand/uL 5 07 7 40   HEMOGLOBIN g/dL 10 5* 11 1*   HEMATOCRIT % 33 5* 34 8   PLATELETS Thousands/uL 193 228   NEUTROS ABS Thousands/µL 2 52 4 15         Results from last 7 days   Lab Units 06/24/23  0513 06/23/23 1929   SODIUM mmol/L 141 140   POTASSIUM mmol/L 4 0 4 5   CHLORIDE mmol/L 114* 112*   CO2 mmol/L 23 23   ANION GAP mmol/L 4 5   BUN mg/dL 30* 36*   CREATININE mg/dL 0 93 1 12   EGFR ml/min/1 73sq m 64 51   CALCIUM mg/dL 9 5 10 4* " "    Results from last 7 days   Lab Units 06/24/23  0513 06/23/23  1929   AST U/L 11* 15   ALT U/L 13 16   ALK PHOS U/L 92 112*   TOTAL PROTEIN g/dL 6 0* 6 8   ALBUMIN g/dL 3 3* 3 9   TOTAL BILIRUBIN mg/dL 0 53 0 45     Results from last 7 days   Lab Units 06/24/23  1919 06/24/23  1543 06/24/23  1105 06/24/23  0716   POC GLUCOSE mg/dl 174* 238* 115 92     Results from last 7 days   Lab Units 06/24/23  0513 06/23/23  1929   GLUCOSE RANDOM mg/dL 93 108             No results found for: \"BETA-HYDROXYBUTYRATE\"                           Results from last 7 days   Lab Units 06/23/23  1929   PROTIME seconds 15 5*   INR  1 21*   PTT seconds 28     Results from last 7 days   Lab Units 06/24/23  0513   TSH 3RD GENERATON uIU/mL 3 244                         Results from last 7 days   Lab Units 06/24/23  0513   FERRITIN ng/mL 12   IRON SATURATION % 13*   IRON ug/dL 39*   TIBC ug/dL 311       ED Treatment:   Medication Administration from 06/23/2023 1621 to 06/23/2023 2107       Date/Time Order Dose Route Action     06/23/2023 1806 EDT HYDROcodone-acetaminophen (NORCO) 5-325 mg per tablet 1 tablet 1 tablet Oral Given     06/23/2023 1935 EDT HYDROmorphone (DILAUDID) injection 0 5 mg 0 5 mg Intravenous Given     06/23/2023 2017 EDT enoxaparin (LOVENOX) subcutaneous injection 100 mg 100 mg Subcutaneous Given        Past Medical History:   Diagnosis Date   • Anemia    • Back pain    • Bowel obstruction ([facility] ) 8022,0898   • Cancer ([facility] )     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   • Cholelithiasis    • Colon polyp    • Deep vein thrombosis of left lower extremity ([facility] ) 01/11/2004    on blood thinner for 3 years     • Diabetes mellitus ([facility] )     type 2 BS 6/6/22 @ 0600 was 145   • Disease of thyroid gland    • Endometrial cancer ([facility] ) 1999   • GERD (gastroesophageal reflux disease)    • Gross hematuria    • Hiatal hernia    • History of transfusion 1999    Post Hysterectomy   • History of urostomy 01/11/2004    " 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  • Hypothyroid    • In vitro fertilization    • Kidney stone    • Migraines    • Muscle weakness     abdominal   • Personal history of irradiation    • Renal cyst    • Sleep apnea     Resolved due to Weight Loss     Present on Admission:  • Hypothyroidism  • Type 2 diabetes mellitus (HCC)  • Anemia  • HTN (hypertension)  • Acute deep vein thrombosis (DVT) of femoral vein of right lower extremity (HCC)  • Bone infarction [facility])      Admitting Diagnosis: Bone infarction (HCC) [M87 9]  Right leg pain [M79 604]  Acute deep vein thrombosis (DVT) of femoral vein of right lower extremity (HCC) [I82 411]  Age/Sex: 79 y o  female  Admission Orders:  MRI IP  OOB  SCD    Scheduled Medications:  ascorbic acid, 125 mg, Oral, Daily  vitamin B-12, 50 mcg, Oral, Daily  dabigatran etexilate, 150 mg, Oral, Q12H [facility]  insulin lispro, 1-6 Units, Subcutaneous, 4x Daily (AC & HS)  levothyroxine, 75 mcg, Oral, Early Morning  niacin, 100 mg, Oral, HS  pantoprazole, 40 mg, Oral, Early Morning  pramipexole, 0 5 mg, Oral, HS  saccharomyces boulardii, 250 mg, Oral, Daily      Continuous IV Infusions:     PRN Meds:  acetaminophen, 650 mg, Oral, Q6H PRN  ALPRAZolam, 0 25 mg, Oral, HS PRN  hydrALAZINE, 5 mg, Intravenous, Q6H PRN  HYDROcodone-acetaminophen, 1 tablet, Oral, Q6H PRN  ondansetron, 4 mg, Intravenous, Q6H PRN        IP CONSULT TO HEMATOLOGY    Network Utilization Review Department  ATTENTION: Please call with any questions or concerns to [phone] and carefully listen to the prompts so that you are directed to the right person  All voicemails are confidential   Belem Patel all requests for admission clinical reviews, approved or denied determinations and any other requests to dedicated fax number below belonging to the campus where the patient is receiving treatment   List of dedicated fax numbers for the Facilities:  FACILITY NAME UR FAX NUMBER   ADMISSION DENIALS (Administrative/Medical Necessity) [phone]   [address] (Maternity/NICU/Pediatrics) Loan [address] Jocelin Rodriguez  [phone]   [address] [address] [facility] [place] [address] [facility] [address] [facility] [place] [address] [phone]

## 2023-06-25 NOTE — ASSESSMENT & PLAN NOTE
XR Knee showing Probable bone infarction distal femoral shaft  Pain well controlled with Norco in ED  ALP on admission 112  GGT 13    - will ask Ortho for recs  - Consider additional workup with CTA vs arterial duplex  - PT/OT  - Pain control with tylenol 650 for mild and Norco 5-325 for moderate/severe

## 2023-06-25 NOTE — ASSESSMENT & PLAN NOTE
Lab Results   Component Value Date    HGBA1C 7 1 (H) 01/18/2021       Recent Labs     06/24/23  1919 06/24/23  2209 06/25/23  0729 06/25/23  1054   POCGLU 174* 116 105 108       Blood Sugar Average: Last 72 hrs:  (P) 135 [id number]   Taking metformin and trulicity as outpatient  - SSI while inpatient

## 2023-06-25 NOTE — ASSESSMENT & PLAN NOTE
· Chronic normocytic anemia with hgb 10 5-11 5  · B12: 2600, Folate >22   · Iron deficiency    - monitor with AM CBC  - start venofer

## 2023-06-25 NOTE — PLAN OF CARE
"  Problem: PHYSICAL THERAPY ADULT  Goal: Performs mobility at highest level of function for planned discharge setting  See evaluation for individualized goals  Description: Treatment/Interventions: Functional transfer training, LE strengthening/ROM, Therapeutic exercise, Endurance training, Patient/family training, Equipment eval/education, Bed mobility, Gait training, Cognitive reorientation, Elevations, Spoke to nursing, Spoke to case management  Equipment Recommended: David Ansari       See flowsheet documentation for full assessment, interventions and recommendations  Note: Prognosis: Fair  Problem List: Decreased strength, Decreased range of motion, Decreased endurance, Impaired balance, Pain, Obesity, Impaired sensation, Decreased mobility (gait deviations)  Assessment: Pt is a 79 y o  female seen for PT evaluation s/p admit to [facility] on 6/23/2023 w/ Acute deep vein thrombosis (DVT) of femoral vein of right lower extremity ([address] )  Additionally pt also has R achilles tear, R distal femoral bone infarct  Order placed for PT, but Ortho consult now switched to amb referral   SLIM providing WB recommendations, no boot ordered  Prior to admission since April 2023 ED visit: Pt lived with spouse in a 1 floor set up, 3 steps to enter  SHe reports being seen by orthopedics nor having a boot since initial injury  Was \"hobbling around\" without DME prior to admission  Upon evaluation: Pt was supervision for transfers and steppage ambulation holding onto bilateral furniture support  Pt's clinical presentation is currently unstable/unpredictable given the functional mobility deficits above, especially (but not limited to) decreased ROM, gait deviations and pain, and combined with medical complications of abnormal H&H, readmission to hospital, fear/retreat and + DVT  She is at risk for falls based on hx of falls, impaired balance, limited sensation/neuropathy and obesity,           During this " admission, pt would benefit from continued skilled inpatient PT in the acute care setting in order to address deficits as defined above to maximize function and mobility  Recommendations:From a PT standpoint, recommend next several sessions focus on mobility training including using a rolling walker for ambulation, stair training  Would recommend follow-up from [address] [place] [address] at least for need recommendations for RLE baced on patient's presentation during evaluation/treatment (boot?)  Barriers to Discharge: Decreased caregiver support, Inaccessible home environment     PT Discharge Recommendation: (S) Home with outpatient rehabilitation (pending mobility progress, pain control, performance on stairs)    See flowsheet documentation for full assessment

## 2023-06-25 NOTE — ASSESSMENT & PLAN NOTE
Recent unprovoked DVT in  R posterior tibial and peroneal veins in the calf in 4/2023 for which she was started on xarelto 15mg QD  Has been compliant with xarelto and now presenting with unprovoked R sided acute non-occlusive deep vein thrombosis in the mid-distal femoral vein and one of the paired posterior tibial veins      Thrombosis panel:   - Antithrombin III: 88% of normal (low)    Plan:  · Anticoagulation switched to Pradaxa 150mg BID by hematology  · Thrombosis panel pending

## 2023-06-26 ENCOUNTER — TELEPHONE (OUTPATIENT)
Dept: HEMATOLOGY ONCOLOGY | Facility: CLINIC | Age: 68
End: 2023-06-26

## 2023-06-26 LAB
ALBUMIN SERPL BCP-MCNC: 3.2 G/DL (ref 3.5–5)
ALP SERPL-CCNC: 93 U/L (ref 34–104)
ALT SERPL W P-5'-P-CCNC: 10 U/L (ref 7–52)
ANION GAP SERPL CALCULATED.3IONS-SCNC: 6 MMOL/L
AST SERPL W P-5'-P-CCNC: 13 U/L (ref 13–39)
BILIRUB SERPL-MCNC: 0.47 MG/DL (ref 0.2–1)
BUN SERPL-MCNC: 22 MG/DL (ref 5–25)
CALCIUM ALBUM COR SERPL-MCNC: 10 MG/DL (ref 8.3–10.1)
CALCIUM SERPL-MCNC: 9.4 MG/DL (ref 8.4–10.2)
CHLORIDE SERPL-SCNC: 113 MMOL/L (ref 96–108)
CO2 SERPL-SCNC: 21 MMOL/L (ref 21–32)
CREAT SERPL-MCNC: 0.79 MG/DL (ref 0.6–1.3)
ERYTHROCYTE [DISTWIDTH] IN BLOOD BY AUTOMATED COUNT: 13 % (ref 11.6–15.1)
GFR SERPL CREATININE-BSD FRML MDRD: 78 ML/MIN/1.73SQ M
GLUCOSE SERPL-MCNC: 105 MG/DL (ref 65–140)
GLUCOSE SERPL-MCNC: 106 MG/DL (ref 65–140)
GLUCOSE SERPL-MCNC: 110 MG/DL (ref 65–140)
GLUCOSE SERPL-MCNC: 113 MG/DL (ref 65–140)
GLUCOSE SERPL-MCNC: 199 MG/DL (ref 65–140)
HCT VFR BLD AUTO: 34.1 % (ref 34.8–46.1)
HGB BLD-MCNC: 11.1 G/DL (ref 11.5–15.4)
MCH RBC QN AUTO: 31.2 PG (ref 26.8–34.3)
MCHC RBC AUTO-ENTMCNC: 32.6 G/DL (ref 31.4–37.4)
MCV RBC AUTO: 96 FL (ref 82–98)
PLATELET # BLD AUTO: 195 THOUSANDS/UL (ref 149–390)
PMV BLD AUTO: 9 FL (ref 8.9–12.7)
POTASSIUM SERPL-SCNC: 4.2 MMOL/L (ref 3.5–5.3)
PROT C AG ACT/NOR PPP IA: 115 % OF NORMAL (ref 60–150)
PROT S ACT/NOR PPP: >150 % (ref 68–108)
PROT SERPL-MCNC: 5.8 G/DL (ref 6.4–8.4)
RBC # BLD AUTO: 3.56 MILLION/UL (ref 3.81–5.12)
SODIUM SERPL-SCNC: 140 MMOL/L (ref 135–147)
WBC # BLD AUTO: 7.03 THOUSAND/UL (ref 4.31–10.16)

## 2023-06-26 PROCEDURE — 97166 OT EVAL MOD COMPLEX 45 MIN: CPT

## 2023-06-26 PROCEDURE — 80053 COMPREHEN METABOLIC PANEL: CPT

## 2023-06-26 PROCEDURE — 99239 HOSP IP/OBS DSCHRG MGMT >30: CPT | Performed by: INTERNAL MEDICINE

## 2023-06-26 PROCEDURE — 82948 REAGENT STRIP/BLOOD GLUCOSE: CPT

## 2023-06-26 PROCEDURE — 97116 GAIT TRAINING THERAPY: CPT

## 2023-06-26 PROCEDURE — 85027 COMPLETE CBC AUTOMATED: CPT

## 2023-06-26 RX ORDER — OXYCODONE HYDROCHLORIDE 5 MG/1
5 TABLET ORAL EVERY 4 HOURS PRN
Status: DISCONTINUED | OUTPATIENT
Start: 2023-06-26 | End: 2023-06-27 | Stop reason: HOSPADM

## 2023-06-26 RX ORDER — GABAPENTIN 300 MG/1
300 CAPSULE ORAL
Qty: 30 CAPSULE | Refills: 2 | Status: SHIPPED | OUTPATIENT
Start: 2023-06-26 | End: 2023-07-06 | Stop reason: SINTOL

## 2023-06-26 RX ORDER — DABIGATRAN ETEXILATE 150 MG/1
150 CAPSULE ORAL EVERY 12 HOURS SCHEDULED
Qty: 180 CAPSULE | Refills: 0 | Status: CANCELLED | OUTPATIENT
Start: 2023-06-26 | End: 2023-09-24

## 2023-06-26 RX ORDER — GABAPENTIN 300 MG/1
300 CAPSULE ORAL DAILY
Status: DISCONTINUED | OUTPATIENT
Start: 2023-06-26 | End: 2023-06-26

## 2023-06-26 RX ORDER — GABAPENTIN 300 MG/1
300 CAPSULE ORAL
Status: DISCONTINUED | OUTPATIENT
Start: 2023-06-26 | End: 2023-06-27 | Stop reason: HOSPADM

## 2023-06-26 RX ORDER — DABIGATRAN ETEXILATE 150 MG/1
150 CAPSULE ORAL EVERY 12 HOURS SCHEDULED
Qty: 180 CAPSULE | Refills: 0 | Status: SHIPPED | OUTPATIENT
Start: 2023-06-26 | End: 2023-06-26 | Stop reason: SDUPTHER

## 2023-06-26 RX ORDER — OXYCODONE HYDROCHLORIDE 5 MG/1
5 TABLET ORAL EVERY 4 HOURS PRN
Qty: 20 TABLET | Refills: 0 | Status: SHIPPED | OUTPATIENT
Start: 2023-06-26 | End: 2023-07-06 | Stop reason: SINTOL

## 2023-06-26 RX ORDER — DABIGATRAN ETEXILATE 150 MG/1
150 CAPSULE ORAL EVERY 12 HOURS SCHEDULED
Qty: 180 CAPSULE | Refills: 0 | Status: SHIPPED | OUTPATIENT
Start: 2023-06-26 | End: 2023-09-24

## 2023-06-26 RX ADMIN — OXYCODONE HYDROCHLORIDE 5 MG: 5 TABLET ORAL at 15:59

## 2023-06-26 RX ADMIN — INSULIN LISPRO 2 UNITS: 100 INJECTION, SOLUTION INTRAVENOUS; SUBCUTANEOUS at 22:20

## 2023-06-26 RX ADMIN — DABIGATRAN ETEXILATE MESYLATE 150 MG: 150 CAPSULE ORAL at 08:47

## 2023-06-26 RX ADMIN — DABIGATRAN ETEXILATE MESYLATE 150 MG: 150 CAPSULE ORAL at 21:23

## 2023-06-26 RX ADMIN — LEVOTHYROXINE SODIUM 75 MCG: 75 TABLET ORAL at 06:21

## 2023-06-26 RX ADMIN — OXYCODONE HYDROCHLORIDE 5 MG: 5 TABLET ORAL at 21:22

## 2023-06-26 RX ADMIN — HYDROCODONE BITARTRATE AND ACETAMINOPHEN 1 TABLET: 5; 325 TABLET ORAL at 08:49

## 2023-06-26 RX ADMIN — PANTOPRAZOLE SODIUM 40 MG: 40 TABLET, DELAYED RELEASE ORAL at 06:21

## 2023-06-26 RX ADMIN — GABAPENTIN 300 MG: 300 CAPSULE ORAL at 21:23

## 2023-06-26 RX ADMIN — PRAMIPEXOLE DIHYDROCHLORIDE 0.5 MG: 0.5 TABLET ORAL at 21:22

## 2023-06-26 RX ADMIN — Medication 250 MG: at 08:47

## 2023-06-26 RX ADMIN — ALPRAZOLAM 0.25 MG: 0.25 TABLET ORAL at 21:23

## 2023-06-26 RX ADMIN — Medication 125 MG: at 08:47

## 2023-06-26 NOTE — ASSESSMENT & PLAN NOTE
Lab Results   Component Value Date    HGBA1C 7 1 (H) 01/18/2021       Recent Labs     06/25/23  1535 06/25/23  2114 06/26/23  0631 06/26/23  1044   POCGLU 141* 129 106 110       Blood Sugar Average: Last 72 hrs:  (P) 586 [id number]   Taking metformin and trulicity as outpatient  - SSI while inpatient

## 2023-06-26 NOTE — PLAN OF CARE
Problem: PHYSICAL THERAPY ADULT  Goal: Performs mobility at highest level of function for planned discharge setting  See evaluation for individualized goals  Description: Treatment/Interventions: Functional transfer training, LE strengthening/ROM, Therapeutic exercise, Endurance training, Patient/family training, Equipment eval/education, Bed mobility, Gait training, Cognitive reorientation, Elevations, Spoke to nursing, Spoke to case management  Equipment Recommended: Blu Howell       See flowsheet documentation for full assessment, interventions and recommendations  Outcome: Adequate for Discharge  Note: Prognosis: Fair  Problem List: Decreased strength, Decreased range of motion, Decreased endurance, Impaired balance, Pain, Obesity, Impaired sensation, Decreased mobility (gait deviations)  Assessment: Pt was able to amb w/rolling walker for longer distances , but still needs physical A support w/ SPC and with more gait deviations  Additionally pt was able to perform stairs, and only needed steadying A, but prefers to sequence in non traditional sequence w/B rails vs one rail  Pt verbalized understanding of car transfers, eventual transition to cane  Based on pt's home support and DME, Nilesh Lawson is adequate for DC from PT standpoint w/ continued OPPT  Barriers to Discharge: Decreased caregiver support, Inaccessible home environment     PT Discharge Recommendation: Home with outpatient rehabilitation    See flowsheet documentation for full assessment

## 2023-06-26 NOTE — TELEPHONE ENCOUNTER
Patient Call    Who are you speaking with? Radha from [place] [address]    If it is not the patient, are they listed on an active communication consent form? N/A   What is the reason for this call? Radha from [place] [address] calling to request medical clearance for patient to have a dental implant with IV sedation  Patient is currently on Xarelto - office would like to know if this would need to be held before/after the surgery  Fax: [phone]   Does this require a call back? No   If a call back is required, please list best call back number [phone] with any questions   If a call back is required, advise that a message will be forwarded to their care team and someone will return their call as soon as possible  Did you relay this information to the patient?  N/A

## 2023-06-26 NOTE — ASSESSMENT & PLAN NOTE
· HTN on admission with resolution without intervention  · Not on home BP medications    Plan  · Hydralazine PRN for SBP >180

## 2023-06-26 NOTE — ASSESSMENT & PLAN NOTE
· Chronic normocytic anemia with hgb 10 5-11 5  · B12: 2600, Folate >22   · Iron deficiency    Plan  · Monitor with AM CBC  · One time 300mg venofer yesterday

## 2023-06-26 NOTE — CASE MANAGEMENT
Case Management Discharge Planning Note    Patient name Song [address] /S -77 MRN [id number]  : 1955 Date 2023       Current Admission Date: 2023  Current Admission Diagnosis:Acute deep vein thrombosis (DVT) of femoral vein of right lower extremity [facility])   Patient Active Problem List    Diagnosis Date Noted   • Bone infarction ([facility] ) 2023   • Acute deep vein thrombosis (DVT) of femoral vein of right lower extremity ([facility] ) 2023   • Ureteral-ileal loop anastomotic stricture 2021   • Headache 2021   • Class 2 obesity in adult 2021   • Elevated lactic acid level 2021   • Tooth infection 2019   • Abdominal adhesions 2019   • HTN (hypertension) 2017   • Migraine 2017   • Hydroureteronephrosis 2017   • Anemia 2017   • Small bowel obstruction ([facility] ) 2016   • Type 2 diabetes mellitus ([facility] ) 2016   • History of urostomy 2016   • Endometrial cancer ([facility] ) 2016   • Hypothyroidism 2016   • In vitro fertilization       LOS (days): 3  Geometric Mean LOS (GMLOS) (days):   Days to GMLOS:     OBJECTIVE:  Risk of Unplanned Readmission Score: 15 48         Current admission status: Inpatient   Preferred Pharmacy:   [facility] [address] [place] [address]  Phone: [phone] Fax: [phone]    Primary Care Provider: Christiano Danielle DO    Primary Insurance: BLUE CROSS  Secondary Insurance: MEDICARE    DISCHARGE DETAILS:  CM spoke with patient's [facility] pharmacy at   Patient's Pradaxa has an OOP cost of $35 00  Pharmacy has to order and will not be ready until after 1600 tomorrow  CM contacted [address]  CM reviewed with pharmacist Pradaxa script on the phone  Per [facility], they have medication in stock  SLIM updated       SLIM spoke with patient and she is open to Pradaxa being sent to [facility] pharmacy to be filled  CM sent referral via parachute to [address] for RW  Patient's copay is $10 69  CM spoke with patient at bedside  Patient okay with copay of $10 69  CM delivered RW to bedside  Patient signed delivery ticket and provided copy at the bedside  Patient requested walker skiers  CM discussed with patient at bedside that walker skiers are not in stock here but CM can send referral to [facility] and home them delivered to the home  Patient requesting referral be sent to [facility] for walker skiers  CM sent referral via parachute to [address] [place] [address] for walker skiers to be delivered to the home  CM spoke with [facility] pharmacist  Patient's Pradaxa script requiring a prior authorization  Prior Authorization number is   CM updated SLIM  SLIM contacted number and patient's should be able to get generic brand without prior authorization  [facility] pharmacy does not have generic brand  CM contacted [facility] pharmacy in [place]  Spoke with pharmacist  Pharmacy has 120 capsules of Praxada at that strength available  SLIM updated

## 2023-06-26 NOTE — CASE MANAGEMENT
Case Management Discharge Planning Note    Patient name Igor Livingston  Location S /S -55 MRN [id number]  : 1955 Date 2023       Current Admission Date: 2023  Current Admission Diagnosis:Acute deep vein thrombosis (DVT) of femoral vein of right lower extremity [facility])   Patient Active Problem List    Diagnosis Date Noted   • Bone infarction ([facility] ) 2023   • Acute deep vein thrombosis (DVT) of femoral vein of right lower extremity ([facility] ) 2023   • Ureteral-ileal loop anastomotic stricture 2021   • Headache 2021   • Class 2 obesity in adult 2021   • Elevated lactic acid level 2021   • Tooth infection 2019   • Abdominal adhesions 2019   • HTN (hypertension) 2017   • Migraine 2017   • Hydroureteronephrosis 2017   • Anemia 2017   • Small bowel obstruction ([facility] ) 2016   • Type 2 diabetes mellitus ([facility] ) 2016   • History of urostomy 2016   • Endometrial cancer ([facility] ) 2016   • Hypothyroidism 2016   • In vitro fertilization       LOS (days): 3  Geometric Mean LOS (GMLOS) (days):   Days to GMLOS:     OBJECTIVE:  Risk of Unplanned Readmission Score: 15 85         Current admission status: Inpatient   Preferred Pharmacy:   [facility] [address] [place] [address]  Phone: [phone] Fax: [phone]    [id number] [address] [address]  Phone: [phone] Fax: [phone]    Primary Care Provider: Mary Flood DO    Primary Insurance: BLUE CROSS  Secondary Insurance: MEDICARE    DISCHARGE DETAILS:  CM contacted [facility] pharmacy in [place]  CM spoke with pharmacist  Pharmacist stated they do not have generic brand of Pradaxa  They only have name brand   Per pharmacist, [place] requests generic brands be filled first  Physician or patient would have to request name brand and it may not be covered/higher copay cost  Pharmacist also stated if hospital pharmacy is requesting prior authorization, [facility] will most likely also need one  CM updated SLIM

## 2023-06-26 NOTE — OCCUPATIONAL THERAPY NOTE
Occupational Therapy Evaluation     Patient Name: Jeanie Queen  [id number]'[id number] Date: 6/26/2023  Problem List  Principal Problem:    Acute deep vein thrombosis (DVT) of femoral vein of right lower extremity (HCC)  Active Problems:    Type 2 diabetes mellitus (HCC)    Hypothyroidism    Anemia    HTN (hypertension)    Bone infarction [facility])    Past Medical History  Past Medical History:   Diagnosis Date    Anemia     Back pain     Bowel obstruction ([address] ) 3372,2887    Cancer ([facility] [address] )     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    Cholelithiasis     Colon polyp     Deep vein thrombosis of left lower extremity ([facility] [address] ) 01/11/2004    on blood thinner for 3 years  Diabetes mellitus ([facility] [address] )     type 2 BS 6/6/22 @ 0600 was 145    Disease of thyroid gland     Endometrial cancer ([facility] [address] ) 1999    GERD (gastroesophageal reflux disease)     Gross hematuria     Hiatal hernia     History of transfusion 1999    Post Hysterectomy    History of urostomy 01/11/2004    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  Hypothyroid     In vitro fertilization     Kidney stone     Migraines     Muscle weakness     abdominal    Personal history of irradiation     Renal cyst     Sleep apnea     Resolved due to Weight Loss     Past Surgical History  Past Surgical History:   Procedure Laterality Date    ACHILLES TENDON REPAIR Right     APPENDECTOMY      COLONOSCOPY      6/6/22    COLONOSCOPY W/ BIOPSIES N/A 12/2015    EGD      HEEL SPUR SURGERY Right 1996    HYSTERECTOMY      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    IR NEPHROSTOMY TUBE PLACEMENT  01/18/2021    LAPAROSCOPIC GASTRIC BANDING N/A 2006    [place], [place]    OTHER SURGICAL HISTORY  2004    Urine shunt to intestine, transverse colon    PCNL Left 02/16/2021    Procedure: NEPHROLITHOTOMY  PERCUTANEOUS (PCNL);   Surgeon: Cassie Sanderson MD;  [facility];  Service: Urology    NE CYSTO/URETERO W/LITHOTRIPSY &INDWELL STENT INSRT Left "02/16/2021    Procedure: anterograde  URETEROSCOPY with dilation of ureteral stricture, INSERTION STENT URETERAL, exchange  of nephrostomy tube; Surgeon: Vonda Barbosa MD;  [facility];  Service: Urology    KS LAPAROSCOPY ENTEROLYSIS SEPARATE PROCEDURE N/A 01/17/2019    Procedure: LAPAROSCOPIC LYSIS OF ADHESIONS;  Surgeon: Agusto Patton MD;  Location: [facility] MAIN OR;  Service: [address] endometrial cancer  SLEEVE GASTROPLASTY N/A 09/2015    Dr Rosas Dsouza, [address] 2010    [address] ARTHROPLASTY          06/26/23 0913   OT Last Visit   OT Visit Date 06/26/23   Note Type   Note type Evaluation   Pain Assessment   Pain Assessment Tool 0-10   Pain Score 7   Restrictions/Precautions   Weight Bearing Precautions Per Order Yes   RLE Weight Bearing Per Order WBAT   Other Precautions Chair Alarm; Bed Alarm; Fall Risk;Pain   Home Living   Type of [address] [place] [address] One level  (3 KATERINA w/ B rails)   Bathroom Shower/Tub Tub/shower unit   Bathroom Toilet Standard   Bathroom Equipment   (pt did not answer)   [address]  (\"threw crutches in closet (FOF), pt reports primarily furniture cruising @ home)   Prior Function   Level of [place] Independent with functional mobility; Independent with ADLs; Independent with IADLS   Lives With Spouse   Receives Help From Family  (prn)   IADLs Independent with medication management; Independent with meal prep; Independent with driving   Falls in the last 6 months 1 to 4  (april)   Vocational   (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)   Lifestyle   Autonomy pta pt reports being (i) w/ adls and iadls, (+) , lives w spouse and he is able to help, 1 fall in last 6 months   Reciprocal Relationships spouse   Service to Others nan   Intrinsic Gratification her job   General   Additional " "Pertinent History anemia, HTN, bone infarction, ttype 2 diabetes mellkitus   Family/Caregiver Present No   Subjective   Subjective \"I would like to wash my hair but not with   ADL   Where Assessed Edge of bed   Eating Assistance 5  Supervision/Setup   UB Bathing Assistance 5  Supervision/Setup   LB Bathing Assistance 4  Minimal Assistance   UB Dressing Assistance 5  Supervision/Setup   LB Dressing Assistance 4  Minimal Assistance   LB Dressing Deficit Don/doff R sock; Don/doff L sock; Thread RLE into underwear; Thread LLE into underwear;Pull up over hips; Increased time to complete;Supervision/safety;Steadying   Toileting Assistance  5  Supervision/Setup   Bed Mobility   Rolling R 5  Supervision   Additional items Assist x 1;HOB elevated; Bedrails; Increased time required   Sit to Supine   (unable to assess, pt with PTBrandi at end of evaluation)   Transfers   Sit to Stand 6  Modified independent   Additional items Assist x 1; Increased time required;Armrests   Stand to Sit 6  Modified independent   Additional items Increased time required;Armrests;Assist x 1   Functional Mobility   Functional Mobility 5  Supervision   Additional Comments supervision x1   Additional items Rolling walker   Balance   Static Sitting Good   Dynamic Sitting Fair   Static Standing Fair -   Dynamic Standing Fair -   Ambulatory Poor +   Activity Tolerance   Activity Tolerance Patient limited by pain; Patient limited by fatigue   Medical Staff Made Aware spoke w/ Tonia Mota from [facility] [address] spoke to RN   RUE Assessment   RUE Assessment WFL   LUE Assessment   LUE Assessment WFL   Vision-Basic Assessment   Current Vision Wears contacts   Vision - Complex Assessment   Acuity Able to read clock/calendar on wall without difficulty; Able to read employee name badge without difficulty   Psychosocial   Psychosocial (WDL) WDL   Cognition   Overall Cognitive Status WFL   Arousal/Participation Alert; Cooperative   Attention Within functional limits " Orientation Level Oriented X4   Memory Within functional limits   Following Commands Follows one step commands with increased time or repetition   Assessment   Limitation Decreased ADL status; Decreased endurance;Decreased self-care trans;Decreased high-level ADLs   Prognosis Good   Assessment Pt is a 79 y o  female seen for OT evaluation s/p [facility] on 6/23/2023  diagnosed with Acute deep vein thrombosis (DVT) of femoral vein of right lower extremity ([address] )  Personal and env factors supporting pt at time of IE include age, (I) PLOF, supportive spouse and attitude towards recovery  Personal and env factors inhibiting engagement in occupations include current habits and behavioral patterns, lifestyle patterns, difficulty completing ADLs and difficulty completing IADLs  Performance deficits that affect the pt’s occupational performance can be seen above  Due to pt's current functional limitations and medical complications pt is functioning below baseline  Pt would benefit from continued skilled OT treatment in order to maximize safety, independence and overall performance with ADLs, IADLs, functional mobility and functional transfers in order to achieve highest level of function  Goals   Patient Goals to go home   LTG Time Frame 10-14   Long Term Goal see below   Plan   Treatment Interventions ADL retraining;Functional transfer training; Endurance training;Equipment evaluation/education;Patient/family training; Compensatory technique education; Energy conservation; Activityengagement   Goal Expiration Date 07/10/23   OT Treatment Day 0   OT Frequency 2-3x/wk   Recommendation   OT Discharge Recommendation Home with home health rehabilitation   AM-PAC Daily Activity Inpatient   Lower Body Dressing 3   Bathing 3   Toileting 3   Upper Body Dressing 3   Grooming 4   Eating 4   Daily Activity Raw Score 20   Daily Activity Standardized Score (Calc for Raw Score >=11) 42 03   AM-PAC Applied Cognition Inpatient Following a Speech/Presentation 4   Understanding Ordinary Conversation 4   Taking Medications 4   Remembering Where Things Are Placed or Put Away 4   Remembering List of 4-5 Errands 4   Taking Care of Complicated Tasks 3   Applied Cognition Raw Score 23   Applied Cognition Standardized Score 53 08   End of Consult   Education Provided Yes  (pt was educated on how to complete safe transfer)   Patient Position at End of Consult Other (comment)  (w/ PT Beth Ocampo)   Nurse Communication Nurse aware of consult     GOALS    Pt will improve activity tolerance to G for min 30 min txment sessions for increase engagement in functional tasks    Pt will complete bed mobility at a Mod I level w/ G balance/safety demonstrated to decrease caregiver assistance required     Pt will complete UB dressing/self care w/ mod I using adaptive device and DME as needed     Pt will complete LB dressing/self care w/ mod I using adaptive device and DME as needed    Pt will complete toileting w/ mod I w/ G hygiene/thoroughness using DME as needed    Pt will improve functional transfers to Mod I on/off all surfaces using DME as needed w/ G balance/safety     Pt will improve functional mobility during ADL/IADL/leisure tasks to Mod I using DME as  Pt will participate in simulated IADL management task to increase independence to Mod I w/ G safety and endurance      Pt will verbalize 3 potential fall hazards and identify appropriate compensatory techniques to decrease fall risk in home environment     Pt will increase seated tolerance to 10 with good dynamic seated balance to increase safety during participation in ADLs     Pt will increase standing tolerance to 15 mins with good dynamic standing balance to increase safety during participation in ADLs       The patient's raw score on the AM-PAC Daily Activity Inpatient Short Form is 20  A raw score of greater than or equal to 19 suggests the patient may benefit from discharge to home   Please refer to the recommendation of the Occupational Therapist for safe discharge planning  This session, pt required and most appropriately benefited from skilled OT/PT co-treat and co-eval due to decreased activity tolerance and unpredictable medical and/or functional status  OT and PT goals were addressed separately as seen in documentation       CHEKO Maier/GIBSON

## 2023-06-26 NOTE — QUICK NOTE
Patient unable to be discharged today due to issues with obtaining Pradaxa from both local pharmacies and [facility]  Patient and patient's spouse made aware at the bedside and they are in agreement with discharge tomorrow with a plan to obtain the medication from [facility] depending on prior authorization

## 2023-06-26 NOTE — CASE MANAGEMENT
Case Management Assessment & Discharge Planning Note    Patient name Igor Livingston  Location S /S -56 MRN [id number]  : 1955 Date 2023       Current Admission Date: 2023  Current Admission Diagnosis:Acute deep vein thrombosis (DVT) of femoral vein of right lower extremity [facility])   Patient Active Problem List    Diagnosis Date Noted   • Bone infarction ([facility] [address] ) 2023   • Acute deep vein thrombosis (DVT) of femoral vein of right lower extremity ([facility] [address] ) 2023   • Ureteral-ileal loop anastomotic stricture 2021   • Headache 2021   • Class 2 obesity in adult 2021   • Elevated lactic acid level 2021   • Tooth infection 2019   • Abdominal adhesions 2019   • HTN (hypertension) 2017   • Migraine 2017   • Hydroureteronephrosis 2017   • Anemia 2017   • Small bowel obstruction ([facility] [address] ) 2016   • Type 2 diabetes mellitus ([facility] [address] ) 2016   • History of urostomy 2016   • Endometrial cancer ([facility] [address] ) 2016   • Hypothyroidism 2016   • In vitro fertilization       LOS (days): 3  Geometric Mean LOS (GMLOS) (days):   Days to GMLOS:     OBJECTIVE:    Risk of Unplanned Readmission Score: 15 28         Current admission status: Inpatient       Preferred Pharmacy:   [facility] [id number] [facility], [address] [place] [address]  Phone: [phone] Fax: [phone]    Primary Care Provider: Mary Flood DO    Primary Insurance: BLUE CROSS  Secondary Insurance: MEDICARE    ASSESSMENT:  [address], [address] O[address] Representative - Spouse   Primary Phone: [phone] (Mobile)  Home Phone: [phone]               Advance Directives  Does patient have a [address]?: Yes  Primary Contact: Patient's spouse Damaris Fisher         Readmission Root Cause  30 Day Readmission: No    Patient Information  Admitted from[de-identified] Home  Mental Status: Alert  During Assessment patient was accompanied by: Not accompanied during assessment  Assessment information provided by[de-identified] Patient  Primary Caregiver: Spouse  Caregiver's Name[de-identified] Patient's spouse Jess Tobar Relationship to Patient[de-identified] Family Member  Support Systems: Self, Spouse/significant other, Family members  [place] of Residence: [address] do you live in?: [place] entry access options   Select all that apply : Stairs  Number of steps to enter home : 3  Do the steps have railings?: Yes  Type of Current Residence: [place]  In the last 12 months, was there a time when you were not able to pay the mortgage or rent on time?: No  In the last 12 months, was there a time when you did not have a steady place to sleep or slept in a shelter (including now)?: No  Homeless/housing insecurity resource given?: No  Living Arrangements: Lives w/ Spouse/significant other    Activities of Daily Living Prior to Admission  Functional Status: Independent  Completes ADLs independently?: Yes  Ambulates independently?: Yes  Does patient use assisted devices?: No  Does patient currently own DME?: Yes  What DME does the patient currently own?: Crutches  Does patient have a history of Outpatient Therapy (PT/OT)?: No  Does the patient have a history of Short-Term Rehab?: No  Does patient have a history of HHC?: No  [facility]         Patient Information Continued  Income Source: Pension/assisted  Does patient have prescription coverage?: Yes  Food insecurity resource given?: No  Does patient receive dialysis treatments?: No  Does patient have a history of substance abuse?: No  Does patient have a history of Mental Health Diagnosis?: No    PHQ 2/9 Screening   Reviewed PHQ 2/9 Depression Screening Score?: No    Means of Transportation  Means of Transport to Appts[de-identified] Drives Self  In the past 12 months, has lack of transportation kept you from medical appointments or from getting medications?: No  In the past 12 months, has lack of transportation kept you from meetings, work, or from getting things needed for daily living?: No  Was application for public transport provided?: No    DISCHARGE DETAILS:    Discharge planning discussed with[de-identified] patient  Freedom of Choice: Yes     CM contacted family/caregiver?: No- see comments (Patient updating spouse)  Were Treatment Team discharge recommendations reviewed with patient/caregiver?: Yes  Did patient/caregiver verbalize understanding of patient care needs?: Yes  Were patient/caregiver advised of the risks associated with not following Treatment Team discharge recommendations?: Yes    [facility]         Is the patient interested in [facility] at discharge?: No    DME Referral Provided  Referral made for DME?: No    Other Referral/Resources/Interventions Provided:  Interventions: Outpatient PT, Outpatient OT  Referral Comments: CM provided Outpatient PT list to patient at bedside    Treatment Team Recommendation: Home  Discharge Destination Plan[de-identified] Home  Transport at Discharge : Family     CM spoke with patient at the bedside  CM introduced self and role  Patient lives with her spouse Neto Gannon in a one level home  Patient independent at baseline  Patient has crutches at home that she does not use  No other DME noted  Patient's pharmacy preference at discharge is [address]  Patient's spouse is able to transport after work around [phone]  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  Patient active at home  CM discussed with patient per PT, the recommendation at discharge is Outpatient PT  CM provided Outpatient PT list to patient at bedside for reference  Patient aware that Outpatient PT may have an OOP cost  CM discussed DME at discharge  Patient expressed concern ambulating with RW  Per patient, she is having difficulty with RW and dragging her foot  CM reached out to PT if able to see her at bedside  CM will continue to f/u for any DCP needs  All questions/concerns answered at this time  CM reviewed discharge planning process including the following: identifying caregivers at home, preference for d/c planning needs,   availability of [facility] Meds to Bed program, availability of treatment team to discuss questions or concerns patient and/or family may have regarding diagnosis, plan of care, old or new medications and discharge planning   CM will continue to follow for care coordination and update assessment as appropriate

## 2023-06-26 NOTE — ASSESSMENT & PLAN NOTE
· XR Knee showing Probable bone infarction distal femoral shaft  · Pain well controlled with Norco in ED  · ALP on admission 112  · GGT 13  · Ortho suspects bone infarction is incidental finding    Plan:  · Outpatient PT   · Pain control with tylenol 650 for mild and Norco 5-325 for moderate/severe  · Gabapentin 300 mg once daily and oxycodone 5 mg PRN for pain

## 2023-06-26 NOTE — PLAN OF CARE
Problem: OCCUPATIONAL THERAPY ADULT  Goal: Performs self-care activities at highest level of function for planned discharge setting  See evaluation for individualized goals  Description: Treatment Interventions: ADL retraining, Functional transfer training, Endurance training, Equipment evaluation/education, Patient/family training, Compensatory technique education, Energy conservation, Activityengagement          See flowsheet documentation for full assessment, interventions and recommendations  Note: Limitation: Decreased ADL status, Decreased endurance, Decreased self-care trans, Decreased high-level ADLs  Prognosis: Good  Assessment: Pt is a 79 y o  female seen for OT evaluation s/p [facility]  diagnosed with Acute deep vein thrombosis (DVT) of femoral vein of right lower extremity ([facility] [address] )  Personal and env factors supporting pt at time of IE include age, (I) PLOF, supportive spouse and attitude towards recovery  Personal and env factors inhibiting engagement in occupations include current habits and behavioral patterns, lifestyle patterns, difficulty completing ADLs and difficulty completing IADLs  Performance deficits that affect the pt’s occupational performance can be seen above  Due to pt's current functional limitations and medical complications pt is functioning below baseline  Pt would benefit from continued skilled OT treatment in order to maximize safety, independence and overall performance with ADLs, IADLs, functional mobility and functional transfers in order to achieve highest level of function       OT Discharge Recommendation: Home with home health rehabilitation

## 2023-06-26 NOTE — DISCHARGE INSTR - AVS FIRST PAGE
Dear Ned Benítez,     It was our pleasure to care for you here at [facility]  It is our hope that we were always able to exceed the expected standards for your care during your stay  You were hospitalized due to leg pain  You were cared for on the 4th floor by Geraldine Ruiz DO under the service of Chris Nur MD with the [facility] who covers for your primary care physician (PCP), Elda Rutledge DO, while you were hospitalized  If you have any questions or concerns related to this hospitalization, you may contact us at [phone]  For follow up as well as any medication refills, we recommend that you follow up with your primary care physician  A registered nurse will reach out to you by phone within a few days after your discharge to answer any additional questions that you may have after going home  However, at this time we provide for you here, the most important instructions / recommendations at discharge:     Notable Medication Adjustments -   Please start taking Pradaxa 150 mg twice daily indefinitely  Please start taking Gabapentin 300 mg daily at bedtime  Testing Required after Discharge -   Blood work for antiphospholipid antibody panel  Important follow up information -   Please [facility] with [facility] [place] in one week  Please follow-up with outpatient physical therapy  Other Instructions -   None  Please review this entire after visit summary as additional general instructions including medication list, appointments, activity, diet, any pertinent wound care, and other additional recommendations from your care team that may be provided for you        Sincerely,     Geraldine Ruiz DO

## 2023-06-26 NOTE — PHYSICAL THERAPY NOTE
"PHYSICAL THERAPY TREATMENT NOTE  NAME:  Lokesh Milton  DATE: 06/26/23    Length Of Stay: 3  Performed at least 2 patient identifiers during session: Name and Epic photo    TREATMENT:    06/26/23 1126   PT Last Visit   PT Visit Date 06/26/23   Note Type   Note Type Treatment   Pain Assessment   Pain Assessment Tool 0-10   Pain Score 7   Pain Location/Orientation Orientation: Right;Location: Leg;Location: Knee   Effect of Pain on Daily Activities limits speed and indep of mobility, especially transitional movements   Patient's Stated Pain Goal No pain   Hospital Pain Intervention(s) Ambulation/increased activity;Repositioned   Restrictions/Precautions   Weight Bearing Precautions Per Order Yes   RLE Weight Bearing Per Order WBAT   Other Precautions Bed Alarm; Chair Alarm;Pain; Fall Risk   General   Chart Reviewed Yes   Response to Previous Treatment Patient reporting fatigue but able to participate  Family/Caregiver Present No   Cognition   Overall Cognitive Status WFL   Memory Within functional limits   Following Commands Follows one step commands with increased time or repetition  (MMT)   Subjective   Subjective Upon entering room, patient sitting at edge of bed with occupational therapy patient agreeable to walk, wants to try a single-point cane  However patient continues to be resistent to using/ trialing crutches  Bed Mobility   Supine to Sit Unable to assess   Transfers   Sit to Stand 6  Modified independent   Additional items Assist x 1; Increased time required;Armrests   Stand to Sit 6  Modified independent   Additional items Increased time required;Armrests   Ambulation/Elevation   Gait pattern Decreased R stance; Antalgic   Gait Assistance 5  Supervision   Additional items Assist x 1  (w/rolling walker, see below for cane trials)   Assistive Device Rolling walker   Distance 20'x2   Stair Management Assistance 4  Minimal assist  (steadying A)   Additional items Verbal cues  (for \"standard recommendations\" " w/ sequencing, however pt trialing opposite sequence )   Stair Management Technique Foreward; Step to pattern;Nonreciprocal;Two rails; One rail L;One rail R  (forward ascend and descend  Pt performed using B railings, and unilateral rail w/B hands (attempted on both L and R rail)  Pt alternated between ascending w/involved and non involved LE, but no LE buckling )   Ambulation/Elevation Additional Comments Cane fitted for pt's height  Additional gait training w/unilateral support of fixed bed rail x 3'x2, then transitioning to [facility] w/min A for physical A in non cane hand for 10'   Balance   Static Sitting Good   Static Standing Fair -   Ambulatory Poor +   Endurance Deficit   Endurance Deficit Yes   Endurance Deficit Description limited amb distance, increased pain reports @ knee with longer WB times and longer distances  Activity Tolerance   Activity Tolerance Patient limited by pain; Patient limited by fatigue   Medical Staff Made Aware spoke to Naheed Calloway from case management; spoke to [facility] from [address] spoke to RN   Exercises   Neuro re-ed Education to pt re: impression read on my chart and her symptoms, Verbal/demonstration recommendations for car transfers   Assessment   Prognosis Fair   Problem List Decreased strength;Decreased range of motion;Decreased endurance; Impaired balance;Pain;Obesity; Impaired sensation;Decreased mobility  (gait deviations)   Assessment Pt was able to amb w/rolling walker for longer distances , but still needs physical A support w/ SPC and with more gait deviations  Additionally pt was able to perform stairs, and only needed steadying A, but prefers to sequence in non traditional sequence w/B rails vs one rail  Pt verbalized understanding of car transfers, eventual transition to cane  Based on pt's home support and DME, Poonam Amador is adequate for DC from PT standpoint w/ continued OPPT   Barriers to Discharge Decreased caregiver support; Inaccessible home environment   Goals Patient Goals to go home, improvements in LE pain and strength   STG Expiration Date 07/05/23   PT Treatment Day 2   Plan   Treatment/Interventions LE strengthening/ROM; Functional transfer training;Elevations; Therapeutic exercise; Endurance training;Patient/family training;Equipment eval/education; Bed mobility;Gait training;Spoke to nursing;Spoke to case management;OT   Progress Progressing toward goals   PT Frequency 4-6x/wk  (if not DCed)   Recommendation   PT Discharge Recommendation Home with outpatient rehabilitation   Equipment Recommended Walker  (for level distance)   [facility] Basic Mobility Inpatient   Turning in Flat Bed Without Bedrails 4   Lying on Back to Sitting on Edge of Flat Bed Without Bedrails 3   Moving Bed to Chair 3   Standing Up From Chair Using Arms 4   Walk in Room 3   Climb 3-5 Stairs With Railing 3   Basic Mobility Inpatient Raw Score 20   Basic Mobility Standardized Score 43 99   Highest Level Of Mobility   -[facility] Goal 6: Walk 10 steps or more   -HL Achieved 7   Education   Education Provided Mobility training;Assistive device   Patient Explanation/teachback used; Reinforcement needed   End of Consult   Patient Position at End of Consult All needs within reach;Bed/Chair alarm activated; Bedside chair     The patient's [facility] Basic Mobility Inpatient Short Form Raw Score is 20  A Raw score of greater than 16 suggests the patient may benefit from discharge to home, which DOES coincide with CURRENT above PT recommendations  However please refer to therapist recommendation for discharge planning given other factors that may influence destination  Adapted from [employer] [facility] “6-Clicks” Basic Mobility and Daily Activity Scores With Discharge Destination  Physical Therapy, 2021;101:1-9   DOI: [email or web address]    Jay Caballero, PT, DPT

## 2023-06-26 NOTE — ASSESSMENT & PLAN NOTE
Lab Results   Component Value Date    HGBA1C 7 1 (H) 01/18/2021       Recent Labs     06/25/23  1054 06/25/23  1535 06/25/23  2114 06/26/23  0631   POCGLU 108 141* 129 106       Blood Sugar Average: Last 72 hrs:  (P) 132 4   Taking metformin and trulicity as outpatient  - SSI while inpatient

## 2023-06-26 NOTE — ASSESSMENT & PLAN NOTE
· Recent unprovoked DVT in  R posterior tibial and peroneal veins in the calf in 4/2023 for which she was started on xarelto 15mg QD  · Has been compliant with xarelto and now presenting with unprovoked R sided acute non-occlusive deep vein thrombosis in the mid-distal femoral vein and one of the paired posterior tibial veins    · Thrombosis panel: Antithrombin III: 88% of normal (low)    Plan:  · Anticoagulation switched to Pradaxa 150mg BID by hematology  · Gabapentin 300 mg once daily at night for pain  · Oxycodone 5 mg PRN for pain  · Test for Anti-phospholipid antibodies  · Outpatient PT

## 2023-06-26 NOTE — DISCHARGE SUMMARY
[facility]  Discharge- Maria C Tyler 1955, 79 y o  female MRN: [id number]  Unit/Bed#: S -01 Encounter: [id number]  Primary Care Provider: Edwina Ventura DO   Date and time admitted to hospital: 6/23/2023  4:26 PM    * Acute deep vein thrombosis (DVT) of femoral vein of right lower extremity ([address] )  Assessment & Plan  · Recent unprovoked DVT in  R posterior tibial and peroneal veins in the calf in 4/2023 for which she was started on xarelto 15mg QD  · Has been compliant with xarelto and now presenting with unprovoked R sided acute non-occlusive deep vein thrombosis in the mid-distal femoral vein and one of the paired posterior tibial veins    · Thrombosis panel: Antithrombin III: 88% of normal (low)    Plan:  · Anticoagulation switched to Pradaxa 150mg BID by hematology  · Gabapentin 300 mg once daily at night for pain  · Oxycodone 5 mg PRN for pain  · Test for Anti-phospholipid antibodies  · Outpatient PT    Bone infarction [facility])  Assessment & Plan  · XR Knee showing Probable bone infarction distal femoral shaft  · Pain well controlled with Norco in ED  · ALP on admission 112  · GGT 13  · Ortho suspects bone infarction is incidental finding    Plan:  · Outpatient PT   · Pain control with tylenol 650 for mild and Norco 5-325 for moderate/severe  · Gabapentin 300 mg once daily and oxycodone 5 mg PRN for pain    HTN (hypertension)  Assessment & Plan  · HTN on admission with resolution without intervention  · Not on home BP medications    Plan  · Hydralazine PRN for SBP >180    Anemia  Assessment & Plan  · Chronic normocytic anemia with hgb 10 5-11 5  · B12: 2600, Folate >22   · Iron deficiency    Plan  · Monitor with AM CBC  · One time 300mg venofer yesterday    Hypothyroidism  Assessment & Plan  Continue PTA levothyroxine    Type 2 diabetes mellitus [facility])  Assessment & Plan  Lab Results   Component Value Date    HGBA1C 7 1 (H) 01/18/2021       Recent Labs     06/25/23  1535 06/25/23 2114 06/26/23  0631 06/26/23  1044   POCGLU 141* 129 106 110       Blood Sugar Average: Last 72 hrs:  (P) 121 [id number]   Taking metformin and trulicity as outpatient  - SSI while inpatient      Medical Problems     Resolved Problems  Date Reviewed: 6/25/2023   None       Discharging Resident: Ebony Ashraf DO  Discharging Attending: Panda Adrian MD  PCP: Ck Ku DO  Admission Date:   Admission Orders (From admission, onward)     Ordered        06/23/23 2016  INPATIENT ADMISSION  Once                      Discharge Date: 06/26/23    Consultations During Hospital Stay:  · Hematology, PT, OT    Procedures Performed:   · None    Significant Findings / Test Results:   · VAS right lower limb: There is evidence of acute non-occlusive deep vein thrombosis in the mid-distal femoral vein and one of the paired posterior tibial veins    Incidental Findings:   · XR R knee: Probable bone infarction distal femoral shaft  · I reviewed the above mentioned incidental findings with the patient and/or family and they expressed understanding  Test Results Pending at Discharge (will require follow up): · Antiphospholipid antibody      Outpatient Tests Requested:  · None    Complications:  none    Reason for Admission: R femoral vein DVT    Hospital Course:   Micheal Parker is a 79 y o  female patient with PMH of 2 prior DVTs, endometrial carcinoma status post hysterectomy, type 2 diabetes, hypothyroidism, anemia, and hypertension who originally presented to the hospital on 6/23/2023 due to increased pain in R leg and difficulty walking  The pain started 2-3 days prior to admission and she had discomfort in right posterior thigh/knee with swelling  She takes Xarelto for anticoagulation and is compliant and has not missed any doses since February when she had her prior DVT     She went to the ED due to difficulty walking and had an X-ray R knee and hip/pelxis showed no acute osseous abnormality, "but probable bone infarction distal femoral shaft  She was given Norco and Dilaudid for pain  VAS right lower limb with evidence of acute non-occlusive deep vein thrombosis in the mid-distal femoral vein and one of the paired posterior tibial veins and no DVT in left lower limb  She was given a one time dose of Lovenox  On 6/24, she had an ECHO that showed LVEF of 60%, left ventricular cavity size is normal, wall thickness is mildly increased,  mild concentric hypertrophy, systolic function is normal  Diastolic function is mildly abnormal, consistent with grade I (abnormal) relaxation  Hematology was consulted and recommended switch from Xarelto to Pradaxa  On 6/25, PT evaluated patient and recommended outpatient rehab  She had an MRI ankle/heel due to concern for achilles tendon rupture from previous injury that showed Achilles tendinosis, but no tear  On 6/26, patient is stable, but continues to have R leg pain and difficulty ambulating  Her thrombosis panel showed decreased Antithrombin III: 88% of normal   Patient started on gabapentin 300 mg once daily at night and oxycodone 5 mg PRN for pain  Follow-up with anti-phospholipid test and referral for new PCP because patient's previous PCP has retired  Please see above list of diagnoses and related plan for additional information  Condition at Discharge: stable    Discharge Day Visit / Exam:   Subjective:  Patient reports she slept well  The pain in her R leg is about the same and she still cannot walk much  She denies chest pain or SOB  She had a bowel movement yesterday  No other concerns     Vitals: Blood Pressure: 131/86 (06/26/23 4183)  Pulse: 67 (06/26/23 0632)  Temperature: 97 7 °F (36 5 °C) (06/26/23 0632)  Temp Source: Oral (06/26/23 1133)  Respirations: 18 (06/26/23 3044)  Height: 5' 7\" (170 2 cm) (06/24/23 1133)  Weight - Scale: 99 8 kg (220 lb 0 3 oz) (06/24/23 1133)  SpO2: 95 % (06/26/23 5817)  Exam:   Physical Exam  Vitals " reviewed  Constitutional:       General: She is not in acute distress  Appearance: Normal appearance  She is well-developed  HENT:      Head: Normocephalic and atraumatic  Eyes:      Conjunctiva/sclera: Conjunctivae normal    Cardiovascular:      Rate and Rhythm: Normal rate and regular rhythm  Pulses: Normal pulses  Heart sounds: Normal heart sounds  No murmur heard  Pulmonary:      Effort: Pulmonary effort is normal  No respiratory distress  Breath sounds: Normal breath sounds  Abdominal:      Palpations: Abdomen is soft  Tenderness: There is no abdominal tenderness  Musculoskeletal:      Cervical back: Neck supple  Right lower leg: Swelling and tenderness (deep palpation) present  Left lower leg: No swelling  Right foot: Decreased range of motion  Skin:     General: Skin is warm and dry  Capillary Refill: Capillary refill takes less than 2 seconds  Findings: No erythema or rash  Neurological:      Mental Status: She is alert  Psychiatric:         Mood and Affect: Mood normal          Discussion with Family: Patient declined call to   Discharge instructions/Information to patient and family:   See after visit summary for information provided to patient and family  Provisions for Follow-Up Care:  See after visit summary for information related to follow-up care and any pertinent home health orders  Disposition:   Home    Planned Readmission: None    Discharge Medications:  See after visit summary for reconciled discharge medications provided to patient and/or family        **Please Note: This note may have been constructed using a voice recognition system**

## 2023-06-27 VITALS
BODY MASS INDEX: 34.53 KG/M2 | SYSTOLIC BLOOD PRESSURE: 153 MMHG | WEIGHT: 220.02 LBS | DIASTOLIC BLOOD PRESSURE: 82 MMHG | HEIGHT: 67 IN | TEMPERATURE: 97.4 F | OXYGEN SATURATION: 95 % | HEART RATE: 67 BPM | RESPIRATION RATE: 18 BRPM

## 2023-06-27 LAB
APTT HEX PL PPP: 6 SEC (ref 0–11)
APTT SCREEN TO CONFIRM RATIO: 0.8 RATIO (ref 0–1.34)
APTT-LA IMM 4:1 NP PPP: 43.4 SEC (ref 0–40.5)
B2 GLYCOPROT1 IGA SERPL IA-ACNC: 2.5
B2 GLYCOPROT1 IGG SERPL IA-ACNC: 0.9
B2 GLYCOPROT1 IGM SERPL IA-ACNC: <2.4
CARDIOLIPIN IGA SER IA-ACNC: 4.6
CARDIOLIPIN IGG SER IA-ACNC: 0.5
CARDIOLIPIN IGM SER IA-ACNC: 0.9
CONFIRM APTT/NORMAL: 54.2 SEC (ref 0–47.6)
DRVVT IMM 1:2 NP PPP: 85.6 SEC (ref 0–40.4)
DRVVT SCREEN TO CONFIRM RATIO: 1.7 RATIO (ref 0.8–1.2)
GLUCOSE SERPL-MCNC: 180 MG/DL (ref 65–140)
GLUCOSE SERPL-MCNC: 80 MG/DL (ref 65–140)
LA PPP-IMP: ABNORMAL
PROT S ACT/NOR PPP: 106 % (ref 61–136)
PROT S PPP-ACNC: 102 % (ref 60–150)
SCREEN APTT: 45.5 SEC (ref 0–43.5)
SCREEN DRVVT: 137.5 SEC (ref 0–47)
THROMBIN TIME: 15.6 SEC (ref 0–23)

## 2023-06-27 PROCEDURE — 82948 REAGENT STRIP/BLOOD GLUCOSE: CPT

## 2023-06-27 PROCEDURE — 99239 HOSP IP/OBS DSCHRG MGMT >30: CPT | Performed by: INTERNAL MEDICINE

## 2023-06-27 RX ADMIN — LEVOTHYROXINE SODIUM 75 MCG: 75 TABLET ORAL at 06:09

## 2023-06-27 RX ADMIN — OXYCODONE HYDROCHLORIDE 5 MG: 5 TABLET ORAL at 08:06

## 2023-06-27 RX ADMIN — Medication 250 MG: at 08:06

## 2023-06-27 RX ADMIN — PANTOPRAZOLE SODIUM 40 MG: 40 TABLET, DELAYED RELEASE ORAL at 06:09

## 2023-06-27 RX ADMIN — INSULIN LISPRO 1 UNITS: 100 INJECTION, SOLUTION INTRAVENOUS; SUBCUTANEOUS at 11:16

## 2023-06-27 RX ADMIN — Medication 125 MG: at 08:06

## 2023-06-27 RX ADMIN — DABIGATRAN ETEXILATE MESYLATE 150 MG: 150 CAPSULE ORAL at 08:06

## 2023-06-27 RX ADMIN — OXYCODONE HYDROCHLORIDE 5 MG: 5 TABLET ORAL at 14:58

## 2023-06-27 NOTE — PROGRESS NOTES
PT Evaluation     Today's date: 2023  Patient name: Lokesh Milton  : 1955  MRN: [id number]  Referring provider: Casey Agarwal DO  Dx:   Encounter Diagnosis     ICD-10-CM    1  Acute deep vein thrombosis (DVT) of femoral vein of right lower extremity (HCC)  I82 [id number] Ambulatory Referral to Physical Therapy    On xeralto      2  Weakness of right lower extremity  [id number]                      Assessment  Assessment details: Lokesh Milton is a pleasant 79 y o  female who presents following acute DVT  Pt demonstrates generalized LE weakness and fatigue with sit to stands, gait, and bed mobility  Pt has pain with active and passive DF and hamstring lengthening however tolerated seated heel/toe raises without disomfort  Pt demonstrates decreased WB tolerance in RLE during sit to stands and decreased heel contact and stance time on affected leg during gait  Pt was given an HEP focused on gentle motion, education of light cardio/activity, and was instructed on avoiding pain and was educated on warning/red flag signs to be aware of if her clot was to be thrown  The patient's greatest concerns are the pain she is experiencing, concern at no signs of improvement and fear of not being able to keep active  No further referral appears necessary at this time based upon examination results  Primary Impairments:  1) fatigue  2) generalized LE weakness  3) limited LE flexibility    Etiologic factors include hx of DVT  Impairments: abnormal gait, abnormal or restricted ROM, activity intolerance, impaired balance, impaired physical strength, lacks appropriate home exercise program and pain with function    Symptom irritability: moderateUnderstanding of Dx/Px/POC: good   Prognosis: good  Prognosis details: Positive prognostic indicators include positive attitude toward recovery, good understanding of diagnosis and treatment plan options, acuity of symptoms and absence of peripheralization    Negative prognostic indicators include hypertension, high symptom irritability and obesity  Goals  Short term goals (3-4 week)  1) Pt will decrease pain to 3/10 at its worst   2) Pt will increase MMT by 1/2 in all deficient muscle groups  3) Pt will improve TUG time by 10 seconds to demonstrate improvement in functional strength and gait  4) Pt will be independent with basic HEP  Long term goals (6-8 weeks)  1) Pt will abolish pain with all activities  2) Patient will have no limitation for ambulatory activities without an AD  3)Pt will achieve MMT of 4+/5 in all deficient muscle groups  4) Patient will be independent with extensive home exercise program          Plan  Patient would benefit from: skilled physical therapy  Referral necessary: No  Planned modality interventions: Modalities PRN  Planned therapy interventions: activity modification, manual therapy, neuromuscular re-education, patient education, therapeutic activities, therapeutic exercise, graded activity, home exercise program, behavior modification and self care  Frequency: 1x week  Duration in weeks: 12  Treatment plan discussed with: patient        Subjective Evaluation    History of Present Illness  Mechanism of injury: History of Current Injury: Pt reports recent unprovoked DVT in R posterior tibial and peroneal veins in the calf in 4/2023  She was given xarelto and has been compliant with it and now presents with unprovoked R sided acute non-occlusive deep vein thrombosis in the mid-distal femoral vein and one of the paired posterior tibial veins  She just got home from the hospital yesterday after a few day stay  She does have a history of DVT in the L leg following a surgery  Since this has occurred she has been having difficulty with walking  She is currently using a RW for ambulation secondary to imbalance and pain  She was not using an AD for ambulation prior to this acute issue  Stairs are okay but she navigates them sideways   Pt had her last "doppler last week  Pain location/Descriptors: posterior calf/thigh throbbing/ache as well as anterior shin/quad throbbing  Aggravating factors: after activity, walking  Easing factors: walking,   AM/PM pattern: sometimes worse in the morning  Special Questions: Pt denies N/T, SOB, lightheadedness, chest pain  Patient goals: Pt wants to be able to walk and ADLs without limitation or AD  Occupation:  on the side            Not a recurrent problem   Quality of life: fair    Pain  Current pain rating: 3  At best pain ratin  At worst pain rating: 10    Social Support  Steps to enter house: yes (3 steps)  Stairs in house: no   Lives in: [employer] house  Lives with: spouse    Employment status: not working        Objective     Passive Range of Motion     Right Hip   Flexion: WFL  Abduction: 20 degrees   External rotation (90/90): 40 degrees with pain  Internal rotation (90/90): 5 degrees with pain    Strength/Myotome Testing     Left Hip   Planes of Motion   Flexion: 4+  Abduction: 4+  Adduction: 5  External rotation: 5  Internal rotation: 5    Right Hip   Planes of Motion   Flexion: 4-  Abduction: 4-  Adduction: 4+  External rotation: 4+  Internal rotation: 4+    Left Knee   Flexion: 5  Extension: 5  Quadriceps contraction: good    Right Knee   Flexion: 4  Extension: 4  Quadriceps contraction: fair    Left Ankle/Foot   Dorsiflexion: 5  Plantar flexion: 5    Right Ankle/Foot   Dorsiflexion: 4+  Plantar flexion: 4    Ambulation     Observational Gait   Decreased right stance time     Right foot contact pattern: foot flat    Additional Observational Gait Details  With RW    Comments   TU 08 seconds             Precautions: acute DVT RLE                   Manuals             PROM RLE 3'                                                   Neuro Re-Ed             LAQ 20x3\" HEP            Rhomberg stance             Tandem stance             Quad set 5x3\" pain                                                " Ther Ex             seated Heel/toe raises 20x HEP            Supine hip abduction 10x HEP            Gentle calf stretch             Gentle seated hamstring stretch                                                                 Ther Activity                                       Gait Training             RW  1 lap                         Modalities                                       Assessment IE, POC, Prognosis            Education HEP, POC, Prognosis

## 2023-06-27 NOTE — TELEPHONE ENCOUNTER
Elective procedure  Will be scheduled for end of July (no earlier)  Hold Xarelto 2 days prior to procedure and restart one day after    Clearance faxed

## 2023-06-27 NOTE — PLAN OF CARE
Problem: Potential for Falls  Goal: Patient will remain free of falls  Description: INTERVENTIONS:  - Educate patient/family on patient safety including physical limitations  - Instruct patient to call for assistance with activity   - Consult OT/PT to assist with strengthening/mobility   - Keep Call bell within reach  - Keep bed low and locked with side rails adjusted as appropriate  - Keep care items and personal belongings within reach  - Initiate and maintain comfort rounds  - Make Fall Risk Sign visible to staff  - Offer Toileting every 2 Hours, in advance of need  - Initiate/Maintain alarm  - Obtain necessary fall risk management equipment  - Apply yellow socks and bracelet for high fall risk patients  - Consider moving patient to room near nurses station  Outcome: Progressing     Problem: MOBILITY - ADULT  Goal: Maintain or return to baseline ADL function  Description: INTERVENTIONS:  -  Assess patient's ability to carry out ADLs; assess patient's baseline for ADL function and identify physical deficits which impact ability to perform ADLs (bathing, care of mouth/teeth, toileting, grooming, dressing, etc )  - Assess/evaluate cause of self-care deficits   - Assess range of motion  - Assess patient's mobility; develop plan if impaired  - Assess patient's need for assistive devices and provide as appropriate  - Encourage maximum independence but intervene and supervise when necessary  - Involve family in performance of ADLs  - Assess for home care needs following discharge   - Consider OT consult to assist with ADL evaluation and planning for discharge  - Provide patient education as appropriate  Outcome: Progressing  Goal: Maintains/Returns to pre admission functional level  Description: INTERVENTIONS:  - Perform BMAT or MOVE assessment daily    - Set and communicate daily mobility goal to care team and patient/family/caregiver     - Collaborate with rehabilitation services on mobility goals if consulted  - Perform Range of Motion 3 times a day  - Reposition patient every 2 hours    - Dangle patient 3 times a day  - Stand patient 3 times a day  - Ambulate patient 3 times a day  - Out of bed to chair 3 times a day   - Out of bed for meals 3 times a day  - Out of bed for toileting  - Record patient progress and toleration of activity level   Outcome: Progressing     Problem: PAIN - ADULT  Goal: Verbalizes/displays adequate comfort level or baseline comfort level  Description: Interventions:  - Encourage patient to monitor pain and request assistance  - Assess pain using appropriate pain scale  - Administer analgesics based on type and severity of pain and evaluate response  - Implement non-pharmacological measures as appropriate and evaluate response  - Consider cultural and social influences on pain and pain management  - Notify physician/advanced practitioner if interventions unsuccessful or patient reports new pain  Outcome: Progressing     Problem: SAFETY ADULT  Goal: Patient will remain free of falls  Description: INTERVENTIONS:  - Educate patient/family on patient safety including physical limitations  - Instruct patient to call for assistance with activity   - Consult OT/PT to assist with strengthening/mobility   - Keep Call bell within reach  - Keep bed low and locked with side rails adjusted as appropriate  - Keep care items and personal belongings within reach  - Initiate and maintain comfort rounds  - Make Fall Risk Sign visible to staff  - Offer Toileting every 2 Hours, in advance of need  - Initiate/Maintain alarm  - Obtain necessary fall risk management equipment  - Apply yellow socks and bracelet for high fall risk patients  - Consider moving patient to room near nurses station  Outcome: Progressing  Goal: Maintain or return to baseline ADL function  Description: INTERVENTIONS:  -  Assess patient's ability to carry out ADLs; assess patient's baseline for ADL function and identify physical deficits which impact ability to perform ADLs (bathing, care of mouth/teeth, toileting, grooming, dressing, etc )  - Assess/evaluate cause of self-care deficits   - Assess range of motion  - Assess patient's mobility; develop plan if impaired  - Assess patient's need for assistive devices and provide as appropriate  - Encourage maximum independence but intervene and supervise when necessary  - Involve family in performance of ADLs  - Assess for home care needs following discharge   - Consider OT consult to assist with ADL evaluation and planning for discharge  - Provide patient education as appropriate  Outcome: Progressing  Goal: Maintains/Returns to pre admission functional level  Description: INTERVENTIONS:  - Perform BMAT or MOVE assessment daily    - Set and communicate daily mobility goal to care team and patient/family/caregiver  - Collaborate with rehabilitation services on mobility goals if consulted  - Perform Range of Motion 3 times a day  - Reposition patient every 2 hours    - Dangle patient 3 times a day  - Stand patient 3 times a day  - Ambulate patient 3 times a day  - Out of bed to chair 3 times a day   - Out of bed for meals 3 times a day  - Out of bed for toileting  - Record patient progress and toleration of activity level   Outcome: Progressing     Problem: METABOLIC, FLUID AND ELECTROLYTES - ADULT  Goal: Electrolytes maintained within normal limits  Description: INTERVENTIONS:  - Monitor labs and assess patient for signs and symptoms of electrolyte imbalances  - Administer electrolyte replacement as ordered  - Monitor response to electrolyte replacements, including repeat lab results as appropriate  - Instruct patient on fluid and nutrition as appropriate  Outcome: Progressing  Goal: Fluid balance maintained  Description: INTERVENTIONS:  - Monitor labs   - Monitor I/O and WT  - Instruct patient on fluid and nutrition as appropriate  - Assess for signs & symptoms of volume excess or deficit  Outcome: Progressing  Goal: Glucose maintained within target range  Description: INTERVENTIONS:  - Monitor Blood Glucose as ordered  - Assess for signs and symptoms of hyperglycemia and hypoglycemia  - Administer ordered medications to maintain glucose within target range  - Assess nutritional intake and initiate nutrition service referral as needed  Outcome: Progressing     Problem: CARDIOVASCULAR - ADULT  Goal: Maintains optimal cardiac output and hemodynamic stability  Description: INTERVENTIONS:  - Monitor I/O, vital signs and rhythm  - Monitor for S/S and trends of decreased cardiac output  - Administer and titrate ordered vasoactive medications to optimize hemodynamic stability  - Assess quality of pulses, skin color and temperature  - Assess for signs of decreased coronary artery perfusion  - Instruct patient to report change in severity of symptoms  Outcome: Progressing  Goal: Absence of cardiac dysrhythmias or at baseline rhythm  Description: INTERVENTIONS:  - Continuous cardiac monitoring, vital signs, obtain 12 lead EKG if ordered  - Administer antiarrhythmic and heart rate control medications as ordered  - Monitor electrolytes and administer replacement therapy as ordered  Outcome: Progressing

## 2023-06-27 NOTE — DISCHARGE SUMMARY
[facility]  Discharge- Ivin Florentino 1955, 79 y o  female MRN: [id number]  Unit/Bed#: S -01 Encounter: [id number]  Primary Care Provider: Emile Romeo DO   Date and time admitted to hospital: 6/23/2023  4:26 PM    * Acute deep vein thrombosis (DVT) of femoral vein of right lower extremity ([address] )  Assessment & Plan  · Recent unprovoked DVT in  R posterior tibial and peroneal veins in the calf in 4/2023 for which she was started on xarelto 15mg QD  · Has been compliant with xarelto and now presenting with unprovoked R sided acute non-occlusive deep vein thrombosis in the mid-distal femoral vein and one of the paired posterior tibial veins    · Thrombosis panel: Antithrombin III: 88% of normal (low)    Plan:  · Anticoagulation switched to Pradaxa 150mg BID by hematology  · Gabapentin 300 mg once daily at night for pain  · Oxycodone 5 mg PRN for pain  · Test for Anti-phospholipid antibodies  · Outpatient PT    Bone infarction [facility])  Assessment & Plan  · XR Knee showing Probable bone infarction distal femoral shaft  · Pain well controlled with Norco in ED  · ALP on admission 112  · GGT 13  · Ortho suspects bone infarction is incidental finding    Plan:  · Outpatient PT   · Pain control with tylenol 650 for mild and Norco 5-325 for moderate/severe  · Gabapentin 300 mg once daily and oxycodone 5 mg PRN for pain    HTN (hypertension)  Assessment & Plan  · HTN on admission with resolution without intervention  · Not on home BP medications    Plan  · Hydralazine PRN for SBP >180    Anemia  Assessment & Plan  · Chronic normocytic anemia with hgb 10 5-11 5  · B12: 2600, Folate >22   · Iron deficiency    Plan  · Monitor with AM CBC  · One time 300mg venofer yesterday    Hypothyroidism  Assessment & Plan  Continue PTA levothyroxine    Type 2 diabetes mellitus [facility])  Assessment & Plan  Lab Results   Component Value Date    HGBA1C 7 1 (H) 01/18/2021       Recent Labs     06/25/23  1535 06/25/23 2114 06/26/23  0631 06/26/23  1044   POCGLU 141* 129 106 110       Blood Sugar Average: Last 72 hrs:  (P) 517 [id number]   Taking metformin and trulicity as outpatient  - SSI while inpatient             Medical Problems      Resolved Problems  Date Reviewed: 6/25/2023   None         Discharging Resident: Leonela Pal DO  Discharging Attending: Carrie Arambula MD  PCP: Radha Verde DO  Admission Date:       Admission Orders (From admission, onward)       Ordered         06/23/23 2016   INPATIENT ADMISSION  Once                         Discharge Date: 06/27/23     Consultations During Hospital Stay:  • Hematology, PT, OT     Procedures Performed:   • None     Significant Findings / Test Results:   • VAS right lower limb: There is evidence of acute non-occlusive deep vein thrombosis in the mid-distal femoral vein and one of the paired posterior tibial veins     Incidental Findings:   • XR R knee: Probable bone infarction distal femoral shaft  • I reviewed the above mentioned incidental findings with the patient and/or family and they expressed understanding      Test Results Pending at Discharge (will require follow up): • Antiphospholipid antibody      Outpatient Tests Requested:  • None     Complications:  none     Reason for Admission: R femoral vein DVT     Hospital Course:   Cedric Johnson is a 79 y o  female patient with PMH of 2 prior DVTs, endometrial carcinoma status post hysterectomy, type 2 diabetes, hypothyroidism, anemia, and hypertension who originally presented to the hospital on 6/23/2023 due to increased pain in R leg and difficulty walking  The pain started 2-3 days prior to admission and she had discomfort in right posterior thigh/knee with swelling  She takes Xarelto for anticoagulation and is compliant and has not missed any doses since February when she had her prior DVT     She went to the ED due to difficulty walking and had an X-ray R knee and hip/pelxis showed no acute osseous abnormality, but probable bone infarction distal femoral shaft  She was given Norco and Dilaudid for pain  VAS right lower limb with evidence of acute non-occlusive deep vein thrombosis in the mid-distal femoral vein and one of the paired posterior tibial veins and no DVT in left lower limb  She was given a one time dose of Lovenox        On 6/24, she had an ECHO that showed LVEF of 60%, left ventricular cavity size is normal, wall thickness is mildly increased,  mild concentric hypertrophy, systolic function is normal  Diastolic function is mildly abnormal, consistent with grade I (abnormal) relaxation  Hematology was consulted and recommended switch from Xarelto to Pradaxa  On 6/25, PT evaluated patient and recommended outpatient rehab  She had an MRI ankle/heel due to concern for achilles tendon rupture from previous injury that showed Achilles tendinosis, but no tear        On 6/26, patient is stable, but continues to have R leg pain and difficulty ambulating  Her thrombosis panel showed decreased Antithrombin III: 88% of normal   Patient started on gabapentin 300 mg once daily at night and oxycodone 5 mg PRN for pain  Follow-up with anti-phospholipid test and referral for new PCP because patient's previous PCP has retired  She patient was ready for discharge but unfortunately required staying another night due to issues with obtaining Pradaxa from multiple pharmacies  She was discharged on 6/27 with instructions to obtain the Pradaxa from her local pharmacy      Please see above list of diagnoses and related plan for additional information       Condition at Discharge: stable     Discharge Day Visit / Exam:   Subjective:  Patient reports she slept well  The pain in her R leg is about the same but she has been able to tolerate walking more with the walker  She denies chest pain or SOB  No other concerns at this time and she is eager for discharge    Vitals: Blood Pressure: 131/86 "(06/26/23 0632)  Pulse: 67 (06/26/23 0632)  Temperature: 97 7 °F (36 5 °C) (06/26/23 0632)  Temp Source: Oral (06/26/23 2752)  Respirations: 18 (06/26/23 0884)  Height: 5' 7\" (170 2 cm) (06/24/23 1133)  Weight - Scale: 99 8 kg (220 lb 0 3 oz) (06/24/23 1133)  SpO2: 95 % (06/26/23 2228)  Exam:   Physical Exam  Vitals reviewed  Constitutional:       General: She is not in acute distress  Appearance: Normal appearance  She is well-developed  HENT:      Head: Normocephalic and atraumatic  Eyes:      Conjunctiva/sclera: Conjunctivae normal    Cardiovascular:      Rate and Rhythm: Normal rate and regular rhythm  Pulses: Normal pulses  Heart sounds: Normal heart sounds  No murmur heard  Pulmonary:      Effort: Pulmonary effort is normal  No respiratory distress  Breath sounds: Normal breath sounds  Abdominal:      Palpations: Abdomen is soft  Tenderness: There is no abdominal tenderness  Musculoskeletal:      Cervical back: Neck supple  Right lower leg: Swelling and tenderness (deep palpation) present  Left lower leg: No swelling  Right foot: Decreased range of motion  Skin:     General: Skin is warm and dry  Capillary Refill: Capillary refill takes less than 2 seconds  Findings: No erythema or rash  Neurological:      Mental Status: She is alert  Psychiatric:         Mood and Affect: Mood normal             Discussion with Family: Patient declined call to        Discharge instructions/Information to patient and family:   See after visit summary for information provided to patient and family        Provisions for Follow-Up Care:  See after visit summary for information related to follow-up care and any pertinent home health orders         Disposition:   Home     Planned Readmission: None     Discharge Medications:  See after visit summary for reconciled discharge medications provided to patient and/or family        **Please Note: This note may " have been constructed using a voice recognition system**

## 2023-06-27 NOTE — PLAN OF CARE
Problem: MOBILITY - ADULT  Goal: Maintain or return to baseline ADL function  Description: INTERVENTIONS:  -  Assess patient's ability to carry out ADLs; assess patient's baseline for ADL function and identify physical deficits which impact ability to perform ADLs (bathing, care of mouth/teeth, toileting, grooming, dressing, etc )  - Assess/evaluate cause of self-care deficits   - Assess range of motion  - Assess patient's mobility; develop plan if impaired  - Assess patient's need for assistive devices and provide as appropriate  - Encourage maximum independence but intervene and supervise when necessary  - Involve family in performance of ADLs  - Assess for home care needs following discharge   - Consider OT consult to assist with ADL evaluation and planning for discharge  - Provide patient education as appropriate  Outcome: Progressing     Problem: PAIN - ADULT  Goal: Verbalizes/displays adequate comfort level or baseline comfort level  Description: Interventions:  - Encourage patient to monitor pain and request assistance  - Assess pain using appropriate pain scale  - Administer analgesics based on type and severity of pain and evaluate response  - Implement non-pharmacological measures as appropriate and evaluate response  - Consider cultural and social influences on pain and pain management  - Notify physician/advanced practitioner if interventions unsuccessful or patient reports new pain  Outcome: Progressing     Problem: SAFETY ADULT  Goal: Maintain or return to baseline ADL function  Description: INTERVENTIONS:  -  Assess patient's ability to carry out ADLs; assess patient's baseline for ADL function and identify physical deficits which impact ability to perform ADLs (bathing, care of mouth/teeth, toileting, grooming, dressing, etc )  - Assess/evaluate cause of self-care deficits   - Assess range of motion  - Assess patient's mobility; develop plan if impaired  - Assess patient's need for assistive devices and provide as appropriate  - Encourage maximum independence but intervene and supervise when necessary  - Involve family in performance of ADLs  - Assess for home care needs following discharge   - Consider OT consult to assist with ADL evaluation and planning for discharge  - Provide patient education as appropriate  Outcome: Progressing     Problem: CARDIOVASCULAR - ADULT  Goal: Maintains optimal cardiac output and hemodynamic stability  Description: INTERVENTIONS:  - Monitor I/O, vital signs and rhythm  - Monitor for S/S and trends of decreased cardiac output  - Administer and titrate ordered vasoactive medications to optimize hemodynamic stability  - Assess quality of pulses, skin color and temperature  - Assess for signs of decreased coronary artery perfusion  - Instruct patient to report change in severity of symptoms  Outcome: Progressing     Problem: CARDIOVASCULAR - ADULT  Goal: Absence of cardiac dysrhythmias or at baseline rhythm  Description: INTERVENTIONS:  - Continuous cardiac monitoring, vital signs, obtain 12 lead EKG if ordered  - Administer antiarrhythmic and heart rate control medications as ordered  - Monitor electrolytes and administer replacement therapy as ordered  Outcome: Progressing

## 2023-06-28 ENCOUNTER — EVALUATION (OUTPATIENT)
Dept: PHYSICAL THERAPY | Facility: CLINIC | Age: 68
End: 2023-06-28
Payer: COMMERCIAL

## 2023-06-28 DIAGNOSIS — I82.411 ACUTE DEEP VEIN THROMBOSIS (DVT) OF FEMORAL VEIN OF RIGHT LOWER EXTREMITY (HCC): Primary | ICD-10-CM

## 2023-06-28 DIAGNOSIS — R29.898 WEAKNESS OF RIGHT LOWER EXTREMITY: ICD-10-CM

## 2023-06-28 LAB
DME PARACHUTE DELIVERY DATE ACTUAL: NORMAL
DME PARACHUTE DELIVERY DATE REQUESTED: NORMAL
DME PARACHUTE ITEM DESCRIPTION: NORMAL
DME PARACHUTE ORDER STATUS: NORMAL
DME PARACHUTE SUPPLIER NAME: NORMAL
DME PARACHUTE SUPPLIER PHONE: NORMAL

## 2023-06-28 PROCEDURE — 97161 PT EVAL LOW COMPLEX 20 MIN: CPT

## 2023-06-28 PROCEDURE — 97112 NEUROMUSCULAR REEDUCATION: CPT

## 2023-06-28 PROCEDURE — 97110 THERAPEUTIC EXERCISES: CPT

## 2023-06-28 NOTE — UTILIZATION REVIEW
NOTIFICATION OF ADMISSION DISCHARGE   This is a Notification of Discharge from [facility]  Please be advised that this patient has been discharge from our facility  Below you will find the admission and discharge date and time including the patient’s disposition  UTILIZATION REVIEW CONTACT:  Farida Menon MA  Utilization   Network Utilization Review Department  Phone: [phone] x carefully listen to the prompts  All voicemails are confidential   Email: [email or web address]     ADMISSION INFORMATION  PRESENTATION DATE: 6/23/2023  4:26 PM  OBERVATION ADMISSION DATE:   INPATIENT ADMISSION DATE: 6/23/23  8:16 PM   DISCHARGE DATE: 6/27/2023  5:44 PM   DISPOSITION:Home/Self Care    IMPORTANT INFORMATION:  Send all requests for admission clinical reviews, approved or denied determinations and any other requests to dedicated fax number below belonging to the campus where the patient is receiving treatment   List of dedicated fax numbers:  [address] DENIALS (Administrative/Medical Necessity) [phone]   [address] (Maternity/NICU/Pediatrics) [phone]   [facility] [phone]   [facility] 87 [phone]   [facility] [phone]   220 [facility] [phone] 90 [facility] [phone]   [address] [place], [place] [address] 119 [phone]   [facility]  [phone]   [address] [facility] [phone]   412 [facility] 850 [facility] [phone]

## 2023-07-03 LAB
DME PARACHUTE DELIVERY DATE ACTUAL: NORMAL
DME PARACHUTE DELIVERY DATE REQUESTED: NORMAL
DME PARACHUTE ITEM DESCRIPTION: NORMAL
DME PARACHUTE ORDER STATUS: NORMAL
DME PARACHUTE SUPPLIER NAME: NORMAL
DME PARACHUTE SUPPLIER PHONE: NORMAL
F2 GENE MUT ANL BLD/T: NORMAL
F5 GENE MUT ANL BLD/T: NORMAL
Lab: NORMAL
Lab: NORMAL

## 2023-07-06 ENCOUNTER — OFFICE VISIT (OUTPATIENT)
Dept: INTERNAL MEDICINE CLINIC | Facility: CLINIC | Age: 68
End: 2023-07-06
Payer: COMMERCIAL

## 2023-07-06 VITALS
TEMPERATURE: 98.3 F | OXYGEN SATURATION: 98 % | HEIGHT: 66 IN | BODY MASS INDEX: 35.45 KG/M2 | WEIGHT: 220.6 LBS | HEART RATE: 66 BPM | SYSTOLIC BLOOD PRESSURE: 140 MMHG | DIASTOLIC BLOOD PRESSURE: 98 MMHG

## 2023-07-06 DIAGNOSIS — E06.3 HYPOTHYROIDISM DUE TO HASHIMOTO'S THYROIDITIS: Chronic | ICD-10-CM

## 2023-07-06 DIAGNOSIS — C54.1 ENDOMETRIAL CANCER (HCC): Chronic | ICD-10-CM

## 2023-07-06 DIAGNOSIS — E66.2 CLASS 2 OBESITY WITH ALVEOLAR HYPOVENTILATION WITHOUT SERIOUS COMORBIDITY WITH BODY MASS INDEX (BMI) OF 36.0 TO 36.9 IN ADULT (HCC): ICD-10-CM

## 2023-07-06 DIAGNOSIS — R76.0 ANTI-CARDIOLIPIN ANTIBODY POSITIVE: ICD-10-CM

## 2023-07-06 DIAGNOSIS — S86.009A ACHILLES TENDON INJURY: ICD-10-CM

## 2023-07-06 DIAGNOSIS — Z79.4 TYPE 2 DIABETES MELLITUS WITH OTHER SPECIFIED COMPLICATION, WITH LONG-TERM CURRENT USE OF INSULIN (HCC): ICD-10-CM

## 2023-07-06 DIAGNOSIS — E11.69 TYPE 2 DIABETES MELLITUS WITH OTHER SPECIFIED COMPLICATION, WITH LONG-TERM CURRENT USE OF INSULIN (HCC): ICD-10-CM

## 2023-07-06 DIAGNOSIS — I82.411 ACUTE DEEP VEIN THROMBOSIS (DVT) OF FEMORAL VEIN OF RIGHT LOWER EXTREMITY (HCC): ICD-10-CM

## 2023-07-06 DIAGNOSIS — D50.9 IRON DEFICIENCY ANEMIA, UNSPECIFIED IRON DEFICIENCY ANEMIA TYPE: ICD-10-CM

## 2023-07-06 DIAGNOSIS — Z76.89 ENCOUNTER TO ESTABLISH CARE: Primary | ICD-10-CM

## 2023-07-06 DIAGNOSIS — M17.4 OTHER SECONDARY OSTEOARTHRITIS OF BOTH KNEES: ICD-10-CM

## 2023-07-06 DIAGNOSIS — Z43.6 ENCOUNTER FOR ATTENTION TO OTHER ARTIFICIAL OPENINGS OF URINARY TRACT (HCC): ICD-10-CM

## 2023-07-06 DIAGNOSIS — G44.059 SHORT LASTING UNILATERAL NEURALGIFORM HEADACHE WITH CONJUNCTIVAL INJECTION AND TEARING (SUNCT), NOT INTRACTABLE: ICD-10-CM

## 2023-07-06 DIAGNOSIS — E03.8 HYPOTHYROIDISM DUE TO HASHIMOTO'S THYROIDITIS: Chronic | ICD-10-CM

## 2023-07-06 PROCEDURE — 99204 OFFICE O/P NEW MOD 45 MIN: CPT | Performed by: HOSPITALIST

## 2023-07-06 RX ORDER — CAPSAICIN 0.025 %
1 CREAM (GRAM) TOPICAL 2 TIMES DAILY
Qty: 60 G | Refills: 0 | Status: SHIPPED | OUTPATIENT
Start: 2023-07-06

## 2023-07-06 RX ORDER — OXYCODONE HYDROCHLORIDE 5 MG/1
5 TABLET ORAL EVERY 4 HOURS PRN
Qty: 30 TABLET | Refills: 0 | Status: SHIPPED | OUTPATIENT
Start: 2023-07-06

## 2023-07-06 NOTE — PROGRESS NOTES
INTERNAL MEDICINE INITIAL OFFICE VISIT  [facility] - [facility] INTERNAL MEDICINE TIM    NAME: Caro Arango  AGE: 79 y.o. SEX: female  : 1955     DATE: 2023     Assessment and Plan:     1. Acute deep vein thrombosis (DVT) of femoral vein of right lower extremity (HCC)  Third episode of DVT-.   first one after treatment for her endometrial cancer in , second episode 2023 and the third episode 2023. Was on anticoagulation for a year after first DVT. Was on Xarelto following the second 1 but developed DVT while on Xarelto hence anticoagulation changed to Pradaxa. Has anticardiolipin positive in addition to low Antithrombin III and protein S. No family history of clots/thrombosis. No history of any  labor/miscarriage, chest pain. Patient has a history of migraines which has improved after anticoagulation was started. Given 3 episodes of DVT as well as the thrombosis panel revealing anticardiolipin and low Antithrombin III and protein S may need anticoagulation for life. Currently tolerating it well. Has an appointment to follow-up with heme-onc in 1 month. - Ambulatory Referral to Internal Medicine    2. Encounter to establish care  Her previous PCP has retired and is looking to establish care at this clinic    3. Anti-cardiolipin antibody positive  See above    4. Class 2 obesity with alveolar hypoventilation without serious comorbidity with body mass index (BMI) of 36.0 to 36.9 in adult [facility])  History of snoring and somnolescent's. However not interested in pursuing a sleep study. 5. Endometrial cancer ([address] [facility])  history of uterine adenocarcinoma status post hysterectomy in  followed by radiation therapy in , with bowel obstruction, urinary stent secondary to radiation scar formation    6.  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 7. Hypothyroidism due to Hashimoto's thyroiditis  On Synthroid. We will recheck TSH    8. Type 2 diabetes mellitus with other specified complication, with long-term current use of insulin ([address])  On metformin and Trulicity. Last glycosylated hemoglobin was 7.1. We will recheck a glycosylated hemoglobin prior to her next visit. 9-complaining of pain in both legs. - X-ray reveals a possible bone infarction in the distal femoral shaft right which Ortho thinks is an incidental finding. However could be related to her anticardiolipin antibody syndrome. Was on oxygen codon/Norco however causing her nausea. We will try capsicin cream.  Unfortunately not nonsteroidal candidate secondary to being on Pradaxa    3 months-check lipids, TSH, glycosylated hemoglobin and urine microalbumin creatinine ratio     Chief Complaint:     Chief Complaint   Patient presents with   • Establish Care     New patient      History of Present Illness:     Rachel romo 56-year-old female who is here to establish care at this clinic since her previous PCP retired. She was admitted to [address] [place] [address] on June 23 for DVT third episode involving the mid distal femoral vein and one of the paired posterior tibial veins. Prior to that she had DVT in the right posterior tibial and peroneal veins and went for/2023 for which she was on Xarelto. All other thrombosis panel she was noted to be anticardiolipin antibody positive follow-up with low antithrombin 3 and protein S. Her anticoagulation was switched to Pradaxa 150 mg twice daily which she has been tolerating well. Denies any family history of clots, coronary artery disease, strokes. Has history of migraines and used to get about 10 episodes of month prior to the initiation of anticoagulation for a DVT which seems to have resolved her migraines.   X-ray reveals a possible bone infarction in the distal femoral shaft right which Ortho thinks is an incidental finding. Patient does have pain in both knees right more than left. Has a history of  uterine adenocarcinoma status post hysterectomy in 1999 followed by radiation therapy in 2000, with bowel obstruction, urinary stent secondary to radiation scar formation patient developed DVT and was treated with anticoagulation for a year. The following portions of the patient's history were reviewed and updated as appropriate: allergies, current medications, past family history, past medical history, past social history, past surgical history and problem list.     Review of Systems:     Review of Systems all other review of symptoms negative unless specified otherwise     Past Medical History:     Past Medical History:   Diagnosis Date   • Anemia    • Back pain    • Bowel obstruction ([address]) 7160,0911   • Cancer ([address])     uterine CA radiation destoried urethra. Patient has urostomy   • Cholelithiasis    • Colon polyp    • Deep vein thrombosis of left lower extremity ([address]) 01/11/2004    on blood thinner for 3 years. 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    • Hypothyroid    • In vitro fertilization    • Kidney stone    • Migraines    • Muscle weakness     abdominal   • Personal history of irradiation    • Renal cyst    • Sleep apnea     Resolved due to Weight Loss        Past Surgical History:     Past Surgical History:   Procedure Laterality Date   • ACHILLES TENDON REPAIR Right    • APPENDECTOMY     • COLONOSCOPY      6/6/22   • COLONOSCOPY W/ BIOPSIES N/A 12/2015   • EGD     • HEEL SPUR SURGERY Right 1996   • HYSTERECTOMY     • 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   • IR NEPHROSTOMY TUBE PLACEMENT  01/18/2021   • LAPAROSCOPIC GASTRIC BANDING N/A 2006    [place], [place]   • OTHER SURGICAL HISTORY  2004    Urine shunt to intestine, transverse colon   • PCNL Left 02/16/2021    Procedure: NEPHROLITHOTOMY  PERCUTANEOUS (PCNL); Surgeon: Ruthie Key MD;  [facility];  Service: Urology   • AK CYSTO/URETERO W/LITHOTRIPSY &INDWELL STENT INSRT Left 02/16/2021    Procedure: anterograde  URETEROSCOPY with dilation of ureteral stricture, INSERTION STENT URETERAL, exchange  of nephrostomy tube; Surgeon: Ruthie Key MD;  [facility];  Service: Urology   • AK LAPAROSCOPY ENTEROLYSIS SEPARATE PROCEDURE N/A 01/17/2019    Procedure: LAPAROSCOPIC LYSIS OF ADHESIONS;  Surgeon: Dick Lopez MD;  Location: [facility] MAIN OR;  Service: General   [facility] • SLEEVE GASTROPLASTY N/A 09/2015    Dr. Lachelle Collier, [facility]   • URETER REVISION  2010   • WEILBY THUMB ARTHROPLASTY     • [address] THUMB ARTHROPLASTY          Social History:   She reports that she quit smoking about 37 years ago. Her smoking use included cigarettes. She has a 15.00 pack-year smoking history. She has quit using smokeless tobacco. She reports current alcohol use. She reports that she does not use drugs.      Family History:     Family History   Problem Relation Age of Onset   • No Known Problems Mother    • Kidney failure Father    • Brain cancer Father    • Kidney cancer Father    • Diabetes Sister    • Alcohol abuse Sister    • Diabetes Brother         Current Medications:     Current Outpatient Medications:   •  ALPRAZolam (XANAX) 0.5 mg tablet, Take 0.25 mg by mouth daily at bedtime as needed Takes 1/2 0.5mg = 0.25 mg @ bedtime prn, Disp: , Rfl: 2  •  Ascorbic Acid (VITAMIN C PO), Take by mouth daily Only in morse, Disp: , Rfl:   •  BIOTIN PO, Take by mouth daily, Disp: , Rfl:   •  Cyanocobalamin (B-12 PO), Take by mouth daily, Disp: , Rfl:   •  dabigatran etexilate (PRADAXA) 150 mg capsu, Take 1 capsule (150 mg total) by mouth every 12 (twelve) hours, Disp: 180 capsule, Rfl: 0  •  FREESTYLE LITE test strip, , Disp: , Rfl:   •  metFORMIN (GLUCOPHAGE) 1000 MG tablet, Take 500 mg by mouth 2 (two) times a day with meals  , Disp: , Rfl:   •  pantoprazole (PROTONIX) 40 mg tablet, Take 40 mg by mouth daily in the early morning, Disp: , Rfl:   •  pramipexole (MIRAPEX) 0.5 mg tablet, , Disp: , Rfl:   •  Probiotic Product (PROBIOTIC DAILY PO), Take by mouth, Disp: , Rfl:   •  SYNTHROID 75 MCG tablet, Take 75 mcg by mouth daily in the early morning  , Disp: , Rfl:   •  Trulicity 4.5 RE/3.9WA SOPN, INJECT UNDER THE SKIN 4.5 MG WEEKLY AS DIRECTED, Disp: , Rfl:      Allergies: Allergies   Allergen Reactions   • Amoxicillin Rash   • Penicillins Rash   • Adhesive [Medical Tape] Rash        Physical Exam:     /98 (BP Location: Right arm, Patient Position: Sitting, Cuff Size: Adult)   Pulse 66   Temp 98.3 °F (36.8 °C) (Tympanic)   Ht 5' 5.5" (1.664 m)   Wt 100 kg (220 lb 9.6 oz)   SpO2 98%   BMI 36.15 kg/m²     Physical Exam   General Appearance:    Alert, cooperative, no distress   Head:    Normocephalic, without obvious abnormality, atraumatic   Eyes:    PERRL, conjunctiva/corneas clear, EOM's intact, fundi     benign, both eyes        Neck:   Supple, no adenopathy, no JVD   Back:     Symmetric, no curvature, ROM normal, no CVA tenderness   Lungs:     Clear to auscultation bilaterally, no wheezing or rhonchi   Heart:    Regular rate and rhythm, S1 and S2 normal, no murmur, rub   or gallop   Abdomen:     Soft, non-tender, bowel sounds active    Extremities:   Extremities normal, right foot cast   Psych:   Normal Affect   Neurologic:   CNII-XII intact.  Normal strength, sensation and reflexes       Throughout        Data:     Laboratory Results: I have personally reviewed the pertinent laboratory results/reports   Radiology/Other Diagnostic Testing Results: I have personally reviewed pertinent films in PACS  Time spent 45 minutes  Ryan Hanson MD  [facility] INTERNAL MEDICINE [place]

## 2023-07-10 ENCOUNTER — APPOINTMENT (OUTPATIENT)
Dept: LAB | Facility: AMBULARY SURGERY CENTER | Age: 68
End: 2023-07-10
Payer: COMMERCIAL

## 2023-07-10 DIAGNOSIS — Z79.4 TYPE 2 DIABETES MELLITUS WITH OTHER SPECIFIED COMPLICATION, WITH LONG-TERM CURRENT USE OF INSULIN (HCC): ICD-10-CM

## 2023-07-10 DIAGNOSIS — E06.3 HYPOTHYROIDISM DUE TO HASHIMOTO'S THYROIDITIS: Chronic | ICD-10-CM

## 2023-07-10 DIAGNOSIS — E66.2 CLASS 2 OBESITY WITH ALVEOLAR HYPOVENTILATION WITHOUT SERIOUS COMORBIDITY WITH BODY MASS INDEX (BMI) OF 36.0 TO 36.9 IN ADULT (HCC): ICD-10-CM

## 2023-07-10 DIAGNOSIS — I82.411 ACUTE DEEP VEIN THROMBOSIS (DVT) OF FEMORAL VEIN OF RIGHT LOWER EXTREMITY (HCC): ICD-10-CM

## 2023-07-10 DIAGNOSIS — I82.461 ACUTE DEEP VEIN THROMBOSIS (DVT) OF CALF MUSCLE VEIN OF RIGHT LOWER EXTREMITY (HCC): ICD-10-CM

## 2023-07-10 DIAGNOSIS — E11.69 TYPE 2 DIABETES MELLITUS WITH OTHER SPECIFIED COMPLICATION, WITH LONG-TERM CURRENT USE OF INSULIN (HCC): ICD-10-CM

## 2023-07-10 DIAGNOSIS — E03.8 HYPOTHYROIDISM DUE TO HASHIMOTO'S THYROIDITIS: Chronic | ICD-10-CM

## 2023-07-10 DIAGNOSIS — D50.9 IRON DEFICIENCY ANEMIA, UNSPECIFIED IRON DEFICIENCY ANEMIA TYPE: ICD-10-CM

## 2023-07-10 LAB
ALBUMIN SERPL BCP-MCNC: 3.5 G/DL (ref 3.5–5)
ALP SERPL-CCNC: 119 U/L (ref 46–116)
ALT SERPL W P-5'-P-CCNC: 25 U/L (ref 12–78)
ANION GAP SERPL CALCULATED.3IONS-SCNC: 2 MMOL/L
AST SERPL W P-5'-P-CCNC: 17 U/L (ref 5–45)
BILIRUB SERPL-MCNC: 0.59 MG/DL (ref 0.2–1)
BUN SERPL-MCNC: 26 MG/DL (ref 5–25)
CALCIUM SERPL-MCNC: 10.4 MG/DL (ref 8.3–10.1)
CHLORIDE SERPL-SCNC: 113 MMOL/L (ref 96–108)
CO2 SERPL-SCNC: 27 MMOL/L (ref 21–32)
CREAT SERPL-MCNC: 1.01 MG/DL (ref 0.6–1.3)
GFR SERPL CREATININE-BSD FRML MDRD: 58 ML/MIN/1.73SQ M
GLUCOSE P FAST SERPL-MCNC: 115 MG/DL (ref 65–99)
POTASSIUM SERPL-SCNC: 4.8 MMOL/L (ref 3.5–5.3)
PROT SERPL-MCNC: 7.4 G/DL (ref 6.4–8.4)
SODIUM SERPL-SCNC: 142 MMOL/L (ref 135–147)

## 2023-07-10 PROCEDURE — 36415 COLL VENOUS BLD VENIPUNCTURE: CPT

## 2023-07-10 PROCEDURE — 85705 THROMBOPLASTIN INHIBITION: CPT

## 2023-07-10 PROCEDURE — 85613 RUSSELL VIPER VENOM DILUTED: CPT

## 2023-07-10 PROCEDURE — 85379 FIBRIN DEGRADATION QUANT: CPT

## 2023-07-10 PROCEDURE — 86147 CARDIOLIPIN ANTIBODY EA IG: CPT

## 2023-07-10 PROCEDURE — 80053 COMPREHEN METABOLIC PANEL: CPT

## 2023-07-10 PROCEDURE — 85300 ANTITHROMBIN III ACTIVITY: CPT

## 2023-07-10 PROCEDURE — 85670 THROMBIN TIME PLASMA: CPT

## 2023-07-10 PROCEDURE — 85732 THROMBOPLASTIN TIME PARTIAL: CPT

## 2023-07-10 PROCEDURE — 85305 CLOT INHIBIT PROT S TOTAL: CPT

## 2023-07-10 PROCEDURE — 85306 CLOT INHIBIT PROT S FREE: CPT

## 2023-07-10 PROCEDURE — 85598 HEXAGNAL PHOSPH PLTLT NEUTRL: CPT

## 2023-07-10 PROCEDURE — 85303 CLOT INHIBIT PROT C ACTIVITY: CPT

## 2023-07-10 PROCEDURE — 81241 F5 GENE: CPT

## 2023-07-10 PROCEDURE — 86146 BETA-2 GLYCOPROTEIN ANTIBODY: CPT

## 2023-07-10 PROCEDURE — 81240 F2 GENE: CPT

## 2023-07-11 LAB
B2 GLYCOPROT1 IGA SERPL IA-ACNC: 2.5
B2 GLYCOPROT1 IGG SERPL IA-ACNC: 1.2
B2 GLYCOPROT1 IGM SERPL IA-ACNC: <2.4
CARDIOLIPIN IGA SER IA-ACNC: 4.2
CARDIOLIPIN IGG SER IA-ACNC: 1.1
CARDIOLIPIN IGM SER IA-ACNC: 0.9
DEPRECATED AT III PPP: 103 % OF NORMAL (ref 92–136)
PROT S ACT/NOR PPP: 123 % (ref 61–136)
PROT S PPP-ACNC: 113 % (ref 60–150)

## 2023-07-12 ENCOUNTER — APPOINTMENT (OUTPATIENT)
Dept: PHYSICAL THERAPY | Facility: CLINIC | Age: 68
End: 2023-07-12
Payer: COMMERCIAL

## 2023-07-12 ENCOUNTER — OFFICE VISIT (OUTPATIENT)
Dept: OBGYN CLINIC | Facility: CLINIC | Age: 68
End: 2023-07-12
Payer: COMMERCIAL

## 2023-07-12 VITALS
WEIGHT: 220.6 LBS | SYSTOLIC BLOOD PRESSURE: 149 MMHG | HEIGHT: 66 IN | HEART RATE: 80 BPM | BODY MASS INDEX: 35.45 KG/M2 | DIASTOLIC BLOOD PRESSURE: 96 MMHG

## 2023-07-12 DIAGNOSIS — M79.604 RIGHT LEG PAIN: ICD-10-CM

## 2023-07-12 DIAGNOSIS — I82.411 ACUTE DEEP VEIN THROMBOSIS (DVT) OF FEMORAL VEIN OF RIGHT LOWER EXTREMITY (HCC): ICD-10-CM

## 2023-07-12 DIAGNOSIS — M17.11 PRIMARY OSTEOARTHRITIS OF RIGHT KNEE: Primary | ICD-10-CM

## 2023-07-12 PROCEDURE — 99244 OFF/OP CNSLTJ NEW/EST MOD 40: CPT | Performed by: STUDENT IN AN ORGANIZED HEALTH CARE EDUCATION/TRAINING PROGRAM

## 2023-07-12 PROCEDURE — 99204 OFFICE O/P NEW MOD 45 MIN: CPT | Performed by: STUDENT IN AN ORGANIZED HEALTH CARE EDUCATION/TRAINING PROGRAM

## 2023-07-12 NOTE — PROGRESS NOTES
Ortho Sports Medicine Knee New Patient Visit     Assesment:   79 y.o. female right knee osteoarthritis and right lower extremity DVT    Plan:  The patient's diagnosis and treatment were discussed at length today. We discussed no treatment, non-operative treatment, and operative treatment. Luther Gordon presents today for right lower extremity pain. I explained this is likely due to her recent DVT, however she does have some evidence of osteoarthritis. Do not advise any injection treatment at this time as she is on a blood thinner. Recommend ongoing treatment of her DVT as well as physical therapy for lower extremity range of motion strengthening. She can follow-up on an as-needed basis moving forward. I explained if she has ongoing discomfort of the right knee we can reconsider the possibility of injection therapy in the future. Conservative treatment:    · Right knee x-rays were reviewed which demonstrate arthritis  · Discussed wouldn't recommend treatment for the mild arthritis at this time as I do not feel as though this is the source of her pain. · Recommend she follow up with Hematology for the DVT which is likely causing her pain  · Follow up with me as needed      Imaging: All imaging from today was reviewed by myself and explained to the patient. Injection:    No Injection planned at this time. Surgery:     No surgery is recommended at this point, continue with conservative treatment plan as noted. Follow up:    Return if symptoms worsen or fail to improve. Chief Complaint   Patient presents with   • Right Leg - Pain       History of Present Illness: The patient is a 79 y.o. female whose referred to me by the emergency room, seen in clinic for consultation of right leg pain. The patient states she has a history of right lower extremity DVT. She states this started in April and she was recently admitted to the ED at the end of June and was found to have 2 more.  The patient was on Xerelto which has been changed to Dabigatran. The patient has a history with Hematology on 7/18/23. The patient notes pain from her calf that radiates up to her hip. She has been attending therapy. The patient has tried rest and physical therapy. Knee Surgical History:  None    Past Medical, Social and Family History:  Past Medical History:   Diagnosis Date   • Anemia    • Back pain    • Bowel obstruction ([address]) 1010,5384   • Cancer ([address])     uterine CA radiation destoried urethra. Patient has urostomy   • Cholelithiasis    • Colon polyp    • Deep vein thrombosis of left lower extremity ([address]) 01/11/2004    on blood thinner for 3 years. 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 • Hypothyroid    • In vitro fertilization    • Kidney stone    • Migraines    • Muscle weakness     abdominal   • Personal history of irradiation    • Renal cyst    • Sleep apnea     Resolved due to Weight Loss     Past Surgical History:   Procedure Laterality Date   • ACHILLES TENDON REPAIR Right    • APPENDECTOMY     • COLONOSCOPY      6/6/22   • COLONOSCOPY W/ BIOPSIES N/A 12/2015   • EGD     • HEEL SPUR SURGERY Right 1996   • HYSTERECTOMY     • 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   • IR NEPHROSTOMY TUBE PLACEMENT  01/18/2021   • LAPAROSCOPIC GASTRIC BANDING N/A 2006    [place], [place]   • OTHER SURGICAL HISTORY  2004    Urine shunt to intestine, transverse colon   • PCNL Left 02/16/2021    Procedure: NEPHROLITHOTOMY  PERCUTANEOUS (PCNL);   Surgeon: Jeremiah Ramirez MD;  [facility];  Service: Urology   • CO CYSTO/URETERO W/LITHOTRIPSY &INDWELL STENT INSRT Left 02/16/2021 Procedure: anterograde  URETEROSCOPY with dilation of ureteral stricture, INSERTION STENT URETERAL, exchange  of nephrostomy tube; Surgeon: Ashanti Simpson MD;  [facility];  Service: Urology   • [place] LAPAROSCOPY ENTEROLYSIS SEPARATE PROCEDURE N/A 01/17/2019    Procedure: LAPAROSCOPIC LYSIS OF ADHESIONS;  Surgeon: Gissell Guerrero MD;  Location: [address] [place] [address] MAIN OR;  Service: General   • [facility]    • SLEEVE GASTROPLASTY N/A 09/2015    Dr. Wale Johnson, [facility]   • URETER REVISION  2010   • WEILBY THUMB ARTHROPLASTY     • WEILBY THUMB ARTHROPLASTY       Allergies   Allergen Reactions   • Amoxicillin Rash   • Penicillins Rash   • Adhesive [Medical Tape] Rash     Current Outpatient Medications on File Prior to Visit   Medication Sig Dispense Refill   • ALPRAZolam (XANAX) 0.5 mg tablet Take 0.25 mg by mouth daily at bedtime as needed Takes 1/2 0.5mg = 0.25 mg @ bedtime prn  2   • BIOTIN PO Take by mouth daily     • capsaicin (ZOSTRIX) 0.025 % cream Apply 1 Application topically 2 (two) times a day 60 g 0   • dabigatran etexilate (PRADAXA) 150 mg capsu Take 1 capsule (150 mg total) by mouth every 12 (twelve) hours 180 capsule 0   • FREESTYLE LITE test strip      • metFORMIN (GLUCOPHAGE) 1000 MG tablet Take 500 mg by mouth 2 (two) times a day with meals       • oxyCODONE (ROXICODONE) 5 immediate release tablet Take 1 tablet (5 mg total) by mouth every 4 (four) hours as needed for severe pain Max Daily Amount: 30 mg 30 tablet 0   • pantoprazole (PROTONIX) 40 mg tablet Take 40 mg by mouth daily in the early morning     • pramipexole (MIRAPEX) 0.5 mg tablet      • Probiotic Product (PROBIOTIC DAILY PO) Take by mouth     • SYNTHROID 75 MCG tablet Take 75 mcg by mouth daily in the early morning       • Trulicity 4.5 FA/3.8XA SOPN INJECT UNDER THE SKIN 4.5 MG WEEKLY AS DIRECTED     • Ascorbic Acid (VITAMIN C PO) Take by mouth daily Only in morse     • Cyanocobalamin (B-12 PO) Take by mouth daily       No current facility-administered medications on file prior to visit. Social History     Socioeconomic History   • Marital status: /Civil Union     Spouse name: Jesse Jefferson   • Number of children: 0   • Years of education: 15   • Highest education level: Not on file   Occupational History   • Occupation: [employer]    Tobacco Use   • Smoking status: Former     Packs/day: 1.50     Years: 10.00     Total pack years: 15.00     Types: Cigarettes     Quit date: 1986     Years since quittin.5   • Smokeless tobacco: Former   Vaping Use   • Vaping Use: Never used   Substance and Sexual Activity   • Alcohol use: Yes     Comment: Rare Socially   • Drug use: No   • Sexual activity: Never     Comment: s/p hysterectomy   Other Topics Concern   • Not on file   Social History Narrative   • Not on file     Social Determinants of Health     Financial Resource Strain: Not on file   Food Insecurity: Not on file   Transportation Needs: No Transportation Needs (2023)    PRAPARE - Transportation    • Lack of Transportation (Medical): No    • Lack of Transportation (Non-Medical): No   Physical Activity: Not on file   Stress: Not on file   Social Connections: Not on file   Intimate Partner Violence: Not on file   Housing Stability: Unknown (2023)    Housing Stability Vital Sign    • Unable to Pay for Housing in the Last Year: No    • Number of Places Lived in the Last Year: Not on file    • Unstable Housing in the Last Year: No         I have reviewed the past medical, surgical, social and family history, medications and allergies as documented in the EMR. Review of systems: ROS is negative other than that noted in the HPI. Constitutional: Negative for fatigue and fever. HENT: Negative for sore throat. Respiratory: Negative for shortness of breath. Cardiovascular: Negative for chest pain. Gastrointestinal: Negative for abdominal pain.    Endocrine: Negative for cold intolerance and heat intolerance. Genitourinary: Negative for flank pain. Musculoskeletal: Negative for back pain. Skin: Negative for rash. Allergic/Immunologic: Negative for immunocompromised state. Neurological: Negative for dizziness. Psychiatric/Behavioral: Negative for agitation. Physical Exam:    Blood pressure 149/96, pulse 80, height 5' 5.5" (1.664 m), weight 100 kg (220 lb 9.6 oz), not currently breastfeeding. General/Constitutional: NAD, well developed, well nourished  HENT: Normocephalic, atraumatic  CV: Intact distal pulses, regular rate  Resp: No respiratory distress or labored breathing  Lymphatic: No lymphadenopathy palpated  Neuro: Alert and Oriented x 3, no focal deficits  Psych: Normal mood, normal affect, normal judgement, normal behavior  Skin: Warm, dry, no rashes, no erythema      Knee Exam (focused):  Visual inspection of the Right knee demonstrates normal contour without atrophy. No previous incisions   There is no significant erythema or edema. No significant joint effusion   Range of motion is full from 0-100 degrees of flexion   Able to straight leg raise   TTP medial joint line tenderness, NTTP lateral joint line tenderness  Negative medial Claudia's, Negative lateral Claudia's  Negative Lachman exam, Negative posterior drawer  Stable to varus and valgus stress at both 0 and 30°  Patella tracks normally. No J sign. No apprehension. Translation is approximately 2 quadrants and is equal to the contralateral side  Patellar eversion is similar to the contralateral side    Examination of the patient's ipsilateral hip demonstrates full painless range of motion. No crepitus.       LE NV Exam: +2 DP/PT pulses bilaterally  Sensation intact to light touch L2-S1 bilaterally     Bilateral hip ROM demonstrates no pain actively or passively    No calf tenderness to palpation bilaterally    Knee Imaging    X-rays of the right knee were reviewed, which demonstrate right knee osteoarthritis. I have reviewed the radiology report and agree with their impression.       Scribe Attestation    I,:  Beryl Lopez MA am acting as a scribe while in the presence of the attending physician.:       I,:  Macarthur Prader, DO personally performed the services described in this documentation    as scribed in my presence.:

## 2023-07-12 NOTE — LETTER
July 13, 2023     Trish Jennings MD  [address] [place] [address]    Patient: Ronan Mac   YOB: 1955   Date of Visit: 7/12/2023       Dear Dr. Iva Key:    Thank you for referring Tyron Randall to me for evaluation. Below are my notes for this consultation. If you have questions, please do not hesitate to call me. I look forward to following your patient along with you. Sincerely,        Latosha Garcia DO        CC: No Recipients    Latosha Garcia DO  7/12/2023  4:33 PM  Signed  Ortho Sports Medicine Knee New Patient Visit     Assesment:   79 y.o. female right knee osteoarthritis and right lower extremity DVT    Plan:  The patient's diagnosis and treatment were discussed at length today. We discussed no treatment, non-operative treatment, and operative treatment. Princess Workman presents today for right lower extremity pain. I explained this is likely due to her recent DVT, however she does have some evidence of osteoarthritis. Do not advise any injection treatment at this time as she is on a blood thinner. Recommend ongoing treatment of her DVT as well as physical therapy for lower extremity range of motion strengthening. She can follow-up on an as-needed basis moving forward. I explained if she has ongoing discomfort of the right knee we can reconsider the possibility of injection therapy in the future. Conservative treatment:    Right knee x-rays were reviewed which demonstrate arthritis  Discussed wouldn't recommend treatment for the mild arthritis at this time as I do not feel as though this is the source of her pain. Recommend she follow up with Hematology for the DVT which is likely causing her pain  Follow up with me as needed      Imaging: All imaging from today was reviewed by myself and explained to the patient. Injection:    No Injection planned at this time.       Surgery:     No surgery is recommended at this point, continue with conservative treatment plan as noted. Follow up:    Return if symptoms worsen or fail to improve. Chief Complaint   Patient presents with   • Right Leg - Pain       History of Present Illness: The patient is a 79 y.o. female whose referred to me by the emergency room, seen in clinic for consultation of right leg pain. The patient states she has a history of right lower extremity DVT. She states this started in April and she was recently admitted to the ED at the end of June and was found to have 2 more. The patient was on Xerelto which has been changed to Dabigatran. The patient has a history with Hematology on 7/18/23. The patient notes pain from her calf that radiates up to her hip. She has been attending therapy. The patient has tried rest and physical therapy. 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    • Hypothyroid    • In vitro fertilization    • Kidney stone    • Migraines    • Muscle weakness     abdominal   • Personal history of irradiation    • Renal cyst    • Sleep apnea     Resolved due to Weight Loss     Past Surgical History:   Procedure Laterality Date   • ACHILLES TENDON REPAIR Right    • APPENDECTOMY     • COLONOSCOPY      6/6/22   • COLONOSCOPY W/ BIOPSIES N/A 12/2015   • EGD     • HEEL SPUR SURGERY Right 1996   • HYSTERECTOMY     • 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   • IR NEPHROSTOMY TUBE PLACEMENT  01/18/2021   • LAPAROSCOPIC GASTRIC BANDING N/A 2006    [place], [address]   • OTHER SURGICAL HISTORY  2004    Urine shunt to intestine, transverse colon   • PCNL Left 02/16/2021    Procedure: NEPHROLITHOTOMY  PERCUTANEOUS (PCNL); Surgeon: Carlos A Ramos MD;  Location: [facility];  Service: Urology   • MS CYSTO/URETERO W/LITHOTRIPSY &INDWELL STENT INSRT Left 02/16/2021    Procedure: anterograde  URETEROSCOPY with dilation of ureteral stricture, INSERTION STENT URETERAL, exchange  of nephrostomy tube; Surgeon: Carlos A Ramos MD;  Location: [facility];  Service: Urology   • MS LAPAROSCOPY ENTEROLYSIS SEPARATE PROCEDURE N/A 01/17/2019    Procedure: LAPAROSCOPIC LYSIS OF ADHESIONS;  Surgeon: Rod Brown MD;  Location: [facility] MAIN OR;  Service: General   • [facility]    • SLEEVE GASTROPLASTY N/A 09/2015    Dr. Margene Cogan, [facility]   • URETER REVISION  2010   • WEILBY THUMB ARTHROPLASTY     • WEILBY THUMB ARTHROPLASTY       Allergies   Allergen Reactions   • Amoxicillin Rash   • Penicillins Rash   • Adhesive [Medical Tape] Rash     Current Outpatient Medications on File Prior to Visit   Medication Sig Dispense Refill   • ALPRAZolam (XANAX) 0.5 mg tablet Take 0.25 mg by mouth daily at bedtime as needed Takes 1/2 0.5mg = 0.25 mg @ bedtime prn  2   • BIOTIN PO Take by mouth daily     • capsaicin (ZOSTRIX) 0.025 % cream Apply 1 Application topically 2 (two) times a day 60 g 0   • dabigatran etexilate (PRADAXA) 150 mg capsu Take 1 capsule (150 mg total) by mouth every 12 (twelve) hours 180 capsule 0   • FREESTYLE LITE test strip      • metFORMIN (GLUCOPHAGE) 1000 MG tablet Take 500 mg by mouth 2 (two) times a day with meals       • oxyCODONE (ROXICODONE) 5 immediate release tablet Take 1 tablet (5 mg total) by mouth every 4 (four) hours as needed for severe pain Max Daily Amount: 30 mg 30 tablet 0   • pantoprazole (PROTONIX) 40 mg tablet Take 40 mg by mouth daily in the early morning     • pramipexole (MIRAPEX) 0.5 mg tablet      • Probiotic Product (PROBIOTIC DAILY PO) Take by mouth     • SYNTHROID 75 MCG tablet Take 75 mcg by mouth daily in the early morning       • Trulicity 4.5 RQ/7.2OB SOPN INJECT UNDER THE SKIN 4.5 MG WEEKLY AS DIRECTED     • Ascorbic Acid (VITAMIN C PO) Take by mouth daily Only in morse     • Cyanocobalamin (B-12 PO) Take by mouth daily       No current facility-administered medications on file prior to visit. Social History     Socioeconomic History   • Marital status: /Civil Union     Spouse name: Morales Anna   • Number of children: 0   • Years of education: 15   • Highest education level: Not on file   Occupational History   • Occupation: Hair styist    Tobacco Use   • Smoking status: Former     Packs/day: 1.50     Years: 10.00     Total pack years: 15.00     Types: Cigarettes     Quit date: 1986     Years since quittin.5   • Smokeless tobacco: Former   Vaping Use   • Vaping Use: Never used   Substance and Sexual Activity   • Alcohol use: Yes     Comment: Rare Socially   • Drug use: No   • Sexual activity: Never     Comment: s/p hysterectomy   Other Topics Concern   • Not on file   Social History Narrative   • Not on file     Social Determinants of Health     Financial Resource Strain: Not on file   Food Insecurity: Not on file   Transportation Needs: No Transportation Needs (2023)    PRAPARE - Transportation    • Lack of Transportation (Medical): No    • Lack of Transportation (Non-Medical):  No   Physical Activity: Not on file   Stress: Not on file   Social Connections: Not on file   Intimate Partner Violence: Not on file   Housing Stability: Unknown (2023)    Housing Stability Vital Sign    • Unable to Pay for Housing in the Last Year: No    • Number of Places Lived in the Last Year: Not on file    • Unstable Housing in the Last Year: No         I have reviewed the past medical, surgical, social and family history, medications and allergies as documented in the EMR. Review of systems: ROS is negative other than that noted in the HPI. Constitutional: Negative for fatigue and fever. HENT: Negative for sore throat. Respiratory: Negative for shortness of breath. Cardiovascular: Negative for chest pain. Gastrointestinal: Negative for abdominal pain. Endocrine: Negative for cold intolerance and heat intolerance. Genitourinary: Negative for flank pain. Musculoskeletal: Negative for back pain. Skin: Negative for rash. Allergic/Immunologic: Negative for immunocompromised state. Neurological: Negative for dizziness. Psychiatric/Behavioral: Negative for agitation. Physical Exam:    Blood pressure 149/96, pulse 80, height 5' 5.5" (1.664 m), weight 100 kg (220 lb 9.6 oz), not currently breastfeeding. General/Constitutional: NAD, well developed, well nourished  HENT: Normocephalic, atraumatic  CV: Intact distal pulses, regular rate  Resp: No respiratory distress or labored breathing  Lymphatic: No lymphadenopathy palpated  Neuro: Alert and Oriented x 3, no focal deficits  Psych: Normal mood, normal affect, normal judgement, normal behavior  Skin: Warm, dry, no rashes, no erythema      Knee Exam (focused):  Visual inspection of the Right knee demonstrates normal contour without atrophy. No previous incisions   There is no significant erythema or edema. No significant joint effusion   Range of motion is full from 0-100 degrees of flexion   Able to straight leg raise   TTP medial joint line tenderness, NTTP lateral joint line tenderness  Negative medial Claudia's, Negative lateral Claudia's  Negative Lachman exam, Negative posterior drawer  Stable to varus and valgus stress at both 0 and 30°  Patella tracks normally. No J sign. No apprehension. Translation is approximately 2 quadrants and is equal to the contralateral side  Patellar eversion is similar to the contralateral side    Examination of the patient's ipsilateral hip demonstrates full painless range of motion. No crepitus. LE NV Exam: +2 DP/PT pulses bilaterally  Sensation intact to light touch L2-S1 bilaterally     Bilateral hip ROM demonstrates no pain actively or passively    No calf tenderness to palpation bilaterally    Knee Imaging    X-rays of the right knee were reviewed, which demonstrate right knee osteoarthritis. I have reviewed the radiology report and agree with their impression.       Scribe Attestation      I,:  Beryl Lopez MA am acting as a scribe while in the presence of the attending physician.:       I,:  Jailene Rubio, DO personally performed the services described in this documentation    as scribed in my presence.:

## 2023-07-13 ENCOUNTER — OFFICE VISIT (OUTPATIENT)
Dept: PHYSICAL THERAPY | Facility: CLINIC | Age: 68
End: 2023-07-13
Payer: COMMERCIAL

## 2023-07-13 DIAGNOSIS — I82.411 ACUTE DEEP VEIN THROMBOSIS (DVT) OF FEMORAL VEIN OF RIGHT LOWER EXTREMITY (HCC): Primary | ICD-10-CM

## 2023-07-13 DIAGNOSIS — R29.898 WEAKNESS OF RIGHT LOWER EXTREMITY: ICD-10-CM

## 2023-07-13 LAB
APTT HEX PL PPP: 10 SEC (ref 0–11)
APTT SCREEN TO CONFIRM RATIO: 1.46 RATIO (ref 0–1.34)
APTT-LA IMM 4:1 NP PPP: 58.6 SEC (ref 0–40.5)
CONFIRM APTT/NORMAL: 124.2 SEC (ref 0–47.6)
D DIMER PPP FEU-MCNC: 0.31 UG/ML FEU
DRVVT IMM 1:2 NP PPP: 92.9 SEC (ref 0–40.4)
DRVVT SCREEN TO CONFIRM RATIO: 1.4 RATIO (ref 0.8–1.2)
LA PPP-IMP: ABNORMAL
PROT C AG ACT/NOR PPP IA: >150 % OF NORMAL (ref 60–150)
PROT S ACT/NOR PPP: >150 % (ref 68–108)
SCREEN APTT: 63.9 SEC (ref 0–43.5)
SCREEN DRVVT: 134.5 SEC (ref 0–47)
THROMBIN TIME: >150 SEC (ref 0–23)
TT IMM NP PPP: 125 SEC (ref 0–23)
TT P HPASE PPP: >150 SEC (ref 0–23)

## 2023-07-13 PROCEDURE — 97110 THERAPEUTIC EXERCISES: CPT

## 2023-07-13 PROCEDURE — 97112 NEUROMUSCULAR REEDUCATION: CPT

## 2023-07-13 NOTE — PROGRESS NOTES
Daily Note     Today's date: 2023  Patient name: Jaki Moore  : 1955  MRN: [id number]  Referring provider: Troy Toth DO  Dx:   Encounter Diagnosis     ICD-10-CM    1. Acute deep vein thrombosis (DVT) of femoral vein of right lower extremity (HCC)  I82.411       2. Weakness of right lower extremity  R29.898           Start Time: 1030  Stop Time: 1100  Total time in clinic (min): 30 minutes    Subjective: Pt reports she is in pain still but stopped taking her pain medicine because of the side effects. The Exercises are okay but do cause some discomfort. Pt came to PT without an AD. Objective: See treatment diary below      Assessment: Pt had very limited knee flexion that was improved by 15-20 degrees with active assisted heel slides however it was uncomfortable. Pt was able to perform SLR but did need minor PT assist and fatigued extremely quickly. Patient demonstrated fatigue post treatment, exhibited good technique with therapeutic exercises and would benefit from continued PT      Plan: Continue per plan of care.       Precautions: acute DVT RLE                  Manuals             PROM RLE 3' 5'                                                  Neuro Re-Ed             LAQ 20x3" HEP 2x10x3" 2#           Rhomberg stance             Tandem stance             Quad set 5x3" pain 20x3"           SLR + quad set  3x5 minimal PT assist HEP                                     Ther Ex             seated Heel/toe raises 20x HEP            Supine hip abduction 10x HEP            Gentle calf stretch             Gentle seated hamstring stretch             Standing hip abduction/extension  2x10 ea           AAROM heel slides  10x5" HEP                        bike  5'           Ther Activity                                       Gait Training             RW  1 lap                         Modalities                                       Assessment IE, POC, Prognosis            Education HEP, POC, Prognosis

## 2023-07-17 LAB
F2 GENE MUT ANL BLD/T: NORMAL
F5 GENE MUT ANL BLD/T: NORMAL
Lab: NORMAL
Lab: NORMAL

## 2023-07-18 ENCOUNTER — HOSPITAL ENCOUNTER (OUTPATIENT)
Dept: NON INVASIVE DIAGNOSTICS | Facility: CLINIC | Age: 68
Discharge: HOME | End: 2023-07-18
Payer: COMMERCIAL

## 2023-07-18 ENCOUNTER — OFFICE VISIT (OUTPATIENT)
Dept: HEMATOLOGY ONCOLOGY | Facility: CLINIC | Age: 68
End: 2023-07-18
Payer: COMMERCIAL

## 2023-07-18 VITALS
OXYGEN SATURATION: 99 % | HEIGHT: 66 IN | BODY MASS INDEX: 35.68 KG/M2 | RESPIRATION RATE: 17 BRPM | WEIGHT: 222 LBS | HEART RATE: 96 BPM | SYSTOLIC BLOOD PRESSURE: 162 MMHG | TEMPERATURE: 97.4 F | DIASTOLIC BLOOD PRESSURE: 98 MMHG

## 2023-07-18 DIAGNOSIS — M79.89 PAIN AND SWELLING OF RIGHT LOWER LEG: Primary | ICD-10-CM

## 2023-07-18 DIAGNOSIS — Z86.718 HISTORY OF DVT (DEEP VEIN THROMBOSIS): ICD-10-CM

## 2023-07-18 DIAGNOSIS — M79.661 PAIN AND SWELLING OF RIGHT LOWER LEG: Primary | ICD-10-CM

## 2023-07-18 DIAGNOSIS — M79.661 PAIN AND SWELLING OF RIGHT LOWER LEG: ICD-10-CM

## 2023-07-18 DIAGNOSIS — I82.411 ACUTE DEEP VEIN THROMBOSIS (DVT) OF FEMORAL VEIN OF RIGHT LOWER EXTREMITY (HCC): ICD-10-CM

## 2023-07-18 DIAGNOSIS — M79.89 PAIN AND SWELLING OF RIGHT LOWER LEG: ICD-10-CM

## 2023-07-18 PROCEDURE — 93971 EXTREMITY STUDY: CPT | Performed by: SURGERY

## 2023-07-18 PROCEDURE — 93971 EXTREMITY STUDY: CPT

## 2023-07-18 PROCEDURE — 99215 OFFICE O/P EST HI 40 MIN: CPT

## 2023-07-18 NOTE — PROGRESS NOTES
[facility] HEMATOLOGY ONCOLOGY SPECIALISTS [place] [address] [address] [id number]  Hematology Ambulatory Follow-Up  Paulo Ralph, 1955, [id number]  7/18/2023    Assessment/Plan:  1. Pain and swelling of right lower leg  2. Acute deep vein thrombosis (DVT) of femoral vein of right lower extremity (HCC)  3. History of DVT (deep vein thrombosis)  - VAS lower limb venous duplex study, unilateral/limited; Future  - Ambulatory Referral to Vascular Surgery; Future      Ms. Lindsey Greene is a 70-year-old female seen in follow-up for Hx of provoked DVT in 2000 following hysterectomy for uterine cancer. Was treated with anticoagulation for 1 year with Coumadin. She has had surgery since and an extensive travel history with no issues with VTE. She was then seen in consultation in May 2023 after diagnosed with acute vs subacute DVT of R posterior tibial and peroneal veins in the calf in April 2023 and had been started on xarelto. Plan was for patient to complete 3 month of anticoagulation, pause Xarelto 07/03 and obtain thrombophilia work-up completed. However prior to 3m of ac, patient developed recurrent/clot propagation RLE pain and had US completed 06/23/23and had been diagnosed with extensive acute DVT in mid-distal  femoral vein and one of the paired posterior tibial veins. She was discharged on Pradaxa 150 mg twice daily. She is seen today in follow-up and has had significantly worsening pain and swelling of the right lower extremity despite being on Pradaxa twice daily. She states that it is greater than 10 out of 10 and she has been taking Tylenol and oxycodone without relief. We discussed the concern for further clot propagation and the need for a stat venous duplex today versus ER for evaluation.   She has agreed to undergo repeat imaging with venous duplex today based on the results she knows that she will either be sent to the ER for emergent evaluation or stress the importance of follow-up with vascular surgery for other methods of treatment if this clot continues to cause her significant discomfort. At this time there is no concern for altered circulation with good distal pulses. She has no signs and symptoms of PE at this time. I have also placed referral for vascular surgery evaluation and follow-up as she has not seen them since discharge. Thrombophilia work-up completed during recent hospital admission which revealed + Lupus anticoagulant, however this is likely invalid due to anticoagulant therapy. Previous Lupus anticoagulant was negative on 04/03. Protein S is high (>150)and anti-thrombin III is low (88)however this is unreliable in setting of ac and acute DVT as well. Protein C is normal. Factor V Leiden, Cardiolipin ab, Beta-2 glycoprotein ab and Prothrombin gene mutation are all negative. Given the patient's history 1 provoked and now 2 unprovoked VTEs and risk factors including obesity and frequent travel. Would recommend lifelong anticoagulation with Pradaxa (pending work-up above). If patient were to have recurrent VTE on Pradaxa would recommend life-long coumadin therapy. Educated on signs/symptoms of bleeding. Patient advised to seek emergency room evaluation for any trauma or head strike. The patient is scheduled for follow-up in approximately 3 months. Patient voiced agreement and understanding to the above. Patient knows to call the Hematology/Oncology office with any questions and concerns regarding the above. Barrier(s) to care: None  The patient is able to self care.  ------------------------------------------------------------------------------------------------------    No chief complaint on file. History of present illness: Brittany Mi is a 74-year-old female seen in consultation for anticoagulation recommendations. She has past medical history of hypothyroidism, type 2 diabetes, hypertension, migraines, and obesity.   In 1999 she underwent hysterectomy for endometrial cancer status post radiation therapy. After this treatment she did experience adhesions causing a small bowel obstruction requiring urostomy. In 2002 she underwent surgery for the urostomy and had a DVT postop for which she received Coumadin anticoagulation for about 1 year. She has had surgeries since including abdominal and revision not having complications of VTE. She also has an extensive travel history including plane rides for extended period of time as well as multiple cruises. Her last vacation was in February when she took a plane to [place]. Towards the end of March 1 week of April she experienced swelling and then pain in her right lower extremity. The pain was mostly in her foot and ankle. She was evaluated in the emergency room 04/03/23 and found to have acute vs subacute nonocclusive DVT in one of the paired posterior tibial and peroneal veins of the right calf. She was started on Xarelto 15 mg twice daily for 3 weeks and then transitioning to 20 mg once daily. Patient had recurrent right ankle pain shortly after DVT diagnosis following a mechanical fall. MRI ankle/heel revealed achilles tendon tear and rupture, retracted 6.cm with hemorrhage in the ruptured Achilles tendon tract    Patient was hospitalized in late June 2023 for R thigh/knee pain, unable to bear weight. Had venous doppler 06/23/23 at this time revealing showing acute nonocclusive deep venous thrombosis in the mid to distal femoral vein and one of the paired posterior tibial vein. No evidence of left lower extremity DVT. Patient had been compliant with xarelto. She was started on lovenox during admission initially and then switched to Pradaxa due. Also found to have bone infarct R knee, planning to follow up with ortho outpatient. She has no family history of VTE. She has no known kidney or liver disorders. She is not on estrogen supplements.   She is up-to-date on colonoscopy and mammogram.  She does not follow with OB/GYN or GYN ONC. She quit smoking in 1986 and smoked for about 10 years. She has no history of CVA or MI. She does has a history of 1 miscarriage. She has no known autoimmune conditions. Hx of tobacco use 1.5ppd x 15y. None currently. Last Mammogram: August 2022  Last Colonoscopy: June 2022     Interval history: Patient had R ankle due to mechanical fall few days after our last visit. MRI showed achilles tendon tear and rupture. Activity level was about the same despite injury. She was then hospitalized 06/23/2023 for R thigh/knee pain, unable to bear weight. Had venous doppler at this time showing showing acute nonocclusive DVT in the mid to distal femoral vein and one of the paired posterior tibial vein. No DVT on left. Patient had been faithful with with xarelto and had not missed any doses leading up to second DVT. She was started on lovenox during admission initially and then switched to Pradaxa following hematology recommendations. Also found to have bone infarct R knee, planning to follow up with ortho outpatient. TODAY: patient complaining 100 out of 10 LE pain from knee to right thigh. Pain is worse ambulation. Reports pain and edema are worse since discharge. No relief with tylenol or oral oxycodone 5mg. States she is having dizziness from oxy. Has chronic numbness/tingling in RLE, unchanged. She reports n/v with pradaxa. She had one episode 1 week ago where she thinks she threw up the medicine. No fevers, chest pain or shortness of breath. She is going on a cruise in August from [place]. Last flight in February. Denies hematuria however urine is juan in urostomy. No hematochezia or melena. Review of Systems   Constitutional: Negative for activity change, appetite change, diaphoresis, fatigue, fever and unexpected weight change. HENT: Negative for trouble swallowing and voice change.     Eyes: Negative for photophobia and visual disturbance. Respiratory: Negative for cough, chest tightness and shortness of breath. Cardiovascular: Positive for leg swelling. Negative for chest pain and palpitations. Gastrointestinal: Negative for abdominal distention, abdominal pain, anal bleeding, blood in stool, constipation, diarrhea, nausea and vomiting. Endocrine: Negative for cold intolerance and heat intolerance. Genitourinary: Negative for dysuria, frequency, pelvic pain and urgency. Musculoskeletal: Positive for joint swelling. Negative for arthralgias, back pain, gait problem and myalgias. Pain in right leg   Skin: Negative for pallor and rash. Neurological: Negative for dizziness, weakness, light-headedness, numbness and headaches. Hematological: Negative for adenopathy. Does not bruise/bleed easily. Psychiatric/Behavioral: Negative for confusion and sleep disturbance. Patient Active Problem List   Diagnosis   • Small bowel obstruction (HCC)   • Type 2 diabetes mellitus ([address])   • History of urostomy   • Endometrial cancer ([address])   • Hypothyroidism   • In vitro fertilization   • Anemia   • Hydroureteronephrosis   • HTN (hypertension)   • Migraine   • Abdominal adhesions   • Tooth infection   • Elevated lactic acid level   • Class 2 obesity in adult   • Headache   • Ureteral-ileal loop anastomotic stricture   • Acute deep vein thrombosis (DVT) of femoral vein of right lower extremity (HCC)   • Bone infarction (HCC)   • Anti-cardiolipin antibody positive   • Encounter for attention to other artificial openings of urinary tract [facility])       Past Medical History:   Diagnosis Date   • Anemia    • Back pain    • Bowel obstruction ([address]) [id number],[id number]   • Cancer ([address])     uterine CA radiation destoried urethra. Patient has urostomy   • Cholelithiasis    • Colon polyp    • Deep vein thrombosis of left lower extremity ([address]) 01/11/2004    on blood thinner for 3 years.    • Diabetes mellitus ([address])     type 2 BS 6/6/22 @ 0600 was 145 • Disease of thyroid gland    • Endometrial cancer ([address] [facility]) 1999   • GERD (gastroesophageal reflux disease)    • Gross hematuria    • Hiatal hernia    • History of transfusion 1999    Post Hysterectomy   • History of urostomy 01/11/2004    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. • Hypothyroid    • In vitro fertilization    • Kidney stone    • Migraines    • Muscle weakness     abdominal   • Personal history of irradiation    • Renal cyst    • Sleep apnea     Resolved due to Weight Loss       Past Surgical History:   Procedure Laterality Date   • ACHILLES TENDON REPAIR Right    • APPENDECTOMY     • COLONOSCOPY      6/6/22   • COLONOSCOPY W/ BIOPSIES N/A 12/2015   • EGD     • HEEL SPUR SURGERY Right 1996   • HYSTERECTOMY     • 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   • IR NEPHROSTOMY TUBE PLACEMENT  01/18/2021   • LAPAROSCOPIC GASTRIC BANDING N/A 2006    [place], [place]   • OTHER SURGICAL HISTORY  2004    Urine shunt to intestine, transverse colon   • PCNL Left 02/16/2021    Procedure: NEPHROLITHOTOMY  PERCUTANEOUS (PCNL); Surgeon: Tae Bonilla MD;  Location[facility];  Service: Urology   • IN CYSTO/URETERO W/LITHOTRIPSY &INDWELL STENT INSRT Left 02/16/2021    Procedure: anterograde  URETEROSCOPY with dilation of ureteral stricture, INSERTION STENT URETERAL, exchange  of nephrostomy tube; Surgeon: Tae Bonilla MD;  Location[facility];  Service: Urology   • IN LAPAROSCOPY ENTEROLYSIS SEPARATE PROCEDURE N/A 01/17/2019    Procedure: LAPAROSCOPIC LYSIS OF ADHESIONS;  Surgeon: Sole Schumacher MD;  Location: [address] [place] [address] MAIN OR;  Service: General   • [facility]    • SLEEVE GASTROPLASTY N/A 09/2015    Dr. Effie North, [facility]   • URETER REVISION  2010   • WEILBY THUMB ARTHROPLASTY     • WEILBY THUMB ARTHROPLASTY         Family History   Problem Relation Age of [place]   • No Known Problems Mother    • Kidney failure Father    • Brain cancer Father    • Kidney cancer Father    • Diabetes Sister    • Alcohol abuse Sister    • Diabetes Brother        Social History     Socioeconomic History   • Marital status: /Civil Union     Spouse name: Ritu Sanders   • Number of children: 0   • Years of education: 15   • Highest education level: Not on file   Occupational History   • Occupation: [employer]    Tobacco Use   • Smoking status: Former     Packs/day: 1.50     Years: 10.00     Total pack years: 15.00     Types: Cigarettes     Quit date: 1986     Years since quittin.5   • Smokeless tobacco: Former   Vaping Use   • Vaping Use: Never used   Substance and Sexual Activity   • Alcohol use: Yes     Comment: Rare Socially   • Drug use: No   • Sexual activity: Never     Comment: s/p hysterectomy   Other Topics Concern   • Not on file   Social History Narrative   • Not on file     Social Determinants of Health     Financial Resource Strain: Not on file   Food Insecurity: Not on file   Transportation Needs: No Transportation Needs (2023)    PRAPARE - Transportation    • Lack of Transportation (Medical): No    • Lack of Transportation (Non-Medical):  No   Physical Activity: Not on file   Stress: Not on file   Social Connections: Not on file   Intimate Partner Violence: Not on file   Housing Stability: Unknown (2023)    Housing Stability Vital Sign    • Unable to Pay for Housing in the Last Year: No    • Number of Places Lived in the Last Year: Not on file    • Unstable Housing in the Last Year: No         Current Outpatient Medications:   •  ALPRAZolam (XANAX) 0.5 mg tablet, Take 0.25 mg by mouth daily at bedtime as needed Takes 1/2 0.5mg = 0.25 mg @ bedtime prn, Disp: , Rfl: 2  •  Ascorbic Acid (VITAMIN C PO), Take by mouth daily Only in morse, Disp: , Rfl:   •  BIOTIN PO, Take by mouth daily, Disp: , Rfl:   •  capsaicin (ZOSTRIX) 0.025 % cream, Apply 1 Application topically 2 (two) times a day, Disp: 60 g, Rfl: 0  •  Cyanocobalamin (B-12 PO), Take by mouth daily, Disp: , Rfl:   •  dabigatran etexilate (PRADAXA) 150 mg capsu, Take 1 capsule (150 mg total) by mouth every 12 (twelve) hours, Disp: 180 capsule, Rfl: 0  •  FREESTYLE LITE test strip, , Disp: , Rfl:   •  metFORMIN (GLUCOPHAGE) 1000 MG tablet, Take 500 mg by mouth 2 (two) times a day with meals  , Disp: , Rfl:   •  oxyCODONE (ROXICODONE) 5 immediate release tablet, Take 1 tablet (5 mg total) by mouth every 4 (four) hours as needed for severe pain Max Daily Amount: 30 mg, Disp: 30 tablet, Rfl: 0  •  pantoprazole (PROTONIX) 40 mg tablet, Take 40 mg by mouth daily in the early morning, Disp: , Rfl:   •  pramipexole (MIRAPEX) 0.5 mg tablet, , Disp: , Rfl:   •  Probiotic Product (PROBIOTIC DAILY PO), Take by mouth, Disp: , Rfl:   •  SYNTHROID 75 MCG tablet, Take 75 mcg by mouth daily in the early morning  , Disp: , Rfl:   •  Trulicity 4.5 MQ/1.1HQ SOPN, INJECT UNDER THE SKIN 4.5 MG WEEKLY AS DIRECTED, Disp: , Rfl:     Allergies   Allergen Reactions   • Amoxicillin Rash   • Penicillins Rash   • Adhesive [Medical Tape] Rash       Objective: There were no vitals taken for this visit. Physical Exam  Cardiovascular:      Rate and Rhythm: Normal rate and regular rhythm. Pulmonary:      Effort: Pulmonary effort is normal.      Breath sounds: Normal breath sounds. Abdominal:      Palpations: Abdomen is soft. Tenderness: There is no abdominal tenderness. Musculoskeletal:      Right lower leg: Edema (right thigh extending to R ankle. PUlse intact. extremity is warm without erythema) present. Neurological:      Mental Status: She is alert. Mental status is at baseline.          Result Review  Labs:  Appointment on 07/10/2023   Component Date Value Ref Range Status   • Sodium 07/10/2023 142  135 - 147 mmol/L Final   • Potassium 07/10/2023 4.8  3.5 - 5.3 mmol/L Final   • Chloride 07/10/2023 113 (H)  96 - 108 mmol/L Final   • CO2 07/10/2023 27  21 - 32 mmol/L Final   • ANION GAP 07/10/2023 2 mmol/L Final   • BUN 07/10/2023 26 (H)  5 - 25 mg/dL Final   • Creatinine 07/10/2023 1.01  0.60 - 1.30 mg/dL Final    Standardized to IDMS reference method   • Glucose, Fasting 07/10/2023 115 (H)  65 - 99 mg/dL Final    Specimen collection should occur prior to Sulfasalazine administration due to the potential for falsely depressed results. Specimen collection should occur prior to Sulfapyridine administration due to the potential for falsely elevated results. • Calcium 07/10/2023 10.4 (H)  8.3 - 10.1 mg/dL Final   • AST 07/10/2023 17  5 - 45 U/L Final    Specimen collection should occur prior to Sulfasalazine administration due to the potential for falsely depressed results. • ALT 07/10/2023 25  12 - 78 U/L Final    Specimen collection should occur prior to Sulfasalazine and/or Sulfapyridine administration due to the potential for falsely depressed results. • Alkaline Phosphatase 07/10/2023 119 (H)  46 - 116 U/L Final   • Total Protein 07/10/2023 7.4  6.4 - 8.4 g/dL Final   • Albumin 07/10/2023 3.5  3.5 - 5.0 g/dL Final   • Total Bilirubin 07/10/2023 0.59  0.20 - 1.00 mg/dL Final    Use of this assay is not recommended for patients undergoing treatment with eltrombopag due to the potential for falsely elevated results. • eGFR 07/10/2023 58  ml/min/1.73sq m Final   • D-Dimer, Quant 07/10/2023 0.31  <0.50 ug/ml FEU Final    Reference and upper limits to exclude DVT and PE are the same. Do not use to exclude if clinical symptoms are present. Pregnant women:  1st trimester:  <0.22 - 1.06 ug/ml FEU  2nd trimester:  <0.22 - 1.88 ug/ml FEU  3rd trimester:   0.24 - 3.28 ug/ml FEU    Note: Normal ranges may not apply to patients who are transgender, non-binary, or whose legal sex, sex at birth, and gender identity differ.      • AntiThrombIN III Activity 07/10/2023 103  92 - 136 % of Normal Final   • ANTICARDIOLIPIN IGG ANTIBODY 07/10/2023 1.1  See comment Final   • ANTICARDIOLIPIN IGA ANTIBODY 07/10/2023 4.2 See comment Final   • ANTICARDIOLIPIN IGM ANTIBODY 07/10/2023 0.9  See comment Final   • Factor V Leiden 07/10/2023 Comment   Final    Comment: Result: c.1601G>A ([id number]) - Not Detected  This result is not associated with an increased risk for venous  thromboembolism. See Additional Clinical Information and  Comments. Additional Clinical Information:  Venous thromboembolism is a multifactorial disease influenced by  genetic, environmental, and circumstantial risk factors. The c.1601G>A  (p. [id number]) variant in the F5 gene, commonly referred to as Factor  V Leiden, is a genetic risk factor for venous thromboembolism. Heterozygous carriers of this variant have a 6- to 8-fold increased  risk for venous thromboembolism. Individuals homozygous for this  variant (ie, with a copy of the variant on each chromosome) have an  approximately 80-fold increased risk for venous thromboembolism. Individuals who carry both a c.*97G>A variant in the F2 gene and  Factor V Leiden have an approximately 20-fold increased risk for  venous thromboembolism. Risks are likely to be even higher in more  complex genotype combinations involving                            the F2 c.*97G>A variant and  Factor V Leiden (PMID: [id number]). Additional risk factors include but  are not limited to: deficiency of protein C, protein S, or  antithrombin III, age, male sex, personal or family history of deep  vein thromboembolism, smoking, surgery, prolonged immobilization,  malignant neoplasm, tamoxifen treatment, raloxifene treatment, oral  contraceptive use, hormone replacement therapy, and pregnancy. Management of thrombotic risk and thrombotic events should follow  established guidelines and fit the clinical circumstance. This result  cannot predict the occurrence or recurrence of a thrombotic event.   Comment:  Genetic counseling is recommended to discuss the potential clinical  implications of positive results, as well as recommendations for  testing family members. Genetic Coordinators are available for health care providers to  discuss results at [phone] ([phone]). Test Details:  Variant Analyzed: c.1601G>A (p. [id number]), referred to as Factor                            V  Leiden  Methods/Limitations:  DNA analysis of the F5 gene (NM_000130.5) was performed by PCR  amplification followed by restriction enzyme analysis. The diagnostic  sensitivity is >99%. Results must be combined with clinical  information for the most accurate interpretation. Molecular-based  testing is highly accurate, but as in any laboratory test, diagnostic  errors may occur. False positive or false negative results may occur  for reasons that include genetic variants, blood transfusions, bone  marrow transplantation, somatic or tissue-specific mosaicism,  mislabeled samples, or erroneous representation of family  relationships. This test was developed and its performance characteristics  determined by [address] [place] [address]. It has not been cleared or approved by the  Food and Drug Administration. References:  Eduardo Puga; [facility] Professional  Practice and Guidelines Committee. Addendum: [employer] consensus statement on factor                            V Leiden mutation  testing. [employer]. 2021 Mar 5. doi: 10.1038/f26499-696-29835-x. PMID: 06299806. Bridger Morton. Factor V Leiden Thrombophilia. 1999 May 14  (Updated 2018 Jan 4). In: Butch Mota, Abrahan DELGADO, Jayleen RA, et al.,  editors. Cassandra(R) ([employer]). [address] [place] [address] ([facility]): [address]; 9319-8328. Available from:  [facility]no  Brendon Wagner Climmie Prow CS;  [facility] Laboratory  Committee.  Venous thromboembolism  laboratory testing (factor V Leiden and factor II c.*97G>A), 2018  update: a technical standard of the [employer] and [employer] ([facility]). [employer] Med. 2018 Dec;20(12):9835-2473.  doi: 15.7338/B64010-328-7920-H. Epub 2018 Oct 5. PMID: 17979944. • REVIEWED BY: 07/10/2023 Comment   Final    Yesica Phelps, PhD  Director, Molecular Genetics   • PTT Lupus Anticoagulant 07/10/2023 63.9 (H)  0.0 - 43.5 sec Final   • Dilute Viper Venom Time 07/10/2023 134.5 (H)  0.0 - 47.0 sec Final   • DILUTE PROTHROMBIN TIME(DPT) 07/10/2023 124.2 (H)  0.0 - 47.6 sec Final   • THROMBIN TIME (DRVW) 07/10/2023 >150.0 (H)  0.0 - 23.0 sec Final   • DPT CONFIRM RATIO 07/10/2023 1.46 (H)  0.00 - 1.34 Ratio Final   • LUPUS REFLEX INTERPRETATION 07/10/2023 Comment:   Final    Results are consistent with the presence of a lupus anticoagulant. However,  the dPT is markedly extended, as can be seen in patients receiving oral  anticoagulant therapy (OAT). In general, OAT can reduce the accuracy of  lupus anticoagulant testing and results obtain for patients receiving OAT  should be interpreted with caution. The extended thrombin time failed to  correct on addition of a heparin neutralizer or normal plasma suggesting  the presence of dabigatran, a direct thrombin inhibitor anticoagulant. As only persistent lupus anticoagulant (LA) positivity meets laboratory  diagnostic criteria for antiphospholipid syndrome, repeat testing in 12 or  more weeks is recommended, ideally in the absence of anticoagulant therapy. The results of LA testing are not valid for patients receiving  anticoagulant therapy. This treatment does not, however, interfere with  anticardiolipin and beta-2 glycoprotein 1 antibody testing. • Protein C Activity 07/10/2023 >150 (H)  60 - 150 % of Normal Final   • PROTEIN S ACTIVITY 07/10/2023 >150 (H)  68 - 108 % Final   • Protein S Ag, Total 07/10/2023 113  60 - 150 % Final    This test was developed and its performance characteristics  determined by [facility]. It has not been cleared or approved  by the Food and Drug Administration.    • Protein S Ag, Free 07/10/2023 123  61 - 136 % Final   • Prothrombin Mutation 07/10/2023 Comment   Final    Comment: Result: c.*97G>A - Not Detected  This result is not associated with an increased risk for venous  thromboembolism. See Additional Clinical Information and  Comments. Additional Clinical Information:  Venous thromboembolism is a multifactorial disease influenced by  genetic, environmental, and circumstantial risk factors. The c.*97G>A  variant in the F2 gene is a genetic risk factor for venous  thromboembolism. Heterozygous carriers have a 2- to 4-fold increased  risk for venous thromboembolism. Homozygotes for the c.*97G>A variant  are rare. The annual risk of VTE in homozygotes has been reported to  be 1.1%/year. Individuals who carry both a c.*97G>A variant in the  F2 gene and a c.1601G>A (p. [id number]) variant in the F5 gene  (commonly referred to as Factor V Leiden) have an approximately 20-  fold increased risk for venous thromboembolism. Risks are likely to  be even higher in more complex genotype combinations involving the  F2 c.*97G>A variant and Factor V Leiden (PMID:                            97513120). Additional  risk factors include but are not limited to: deficiency of protein C,  protein S, or antithrombin III, age, male sex, personal or family  history of deep vein thromboembolism, smoking, surgery, prolonged  immobilization, malignant neoplasm, tamoxifen treatment, raloxifene  treatment, oral contraceptive use, hormone replacement therapy, and  pregnancy. Management of thrombotic risk and thrombotic events should  follow established guidelines and fit the clinical circumstance. This  result cannot predict the occurrence or recurrence of a thrombotic  event. Comments:  Genetic counseling is recommended to discuss the potential clinical  implications of positive results, as well as recommendations for  testing family members.   Genetic Coordinators are available for health care providers to discuss  results at [phone] ([phone]). Test Details:  Variant analyzed: c.*97G>A, previously referred to as [id number]  Methods/Limitations:  DNA analysis of the F2 gene                            (NM_000506. 5) was performed by PCR  amplification followed by restriction enzyme analysis. The diagnostic  sensitivity is >99%. Results must be combined with clinical  information for the most accurate interpretation. Molecular-based  testing is highly accurate, but as in any laboratory test, diagnostic  errors may occur. False positive or false negative results may occur  for reasons that include genetic variants, blood transfusions, bone  marrow transplantation, somatic or tissue-specific mosaicism,  mislabeled samples, or erroneous representation of family  relationships. This test was developed and its performance characteristics determined  by [address] [place] [address]. It has not been cleared or approved by the Food and Drug  Administration. References:  [facility], Jeanlincoln Ramirez; [facility] Professional  Practice and Guidelines Committee. Addendum: [facility] consensus statement on factor V Leiden mutation  testing. Tiffanie Med. 2021 Mar 5. doi:                            10.1038/u45414-392-33941-g. PMID: 37042693. Panda Porter. Prothrombin Thrombophilia. 2006 Jul 25  [Updated 2021 Feb 4]. In: Bayron Massey, Abrahan HH, Jayleen RA, et al.,  editors. Cassandra(R) [Internet]. [address] [place] [address] ([address] [place] [address]): [address]; 3928-8868. Available from:  Riya.yordan Granados, Celine Du;  [facility] Laboratory  Committee. Venous thromboembolism  laboratory testing (factor V Leiden and factor II c.*97G>A),  2018 update: a technical standard of the [facility] [address] (AC). Tiffanie Med. 2018 Dec;20(12):1388-4399.  doi: 86.1758/[id number]-S. Epub 2018 Oct 5.  PMID: 20892243. • REVIEWED BY: 07/10/2023 Tiago Oliveira, PhD   Final   • BETA 2 GLYCO 1 IGG 07/10/2023 1.2  See comment Final   • BETA 2 GLYCO 1 IGA 07/10/2023 2.5  See comment Final   • BETA 2 GLYCO 1 IGM 07/10/2023 <2.4  See comment Final   • THROMBIN TIME MIX-LA 07/10/2023 125.0 (H)  0.0 - 23.0 sec Final   • THROMBIN NEUTRALIZATION-LA 07/10/2023 >150.0 (H)  0.0 - 23.0 sec Final   • PTT-LA MIX 07/10/2023 58.6 (H)  0.0 - 40.5 sec Final   • Hex Phosph Neut Test 07/10/2023 10  0 - 11 sec Final   • dRVVT Mix Interp. 07/10/2023 92.9 (H)  0.0 - 40.4 sec Final   • DRVVT/Confirm Ratio 07/10/2023 1.4 (H)  0.8 - 1.2 ratio Final   No results displayed because visit has over 200 results. Imagin23 VAS lower extremity:  RIGHT LOWER LIMB  There is evidence of acute non-occlusive deep vein thrombosis in the mid-distal  femoral vein and one of the paired posterior tibial veins. No evidence of superficial thrombophlebitis noted. Popliteal, posterior tibial and anterior tibial arterial Doppler waveforms are  triphasic. LEFT LOWER LIMB LIMITED  Evaluation shows no evidence of thrombus in the common femoral vein. Doppler evaluation shows a normal response to augmentation maneuvers. 4/3/23: VAS lower extremity   RIGHT LOWER LIMB  acute vs. subacute non-occlusive deep vein thrombosis noted in one of the  paired posterior tibial and peroneal veins in the calf. No evidence of superficial thrombophlebitis noted. No evidence of valvular incompetence noted in the deep veins. Popliteal, posterior tibial and anterior tibial arterial Doppler waveforms are  triphasic.     LEFT LOWER LIMB LIMITED  Evaluation shows no evidence of thrombus in the common femoral vein. Doppler evaluation shows a normal response to augmentation maneuvers. Please note: This report has been generated by a voice recognition software system. Therefore there may be syntax, spelling, and/or grammatical errors.  Please call if you have any questions.

## 2023-07-19 ENCOUNTER — TELEPHONE (OUTPATIENT)
Dept: HEMATOLOGY ONCOLOGY | Facility: CLINIC | Age: 68
End: 2023-07-19

## 2023-07-19 NOTE — TELEPHONE ENCOUNTER
Reviewed attached VM via teams:  "Hi, I'm calling for Redge Render. If you can give me a call back. My name's Esther Salomon at [phone]. I have a couple of questions. One is regarding the blood thinner. So if you can give me a call back. Thank you."    VM left for patient asking for a return call to discuss. Call back number provided.

## 2023-07-19 NOTE — TELEPHONE ENCOUNTER
Pt returned my call, she reports still having pain. She is taking two pills of oxycodone as instructed by TRACY Sarabia. She reports this has improved her knee pain. Since increasing her pain medication, she is nauseated all the time. Vomited a few times overnight and woke up in a sweat. She denies and fevers. Regular bowel movements, last one today. Pt reports going back to one tablet due to her symptoms. Reviewed with Sherly Vizcarra, she reports patient should be seen by PCP or vascular this week for further evaluation. Spoke with patient notified her of above recommendation. Pt refuses ED evaluation at this time. Reviewed with patient further workup is required to determine cause of pain, as knee pain is not usually correlated with a DVT. Stated she could also go to urgent care for evaluation, which refuses. Pt reports she will call her PCP to see if she can get an appointment this week. Pt was advised to call back if she cannot get an appointment this week. Pt was agreeable.

## 2023-07-20 ENCOUNTER — OFFICE VISIT (OUTPATIENT)
Dept: INTERNAL MEDICINE CLINIC | Facility: CLINIC | Age: 68
End: 2023-07-20
Payer: COMMERCIAL

## 2023-07-20 ENCOUNTER — TELEPHONE (OUTPATIENT)
Dept: INTERNAL MEDICINE CLINIC | Facility: CLINIC | Age: 68
End: 2023-07-20

## 2023-07-20 VITALS
WEIGHT: 218.2 LBS | HEART RATE: 78 BPM | BODY MASS INDEX: 35.07 KG/M2 | DIASTOLIC BLOOD PRESSURE: 80 MMHG | TEMPERATURE: 99.1 F | HEIGHT: 66 IN | OXYGEN SATURATION: 99 % | SYSTOLIC BLOOD PRESSURE: 120 MMHG

## 2023-07-20 DIAGNOSIS — M79.604 LEG PAIN, ANTERIOR, RIGHT: Primary | ICD-10-CM

## 2023-07-20 DIAGNOSIS — I82.411 ACUTE DEEP VEIN THROMBOSIS (DVT) OF FEMORAL VEIN OF RIGHT LOWER EXTREMITY (HCC): ICD-10-CM

## 2023-07-20 PROCEDURE — 99214 OFFICE O/P EST MOD 30 MIN: CPT | Performed by: INTERNAL MEDICINE

## 2023-07-20 RX ORDER — DABIGATRAN ETEXILATE 150 MG/1
150 CAPSULE ORAL EVERY 12 HOURS SCHEDULED
Qty: 180 CAPSULE | Refills: 0 | Status: SHIPPED | OUTPATIENT
Start: 2023-07-20 | End: 2023-10-18

## 2023-07-20 NOTE — PROGRESS NOTES
INTERNAL MEDICINE FOLLOW-UP OFFICE VISIT  [facility] - [facility] INTERNAL MEDICINE TIM    NAME: Kandi Arango  AGE: 79 y.o. SEX: female  : 1955     DATE: 2023     Assessment and Plan:     1. Leg pain, anterior, right  Assessment & Plan:  Patient still complains of right leg pain from anterior mid thigh to mid shin. Repeat VAS duplex does not show propagation of clot or formation of new clots. Knee joint examination unremarkable. No swelling, tenderness, erythema or effusion noted. No recent history of trauma or falls. Anterior drawer, posterior drawer, valgus varus, Claudia signs negative. Patient was on capsicin cream, Tylenol and oxycodone without any relief. X ray R knee: Probable bone infarction distal femoral shaft    Plan  Diclofenac sodium 50 mg twice daily for 5 days. Stop taking oxycodone as it causes nausea and vomiting. Ambulatory referral to orthopedic surgery for further evaluation of bone infarct. Orders:  -     Ambulatory Referral to Orthopedic Surgery; Future  -     diclofenac sodium (VOLTAREN) 50 mg EC tablet; Take 1 tablet (50 mg total) by mouth 2 (two) times a day    2. Acute deep vein thrombosis (DVT) of femoral vein of right lower extremity (HCC)  Comments: On xeralto  Assessment & Plan:  Plan  Refill Pradaxa 150 mg. Follow up with hematology. Orders:  -     dabigatran etexilate (PRADAXA) 150 mg capsu; Take 1 capsule (150 mg total) by mouth every 12 (twelve) hours      No follow-ups on file. Chief Complaint:     Chief Complaint   Patient presents with   • Follow-up     Right leg clot, doppler        History of Present Illness:     Patient presents with right leg pain from mid thigh to mid shin, in anterior aspect. Patient was using capsicin cream, Tylenol and oxycodone without any relief. Oxycodone makes nauseous and vomited few times last night. Patient was evaluated by hematology.   Repeat VAS duplex does not show propagation of clot or new clot formation. Knee joint examination is unremarkable. No swelling, tenderness, erythema or effusion noted. No recent history of trauma or falls. Anterior drawer, posterior drawer, valgus varus, Claudia signs negative. Denies fever or chills. X-ray right knee shows evidence of probable bone infarction in the distal femoral shaft. The following portions of the patient's history were reviewed and updated as appropriate: allergies, current medications, past family history, past medical history, past social history, past surgical history and problem list.     Review of Systems:     Review of Systems   Constitutional: Negative for appetite change, chills, diaphoresis, fatigue, fever and unexpected weight change. HENT: Negative for congestion and sore throat. Respiratory: Negative for cough and shortness of breath. Cardiovascular: Negative for chest pain, palpitations and leg swelling. Gastrointestinal: Positive for nausea and vomiting. Negative for abdominal pain, constipation and diarrhea. Genitourinary: Negative for dysuria, flank pain and frequency. Musculoskeletal: Positive for arthralgias (right knee). Negative for joint swelling. Skin: Negative for color change, pallor, rash and wound. Neurological: Negative for light-headedness and headaches. Psychiatric/Behavioral: Negative for agitation, behavioral problems and confusion. All other systems reviewed and are negative.        Problem List:     Patient Active Problem List   Diagnosis   • Small bowel obstruction (HCC)   • Type 2 diabetes mellitus ([address])   • History of urostomy   • Endometrial cancer ([address])   • Hypothyroidism   • In vitro fertilization   • Anemia   • Hydroureteronephrosis   • HTN (hypertension)   • Migraine   • Abdominal adhesions   • Tooth infection   • Elevated lactic acid level   • Class 2 obesity in adult   • Headache   • Ureteral-ileal loop anastomotic stricture   • Acute deep vein thrombosis (DVT) of femoral vein of right lower extremity (HCC)   • Bone infarction (HCC)   • Anti-cardiolipin antibody positive   • Encounter for attention to other artificial openings of urinary tract ([address])   • Leg pain, anterior, right        Objective:     /80 (BP Location: Left arm, Patient Position: Sitting, Cuff Size: Large)   Pulse 78   Temp 99.1 °F (37.3 °C) (Tympanic)   Ht 5' 6" (1.676 m)   Wt 99 kg (218 lb 3.2 oz)   SpO2 99%   BMI 35.22 kg/m²     Physical Exam  Constitutional:       General: She is not in acute distress. Appearance: Normal appearance. She is not ill-appearing, toxic-appearing or diaphoretic. HENT:      Head: Normocephalic. Nose: No congestion. Mouth/Throat:      Mouth: Mucous membranes are moist.      Pharynx: Oropharynx is clear. Cardiovascular:      Rate and Rhythm: Normal rate and regular rhythm. Pulses: Normal pulses. Heart sounds: Normal heart sounds. Pulmonary:      Effort: Pulmonary effort is normal.      Breath sounds: Normal breath sounds. Abdominal:      General: Abdomen is flat. Bowel sounds are normal.      Palpations: Abdomen is soft. Musculoskeletal:      Right lower leg: No edema. Left lower leg: No edema. Comments: Knee joint examination: No swelling, tenderness, erythema or effusion noted. Anterior drawer, posterior drawer, valgus varus, Claudia signs negative. Skin:     General: Skin is warm and dry. Capillary Refill: Capillary refill takes less than 2 seconds. Neurological:      General: No focal deficit present. Mental Status: She is alert and oriented to person, place, and time.    Psychiatric:         Mood and Affect: Mood normal.         Behavior: Behavior normal.         Pertinent Laboratory/Diagnostic Studies:    Laboratory Results: I have personally reviewed the pertinent laboratory results/reports     CBC:   Results from Last 12 Months   Lab Units 06/26/23  0427 06/25/23  0431   WBC Thousand/uL 7.03 6.26   RBC Million/uL 3.56* 3.60*   HEMOGLOBIN g/dL 11.1* 11.0*   HEMATOCRIT % 34.1* 34.6*   MCV fL 96 96   MCH pg 31.2 30.6   MCHC g/dL 32.6 31.8   RDW % 13.0 13.1   MPV fL 9.0 9.1   PLATELETS Thousands/uL 195 205   NRBC AUTO /100 WBCs  --  0   NEUTROS PCT %  --  57   LYMPHS PCT %  --  27   MONOS PCT %  --  9   EOS PCT %  --  6   BASOS PCT %  --  1   NEUTROS ABS Thousands/µL  --  3.60   LYMPHS ABS Thousands/µL  --  1.70   MONOS ABS Thousand/µL  --  0.54   EOS ABS Thousand/µL  --  0.38       Radiology/Other Diagnostic Testing Results: I have personally reviewed pertinent reports.       [address] Jorge Walls MD  [facility] INTERNAL MEDICINE [facility]

## 2023-07-20 NOTE — TELEPHONE ENCOUNTER
Patient called stated she went to the ortho appointment and she was not seen because she was told that Dr Che Richardson said there is nothing he can do for her. She called our office to put a new plan in place on how to follow up with this pain she is having.  Please review

## 2023-07-20 NOTE — ASSESSMENT & PLAN NOTE
Patient still complains of right leg pain from anterior mid thigh to mid shin. Repeat VAS duplex does not show propagation of clot or formation of new clots. Knee joint examination unremarkable. No swelling, tenderness, erythema or effusion noted. No recent history of trauma or falls. Anterior drawer, posterior drawer, valgus varus, Claudia signs negative. Patient was on capsicin cream, Tylenol and oxycodone without any relief. X ray R knee: Probable bone infarction distal femoral shaft    Plan  Diclofenac sodium 50 mg twice daily for 5 days. Stop taking oxycodone as it causes nausea and vomiting. Ambulatory referral to orthopedic surgery for further evaluation of bone infarct.

## 2023-07-21 ENCOUNTER — TELEPHONE (OUTPATIENT)
Dept: INTERNAL MEDICINE CLINIC | Facility: CLINIC | Age: 68
End: 2023-07-21

## 2023-07-21 NOTE — TELEPHONE ENCOUNTER
WE received a call in reference to the referral we placed for pain medicine. Records have been faxed for review prior to acceptance of new patients. Send last 2 IM notes and Vascular test and MRI results.  Faxed to [phone]

## 2023-07-25 DIAGNOSIS — K21.9 GASTROESOPHAGEAL REFLUX DISEASE WITHOUT ESOPHAGITIS: Primary | ICD-10-CM

## 2023-07-25 DIAGNOSIS — E11.9 TYPE 2 DIABETES MELLITUS WITHOUT COMPLICATION, WITHOUT LONG-TERM CURRENT USE OF INSULIN (HCC): Primary | ICD-10-CM

## 2023-07-25 RX ORDER — PANTOPRAZOLE SODIUM 40 MG/1
40 TABLET, DELAYED RELEASE ORAL
Qty: 30 TABLET | Refills: 2 | Status: SHIPPED | OUTPATIENT
Start: 2023-07-25 | End: 2023-07-28 | Stop reason: SDUPTHER

## 2023-07-25 RX ORDER — PANTOPRAZOLE SODIUM 40 MG/1
40 TABLET, DELAYED RELEASE ORAL
Qty: 90 TABLET | OUTPATIENT
Start: 2023-07-25

## 2023-07-27 ENCOUNTER — CONSULT (OUTPATIENT)
Dept: VASCULAR SURGERY | Facility: CLINIC | Age: 68
End: 2023-07-27
Payer: COMMERCIAL

## 2023-07-27 ENCOUNTER — APPOINTMENT (OUTPATIENT)
Dept: PHYSICAL THERAPY | Facility: CLINIC | Age: 68
End: 2023-07-27
Payer: COMMERCIAL

## 2023-07-27 VITALS
BODY MASS INDEX: 34.87 KG/M2 | DIASTOLIC BLOOD PRESSURE: 88 MMHG | HEIGHT: 66 IN | HEART RATE: 86 BPM | WEIGHT: 217 LBS | SYSTOLIC BLOOD PRESSURE: 126 MMHG

## 2023-07-27 DIAGNOSIS — M79.89 PAIN AND SWELLING OF RIGHT LOWER LEG: ICD-10-CM

## 2023-07-27 DIAGNOSIS — M79.661 PAIN AND SWELLING OF RIGHT LOWER LEG: ICD-10-CM

## 2023-07-27 DIAGNOSIS — I82.411 ACUTE DEEP VEIN THROMBOSIS (DVT) OF FEMORAL VEIN OF RIGHT LOWER EXTREMITY (HCC): ICD-10-CM

## 2023-07-27 PROCEDURE — 99213 OFFICE O/P EST LOW 20 MIN: CPT | Performed by: NURSE PRACTITIONER

## 2023-07-27 NOTE — PROGRESS NOTES
Assessment/Plan:    No problem-specific Assessment & Plan notes found for this encounter. Diagnoses and all orders for this visit:    Pain and swelling of right lower leg  -     Ambulatory Referral to Vascular Surgery    Acute deep vein thrombosis (DVT) of femoral vein of right lower extremity ([address])  -     Ambulatory Referral to Vascular Surgery  -     Compression Stocking  -     VAS lower limb venous duplex study, complete bilateral; Future    Other orders  -     mupirocin (BACTROBAN) 2 % ointment; APPLY TOPICALLY 2 (TWO) TIMES A DAY. APPLY TO NARES (Patient not taking: Reported on 7/27/2023)  -     metFORMIN (GLUCOPHAGE) 500 mg tablet          Subjective:      Patient ID: Emily Peñaloza is a 79 y.o. female. New patient referred by PCP after her hsopital visit 6/23/23. Pt has hx of DVT and reports consistent pain and swelling in RLE. Pt has worn compression in the past but states they caused her more pain. She has been walking and elevating her legs daily. She is taking Pradaxa and is a former smoker. HPI    The following portions of the patient's history were reviewed and updated as appropriate: allergies, current medications, past family history, past medical history, past social history, past surgical history and problem list.  ROS reviewed     Review of Systems   Cardiovascular: Positive for leg swelling. Musculoskeletal:        RLE pain   All other systems reviewed and are negative. Objective:    I have reviewed and made appropriate changes to the review of systems input by the medical assistant.     Vitals:    07/27/23 1519   BP: 126/88   BP Location: Right arm   Patient Position: Sitting   Cuff Size: Large   Pulse: 86   Weight: 98.4 kg (217 lb)   Height: 5' 6" (1.676 m)       Patient Active Problem List   Diagnosis   • Small bowel obstruction (HCC)   • Type 2 diabetes mellitus ([address])   • History of urostomy   • Endometrial cancer ([address])   • Hypothyroidism   • In vitro fertilization   • Anemia   • Hydroureteronephrosis   • HTN (hypertension)   • Migraine   • Abdominal adhesions   • Tooth infection   • Elevated lactic acid level   • Class 2 obesity in adult   • Headache   • Ureteral-ileal loop anastomotic stricture   • Acute deep vein thrombosis (DVT) of femoral vein of right lower extremity (HCC)   • Bone infarction (HCC)   • Anti-cardiolipin antibody positive   • Encounter for attention to other artificial openings of urinary tract ([address])   • Leg pain, anterior, right       Past Surgical History:   Procedure Laterality Date   • ACHILLES TENDON REPAIR Right    • APPENDECTOMY     • COLONOSCOPY      6/6/22   • COLONOSCOPY W/ BIOPSIES N/A 12/2015   • EGD     • HEEL SPUR SURGERY Right 1996   • HYSTERECTOMY     • 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   • IR NEPHROSTOMY TUBE PLACEMENT  01/18/2021   • LAPAROSCOPIC GASTRIC BANDING N/A 2006    [place], [place]   • OTHER SURGICAL HISTORY  2004    Urine shunt to intestine, transverse colon   • PCNL Left 02/16/2021    Procedure: NEPHROLITHOTOMY  PERCUTANEOUS (PCNL); Surgeon: Carlos A Ramos MD;  Location: [facility];  Service: Urology   • NC CYSTO/URETERO W/LITHOTRIPSY &INDWELL STENT INSRT Left 02/16/2021    Procedure: anterograde  URETEROSCOPY with dilation of ureteral stricture, INSERTION STENT URETERAL, exchange  of nephrostomy tube; Surgeon: Carlos A Ramos MD;  Location: [facility];  Service: Urology   • NC LAPAROSCOPY ENTEROLYSIS SEPARATE PROCEDURE N/A 01/17/2019    Procedure: LAPAROSCOPIC LYSIS OF ADHESIONS;  Surgeon: Rod Brown MD;  Location: [address] [place] [address] MAIN OR;  Service: General   • [facility]    • SLEEVE GASTROPLASTY N/A 09/2015    Dr. 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    Tobacco Use   • Smoking status: Former     Packs/day: 1.50     Years: 10.00     Total pack years: 15.00     Types: Cigarettes     Quit date: 1986     Years since quittin.5   • Smokeless tobacco: Former   Vaping Use   • Vaping Use: Never used   Substance and Sexual Activity   • Alcohol use: Yes     Comment: Rare Socially   • Drug use: No   • Sexual activity: Never     Comment: s/p hysterectomy   Other Topics Concern   • Not on file   Social History Narrative   • Not on file     Social Determinants of Health     Financial Resource Strain: Not on file   Food Insecurity: Not on file   Transportation Needs: No Transportation Needs (2023)    PRAPARE - Transportation    • Lack of Transportation (Medical): No    • Lack of Transportation (Non-Medical):  No   Physical Activity: Not on file   Stress: Not on file   Social Connections: Not on file   Intimate Partner Violence: Not on file   Housing Stability: Unknown (2023)    Housing Stability Vital Sign    • Unable to Pay for Housing in the Last Year: No    • Number of Places Lived in the Last Year: Not on file    • Unstable Housing in the Last Year: No       Allergies   Allergen Reactions   • Amoxicillin Rash   • Penicillins Rash   • Adhesive [Medical Tape] Rash         Current Outpatient Medications:   •  ALPRAZolam (XANAX) 0.5 mg tablet, Take 0.25 mg by mouth daily at bedtime as needed Takes 1/2 0.5mg = 0.25 mg @ bedtime prn, Disp: , Rfl: 2  •  BIOTIN PO, Take by mouth daily, Disp: , Rfl:   •  dabigatran etexilate (PRADAXA) 150 mg capsu, Take 1 capsule (150 mg total) by mouth every 12 (twelve) hours, Disp: 180 capsule, Rfl: 0  •  FREESTYLE LITE test strip, , Disp: , Rfl:   •  metFORMIN (GLUCOPHAGE) 500 mg tablet, , Disp: , Rfl:   •  oxyCODONE (ROXICODONE) 5 immediate release tablet, Take 1 tablet (5 mg total) by mouth every 4 (four) hours as needed for severe pain Max Daily Amount: 30 mg, Disp: 30 tablet, Rfl: 0  •  pramipexole (MIRAPEX) 0.5 mg tablet, , Disp: , Rfl:   •  Probiotic Product (PROBIOTIC DAILY PO), Take by mouth, Disp: , Rfl:   •  SYNTHROID 75 MCG tablet, Take 75 mcg by mouth daily in the early morning  , Disp: , Rfl:   •  Trulicity 4.5 HB/7.1VP SOPN, INJECT UNDER THE SKIN 4.5 MG WEEKLY AS DIRECTED, Disp: , Rfl:   •  Ascorbic Acid (VITAMIN C PO), Take by mouth daily Only in morse (Patient not taking: Reported on 7/27/2023), Disp: , Rfl:   •  capsaicin (ZOSTRIX) 0.025 % cream, Apply 1 Application topically 2 (two) times a day (Patient not taking: Reported on 7/27/2023), Disp: 60 g, Rfl: 0  •  Cyanocobalamin (B-12 PO), Take by mouth daily (Patient not taking: Reported on 7/27/2023), Disp: , Rfl:   •  diclofenac sodium (VOLTAREN) 50 mg EC tablet, Take 1 tablet (50 mg total) by mouth 2 (two) times a day (Patient not taking: Reported on 7/27/2023), Disp: 5 tablet, Rfl: 0  •  metFORMIN (GLUCOPHAGE) 1000 MG tablet, Take 0.5 tablets (500 mg total) by mouth 2 (two) times a day with meals (Patient not taking: Reported on 7/27/2023), Disp: 180 tablet, Rfl: 0  •  mupirocin (BACTROBAN) 2 % ointment, APPLY TOPICALLY 2 (TWO) TIMES A DAY.  APPLY TO NARES (Patient not taking: Reported on 7/27/2023), Disp: , Rfl:   •  pantoprazole (PROTONIX) 40 mg tablet, Take 1 tablet (40 mg total) by mouth daily in the early morning, Disp: 90 tablet, Rfl: 0  /88 (BP Location: Right arm, Patient Position: Sitting, Cuff Size: Large)   Pulse 86   Ht 5' 6" (1.676 m)   Wt 98.4 kg (217 lb)   BMI 35.02 kg/m²          Physical Exam

## 2023-07-28 DIAGNOSIS — K21.9 GASTROESOPHAGEAL REFLUX DISEASE WITHOUT ESOPHAGITIS: ICD-10-CM

## 2023-07-28 RX ORDER — PANTOPRAZOLE SODIUM 40 MG/1
40 TABLET, DELAYED RELEASE ORAL
Qty: 90 TABLET | Refills: 0 | Status: SHIPPED | OUTPATIENT
Start: 2023-07-28 | End: 2023-10-26

## 2023-08-03 ENCOUNTER — TELEPHONE (OUTPATIENT)
Dept: INTERNAL MEDICINE CLINIC | Facility: CLINIC | Age: 68
End: 2023-08-03

## 2023-08-03 DIAGNOSIS — K21.9 GASTROESOPHAGEAL REFLUX DISEASE WITHOUT ESOPHAGITIS: Primary | ICD-10-CM

## 2023-08-03 RX ORDER — FAMOTIDINE 20 MG/1
20 TABLET, FILM COATED ORAL 2 TIMES DAILY
Qty: 60 TABLET | Refills: 0 | Status: SHIPPED | OUTPATIENT
Start: 2023-08-03 | End: 2023-09-02

## 2023-08-03 NOTE — TELEPHONE ENCOUNTER
Patient called and she is concerned about the blood thinner , she said she has really bad heartburn , she said she had heartburn from water, she googled and it states it is the blood thinner. She still has 80 percent pain on leg,is able to bear weight on leg for a little bit.

## 2023-08-03 NOTE — PROGRESS NOTES
Called patient and she said she's been having worsening reflux symptoms the past week with belching, burning, and acid. She thinks it's her blood thinner. I spoke to her about taking Pepcid 20mg daily and sent script to her pharmacy. I also gave referral to GI. She hasn't had an endoscopy in 5+ years and it only showed hiatal hernia at the time.

## 2023-08-11 NOTE — PROGRESS NOTES
Assessment/Plan:    Acute deep vein thrombosis (DVT) of femoral vein of right lower extremity [facility])  53-year-old female with HTN, DM, migraines, hypothyroid, uterine cancer s/p hysterectomy c/b provoked DVT '00, right lower extremity DVT referred by hematology for evaluation. Ongoing right lower extremity pain    Patient diagnosed with acute to subacute nonocclusive posterior tibial DVT in April this year and was started on Xarelto. She had worsening right lower extremity pain and swelling repeat duplex in June noted propagation of DVT, acute nonocclusive DVT in the mid to distal femoral vein and she was switched to Pradaxa. 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 -Mild right lower extremity swelling. Start Rx 20-30 mmHg compression  -Continue Pradaxa. Likely long-term anticoagulation due to recurrent DVT and propagation of DVT while on Xarelto follow-up with hematology  -Will evaluate with repeat venous duplex in 3 months and follow-up office visit  -Agree with pain management evaluation due to ongoing right lower extremity pain, multifactorial       Diagnoses and all orders for this visit:    Pain and swelling of right lower leg  -     Ambulatory Referral to Vascular Surgery    Acute deep vein thrombosis (DVT) of femoral vein of right lower extremity ([address])  -     Ambulatory Referral to Vascular Surgery  -     Compression Stocking  -     VAS lower limb venous duplex study, complete bilateral; Future    Other orders  -     mupirocin (BACTROBAN) 2 % ointment; APPLY TOPICALLY 2 (TWO) TIMES A DAY. APPLY TO NARES (Patient not taking: Reported on 7/27/2023)  -     metFORMIN (GLUCOPHAGE) 500 mg tablet          Subjective:      Patient ID: Bee Chambers is a 79 y.o. female.     New patient referred by PCP after her hsopital visit 6/23/23. Pt has hx of DVT and reports consistent pain and swelling in RLE. Pt has worn compression in the past but states they caused her more pain. She has been walking and elevating her legs daily. She is taking Pradaxa and is a former smoker. HPI  80-year-old female with HTN, DM, migraines, hypothyroid, uterine cancer s/p hysterectomy c/b provoked DVT '00, right lower extremity DVT referred by hematology for evaluation. Ongoing right lower extremity pain  Patient diagnosed with acute to subacute nonocclusive posterior tibial DVT in April this year and was started on Xarelto. She had worsening right lower extremity pain and swelling repeat duplex in June noted propagation of DVT, acute nonocclusive DVT in the mid to distal femoral vein and she was switched to Pradaxa. 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 She denies any as sharp, aching, throbbing at times. She has previously tried compression with no significant improvement. We discussed different types of compression and getting measured for compression. She has no provoking factors. The following portions of the patient's history were reviewed and updated as appropriate: allergies, current medications, past family history, past medical history, past social history, past surgical history and problem list.  ROS reviewed     Review of Systems      Objective:  I have reviewed and made appropriate changes to the review of systems input by the medical assistant.     Vitals:    07/27/23 1519   BP: 126/88   BP Location: Right arm   Patient Position: Sitting   Cuff Size: Large   Pulse: 86   Weight: 98.4 kg (217 lb)   Height: 5' 6" (1.676 m)       Patient Active Problem List   Diagnosis   • Small bowel obstruction (HCC)   • Type 2 diabetes mellitus ([address])   • History of urostomy   • Endometrial cancer ([address])   • Hypothyroidism   • In vitro fertilization   • Anemia   • Hydroureteronephrosis   • HTN (hypertension)   • Migraine   • Abdominal adhesions   • Tooth infection   • Elevated lactic acid level   • Class 2 obesity in adult   • Headache   • Ureteral-ileal loop anastomotic stricture   • Acute deep vein thrombosis (DVT) of femoral vein of right lower extremity (HCC)   • Bone infarction (HCC)   • Anti-cardiolipin antibody positive   • Encounter for attention to other artificial openings of urinary tract ([address])   • Leg pain, anterior, right       Past Surgical History:   Procedure Laterality Date   • ACHILLES TENDON REPAIR Right    • APPENDECTOMY     • COLONOSCOPY      6/6/22   • COLONOSCOPY W/ BIOPSIES N/A 12/2015   • EGD     • HEEL SPUR SURGERY Right 1996   • HYSTERECTOMY     • 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   • IR NEPHROSTOMY TUBE PLACEMENT  01/18/2021   • LAPAROSCOPIC GASTRIC BANDING N/A 2006    [place], [place]   • OTHER SURGICAL HISTORY  2004    Urine shunt to intestine, transverse colon   • PCNL Left 02/16/2021    Procedure: NEPHROLITHOTOMY  PERCUTANEOUS (PCNL); Surgeon: Monico Hernandez MD;  [facility];  Service: Urology   • ND CYSTO/URETERO W/LITHOTRIPSY &INDWELL STENT INSRT Left 02/16/2021    Procedure: anterograde  URETEROSCOPY with dilation of ureteral stricture, INSERTION STENT URETERAL, exchange  of nephrostomy tube; Surgeon: Monico Hernandez MD;  [facility];  Service: Urology   • ND LAPAROSCOPY ENTEROLYSIS SEPARATE PROCEDURE N/A 01/17/2019    Procedure: LAPAROSCOPIC LYSIS OF ADHESIONS;  Surgeon: Bob Casiano MD;  Location: [address] [place] [address] MAIN OR;  Service: General   • [facility]    • SLEEVE GASTROPLASTY N/A 09/2015    Dr. Iona [facility]   • URETER REVISION  2010   • WEILBY THUMB ARTHROPLASTY     • WEILBY THUMB ARTHROPLASTY         Family History   Problem Relation Age of Onset • No Known Problems Mother    • Kidney failure Father    • Brain cancer Father    • Kidney cancer Father    • Diabetes Sister    • Alcohol abuse Sister    • Diabetes Brother        Social History     Socioeconomic History   • Marital status: /Civil Union     Spouse name: Trent Todd   • Number of children: 0   • Years of education: 15   • Highest education level: Not on file   Occupational History   • Occupation: Hair styist    Tobacco Use   • Smoking status: Former     Packs/day: 1.50     Years: 10.00     Total pack years: 15.00     Types: Cigarettes     Quit date: 1986     Years since quittin.6   • Smokeless tobacco: Former   Vaping Use   • Vaping Use: Never used   Substance and Sexual Activity   • Alcohol use: Yes     Comment: Rare Socially   • Drug use: No   • Sexual activity: Never     Comment: s/p hysterectomy   Other Topics Concern   • Not on file   Social History Narrative   • Not on file     Social Determinants of Health     Financial Resource Strain: Not on file   Food Insecurity: Not on file   Transportation Needs: No Transportation Needs (2023)    PRAPARE - Transportation    • Lack of Transportation (Medical): No    • Lack of Transportation (Non-Medical):  No   Physical Activity: Not on file   Stress: Not on file   Social Connections: Not on file   Intimate Partner Violence: Not on file   Housing Stability: Unknown (2023)    Housing Stability Vital Sign    • Unable to Pay for Housing in the Last Year: No    • Number of Places Lived in the Last Year: Not on file    • Unstable Housing in the Last Year: No       Allergies   Allergen Reactions   • Amoxicillin Rash   • Penicillins Rash   • Adhesive [Medical Tape] Rash         Current Outpatient Medications:   •  ALPRAZolam (XANAX) 0.5 mg tablet, Take 0.25 mg by mouth daily at bedtime as needed Takes 1/2 0.5mg = 0.25 mg @ bedtime prn, Disp: , Rfl: 2  •  BIOTIN PO, Take by mouth daily, Disp: , Rfl:   •  dabigatran etexilate (PRADAXA) 150 mg capsu, Take 1 capsule (150 mg total) by mouth every 12 (twelve) hours, Disp: 180 capsule, Rfl: 0  •  FREESTYLE LITE test strip, , Disp: , Rfl:   •  metFORMIN (GLUCOPHAGE) 500 mg tablet, , Disp: , Rfl:   •  oxyCODONE (ROXICODONE) 5 immediate release tablet, Take 1 tablet (5 mg total) by mouth every 4 (four) hours as needed for severe pain Max Daily Amount: 30 mg, Disp: 30 tablet, Rfl: 0  •  pramipexole (MIRAPEX) 0.5 mg tablet, , Disp: , Rfl:   •  Probiotic Product (PROBIOTIC DAILY PO), Take by mouth, Disp: , Rfl:   •  SYNTHROID 75 MCG tablet, Take 75 mcg by mouth daily in the early morning  , Disp: , Rfl:   •  Trulicity 4.5 AA/6.6OQ SOPN, INJECT UNDER THE SKIN 4.5 MG WEEKLY AS DIRECTED, Disp: , Rfl:   •  Ascorbic Acid (VITAMIN C PO), Take by mouth daily Only in morse (Patient not taking: Reported on 7/27/2023), Disp: , Rfl:   •  capsaicin (ZOSTRIX) 0.025 % cream, Apply 1 Application topically 2 (two) times a day (Patient not taking: Reported on 7/27/2023), Disp: 60 g, Rfl: 0  •  Cyanocobalamin (B-12 PO), Take by mouth daily (Patient not taking: Reported on 7/27/2023), Disp: , Rfl:   •  diclofenac sodium (VOLTAREN) 50 mg EC tablet, Take 1 tablet (50 mg total) by mouth 2 (two) times a day (Patient not taking: Reported on 7/27/2023), Disp: 5 tablet, Rfl: 0  •  famotidine (PEPCID) 20 mg tablet, Take 1 tablet (20 mg total) by mouth 2 (two) times a day, Disp: 60 tablet, Rfl: 0  •  metFORMIN (GLUCOPHAGE) 1000 MG tablet, Take 0.5 tablets (500 mg total) by mouth 2 (two) times a day with meals (Patient not taking: Reported on 7/27/2023), Disp: 180 tablet, Rfl: 0  •  mupirocin (BACTROBAN) 2 % ointment, APPLY TOPICALLY 2 (TWO) TIMES A DAY.  APPLY TO NARES (Patient not taking: Reported on 7/27/2023), Disp: , Rfl:   •  pantoprazole (PROTONIX) 40 mg tablet, Take 1 tablet (40 mg total) by mouth daily in the early morning, Disp: 90 tablet, Rfl: 0      /88 (BP Location: Right arm, Patient Position: Sitting, Cuff Size: Large) Pulse 86   Ht 5' 6" (1.676 m)   Wt 98.4 kg (217 lb)   BMI 35.02 kg/m²          Physical Exam  Vitals and nursing note reviewed. Constitutional:       Appearance: Normal appearance. HENT:      Head: Normocephalic and atraumatic. Eyes:      Extraocular Movements: Extraocular movements intact. Cardiovascular:      Pulses:           Dorsalis pedis pulses are 2+ on the right side and 2+ on the left side. Heart sounds: Normal heart sounds. Pulmonary:      Breath sounds: Normal breath sounds. Musculoskeletal:         General: Swelling present. Neurological:      General: No focal deficit present. Mental Status: She is alert and oriented to person, place, and time.    Psychiatric:         Behavior: Behavior normal.

## 2023-08-11 NOTE — ASSESSMENT & PLAN NOTE
46-year-old female with HTN, DM, migraines, hypothyroid, uterine cancer s/p hysterectomy c/b provoked DVT '00, right lower extremity DVT referred by hematology for evaluation. Ongoing right lower extremity pain    Patient diagnosed with acute to subacute nonocclusive posterior tibial DVT in April this year and was started on Xarelto. She had worsening right lower extremity pain and swelling repeat duplex in June noted propagation of DVT, acute nonocclusive DVT in the mid to distal femoral vein and she was switched to Pradaxa. 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 -Mild right lower extremity swelling. Start Rx 20-30 mmHg compression  -Continue Pradaxa.   Likely long-term anticoagulation due to recurrent DVT and propagation of DVT while on Xarelto follow-up with hematology  -Will evaluate with repeat venous duplex in 3 months and follow-up office visit  -Agree with pain management evaluation due to ongoing right lower extremity pain, multifactorial

## 2023-08-18 ENCOUNTER — TELEPHONE (OUTPATIENT)
Dept: INTERNAL MEDICINE CLINIC | Facility: CLINIC | Age: 68
End: 2023-08-18

## 2023-08-18 DIAGNOSIS — R42 VERTIGO: Primary | ICD-10-CM

## 2023-08-18 RX ORDER — MECLIZINE HCL 12.5 MG/1
12.5 TABLET ORAL EVERY 8 HOURS SCHEDULED
Qty: 30 TABLET | Refills: 0 | Status: SHIPPED | OUTPATIENT
Start: 2023-08-18 | End: 2023-08-28

## 2023-08-18 NOTE — TELEPHONE ENCOUNTER
Patient is leaving on a cruise next week and she has been having vertigo was wondering if she might benefit from vertigo. Please review.  Thank you

## 2023-09-19 ENCOUNTER — OFFICE VISIT (OUTPATIENT)
Dept: PODIATRY | Facility: CLINIC | Age: 68
End: 2023-09-19
Payer: COMMERCIAL

## 2023-09-19 VITALS
HEART RATE: 73 BPM | WEIGHT: 222 LBS | BODY MASS INDEX: 35.68 KG/M2 | HEIGHT: 66 IN | DIASTOLIC BLOOD PRESSURE: 96 MMHG | SYSTOLIC BLOOD PRESSURE: 150 MMHG | OXYGEN SATURATION: 98 %

## 2023-09-19 DIAGNOSIS — M77.52 BURSITIS OF POSTERIOR HEEL, LEFT: Primary | ICD-10-CM

## 2023-09-19 DIAGNOSIS — M67.874 ACHILLES TENDINOSIS OF LEFT ANKLE: ICD-10-CM

## 2023-09-19 DIAGNOSIS — S86.009A ACHILLES TENDON INJURY: ICD-10-CM

## 2023-09-19 PROCEDURE — 99203 OFFICE O/P NEW LOW 30 MIN: CPT | Performed by: PODIATRIST

## 2023-09-19 RX ORDER — METHYLPREDNISOLONE 4 MG/1
TABLET ORAL
Qty: 21 TABLET | Refills: 0 | Status: SHIPPED | OUTPATIENT
Start: 2023-09-19

## 2023-09-19 NOTE — PROGRESS NOTES
Assessment/Plan:    The patient's clinical examination today significant for significant thickening of the distal Achilles tendon at its insertion site and as well as a palpable osteophyte to the posterior left calcaneus. There is mild to moderate tenderness palpation of the retrocalcaneal bursa as well. The patient's most recent MRI of the left ankle was personally viewed and interpreted. There is significant thickening of the distal Achilles tendon, there is appearance of a retrocalcaneal spur. Mild retrocalcaneal bursitis is also noted. Treatment options were discussed with the patient. This would include bracing and a course of physical therapy in conjunction with anti-inflammatory therapy as needed. We also discussed surgical options. Due to the chronicity and severity of her left lower extremity pain, she would like to proceed with surgical intervention sometime in late November after her scheduled cruises. The nature of the procedure was discussed with the patient. This would entail excision of the retrocalcaneal spur with debridement and repair of the Achilles tendon with bone anchors. She will be nonweightbearing for at least 2 weeks postoperatively followed by 4 to 6 weeks of guarded weightbearing in a cam boot. The patient was agreeable and willing to proceed. Informed consent was obtained today. She is currently being managed for right lower extremity DVT. We will need vascular/hematology clearance prior to left lower extremity surgery. We will initiate preoperative testing and scheduling.        Diagnoses and all orders for this visit:    Bursitis of posterior heel, left  -     methylPREDNISolone 4 MG tablet therapy pack; Use as directed on package    Achilles tendon injury  -     Ambulatory Referral to Orthopedic Surgery    Achilles tendinosis of left ankle  -     methylPREDNISolone 4 MG tablet therapy pack; Use as directed on package          Subjective:      Patient ID: Flo Homans Eusebia Fontanez is a 79 y.o. female. The patient presents today for her initial consultation with [facility] podiatry group with a chief complaint of posterior left heel pain that has been present now for several months. She has had a a recent MRI study of the left heel to rule out a tear of the Achilles tendon. The following portions of the patient's history were reviewed and updated as appropriate: allergies, current medications, past family history, past medical history, past social history, past surgical history and problem list.      PAST MEDICAL HISTORY:  Past Medical History:   Diagnosis Date   • Anemia    • Back pain    • Bowel obstruction ([address]) 4895,5291   • Cancer ([address])     uterine CA radiation destoried urethra. Patient has urostomy   • Cholelithiasis    • Colon polyp    • Deep vein thrombosis of left lower extremity ([address]) 01/11/2004    on blood thinner for 3 years. 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   • IR NEPHROSTOMY TUBE PLACEMENT  01/18/2021   • LAPAROSCOPIC GASTRIC BANDING N/A 2006    [place], [place]   • OTHER SURGICAL HISTORY  2004    Urine shunt to intestine, transverse colon   • PCNL Left 02/16/2021    Procedure: NEPHROLITHOTOMY  PERCUTANEOUS (PCNL); Surgeon: Leesa Dunn MD;  [facility];  Service: Urology   • ND CYSTO/URETERO W/LITHOTRIPSY &INDWELL STENT INSRT Left 02/16/2021    Procedure: anterograde  URETEROSCOPY with dilation of ureteral stricture, INSERTION STENT URETERAL, exchange  of nephrostomy tube; Surgeon: Leesa Dunn MD;  [facility];  Service: Urology   • ND LAPAROSCOPY ENTEROLYSIS SEPARATE PROCEDURE N/A 01/17/2019    Procedure: LAPAROSCOPIC LYSIS OF ADHESIONS;  Surgeon: Gris Ramirez MD;  Location: [address] [place] [address] MAIN OR;  Service: General   • [facility] [address] [facility]    • SLEEVE GASTROPLASTY N/A 09/2015    Dr. Agapito Avery, [facility]   • URETER REVISION  2010   • WEILBY THUMB ARTHROPLASTY     • WEILBY THUMB ARTHROPLASTY          ALLERGIES:  Amoxicillin, Penicillins, and Adhesive [medical tape]    MEDICATIONS:  Current Outpatient Medications   Medication Sig Dispense Refill   • ALPRAZolam (XANAX) 0.5 mg tablet Take 0.25 mg by mouth daily at bedtime as needed Takes 1/2 0.5mg = 0.25 mg @ bedtime prn  2   • BIOTIN PO Take by mouth daily     • dabigatran etexilate (PRADAXA) 150 mg capsu Take 1 capsule (150 mg total) by mouth every 12 (twelve) hours 180 capsule 0   • FREESTYLE LITE test strip      • metFORMIN (GLUCOPHAGE) 500 mg tablet      • methylPREDNISolone 4 MG tablet therapy pack Use as directed on package 21 tablet 0   • pantoprazole (PROTONIX) 40 mg tablet Take 1 tablet (40 mg total) by mouth daily in the early morning 90 tablet 0   • pramipexole (MIRAPEX) 0.5 mg tablet      • Probiotic Product (PROBIOTIC DAILY PO) Take by mouth     • SYNTHROID 75 MCG tablet Take 75 mcg by mouth daily in the early morning       • Trulicity 4.5 YC/8.4JV SOPN INJECT UNDER THE SKIN 4.5 MG WEEKLY AS DIRECTED     • Ascorbic Acid (VITAMIN C PO) Take by mouth daily Only in morse (Patient not taking: Reported on 2023)     • capsaicin (ZOSTRIX) 0.025 % cream Apply 1 Application topically 2 (two) times a day (Patient not taking: Reported on 2023) 60 g 0   • Cyanocobalamin (B-12 PO) Take by mouth daily (Patient not taking: Reported on 2023)     • diclofenac sodium (VOLTAREN) 50 mg EC tablet Take 1 tablet (50 mg total) by mouth 2 (two) times a day (Patient not taking: Reported on 2023) 5 tablet 0   • famotidine (PEPCID) 20 mg tablet Take 1 tablet (20 mg total) by mouth 2 (two) times a day 60 tablet 0   • meclizine (ANTIVERT) 12.5 MG tablet Take 1 tablet (12.5 mg total) by mouth every 8 (eight) hours for 10 days 30 tablet 0   • metFORMIN (GLUCOPHAGE) 1000 MG tablet Take 0.5 tablets (500 mg total) by mouth 2 (two) times a day with meals (Patient not taking: Reported on 2023) 180 tablet 0   • mupirocin (BACTROBAN) 2 % ointment APPLY TOPICALLY 2 (TWO) TIMES A DAY. APPLY TO NARES (Patient not taking: Reported on 2023)     • oxyCODONE (ROXICODONE) 5 immediate release tablet Take 1 tablet (5 mg total) by mouth every 4 (four) hours as needed for severe pain Max Daily Amount: 30 mg (Patient not taking: Reported on 2023) 30 tablet 0     No current facility-administered medications for this visit.        SOCIAL HISTORY:  Social History     Socioeconomic History   • Marital status: /Civil Union     Spouse name: Josse Chinchilla   • Number of children: 0   • Years of education: 15   • Highest education level: None   Occupational History   • Occupation: [employer]    Tobacco Use   • Smoking status: Former     Packs/day: 1.50     Years: 10.00     Total pack years: 15.00     Types: Cigarettes     Quit date: 1986     Years since quittin.7   • Smokeless tobacco: Former   Vaping Use   • Vaping Use: Never used   Substance and Sexual Activity   • Alcohol use: Yes     Comment: Rare Socially   • Drug use: No   • Sexual activity: Never     Comment: s/p hysterectomy   Other Topics Concern   • None Social History Narrative   • None     Social Determinants of Health     Financial Resource Strain: Not on file   Food Insecurity: Not on file   Transportation Needs: No Transportation Needs (6/26/2023)    PRAPARE - Transportation    • Lack of Transportation (Medical): No    • Lack of Transportation (Non-Medical): No   Physical Activity: Not on file   Stress: Not on file   Social Connections: Not on file   Intimate Partner Violence: Not on file   Housing Stability: Unknown (6/26/2023)    Housing Stability Vital Sign    • Unable to Pay for Housing in the Last Year: No    • Number of Places Lived in the Last Year: Not on file    • Unstable Housing in the Last Year: No        Review of Systems   Constitutional: Negative. HENT: Negative. Eyes: Negative. Respiratory: Negative. Cardiovascular: Negative. Endocrine: Negative. Musculoskeletal: Negative. Skin: Negative. Neurological: Negative. Hematological: Negative. Psychiatric/Behavioral: Negative. Objective:      /96 (BP Location: Right arm, Patient Position: Sitting, Cuff Size: Standard)   Pulse 73   Ht 5' 6" (1.676 m)   Wt 101 kg (222 lb)   SpO2 98%   BMI 35.83 kg/m²          Physical Exam  Constitutional:       Appearance: Normal appearance. HENT:      Head: Normocephalic and atraumatic. Nose: Nose normal.   Cardiovascular:      Pulses:           Dorsalis pedis pulses are 2+ on the left side. Posterior tibial pulses are 2+ on the left side. Pulmonary:      Effort: Pulmonary effort is normal.   Musculoskeletal:        Feet:    Feet:      Left foot:      Skin integrity: Skin integrity normal.      Comments: The patient's clinical examination today significant for significant thickening of the distal Achilles tendon at its insertion site and as well as a palpable osteophyte to the posterior left calcaneus. There is mild to moderate tenderness palpation of the retrocalcaneal bursa as well.   Skin: General: Skin is warm. Capillary Refill: Capillary refill takes less than 2 seconds. Neurological:      General: No focal deficit present. Mental Status: She is alert and oriented to person, place, and time. Psychiatric:         Mood and Affect: Mood normal.         Behavior: Behavior normal.         Thought Content:  Thought content normal.

## 2023-10-03 ENCOUNTER — OFFICE VISIT (OUTPATIENT)
Dept: HEMATOLOGY ONCOLOGY | Facility: CLINIC | Age: 68
End: 2023-10-03
Payer: COMMERCIAL

## 2023-10-03 VITALS
WEIGHT: 218 LBS | HEART RATE: 86 BPM | OXYGEN SATURATION: 100 % | HEIGHT: 66 IN | SYSTOLIC BLOOD PRESSURE: 130 MMHG | RESPIRATION RATE: 16 BRPM | TEMPERATURE: 97 F | DIASTOLIC BLOOD PRESSURE: 80 MMHG | BODY MASS INDEX: 35.03 KG/M2

## 2023-10-03 DIAGNOSIS — I82.461 ACUTE DEEP VEIN THROMBOSIS (DVT) OF CALF MUSCLE VEIN OF RIGHT LOWER EXTREMITY (HCC): ICD-10-CM

## 2023-10-03 DIAGNOSIS — I82.411 ACUTE DEEP VEIN THROMBOSIS (DVT) OF FEMORAL VEIN OF RIGHT LOWER EXTREMITY (HCC): Primary | ICD-10-CM

## 2023-10-03 DIAGNOSIS — R42 DIZZINESS: ICD-10-CM

## 2023-10-03 DIAGNOSIS — R53.83 OTHER FATIGUE: ICD-10-CM

## 2023-10-03 DIAGNOSIS — D64.9 NORMOCYTIC ANEMIA: ICD-10-CM

## 2023-10-03 DIAGNOSIS — Z86.718 HISTORY OF DVT (DEEP VEIN THROMBOSIS): ICD-10-CM

## 2023-10-03 PROCEDURE — 99215 OFFICE O/P EST HI 40 MIN: CPT

## 2023-10-03 NOTE — PROGRESS NOTES
[facility] HEMATOLOGY ONCOLOGY SPECIALISTS [place] [address] [address]  [place] [address]  Hematology Ambulatory Follow-Up  Clinton Pratt, 1955, [id number]  10/3/2023    Assessment/Plan:  1. Acute deep vein thrombosis (DVT) of femoral vein of right lower extremity (HCC)  2. History of DVT (deep vein thrombosis)  3. Acute deep vein thrombosis (DVT) of calf muscle vein of right lower extremity ([address])  Ms. Rita Topete is a 80-year-old female seen in follow-up for anticoagulation recommendations. She has a history of a provoked DVT in 2000 after hysterectomy for uterine cancer and was treated with 1 year of Coumadin. She has had multiple surgeries since as well as extensive travel history without issues of VTE. In May 2023 she was diagnosed with acute for subacute DVT in the right posterior tibial vein and peroneal vein of the calf and was placed on Xarelto. She ended up in June 2023 to have recurrent/clot propagation in the right lower extremity with extensive acute DVT in the mid to distal femoral vein and one of the paired posterior tibial veins. This was determined to be Xarelto failure and she was placed on Pradaxa 150 mg twice daily. Thrombophilia work-up to date has been negative. Her other risk factors include frequent travel as well as obesity. Therefore ongoing recommendations include lifelong/indefinite anticoagulation. Since starting the Pradaxa she has had worsening nausea as well as dizziness that has been treated with mexiletine and thought to be vertigo. See below for other recommendations regarding the symptoms. She is now here needing recommendations for anticoagulation prior to undergoing surgery of her left Achilles tendon repair. Due to extensive DVT history and failure of Xarelto bridging with Lovenox is recommended. I suggest holding Pradaxa for 3 days prior to surgery and supplementing with Lovenox 48 hours prior to surgery.   She will then resume Pradaxa on postop day 1 unless otherwise instructed by her orthopedic surgeon. 4. Normocytic anemia  5. Dizziness  6. Other fatigue  Patient has had increasing fatigue, dyspnea on exertion, and dizziness. Reviewed labs and it appears that she has had chronic anemia likely due to iron deficiency. Her hemoglobin has been ranging from 10-11. Iron panel demonstrates a ferritin of 12 with an iron saturation of 13% I suggested oral iron supplementation for now. She will take oral iron once daily. She will start taking Monday, Wednesday, Friday and increase as tolerated as long as there is no GI upset or constipation. She will then repeat labs in about 6 to 8 weeks and determine if IV iron supplementation is necessary. Folate and B12 were adequate and did not require supplementation at this time. Did discuss that further work-up of her iron deficiency and ongoing nausea would include GI referral.  Patient would like to hold off for now as she has enough going on dealing with her Achilles tendon repair in the near future. Stressed the importance of her continued follow-up with her PCP regarding the symptoms. If her symptoms or not resolved by oral iron supplementation, or GI evaluation demonstrates no findings causing the symptoms. I did discuss with the patient that transitioning to Coumadin is an option if this truly is thought to be a side effect from the Pradaxa. She will have further ongoing discussions with her PCP regarding this. - CBC and differential; Future  - Iron Panel (Includes Ferritin, Iron Sat%, Iron, and TIBC); Future          Patient voiced agreement and understanding to the above. Patient knows to call the Hematology/Oncology office with any questions and concerns regarding the above.       Barrier(s) to care: None  The patient is able to self care.  ------------------------------------------------------------------------------------------------------    Chief Complaint   Patient presents with • Follow-up       History of present illness:    Omid Ledbetter a 74-year-old female seen in consultation for anticoagulation recommendations. ePpito Booth has past medical history of hypothyroidism, type 2 diabetes, hypertension, migraines, and obesity.  In 1999 she underwent hysterectomy for endometrial cancer status post radiation therapy.  After this treatment she did experience adhesions causing a small bowel obstruction requiring urostomy.  In 2002 she underwent surgery for the urostomy and had a DVT postop for which she received Coumadin anticoagulation for about 1 year. Pepito Booth has had surgeries since including abdominal and revision not having complications of VTE. Pepito Booth also has an extensive travel history including plane rides for extended period of time as well as multiple cruises.    Her last vacation was in February when she took a plane to [place] the end of March 1 week of April she experienced swelling and then pain in her right lower extremity.  The pain was mostly in her foot and ankle.  She was evaluated in the emergency room 04/03/23 and found to have acute vs subacute nonocclusive DVT in one of the paired posterior tibial and peroneal veins of the right calf.  She was started on Xarelto 15 mg twice daily for 3 weeks and then transitioning to 20 mg once daily.     Patient had recurrent right ankle pain shortly after DVT diagnosis following a mechanical fall. MRI ankle/heel revealed achilles tendon tear and rupture, retracted 6.cm with hemorrhage in the ruptured Achilles tendon tract     Patient was hospitalized in late June 2023 for R thigh/knee pain, unable to bear weight. Had venous doppler 06/23/23 at this time revealing showing acute nonocclusive deep venous thrombosis in the mid to distal femoral vein and one of the paired posterior tibial vein. No evidence of left lower extremity DVT. Patient had been compliant with xarelto.  She was started on lovenox during admission initially and then switched to Pradaxa due. Also found to have bone infarct R knee, planning to follow up with ortho outpatient.      She has no family history of VTE.  She has no known kidney or liver disorders.  She is not on estrogen supplements.  She is up-to-date on colonoscopy and mammogram.  She does not follow with OB/GYN or GYN ONC.  She quit smoking in 1986 and smoked for about 10 years. Lyanne Leventhal has no history of CVA or MI. Lyanne Leventhal does has a history of 1 miscarriage. She has no known autoimmune conditions. Hx of tobacco use 1.5ppd x 15y. None currently.      Last Mammogram: August 2022  Last Colonoscopy: June 2022 4/11/23: Patient had R ankle due to mechanical fall few days after our last visit. MRI showed achilles tendon tear and rupture. Activity level was about the same despite injury. She was then hospitalized 06/23/2023 for R thigh/knee pain, unable to bear weight. Had venous doppler at this time showing showing acute nonocclusive DVT in the mid to distal femoral vein and one of the paired posterior tibial vein. No DVT on left. Patient had been faithful with with xarelto and had not missed any doses leading up to second DVT. She was started on lovenox during admission initially and then switched to Pradaxa following hematology recommendations. Also found to have bone infarct R knee, planning to follow up with ortho outpatient. Interval history: She continues with right knee pain. She did have repeat venous duplex of her lower extremity which found chronic nonocclusive DVT in the right mid distal femoral vein and one of the paired posterior tibial veins. [facility]. She is scheduled to undergo repair of her Achilles tendon in November. She has ongoing significant fatigue as well as some dyspnea on exertion. Reviewed her labs previously which demonstrated likely iron deficiency anemia.     6/24/2023: Ferritin 12, iron saturation 13%, TIBC 311, serum iron 39   Folate >22.3, B12 2640      Review of Systems   Constitutional: Positive for fatigue. Negative for activity change, appetite change, diaphoresis, fever and unexpected weight change. HENT: Negative for trouble swallowing and voice change. Eyes: Negative for photophobia and visual disturbance. Respiratory: Negative for cough, chest tightness and shortness of breath. Cardiovascular: Positive for leg swelling. Negative for chest pain and palpitations. Gastrointestinal: Negative for abdominal distention, abdominal pain, anal bleeding, blood in stool, constipation, diarrhea, nausea and vomiting. Endocrine: Negative for cold intolerance and heat intolerance. Genitourinary: Negative for dysuria, frequency, pelvic pain and urgency. Musculoskeletal: Positive for joint swelling. Negative for arthralgias, back pain, gait problem and myalgias. Pain in right leg   Skin: Negative for pallor and rash. Neurological: Negative for dizziness, weakness, light-headedness, numbness and headaches. Hematological: Negative for adenopathy. Does not bruise/bleed easily. Psychiatric/Behavioral: Negative for confusion and sleep disturbance.        Patient Active Problem List   Diagnosis   • Small bowel obstruction (HCC)   • Type 2 diabetes mellitus ([address])   • History of urostomy   • Endometrial cancer ([address])   • Hypothyroidism   • In vitro fertilization   • Anemia   • Hydroureteronephrosis   • HTN (hypertension)   • Migraine   • Abdominal adhesions   • Tooth infection   • Elevated lactic acid level   • Class 2 obesity in adult   • Headache   • Ureteral-ileal loop anastomotic stricture   • Acute deep vein thrombosis (DVT) of femoral vein of right lower extremity (HCC)   • Bone infarction (HCC)   • Anti-cardiolipin antibody positive   • Encounter for attention to other artificial openings of urinary tract ([address])   • Leg pain, anterior, right       Past Medical History:   Diagnosis Date   • Anemia    • Back pain    • Bowel obstruction ([address]) [phone],[phone]   • Cancer ([address])     uterine CA radiation destoried urethra. Patient has urostomy   • Cholelithiasis    • Colon polyp    • Deep vein thrombosis of left lower extremity ([address]) 01/11/2004    on blood thinner for 3 years. 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 • Hypothyroid    • In vitro fertilization    • Kidney stone    • Migraines    • Muscle weakness     abdominal   • Personal history of irradiation    • Renal cyst    • Sleep apnea     Resolved due to Weight Loss       Past Surgical History:   Procedure Laterality Date   • ACHILLES TENDON REPAIR Right    • APPENDECTOMY     • COLONOSCOPY      6/6/22   • COLONOSCOPY W/ BIOPSIES N/A 12/2015   • EGD     • HEEL SPUR SURGERY Right 1996   • HYSTERECTOMY     • 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   • IR NEPHROSTOMY TUBE PLACEMENT  01/18/2021   • LAPAROSCOPIC GASTRIC BANDING N/A 2006    [place], [place]   • OTHER SURGICAL HISTORY  2004    Urine shunt to intestine, transverse colon   • PCNL Left 02/16/2021    Procedure: NEPHROLITHOTOMY  PERCUTANEOUS (PCNL); Surgeon: Asif Kuhn MD;  Location: [facility];  Service: Urology   • TN CYSTO/URETERO W/LITHOTRIPSY &INDWELL STENT INSRT Left 02/16/2021    Procedure: anterograde  URETEROSCOPY with dilation of ureteral stricture, INSERTION STENT URETERAL, exchange  of nephrostomy tube;   Surgeon: Asif Kuhn MD;  Location[facility];  Service: Urology   • TN LAPAROSCOPY ENTEROLYSIS SEPARATE PROCEDURE N/A 01/17/2019    Procedure: LAPAROSCOPIC LYSIS OF ADHESIONS;  Surgeon: Neema Oviedo MD;  Location: [address] [place] [address] MAIN OR;  Service: General   • [facility]. • SLEEVE GA[facility]LASTY N/A 2015    Dr. Carla Warren, [facility]   • URETER REVISION     • [address] ARTHROPLASTY     • [address] THUMB ARTHROPLASTY         Family History   Problem Relation Age of Onset   • No Known Problems Mother    • Kidney failure Father    • Brain cancer Father    • Kidney cancer Father    • Diabetes Sister    • Alcohol abuse Sister    • Diabetes Brother        Social History     Socioeconomic History   • Marital status: /Civil Union     Spouse name: Paty Sena   • Number of children: 0   • Years of education: 15   • Highest education level: Not on file   Occupational History   • Occupation: [employer]    Tobacco Use   • Smoking status: Former     Packs/day: 1.50     Years: 10.00     Total pack years: 15.00     Types: Cigarettes     Quit date: 1986     Years since quittin.7   • Smokeless tobacco: Former   Vaping Use   • Vaping Use: Never used   Substance and Sexual Activity   • Alcohol use: Yes     Comment: Rare Socially   • Drug use: No   • Sexual activity: Never     Comment: s/p hysterectomy   Other Topics Concern   • Not on file   Social History Narrative   • Not on file     Social Determinants of Health     Financial Resource Strain: Not on file   Food Insecurity: Not on file   Transportation Needs: No Transportation Needs (2023)    PRAPARE - Transportation    • Lack of Transportation (Medical): No    • Lack of Transportation (Non-Medical):  No   Physical Activity: Not on file   Stress: Not on file   Social Connections: Not on file   Intimate Partner Violence: Not on file   Housing Stability: Unknown (2023)    Housing Stability Vital Sign    • Unable to Pay for Housing in the Last Year: No    • Number of Places Lived in the Last Year: Not on file    • Unstable Housing in the Last Year: No         Current Outpatient Medications:   •  ALPRAZolam (XANAX) 0.5 mg tablet, Take 0.25 mg by mouth daily at bedtime as needed Takes 1/2 0.5mg = 0.25 mg @ bedtime prn, Disp: , Rfl: 2  •  BIOTIN PO, Take by mouth daily, Disp: , Rfl:   •  Cyanocobalamin (B-12 PO), Take by mouth daily, Disp: , Rfl:   •  dabigatran etexilate (PRADAXA) 150 mg capsu, Take 1 capsule (150 mg total) by mouth every 12 (twelve) hours, Disp: 180 capsule, Rfl: 0  •  famotidine (PEPCID) 20 mg tablet, Take 1 tablet (20 mg total) by mouth 2 (two) times a day, Disp: 60 tablet, Rfl: 0  •  FREESTYLE LITE test strip, , Disp: , Rfl:   •  meclizine (ANTIVERT) 12.5 MG tablet, Take 1 tablet (12.5 mg total) by mouth every 8 (eight) hours for 10 days, Disp: 30 tablet, Rfl: 0  •  metFORMIN (GLUCOPHAGE) 500 mg tablet, , Disp: , Rfl:   •  methylPREDNISolone 4 MG tablet therapy pack, Use as directed on package, Disp: 21 tablet, Rfl: 0  •  pantoprazole (PROTONIX) 40 mg tablet, Take 1 tablet (40 mg total) by mouth daily in the early morning, Disp: 90 tablet, Rfl: 0  •  pramipexole (MIRAPEX) 0.5 mg tablet, , Disp: , Rfl:   •  Probiotic Product (PROBIOTIC DAILY PO), Take by mouth, Disp: , Rfl:   •  SYNTHROID 75 MCG tablet, Take 75 mcg by mouth daily in the early morning  , Disp: , Rfl:   •  Trulicity 4.5 RV/9.5PJ SOPN, INJECT UNDER THE SKIN 4.5 MG WEEKLY AS DIRECTED, Disp: , Rfl:   •  Ascorbic Acid (VITAMIN C PO), Take by mouth daily Only in morse (Patient not taking: Reported on 7/27/2023), Disp: , Rfl:   •  capsaicin (ZOSTRIX) 0.025 % cream, Apply 1 Application topically 2 (two) times a day (Patient not taking: Reported on 7/27/2023), Disp: 60 g, Rfl: 0  •  diclofenac sodium (VOLTAREN) 50 mg EC tablet, Take 1 tablet (50 mg total) by mouth 2 (two) times a day (Patient not taking: Reported on 7/27/2023), Disp: 5 tablet, Rfl: 0  •  metFORMIN (GLUCOPHAGE) 1000 MG tablet, Take 0.5 tablets (500 mg total) by mouth 2 (two) times a day with meals (Patient not taking: Reported on 7/27/2023), Disp: 180 tablet, Rfl: 0  •  mupirocin (BACTROBAN) 2 % ointment, APPLY TOPICALLY 2 (TWO) TIMES A DAY.  APPLY TO NARES (Patient not taking: Reported on 7/27/2023), Disp: , Rfl:   •  oxyCODONE (ROXICODONE) 5 immediate release tablet, Take 1 tablet (5 mg total) by mouth every 4 (four) hours as needed for severe pain Max Daily Amount: 30 mg (Patient not taking: Reported on 9/19/2023), Disp: 30 tablet, Rfl: 0    Allergies   Allergen Reactions   • Amoxicillin Rash   • Penicillins Rash   • Adhesive [Medical Tape] Rash       Objective:  /80 (BP Location: Right arm, Patient Position: Sitting, Cuff Size: Adult)   Pulse 86   Temp (!) 97 °F (36.1 °C)   Resp 16   Ht 5' 6" (1.676 m)   Wt 98.9 kg (218 lb) Comment: per pt  SpO2 100%   BMI 35.19 kg/m²    Physical Exam  Constitutional:       General: She is not in acute distress. Appearance: Normal appearance. She is obese. She is not ill-appearing. HENT:      Head: Normocephalic and atraumatic. Cardiovascular:      Rate and Rhythm: Normal rate and regular rhythm. Pulmonary:      Effort: Pulmonary effort is normal.      Breath sounds: Normal breath sounds. Abdominal:      Palpations: Abdomen is soft. Tenderness: There is no abdominal tenderness. Musculoskeletal:      Cervical back: Normal range of motion. Right lower leg: No edema. Left lower leg: No edema. Skin:     General: Skin is warm. Capillary Refill: Capillary refill takes less than 2 seconds. Coloration: Skin is not jaundiced or pale. Neurological:      Mental Status: She is alert and oriented to person, place, and time. Mental status is at baseline. Psychiatric:         Mood and Affect: Mood normal.         Behavior: Behavior normal.         Thought Content: Thought content normal.         Judgment: Judgment normal.         Result Review  Labs:  No visits with results within 1 Month(s) from this visit.    Latest known visit with results is:   Appointment on 07/10/2023   Component Date Value Ref Range Status   • Sodium 07/10/2023 142  135 - 147 mmol/L Final   • Potassium 07/10/2023 4.8 3.5 - 5.3 mmol/L Final   • Chloride 07/10/2023 113 (H)  96 - 108 mmol/L Final   • CO2 07/10/2023 27  21 - 32 mmol/L Final   • ANION GAP 07/10/2023 2  mmol/L Final   • BUN 07/10/2023 26 (H)  5 - 25 mg/dL Final   • Creatinine 07/10/2023 1.01  0.60 - 1.30 mg/dL Final    Standardized to IDMS reference method   • Glucose, Fasting 07/10/2023 115 (H)  65 - 99 mg/dL Final    Specimen collection should occur prior to Sulfasalazine administration due to the potential for falsely depressed results. Specimen collection should occur prior to Sulfapyridine administration due to the potential for falsely elevated results. • Calcium 07/10/2023 10.4 (H)  8.3 - 10.1 mg/dL Final   • AST 07/10/2023 17  5 - 45 U/L Final    Specimen collection should occur prior to Sulfasalazine administration due to the potential for falsely depressed results. • ALT 07/10/2023 25  12 - 78 U/L Final    Specimen collection should occur prior to Sulfasalazine and/or Sulfapyridine administration due to the potential for falsely depressed results. • Alkaline Phosphatase 07/10/2023 119 (H)  46 - 116 U/L Final   • Total Protein 07/10/2023 7.4  6.4 - 8.4 g/dL Final   • Albumin 07/10/2023 3.5  3.5 - 5.0 g/dL Final   • Total Bilirubin 07/10/2023 0.59  0.20 - 1.00 mg/dL Final    Use of this assay is not recommended for patients undergoing treatment with eltrombopag due to the potential for falsely elevated results. • eGFR 07/10/2023 58  ml/min/1.73sq m Final   • D-Dimer, Quant 07/10/2023 0.31  <0.50 ug/ml FEU Final    Reference and upper limits to exclude DVT and PE are the same. Do not use to exclude if clinical symptoms are present. Pregnant women:  1st trimester:  <0.22 - 1.06 ug/ml FEU  2nd trimester:  <0.22 - 1.88 ug/ml FEU  3rd trimester:   0.24 - 3.28 ug/ml FEU    Note: Normal ranges may not apply to patients who are transgender, non-binary, or whose legal sex, sex at birth, and gender identity differ.      • AntiThrombIN III Activity 07/10/2023 103  92 - 136 % of Normal Final   • ANTICARDIOLIPIN IGG ANTIBODY 07/10/2023 1.1  See comment Final   • ANTICARDIOLIPIN IGA ANTIBODY 07/10/2023 4.2  See comment Final   • ANTICARDIOLIPIN IGM ANTIBODY 07/10/2023 0.9  See comment Final   • Factor V Leiden 07/10/2023 Comment   Final    Comment: Result: c.1601G>A ([id number]) - Not Detected  This result is not associated with an increased risk for venous  thromboembolism. See Additional Clinical Information and  Comments. Additional Clinical Information:  Venous thromboembolism is a multifactorial disease influenced by  genetic, environmental, and circumstantial risk factors. The c.1601G>A  (p. [id number]) variant in the F5 gene, commonly referred to as Factor  V Leiden, is a genetic risk factor for venous thromboembolism. Heterozygous carriers of this variant have a 6- to 8-fold increased  risk for venous thromboembolism. Individuals homozygous for this  variant (ie, with a copy of the variant on each chromosome) have an  approximately 80-fold increased risk for venous thromboembolism. Individuals who carry both a c.*97G>A variant in the F2 gene and  Factor V Leiden have an approximately 20-fold increased risk for  venous thromboembolism. Risks are likely to be even higher in more  complex genotype combinations involving                            the F2 c.*97G>A variant and  Factor V Leiden (PMID: [id number]). Additional risk factors include but  are not limited to: deficiency of protein C, protein S, or  antithrombin III, age, male sex, personal or family history of deep  vein thromboembolism, smoking, surgery, prolonged immobilization,  malignant neoplasm, tamoxifen treatment, raloxifene treatment, oral  contraceptive use, hormone replacement therapy, and pregnancy. Management of thrombotic risk and thrombotic events should follow  established guidelines and fit the clinical circumstance.  This result  cannot predict the occurrence or recurrence of a thrombotic event. Comment:  Genetic counseling is recommended to discuss the potential clinical  implications of positive results, as well as recommendations for  testing family members. Genetic Coordinators are available for health care providers to  discuss results at [phone]). Test Details:  Variant Analyzed: c.1601G>A (p. [id number]), referred to as Factor                            V  Leiden  Methods/Limitations:  DNA analysis of the F5 gene (NM_000130.5) was performed by PCR  amplification followed by restriction enzyme analysis. The diagnostic  sensitivity is >99%. Results must be combined with clinical  information for the most accurate interpretation. Molecular-based  testing is highly accurate, but as in any laboratory test, diagnostic  errors may occur. False positive or false negative results may occur  for reasons that include genetic variants, blood transfusions, bone  marrow transplantation, somatic or tissue-specific mosaicism,  mislabeled samples, or erroneous representation of family  relationships. This test was developed and its performance characteristics  determined by [facility]. It has not been cleared or approved by the  Food and Drug Administration. References:  [facility], Lisa Andrews; [facility] Professional  Practice and Guidelines Committee. Addendum: 2809 [facility] consensus statement on factor                            V Leiden mutation  testing. Tiffanie Med. 2021 Mar 5. doi: 10.1038/[id number]-j. PMID: 07283770. Marlene Carroll. Factor V Leiden Thrombophilia. 1999 May 14  (Updated 2018 Jan 4). In: Rosa Puckett, Abrahan DELGADO, Jayleen RA, et al.,  editors. Cassandra(R) (Internet). [address] [place] [address] ([facility]): [facility], [address] [place] [address]; [phone]. Available from:  [email or web address] Montague, Charbel Keyse;  [facility] Laboratory  Committee.  Venous thromboembolism  laboratory testing (factor V Leiden and factor II c.*97G>A), 2018  update: a technical standard of the [address] [place] [address] ([facility]). Tiffanie Med. 2018 Dec;20(12):3680-5413.  doi: 81.3150/[id number]-C. Epub 2018 Oct 5. PMID: 73111018. • REVIEWED BY: 07/10/2023 Comment   Final    Yesica Griffin, PhD  Director, Molecular Genetics   • PTT Lupus Anticoagulant 07/10/2023 63.9 (H)  0.0 - 43.5 sec Final   • Dilute Viper Venom Time 07/10/2023 134.5 (H)  0.0 - 47.0 sec Final   • DILUTE PROTHROMBIN TIME(DPT) 07/10/2023 124.2 (H)  0.0 - 47.6 sec Final   • THROMBIN TIME (DRVW) 07/10/2023 >150.0 (H)  0.0 - 23.0 sec Final   • DPT CONFIRM RATIO 07/10/2023 1.46 (H)  0.00 - 1.34 Ratio Final   • LUPUS REFLEX INTERPRETATION 07/10/2023 Comment:   Final    Results are consistent with the presence of a lupus anticoagulant. However,  the dPT is markedly extended, as can be seen in patients receiving oral  anticoagulant therapy (OAT). In general, OAT can reduce the accuracy of  lupus anticoagulant testing and results obtain for patients receiving OAT  should be interpreted with caution. The extended thrombin time failed to  correct on addition of a heparin neutralizer or normal plasma suggesting  the presence of dabigatran, a direct thrombin inhibitor anticoagulant. As only persistent lupus anticoagulant (LA) positivity meets laboratory  diagnostic criteria for antiphospholipid syndrome, repeat testing in 12 or  more weeks is recommended, ideally in the absence of anticoagulant therapy. The results of LA testing are not valid for patients receiving  anticoagulant therapy. This treatment does not, however, interfere with  anticardiolipin and beta-2 glycoprotein 1 antibody testing.    • Protein C Activity 07/10/2023 >150 (H)  60 - 150 % of Normal Final   • PROTEIN S ACTIVITY 07/10/2023 >150 (H)  68 - 108 % Final   • Protein S Ag, Total 07/10/2023 113  60 - 150 % Final    This test was developed and its performance characteristics  determined by [facility]. It has not been cleared or approved  by the Food and Drug Administration. • Protein S Ag, Free 07/10/2023 123  61 - 136 % Final   • Prothrombin Mutation 07/10/2023 Comment   Final    Comment: Result: c.*97G>A - Not Detected  This result is not associated with an increased risk for venous  thromboembolism. See Additional Clinical Information and  Comments. Additional Clinical Information:  Venous thromboembolism is a multifactorial disease influenced by  genetic, environmental, and circumstantial risk factors. The c.*97G>A  variant in the F2 gene is a genetic risk factor for venous  thromboembolism. Heterozygous carriers have a 2- to 4-fold increased  risk for venous thromboembolism. Homozygotes for the c.*97G>A variant  are rare. The annual risk of VTE in homozygotes has been reported to  be 1.1%/year. Individuals who carry both a c.*97G>A variant in the  F2 gene and a c.1601G>A (p. [id number]) variant in the F5 gene  (commonly referred to as Factor V Leiden) have an approximately 20-  fold increased risk for venous thromboembolism. Risks are likely to  be even higher in more complex genotype combinations involving the  F2 c.*97G>A variant and Factor V Leiden (PMID:                            84920913). Additional  risk factors include but are not limited to: deficiency of protein C,  protein S, or antithrombin III, age, male sex, personal or family  history of deep vein thromboembolism, smoking, surgery, prolonged  immobilization, malignant neoplasm, tamoxifen treatment, raloxifene  treatment, oral contraceptive use, hormone replacement therapy, and  pregnancy. Management of thrombotic risk and thrombotic events should  follow established guidelines and fit the clinical circumstance. This  result cannot predict the occurrence or recurrence of a thrombotic  event.   Comments:  Genetic counseling is recommended to discuss the potential clinical  implications of positive results, as well as recommendations for  testing family members. Genetic Coordinators are available for health care providers to discuss  results at [phone] ([phone]). Test Details:  Variant analyzed: c.*97G>A, previously referred to as [id number]  Methods/Limitations:  DNA analysis of the F2 gene                            (NM_000506. 5) was performed by PCR  amplification followed by restriction enzyme analysis. The diagnostic  sensitivity is >99%. Results must be combined with clinical  information for the most accurate interpretation. Molecular-based  testing is highly accurate, but as in any laboratory test, diagnostic  errors may occur. False positive or false negative results may occur  for reasons that include genetic variants, blood transfusions, bone  marrow transplantation, somatic or tissue-specific mosaicism,  mislabeled samples, or erroneous representation of family  relationships. This test was developed and its performance characteristics determined  by [facility]. It has not been cleared or approved by the Food and Drug  Administration. References:  [facility], Dajuan Durham; [facility] Professional  Practice and Guidelines Committee. Addendum: [address] [facility] consensus statement on factor V Leiden mutation  testing. [employer]. 2021 Mar 5. doi:                            10.1038/[id number]-y. PMID: 16264882. Charito Mendoza. Prothrombin Thrombophilia. 2006 Jul 25  [Updated 2021 Feb 4]. In: Spenser Umanzor, Abrahan DELGADO, Jayleen BARBOSA, et al.,  editors. Cassandra(R) [Internet]. [address] [place] [address] ([facility]): [address]; [phone]. Available from:  PopTick[employer]  Yolande Coughlin, Curlie Baumgarten, Tavo WINKLRE;  [facility] Laboratory  Committee.  Venous thromboembolism  laboratory testing (factor V Leiden and factor II c.*97G>A),  2018 update: a technical standard of [address]). [employer] Med. 2018 Dec;20(12):4026-4032.  doi: 01.3673/[id number]-Y. Epub 2018 Oct 5. PMID: 70775693. • REVIEWED BY: 07/10/2023 Dinah Chase, PhD   Final   • BETA 2 GLYCO 1 IGG 07/10/2023 1.2  See comment Final   • BETA 2 GLYCO 1 IGA 07/10/2023 2.5  See comment Final   • BETA 2 GLYCO 1 IGM 07/10/2023 <2.4  See comment Final   • THROMBIN TIME MIX-LA 07/10/2023 125.0 (H)  0.0 - 23.0 sec Final   • THROMBIN NEUTRALIZATION-LA 07/10/2023 >150.0 (H)  0.0 - 23.0 sec Final   • PTT-LA MIX 07/10/2023 58.6 (H)  0.0 - 40.5 sec Final   • Hex Phosph Neut Test 07/10/2023 10  0 - 11 sec Final   • dRVVT Mix Interp. 07/10/2023 92.9 (H)  0.0 - 40.4 sec Final   • DRVVT/Confirm Ratio 07/10/2023 1.4 (H)  0.8 - 1.2 ratio Final       Imagin23: VAS lower limb  CONCLUSION:  Impression:  RIGHT LOWER LIMB  There is no evidence of acute deep vein thrombosis. There is evidence of chronic non-occlusive deep vein thrombosis in the  mid-distal femoral vein and one of the paired posterior tibial veins. No evidence of superficial thrombophlebitis noted. Popliteal, posterior tibial and anterior tibial arterial Doppler waveforms are  triphasic. Incidental finding: A non-vascularized cyct-like structure is noted in the  popliteal fossa which measures 4.11 x 0.91 cm     LEFT LOWER LIMB LIMITED  Evaluation shows no evidence of thrombus in the common femoral vein. Doppler evaluation shows a normal response to augmentation maneuvers. In comparison to the study of  2023, there is no progression of disease. Please note: This report has been generated by a voice recognition software system. Therefore there may be syntax, spelling, and/or grammatical errors. Please call if you have any questions.

## 2023-10-05 ENCOUNTER — APPOINTMENT (OUTPATIENT)
Dept: LAB | Facility: AMBULARY SURGERY CENTER | Age: 68
End: 2023-10-05
Payer: COMMERCIAL

## 2023-10-05 ENCOUNTER — OFFICE VISIT (OUTPATIENT)
Dept: INTERNAL MEDICINE CLINIC | Facility: CLINIC | Age: 68
End: 2023-10-05
Payer: COMMERCIAL

## 2023-10-05 VITALS
SYSTOLIC BLOOD PRESSURE: 126 MMHG | OXYGEN SATURATION: 97 % | WEIGHT: 219 LBS | TEMPERATURE: 98.8 F | DIASTOLIC BLOOD PRESSURE: 68 MMHG | BODY MASS INDEX: 35.35 KG/M2 | RESPIRATION RATE: 14 BRPM | HEART RATE: 68 BPM

## 2023-10-05 DIAGNOSIS — E11.69 TYPE 2 DIABETES MELLITUS WITH OTHER SPECIFIED COMPLICATION, WITH LONG-TERM CURRENT USE OF INSULIN (HCC): ICD-10-CM

## 2023-10-05 DIAGNOSIS — R53.83 OTHER FATIGUE: ICD-10-CM

## 2023-10-05 DIAGNOSIS — Z23 ENCOUNTER FOR ADMINISTRATION OF VACCINE: ICD-10-CM

## 2023-10-05 DIAGNOSIS — I82.411 ACUTE DEEP VEIN THROMBOSIS (DVT) OF FEMORAL VEIN OF RIGHT LOWER EXTREMITY (HCC): ICD-10-CM

## 2023-10-05 DIAGNOSIS — R11.0 NAUSEA: Primary | ICD-10-CM

## 2023-10-05 DIAGNOSIS — Z79.4 TYPE 2 DIABETES MELLITUS WITH OTHER SPECIFIED COMPLICATION, WITH LONG-TERM CURRENT USE OF INSULIN (HCC): ICD-10-CM

## 2023-10-05 DIAGNOSIS — M79.604 LEG PAIN, ANTERIOR, RIGHT: ICD-10-CM

## 2023-10-05 PROBLEM — R79.89 ELEVATED LACTIC ACID LEVEL: Status: RESOLVED | Noted: 2021-01-16 | Resolved: 2023-10-05

## 2023-10-05 LAB
25(OH)D3 SERPL-MCNC: 100.9 NG/ML (ref 30–100)
ALBUMIN SERPL BCP-MCNC: 4 G/DL (ref 3.5–5)
ALP SERPL-CCNC: 105 U/L (ref 34–104)
ALT SERPL W P-5'-P-CCNC: 18 U/L (ref 7–52)
ANION GAP SERPL CALCULATED.3IONS-SCNC: 5 MMOL/L
AST SERPL W P-5'-P-CCNC: 16 U/L (ref 13–39)
BILIRUB SERPL-MCNC: 0.84 MG/DL (ref 0.2–1)
BUN SERPL-MCNC: 34 MG/DL (ref 5–25)
CALCIUM SERPL-MCNC: 11.2 MG/DL (ref 8.4–10.2)
CHLORIDE SERPL-SCNC: 110 MMOL/L (ref 96–108)
CO2 SERPL-SCNC: 26 MMOL/L (ref 21–32)
CREAT SERPL-MCNC: 1 MG/DL (ref 0.6–1.3)
CREAT UR-MCNC: 75.7 MG/DL
EST. AVERAGE GLUCOSE BLD GHB EST-MCNC: 154 MG/DL
GFR SERPL CREATININE-BSD FRML MDRD: 58 ML/MIN/1.73SQ M
GLUCOSE P FAST SERPL-MCNC: 106 MG/DL (ref 65–99)
HBA1C MFR BLD: 7 %
MICROALBUMIN UR-MCNC: 113 MG/L
MICROALBUMIN/CREAT 24H UR: 149 MG/G CREATININE (ref 0–30)
POTASSIUM SERPL-SCNC: 5.2 MMOL/L (ref 3.5–5.3)
PROT SERPL-MCNC: 7.3 G/DL (ref 6.4–8.4)
SODIUM SERPL-SCNC: 141 MMOL/L (ref 135–147)
TSH SERPL DL<=0.05 MIU/L-ACNC: 1.96 UIU/ML (ref 0.45–4.5)

## 2023-10-05 PROCEDURE — 82570 ASSAY OF URINE CREATININE: CPT

## 2023-10-05 PROCEDURE — 36415 COLL VENOUS BLD VENIPUNCTURE: CPT

## 2023-10-05 PROCEDURE — 90471 IMMUNIZATION ADMIN: CPT

## 2023-10-05 PROCEDURE — 82043 UR ALBUMIN QUANTITATIVE: CPT

## 2023-10-05 PROCEDURE — 99214 OFFICE O/P EST MOD 30 MIN: CPT | Performed by: INTERNAL MEDICINE

## 2023-10-05 PROCEDURE — 82306 VITAMIN D 25 HYDROXY: CPT

## 2023-10-05 PROCEDURE — 83036 HEMOGLOBIN GLYCOSYLATED A1C: CPT

## 2023-10-05 PROCEDURE — 80053 COMPREHEN METABOLIC PANEL: CPT

## 2023-10-05 PROCEDURE — 84443 ASSAY THYROID STIM HORMONE: CPT

## 2023-10-05 PROCEDURE — 90662 IIV NO PRSV INCREASED AG IM: CPT

## 2023-10-05 NOTE — ASSESSMENT & PLAN NOTE
Lab Results   Component Value Date    HGBA1C 7.0 (H) 10/05/2023   Check A1c  Check urine albumin to creatinine ratio  Continue with metformin  CMP

## 2023-10-05 NOTE — PROGRESS NOTES
Name: Cristobal Hall      : 1955      MRN: [id number]  Encounter Provider: Leander Mehta MD  Encounter Date: 10/5/2023   Encounter department: [address] [place] [address]     1. Nausea  Assessment & Plan:  Patient states that for the past month she has been experiencing nausea. Also states that she has been experiencing fatigue since this time . denies any associated vomiting, heartburn, chest pain, abdominal pain, change in bowel habits  DDx could include but not limited to medication side effects, gastroparesis, GERD, PUD    Plan  Will refer patient to Rheumatology for further evaluation  Check vitamin D  Check TSH  Continue with oral iron     Orders:  -     Ambulatory Referral to Rheumatology; Future    2. Type 2 diabetes mellitus with other specified complication, with long-term current use of insulin (HCC)  Assessment & Plan:    Lab Results   Component Value Date    HGBA1C 7.0 (H) 10/05/2023   Check A1c  Check urine albumin to creatinine ratio  Continue with metformin  CMP    Orders:  -     Albumin / creatinine urine ratio; Future  -     Hemoglobin A1C; Future  -     Comprehensive metabolic panel; Future    3. Other fatigue  -     Vitamin D 25 hydroxy; Future  -     TSH, 3rd generation with Free T4 reflex; Future  -     Ambulatory Referral to Rheumatology; Future    4. Encounter for administration of vaccine  -     influenza vaccine, high-dose, PF 0.7 mL (FLUZONE HIGH-DOSE)    5. Acute deep vein thrombosis (DVT) of femoral vein of right lower extremity (HCC)  Assessment & Plan: We will continue with Pradaxa 150 mg twice daily at this time  No evidence of acute DVT during examination      6. Leg pain, anterior, right  Assessment & Plan:  Ongoing intermittent pain  States the oxycodone was not effective  Discussed with patient that we will give her a short course of Dilaudid           Subjective      HPI   Ms. Aguila Wilkinson is a 24-year-old female presenting to clinic complaining of nausea, right thigh pain and left ankle pain. She states that for the past month she has been experiencing nausea. Denies any associated vomiting, heartburn, abdominal pain, chest pain or shortness of breath. She states that she usually experiences nausea in the mid afternoon. Does endorse some early satiety however she states that this has been ongoing for several years. Denies any fever chills weight loss or night sweats. States that for the past month she has also been experiencing fatigue she is unusual for her. She was recently started on oral iron by her hematologist. she states that she thinks her Pradaxa might be playing a role but she has been on the medication since June of this year. She states that she is scheduled for upcoming surgery with podiatry in November for her Achilles tendon. She also complains of ongoing mid. Left thigh/knee. Review of Systems   Constitutional: Negative for chills and fever. HENT: Negative for ear pain and sore throat. Eyes: Negative for pain and visual disturbance. Respiratory: Negative for cough and shortness of breath. Cardiovascular: Negative for chest pain and palpitations. Gastrointestinal: Positive for nausea. Negative for abdominal pain and vomiting. Genitourinary: Negative for dysuria and hematuria. Musculoskeletal: Positive for joint swelling. Negative for arthralgias and back pain. Skin: Negative for color change and rash. Neurological: Negative for seizures and syncope. All other systems reviewed and are negative.       Current Outpatient Medications on File Prior to Visit   Medication Sig   • ALPRAZolam (XANAX) 0.5 mg tablet Take 0.25 mg by mouth daily at bedtime as needed Takes 1/2 0.5mg = 0.25 mg @ bedtime prn   • Ascorbic Acid (VITAMIN C PO) Take by mouth daily Only in morse   • BIOTIN PO Take by mouth daily   • dabigatran etexilate (PRADAXA) 150 mg capsu Take 1 capsule (150 mg total) by mouth every 12 (twelve) hours   • diclofenac sodium (VOLTAREN) 50 mg EC tablet Take 1 tablet (50 mg total) by mouth 2 (two) times a day   • famotidine (PEPCID) 20 mg tablet Take 1 tablet (20 mg total) by mouth 2 (two) times a day   • FREESTYLE LITE test strip    • meclizine (ANTIVERT) 12.5 MG tablet Take 1 tablet (12.5 mg total) by mouth every 8 (eight) hours for 10 days   • metFORMIN (GLUCOPHAGE) 1000 MG tablet Take 0.5 tablets (500 mg total) by mouth 2 (two) times a day with meals   • metFORMIN (GLUCOPHAGE) 500 mg tablet    • oxyCODONE (ROXICODONE) 5 immediate release tablet Take 1 tablet (5 mg total) by mouth every 4 (four) hours as needed for severe pain Max Daily Amount: 30 mg   • pantoprazole (PROTONIX) 40 mg tablet Take 1 tablet (40 mg total) by mouth daily in the early morning   • pramipexole (MIRAPEX) 0.5 mg tablet    • Probiotic Product (PROBIOTIC DAILY PO) Take by mouth   • SYNTHROID 75 MCG tablet Take 75 mcg by mouth daily in the early morning     • Trulicity 4.5 RJ/7.4UT SOPN INJECT UNDER THE SKIN 4.5 MG WEEKLY AS DIRECTED   • methylPREDNISolone 4 MG tablet therapy pack Use as directed on package (Patient not taking: Reported on 10/5/2023)   • mupirocin (BACTROBAN) 2 % ointment APPLY TOPICALLY 2 (TWO) TIMES A DAY. APPLY TO NARES (Patient not taking: Reported on 7/27/2023)   • [DISCONTINUED] capsaicin (ZOSTRIX) 0.025 % cream Apply 1 Application topically 2 (two) times a day (Patient not taking: Reported on 7/27/2023)   • [DISCONTINUED] Cyanocobalamin (B-12 PO) Take by mouth daily (Patient not taking: Reported on 10/5/2023)       Objective     /68 (BP Location: Left arm, Patient Position: Sitting, Cuff Size: Extra-Large)   Pulse 68   Temp 98.8 °F (37.1 °C) (Tympanic)   Resp 14   Wt 99.3 kg (219 lb)   SpO2 97%   BMI 35.35 kg/m²     Physical Exam  Constitutional:       Appearance: She is obese. HENT:      Head: Normocephalic and atraumatic.       Right Ear: Tympanic membrane normal. Left Ear: Tympanic membrane normal.      Nose: Nose normal.      Mouth/Throat:      Pharynx: Oropharynx is clear. Eyes:      Conjunctiva/sclera: Conjunctivae normal.   Cardiovascular:      Rate and Rhythm: Normal rate and regular rhythm. Pulses: Normal pulses. Heart sounds: Normal heart sounds. Pulmonary:      Effort: Pulmonary effort is normal.      Breath sounds: Normal breath sounds. Abdominal:      General: Bowel sounds are normal.   Musculoskeletal:      Cervical back: Neck supple. Right lower leg: No deformity or bony tenderness. 1+ Edema present. Left lower leg: No deformity or bony tenderness. 1+ Edema present. Left ankle: Swelling present. Tenderness present. Normal pulse. Left Achilles Tendon: Tenderness present. Skin:     General: Skin is warm. Neurological:      General: No focal deficit present. Mental Status: She is alert and oriented to person, place, and time.    Psychiatric:         Mood and Affect: Mood normal.         Behavior: Behavior normal.       Leslye Mejia MD

## 2023-10-05 NOTE — ASSESSMENT & PLAN NOTE
Repeat TSH  Discussed with patient that she should wait at least 2 hours after taking her medication before t taking any other medications.

## 2023-10-05 NOTE — ASSESSMENT & PLAN NOTE
We will continue with Pradaxa 150 mg twice daily at this time  No evidence of acute DVT during examination

## 2023-10-05 NOTE — ASSESSMENT & PLAN NOTE
Ongoing intermittent pain  States the oxycodone was not effective  Discussed with patient that we will give her a short course of Dilaudid

## 2023-10-05 NOTE — PROGRESS NOTES
Name: Asif Quintero      : 1955      MRN: [id number]  Encounter Provider: Ceferino Jesus MD  Encounter Date: 10/5/2023   Encounter department: [address] [place] [address]     1. Nausea  Assessment & Plan:  Patient states that for the past month she has been experiencing nausea. Also states that she has been experiencing fatigue since this time . denies any associated vomiting, heartburn, chest pain, abdominal pain, change in bowel habits  DDx could include but not limited to medication side effects, gastroparesis, GERD, PUD    Plan  Will refer patient to Rheumatology for further evaluation  Check vitamin D  Check TSH  Continue with oral iron     Orders:  -     Ambulatory Referral to Rheumatology; Future    2. Type 2 diabetes mellitus with other specified complication, with long-term current use of insulin ([facility])  Assessment & Plan:    Lab Results   Component Value Date    HGBA1C 7.0 (H) 10/05/2023   Check A1c  Check urine albumin to creatinine ratio  Continue with metformin  CMP    Orders:  -     Albumin / creatinine urine ratio; Future  -     Hemoglobin A1C; Future  -     Comprehensive metabolic panel; Future    3. Other fatigue  -     Vitamin D 25 hydroxy; Future  -     TSH, 3rd generation with Free T4 reflex; Future  -     Ambulatory Referral to Rheumatology; Future    4. Encounter for administration of vaccine  -     influenza vaccine, high-dose, PF 0.7 mL (FLUZONE HIGH-DOSE)    5. Acute deep vein thrombosis (DVT) of femoral vein of right lower extremity (HCC)  Assessment & Plan: We will continue with Pradaxa 150 mg twice daily at this time  No evidence of acute DVT during examination      6.  Leg pain, anterior, right  Assessment & Plan:  Ongoing intermittent pain  States the oxycodone was not effective  Discussed with patient that we will give her a short course of Dilaudid           Subjective      HPI   Fauzia Mendoza is a 79year old female who presents to the clinic complaining of nausea, fatigue and left ankle pain and right knee and thigh pain. She states that about a month ago she started experiencing nausea in the mid afternoon. Denies any vomiting, abdominal pain, changes in bowel habits, heartburn, chest pain or SOB. She does endorse early satiety however she states that this has been ongoing for several years. she also complains of fatigue that started about a month ago. She states that she is usually very active however for the past few weeks she has had decrease energy.     Review of Systems    Current Outpatient Medications on File Prior to Visit   Medication Sig   • ALPRAZolam (XANAX) 0.5 mg tablet Take 0.25 mg by mouth daily at bedtime as needed Takes 1/2 0.5mg = 0.25 mg @ bedtime prn   • Ascorbic Acid (VITAMIN C PO) Take by mouth daily Only in morse   • BIOTIN PO Take by mouth daily   • dabigatran etexilate (PRADAXA) 150 mg capsu Take 1 capsule (150 mg total) by mouth every 12 (twelve) hours   • diclofenac sodium (VOLTAREN) 50 mg EC tablet Take 1 tablet (50 mg total) by mouth 2 (two) times a day   • famotidine (PEPCID) 20 mg tablet Take 1 tablet (20 mg total) by mouth 2 (two) times a day   • FREESTYLE LITE test strip    • meclizine (ANTIVERT) 12.5 MG tablet Take 1 tablet (12.5 mg total) by mouth every 8 (eight) hours for 10 days   • metFORMIN (GLUCOPHAGE) 1000 MG tablet Take 0.5 tablets (500 mg total) by mouth 2 (two) times a day with meals   • metFORMIN (GLUCOPHAGE) 500 mg tablet    • oxyCODONE (ROXICODONE) 5 immediate release tablet Take 1 tablet (5 mg total) by mouth every 4 (four) hours as needed for severe pain Max Daily Amount: 30 mg   • pantoprazole (PROTONIX) 40 mg tablet Take 1 tablet (40 mg total) by mouth daily in the early morning   • pramipexole (MIRAPEX) 0.5 mg tablet    • Probiotic Product (PROBIOTIC DAILY PO) Take by mouth   • SYNTHROID 75 MCG tablet Take 75 mcg by mouth daily in the early morning     • Trulicity 4.5 MG/0.5ML SOPN INJECT UNDER THE SKIN 4.5 MG WEEKLY AS DIRECTED   • methylPREDNISolone 4 MG tablet therapy pack Use as directed on package (Patient not taking: Reported on 10/5/2023)   • mupirocin (BACTROBAN) 2 % ointment APPLY TOPICALLY 2 (TWO) TIMES A DAY.  APPLY TO NARES (Patient not taking: Reported on 7/27/2023)   • [DISCONTINUED] capsaicin (ZOSTRIX) 0.025 % cream Apply 1 Application topically 2 (two) times a day (Patient not taking: Reported on 7/27/2023)   • [DISCONTINUED] Cyanocobalamin (B-12 PO) Take by mouth daily (Patient not taking: Reported on 10/5/2023)       Objective     /68 (BP Location: Left arm, Patient Position: Sitting, Cuff Size: Extra-Large)   Pulse 68   Temp 98.8 °F (37.1 °C) (Tympanic)   Resp 14   Wt 99.3 kg (219 lb)   SpO2 97%   BMI 35.35 kg/m²     Physical Exam  Claude Shipley MD

## 2023-10-05 NOTE — PROGRESS NOTES
Name: Theresa Goltz      : 1955      MRN: [id number]  Encounter Provider: Jonathan Roberts MD  Encounter Date: 10/5/2023   Encounter department: [address] [place] [address]     1. Nausea  Assessment & Plan:  Patient states that for the past month she has been experiencing nausea. Also states that she has been experiencing fatigue since this time . denies any associated vomiting, heartburn, chest pain, abdominal pain, change in bowel habits  DDx could include but not limited to medication side effects, gastroparesis, GERD, PUD    Plan  Will refer patient to Rheumatology for further evaluation  Check vitamin D  Check TSH  Continue with oral iron     Orders:  -     Ambulatory Referral to Rheumatology; Future    2. Type 2 diabetes mellitus with other specified complication, with long-term current use of insulin (HCC)  Assessment & Plan:    Lab Results   Component Value Date    HGBA1C 7.0 (H) 10/05/2023   Check A1c  Check urine albumin to creatinine ratio  Continue with metformin  CMP    Orders:  -     Albumin / creatinine urine ratio; Future  -     Hemoglobin A1C; Future  -     Comprehensive metabolic panel; Future    3. Other fatigue  -     Vitamin D 25 hydroxy; Future  -     TSH, 3rd generation with Free T4 reflex; Future  -     Ambulatory Referral to Rheumatology; Future    4. Encounter for administration of vaccine  -     influenza vaccine, high-dose, PF 0.7 mL (FLUZONE HIGH-DOSE)    5. Acute deep vein thrombosis (DVT) of femoral vein of right lower extremity (HCC)  Assessment & Plan: We will continue with Pradaxa 150 mg twice daily at this time  No evidence of acute DVT during examination      6.  Leg pain, anterior, right  Assessment & Plan:  Ongoing intermittent pain  States the oxycodone was not effective  Discussed with patient that we will give her a short course of Dilaudid        Depression Screening and Follow-up Plan: Patient's depression screening was positive with a PHQ-2 score of 3. Their PHQ-9 score was 6. Subjective      Ms. Rea Patient is a 72-year-old female presenting to clinic complaining of nausea, right thigh pain and left ankle pain. She states that for the past month she has been experiencing nausea. Denies any associated vomiting, heartburn, abdominal pain, chest pain or shortness of breath. She states that she usually experiences nausea in the mid afternoon. Does endorse some early satiety however she states that this has been ongoing for several years. Denies any fever chills weight loss or night sweats. States that for the past month she has also been experiencing fatigue she is unusual for her. She was recently started on oral iron by her hematologist. she states that she thinks her Pradaxa might be playing a role but she has been on the medication since June of this year. She states that she is scheduled for upcoming surgery with podiatry in November for her Achilles tendon. She also complains of ongoing mid. Left thigh/knee. Review of Systems   Constitutional: Negative for chills and fever. HENT: Negative for ear pain and sore throat. Eyes: Negative for pain and visual disturbance. Respiratory: Negative for cough and shortness of breath. Cardiovascular: Negative for chest pain and palpitations. Gastrointestinal: Positive for nausea. Negative for abdominal pain and vomiting. Genitourinary: Negative for dysuria and hematuria. Musculoskeletal: Positive for joint swelling. Negative for arthralgias and back pain. Skin: Negative for color change and rash. Neurological: Negative for seizures and syncope. All other systems reviewed and are negative.       Current Outpatient Medications on File Prior to Visit   Medication Sig   • ALPRAZolam (XANAX) 0.5 mg tablet Take 0.25 mg by mouth daily at bedtime as needed Takes 1/2 0.5mg = 0.25 mg @ bedtime prn   • Ascorbic Acid (VITAMIN C PO) Take by mouth daily Only in morse   • BIOTIN PO Take by mouth daily   • dabigatran etexilate (PRADAXA) 150 mg capsu Take 1 capsule (150 mg total) by mouth every 12 (twelve) hours   • diclofenac sodium (VOLTAREN) 50 mg EC tablet Take 1 tablet (50 mg total) by mouth 2 (two) times a day   • famotidine (PEPCID) 20 mg tablet Take 1 tablet (20 mg total) by mouth 2 (two) times a day   • FREESTYLE LITE test strip    • meclizine (ANTIVERT) 12.5 MG tablet Take 1 tablet (12.5 mg total) by mouth every 8 (eight) hours for 10 days   • metFORMIN (GLUCOPHAGE) 1000 MG tablet Take 0.5 tablets (500 mg total) by mouth 2 (two) times a day with meals   • metFORMIN (GLUCOPHAGE) 500 mg tablet    • oxyCODONE (ROXICODONE) 5 immediate release tablet Take 1 tablet (5 mg total) by mouth every 4 (four) hours as needed for severe pain Max Daily Amount: 30 mg   • pantoprazole (PROTONIX) 40 mg tablet Take 1 tablet (40 mg total) by mouth daily in the early morning   • pramipexole (MIRAPEX) 0.5 mg tablet    • Probiotic Product (PROBIOTIC DAILY PO) Take by mouth   • SYNTHROID 75 MCG tablet Take 75 mcg by mouth daily in the early morning     • Trulicity 4.5 YVAN/2.0KZ SOPN INJECT UNDER THE SKIN 4.5 MG WEEKLY AS DIRECTED   • methylPREDNISolone 4 MG tablet therapy pack Use as directed on package (Patient not taking: Reported on 10/5/2023)   • mupirocin (BACTROBAN) 2 % ointment APPLY TOPICALLY 2 (TWO) TIMES A DAY.  APPLY TO NARES (Patient not taking: Reported on 7/27/2023)   • [DISCONTINUED] capsaicin (ZOSTRIX) 0.025 % cream Apply 1 Application topically 2 (two) times a day (Patient not taking: Reported on 7/27/2023)   • [DISCONTINUED] Cyanocobalamin (B-12 PO) Take by mouth daily (Patient not taking: Reported on 10/5/2023)       Objective     /68 (BP Location: Left arm, Patient Position: Sitting, Cuff Size: Extra-Large)   Pulse 68   Temp 98.8 °F (37.1 °C) (Tympanic)   Resp 14   Wt 99.3 kg (219 lb)   SpO2 97%   BMI 35.35 kg/m²     Physical Exam  Constitutional:       Appearance: She is obese. HENT:      Head: Normocephalic and atraumatic. Right Ear: Tympanic membrane normal.      Left Ear: Tympanic membrane normal.      Nose: Nose normal.      Mouth/Throat:      Pharynx: Oropharynx is clear. Eyes:      Conjunctiva/sclera: Conjunctivae normal.   Cardiovascular:      Rate and Rhythm: Normal rate and regular rhythm. Pulses: Normal pulses. Heart sounds: Normal heart sounds. Pulmonary:      Effort: Pulmonary effort is normal.      Breath sounds: Normal breath sounds. Abdominal:      General: Bowel sounds are normal.   Musculoskeletal:      Cervical back: Neck supple. Right lower leg: No deformity or bony tenderness. 1+ Edema present. Left lower leg: No deformity or bony tenderness. 1+ Edema present. Left ankle: Swelling present. Tenderness present. Normal pulse. Left Achilles Tendon: Tenderness present. Skin:     General: Skin is warm. Neurological:      General: No focal deficit present. Mental Status: She is alert and oriented to person, place, and time.    Psychiatric:         Mood and Affect: Mood normal.         Behavior: Behavior normal.       Jai Pal MD

## 2023-10-05 NOTE — ASSESSMENT & PLAN NOTE
Patient states that for the past month she has been experiencing nausea. Also states that she has been experiencing fatigue since this time .   denies any associated vomiting, heartburn, chest pain, abdominal pain, change in bowel habits  DDx could include but not limited to medication side effects, gastroparesis, GERD, PUD    Plan  Will refer patient to Rheumatology for further evaluation  Check vitamin D  Check TSH  Continue with oral iron Monday Wednesday Friday

## 2023-10-06 ENCOUNTER — TELEPHONE (OUTPATIENT)
Dept: INTERNAL MEDICINE CLINIC | Facility: CLINIC | Age: 68
End: 2023-10-06

## 2023-10-06 RX ORDER — HYDROCODONE BITARTRATE AND ACETAMINOPHEN 5; 325 MG/1; MG/1
1 TABLET ORAL EVERY 12 HOURS PRN
Qty: 15 TABLET | Refills: 0 | Status: SHIPPED | OUTPATIENT
Start: 2023-10-06

## 2023-10-06 RX ORDER — NALOXONE HYDROCHLORIDE 4 MG/.1ML
SPRAY NASAL
Qty: 1 EACH | Refills: 1 | Status: SHIPPED | OUTPATIENT
Start: 2023-10-06 | End: 2024-10-05

## 2023-10-06 NOTE — TELEPHONE ENCOUNTER
Called patient to Discuss blood work results. Her calcium and vitamin D level are elevated. She states take she does take OTC calcium and a multivitamin. Instructed patient to stop taking calcium and her multivitamin. Repeat vitamin D and calcium in 4 weeks.

## 2023-10-06 NOTE — ADDENDUM NOTE
Addended by: Sarabjit Austin on: 10/6/2023 09:25 AM     Modules accepted: Orders, Level of Service, SmartSet

## 2023-10-10 ENCOUNTER — OFFICE VISIT (OUTPATIENT)
Dept: INTERNAL MEDICINE CLINIC | Facility: CLINIC | Age: 68
End: 2023-10-10
Payer: COMMERCIAL

## 2023-10-10 VITALS
OXYGEN SATURATION: 98 % | RESPIRATION RATE: 16 BRPM | WEIGHT: 218 LBS | BODY MASS INDEX: 35.19 KG/M2 | HEART RATE: 73 BPM | SYSTOLIC BLOOD PRESSURE: 140 MMHG | TEMPERATURE: 99.2 F | DIASTOLIC BLOOD PRESSURE: 80 MMHG

## 2023-10-10 DIAGNOSIS — E83.52 HYPERCALCEMIA: Primary | ICD-10-CM

## 2023-10-10 DIAGNOSIS — R63.0 DECREASED APPETITE: ICD-10-CM

## 2023-10-10 DIAGNOSIS — E11.69 TYPE 2 DIABETES MELLITUS WITH OTHER SPECIFIED COMPLICATION, WITH LONG-TERM CURRENT USE OF INSULIN (HCC): ICD-10-CM

## 2023-10-10 DIAGNOSIS — R11.2 NAUSEA AND VOMITING, UNSPECIFIED VOMITING TYPE: ICD-10-CM

## 2023-10-10 DIAGNOSIS — Z79.4 TYPE 2 DIABETES MELLITUS WITH OTHER SPECIFIED COMPLICATION, WITH LONG-TERM CURRENT USE OF INSULIN (HCC): ICD-10-CM

## 2023-10-10 DIAGNOSIS — I82.4Z1 ACUTE DEEP VEIN THROMBOSIS (DVT) OF DISTAL VEIN OF RIGHT LOWER EXTREMITY (HCC): ICD-10-CM

## 2023-10-10 DIAGNOSIS — Z87.891 HISTORY OF SMOKING: ICD-10-CM

## 2023-10-10 DIAGNOSIS — Z85.9 HISTORY OF CANCER: ICD-10-CM

## 2023-10-10 DIAGNOSIS — R42 VERTIGO: ICD-10-CM

## 2023-10-10 PROCEDURE — 99214 OFFICE O/P EST MOD 30 MIN: CPT

## 2023-10-10 RX ORDER — LISINOPRIL 10 MG/1
10 TABLET ORAL DAILY
Qty: 10 TABLET | Refills: 0 | Status: SHIPPED | OUTPATIENT
Start: 2023-10-10 | End: 2023-10-20

## 2023-10-10 RX ORDER — LISINOPRIL 5 MG/1
10 TABLET ORAL DAILY
Qty: 60 TABLET | Refills: 0 | Status: CANCELLED | OUTPATIENT
Start: 2023-10-10 | End: 2023-11-09

## 2023-10-10 NOTE — ASSESSMENT & PLAN NOTE
· Reports that she has been experiencing vertigo for several months  · scopolamine patches are effective in controlling her symptoms    Likely secondary to BPPV     Plan  referral for vestibular therapy with PT  Continue Scopolamine patches PRN

## 2023-10-10 NOTE — ASSESSMENT & PLAN NOTE
Lab Results   Component Value Date    HGBA1C 7.0 (H) 10/05/2023    Microalbuminuria on most recent blood work  BP in office noted to be 140/80   Will start lisinopril 10 mg daily  Repeat BMP in two weeks

## 2023-10-10 NOTE — PROGRESS NOTES
Name: Jay Larry      : 1955      MRN: [id number]  Encounter Provider: Yared Dent MD  Encounter Date: 10/10/2023   Encounter department: [address] [place] [address]     1. Hypercalcemia  Assessment & Plan:  · On 10/05 Ca noted to be 11.2 was 10.3 in 2023  · Hypercalcemia noted in April as well-10.3   · Albumin is within normal limit  · Total protein 7.3  · Vitamin D 100.9  · Mammogram done this year- BIRAD 1- repeat mammogram in   · Last colonoscopy - repeat in 5 years     Differential diagnosis could include but not limited to, primary hyperparathyroidism, malignancy, kidney disease, multiple myeloma    nausea, vomiting, fatigue may be secondary to above  Check PTH, serum calcium  Check ALP isoenzyme  Repeat vitamin D in 3 months  Discussed with patient that she will continue to stop taking multivitamin and calcium  If work-up is unremarkable patient may need SPEP and UPEP    Orders:  -     PTH, intact; Future  -     Alkaline phosphatase, isoenzymes; Future  -     Comprehensive metabolic panel; Future    2. Vertigo  Assessment & Plan:  · Reports that she has been experiencing vertigo for several months  · scopolamine patches are effective in controlling her symptoms    Likely secondary to BPPV     Plan  referral for vestibular therapy with PT  Continue Scopolamine patches PRN      Orders:  -     Ambulatory Referral to Physical Therapy; Future    3. Type 2 diabetes mellitus with other specified complication, with long-term current use of insulin ([facility])  Assessment & Plan:    Lab Results   Component Value Date    HGBA1C 7.0 (H) 10/05/2023    Microalbuminuria on most recent blood work  BP in office noted to be 140/80   Will start lisinopril 10 mg daily  Repeat BMP in two weeks     Orders:  -     lisinopril (ZESTRIL) 10 mg tablet; Take 1 tablet (10 mg total) by mouth daily for 10 days    4.  Nausea and vomiting, unspecified vomiting type  Assessment & Plan:  Associated with early satiety and loss of appetite   Given patient's history of cancer and acute DVT will obtain CT abdomen and pelvis for further evaluation of her symptoms       5. Acute deep vein thrombosis (DVT) of distal vein of right lower extremity (HCC)    6. History of cancer  -     CT abdomen pelvis wo contrast; Future; Expected date: 10/10/2023    7. History of smoking  Assessment & Plan:  Given patient's smoking history, will obtain low dose CT to screen for lung cancer     Orders:  -     CT lung screening program; Future; Expected date: 10/10/2023    8. Decreased appetite         Subjective      HPI   Ms. Lori Nageotte is a 19-year-old female who presents to the clinic for follow-up of her recent blood work. She reports that she is still experiencing nausea and yesterday she had an episode of vomiting. Associated with decrease appetite and early satiety. She reports that she has stopped taking OTC calcium and her multivitamin after we informed her to do so. She states that she is still experiencing vertigo special when changing position. Review of Systems   Constitutional: Negative for chills and fever. HENT: Negative for ear pain and sore throat. Eyes: Negative for pain and visual disturbance. Respiratory: Negative for cough and shortness of breath. Cardiovascular: Negative for chest pain and palpitations. Gastrointestinal: Negative for abdominal pain and vomiting. Genitourinary: Negative for dysuria and hematuria. Musculoskeletal: Negative for arthralgias and back pain. Skin: Negative for color change and rash. Neurological: Negative for seizures and syncope. All other systems reviewed and are negative.       Current Outpatient Medications on File Prior to Visit   Medication Sig   • ALPRAZolam (XANAX) 0.5 mg tablet Take 0.25 mg by mouth daily at bedtime as needed Takes 1/2 0.5mg = 0.25 mg @ bedtime prn   • BIOTIN PO Take by mouth daily   • dabigatran etexilate (PRADAXA) 150 mg capsu Take 1 capsule (150 mg total) by mouth every 12 (twelve) hours   • diclofenac sodium (VOLTAREN) 50 mg EC tablet Take 1 tablet (50 mg total) by mouth 2 (two) times a day   • famotidine (PEPCID) 20 mg tablet Take 1 tablet (20 mg total) by mouth 2 (two) times a day   • FREESTYLE LITE test strip    • HYDROcodone-acetaminophen (Norco) 5-325 mg per tablet Take 1 tablet by mouth every 12 (twelve) hours as needed for pain Max Daily Amount: 2 tablets   • meclizine (ANTIVERT) 12.5 MG tablet Take 1 tablet (12.5 mg total) by mouth every 8 (eight) hours for 10 days   • metFORMIN (GLUCOPHAGE) 500 mg tablet    • naloxone (NARCAN) 4 mg/0.1 mL nasal spray Administer 1 spray into a nostril. If no response after 2-3 minutes, give another dose in the other nostril using a new spray. • oxyCODONE (ROXICODONE) 5 immediate release tablet Take 1 tablet (5 mg total) by mouth every 4 (four) hours as needed for severe pain Max Daily Amount: 30 mg   • pantoprazole (PROTONIX) 40 mg tablet Take 1 tablet (40 mg total) by mouth daily in the early morning   • pramipexole (MIRAPEX) 0.5 mg tablet    • Probiotic Product (PROBIOTIC DAILY PO) Take by mouth   • SYNTHROID 75 MCG tablet Take 75 mcg by mouth daily in the early morning     • Trulicity 4.5 GG/1.7DD SOPN INJECT UNDER THE SKIN 4.5 MG WEEKLY AS DIRECTED   • Ascorbic Acid (VITAMIN C PO) Take by mouth daily Only in morse (Patient not taking: Reported on 10/10/2023)   • metFORMIN (GLUCOPHAGE) 1000 MG tablet Take 0.5 tablets (500 mg total) by mouth 2 (two) times a day with meals (Patient not taking: Reported on 10/10/2023)   • methylPREDNISolone 4 MG tablet therapy pack Use as directed on package (Patient not taking: Reported on 10/5/2023)   • mupirocin (BACTROBAN) 2 % ointment APPLY TOPICALLY 2 (TWO) TIMES A DAY.  APPLY TO NARES (Patient not taking: Reported on 7/27/2023)       Objective     /80 (BP Location: Left arm, Patient Position: Sitting, Cuff Size: Extra-Large)   Pulse 73 Temp 99.2 °F (37.3 °C) (Tympanic)   Resp 16   Wt 98.9 kg (218 lb)   SpO2 98%   BMI 35.19 kg/m²     Physical Exam  Rola Officer, MD

## 2023-10-10 NOTE — ASSESSMENT & PLAN NOTE
· On 10/05 Ca noted to be 11.2 was 10.3 in July 2023  · Hypercalcemia noted in April as well-10.3   · Albumin is within normal limit  · Total protein 7.3  · Vitamin D 100.9  · Mammogram done this year- BIRAD 1- repeat mammogram in 2024  · Last colonoscopy 2022- repeat in 5 years     Differential diagnosis could include but not limited to, primary hyperparathyroidism, malignancy, kidney disease, multiple myeloma    nausea, vomiting, fatigue may be secondary to above  Check PTH, serum calcium  Check ALP isoenzyme  Repeat vitamin D in 3 months  Discussed with patient that she will continue to stop taking multivitamin and calcium  If work-up is unremarkable patient may need SPEP and UPEP

## 2023-10-10 NOTE — ASSESSMENT & PLAN NOTE
Associated with early satiety and loss of appetite   Given patient's history of cancer and acute DVT will obtain CT abdomen and pelvis for further evaluation of her symptoms

## 2023-10-20 ENCOUNTER — HOSPITAL ENCOUNTER (OUTPATIENT)
Dept: CT IMAGING | Facility: HOSPITAL | Age: 68
Discharge: HOME | End: 2023-10-20
Payer: COMMERCIAL

## 2023-10-20 DIAGNOSIS — Z85.9 HISTORY OF CANCER: ICD-10-CM

## 2023-10-20 PROCEDURE — 74176 CT ABD & PELVIS W/O CONTRAST: CPT

## 2023-10-20 PROCEDURE — G1004 CDSM NDSC: HCPCS

## 2023-10-30 ENCOUNTER — HOSPITAL ENCOUNTER (OUTPATIENT)
Dept: NON INVASIVE DIAGNOSTICS | Facility: CLINIC | Age: 68
Discharge: HOME | End: 2023-10-30
Payer: COMMERCIAL

## 2023-10-30 DIAGNOSIS — I82.411 ACUTE DEEP VEIN THROMBOSIS (DVT) OF FEMORAL VEIN OF RIGHT LOWER EXTREMITY (HCC): ICD-10-CM

## 2023-10-30 DIAGNOSIS — K21.9 GASTROESOPHAGEAL REFLUX DISEASE WITHOUT ESOPHAGITIS: ICD-10-CM

## 2023-10-30 PROCEDURE — 93970 EXTREMITY STUDY: CPT | Performed by: SURGERY

## 2023-10-30 PROCEDURE — 93970 EXTREMITY STUDY: CPT

## 2023-10-30 RX ORDER — PANTOPRAZOLE SODIUM 40 MG/1
40 TABLET, DELAYED RELEASE ORAL
Qty: 90 TABLET | Refills: 0 | Status: SHIPPED | OUTPATIENT
Start: 2023-10-30 | End: 2024-01-28

## 2023-11-02 ENCOUNTER — OFFICE VISIT (OUTPATIENT)
Dept: VASCULAR SURGERY | Facility: CLINIC | Age: 68
End: 2023-11-02
Payer: COMMERCIAL

## 2023-11-02 VITALS
WEIGHT: 221 LBS | SYSTOLIC BLOOD PRESSURE: 130 MMHG | HEIGHT: 66 IN | HEART RATE: 84 BPM | BODY MASS INDEX: 35.52 KG/M2 | DIASTOLIC BLOOD PRESSURE: 88 MMHG

## 2023-11-02 DIAGNOSIS — I82.511 CHRONIC DEEP VEIN THROMBOSIS (DVT) OF FEMORAL VEIN OF RIGHT LOWER EXTREMITY (HCC): Primary | ICD-10-CM

## 2023-11-02 PROCEDURE — 99213 OFFICE O/P EST LOW 20 MIN: CPT | Performed by: NURSE PRACTITIONER

## 2023-11-02 NOTE — ASSESSMENT & PLAN NOTE
81-year-old female with HTN, DM, migraines, hypothyroid, uterine cancer s/p hysterectomy, provoked LLE DVT '00 after urostomy surgery, R posterior tibial DVT in April '23 with propagation to R mid to distal femoral vein DVT June '23 (Xarelto failure/switched to pradaxa), left achilles tendoitis, chronic BLE pain. Patient returns to the office to review LEVD 10/30/23      -Ongoing BLE pain.  L>R lower extremity swelling   -No improvement in discomfort with compression   -Planned for left achilles repair with podiatry at end of the month   -LEVD showed chronic R femoral and PT DVT and no LLE DVT; triphasic arterial waveforms   -Continue long-term anticoagulation due to recurrent DVT  -Patient also following with hematology and recommendations were given for preoperative anticoagulation management  -Continue compression  -Additionally periodic leg elevation and calf pump exercises  -Follow-up in the office in 6 months to recheck

## 2023-11-06 ENCOUNTER — CONSULT (OUTPATIENT)
Dept: INTERNAL MEDICINE CLINIC | Facility: CLINIC | Age: 68
End: 2023-11-06
Payer: COMMERCIAL

## 2023-11-06 ENCOUNTER — APPOINTMENT (OUTPATIENT)
Dept: LAB | Facility: AMBULARY SURGERY CENTER | Age: 68
End: 2023-11-06
Payer: COMMERCIAL

## 2023-11-06 VITALS
BODY MASS INDEX: 36.64 KG/M2 | OXYGEN SATURATION: 98 % | SYSTOLIC BLOOD PRESSURE: 134 MMHG | WEIGHT: 228 LBS | RESPIRATION RATE: 20 BRPM | HEIGHT: 66 IN | HEART RATE: 86 BPM | DIASTOLIC BLOOD PRESSURE: 72 MMHG | TEMPERATURE: 98.4 F

## 2023-11-06 DIAGNOSIS — Z01.818 PREOP EXAMINATION: ICD-10-CM

## 2023-11-06 DIAGNOSIS — I82.511 CHRONIC DEEP VEIN THROMBOSIS (DVT) OF FEMORAL VEIN OF RIGHT LOWER EXTREMITY (HCC): ICD-10-CM

## 2023-11-06 DIAGNOSIS — R42 VERTIGO: ICD-10-CM

## 2023-11-06 DIAGNOSIS — F41.9 ANXIETY: ICD-10-CM

## 2023-11-06 DIAGNOSIS — E83.52 HYPERCALCEMIA: Primary | ICD-10-CM

## 2023-11-06 DIAGNOSIS — E83.52 HYPERCALCEMIA: ICD-10-CM

## 2023-11-06 LAB
25(OH)D3 SERPL-MCNC: 81.4 NG/ML (ref 30–100)
ALBUMIN SERPL BCP-MCNC: 3.9 G/DL (ref 3.5–5)
ALP SERPL-CCNC: 101 U/L (ref 34–104)
ALT SERPL W P-5'-P-CCNC: 15 U/L (ref 7–52)
ANION GAP SERPL CALCULATED.3IONS-SCNC: 4 MMOL/L
AST SERPL W P-5'-P-CCNC: 14 U/L (ref 13–39)
BILIRUB SERPL-MCNC: 0.51 MG/DL (ref 0.2–1)
BUN SERPL-MCNC: 32 MG/DL (ref 5–25)
CALCIUM SERPL-MCNC: 10.8 MG/DL (ref 8.4–10.2)
CHLORIDE SERPL-SCNC: 108 MMOL/L (ref 96–108)
CO2 SERPL-SCNC: 29 MMOL/L (ref 21–32)
CREAT SERPL-MCNC: 1 MG/DL (ref 0.6–1.3)
GFR SERPL CREATININE-BSD FRML MDRD: 58 ML/MIN/1.73SQ M
GLUCOSE SERPL-MCNC: 128 MG/DL (ref 65–140)
POTASSIUM SERPL-SCNC: 4.6 MMOL/L (ref 3.5–5.3)
PROT SERPL-MCNC: 6.8 G/DL (ref 6.4–8.4)
SODIUM SERPL-SCNC: 141 MMOL/L (ref 135–147)

## 2023-11-06 PROCEDURE — 36415 COLL VENOUS BLD VENIPUNCTURE: CPT

## 2023-11-06 PROCEDURE — 80053 COMPREHEN METABOLIC PANEL: CPT

## 2023-11-06 PROCEDURE — 82306 VITAMIN D 25 HYDROXY: CPT

## 2023-11-06 PROCEDURE — 99214 OFFICE O/P EST MOD 30 MIN: CPT

## 2023-11-06 RX ORDER — ALPRAZOLAM 0.5 MG/1
0.25 TABLET ORAL
Qty: 20 TABLET | Refills: 0 | Status: SHIPPED | OUTPATIENT
Start: 2023-11-06

## 2023-11-06 RX ORDER — ENOXAPARIN SODIUM 150 MG/ML
1 INJECTION SUBCUTANEOUS EVERY 12 HOURS
Qty: 4 ML | Refills: 0 | Status: SHIPPED | OUTPATIENT
Start: 2023-11-06 | End: 2023-11-08

## 2023-11-06 RX ORDER — MECLIZINE HCL 12.5 MG/1
12.5 TABLET ORAL EVERY 8 HOURS SCHEDULED
Qty: 30 TABLET | Refills: 0 | Status: SHIPPED | OUTPATIENT
Start: 2023-11-06 | End: 2023-11-16

## 2023-11-06 NOTE — PROGRESS NOTES
INTERNAL MEDICINE PRE-OPERATIVE EVALUATION  [facility] PHYSICIAN GROUP - [facility] INTERNAL MEDICINE TIM    NAME: Edmundo Chinchilla  AGE: 79 y.o. SEX: female  : 1955     DATE: 2023     Internal Medicine Pre-Operative Evaluation:     Chief Complaint: Pre-operative Evaluation     Surgery: Achilles Tendon Repair, Removal of Heel Spur Left   Anticipated Date of Surgery: 23  Referring Provider: No ref. provider found        History of Present Illness:     Edmundo Chinchilla is a 79 y.o. female who presents to the office today for a preoperative consultation at the request of surgeon, Dr. Xena Veliz, who plans on performing L Achilles Repair and Removal of Heel spur on 23. Planned anesthesia is general. Patient has a bleeding risk of: no recent abnormal bleeding. Patient does not have objections to receiving blood products if needed. Current anti-platelet/anti-coagulation medications that the patient is prescribed includes: Dabigatran (Pradaxa). Assessment of Chronic Conditions:   - Diabetes Mellitus: On metformin    - Hypertension: Previously prescribed Lisinopril; Pt not taking      Assessment of Cardiac Risk:  Denies unstable or severe angina or MI in the last 6 weeks or history of stent placement in the last year   Denies decompensated heart failure (e.g. New onset heart failure, NYHA functional class IV heart failure, or worsening existing heart failure)  Denies significant arrhythmias such as high grade AV block, symptomatic ventricular arrhythmia, newly recognized ventricular tachycardia, supraventricular tachycardia with resting heart rate >100, or symptomatic bradycardia  Denies severe heart valve disease including aortic stenosis or symptomatic mitral stenosis     Exercise Capacity:  Able to walk 4 blocks without symptoms?: No  Able to walk 2 flights without symptoms?: No    Prior Anesthesia Reactions: No     Personal history of venous thromboembolic disease?  Yes    History of steroid use for >2 weeks within last year? No    STOP-BANG Sleep Apnea Screening Questionnaire:      Do you SNORE loudly (louder than talking or loud enough to be heard through closed doors)? No = 0 point   Do you often feel TIRED, fatigued, or sleepy during daytime? No = 0 point   Has anyone OBSERVED you stop breathing during your sleep? No = 0 point   Do you have or are you being treated for high blood pressure? No = 0 point   BMI more than 35 kg/m2? Yes = 1 point   AGE over 48years old? Yes = 1 point   NECK circumference > 16 inches (40 cm)? No = 0 point   Male GENDER? No = 0 point   TOTAL SCORE 2 = LOW risk of JANNET       Review of Systems:     Review of Systems   Constitutional:  Negative for chills and fever. HENT:  Negative for congestion, ear pain and sore throat. Eyes:  Negative for pain and visual disturbance. Respiratory:  Negative for cough, chest tightness, shortness of breath and wheezing. Cardiovascular:  Negative for chest pain and palpitations. Gastrointestinal:  Negative for abdominal distention, abdominal pain, constipation, diarrhea, nausea and vomiting. Genitourinary:  Negative for decreased urine volume, flank pain and hematuria. Musculoskeletal:  Negative for arthralgias and back pain. Skin:  Negative for color change and rash. Neurological:  Negative for seizures, syncope, speech difficulty, weakness, numbness and headaches. Psychiatric/Behavioral:  Negative for agitation and confusion. All other systems reviewed and are negative.        Problem List:     Patient Active Problem List   Diagnosis    Small bowel obstruction (HCC)    Type 2 diabetes mellitus ([address])    History of urostomy    Endometrial cancer ([address])    Hypothyroidism    In vitro fertilization    Anemia    Hydroureteronephrosis    HTN (hypertension)    Migraine    Abdominal adhesions    Tooth infection    Class 2 obesity in adult    Headache    Ureteral-ileal loop anastomotic stricture    Chronic deep vein thrombosis (DVT) of femoral vein of right lower extremity (HCC)    Bone infarction (HCC)    Anti-cardiolipin antibody positive    Encounter for attention to other artificial openings of urinary tract (HCC)    Leg pain, anterior, right    Nausea and vomiting    Hypercalcemia    Vertigo    History of smoking    Decreased appetite        Allergies:      Allergies   Allergen Reactions    Amoxicillin Rash    Penicillins Rash    Adhesive [Medical Tape] Rash        Current Medications:       Current Outpatient Medications:     ALPRAZolam (XANAX) 0.5 mg tablet, Take 0.5 tablets (0.25 mg total) by mouth daily at bedtime as needed for anxiety Takes 1/2 0.5mg = 0.25 mg @ bedtime prn, Disp: 20 tablet, Rfl: 0    Ascorbic Acid (VITAMIN C PO), Take by mouth daily Only in morse, Disp: , Rfl:     BIOTIN PO, Take by mouth daily, Disp: , Rfl:     dabigatran etexilate (PRADAXA) 150 mg capsu, Take 1 capsule (150 mg total) by mouth every 12 (twelve) hours, Disp: 180 capsule, Rfl: 0    enoxaparin (LOVENOX) 150 mg/mL injection, Inject 0.5 mL (75 mg total) under the skin every 12 (twelve) hours for 2 days 2 days prior to procedure day, Disp: 4 mL, Rfl: 0    famotidine (PEPCID) 20 mg tablet, Take 1 tablet (20 mg total) by mouth 2 (two) times a day, Disp: 60 tablet, Rfl: 0    HYDROcodone-acetaminophen (Norco) 5-325 mg per tablet, Take 1 tablet by mouth every 12 (twelve) hours as needed for pain Max Daily Amount: 2 tablets, Disp: 15 tablet, Rfl: 0    meclizine (ANTIVERT) 12.5 MG tablet, Take 1 tablet (12.5 mg total) by mouth every 8 (eight) hours for 10 days, Disp: 30 tablet, Rfl: 0    metFORMIN (GLUCOPHAGE) 500 mg tablet, , Disp: , Rfl:     oxyCODONE (ROXICODONE) 5 immediate release tablet, Take 1 tablet (5 mg total) by mouth every 4 (four) hours as needed for severe pain Max Daily Amount: 30 mg, Disp: 30 tablet, Rfl: 0    pantoprazole (PROTONIX) 40 mg tablet, Take 1 tablet (40 mg total) by mouth daily in the early morning, Disp: 90 tablet, Rfl: 0 pramipexole (MIRAPEX) 0.5 mg tablet, , Disp: , Rfl:     Probiotic Product (PROBIOTIC DAILY PO), Take by mouth, Disp: , Rfl:     SYNTHROID 75 MCG tablet, Take 75 mcg by mouth daily in the early morning  , Disp: , Rfl:     Trulicity 4.5 QW/6.4QF SOPN, INJECT UNDER THE SKIN 4.5 MG WEEKLY AS DIRECTED, Disp: , Rfl:     diclofenac sodium (VOLTAREN) 50 mg EC tablet, Take 1 tablet (50 mg total) by mouth 2 (two) times a day (Patient not taking: Reported on 11/2/2023), Disp: 5 tablet, Rfl: 0    FREESTYLE LITE test strip, , Disp: , Rfl:     lisinopril (ZESTRIL) 10 mg tablet, Take 1 tablet (10 mg total) by mouth daily for 10 days, Disp: 10 tablet, Rfl: 0    methylPREDNISolone 4 MG tablet therapy pack, Use as directed on package, Disp: 21 tablet, Rfl: 0    naloxone (NARCAN) 4 mg/0.1 mL nasal spray, Administer 1 spray into a nostril. If no response after 2-3 minutes, give another dose in the other nostril using a new spray. 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 Diabetes mellitus ([address])     type 2 BS 6/6/22 @ 0600 was 145    Disease of thyroid gland     Elevated lactic acid level 1/16/2021    Endometrial cancer ([address]) 1999    GERD (gastroesophageal reflux disease)     Gross hematuria     Hiatal hernia     History of transfusion 1999    Post Hysterectomy    History of urostomy 01/11/2004    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.     Hypothyroid     In vitro fertilization     Kidney stone     Migraines     Muscle weakness     abdominal    Personal history of irradiation     Renal cyst     Sleep apnea     Resolved due to Weight Loss        Past Surgical History:   Procedure Laterality Date    ACHILLES TENDON REPAIR Right     APPENDECTOMY      COLONOSCOPY      6/6/22    COLONOSCOPY W/ BIOPSIES N/A 12/2015    EGD      HEEL SPUR SURGERY Right 1996    HYSTERECTOMY      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    IR NEPHROSTOMY TUBE PLACEMENT  01/18/2021    LAPAROSCOPIC GASTRIC BANDING N/A 2006    [place], [place]    OTHER SURGICAL HISTORY  2004    Urine shunt to intestine, transverse colon    PCNL Left 02/16/2021    Procedure: NEPHROLITHOTOMY  PERCUTANEOUS (PCNL); Surgeon: Radha Toro MD;  Location: [facility];  Service: Urology    TX CYSTO/URETERO W/LITHOTRIPSY &INDWELL STENT INSRT Left 02/16/2021    Procedure: anterograde  URETEROSCOPY with dilation of ureteral stricture, INSERTION STENT URETERAL, exchange  of nephrostomy tube; Surgeon: Radha Toro MD;  Location: [facility];  Service: Urology    TX LAPAROSCOPY ENTEROLYSIS SEPARATE PROCEDURE N/A 01/17/2019    Procedure: LAPAROSCOPIC LYSIS OF ADHESIONS;  Surgeon: Juancarlos Yen MD;  Location: [facility] MAIN OR;  Service: [address] [place], [place] [address]for endometrial cancer.     SLEEVE GASTROPLASTY N/A 09/2015    Dr. Vivien Osorio, [facility]    URETER REVISION  2010    [facility]          Family History   Problem Relation Age of Onset    No Known Problems Mother     Kidney failure Father     Brain cancer Father     Kidney cancer Father     Diabetes Sister     Alcohol abuse Sister     Diabetes Brother         Social History     Socioeconomic History    Marital status: /Civil Union     Spouse name: Yakelin Oden    Number of children: 0    Years of education: 14    Highest education level: Not on file   Occupational History    Occupation: Hair styist    Tobacco Use    Smoking status: Former     Packs/day: 1.50     Years: 10.00     Total pack years: 15.00     Types: Cigarettes     Start date: 01/1974     Quit date: 1/11/1986     Years since quittin.8     Passive exposure: Never    Smokeless tobacco: Former   Vaping Use    Vaping Use: Never used   Substance and Sexual Activity    Alcohol use: Yes     Comment: Rare Socially    Drug use: No    Sexual activity: Never     Comment: s/p hysterectomy   Other Topics Concern    Not on file   Social History Narrative    Not on file     Social Determinants of Health     Financial Resource Strain: Not on file   Food Insecurity: Not on file   Transportation Needs: No Transportation Needs (2023)    PRAPARE - Transportation     Lack of Transportation (Medical): No     Lack of Transportation (Non-Medical): No   Physical Activity: Not on file   Stress: Not on file   Social Connections: Not on file   Intimate Partner Violence: Not on file   Housing Stability: Unknown (2023)    Housing Stability Vital Sign     Unable to Pay for Housing in the Last Year: No     Number of Places Lived in the Last Year: Not on file     Unstable Housing in the Last Year: No        Physical Exam:      /72 (BP Location: Left arm, Patient Position: Sitting, Cuff Size: Large)   Pulse 86   Temp 98.4 °F (36.9 °C)   Resp 20   Ht 5' 6" (1.676 m)   Wt 103 kg (228 lb)   SpO2 98%   BMI 36.80 kg/m²     Physical Exam  Constitutional:       General: She is not in acute distress. Appearance: Normal appearance. She is obese. She is not ill-appearing or toxic-appearing. HENT:      Head: Normocephalic and atraumatic. Mouth/Throat:      Mouth: Mucous membranes are moist.   Eyes:      Extraocular Movements: Extraocular movements intact. Pupils: Pupils are equal, round, and reactive to light. Cardiovascular:      Rate and Rhythm: Normal rate and regular rhythm. Pulses: Normal pulses. Heart sounds: Normal heart sounds. No murmur heard. No gallop. Pulmonary:      Effort: Pulmonary effort is normal. No respiratory distress. Breath sounds: Normal breath sounds. No wheezing.    Chest:      Chest wall: No tenderness. Abdominal:      General: Bowel sounds are normal. There is no distension. Palpations: Abdomen is soft. Tenderness: There is no abdominal tenderness. There is no guarding or rebound. Comments: Ileal conduit RLQ w/ stoma    Musculoskeletal:         General: Normal range of motion. Cervical back: Normal range of motion and neck supple. Right lower leg: No edema. Left lower leg: No edema. Skin:     General: Skin is warm. Neurological:      General: No focal deficit present. Mental Status: She is alert and oriented to person, place, and time. Cranial Nerves: No cranial nerve deficit. Sensory: No sensory deficit. Motor: No weakness. Coordination: Coordination normal.      Gait: Gait normal.   Psychiatric:         Mood and Affect: Mood normal.         Behavior: Behavior normal.         Thought Content: Thought content normal.           Data:     Pre-operative work-up    Laboratory Results: I have personally reviewed the pertinent laboratory results/reports     EKG: I have personally reviewed pertinent reports. Chest x-ray: I have personally reviewed pertinent reports. Previous cardiopulmonary studies within the past year:  Echocardiogram: 6/23- EF 60%, G1DD, Trace MR   Cardiac Catheterization: None  Stress Test: None  Pulmonary Function Testing:  None       Assessment:     1. Hypercalcemia  Comprehensive metabolic panel    Vitamin D 25 hydroxy      2. Vertigo  meclizine (ANTIVERT) 12.5 MG tablet    Vitamin D 25 hydroxy      3. Preop examination  enoxaparin (LOVENOX) 150 mg/mL injection      4. Anxiety  ALPRAZolam (XANAX) 0.5 mg tablet      5. Chronic deep vein thrombosis (DVT) of femoral vein of right lower extremity (HCC)             Plan:     79 y.o. female with planned surgery: Left Achilles Tendon Repair and Removal of Heel Spur.       Cardiac Risk Estimation: per the Revised Cardiac Risk Index (Circ. 100:1043, 1999), the patient's risk factors for cardiac complications include     putting her in: RCI RISK CLASS I (0 risk factors, risk of major cardiac compl. appr. 0.5%). 1. Further preoperative workup as follows:   - CMP prior to procedure then optimization for surgery complete      2. Medication Management/Recommendations:   - Patient has been instructed to avoid herbs or non-directed vitamins the week prior to surgery to ensure no drug interactions with perioperative surgical and anesthetic medications. - Hold metformin the morning of surgery and do not resume until 48 hours AFTER surgery to avoid risk of lactic acidosis. Do not resume if eGFR is < 30  - Warfarin management: Pradaxa Recommendations **  Per Hematology Oncology Recommendations  We will hold Pradaxa for 3 days prior to your surgery date  -The 2 days prior to your surgery date you will inject Lovenox  -You will resume your Pradaxa on postop day 1 unless otherwise instructed by your orthopedic surgeon     3. Prophylaxis for cardiac events with perioperative beta-blockers: not indicated.     4. Patient requires further consultation with: None    Clearance  Patient is Optimized for surgery without any additional cardiac testing at the time of my evaluation      Julius Méndez MD  [address] [place] [address],[address]  [address] [place] [id number]  Phone#  [phone]  Fax#  [phone]

## 2023-11-06 NOTE — H&P (VIEW-ONLY)
INTERNAL MEDICINE PRE-OPERATIVE EVALUATION  [facility] PHYSICIAN GROUP - [facility] INTERNAL MEDICINE TIM    NAME: Edgar Elder  AGE: 79 y.o. SEX: female  : 1955     DATE: 2023     Internal Medicine Pre-Operative Evaluation:     Chief Complaint: Pre-operative Evaluation     Surgery: Achilles Tendon Repair, Removal of Heel Spur Left   Anticipated Date of Surgery: 23  Referring Provider: No ref. provider found        History of Present Illness:     Edgar Elder is a 79 y.o. female who presents to the office today for a preoperative consultation at the request of surgeon, Dr. Cheryle Hunger, who plans on performing L Achilles Repair and Removal of Heel spur on 23. Planned anesthesia is general. Patient has a bleeding risk of: no recent abnormal bleeding. Patient does not have objections to receiving blood products if needed. Current anti-platelet/anti-coagulation medications that the patient is prescribed includes: Dabigatran (Pradaxa). Assessment of Chronic Conditions:   - Diabetes Mellitus: On metformin    - Hypertension: Previously prescribed Lisinopril; Pt not taking      Assessment of Cardiac Risk:  Denies unstable or severe angina or MI in the last 6 weeks or history of stent placement in the last year   Denies decompensated heart failure (e.g. New onset heart failure, NYHA functional class IV heart failure, or worsening existing heart failure)  Denies significant arrhythmias such as high grade AV block, symptomatic ventricular arrhythmia, newly recognized ventricular tachycardia, supraventricular tachycardia with resting heart rate >100, or symptomatic bradycardia  Denies severe heart valve disease including aortic stenosis or symptomatic mitral stenosis     Exercise Capacity:  Able to walk 4 blocks without symptoms?: No  Able to walk 2 flights without symptoms?: No    Prior Anesthesia Reactions: No     Personal history of venous thromboembolic disease?  Yes    History of steroid use for >2 weeks within last year? No    STOP-BANG Sleep Apnea Screening Questionnaire:      Do you SNORE loudly (louder than talking or loud enough to be heard through closed doors)? No = 0 point   Do you often feel TIRED, fatigued, or sleepy during daytime? No = 0 point   Has anyone OBSERVED you stop breathing during your sleep? No = 0 point   Do you have or are you being treated for high blood pressure? No = 0 point   BMI more than 35 kg/m2? Yes = 1 point   AGE over 48years old? Yes = 1 point   NECK circumference > 16 inches (40 cm)? No = 0 point   Male GENDER? No = 0 point   TOTAL SCORE 2 = LOW risk of JANNET       Review of Systems:     Review of Systems   Constitutional:  Negative for chills and fever. HENT:  Negative for congestion, ear pain and sore throat. Eyes:  Negative for pain and visual disturbance. Respiratory:  Negative for cough, chest tightness, shortness of breath and wheezing. Cardiovascular:  Negative for chest pain and palpitations. Gastrointestinal:  Negative for abdominal distention, abdominal pain, constipation, diarrhea, nausea and vomiting. Genitourinary:  Negative for decreased urine volume, flank pain and hematuria. Musculoskeletal:  Negative for arthralgias and back pain. Skin:  Negative for color change and rash. Neurological:  Negative for seizures, syncope, speech difficulty, weakness, numbness and headaches. Psychiatric/Behavioral:  Negative for agitation and confusion. All other systems reviewed and are negative.        Problem List:     Patient Active Problem List   Diagnosis    Small bowel obstruction (HCC)    Type 2 diabetes mellitus ([address])    History of urostomy    Endometrial cancer ([address])    Hypothyroidism    In vitro fertilization    Anemia    Hydroureteronephrosis    HTN (hypertension)    Migraine    Abdominal adhesions    Tooth infection    Class 2 obesity in adult    Headache    Ureteral-ileal loop anastomotic stricture    Chronic deep vein thrombosis (DVT) of femoral vein of right lower extremity (HCC)    Bone infarction (HCC)    Anti-cardiolipin antibody positive    Encounter for attention to other artificial openings of urinary tract (HCC)    Leg pain, anterior, right    Nausea and vomiting    Hypercalcemia    Vertigo    History of smoking    Decreased appetite        Allergies:      Allergies   Allergen Reactions    Amoxicillin Rash    Penicillins Rash    Adhesive [Medical Tape] Rash        Current Medications:       Current Outpatient Medications:     ALPRAZolam (XANAX) 0.5 mg tablet, Take 0.5 tablets (0.25 mg total) by mouth daily at bedtime as needed for anxiety Takes 1/2 0.5mg = 0.25 mg @ bedtime prn, Disp: 20 tablet, Rfl: 0    Ascorbic Acid (VITAMIN C PO), Take by mouth daily Only in morse, Disp: , Rfl:     BIOTIN PO, Take by mouth daily, Disp: , Rfl:     dabigatran etexilate (PRADAXA) 150 mg capsu, Take 1 capsule (150 mg total) by mouth every 12 (twelve) hours, Disp: 180 capsule, Rfl: 0    enoxaparin (LOVENOX) 150 mg/mL injection, Inject 0.5 mL (75 mg total) under the skin every 12 (twelve) hours for 2 days 2 days prior to procedure day, Disp: 4 mL, Rfl: 0    famotidine (PEPCID) 20 mg tablet, Take 1 tablet (20 mg total) by mouth 2 (two) times a day, Disp: 60 tablet, Rfl: 0    HYDROcodone-acetaminophen (Norco) 5-325 mg per tablet, Take 1 tablet by mouth every 12 (twelve) hours as needed for pain Max Daily Amount: 2 tablets, Disp: 15 tablet, Rfl: 0    meclizine (ANTIVERT) 12.5 MG tablet, Take 1 tablet (12.5 mg total) by mouth every 8 (eight) hours for 10 days, Disp: 30 tablet, Rfl: 0    metFORMIN (GLUCOPHAGE) 500 mg tablet, , Disp: , Rfl:     oxyCODONE (ROXICODONE) 5 immediate release tablet, Take 1 tablet (5 mg total) by mouth every 4 (four) hours as needed for severe pain Max Daily Amount: 30 mg, Disp: 30 tablet, Rfl: 0    pantoprazole (PROTONIX) 40 mg tablet, Take 1 tablet (40 mg total) by mouth daily in the early morning, Disp: 90 tablet, Rfl: 0 pramipexole (MIRAPEX) 0.5 mg tablet, , Disp: , Rfl:     Probiotic Product (PROBIOTIC DAILY PO), Take by mouth, Disp: , Rfl:     SYNTHROID 75 MCG tablet, Take 75 mcg by mouth daily in the early morning  , Disp: , Rfl:     Trulicity 4.5 VH/2.7WN SOPN, INJECT UNDER THE SKIN 4.5 MG WEEKLY AS DIRECTED, Disp: , Rfl:     diclofenac sodium (VOLTAREN) 50 mg EC tablet, Take 1 tablet (50 mg total) by mouth 2 (two) times a day (Patient not taking: Reported on 11/2/2023), Disp: 5 tablet, Rfl: 0    FREESTYLE LITE test strip, , Disp: , Rfl:     lisinopril (ZESTRIL) 10 mg tablet, Take 1 tablet (10 mg total) by mouth daily for 10 days, Disp: 10 tablet, Rfl: 0    methylPREDNISolone 4 MG tablet therapy pack, Use as directed on package, Disp: 21 tablet, Rfl: 0    naloxone (NARCAN) 4 mg/0.1 mL nasal spray, Administer 1 spray into a nostril. If no response after 2-3 minutes, give another dose in the other nostril using a new spray. (Patient not taking: Reported on 11/2/2023), Disp: 1 each, Rfl: 1     Past History:     Past Medical History:   Diagnosis Date    Anemia     Back pain     Bowel obstruction ([address]) 2110,4716    Cancer ([address])     uterine CA radiation destoried urethra. Patient has urostomy    Cholelithiasis     Colon polyp     Deep vein thrombosis of left lower extremity ([address]) 01/11/2004    on blood thinner for 3 years. Diabetes mellitus ([address])     type 2 BS 6/6/22 @ 0600 was 145    Disease of thyroid gland     Elevated lactic acid level 1/16/2021    Endometrial cancer ([address]) 1999    GERD (gastroesophageal reflux disease)     Gross hematuria     Hiatal hernia     History of transfusion 1999    Post Hysterectomy    History of urostomy 01/11/2004    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.     Hypothyroid     In vitro fertilization     Kidney stone     Migraines     Muscle weakness     abdominal    Personal history of irradiation     Renal cyst     Sleep apnea     Resolved due to Weight Loss        Past Surgical History:   Procedure Laterality Date    ACHILLES TENDON REPAIR Right     APPENDECTOMY      COLONOSCOPY      6/6/22    COLONOSCOPY W/ BIOPSIES N/A 12/2015    EGD      HEEL SPUR SURGERY Right 1996    HYSTERECTOMY      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    IR NEPHROSTOMY TUBE PLACEMENT  01/18/2021    LAPAROSCOPIC GASTRIC BANDING N/A 2006    [place], [address]    OTHER SURGICAL HISTORY  2004    Urine shunt to intestine, transverse colon    PCNL Left 02/16/2021    Procedure: NEPHROLITHOTOMY  PERCUTANEOUS (PCNL); Surgeon: Ap Verde MD;  Location: [facility];  Service: Urology    SD CYSTO/URETERO W/LITHOTRIPSY &INDWELL STENT INSRT Left 02/16/2021    Procedure: anterograde  URETEROSCOPY with dilation of ureteral stricture, INSERTION STENT URETERAL, exchange  of nephrostomy tube; Surgeon: Ap Verde MD;  Location: [facility];  Service: Urology    SD LAPAROSCOPY ENTEROLYSIS SEPARATE PROCEDURE N/A 01/17/2019    Procedure: LAPAROSCOPIC LYSIS OF ADHESIONS;  Surgeon: Brissa Noel MD;  Location: [address] [place] [address] MAIN OR;  Service: [address] [place], [place] [address]for endometrial cancer.     SLEEVE GASTROPLASTY N/A 09/2015    Dr. Eli Funk, [facility]    URETER REVISION  2010    [facility]          Family History   Problem Relation Age of Onset    No Known Problems Mother     Kidney failure Father     Brain cancer Father     Kidney cancer Father     Diabetes Sister     Alcohol abuse Sister     Diabetes Brother         Social History     Socioeconomic History    Marital status: /Civil Union     Spouse name: Jace Carrasco    Number of children: 0    Years of education: 14    Highest education level: Not on file   Occupational History    Occupation: Hair styist    Tobacco Use    Smoking status: Former     Packs/day: 1.50     Years: 10.00     Total pack years: 15.00     Types: Cigarettes     Start date: 01/1974     Quit date: 1/11/1986     Years since quittin.8     Passive exposure: Never    Smokeless tobacco: Former   Vaping Use    Vaping Use: Never used   Substance and Sexual Activity    Alcohol use: Yes     Comment: Rare Socially    Drug use: No    Sexual activity: Never     Comment: s/p hysterectomy   Other Topics Concern    Not on file   Social History Narrative    Not on file     Social Determinants of Health     Financial Resource Strain: Not on file   Food Insecurity: Not on file   Transportation Needs: No Transportation Needs (2023)    PRAPARE - Transportation     Lack of Transportation (Medical): No     Lack of Transportation (Non-Medical): No   Physical Activity: Not on file   Stress: Not on file   Social Connections: Not on file   Intimate Partner Violence: Not on file   Housing Stability: Unknown (2023)    Housing Stability Vital Sign     Unable to Pay for Housing in the Last Year: No     Number of Places Lived in the Last Year: Not on file     Unstable Housing in the Last Year: No        Physical Exam:      /72 (BP Location: Left arm, Patient Position: Sitting, Cuff Size: Large)   Pulse 86   Temp 98.4 °F (36.9 °C)   Resp 20   Ht 5' 6" (1.676 m)   Wt 103 kg (228 lb)   SpO2 98%   BMI 36.80 kg/m²     Physical Exam  Constitutional:       General: She is not in acute distress. Appearance: Normal appearance. She is obese. She is not ill-appearing or toxic-appearing. HENT:      Head: Normocephalic and atraumatic. Mouth/Throat:      Mouth: Mucous membranes are moist.   Eyes:      Extraocular Movements: Extraocular movements intact. Pupils: Pupils are equal, round, and reactive to light. Cardiovascular:      Rate and Rhythm: Normal rate and regular rhythm. Pulses: Normal pulses. Heart sounds: Normal heart sounds. No murmur heard. No gallop. Pulmonary:      Effort: Pulmonary effort is normal. No respiratory distress. Breath sounds: Normal breath sounds. No wheezing.    Chest:      Chest wall: No tenderness. Abdominal:      General: Bowel sounds are normal. There is no distension. Palpations: Abdomen is soft. Tenderness: There is no abdominal tenderness. There is no guarding or rebound. Comments: Ileal conduit RLQ w/ stoma    Musculoskeletal:         General: Normal range of motion. Cervical back: Normal range of motion and neck supple. Right lower leg: No edema. Left lower leg: No edema. Skin:     General: Skin is warm. Neurological:      General: No focal deficit present. Mental Status: She is alert and oriented to person, place, and time. Cranial Nerves: No cranial nerve deficit. Sensory: No sensory deficit. Motor: No weakness. Coordination: Coordination normal.      Gait: Gait normal.   Psychiatric:         Mood and Affect: Mood normal.         Behavior: Behavior normal.         Thought Content: Thought content normal.           Data:     Pre-operative work-up    Laboratory Results: I have personally reviewed the pertinent laboratory results/reports     EKG: I have personally reviewed pertinent reports. Chest x-ray: I have personally reviewed pertinent reports. Previous cardiopulmonary studies within the past year:  Echocardiogram: 6/23- EF 60%, G1DD, Trace MR   Cardiac Catheterization: None  Stress Test: None  Pulmonary Function Testing:  None       Assessment:     1. Hypercalcemia  Comprehensive metabolic panel    Vitamin D 25 hydroxy      2. Vertigo  meclizine (ANTIVERT) 12.5 MG tablet    Vitamin D 25 hydroxy      3. Preop examination  enoxaparin (LOVENOX) 150 mg/mL injection      4. Anxiety  ALPRAZolam (XANAX) 0.5 mg tablet      5. Chronic deep vein thrombosis (DVT) of femoral vein of right lower extremity (HCC)             Plan:     79 y.o. female with planned surgery: Left Achilles Tendon Repair and Removal of Heel Spur.       Cardiac Risk Estimation: per the Revised Cardiac Risk Index (Circ. 100:1043, 1999), the patient's risk factors for cardiac complications include     putting her in: RCI RISK CLASS I (0 risk factors, risk of major cardiac compl. appr. 0.5%). 1. Further preoperative workup as follows:   - CMP prior to procedure then optimization for surgery complete      2. Medication Management/Recommendations:   - Patient has been instructed to avoid herbs or non-directed vitamins the week prior to surgery to ensure no drug interactions with perioperative surgical and anesthetic medications. - Hold metformin the morning of surgery and do not resume until 48 hours AFTER surgery to avoid risk of lactic acidosis. Do not resume if eGFR is < 30  - Warfarin management: Pradaxa Recommendations **  Per Hematology Oncology Recommendations  We will hold Pradaxa for 3 days prior to your surgery date  -The 2 days prior to your surgery date you will inject Lovenox  -You will resume your Pradaxa on postop day 1 unless otherwise instructed by your orthopedic surgeon     3. Prophylaxis for cardiac events with perioperative beta-blockers: not indicated.     4. Patient requires further consultation with: None    Clearance  Patient is Optimized for surgery without any additional cardiac testing at the time of my evaluation      Dhaval Sin MD  [address] [place] [address],[address]  [address] [place] [id number]  Phone#  [phone]  Fax#  [phone]

## 2023-11-06 NOTE — ASSESSMENT & PLAN NOTE
[facility] Endometrial CA s/p hysterectomy and radiation, provoked LLE DVT '00 after urostomy surgery, R posterior tibial DVT in April '23 with propagation to R mid to distal femoral vein DVT June '23 (Xarelto failure/switched to pradaxa)    Plan-   Per Hematology recommendations   Will hold Pradaxa 3 days prior to surgery date   2 days before surgery date inject Lovenox  Resume Pradaxa post op day 1 unless otherwise directed by surgeon

## 2023-11-06 NOTE — PATIENT INSTRUCTIONS
Your surgery anticoagulation recommendations are as follows:    -We will hold Pradaxa for 3 days prior to your surgery date  -The 2 days prior to your surgery date you will inject Lovenox  -You will resume your Pradaxa on postop day 1 unless otherwise instructed by your orthopedic surgeon     Please hold your Metformin on the day of your procedure

## 2023-11-07 ENCOUNTER — TELEPHONE (OUTPATIENT)
Dept: INTERNAL MEDICINE CLINIC | Facility: CLINIC | Age: 68
End: 2023-11-07

## 2023-11-07 NOTE — TELEPHONE ENCOUNTER
Attempted to call patient to review recent labs that were recently completed. Left voicemail. Patient is optimized for surgery and has been recommended to have ongoing follow ups with our office as well as OBGYN with her recent calcium and history of cancer.

## 2023-11-10 ENCOUNTER — APPOINTMENT (OUTPATIENT)
Dept: LAB | Facility: AMBULARY SURGERY CENTER | Age: 68
End: 2023-11-10
Payer: COMMERCIAL

## 2023-11-10 DIAGNOSIS — M67.874 ACHILLES TENDINOSIS OF LEFT ANKLE: ICD-10-CM

## 2023-11-10 DIAGNOSIS — S86.009A ACHILLES TENDON INJURY: ICD-10-CM

## 2023-11-10 DIAGNOSIS — M77.52 BURSITIS OF POSTERIOR HEEL, LEFT: ICD-10-CM

## 2023-11-10 LAB
ALBUMIN SERPL BCP-MCNC: 4.1 G/DL (ref 3.5–5)
ALP SERPL-CCNC: 103 U/L (ref 34–104)
ALT SERPL W P-5'-P-CCNC: 12 U/L (ref 7–52)
ANION GAP SERPL CALCULATED.3IONS-SCNC: 13 MMOL/L
AST SERPL W P-5'-P-CCNC: 15 U/L (ref 13–39)
BASOPHILS # BLD AUTO: 0.05 THOUSANDS/ÂΜL (ref 0–0.1)
BASOPHILS NFR BLD AUTO: 1 % (ref 0–1)
BILIRUB SERPL-MCNC: 0.6 MG/DL (ref 0.2–1)
BUN SERPL-MCNC: 28 MG/DL (ref 5–25)
CALCIUM SERPL-MCNC: 10.7 MG/DL (ref 8.4–10.2)
CHLORIDE SERPL-SCNC: 109 MMOL/L (ref 96–108)
CO2 SERPL-SCNC: 19 MMOL/L (ref 21–32)
CREAT SERPL-MCNC: 1.01 MG/DL (ref 0.6–1.3)
EOSINOPHIL # BLD AUTO: 0.39 THOUSAND/ÂΜL (ref 0–0.61)
EOSINOPHIL NFR BLD AUTO: 5 % (ref 0–6)
ERYTHROCYTE [DISTWIDTH] IN BLOOD BY AUTOMATED COUNT: 13.3 % (ref 11.6–15.1)
GFR SERPL CREATININE-BSD FRML MDRD: 58 ML/MIN/1.73SQ M
GLUCOSE SERPL-MCNC: 127 MG/DL (ref 65–140)
HCT VFR BLD AUTO: 38.1 % (ref 34.8–46.1)
HGB BLD-MCNC: 11.9 G/DL (ref 11.5–15.4)
IMM GRANULOCYTES # BLD AUTO: 0.02 THOUSAND/UL (ref 0–0.2)
IMM GRANULOCYTES NFR BLD AUTO: 0 % (ref 0–2)
LYMPHOCYTES # BLD AUTO: 1.59 THOUSANDS/ÂΜL (ref 0.6–4.47)
LYMPHOCYTES NFR BLD AUTO: 20 % (ref 14–44)
MCH RBC QN AUTO: 30.9 PG (ref 26.8–34.3)
MCHC RBC AUTO-ENTMCNC: 31.2 G/DL (ref 31.4–37.4)
MCV RBC AUTO: 99 FL (ref 82–98)
MONOCYTES # BLD AUTO: 0.58 THOUSAND/ÂΜL (ref 0.17–1.22)
MONOCYTES NFR BLD AUTO: 7 % (ref 4–12)
NEUTROPHILS # BLD AUTO: 5.49 THOUSANDS/ÂΜL (ref 1.85–7.62)
NEUTS SEG NFR BLD AUTO: 67 % (ref 43–75)
NRBC BLD AUTO-RTO: 0 /100 WBCS
PLATELET # BLD AUTO: 266 THOUSANDS/UL (ref 149–390)
PMV BLD AUTO: 9.2 FL (ref 8.9–12.7)
POTASSIUM SERPL-SCNC: 4.5 MMOL/L (ref 3.5–5.3)
PROT SERPL-MCNC: 7.1 G/DL (ref 6.4–8.4)
RBC # BLD AUTO: 3.85 MILLION/UL (ref 3.81–5.12)
SODIUM SERPL-SCNC: 141 MMOL/L (ref 135–147)
WBC # BLD AUTO: 8.12 THOUSAND/UL (ref 4.31–10.16)

## 2023-11-10 PROCEDURE — 80053 COMPREHEN METABOLIC PANEL: CPT

## 2023-11-10 PROCEDURE — 36415 COLL VENOUS BLD VENIPUNCTURE: CPT

## 2023-11-10 PROCEDURE — 85025 COMPLETE CBC W/AUTO DIFF WBC: CPT

## 2023-11-13 ENCOUNTER — ANESTHESIA EVENT (OUTPATIENT)
Dept: PERIOP | Facility: HOSPITAL | Age: 68
End: 2023-11-13
Payer: COMMERCIAL

## 2023-11-20 NOTE — PRE-PROCEDURE INSTRUCTIONS
Pre-Surgery Instructions:   Medication Instructions    ALPRAZolam (XANAX) 0.5 mg tablet Uses PRN- OK to take day of surgery    Ascorbic Acid (VITAMIN C PO) Stop taking 7 days prior to surgery. BIOTIN PO Stop taking 7 days prior to surgery. dabigatran etexilate (PRADAXA) 150 mg capsu Instructions provided by MD    famotidine (PEPCID) 20 mg tablet Uses PRN- OK to take day of surgery    HYDROcodone-acetaminophen (Norco) 5-325 mg per tablet Uses PRN- OK to take day of surgery    meclizine (ANTIVERT) 12.5 MG tablet Uses PRN- OK to take day of surgery    metFORMIN (GLUCOPHAGE) 500 mg tablet Hold day of surgery. oxyCODONE (ROXICODONE) 5 immediate release tablet Uses PRN- OK to take day of surgery    pantoprazole (PROTONIX) 40 mg tablet Take day of surgery. pramipexole (MIRAPEX) 0.5 mg tablet Take night before surgery    Probiotic Product (PROBIOTIC DAILY PO) Stop taking 7 days prior to surgery. SYNTHROID 75 MCG tablet Take day of surgery. Trulicity 4.5 QX/1.4FF SOPN Stop taking 7 days prior to surgery. Medication instructions for day surgery reviewed. Please use only a sip of water to take your instructed medications. Avoid all over the counter vitamins, supplements and NSAIDS for one week prior to surgery per anesthesia guidelines. Tylenol is ok to take as needed. You will receive a call one business day prior to surgery with an arrival time and hospital directions. If your surgery is scheduled on a Monday, the hospital will be calling you on the Friday prior to your surgery. If you have not heard from anyone by 8pm, please call the hospital supervisor through the hospital  at [phone]. Shanique Rivers [phone]). Do not eat or drink anything after midnight the night before your surgery, including candy, mints, lifesavers, or chewing gum. Do not drink alcohol 24hrs before your surgery. Try not to smoke at least 24hrs before your surgery.        Follow the pre surgery showering instructions as listed in the [facility] Surgical Experience Booklet” or otherwise provided by your surgeon's office. Do not use a blade to shave the surgical area 1 week before surgery. It is okay to use a clean electric clippers up to 24 hours before surgery. Do not apply any lotions, creams, including makeup, cologne, deodorant, or perfumes after showering on the day of your surgery. Do not use dry shampoo, hair spray, hair gel, or any type of hair products. No contact lenses, eye make-up, or artificial eyelashes. Remove nail polish, including gel polish, and any artificial, gel, or acrylic nails if possible. Remove all jewelry including rings and body piercing jewelry. Wear causal clothing that is easy to take on and off. Consider your type of surgery. Keep any valuables, jewelry, piercings at home. Please bring any specially ordered equipment (sling, braces) if indicated. Arrange for a responsible person to drive you to and from the hospital on the day of your surgery. Visitor Guidelines discussed. Call the surgeon's office with any new illnesses, exposures, or additional questions prior to surgery. Please reference your [facility] Surgical Experience Booklet” for additional information to prepare for your upcoming surgery. Pt verbalized understanding of shower and med instructions. Pt instructed to stop nsaids and supplements one week prior to surgery. Per pt last dose of Pradaxa is 11/20. Pt will use lovenox BID for 2 days prior to surgery. Last dose of trulicity was 12/11--SEUNFNX sent to anesthesia.

## 2023-11-24 ENCOUNTER — ANESTHESIA (OUTPATIENT)
Dept: PERIOP | Facility: HOSPITAL | Age: 68
End: 2023-11-24
Payer: COMMERCIAL

## 2023-11-24 ENCOUNTER — APPOINTMENT (OUTPATIENT)
Dept: RADIOLOGY | Facility: HOSPITAL | Age: 68
End: 2023-11-24
Payer: COMMERCIAL

## 2023-11-24 ENCOUNTER — HOSPITAL ENCOUNTER (OUTPATIENT)
Facility: HOSPITAL | Age: 68
Setting detail: OUTPATIENT SURGERY
Discharge: HOME | End: 2023-11-24
Attending: PODIATRIST | Admitting: PODIATRIST
Payer: COMMERCIAL

## 2023-11-24 VITALS
DIASTOLIC BLOOD PRESSURE: 87 MMHG | OXYGEN SATURATION: 98 % | TEMPERATURE: 97.6 F | BODY MASS INDEX: 35.57 KG/M2 | SYSTOLIC BLOOD PRESSURE: 179 MMHG | HEART RATE: 66 BPM | RESPIRATION RATE: 17 BRPM | HEIGHT: 66 IN | WEIGHT: 221.3 LBS

## 2023-11-24 DIAGNOSIS — G89.18 POST-OP PAIN: Primary | ICD-10-CM

## 2023-11-24 LAB
APTT PPP: 37 SECONDS (ref 23–37)
GLUCOSE SERPL-MCNC: 105 MG/DL (ref 65–140)
INR PPP: 1.13 (ref 0.84–1.19)
PROTHROMBIN TIME: 14.4 SECONDS (ref 11.6–14.5)

## 2023-11-24 PROCEDURE — C9290 INJ, BUPIVACAINE LIPOSOME: HCPCS | Performed by: STUDENT IN AN ORGANIZED HEALTH CARE EDUCATION/TRAINING PROGRAM

## 2023-11-24 PROCEDURE — 82948 REAGENT STRIP/BLOOD GLUCOSE: CPT

## 2023-11-24 PROCEDURE — C1713 ANCHOR/SCREW BN/BN,TIS/BN: HCPCS | Performed by: PODIATRIST

## 2023-11-24 PROCEDURE — 99024 POSTOP FOLLOW-UP VISIT: CPT | Performed by: PODIATRIST

## 2023-11-24 PROCEDURE — 85730 THROMBOPLASTIN TIME PARTIAL: CPT | Performed by: PODIATRIST

## 2023-11-24 PROCEDURE — 27654 REPAIR OF ACHILLES TENDON: CPT | Performed by: PODIATRIST

## 2023-11-24 PROCEDURE — 73650 X-RAY EXAM OF HEEL: CPT

## 2023-11-24 PROCEDURE — 28118 REMOVAL OF HEEL BONE: CPT | Performed by: PODIATRIST

## 2023-11-24 PROCEDURE — 85610 PROTHROMBIN TIME: CPT | Performed by: PODIATRIST

## 2023-11-24 DEVICE — IMPLANT SYS, BIOC ACHILLES SPEEDB W/JUMP
Type: IMPLANTABLE DEVICE | Site: ACHILLES TENDON | Status: FUNCTIONAL
Brand: ARTHREX®

## 2023-11-24 RX ORDER — METOCLOPRAMIDE HYDROCHLORIDE 5 MG/ML
10 INJECTION INTRAMUSCULAR; INTRAVENOUS ONCE AS NEEDED
Status: DISCONTINUED | OUTPATIENT
Start: 2023-11-24 | End: 2023-11-24 | Stop reason: HOSPADM

## 2023-11-24 RX ORDER — FENTANYL CITRATE 50 UG/ML
INJECTION, SOLUTION INTRAMUSCULAR; INTRAVENOUS AS NEEDED
Status: DISCONTINUED | OUTPATIENT
Start: 2023-11-24 | End: 2023-11-24

## 2023-11-24 RX ORDER — DEXAMETHASONE SODIUM PHOSPHATE 10 MG/ML
INJECTION, SOLUTION INTRAMUSCULAR; INTRAVENOUS AS NEEDED
Status: DISCONTINUED | OUTPATIENT
Start: 2023-11-24 | End: 2023-11-24

## 2023-11-24 RX ORDER — PROPOFOL 10 MG/ML
INJECTION, EMULSION INTRAVENOUS CONTINUOUS PRN
Status: DISCONTINUED | OUTPATIENT
Start: 2023-11-24 | End: 2023-11-24

## 2023-11-24 RX ORDER — CHLORHEXIDINE GLUCONATE ORAL RINSE 1.2 MG/ML
15 SOLUTION DENTAL ONCE
Status: COMPLETED | OUTPATIENT
Start: 2023-11-24 | End: 2023-11-24

## 2023-11-24 RX ORDER — ROCURONIUM BROMIDE 10 MG/ML
INJECTION, SOLUTION INTRAVENOUS AS NEEDED
Status: DISCONTINUED | OUTPATIENT
Start: 2023-11-24 | End: 2023-11-24

## 2023-11-24 RX ORDER — MIDAZOLAM HYDROCHLORIDE 2 MG/2ML
INJECTION, SOLUTION INTRAMUSCULAR; INTRAVENOUS AS NEEDED
Status: DISCONTINUED | OUTPATIENT
Start: 2023-11-24 | End: 2023-11-24

## 2023-11-24 RX ORDER — CLINDAMYCIN PHOSPHATE 900 MG/50ML
900 INJECTION, SOLUTION INTRAVENOUS ONCE
Status: DISCONTINUED | OUTPATIENT
Start: 2023-11-24 | End: 2023-11-24

## 2023-11-24 RX ORDER — LIDOCAINE HYDROCHLORIDE 10 MG/ML
INJECTION, SOLUTION EPIDURAL; INFILTRATION; INTRACAUDAL; PERINEURAL AS NEEDED
Status: DISCONTINUED | OUTPATIENT
Start: 2023-11-24 | End: 2023-11-24

## 2023-11-24 RX ORDER — OXYCODONE HYDROCHLORIDE AND ACETAMINOPHEN 5; 325 MG/1; MG/1
1 TABLET ORAL EVERY 4 HOURS PRN
Qty: 15 TABLET | Refills: 0 | Status: SHIPPED | OUTPATIENT
Start: 2023-11-24 | End: 2023-12-04

## 2023-11-24 RX ORDER — SODIUM CHLORIDE, SODIUM LACTATE, POTASSIUM CHLORIDE, CALCIUM CHLORIDE 600; 310; 30; 20 MG/100ML; MG/100ML; MG/100ML; MG/100ML
125 INJECTION, SOLUTION INTRAVENOUS CONTINUOUS
Status: DISCONTINUED | OUTPATIENT
Start: 2023-11-24 | End: 2023-11-24 | Stop reason: HOSPADM

## 2023-11-24 RX ORDER — SODIUM CHLORIDE, SODIUM LACTATE, POTASSIUM CHLORIDE, CALCIUM CHLORIDE 600; 310; 30; 20 MG/100ML; MG/100ML; MG/100ML; MG/100ML
INJECTION, SOLUTION INTRAVENOUS CONTINUOUS PRN
Status: DISCONTINUED | OUTPATIENT
Start: 2023-11-24 | End: 2023-11-24

## 2023-11-24 RX ORDER — HYDROMORPHONE HCL/PF 1 MG/ML
0.5 SYRINGE (ML) INJECTION
Status: DISCONTINUED | OUTPATIENT
Start: 2023-11-24 | End: 2023-11-24 | Stop reason: HOSPADM

## 2023-11-24 RX ORDER — HYDROMORPHONE HCL IN WATER/PF 6 MG/30 ML
0.2 PATIENT CONTROLLED ANALGESIA SYRINGE INTRAVENOUS
Status: DISCONTINUED | OUTPATIENT
Start: 2023-11-24 | End: 2023-11-24 | Stop reason: HOSPADM

## 2023-11-24 RX ORDER — ONDANSETRON 2 MG/ML
4 INJECTION INTRAMUSCULAR; INTRAVENOUS ONCE AS NEEDED
Status: DISCONTINUED | OUTPATIENT
Start: 2023-11-24 | End: 2023-11-24 | Stop reason: HOSPADM

## 2023-11-24 RX ORDER — ONDANSETRON 2 MG/ML
4 INJECTION INTRAMUSCULAR; INTRAVENOUS EVERY 6 HOURS PRN
Status: DISCONTINUED | OUTPATIENT
Start: 2023-11-24 | End: 2023-11-24 | Stop reason: HOSPADM

## 2023-11-24 RX ORDER — BUPIVACAINE HYDROCHLORIDE 5 MG/ML
INJECTION, SOLUTION EPIDURAL; INTRACAUDAL
Status: COMPLETED | OUTPATIENT
Start: 2023-11-24 | End: 2023-11-24

## 2023-11-24 RX ORDER — PROPOFOL 10 MG/ML
INJECTION, EMULSION INTRAVENOUS AS NEEDED
Status: DISCONTINUED | OUTPATIENT
Start: 2023-11-24 | End: 2023-11-24

## 2023-11-24 RX ORDER — OXYCODONE HYDROCHLORIDE 5 MG/1
5 TABLET ORAL ONCE AS NEEDED
Status: DISCONTINUED | OUTPATIENT
Start: 2023-11-24 | End: 2023-11-24 | Stop reason: HOSPADM

## 2023-11-24 RX ORDER — FENTANYL CITRATE/PF 50 MCG/ML
25 SYRINGE (ML) INJECTION
Status: DISCONTINUED | OUTPATIENT
Start: 2023-11-24 | End: 2023-11-24 | Stop reason: HOSPADM

## 2023-11-24 RX ORDER — FENTANYL CITRATE/PF 50 MCG/ML
50 SYRINGE (ML) INJECTION
Status: DISCONTINUED | OUTPATIENT
Start: 2023-11-24 | End: 2023-11-24 | Stop reason: HOSPADM

## 2023-11-24 RX ORDER — ONDANSETRON 2 MG/ML
INJECTION INTRAMUSCULAR; INTRAVENOUS AS NEEDED
Status: DISCONTINUED | OUTPATIENT
Start: 2023-11-24 | End: 2023-11-24

## 2023-11-24 RX ORDER — VANCOMYCIN HYDROCHLORIDE 1 G/200ML
1000 INJECTION, SOLUTION INTRAVENOUS ONCE
Status: COMPLETED | OUTPATIENT
Start: 2023-11-24 | End: 2023-11-24

## 2023-11-24 RX ADMIN — CHLORHEXIDINE GLUCONATE 0.12% ORAL RINSE 15 ML: 1.2 LIQUID ORAL at 08:19

## 2023-11-24 RX ADMIN — SODIUM CHLORIDE, SODIUM LACTATE, POTASSIUM CHLORIDE, AND CALCIUM CHLORIDE: .6; .31; .03; .02 INJECTION, SOLUTION INTRAVENOUS at 08:54

## 2023-11-24 RX ADMIN — MIDAZOLAM 2 MG: 1 INJECTION INTRAMUSCULAR; INTRAVENOUS at 08:34

## 2023-11-24 RX ADMIN — BUPIVACAINE HYDROCHLORIDE 10 ML: 5 INJECTION, SOLUTION EPIDURAL; INTRACAUDAL; PERINEURAL at 08:40

## 2023-11-24 RX ADMIN — BUPIVACAINE 20 ML: 13.3 INJECTION, SUSPENSION, LIPOSOMAL INFILTRATION at 08:30

## 2023-11-24 RX ADMIN — SUGAMMADEX 200 MG: 100 INJECTION, SOLUTION INTRAVENOUS at 10:38

## 2023-11-24 RX ADMIN — PROPOFOL 100 MCG/KG/MIN: 10 INJECTION, EMULSION INTRAVENOUS at 09:03

## 2023-11-24 RX ADMIN — LIDOCAINE HYDROCHLORIDE 50 MG: 10 INJECTION, SOLUTION EPIDURAL; INFILTRATION; INTRACAUDAL at 08:58

## 2023-11-24 RX ADMIN — ONDANSETRON 4 MG: 2 INJECTION INTRAMUSCULAR; INTRAVENOUS at 11:10

## 2023-11-24 RX ADMIN — ROCURONIUM BROMIDE 40 MG: 10 INJECTION, SOLUTION INTRAVENOUS at 08:58

## 2023-11-24 RX ADMIN — VANCOMYCIN HYDROCHLORIDE 1000 MG: 1 INJECTION, SOLUTION INTRAVENOUS at 08:38

## 2023-11-24 RX ADMIN — PROPOFOL 200 MG: 10 INJECTION, EMULSION INTRAVENOUS at 08:58

## 2023-11-24 RX ADMIN — FENTANYL CITRATE 50 MCG: 50 INJECTION, SOLUTION INTRAMUSCULAR; INTRAVENOUS at 08:34

## 2023-11-24 RX ADMIN — DEXAMETHASONE SODIUM PHOSPHATE 10 MG: 10 INJECTION, SOLUTION INTRAMUSCULAR; INTRAVENOUS at 09:03

## 2023-11-24 RX ADMIN — ONDANSETRON 4 MG: 2 INJECTION INTRAMUSCULAR; INTRAVENOUS at 09:03

## 2023-11-24 NOTE — INTERVAL H&P NOTE
H&P reviewed. After examining the patient I find no changes in the patients condition since the H&P had been written.     Vitals:    11/24/23 0754   BP: 165/91   Pulse: 82   Resp: 20   Temp: 97.7 °F (36.5 °C)   SpO2: 99%

## 2023-11-24 NOTE — DISCHARGE INSTR - AVS FIRST PAGE
Lorence Opitz Dr. Blease Dates  Post-Operative Instructions    1. Take your prescribed medication as needed. You can take ibuprofen in between doses of the narcotic if needed. 2. Upon arrival at home, lie down and elevate your surgical foot on 2 pillows. 3. Remain quiet, off your feet as much as possible, for the first 24-48 hours. This is when your feet first swell and may become painful. After 48 hours you may begin limited walking following these restrictions:Non weight bearing to left lower extremity. Please use crutches to aid in your ambulation      4. Drink large quantities of water. Consume no alcohol. Continue a well-balanced diet. 5. Report any unusual discomfort or fever to this office. 6. A limited amount of discomfort and swelling is to be expected. In some cases the skin may take on a bruised appearance. The surgical solution that was applied to your foot prior to the operation is dark in color and the operation site may appear to be oozing when it actually is not. 7. A slight amount of blood is to be expected, and is no cause for alarm. Do not remove the dressings. If there is active bleeding and if the bleeding persists, add additional gauze to the bandage, apply direct pressure, elevate your feet and call this office. 8. Do not get the dressings wet. As regular bathing may be inconvenient, sponge baths are recommended. If you shower, make sure the dressing stays dry. 9. When anesthesia wears off and if any discomfort should be present, apply an ice pack directly over the operated area for 15 minute intervals for several hours or until the pain leaves. (USE IN EXCESS OF 15 MINUTES COULD CAUSE FROSTBITE). Do not use hot water bags or electric pads. A convenient icepack can be made by placing ice cubes in a plastic bag and covering this with a towel. 10. If necessary, take a mild laxative before retiring.   11. Wear your special open shoes anytime you put weight on your foot, even if it is just to walk to the bathroom and back. It will probably be 2 or 3 weeks before you will be permitted to try regular shoes. 12. Having performed the operation, we are interested in a prompt recovery. Please cooperate by following the above instructions. 13. Please call to confirm your post-op appointment or call with any other questions.

## 2023-11-24 NOTE — ANESTHESIA PROCEDURE NOTES
Peripheral Block    Patient location during procedure: holding area  Start time: 11/24/2023 8:30 AM  Reason for block: at surgeon's request and post-op pain management  Staffing  Performed by: Kwame Desai MD  Authorized by: Kwame Desai MD    Preanesthetic Checklist  Completed: patient identified, IV checked, site marked, risks and benefits discussed, surgical consent, monitors and equipment checked, pre-op evaluation and timeout performed  Peripheral Block  Patient position: right lateral  Prep: ChloraPrep  Patient monitoring: frequent blood pressure checks, continuous pulse oximetry and heart rate  Block type: Popliteal  Laterality: left  Injection technique: single-shot  Procedures: ultrasound guided, Ultrasound guidance required for the procedure to increase accuracy and safety of medication placement and decrease risk of complications.   Ultrasound permanent image savedbupivacaine (PF) (MARCAINE) 0.5 % injection 20 mL - Perineural   10 mL - 11/24/2023 8:40:00 AM  Needle  Needle type: Stimuplex   Needle gauge: 22 G  Needle length: 6 in  Needle localization: anatomical landmarks and ultrasound guidance  Needle insertion depth: 8 cm  Assessment  Injection assessment: incremental injection, frequent aspiration, injected with ease, negative aspiration, negative for heart rate change, no paresthesia on injection, no symptoms of intraneural/intravenous injection and needle tip visualized at all times  Paresthesia pain: none  Post-procedure:  site cleaned  patient tolerated the procedure well with no immediate complications

## 2023-11-24 NOTE — ANESTHESIA PREPROCEDURE EVALUATION
Procedure:  REMOVAL OF POSTERIOR HEEL SPUR WITH REPAIR TENDON ACHILLES (Left: Ankle)    Relevant Problems   CARDIO   (+) Chronic deep vein thrombosis (DVT) of femoral vein of right lower extremity (HCC)   (+) HTN (hypertension)   (+) Migraine      ENDO   (+) Hypothyroidism   (+) Type 2 diabetes mellitus (HCC)      GI/HEPATIC   (+) Small bowel obstruction (HCC)      /RENAL   (+) Hydroureteronephrosis      GYN   (+) Endometrial cancer (HCC)      HEMATOLOGY   (+) Anemia      NEURO/PSYCH   (+) Headache   (+) Migraine        Physical Exam    Airway    Mallampati score: I  TM Distance: >3 FB  Neck ROM: full     Dental   No notable dental hx     Cardiovascular      Pulmonary      Other Findings  post-pubertal.      Anesthesia Plan  ASA Score- 3     Anesthesia Type- general with ASA Monitors. Additional Monitors:     Airway Plan: ETT. Plan Factors-Exercise tolerance (METS): >4 METS. Chart reviewed. EKG reviewed. Existing labs reviewed. Patient summary reviewed. Patient is not a current smoker. There is medical exclusion for perioperative obstructive sleep apnea risk education. Induction- intravenous. Postoperative Plan- Plan for postoperative opioid use. Informed Consent- Anesthetic plan and risks discussed with patient. I personally reviewed this patient with the CRNA. Discussed and agreed on the Anesthesia Plan with the CRNA. Robin Jacobs

## 2023-11-24 NOTE — OP NOTE
OPERATIVE REPORT  PATIENT NAME: Zoila Hernandez    :  1955  MRN: [id number]  Pt Location:  OR ROOM 01    SURGERY DATE: 2023    Surgeon(s) and Role:     * Ju Valenzuela DPM - Primary     * Rosanna Peterson DPM - Assisting    Preop Diagnosis:  Achilles tendon injury [S86.009A]  Bursitis of posterior heel, left [M77.52]  Achilles tendinosis of left ankle [M67.874]    Post-Op Diagnosis Codes:     * Achilles tendon injury [S86.009A]     * Bursitis of posterior heel, left [M77.52]     * Achilles tendinosis of left ankle [M67.874]    Procedure(s):  Left - REMOVAL OF POSTERIOR HEEL SPUR WITH REPAIR TENDON ACHILLES    Specimen(s):  * No specimens in log *    Estimated Blood Loss:   Minimal    Drains:  Nephrostomy Left 8 Fr. (Active)   Number of days: 1040       Urostomy Ureterostomy right RUQ (Active)   Number of days:        Percutaneous Nephroureteral Tube (PCNU) Left 1 8.5 Fr 22 Cm (Active)   Number of days: 976       Anesthesia Type:   General w/ Popliteal Block    Operative Indications:  Achilles tendon injury [S86.009A]  Bursitis of posterior heel, left [M77.52]  Achilles tendinosis of left ankle [I77.346]      Operative Findings:  1) thickening and calcinosis of the Achilles tendon noted  2) posterior heel spur removed  3) repair of Achilles tendon utilizing Ar[employer] speed bridge system    Complications:   None    Procedure and Technique:    A popliteal block was performed preoperatively. Pneumatic thigh tourniquet was placed using ample amounts of webril . Under mild sedation the patient was brought into the OR and placed on the table in the prone position. Following sedation a time out was performed. The extremity was then scrubbed, prepped, and draped in the usual aseptic manner. An eschmark bandage was then used to exsanguinate the Left thigh and the tourniquet was inflated to  325 millimeters of pressure.      Attention was directed to the Left posterior heel where a Curvilinear incision was made. Bleeders were cauterized as necessary. Incision was deepened along the lateral aspect of the Achilles tendon until the depth of the peritenon was reached. By utilizing sharp dissection the Achilles tendon was reflected off the medial and central portion of the calcaneus. Nonviable tendon anteriorly and posteriorly was sharply removed. Underlying bone deformity was appreciated. Utilizing sagittal saw appropriate wedge of calcaneus was removed. Adequate resection of calcaneal bone was verified using C-arm fluoroscopy. At this time the Achilles bursa was appreciated and sharply removed. Attention was then redirected back to the posterior heel, where the Achilles tendon was reattached to the calcaneal bone utilizing speed bridge per manufacturing guidelines. Redundant suture proximally was maintained and utilized to tag the proximal Achilles down to the bone. The speed bridge was then used to maintain the distal Achilles insertion per  guidelines. The repair was noted to be extremely stable. Redundant tendon medially and laterally was sharply removed from the distal aspects. The wound was irrigated with normal sterile saline. Later closure was obtained using 3-0 Vicryl, and 4-0 Vicryl for deep structures and periosteum. Skin closure was achieved utilizing 3-0 nylon in a horizontal mattress and simple suture type fashion. The surgical site was dressed with Xeroform, 4 x 4's, fluff dressing, followed by a Gannon compression and a posterior splint. Tourniquet was deflated for a total time of 65 minutes. A prompt hyperemic response was noted to the Left lower extremity. No complications were encountered. The patient tolerated the procedure and anesthesia well and was transferred to the PACU with vital signs stable. Dr. Isis Castrejon was present for the entire procedure.     Patient Disposition:  PACU         SIGNATURE: Timothy Miranda DPM  DATE: November 24, 2023  TIME: 10:49 AM

## 2023-11-24 NOTE — DISCHARGE SUMMARY
PODIATRY DISCHARGE SUMMARY     Patient Name: Gordy Lopez   Age & Sex: 79 y.o. female   MRN: [id number]  Unit/Bed#: [facility]   Encounter: [id number]  Length of Stay: 0 days    Active Problems: There are no active Hospital Problems. HPI from Admission:  Patient to be admitted for same-day surgery of left lower extremity removal of posterior heel spur with repair of Achilles tendon    [address] Genesis Saravia is a 79 y.o. female who was admitted on 11/24/2023 for same-day surgery of left lower extremity removal of posterior heel spur with    Achilles tendon. Patient underwent procedure as advertised and was discharged home following meeting PACU criteria    DISCHARGE INFORMATION     PCP at Discharge: Peng Sher MD    Admitting Provider: Dr. Martin Garcia  Admission Date: 11/24/2023     Discharge Provider: Dr. Martin Garcia  Discharge Date: 11/24/23    Discharge Disposition: Home  Discharge Condition: Good  Discharge with Lines: No  Discharge Diet: Diabetic  Activity Restrictions: NWB  Test Results Pending at Discharge: None  Medications at Discharge: See after visit summary for reconciled discharge medications provided to patient and family. Discharge Diagnoses: Active Problems: There are no active Hospital Problems. Consulting Providers:  none    Diagnostic Imaging Performed:  No results found. Procedures Performed:  Procedure(s):  REMOVAL OF POSTERIOR HEEL SPUR WITH REPAIR TENDON ACHILLES      Code Status: Prior  Advance Directive and Living Will:      Power of :    POLST:      FOLLOW-UP     PCP Outpatient Follow-up:  Yes    Consulting Providers Follow-up:  none    Active Issues Requiring Follow-Up:  none    Discharge Statement:  I spent 25 minutes discharging the patient. This time was spent on the day of discharge. I had direct contact with the patient on the day of discharge. Additional documentation is required if more than 30 minutes were spent on discharge. Portions of the record may have been created with voice recognition software. Occasional wrong word or "sound a like" substitutions may have occurred due to the inherent limitations of voice recognition software.   Read the chart carefully and recognize, using context, where substitutions have occurred.  ==  Heidi Arredondo, [facility]  Podiatric Medicine & Surgery

## 2023-11-24 NOTE — ANESTHESIA POSTPROCEDURE EVALUATION
Post-Op Assessment Note    CV Status:  Stable    Pain management: adequate    Multimodal analgesia used between 6 hours prior to anesthesia start to PACU discharge    Mental Status:  Alert   Hydration Status:  Stable   PONV Controlled:  None     Post Op Vitals Reviewed: Yes    No anethesia notable event occurred.     Staff: CRNA               BP   156/64   Temp 97.0 F   Pulse 70   Resp 16   SpO2 100%

## 2023-11-27 ENCOUNTER — OFFICE VISIT (OUTPATIENT)
Dept: PODIATRY | Facility: CLINIC | Age: 68
End: 2023-11-27

## 2023-11-27 VITALS
BODY MASS INDEX: 35.72 KG/M2 | SYSTOLIC BLOOD PRESSURE: 160 MMHG | HEIGHT: 66 IN | DIASTOLIC BLOOD PRESSURE: 82 MMHG | OXYGEN SATURATION: 97 % | HEART RATE: 79 BPM

## 2023-11-27 DIAGNOSIS — M67.874 ACHILLES TENDINOSIS OF LEFT ANKLE: ICD-10-CM

## 2023-11-27 DIAGNOSIS — M77.52 BURSITIS OF POSTERIOR HEEL, LEFT: Primary | ICD-10-CM

## 2023-11-27 DIAGNOSIS — Z98.890 POSTOPERATIVE STATE: ICD-10-CM

## 2023-11-27 PROCEDURE — 99024 POSTOP FOLLOW-UP VISIT: CPT | Performed by: PODIATRIST

## 2023-11-27 NOTE — PROGRESS NOTES
Assessment/Plan:     The patient's clinical examination today was significant for a well coapted incision line to the posterior aspect of the left heel and ankle. There are no signs of infection. There is no active drainage nor bleeding. The surgical wound was cleansed with an antiseptic dermal cleanser. The wound was then redressed with sterile dressing consisting of Xeroform gauze as a contact layer with a DSD and Coflex for compression. We will start the patient in guarded weightbearing with a high tide Cam boot today to get the patient mobilized. Recommend follow-up in 1 week for her next postoperative dressing change. She will keep the dressing clean dry and intact until follow-up. Diagnoses and all orders for this visit:    Bursitis of posterior heel, left  -     Cam Boot    Achilles tendinosis of left ankle  -     Cam Boot    Postoperative state  -     Cam Boot          Subjective:     Patient ID: Gus Cervantes is a 79 y.o. female. The patient presents today for follow-up status post resection of a posterior left heel spur with debridement and repair of her Achilles tendon. She is doing well from postoperative pain standpoint. She states that she does not have any. She likely still is feeling the effects of the popliteal nerve block. She is eager to get out of the splint. She does not have a lot of strength in her right lower extremity secondary to prior nerve damage. It is too hard for her to maintain nonweightbearing status with crutches or with a walker. We will transition her to a high tide Cam boot today and she can start guarded weightbearing for short distances around the house with the boot on. Review of Systems   Constitutional: Negative. HENT: Negative. Eyes: Negative. Respiratory: Negative. Cardiovascular: Negative. Endocrine: Negative. Musculoskeletal: Negative. Neurological: Negative. Hematological: Negative.     Psychiatric/Behavioral: Negative. Objective:     Physical Exam  Constitutional:       Appearance: Normal appearance. HENT:      Head: Normocephalic and atraumatic. Nose: Nose normal.   Cardiovascular:      Pulses:           Dorsalis pedis pulses are 2+ on the left side. Posterior tibial pulses are 2+ on the left side. Pulmonary:      Effort: Pulmonary effort is normal.   Feet:      Comments: The patient's clinical examination today was significant for a well coapted incision line to the posterior aspect of the left heel and ankle. There are no signs of infection. There is no active drainage nor bleeding. Skin:     General: Skin is warm. Capillary Refill: Capillary refill takes less than 2 seconds. Neurological:      General: No focal deficit present. Mental Status: She is alert and oriented to person, place, and time. Psychiatric:         Mood and Affect: Mood normal.         Behavior: Behavior normal.         Thought Content:  Thought content normal.

## 2023-11-29 ENCOUNTER — TELEPHONE (OUTPATIENT)
Dept: PODIATRY | Facility: CLINIC | Age: 68
End: 2023-11-29

## 2023-11-29 NOTE — TELEPHONE ENCOUNTER
Caller: Tyesha Ariza    Doctor: aFzal Eden, DPM    Reason for call: Post op - her bandages got wet after showering. Please advise and she would also like to know if she should leave the boot off until the bandage dries. Call back#: [phone]    Roman Henry would also like to make Dr. Isis Castrejon aware that on Monday and Tuesday there has been blood on the bandage at her heel. She thinks it stopped now.

## 2023-12-04 ENCOUNTER — OFFICE VISIT (OUTPATIENT)
Dept: PODIATRY | Facility: CLINIC | Age: 68
End: 2023-12-04

## 2023-12-04 VITALS
OXYGEN SATURATION: 96 % | HEIGHT: 66 IN | SYSTOLIC BLOOD PRESSURE: 134 MMHG | BODY MASS INDEX: 35.72 KG/M2 | HEART RATE: 75 BPM | DIASTOLIC BLOOD PRESSURE: 86 MMHG

## 2023-12-04 DIAGNOSIS — M77.52 BURSITIS OF POSTERIOR HEEL, LEFT: ICD-10-CM

## 2023-12-04 DIAGNOSIS — Z98.890 POSTOPERATIVE STATE: Primary | ICD-10-CM

## 2023-12-04 DIAGNOSIS — M67.874 ACHILLES TENDINOSIS OF LEFT ANKLE: ICD-10-CM

## 2023-12-04 PROCEDURE — 99024 POSTOP FOLLOW-UP VISIT: CPT | Performed by: PODIATRIST

## 2023-12-04 NOTE — PROGRESS NOTES
Assessment/Plan:     The patient's clinical examination today significant for serosanguineous filled blister over the posterior aspect of the left heel. This could be either postoperative nature or perhaps from pressure. I reiterated to the patient the need to prop the heel up and suspended in the air with pillows under the calf. There are no signs of infection noted. The incision line is well coapted with all sutures intact. There is mild edema to the left lower extremity in a diffuse pattern. There is no tenderness with calf squeeze. There is no erythema nor ecchymosis. There is negative Homans' sign. The left heel incision was cleansed with Betadine and a silver alginate dressing was then applied as the contact layer. A compression dressing was then applied. She will continue guarded weightbearing in a cam boot with a walker. Recommend follow-up in 1 week for next postoperative dressing change. Diagnoses and all orders for this visit:    Postoperative state    Achilles tendinosis of left ankle    Bursitis of posterior heel, left          Subjective:     Patient ID: Garry August is a 79 y.o. female. The patient presents presents today for follow-up status post resection of a retrocalcaneal spur with debridement and repair of the distal left Achilles tendon. She is doing fairly well from postoperative pain standpoint. She is currently ambulating with a high tide Cam boot with a walker. Review of Systems   Constitutional: Negative. HENT: Negative. Eyes: Negative. Respiratory: Negative. Cardiovascular: Negative. Endocrine: Negative. Musculoskeletal: Negative. Neurological: Negative. Hematological: Negative. Psychiatric/Behavioral: Negative. Objective:     Physical Exam  Constitutional:       Appearance: Normal appearance. HENT:      Head: Normocephalic and atraumatic.       Nose: Nose normal.   Cardiovascular:      Pulses:           Dorsalis pedis pulses are 2+ on the left side. Posterior tibial pulses are 2+ on the left side. Pulmonary:      Effort: Pulmonary effort is normal.   Feet:      Comments: The patient's clinical examination today significant for serosanguineous filled blister over the posterior aspect of the left heel. This could be either postoperative nature or perhaps from pressure. I reiterated to the patient the need to prop the heel up and suspended in the air with pillows under the calf. There are no signs of infection noted. The incision line is well coapted with all sutures intact. There is mild edema to the left lower extremity in a diffuse pattern. There is no tenderness with calf squeeze. There is no erythema nor ecchymosis. There is negative Homans' sign. Skin:     General: Skin is warm. Capillary Refill: Capillary refill takes less than 2 seconds. Neurological:      General: No focal deficit present. Mental Status: She is alert and oriented to person, place, and time. Psychiatric:         Mood and Affect: Mood normal.         Behavior: Behavior normal.         Thought Content:  Thought content normal.

## 2023-12-11 ENCOUNTER — OFFICE VISIT (OUTPATIENT)
Dept: PODIATRY | Facility: CLINIC | Age: 68
End: 2023-12-11

## 2023-12-11 VITALS
SYSTOLIC BLOOD PRESSURE: 154 MMHG | OXYGEN SATURATION: 98 % | BODY MASS INDEX: 35.72 KG/M2 | DIASTOLIC BLOOD PRESSURE: 99 MMHG | HEART RATE: 75 BPM | HEIGHT: 66 IN

## 2023-12-11 DIAGNOSIS — M67.874 ACHILLES TENDINOSIS OF LEFT ANKLE: Primary | ICD-10-CM

## 2023-12-11 DIAGNOSIS — M77.52 BURSITIS OF POSTERIOR HEEL, LEFT: ICD-10-CM

## 2023-12-11 DIAGNOSIS — L03.116 CELLULITIS OF LEFT LOWER EXTREMITY: ICD-10-CM

## 2023-12-11 DIAGNOSIS — Z98.890 POSTOPERATIVE STATE: ICD-10-CM

## 2023-12-11 PROCEDURE — 99024 POSTOP FOLLOW-UP VISIT: CPT | Performed by: PODIATRIST

## 2023-12-11 RX ORDER — HYDROCODONE BITARTRATE AND ACETAMINOPHEN 5; 325 MG/1; MG/1
1 TABLET ORAL EVERY 6 HOURS PRN
Qty: 9 TABLET | Refills: 0 | Status: SHIPPED | OUTPATIENT
Start: 2023-12-11

## 2023-12-11 RX ORDER — DOXYCYCLINE 100 MG/1
100 TABLET ORAL 2 TIMES DAILY
Qty: 20 TABLET | Refills: 0 | Status: SHIPPED | OUTPATIENT
Start: 2023-12-11 | End: 2023-12-18 | Stop reason: SDUPTHER

## 2023-12-11 NOTE — PROGRESS NOTES
Assessment/Plan:     The patient's clinical examination today significant for blistering of the distal flap of the posterior left heel and ankle. Underlying tissue appears granular and clean. There is some very mild localized erythema without any active drainage no purulence. There is no fluctuance nor crepitus. There is moderate tenderness palpation of the postsurgical site. There is no tenderness with calf squeeze. The Achilles tendon is palpable and contiguous. The surgical incision line and wound site was cleansed with an antiseptic dermal cleanser as well as Betadine. A dry sterile dressing was reapplied with silver alginate to the contact layer. I will start the patient on a 10-day course of doxycycline 100 mg twice daily. The patient is also requesting some additional postop pain medication to assist with the discomfort to her posterior left heel. I feel this is reasonable. She tolerates hydrocodone better compared to her Percocet which was dispensed to her after her recent surgery. The [place] PDMP was checked and was noted to be consistent. A prescription for Norco 5/325 mg, 9 tablets, 1 tablet by mouth every 8 hours as needed for severe pain only was sent to her pharmacy. She will continue guarded weightbearing in a high tide Cam boot with a walker. Recommend follow-up in 1 week for next postoperative dressing change. Diagnoses and all orders for this visit:    Achilles tendinosis of left ankle  -     HYDROcodone-acetaminophen (Norco) 5-325 mg per tablet; Take 1 tablet by mouth every 6 (six) hours as needed for pain Max Daily Amount: 4 tablets    Bursitis of posterior heel, left  -     HYDROcodone-acetaminophen (Norco) 5-325 mg per tablet; Take 1 tablet by mouth every 6 (six) hours as needed for pain Max Daily Amount: 4 tablets    Postoperative state  -     HYDROcodone-acetaminophen (Norco) 5-325 mg per tablet;  Take 1 tablet by mouth every 6 (six) hours as needed for pain Max Daily Amount: 4 tablets  -     doxycycline (ADOXA) 100 MG tablet; Take 1 tablet (100 mg total) by mouth 2 (two) times a day for 10 days    Cellulitis of left lower extremity  -     doxycycline (ADOXA) 100 MG tablet; Take 1 tablet (100 mg total) by mouth 2 (two) times a day for 10 days          Subjective:     Patient ID: Cristobal Hall is a 79 y.o. female. The patient presents presents today for follow-up status post resection of a retrocalcaneal spur with debridement and repair of the distal left Achilles tendon. She does note some increased pain to her left posterior heel, as she feels that the popliteal block has fully worn out. She is ambulating with a high tide Cam boot with a walker. Review of Systems   Constitutional: Negative. HENT: Negative. Eyes: Negative. Respiratory: Negative. Cardiovascular: Negative. Endocrine: Negative. Musculoskeletal: Negative. Neurological: Negative. Hematological: Negative. Psychiatric/Behavioral: Negative. Objective:     Physical Exam  Constitutional:       Appearance: Normal appearance. HENT:      Head: Normocephalic and atraumatic. Nose: Nose normal.   Cardiovascular:      Pulses:           Dorsalis pedis pulses are 2+ on the left side. Posterior tibial pulses are 2+ on the left side. Pulmonary:      Effort: Pulmonary effort is normal.   Feet:      Comments: The patient's clinical examination today significant for blistering of the distal flap of the posterior left heel and ankle. Underlying tissue appears granular and clean. There is some very mild localized erythema without any active drainage no purulence. There is no fluctuance nor crepitus. There is moderate tenderness palpation of the postsurgical site. There is no tenderness with calf squeeze. The Achilles tendon is palpable and contiguous. Skin:     General: Skin is warm. Capillary Refill: Capillary refill takes less than 2 seconds. Neurological:      General: No focal deficit present. Mental Status: She is alert and oriented to person, place, and time. Psychiatric:         Mood and Affect: Mood normal.         Behavior: Behavior normal.         Thought Content:  Thought content normal.

## 2023-12-18 ENCOUNTER — OFFICE VISIT (OUTPATIENT)
Dept: PODIATRY | Facility: CLINIC | Age: 68
End: 2023-12-18

## 2023-12-18 VITALS
DIASTOLIC BLOOD PRESSURE: 79 MMHG | SYSTOLIC BLOOD PRESSURE: 130 MMHG | HEART RATE: 101 BPM | BODY MASS INDEX: 35.72 KG/M2 | OXYGEN SATURATION: 98 % | HEIGHT: 66 IN

## 2023-12-18 DIAGNOSIS — L03.116 CELLULITIS OF LEFT LOWER EXTREMITY: ICD-10-CM

## 2023-12-18 DIAGNOSIS — Z98.890 POSTOPERATIVE STATE: ICD-10-CM

## 2023-12-18 DIAGNOSIS — T81.31XD POSTOPERATIVE WOUND DEHISCENCE, SUBSEQUENT ENCOUNTER: Primary | ICD-10-CM

## 2023-12-18 PROCEDURE — 99024 POSTOP FOLLOW-UP VISIT: CPT | Performed by: PODIATRIST

## 2023-12-18 RX ORDER — DOXYCYCLINE 100 MG/1
100 TABLET ORAL 2 TIMES DAILY
Qty: 20 TABLET | Refills: 0 | Status: SHIPPED | OUTPATIENT
Start: 2023-12-18 | End: 2023-12-27

## 2023-12-18 NOTE — PROGRESS NOTES
Assessment/Plan:       The patient's clinical examination today significant for fibrogranular wound base to the distal flap of the posterior left heel and ankle. There is some very mild localized erythema without any active drainage no purulence.  There is no fluctuance nor crepitus.  There is moderate tenderness palpation of the postsurgical site. There is no tenderness with calf squeeze. The Achilles tendon is palpable and contiguous.      The surgical incision line and wound site was cleansed with an antiseptic dermal cleanser as well as Betadine.  A dry sterile dressing was reapplied with Xeroform as to the contact layer.  I will add an additional 10-day course of doxycycline 100 mg twice daily.       She will continue guarded weightbearing in a high tide Cam boot with a walker.  Recommend follow-up in 1 week for next postoperative dressing change.     Diagnoses and all orders for this visit:    Postoperative wound dehiscence, subsequent encounter    Postoperative state  -     doxycycline (ADOXA) 100 MG tablet; Take 1 tablet (100 mg total) by mouth 2 (two) times a day for 10 days    Cellulitis of left lower extremity  -     doxycycline (ADOXA) 100 MG tablet; Take 1 tablet (100 mg total) by mouth 2 (two) times a day for 10 days          Subjective:     Patient ID: Jillian Arango is a 68 y.o. female.      The patient presents presents today for follow-up status post resection of a retrocalcaneal spur with debridement and repair of the distal left Achilles tendon.  She does note some increased pain to her left posterior heel, as she feels that the popliteal block has fully worn out. She is ambulating with a high tide Cam boot with a walker.          Review of Systems   Constitutional: Negative.    HENT: Negative.     Eyes: Negative.    Respiratory: Negative.     Cardiovascular: Negative.    Endocrine: Negative.    Musculoskeletal: Negative.    Neurological: Negative.    Hematological: Negative.     Psychiatric/Behavioral: Negative.           Objective:     Physical Exam  Constitutional:       Appearance: Normal appearance.   HENT:      Head: Normocephalic and atraumatic.      Nose: Nose normal.   Pulmonary:      Effort: Pulmonary effort is normal.   Feet:      Comments: The patient's clinical examination today significant for fibrogranular wound base to the distal flap of the posterior left heel and ankle. There is some very mild localized erythema without any active drainage no purulence.  There is no fluctuance nor crepitus.  There is moderate tenderness palpation of the postsurgical site. There is no tenderness with calf squeeze. The Achilles tendon is palpable and contiguous.  Skin:     General: Skin is warm.      Capillary Refill: Capillary refill takes less than 2 seconds.   Neurological:      General: No focal deficit present.      Mental Status: She is alert and oriented to person, place, and time.   Psychiatric:         Mood and Affect: Mood normal.         Behavior: Behavior normal.         Thought Content: Thought content normal.

## 2023-12-27 ENCOUNTER — OFFICE VISIT (OUTPATIENT)
Dept: PODIATRY | Facility: CLINIC | Age: 68
End: 2023-12-27

## 2023-12-27 ENCOUNTER — OFFICE VISIT (OUTPATIENT)
Dept: INTERNAL MEDICINE CLINIC | Facility: CLINIC | Age: 68
End: 2023-12-27
Payer: COMMERCIAL

## 2023-12-27 VITALS
HEART RATE: 80 BPM | WEIGHT: 220 LBS | DIASTOLIC BLOOD PRESSURE: 86 MMHG | SYSTOLIC BLOOD PRESSURE: 146 MMHG | RESPIRATION RATE: 16 BRPM | TEMPERATURE: 98.3 F | BODY MASS INDEX: 35.51 KG/M2 | OXYGEN SATURATION: 99 %

## 2023-12-27 VITALS
OXYGEN SATURATION: 99 % | HEART RATE: 81 BPM | SYSTOLIC BLOOD PRESSURE: 142 MMHG | BODY MASS INDEX: 35.51 KG/M2 | HEIGHT: 66 IN | DIASTOLIC BLOOD PRESSURE: 86 MMHG

## 2023-12-27 DIAGNOSIS — E06.3 HYPOTHYROIDISM DUE TO HASHIMOTO'S THYROIDITIS: Primary | ICD-10-CM

## 2023-12-27 DIAGNOSIS — F41.9 ANXIETY: ICD-10-CM

## 2023-12-27 DIAGNOSIS — E03.8 HYPOTHYROIDISM DUE TO HASHIMOTO'S THYROIDITIS: Primary | ICD-10-CM

## 2023-12-27 DIAGNOSIS — Z71.84 TRAVEL ADVICE ENCOUNTER: ICD-10-CM

## 2023-12-27 DIAGNOSIS — G89.18 ACUTE POST-OPERATIVE PAIN: Primary | ICD-10-CM

## 2023-12-27 DIAGNOSIS — R42 VERTIGO: ICD-10-CM

## 2023-12-27 DIAGNOSIS — L03.116 CELLULITIS OF LEFT LOWER EXTREMITY: ICD-10-CM

## 2023-12-27 DIAGNOSIS — T81.31XD POSTOPERATIVE WOUND DEHISCENCE, SUBSEQUENT ENCOUNTER: ICD-10-CM

## 2023-12-27 DIAGNOSIS — Z98.890 POSTOPERATIVE STATE: ICD-10-CM

## 2023-12-27 PROCEDURE — 99214 OFFICE O/P EST MOD 30 MIN: CPT | Performed by: HOSPITALIST

## 2023-12-27 PROCEDURE — 99024 POSTOP FOLLOW-UP VISIT: CPT | Performed by: PODIATRIST

## 2023-12-27 RX ORDER — LEVOTHYROXINE SODIUM 75 MCG
75 TABLET ORAL
Qty: 90 TABLET | Refills: 2 | Status: SHIPPED | OUTPATIENT
Start: 2023-12-27

## 2023-12-27 RX ORDER — HYDROCODONE BITARTRATE AND ACETAMINOPHEN 5; 325 MG/1; MG/1
1 TABLET ORAL EVERY 12 HOURS PRN
Qty: 5 TABLET | Refills: 0 | Status: SHIPPED | OUTPATIENT
Start: 2023-12-27

## 2023-12-27 RX ORDER — DOXYCYCLINE 100 MG/1
100 TABLET ORAL 2 TIMES DAILY
Qty: 14 TABLET | Refills: 0 | Status: SHIPPED | OUTPATIENT
Start: 2023-12-27 | End: 2024-01-03

## 2023-12-27 RX ORDER — MECLIZINE HCL 12.5 MG/1
12.5 TABLET ORAL EVERY 8 HOURS PRN
Qty: 40 TABLET | Refills: 0 | Status: SHIPPED | OUTPATIENT
Start: 2023-12-27 | End: 2024-01-10

## 2023-12-27 RX ORDER — ALPRAZOLAM 0.5 MG/1
0.25 TABLET ORAL
Qty: 30 TABLET | Refills: 0 | Status: SHIPPED | OUTPATIENT
Start: 2023-12-27

## 2023-12-27 NOTE — PROGRESS NOTES
INTERNAL MEDICINE FOLLOW-UP OFFICE VISIT  [facility] - [facility] INTERNAL MEDICINE [place]    NAME: Jillian Arnago  AGE: 68 y.o. SEX: female  : 1955     DATE: 2023     Assessment and Plan:     1. Anxiety  Takes Xanax as needed.  Last refill was 3 months ago.  No evidence of any abuse.  - ALPRAZolam (XANAX) 0.5 mg tablet; Take 0.5 tablets (0.25 mg total) by mouth daily at bedtime as needed for anxiety Takes 1/2 0.5mg = 0.25 mg @ bedtime prn  Dispense: 30 tablet; Refill: 0    2. Vertigo  Meclizine as needed.  Now going on a cruise.  Will give her 2-week supply  - meclizine (ANTIVERT) 12.5 MG tablet; Take 1 tablet (12.5 mg total) by mouth every 8 (eight) hours as needed for nausea or dizziness for up to 14 days  Dispense: 40 tablet; Refill: 0    3. Hypothyroidism due to Hashimoto's thyroiditis  Refilled  - Synthroid 75 MCG tablet; Take 1 tablet (75 mcg total) by mouth daily in the early morning  Dispense: 90 tablet; Refill: 2    4. Travel advice encounter  Has taken her flu shot.  Has taken a COVID shot previously as well.         Chief Complaint:     Chief Complaint   Patient presents with   • Follow-up     Needs refills, also wants Meclizine reodered        History of Present Illness:     Jillian is going on a cruise to the [facility] next week and is here for refill of her meclizine secondary to dizziness and motion sickness that she has had during her previous travel as well as for refills for her Xanax and Synthroid.Achilles Tendon Repair, Removal of Heel Spur Left on 2023.  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    The following portions of the patient's history were reviewed and updated as appropriate: allergies, current medications, past family history, past medical history, past social history, past surgical history and problem list.     Review of Systems:     Review of Systems all other review of symptoms negative unless specified  otherwise     Problem List:     Patient Active Problem List   Diagnosis   • Small bowel obstruction (HCC)   • Type 2 diabetes mellitus (HCC)   • History of urostomy   • Endometrial cancer (HCC)   • Hypothyroidism   • In vitro fertilization   • Anemia   • Hydroureteronephrosis   • HTN (hypertension)   • Migraine   • Abdominal adhesions   • Tooth infection   • Class 2 obesity in adult   • Headache   • Ureteral-ileal loop anastomotic stricture   • Chronic deep vein thrombosis (DVT) of femoral vein of right lower extremity (HCC)   • Bone infarction (HCC)   • Anti-cardiolipin antibody positive   • Encounter for attention to other artificial openings of urinary tract (HCC)   • Leg pain, anterior, right   • Nausea and vomiting   • Hypercalcemia   • Vertigo   • History of smoking   • Decreased appetite   • Postoperative state   • Bursitis of posterior heel, left   • Achilles tendinosis of left ankle        Objective:     /86 (BP Location: Left arm, Patient Position: Sitting, Cuff Size: Large)   Pulse 80   Temp 98.3 °F (36.8 °C) (Tympanic)   Resp 16   Wt 99.8 kg (220 lb)   SpO2 99%   BMI 35.51 kg/m²     Physical Exam  General Appearance:    Alert, cooperative, no distress   Head:    Normocephalic, without obvious abnormality, atraumatic   Eyes:    PERRL, conjunctiva/corneas clear, EOM's intact, fundi     benign, both eyes        Neck:   Supple, no adenopathy, no JVD   Back:     Symmetric, no curvature, ROM normal, no CVA tenderness   Lungs:     Clear to auscultation bilaterally, no wheezing or rhonchi   Heart:    Regular rate and rhythm, S1 and S2 normal, no murmur, rub   or gallop   Abdomen:     Soft, non-tender, bowel sounds active    Extremities:   Extremities normal, atraumatic, no cyanosis or edema   Psych:   Normal Affect   Neurologic:   CNII-XII intact. Normal strength, sensation and reflexes       Throughout       Pertinent Laboratory/Diagnostic Studies:    Laboratory Results: I have personally reviewed  the pertinent laboratory results/reports         Augustine Lloyd MD  [facility] INTERNAL MEDICINE [place]

## 2023-12-27 NOTE — PROGRESS NOTES
Assessment/Plan:     The patient's clinical examination today significant for fibrogranular wound base to the distal flap of the posterior left heel and ankle.  Localized erythema has resolved, there are no signs of any acute infectious process.  There is no fluctuance nor crepitus.  There is moderate tenderness palpation of the postsurgical site. There is no tenderness with calf squeeze. The Achilles tendon is palpable and contiguous.      The surgical incision line and wound site was cleansed with an antiseptic dermal cleanser .  A dry sterile dressing was reapplied with silver alginate as the contact layer.  The patient will be leaving for a 7-day cruise shortly.  I will refill her prescription for doxycycline which she can take on her trip with her and use in case infection should set in while she is away.    Due to her increased pain, I will provide her with Norco 5/325 mg, 5 tablets, to be taken every 12 hours as needed for severe pain only to her left lower extremity postoperative site.  The [place] PDMP was checked and was noted to be consistent.    She will continue guarded weightbearing in a high tide Cam boot with a walker.  Silver alginate was also provided to the patient to perform daily dressing changes while she is away on her trip.  Recommend follow-up in 1 week for next postoperative dressing change.     Diagnoses and all orders for this visit:    Acute post-operative pain  -     HYDROcodone-acetaminophen (Norco) 5-325 mg per tablet; Take 1 tablet by mouth every 12 (twelve) hours as needed for pain Max Daily Amount: 2 tablets    Cellulitis of left lower extremity  -     doxycycline (ADOXA) 100 MG tablet; Take 1 tablet (100 mg total) by mouth 2 (two) times a day for 7 days  -     HYDROcodone-acetaminophen (Norco) 5-325 mg per tablet; Take 1 tablet by mouth every 12 (twelve) hours as needed for pain Max Daily Amount: 2 tablets    Postoperative wound dehiscence, subsequent encounter  -      HYDROcodone-acetaminophen (Norco) 5-325 mg per tablet; Take 1 tablet by mouth every 12 (twelve) hours as needed for pain Max Daily Amount: 2 tablets    Postoperative state          Subjective:     Patient ID: Jillian Arango is a 68 y.o. female.    The patient presents presents today for follow-up status post resection of a retrocalcaneal spur with debridement and repair of the distal left Achilles tendon.  She does note some increased pain to her left posterior heel, as she feels that the popliteal block has fully worn out. She is ambulating with a high tide Cam boot with a walker.         Review of Systems   Constitutional: Negative.    HENT: Negative.     Eyes: Negative.    Respiratory: Negative.     Cardiovascular: Negative.    Endocrine: Negative.    Musculoskeletal: Negative.    Neurological: Negative.    Hematological: Negative.    Psychiatric/Behavioral: Negative.           Objective:     Physical Exam  Constitutional:       Appearance: Normal appearance.   HENT:      Head: Normocephalic and atraumatic.      Nose: Nose normal.   Cardiovascular:      Pulses:           Dorsalis pedis pulses are 2+ on the left side.        Posterior tibial pulses are 1+ on the left side.   Pulmonary:      Effort: Pulmonary effort is normal.   Feet:      Left foot:      Skin integrity: Ulcer and skin breakdown present. No erythema or warmth.      Comments: The patient's clinical examination today significant for fibrogranular wound base to the distal flap of the posterior left heel and ankle.  Localized erythema has resolved, there are no signs of any acute infectious process.  There is no fluctuance nor crepitus.  There is moderate tenderness palpation of the postsurgical site. There is no tenderness with calf squeeze. The Achilles tendon is palpable and contiguous.   Skin:     General: Skin is warm.      Capillary Refill: Capillary refill takes less than 2 seconds.   Neurological:      General: No focal deficit present.       Mental Status: She is alert and oriented to person, place, and time.   Psychiatric:         Mood and Affect: Mood normal.         Behavior: Behavior normal.         Thought Content: Thought content normal.

## 2024-01-08 ENCOUNTER — OFFICE VISIT (OUTPATIENT)
Dept: PODIATRY | Facility: CLINIC | Age: 69
End: 2024-01-08

## 2024-01-08 VITALS
BODY MASS INDEX: 35.51 KG/M2 | DIASTOLIC BLOOD PRESSURE: 90 MMHG | HEIGHT: 66 IN | HEART RATE: 88 BPM | SYSTOLIC BLOOD PRESSURE: 147 MMHG | OXYGEN SATURATION: 98 %

## 2024-01-08 DIAGNOSIS — Z98.890 POSTOPERATIVE STATE: ICD-10-CM

## 2024-01-08 DIAGNOSIS — T81.31XD POSTOPERATIVE WOUND DEHISCENCE, SUBSEQUENT ENCOUNTER: Primary | ICD-10-CM

## 2024-01-08 PROCEDURE — 99024 POSTOP FOLLOW-UP VISIT: CPT | Performed by: PODIATRIST

## 2024-01-08 NOTE — PROGRESS NOTES
Assessment/Plan:     The patient's clinical examination today significant for fibrogranular wound base to the distal flap of the posterior left heel and ankle.  There are no obvious signs of infection noted.  There is no fluctuance nor crepitus.  There is moderate tenderness palpation of the postsurgical site. There is no tenderness with calf squeeze. The Achilles tendon is palpable and there is to be contiguous.     The wound was cleansed with Betadine and a sterile redress was performed with silver alginate as a contact layer.  She will continue local wound care every 2 to 3 days with the same materials.  She will continue guarded weightbearing in a cam boot.    Recommend follow-up in 7 to 10 days.  She will complete her course of antibiotic therapy as previously prescribed.     Diagnoses and all orders for this visit:    Postoperative wound dehiscence, subsequent encounter    Postoperative state          Subjective:     Patient ID: Jillian Arango is a 68 y.o. female.    The patient presents today for follow-up of a postoperative wound dehiscence site to the posterior left heel.  She had just returned from a recent cruise.  She did note that she was concerned that infection was setting in this past Tuesday and therefore started herself on antibiotic therapy.  At present, she has been performing wound care as directed with silver alginate.  She is still ambulate in the cam boot with a cane.      PAST MEDICAL HISTORY:  Past Medical History:   Diagnosis Date    Anemia     Back pain     Bowel obstruction (HCC) 2017,2018    Cancer (HCC)     uterine CA radiation destoried urethra.   Patient has urostomy    Cholelithiasis     Colon polyp     Deep vein thrombosis of left lower extremity (HCC) 01/11/2004    on blood thinner for 3 years.    Diabetes mellitus (HCC)     type 2 BS 6/6/22 @ 0600 was 145    Disease of thyroid gland     Elevated lactic acid level 1/16/2021    Endometrial cancer (HCC) 1999    GERD  (gastroesophageal reflux disease)     Gross hematuria     Hiatal hernia     History of transfusion 1999    Post Hysterectomy    History of urostomy 01/11/2004    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.    Hypothyroid     In vitro fertilization     Kidney stone     Migraines     Muscle weakness     abdominal    Personal history of irradiation     Renal cyst     Sleep apnea     Resolved due to Weight Loss       PAST SURGICAL HISTORY:  Past Surgical History:   Procedure Laterality Date    ACHILLES TENDON REPAIR Right     APPENDECTOMY      COLONOSCOPY      6/6/22    COLONOSCOPY W/ BIOPSIES N/A 12/2015    EGD      HEEL SPUR SURGERY Right 1996    HYSTERECTOMY      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    IR NEPHROSTOMY TUBE PLACEMENT  01/18/2021    LAPAROSCOPIC GASTRIC BANDING N/A 2006    [place], [place]    OTHER SURGICAL HISTORY  2004    Urine shunt to intestine, transverse colon    PCNL Left 02/16/2021    Procedure: NEPHROLITHOTOMY  PERCUTANEOUS (PCNL);  Surgeon: Ziggy Roldan MD;  Location: [facility];  Service: Urology    WA CYSTO/URETERO W/LITHOTRIPSY &INDWELL STENT INSRT Left 02/16/2021    Procedure: anterograde  URETEROSCOPY with dilation of ureteral stricture, INSERTION STENT URETERAL, exchange  of nephrostomy tube;  Surgeon: Ziggy Roldan MD;  Location: [facility];  Service: Urology    WA LAPAROSCOPY ENTEROLYSIS SEPARATE PROCEDURE N/A 01/17/2019    Procedure: LAPAROSCOPIC LYSIS OF ADHESIONS;  Surgeon: Omar Jack MD;  Location:  MAIN OR;  Service: General    WA REPAIR SECONDARY ACHILLES TENDON W/WO GRAFT Left 11/24/2023    Procedure: REMOVAL OF POSTERIOR HEEL SPUR WITH REPAIR TENDON ACHILLES;  Surgeon: Darron Frias DPM;  Location:  MAIN OR;  Service: Podiatry    [facility]    SLEEVE GA[facility]OPLASTY N/A 09/2015    Dr. Jack, [facility]    [facility]      WEILBY THUMB  ARTHROPLASTY          ALLERGIES:  Amoxicillin, Penicillins, and Adhesive [medical tape]    MEDICATIONS:  Current Outpatient Medications   Medication Sig Dispense Refill    ALPRAZolam (XANAX) 0.5 mg tablet Take 0.5 tablets (0.25 mg total) by mouth daily at bedtime as needed for anxiety Takes 1/2 0.5mg = 0.25 mg @ bedtime prn 30 tablet 0    Ascorbic Acid (VITAMIN C PO) Take by mouth daily      BIOTIN PO Take by mouth daily      FREESTYLE LITE test strip       HYDROcodone-acetaminophen (Norco) 5-325 mg per tablet Take 1 tablet by mouth every 12 (twelve) hours as needed for pain Max Daily Amount: 2 tablets 5 tablet 0    meclizine (ANTIVERT) 12.5 MG tablet Take 1 tablet (12.5 mg total) by mouth every 8 (eight) hours as needed for nausea or dizziness for up to 14 days 40 tablet 0    metFORMIN (GLUCOPHAGE) 500 mg tablet 2 (two) times a day with meals      pantoprazole (PROTONIX) 40 mg tablet Take 1 tablet (40 mg total) by mouth daily in the early morning 90 tablet 0    pramipexole (MIRAPEX) 0.5 mg tablet 1 mg daily at bedtime      Probiotic Product (PROBIOTIC DAILY PO) Take by mouth in the morning      Synthroid 75 MCG tablet Take 1 tablet (75 mcg total) by mouth daily in the early morning 90 tablet 2    Trulicity 4.5 MG/0.5ML SOPN every 7 days      dabigatran etexilate (PRADAXA) 150 mg capsu Take 1 capsule (150 mg total) by mouth every 12 (twelve) hours 180 capsule 0    famotidine (PEPCID) 20 mg tablet Take 1 tablet (20 mg total) by mouth 2 (two) times a day (Patient taking differently: Take 20 mg by mouth if needed) 60 tablet 0     No current facility-administered medications for this visit.       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    Tobacco Use    Smoking status: Former     Current packs/day: 0.00      Average packs/day: 1.5 packs/day for 12.0 years (18.0 ttl pk-yrs)     Types: Cigarettes     Start date: 1974     Quit date: 1986     Years since quittin.0     Passive exposure: Never    Smokeless tobacco: Never   Vaping Use    Vaping status: Never Used   Substance and Sexual Activity    Alcohol use: Not Currently    Drug use: Never    Sexual activity: Never     Comment: s/p hysterectomy   Other Topics Concern    None   Social History Narrative    None     Social Determinants of Health     Financial Resource Strain: Not on file   Food Insecurity: Not on file   Transportation Needs: No Transportation Needs (2023)    PRAPARE - Transportation     Lack of Transportation (Medical): No     Lack of Transportation (Non-Medical): No   Physical Activity: Not on file   Stress: Not on file   Social Connections: Not on file   Intimate Partner Violence: Not on file   Housing Stability: Unknown (2023)    Housing Stability Vital Sign     Unable to Pay for Housing in the Last Year: No     Number of Places Lived in the Last Year: Not on file     Unstable Housing in the Last Year: No        Review of Systems   Constitutional: Negative.    HENT: Negative.     Eyes: Negative.    Respiratory: Negative.     Cardiovascular: Negative.    Endocrine: Negative.    Musculoskeletal: Negative.    Neurological: Negative.    Hematological: Negative.    Psychiatric/Behavioral: Negative.           Objective:     Physical Exam  Constitutional:       Appearance: Normal appearance.   HENT:      Head: Normocephalic and atraumatic.      Nose: Nose normal.   Pulmonary:      Effort: Pulmonary effort is normal.   Feet:      Comments: The patient's clinical examination today significant for fibrogranular wound base to the distal flap of the posterior left heel and ankle.  Some increased granulation is noted. There are no obvious signs of infection noted.  There is no fluctuance nor crepitus.  There is moderate tenderness palpation of the  postsurgical site. There is no tenderness with calf squeeze. The Achilles tendon is palpable and there is to be contiguous.     Skin:     General: Skin is warm.      Capillary Refill: Capillary refill takes less than 2 seconds.   Neurological:      General: No focal deficit present.      Mental Status: She is alert and oriented to person, place, and time.   Psychiatric:         Mood and Affect: Mood normal.         Behavior: Behavior normal.         Thought Content: Thought content normal.

## 2024-01-10 DIAGNOSIS — E66.2 CLASS 2 OBESITY WITH ALVEOLAR HYPOVENTILATION WITHOUT SERIOUS COMORBIDITY WITH BODY MASS INDEX (BMI) OF 36.0 TO 36.9 IN ADULT: ICD-10-CM

## 2024-01-10 DIAGNOSIS — Z79.4 TYPE 2 DIABETES MELLITUS WITH OTHER SPECIFIED COMPLICATION, WITH LONG-TERM CURRENT USE OF INSULIN (HCC): Primary | ICD-10-CM

## 2024-01-10 DIAGNOSIS — E11.69 TYPE 2 DIABETES MELLITUS WITH OTHER SPECIFIED COMPLICATION, WITH LONG-TERM CURRENT USE OF INSULIN (HCC): Primary | ICD-10-CM

## 2024-01-10 RX ORDER — DULAGLUTIDE 4.5 MG/.5ML
4.5 INJECTION, SOLUTION SUBCUTANEOUS
Qty: 12 ML | Refills: 0 | Status: SHIPPED | OUTPATIENT
Start: 2024-01-10

## 2024-01-10 NOTE — TELEPHONE ENCOUNTER
This is Roxanne Arango's birthday [birth year]. I need my Trulicity refilled 4.5 milligrams that [facility]

## 2024-01-15 ENCOUNTER — OFFICE VISIT (OUTPATIENT)
Dept: PODIATRY | Facility: CLINIC | Age: 69
End: 2024-01-15

## 2024-01-15 VITALS
DIASTOLIC BLOOD PRESSURE: 99 MMHG | HEIGHT: 66 IN | OXYGEN SATURATION: 98 % | HEART RATE: 69 BPM | SYSTOLIC BLOOD PRESSURE: 146 MMHG | BODY MASS INDEX: 35.51 KG/M2

## 2024-01-15 DIAGNOSIS — T81.31XD POSTOPERATIVE WOUND DEHISCENCE, SUBSEQUENT ENCOUNTER: Primary | ICD-10-CM

## 2024-01-15 PROCEDURE — 99024 POSTOP FOLLOW-UP VISIT: CPT | Performed by: PODIATRIST

## 2024-01-15 NOTE — PROGRESS NOTES
Assessment/Plan:     The patient's clinical examination today significant for fibro-granular wound base to the distal flap of the posterior left heel and ankle.  There are no obvious signs of infection noted.  There is no fluctuance nor crepitus.  There is moderate tenderness palpation of the postsurgical site. There is no tenderness with calf squeeze. The Achilles tendon is palpable and  is to be contiguous.      The wound was cleansed with Betadine and a sterile redress was performed with silver alginate as a contact layer.  It remains too tender for her to tolerate any local debridement. She will continue local wound care every 2 to 3 days with the same materials.  She will continue guarded weightbearing in a cam boot.     Recommend follow-up in 7 to 10 days.  I see no clinical need for any further antibiosis. We did discuss the potential benefits of a debridement and grafting procedure to the post-op wound dehiscence site. We will consider a [place] graft, as she defers a STSG.     Diagnoses and all orders for this visit:    Postoperative wound dehiscence, subsequent encounter  -     mupirocin (BACTROBAN) 2 % ointment; Apply topically 3 (three) times a day          Subjective:     Patient ID: Jillian Arango is a 68 y.o. female.    The patient presents today for follow-up of a postoperative wound dehiscence site to the posterior left heel.  At present, she has been performing wound care as directed with silver alginate.  She is still ambulate in the cam boot with a cane. She does spend some time out of the boot, stating she did not wear it yesterday. She has completed her course of antibiotics.        Review of Systems   Constitutional: Negative.    HENT: Negative.     Eyes: Negative.    Respiratory: Negative.     Cardiovascular: Negative.    Endocrine: Negative.    Musculoskeletal: Negative.    Skin:  Positive for wound.   Neurological: Negative.    Hematological: Negative.    Psychiatric/Behavioral: Negative.            Objective:     Physical Exam  Constitutional:       Appearance: Normal appearance.   HENT:      Head: Normocephalic and atraumatic.      Nose: Nose normal.   Cardiovascular:      Pulses:           Dorsalis pedis pulses are 2+ on the left side.        Posterior tibial pulses are 1+ on the left side.   Pulmonary:      Effort: Pulmonary effort is normal.   Feet:      Comments: The patient's clinical examination today significant for fibro-granular wound base to the distal flap of the posterior left heel and ankle.  There are no obvious signs of infection noted.  There is no fluctuance nor crepitus.  There is moderate tenderness palpation of the postsurgical site. There is no tenderness with calf squeeze. The Achilles tendon is palpable and  is to be contiguous.   Skin:     General: Skin is warm.      Capillary Refill: Capillary refill takes less than 2 seconds.   Neurological:      General: No focal deficit present.      Mental Status: She is alert and oriented to person, place, and time.   Psychiatric:         Mood and Affect: Mood normal.         Behavior: Behavior normal.         Thought Content: Thought content normal.

## 2024-01-17 ENCOUNTER — TELEPHONE (OUTPATIENT)
Dept: OBGYN CLINIC | Facility: CLINIC | Age: 69
End: 2024-01-17

## 2024-01-18 ENCOUNTER — APPOINTMENT (OUTPATIENT)
Dept: LAB | Facility: AMBULARY SURGERY CENTER | Age: 69
End: 2024-01-18
Payer: COMMERCIAL

## 2024-01-18 DIAGNOSIS — E83.52 HYPERCALCEMIA: ICD-10-CM

## 2024-01-18 DIAGNOSIS — I82.461 ACUTE DEEP VEIN THROMBOSIS (DVT) OF CALF MUSCLE VEIN OF RIGHT LOWER EXTREMITY (HCC): ICD-10-CM

## 2024-01-18 DIAGNOSIS — R53.83 OTHER FATIGUE: ICD-10-CM

## 2024-01-18 DIAGNOSIS — I82.411 ACUTE DEEP VEIN THROMBOSIS (DVT) OF FEMORAL VEIN OF RIGHT LOWER EXTREMITY (HCC): ICD-10-CM

## 2024-01-18 DIAGNOSIS — E06.3 HYPOTHYROIDISM DUE TO HASHIMOTO'S THYROIDITIS: Chronic | ICD-10-CM

## 2024-01-18 DIAGNOSIS — T81.31XD POSTOPERATIVE WOUND DEHISCENCE, SUBSEQUENT ENCOUNTER: ICD-10-CM

## 2024-01-18 DIAGNOSIS — R42 DIZZINESS: ICD-10-CM

## 2024-01-18 DIAGNOSIS — E03.8 HYPOTHYROIDISM DUE TO HASHIMOTO'S THYROIDITIS: Chronic | ICD-10-CM

## 2024-01-18 DIAGNOSIS — E66.2 CLASS 2 OBESITY WITH ALVEOLAR HYPOVENTILATION WITHOUT SERIOUS COMORBIDITY WITH BODY MASS INDEX (BMI) OF 36.0 TO 36.9 IN ADULT: ICD-10-CM

## 2024-01-18 DIAGNOSIS — Z79.4 TYPE 2 DIABETES MELLITUS WITH OTHER SPECIFIED COMPLICATION, WITH LONG-TERM CURRENT USE OF INSULIN (HCC): ICD-10-CM

## 2024-01-18 DIAGNOSIS — E11.69 TYPE 2 DIABETES MELLITUS WITH OTHER SPECIFIED COMPLICATION, WITH LONG-TERM CURRENT USE OF INSULIN (HCC): ICD-10-CM

## 2024-01-18 DIAGNOSIS — D50.9 IRON DEFICIENCY ANEMIA, UNSPECIFIED IRON DEFICIENCY ANEMIA TYPE: ICD-10-CM

## 2024-01-18 LAB
ALBUMIN SERPL BCP-MCNC: 3.9 G/DL (ref 3.5–5)
ALP SERPL-CCNC: 106 U/L (ref 34–104)
ALT SERPL W P-5'-P-CCNC: 21 U/L (ref 7–52)
ANION GAP SERPL CALCULATED.3IONS-SCNC: 11 MMOL/L
AST SERPL W P-5'-P-CCNC: 20 U/L (ref 13–39)
BASOPHILS # BLD AUTO: 0.04 THOUSANDS/ÂΜL (ref 0–0.1)
BASOPHILS NFR BLD AUTO: 1 % (ref 0–1)
BILIRUB SERPL-MCNC: 0.61 MG/DL (ref 0.2–1)
BUN SERPL-MCNC: 30 MG/DL (ref 5–25)
CALCIUM SERPL-MCNC: 10.6 MG/DL (ref 8.4–10.2)
CHLORIDE SERPL-SCNC: 107 MMOL/L (ref 96–108)
CO2 SERPL-SCNC: 22 MMOL/L (ref 21–32)
CREAT SERPL-MCNC: 1.01 MG/DL (ref 0.6–1.3)
EOSINOPHIL # BLD AUTO: 0.19 THOUSAND/ÂΜL (ref 0–0.61)
EOSINOPHIL NFR BLD AUTO: 3 % (ref 0–6)
ERYTHROCYTE [DISTWIDTH] IN BLOOD BY AUTOMATED COUNT: 13.7 % (ref 11.6–15.1)
FERRITIN SERPL-MCNC: 25 NG/ML (ref 11–307)
GFR SERPL CREATININE-BSD FRML MDRD: 58 ML/MIN/1.73SQ M
GLUCOSE SERPL-MCNC: 154 MG/DL (ref 65–140)
HCT VFR BLD AUTO: 36.9 % (ref 34.8–46.1)
HGB BLD-MCNC: 11.7 G/DL (ref 11.5–15.4)
IMM GRANULOCYTES # BLD AUTO: 0.03 THOUSAND/UL (ref 0–0.2)
IMM GRANULOCYTES NFR BLD AUTO: 0 % (ref 0–2)
IRON SATN MFR SERPL: 23 % (ref 15–50)
IRON SERPL-MCNC: 73 UG/DL (ref 50–212)
LYMPHOCYTES # BLD AUTO: 1.61 THOUSANDS/ÂΜL (ref 0.6–4.47)
LYMPHOCYTES NFR BLD AUTO: 21 % (ref 14–44)
MCH RBC QN AUTO: 31.1 PG (ref 26.8–34.3)
MCHC RBC AUTO-ENTMCNC: 31.7 G/DL (ref 31.4–37.4)
MCV RBC AUTO: 98 FL (ref 82–98)
MONOCYTES # BLD AUTO: 0.44 THOUSAND/ÂΜL (ref 0.17–1.22)
MONOCYTES NFR BLD AUTO: 6 % (ref 4–12)
NEUTROPHILS # BLD AUTO: 5.35 THOUSANDS/ÂΜL (ref 1.85–7.62)
NEUTS SEG NFR BLD AUTO: 69 % (ref 43–75)
NRBC BLD AUTO-RTO: 0 /100 WBCS
PLATELET # BLD AUTO: 255 THOUSANDS/UL (ref 149–390)
PMV BLD AUTO: 9.5 FL (ref 8.9–12.7)
POTASSIUM SERPL-SCNC: 4.7 MMOL/L (ref 3.5–5.3)
PROT SERPL-MCNC: 6.4 G/DL (ref 6.4–8.4)
PTH-INTACT SERPL-MCNC: 134.2 PG/ML (ref 12–88)
RBC # BLD AUTO: 3.76 MILLION/UL (ref 3.81–5.12)
SODIUM SERPL-SCNC: 140 MMOL/L (ref 135–147)
TIBC SERPL-MCNC: 322 UG/DL (ref 250–450)
UIBC SERPL-MCNC: 249 UG/DL (ref 155–355)
WBC # BLD AUTO: 7.66 THOUSAND/UL (ref 4.31–10.16)

## 2024-01-18 PROCEDURE — 80053 COMPREHEN METABOLIC PANEL: CPT

## 2024-01-18 PROCEDURE — 85025 COMPLETE CBC W/AUTO DIFF WBC: CPT

## 2024-01-18 PROCEDURE — 82728 ASSAY OF FERRITIN: CPT

## 2024-01-18 PROCEDURE — 83540 ASSAY OF IRON: CPT

## 2024-01-18 PROCEDURE — 86147 CARDIOLIPIN ANTIBODY EA IG: CPT

## 2024-01-18 PROCEDURE — 36415 COLL VENOUS BLD VENIPUNCTURE: CPT

## 2024-01-18 PROCEDURE — 83550 IRON BINDING TEST: CPT

## 2024-01-18 PROCEDURE — 83970 ASSAY OF PARATHORMONE: CPT

## 2024-01-18 RX ORDER — VANCOMYCIN HYDROCHLORIDE 1 G/200ML
1000 INJECTION, SOLUTION INTRAVENOUS ONCE
OUTPATIENT
Start: 2024-01-29 | End: 2024-01-18

## 2024-01-18 RX ORDER — CHLORHEXIDINE GLUCONATE ORAL RINSE 1.2 MG/ML
15 SOLUTION DENTAL ONCE
OUTPATIENT
Start: 2024-01-18 | End: 2024-01-18

## 2024-01-19 LAB
CARDIOLIPIN IGA SER IA-ACNC: 3.2
CARDIOLIPIN IGG SER IA-ACNC: 1.5
CARDIOLIPIN IGM SER IA-ACNC: <0.9

## 2024-01-21 DIAGNOSIS — K21.9 GASTROESOPHAGEAL REFLUX DISEASE WITHOUT ESOPHAGITIS: ICD-10-CM

## 2024-01-22 ENCOUNTER — OFFICE VISIT (OUTPATIENT)
Dept: PODIATRY | Facility: CLINIC | Age: 69
End: 2024-01-22

## 2024-01-22 VITALS
HEIGHT: 66 IN | SYSTOLIC BLOOD PRESSURE: 148 MMHG | HEART RATE: 93 BPM | DIASTOLIC BLOOD PRESSURE: 99 MMHG | BODY MASS INDEX: 35.51 KG/M2 | OXYGEN SATURATION: 97 %

## 2024-01-22 DIAGNOSIS — Z98.890 POSTOPERATIVE STATE: Primary | ICD-10-CM

## 2024-01-22 DIAGNOSIS — T81.31XD POSTOPERATIVE WOUND DEHISCENCE, SUBSEQUENT ENCOUNTER: ICD-10-CM

## 2024-01-22 PROCEDURE — 99024 POSTOP FOLLOW-UP VISIT: CPT | Performed by: PODIATRIST

## 2024-01-22 RX ORDER — PANTOPRAZOLE SODIUM 40 MG/1
40 TABLET, DELAYED RELEASE ORAL
Qty: 90 TABLET | Refills: 3 | Status: SHIPPED | OUTPATIENT
Start: 2024-01-22 | End: 2024-01-26 | Stop reason: SDUPTHER

## 2024-01-22 NOTE — PROGRESS NOTES
Assessment/Plan:     The patient's clinical examination today significant for fibro-granular wound base to the distal flap of the posterior left heel and ankle.  There are no obvious signs of infection noted.  There is no fluctuance nor crepitus.  There is moderate tenderness palpation of the postsurgical site. There is no tenderness with calf squeeze. The Achilles tendon is palpable and is contiguous.      The wound was cleansed with Betadine and a sterile redress was performed with silver alginate as a contact layer.  It remains too tender for her to tolerate any local debridement. She will continue local wound care every 2 to 3 days with the same materials.  She will continue guarded weightbearing in a cam boot.     The patient is scheduled for debridement of the left lower extremity wound with Phoenix graft application this Friday.     Diagnoses and all orders for this visit:    Postoperative state    Postoperative wound dehiscence, subsequent encounter          Subjective:     Patient ID: Jillian Arango is a 68 y.o. female.    The patient presents today for follow-up of a postoperative wound dehiscence site to the posterior left heel.  At present, she has been performing wound care as directed with silver alginate.  She is still ambulating in the cam boot with a cane today but does spend some time out of the boot as tolerated.            PAST MEDICAL HISTORY:  Past Medical History:   Diagnosis Date    Anemia     Back pain     Bowel obstruction (HCC) 2017,2018    Cancer (HCC)     uterine CA radiation destoried urethra.   Patient has urostomy    Cholelithiasis     Colon polyp     Deep vein thrombosis of left lower extremity (HCC) 01/11/2004    on blood thinner for 3 years.    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    Hypothyroid     In vitro fertilization     Kidney stone     Migraines     Muscle weakness     abdominal    Personal history of irradiation     Renal cyst     Sleep apnea     Resolved due to Weight Loss       PAST SURGICAL HISTORY:  Past Surgical History:   Procedure Laterality Date    ACHILLES TENDON REPAIR Right     APPENDECTOMY      COLONOSCOPY      6/6/22    COLONOSCOPY W/ BIOPSIES N/A 12/2015    EGD      HEEL SPUR SURGERY Right 1996    HYSTERECTOMY      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    IR NEPHROSTOMY TUBE PLACEMENT  01/18/2021    LAPAROSCOPIC GASTRIC BANDING N/A 2006    [place], [place]    OTHER SURGICAL HISTORY  2004    Urine shunt to intestine, transverse colon    PCNL Left 02/16/2021    Procedure: NEPHROLITHOTOMY  PERCUTANEOUS (PCNL);  Surgeon: Ziggy Roldan MD;  [facility]: [facility];  Service: Urology    WV CYSTO/URETERO W/LITHOTRIPSY &INDWELL STENT INSRT Left 02/16/2021    Procedure: anterograde  URETEROSCOPY with dilation of ureteral stricture, INSERTION STENT URETERAL, exchange  of nephrostomy tube;  Surgeon: Ziggy Roldan MD;  [facility];  Service: Urology    WV LAPAROSCOPY ENTEROLYSIS SEPARATE PROCEDURE N/A 01/17/2019    Procedure: LAPAROSCOPIC LYSIS OF ADHESIONS;  Surgeon: Omar Jack MD;  Location:  MAIN OR;  Service: General    WV REPAIR SECONDARY ACHILLES TENDON W/WO GRAFT Left 11/24/2023    Procedure: REMOVAL OF POSTERIOR HEEL SPUR WITH REPAIR TENDON ACHILLES;  Surgeon: Darron Frias DPM;  Location:  MAIN OR;  Service: Podiatry    [facility]    SLEEVE GA[facility]OPLASTY N/A 09/2015    Dr. Jack, [facility]    URETER REVISION  2010    WEILBY THUMB ARTHROPLASTY      WEILBY THUMB ARTHROPLASTY          ALLERGIES:  Amoxicillin, Penicillins, and  Adhesive [medical tape]    MEDICATIONS:  Current Outpatient Medications   Medication Sig Dispense Refill    ALPRAZolam (XANAX) 0.5 mg tablet Take 0.5 tablets (0.25 mg total) by mouth daily at bedtime as needed for anxiety Takes 1/2 0.5mg = 0.25 mg @ bedtime prn 30 tablet 0    Ascorbic Acid (VITAMIN C PO) Take by mouth daily      BIOTIN PO Take by mouth daily      FREESTYLE LITE test strip       HYDROcodone-acetaminophen (Norco) 5-325 mg per tablet Take 1 tablet by mouth every 12 (twelve) hours as needed for pain Max Daily Amount: 2 tablets 5 tablet 0    metFORMIN (GLUCOPHAGE) 500 mg tablet 2 (two) times a day with meals      mupirocin (BACTROBAN) 2 % ointment Apply topically 3 (three) times a day 22 g 6    pantoprazole (PROTONIX) 40 mg tablet TAKE 1 TABLET DAILY EARLY IN THE MORNING 90 tablet 3    pramipexole (MIRAPEX) 0.5 mg tablet 1 mg daily at bedtime      Probiotic Product (PROBIOTIC DAILY PO) Take by mouth in the morning      Synthroid 75 MCG tablet Take 1 tablet (75 mcg total) by mouth daily in the early morning 90 tablet 2    Trulicity 4.5 MG/0.5ML injection Inject 0.5 mL (4.5 mg total) under the skin every 7 days 12 mL 0    dabigatran etexilate (PRADAXA) 150 mg capsu Take 1 capsule (150 mg total) by mouth every 12 (twelve) hours 180 capsule 0    famotidine (PEPCID) 20 mg tablet Take 1 tablet (20 mg total) by mouth 2 (two) times a day (Patient taking differently: Take 20 mg by mouth if needed) 60 tablet 0    meclizine (ANTIVERT) 12.5 MG tablet Take 1 tablet (12.5 mg total) by mouth every 8 (eight) hours as needed for nausea or dizziness for up to 14 days 40 tablet 0     No current facility-administered medications for this visit.       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    Tobacco Use     Smoking status: Former     Current packs/day: 0.00     Average packs/day: 1.5 packs/day for 12.0 years (18.0 ttl pk-yrs)     Types: Cigarettes     Start date: 1974     Quit date: 1986     Years since quittin.0     Passive exposure: Never    Smokeless tobacco: Never   Vaping Use    Vaping status: Never Used   Substance and Sexual Activity    Alcohol use: Not Currently    Drug use: Never    Sexual activity: Never     Comment: s/p hysterectomy   Other Topics Concern    None   Social History Narrative    None     Social Determinants of Health     Financial Resource Strain: Not on file   Food Insecurity: Not on file   Transportation Needs: No Transportation Needs (2023)    PRAPARE - Transportation     Lack of Transportation (Medical): No     Lack of Transportation (Non-Medical): No   Physical Activity: Not on file   Stress: Not on file   Social Connections: Not on file   Intimate Partner Violence: Not on file   Housing Stability: Unknown (2023)    Housing Stability Vital Sign     Unable to Pay for Housing in the Last Year: No     Number of Places Lived in the Last Year: Not on file     Unstable Housing in the Last Year: No        Review of Systems   Constitutional: Negative.    HENT: Negative.     Eyes: Negative.    Respiratory: Negative.     Cardiovascular: Negative.    Endocrine: Negative.    Musculoskeletal: Negative.    Skin:  Positive for color change and wound.   Neurological: Negative.    Hematological: Negative.    Psychiatric/Behavioral: Negative.           Objective:     Physical Exam  Constitutional:       Appearance: Normal appearance.   HENT:      Head: Normocephalic and atraumatic.      Nose: Nose normal.   Pulmonary:      Effort: Pulmonary effort is normal.   Musculoskeletal:        Feet:    Feet:      Left foot:      Skin integrity: Ulcer and skin breakdown present. No erythema or warmth.      Comments: The patient's clinical examination today significant for fibro-granular wound  base to the distal flap of the posterior left heel and ankle.  There are no obvious signs of infection noted.  There is no fluctuance nor crepitus.  There is moderate tenderness palpation of the postsurgical site. There is no tenderness with calf squeeze. The Achilles tendon is palpable and is contiguous.   Skin:     General: Skin is warm.      Capillary Refill: Capillary refill takes less than 2 seconds.   Neurological:      General: No focal deficit present.      Mental Status: She is alert and oriented to person, place, and time.   Psychiatric:         Mood and Affect: Mood normal.         Behavior: Behavior normal.         Thought Content: Thought content normal.

## 2024-01-23 ENCOUNTER — TELEPHONE (OUTPATIENT)
Dept: INTERNAL MEDICINE CLINIC | Facility: CLINIC | Age: 69
End: 2024-01-23

## 2024-01-23 ENCOUNTER — ANESTHESIA EVENT (OUTPATIENT)
Dept: PERIOP | Facility: HOSPITAL | Age: 69
End: 2024-01-23
Payer: COMMERCIAL

## 2024-01-23 NOTE — PRE-PROCEDURE INSTRUCTIONS
Pre-Surgery Instructions:   Medication Instructions    ALPRAZolam (XANAX) 0.5 mg tablet Uses PRN- OK to take day of surgery    Ascorbic Acid (VITAMIN C PO) Stop taking 7 days prior to surgery.    BIOTIN PO Stop taking 7 days prior to surgery.    dabigatran etexilate (PRADAXA) 150 mg capsu Instructions provided by MD Instructed to call PCP for instructions    famotidine (PEPCID) 20 mg tablet Uses PRN- OK to take day of surgery    meclizine (ANTIVERT) 12.5 MG tablet Uses PRN- OK to take day of surgery    metFORMIN (GLUCOPHAGE) 500 mg tablet Hold day of surgery.    mupirocin (BACTROBAN) 2 % ointment Hold day of surgery.    pantoprazole (PROTONIX) 40 mg tablet Take day of surgery.    pramipexole (MIRAPEX) 0.5 mg tablet Take night before surgery    Probiotic Product (PROBIOTIC DAILY PO) Stop taking 7 days prior to surgery.    Synthroid 75 MCG tablet Take day of surgery.    Trulicity 4.5 MG/0.5ML injection Stop taking 7 days prior to surgery.    Medication instructions for day surgery reviewed. Please use only a sip of water to take your instructed medications. Avoid all over the counter vitamins, supplements and NSAIDS for one week prior to surgery per anesthesia guidelines. Tylenol is ok to take as needed.     You will receive a call one business day prior to surgery with an arrival time and hospital directions. If your surgery is scheduled on a Monday, the hospital will be calling you on the Friday prior to your surgery. If you have not heard from anyone by 8pm, please call the hospital supervisor through the hospital  at [phone]. ([place] [phone]).    Do not eat or drink anything after midnight the night before your surgery, including candy, mints, lifesavers, or chewing gum. Do not drink alcohol 24hrs before your surgery. Try not to smoke at least 24hrs before your surgery.       Follow the pre surgery showering instructions as listed in the “My Surgical Experience Booklet” or otherwise provided by  your surgeon's office. Do not use a blade to shave the surgical area 1 week before surgery. It is okay to use a clean electric clippers up to 24 hours before surgery. Do not apply any lotions, creams, including makeup, cologne, deodorant, or perfumes after showering on the day of your surgery. Do not use dry shampoo, hair spray, hair gel, or any type of hair products.     No contact lenses, eye make-up, or artificial eyelashes. Remove nail polish, including gel polish, and any artificial, gel, or acrylic nails if possible. Remove all jewelry including rings and body piercing jewelry.     Wear causal clothing that is easy to take on and off. Consider your type of surgery.    Keep any valuables, jewelry, piercings at home. Please bring any specially ordered equipment (sling, braces) if indicated.    Arrange for a responsible person to drive you to and from the hospital on the day of your surgery. Visitor Guidelines discussed.     Call the surgeon's office with any new illnesses, exposures, or additional questions prior to surgery.    Please reference your “My Surgical Experience Booklet” for additional information to prepare for your upcoming surgery.

## 2024-01-23 NOTE — TELEPHONE ENCOUNTER
Patient having surgery on Friday and is asking if she needs to stop pradaxa and go on shots, please call and let her know.

## 2024-01-24 NOTE — TELEPHONE ENCOUNTER
OK, Hi, this is Laura [birth year] birthday for [id number]. I called this morning. They have Lovenox still because I'm having surgery on Friday. Well, he ordered for some reason productive, which I can't take. So I need to load them up tonight so somebody can get back with me at [phone]. Thank you

## 2024-01-25 ENCOUNTER — TELEPHONE (OUTPATIENT)
Dept: OBGYN CLINIC | Facility: CLINIC | Age: 69
End: 2024-01-25

## 2024-01-25 ENCOUNTER — OFFICE VISIT (OUTPATIENT)
Dept: INTERNAL MEDICINE CLINIC | Facility: CLINIC | Age: 69
End: 2024-01-25
Payer: COMMERCIAL

## 2024-01-25 VITALS
SYSTOLIC BLOOD PRESSURE: 160 MMHG | RESPIRATION RATE: 16 BRPM | OXYGEN SATURATION: 98 % | TEMPERATURE: 98.9 F | HEART RATE: 68 BPM | BODY MASS INDEX: 35.51 KG/M2 | WEIGHT: 220 LBS | DIASTOLIC BLOOD PRESSURE: 88 MMHG

## 2024-01-25 DIAGNOSIS — E66.2 CLASS 2 OBESITY WITH ALVEOLAR HYPOVENTILATION WITHOUT SERIOUS COMORBIDITY WITH BODY MASS INDEX (BMI) OF 36.0 TO 36.9 IN ADULT: ICD-10-CM

## 2024-01-25 DIAGNOSIS — M67.874 ACHILLES TENDINOSIS OF LEFT ANKLE: Primary | ICD-10-CM

## 2024-01-25 DIAGNOSIS — F33.9 DEPRESSION, RECURRENT (HCC): ICD-10-CM

## 2024-01-25 DIAGNOSIS — Z43.6 ENCOUNTER FOR ATTENTION TO OTHER ARTIFICIAL OPENINGS OF URINARY TRACT (HCC): ICD-10-CM

## 2024-01-25 DIAGNOSIS — E11.42 TYPE 2 DIABETES MELLITUS WITH DIABETIC POLYNEUROPATHY, WITHOUT LONG-TERM CURRENT USE OF INSULIN (HCC): ICD-10-CM

## 2024-01-25 DIAGNOSIS — R76.0 ANTI-CARDIOLIPIN ANTIBODY POSITIVE: ICD-10-CM

## 2024-01-25 DIAGNOSIS — N18.31 STAGE 3A CHRONIC KIDNEY DISEASE (HCC): ICD-10-CM

## 2024-01-25 DIAGNOSIS — C54.1 ENDOMETRIAL CANCER (HCC): ICD-10-CM

## 2024-01-25 DIAGNOSIS — M87.9 BONE INFARCTION (HCC): ICD-10-CM

## 2024-01-25 DIAGNOSIS — I82.511 CHRONIC DEEP VEIN THROMBOSIS (DVT) OF FEMORAL VEIN OF RIGHT LOWER EXTREMITY (HCC): ICD-10-CM

## 2024-01-25 PROCEDURE — 99214 OFFICE O/P EST MOD 30 MIN: CPT | Performed by: HOSPITALIST

## 2024-01-25 RX ORDER — ENOXAPARIN SODIUM 100 MG/ML
INJECTION SUBCUTANEOUS
COMMUNITY
Start: 2024-01-23 | End: 2024-01-26 | Stop reason: SDUPTHER

## 2024-01-25 RX ORDER — OXYCODONE HYDROCHLORIDE AND ACETAMINOPHEN 5; 325 MG/1; MG/1
1 TABLET ORAL EVERY 6 HOURS PRN
Qty: 20 TABLET | Refills: 0 | Status: SHIPPED | OUTPATIENT
Start: 2024-01-25 | End: 2024-01-30

## 2024-01-25 NOTE — PROGRESS NOTES
INTERNAL MEDICINE FOLLOW-UP OFFICE VISIT  [facility] INTERNAL MEDICINE [place]    NAME: Jillian Arango  AGE: 68 y.o. SEX: female  : 1955     DATE: 2024     Assessment and Plan:     1. Achilles tendinosis of left ankle  S/p Achilles tendon repair, removal of heel spur left on 2023 and appeared to have debridement of the left lower extremity wound with Phoenix graft application on the  of this month.  She is off her Pradaxa and on Lovenox till the date of surgery.  Will need to resume Pradaxa as soon as cleared by surgery.  She will be on lifelong Pradaxa secondary to her recurrent DVTs and positive anticardiolipin antibody     2. Anti-cardiolipin antibody positive  Has had history of recurrent DVTs with positive anticardiolipin antibody.  Will be on lifelong Pradaxa however currently on hold and on Lovenox secondary to surgery    3. Chronic deep vein thrombosis (DVT) of femoral vein of right lower extremity (HCC)  Secondary to anticardiolipin antibody.  On lifelong Pradaxa    4-diabetes mellitus type 2 stable on metformin and has been on it for over 20 years.  Will check a B12 level.     Chief Complaint:     Chief Complaint   Patient presents with   • Follow-up     Needs refills, Metformin questions        History of Present Illness:     Patient is due for debridement of the left lower extremity wound with Phoenix graft application this Monday which was postponed from today .She had Achilles tendon repair, removal of heel spur left on 2023.she is off her Pradaxa and on Lovenox.  Patient with recurrent DVTs with anticardiolipin positive .on lifelong Pradaxa.    Has been on metformin for over 20 years.  Will check a B12 level.  The following portions of the patient's history were reviewed and updated as appropriate: allergies, current medications, past family history, past medical history, past social history, past surgical history and problem list.     Review  of Systems:     Review of Systems all other review of symptoms negative unless specified otherwise     Problem List:     Patient Active Problem List   Diagnosis   • Small bowel obstruction (HCC)   • Type 2 diabetes mellitus (HCC)   • History of urostomy   • Endometrial cancer (HCC)   • Hypothyroidism   • In vitro fertilization   • Anemia   • Hydroureteronephrosis   • HTN (hypertension)   • Migraine   • Abdominal adhesions   • Tooth infection   • Class 2 obesity with alveolar hypoventilation without serious comorbidity with body mass index (BMI) of 36.0 to 36.9 in adult    • Headache   • Ureteral-ileal loop anastomotic stricture   • Chronic deep vein thrombosis (DVT) of femoral vein of right lower extremity (HCC)   • Bone infarction (HCC)   • Anti-cardiolipin antibody positive   • Encounter for attention to other artificial openings of urinary tract (HCC)   • Leg pain, anterior, right   • Nausea and vomiting   • Hypercalcemia   • Vertigo   • History of smoking   • Decreased appetite   • Postoperative state   • Bursitis of posterior heel, left   • Achilles tendinosis of left ankle   • Depression, recurrent (HCC)        Objective:     /88 (BP Location: Left arm, Patient Position: Sitting, Cuff Size: Large)   Pulse 68   Temp 98.9 °F (37.2 °C) (Tympanic)   Resp 16   Wt 99.8 kg (220 lb)   SpO2 98%   BMI 35.51 kg/m²     Physical Exam  General Appearance:    Alert, cooperative, no distress   Head:    Normocephalic, without obvious abnormality, atraumatic   Eyes:    PERRL, conjunctiva/corneas clear, EOM's intact, fundi     benign, both eyes        Neck:   Supple, no adenopathy, no JVD   Back:     Symmetric, no curvature, ROM normal, no CVA tenderness   Lungs:     Clear to auscultation bilaterally, no wheezing or rhonchi   Heart:    Regular rate and rhythm, S1 and S2 normal, no murmur, rub   or gallop   Abdomen:     Soft, non-tender, bowel sounds active    Extremities: Left foot in Cam boot   Psych:   Normal  Affect   Neurologic:   CNII-XII intact. Normal strength, sensation and reflexes       Throughout       Pertinent Laboratory/Diagnostic Studies:    Laboratory Results: I have personally reviewed the pertinent laboratory results/reports         Radiology/Other Diagnostic Testing Results: I have personally reviewed pertinent films in PACS    Augustine Lloyd MD  [facility] INTERNAL MEDICINE [place]

## 2024-01-26 ENCOUNTER — ANESTHESIA (OUTPATIENT)
Dept: PERIOP | Facility: HOSPITAL | Age: 69
End: 2024-01-26
Payer: COMMERCIAL

## 2024-01-26 DIAGNOSIS — K21.9 GASTROESOPHAGEAL REFLUX DISEASE WITHOUT ESOPHAGITIS: ICD-10-CM

## 2024-01-26 DIAGNOSIS — R76.0 ANTI-CARDIOLIPIN ANTIBODY POSITIVE: Primary | ICD-10-CM

## 2024-01-26 RX ORDER — PANTOPRAZOLE SODIUM 40 MG/1
40 TABLET, DELAYED RELEASE ORAL
Qty: 90 TABLET | Refills: 3 | Status: CANCELLED | OUTPATIENT
Start: 2024-01-26

## 2024-01-26 RX ORDER — ENOXAPARIN SODIUM 100 MG/ML
40 INJECTION SUBCUTANEOUS DAILY
Qty: 3 ML | Refills: 0 | Status: SHIPPED | OUTPATIENT
Start: 2024-01-26

## 2024-01-26 RX ORDER — PANTOPRAZOLE SODIUM 40 MG/1
40 TABLET, DELAYED RELEASE ORAL
Qty: 90 TABLET | Refills: 0 | Status: SHIPPED | OUTPATIENT
Start: 2024-01-26

## 2024-01-29 ENCOUNTER — HOSPITAL ENCOUNTER (OUTPATIENT)
Facility: HOSPITAL | Age: 69
Setting detail: OUTPATIENT SURGERY
Discharge: HOME | End: 2024-01-29
Attending: PODIATRIST | Admitting: PODIATRIST
Payer: COMMERCIAL

## 2024-01-29 VITALS
SYSTOLIC BLOOD PRESSURE: 141 MMHG | DIASTOLIC BLOOD PRESSURE: 74 MMHG | OXYGEN SATURATION: 98 % | RESPIRATION RATE: 16 BRPM | WEIGHT: 226 LBS | BODY MASS INDEX: 36.32 KG/M2 | HEART RATE: 63 BPM | TEMPERATURE: 97.8 F | HEIGHT: 66 IN

## 2024-01-29 LAB — GLUCOSE SERPL-MCNC: 105 MG/DL (ref 65–140)

## 2024-01-29 PROCEDURE — 15271 SKIN SUB GRAFT TRNK/ARM/LEG: CPT | Performed by: PODIATRIST

## 2024-01-29 PROCEDURE — 82948 REAGENT STRIP/BLOOD GLUCOSE: CPT

## 2024-01-29 PROCEDURE — A2015 (25 SQ CM)  PHOENIX WND MATRIX 5 X 5CM FEN: HCPCS | Performed by: PODIATRIST

## 2024-01-29 PROCEDURE — 15002 WOUND PREP TRK/ARM/LEG: CPT | Performed by: PODIATRIST

## 2024-01-29 DEVICE — IMPLANTABLE DEVICE
Type: IMPLANTABLE DEVICE | Site: FOOT | Status: FUNCTIONAL
Brand: PHOENIX WOUND MATRIX FENESTRATED

## 2024-01-29 RX ORDER — PROPOFOL 10 MG/ML
INJECTION, EMULSION INTRAVENOUS AS NEEDED
Status: DISCONTINUED | OUTPATIENT
Start: 2024-01-29 | End: 2024-01-29

## 2024-01-29 RX ORDER — ONDANSETRON 2 MG/ML
INJECTION INTRAMUSCULAR; INTRAVENOUS AS NEEDED
Status: DISCONTINUED | OUTPATIENT
Start: 2024-01-29 | End: 2024-01-29

## 2024-01-29 RX ORDER — MAGNESIUM HYDROXIDE 1200 MG/15ML
LIQUID ORAL AS NEEDED
Status: DISCONTINUED | OUTPATIENT
Start: 2024-01-29 | End: 2024-01-29 | Stop reason: HOSPADM

## 2024-01-29 RX ORDER — MIDAZOLAM HYDROCHLORIDE 2 MG/2ML
INJECTION, SOLUTION INTRAMUSCULAR; INTRAVENOUS AS NEEDED
Status: DISCONTINUED | OUTPATIENT
Start: 2024-01-29 | End: 2024-01-29

## 2024-01-29 RX ORDER — FENTANYL CITRATE/PF 50 MCG/ML
50 SYRINGE (ML) INJECTION
Status: DISCONTINUED | OUTPATIENT
Start: 2024-01-29 | End: 2024-01-29 | Stop reason: HOSPADM

## 2024-01-29 RX ORDER — SODIUM CHLORIDE, SODIUM LACTATE, POTASSIUM CHLORIDE, CALCIUM CHLORIDE 600; 310; 30; 20 MG/100ML; MG/100ML; MG/100ML; MG/100ML
INJECTION, SOLUTION INTRAVENOUS CONTINUOUS PRN
Status: DISCONTINUED | OUTPATIENT
Start: 2024-01-29 | End: 2024-01-29

## 2024-01-29 RX ORDER — VANCOMYCIN HYDROCHLORIDE 1 G/200ML
1000 INJECTION, SOLUTION INTRAVENOUS ONCE
Status: COMPLETED | OUTPATIENT
Start: 2024-01-29 | End: 2024-01-29

## 2024-01-29 RX ORDER — PROPOFOL 10 MG/ML
INJECTION, EMULSION INTRAVENOUS CONTINUOUS PRN
Status: DISCONTINUED | OUTPATIENT
Start: 2024-01-29 | End: 2024-01-29

## 2024-01-29 RX ORDER — ONDANSETRON 2 MG/ML
4 INJECTION INTRAMUSCULAR; INTRAVENOUS ONCE AS NEEDED
Status: DISCONTINUED | OUTPATIENT
Start: 2024-01-29 | End: 2024-01-29 | Stop reason: HOSPADM

## 2024-01-29 RX ORDER — ACETAMINOPHEN 325 MG/1
650 TABLET ORAL EVERY 4 HOURS PRN
Status: DISCONTINUED | OUTPATIENT
Start: 2024-01-29 | End: 2024-01-29 | Stop reason: HOSPADM

## 2024-01-29 RX ORDER — FENTANYL CITRATE 50 UG/ML
INJECTION, SOLUTION INTRAMUSCULAR; INTRAVENOUS AS NEEDED
Status: DISCONTINUED | OUTPATIENT
Start: 2024-01-29 | End: 2024-01-29

## 2024-01-29 RX ORDER — CHLORHEXIDINE GLUCONATE ORAL RINSE 1.2 MG/ML
15 SOLUTION DENTAL ONCE
Status: COMPLETED | OUTPATIENT
Start: 2024-01-29 | End: 2024-01-29

## 2024-01-29 RX ADMIN — SODIUM CHLORIDE, SODIUM LACTATE, POTASSIUM CHLORIDE, AND CALCIUM CHLORIDE: .6; .31; .03; .02 INJECTION, SOLUTION INTRAVENOUS at 07:00

## 2024-01-29 RX ADMIN — MIDAZOLAM 2 MG: 1 INJECTION INTRAMUSCULAR; INTRAVENOUS at 07:27

## 2024-01-29 RX ADMIN — PROPOFOL 25 MG: 10 INJECTION, EMULSION INTRAVENOUS at 07:36

## 2024-01-29 RX ADMIN — FENTANYL CITRATE 50 MCG: 50 INJECTION INTRAMUSCULAR; INTRAVENOUS at 08:03

## 2024-01-29 RX ADMIN — PROPOFOL 100 MCG/KG/MIN: 10 INJECTION, EMULSION INTRAVENOUS at 07:36

## 2024-01-29 RX ADMIN — FENTANYL CITRATE 50 MCG: 50 INJECTION INTRAMUSCULAR; INTRAVENOUS at 07:36

## 2024-01-29 RX ADMIN — PROPOFOL 25 MG: 10 INJECTION, EMULSION INTRAVENOUS at 07:37

## 2024-01-29 RX ADMIN — VANCOMYCIN HYDROCHLORIDE 1000 MG: 1 INJECTION, SOLUTION INTRAVENOUS at 07:32

## 2024-01-29 RX ADMIN — CHLORHEXIDINE GLUCONATE 0.12% ORAL RINSE 15 ML: 1.2 LIQUID ORAL at 07:05

## 2024-01-29 RX ADMIN — ONDANSETRON 4 MG: 2 INJECTION INTRAMUSCULAR; INTRAVENOUS at 08:05

## 2024-01-29 NOTE — DISCHARGE INSTR - AVS FIRST PAGE
Dr. Darron Frias, DPM  Post-op surgery Instructions    Pain / Swelling  There is expected to be some discomfort, swelling and bruising of the foot. You might see some blood on the bandage. This is not a cause for alarm. However, if there is active or persistent bleeding (blood running out of the bandage while at rest) - call the office at once (or) go to a [facility] ER and ask them to page the podiatry residents.  Apply an ice bag to the top of your ankle for 30 minutes for each waking hour, for the first 72 hours. This should be discontinued when sleeping. This will also work through your cast if you have one. Ice must not leak and wet the dressings. Also, using the ice inappropriately can cause permanent nerve damage.  Your foot should be elevated as much as possible for the first 72 hours. The foot should be above heart level. If your foot is below heart level, throbbing and pain will increase.  When sleeping, elevation can be accomplished by putting a small hard suitcase between the box spring and mattress at the foot of the bed.  Walking and standing will increase pain, throbbing and bleeding.  Persistent pain despite elevation and your pain meds can many times be relieved by removing the tight brown compression layer (called the ACE wrap) that is over the white gauze dressing. If you are elevating and taking your pain meds and pain is still severe, remove this brown stretchy layer but leave the gauze intact. Wait 30 minutes. If the pain subsides, reapply the ACE so it’s not so tight. If pain doesn’t get better, call your doctor.   Dressings / Casts  Do not remove your surgical dressings - they will be changed at your doctor appointment. Do not allow surgical dressings to get wet. Sponge baths should be used until the sutures are removed.  Do not try to keep the foot dry using a garbage bag and tape - this rarely works.  If you get your dressings or cast wet - call your doctor immediately.  If your cast  or dressings feel tight - elevate your foot for 30 minutes. If this doesn’t help and you feel tingling or see toe discoloration - call your doctor or go to a [facility] ER and ask them to page the podiatry residents.   Do not put things in your cast such as powder, coat hangers to scratch, etc.. This can cause skin damage and infections.   Infection  If you have a fever at or above 100 degrees, chills, sweats, or see red streaks rising above the dressing or smell odor / see pus (creamy white drainage), call your doctor immediately or go to a [facility] ER and ask them to page the podiatry residents.  Constipation  If you have severe constipation after surgery, this can be due to the pain medication. Notify your doctor and special medication will be prescribed to deal with this.   Blood Clots  If you had surgery and are in a cast, have an external fixation device, or are non-weightbearing using crutches, a knee scooter, a wheelchair, a walker, or an iWalk device - you need to be on a blood thinner. Your doctor will prescribe one of the regimens below. If you run out of the blood thinner checked off below before you are walking normally on your foot and out of your cast - notify your doctor immediately so you can get a refill. Not doing so can lead to blood clots and serious complications including death.    Aspirin 325mg daily    Numbness  It is normal for your foot to be numb until about dinner time. If you’ve had a popliteal block procedure, you might be numb until the following day. When you start to feel pins and needles in the foot - this means the block is wearing off. That is the appropriate time to take your pain medication.   Pain Medication  Do not supplement your pain medication with over the counter drugs, old leftover pain medications, or extra Tylenol. You must discuss any additional medications with your doctor prior to taking them for pain.   Driving  No driving is allowed without  discussion with the doctor  Ambulation  Weight bear as tolerated to surgical foot in CAM boot  If given a flat, stiff shoe / darco wedge shoe, Do not walk at all without it.  Use a device (cane, walker, crutches) to take some weight off of the foot when walking  If instructed not to put weight on the surgical foot, use the following:  CAM boot     Putting weight on the foot will lead to complications.

## 2024-01-29 NOTE — OP NOTE
Discharge Summary Outpatient Procedure Podiatry -   Jillian Arango 68 y.o. female MRN: [id number]  Unit/Bed#: OR POOL Encounter: [id number]    Admission Date: 1/29/2024     Admitting Diagnosis: Postoperative wound dehiscence, subsequent encounter [T81.31XD]    Discharge Diagnosis: same    Procedures Performed: DEBRIDEMENT LOWER EXTREMITY (WASH OUT) with PHOENIX GRAFT APPLICATION, foot wound: 01082 (CPT®)    Complications: none    Condition at Discharge: stable    Discharge instructions/Information to patient and family:   See after visit summary for information provided to patient and family.      Provisions for Follow-Up Care/Important appointments:  See after visit summary for information related to follow-up care and any pertinent home health orders.      Discharge Medications:  See after visit summary for reconciled discharge medications provided to patient and family.

## 2024-01-29 NOTE — H&P
Patient seen and examined. Vitals reviewed. No major changes in health history. OK to proceed to surgery.

## 2024-01-29 NOTE — OP NOTE
OPERATIVE REPORT - Podiatry  PATIENT NAME: Jillian Arango    :  1955  MRN: [id number]  Pt Location: [facility] OR ROOM 01    SURGERY DATE: 2024    Surgeons and Role:     * Darron Frias DPM - Primary     * Al Smith DPM - Assisting    Pre-op Diagnosis:  Postoperative wound dehiscence, subsequent encounter [T81.31XD]    Post-Op Diagnosis Codes:     * Postoperative wound dehiscence, subsequent encounter [T81.31XD]    Procedure(s) (LRB):  DEBRIDEMENT LOWER EXTREMITY (WASH OUT) with PHOENIX GRAFT APPLICATION, foot wound (Left)    Specimen(s):  * No specimens in log *    Estimated Blood Loss:   Minimal    Drains:  Nephrostomy Left 8 Fr. (Active)   Number of days: 1106       Urostomy Ureterostomy right RUQ (Active)   Number of days:        Percutaneous Nephroureteral Tube (PCNU) Left 1 8.5 Fr 22 Cm (Active)   Number of days: 1042       Anesthesia Type:   Choice with 10 ml of 1% Lidocaine and 0.5% Bupivacaine in a 1:1 mixture    Hemostasis:  Mechanical    Materials:  Implant Name Type Inv. Item Serial No.  Lot No. LRB No. Used Action   (25 SQ CM)  PHOENIX WND MATRIX 5 X 5CM FEN - [id number]  (25 SQ CM)  PHOENIX WND MATRIX 5 X 5CM FEN  [employer] [id number] Left 1 Implanted       Operative Findings:  Overlying layer eschar and fibrotic tissue with minimal adhesions  Left posterior ankle wound debrided until pinpoint bleeding and removal of fibrotic tissue and overlying eschar    Complications:   None    Procedure and Technique:     Under mild sedation, the patient was brought into the operating room and placed on the operating room table in the supine position. IV sedation was achieved by anesthesia team and a universal timeout was performed where all parties are in agreement of correct patient, correct procedure and correct site. A V block was performed consisting of 10 ml of 1% Lidocaine and 0.5% Bupivacaine in a 1:1 mixture. The foot was then prepped and draped in the usual aseptic manner.   "A pneumatic tourniquet was not applied to the left lower extremity for this procedure.    Wound Debridement    Attention was drawn to the wound located at left posterior ankle along the Achilles tendon.  The wound was excisionally debrided with 15 blade and dermal currette of all nonviable, devitalized, fibrotic tissue and eschar tissue w/ excision of fibrotic tissue and eschar to depth of subcutaneous tissue. Post debridement wound measurements 6.0 x 2.5 x 0.3 cm, with appearance of wound fresh bleeding tissue with areas of granular tissue with viable 100% vs nonviable 0%, <15 sq cm debrided.    Phoenix graft application    At this time, the left posterior ankle wound was noted to consist entirely of fresh bleeding viable soft tissue and granular tissue.  The wound was then flushed with copious amounts of sterile saline and a pulse lavage.  A 5 x 5 cm Phoenix graft was applied to according to 's instructions and secured with sterile Steri-Strips.  The left posterior ankle wound and graft were then dressed using a Adaptic and bolster dressing consisting of 4 x 4 gauze, Christa, Webril, and Ace wrap.    The patient tolerated the procedure and anesthesia well without immediate complications and transferred to PACU with vital signs stable.     As with many limb salvage procedures, we contemplate the possibility of performing further stages to this procedure. Procedures may include debridements, delayed closure, plastic surgery techniques, or more proximal amputations. This procedure may be considered part of a multi-staged limb salvage treatment plan.     Dr. Frias was present during the entire procedure and participated in all key aspects.    SIGNATURE: Al Smith DPM  DATE: January 29, 2024  TIME: 8:08 AM      Portions of the record may have been created with voice recognition software. Occasional wrong word or \"sound a like\" substitutions may have occurred due to the inherent limitations of voice " recognition software. Read the chart carefully and recognize, using context, where substitutions have occurred.

## 2024-01-29 NOTE — ANESTHESIA POSTPROCEDURE EVALUATION
Post-Op Assessment Note    CV Status:  Stable  Pain Score: 0    Pain management: adequate       Mental Status:  Alert and awake   Hydration Status:  Euvolemic   PONV Controlled:  Controlled   Airway Patency:  Patent     Post Op Vitals Reviewed: Yes    No anethesia notable event occurred.    Staff: CRNA               /58 (01/29/24 0812)    Temp 97.9 °F (36.6 °C) (01/29/24 0812)    Pulse 72 (01/29/24 0812)   Resp 16 (01/29/24 0812)    SpO2 98 % (01/29/24 0812)

## 2024-01-29 NOTE — ANESTHESIA PREPROCEDURE EVALUATION
Procedure:  DEBRIDEMENT LOWER EXTREMITY (WASH OUT) with PHOENIX GRAFT APPLICATION, foot wound (Left: Leg Lower)    Relevant Problems   CARDIO   (+) Chronic deep vein thrombosis (DVT) of femoral vein of right lower extremity (HCC)   (+) HTN (hypertension)   (+) Migraine      ENDO   (+) Hypothyroidism   (+) Type 2 diabetes mellitus (HCC)      GI/HEPATIC   (+) Small bowel obstruction (HCC)      /RENAL   (+) Hydroureteronephrosis      GYN   (+) Endometrial cancer (HCC)      HEMATOLOGY   (+) Anemia      NEURO/PSYCH   (+) Depression, recurrent (HCC)   (+) Headache   (+) Migraine        Physical Exam    Airway    Mallampati score: III  TM Distance: >3 FB  Neck ROM: full     Dental   No notable dental hx     Cardiovascular  Cardiovascular exam normal    Pulmonary  Pulmonary exam normal     Other Findings  post-pubertal.    Last ozempic Monday  Last lovenox last night  Anesthesia Plan  ASA Score- 2     Anesthesia Type- IV sedation with anesthesia with ASA Monitors.         Additional Monitors:     Airway Plan:            Plan Factors-Exercise tolerance (METS): <4 METS.    Chart reviewed. EKG reviewed.  Existing labs reviewed. Patient summary reviewed.    Patient is not a current smoker.              Induction- intravenous.    Postoperative Plan- Plan for postoperative opioid use.     Informed Consent- Anesthetic plan and risks discussed with patient.  I personally reviewed this patient with the CRNA. Discussed and agreed on the Anesthesia Plan with the CRNA..

## 2024-02-02 ENCOUNTER — OFFICE VISIT (OUTPATIENT)
Dept: PODIATRY | Facility: CLINIC | Age: 69
End: 2024-02-02

## 2024-02-02 VITALS
HEART RATE: 73 BPM | DIASTOLIC BLOOD PRESSURE: 99 MMHG | BODY MASS INDEX: 36.48 KG/M2 | OXYGEN SATURATION: 99 % | HEIGHT: 66 IN | SYSTOLIC BLOOD PRESSURE: 141 MMHG

## 2024-02-02 DIAGNOSIS — T81.31XD POSTOPERATIVE WOUND DEHISCENCE, SUBSEQUENT ENCOUNTER: ICD-10-CM

## 2024-02-02 DIAGNOSIS — Z98.890 POSTOPERATIVE STATE: Primary | ICD-10-CM

## 2024-02-02 PROCEDURE — 99024 POSTOP FOLLOW-UP VISIT: CPT | Performed by: PODIATRIST

## 2024-02-02 NOTE — PROGRESS NOTES
Assessment/Plan:     The patient's clinical examination today is relatively benign.  The Phoenix graft is still present in the postsurgical wound and was left in place.  There are no signs of infection noted to the left lower extremity.  There are no pretrophic changes to the lower leg or heel.  Periwound margins were cleansed with an antiseptic dermal cleanser and the wound was redressed with Adaptic and a dry sterile dressing.      She will keep the dressing clean dry and intact until follow-up in 1 week.  Continue guarded weightbearing in a cam boot as tolerated.     Diagnoses and all orders for this visit:    Postoperative state    Postoperative wound dehiscence, subsequent encounter          Subjective:     Patient ID: Jillian Arango is a 68 y.o. female.    The patient presents today for follow-up status post debridement of a left foot wound with application of the Phoenix graft.  The patient has been doing fairly well.  She does note some mild tenderness along the lateral aspect of the left heel.        Review of Systems   Constitutional: Negative.    HENT: Negative.     Eyes: Negative.    Respiratory: Negative.     Cardiovascular: Negative.    Endocrine: Negative.    Musculoskeletal: Negative.    Neurological: Negative.    Hematological: Negative.    Psychiatric/Behavioral: Negative.           Objective:     Physical Exam  Constitutional:       Appearance: Normal appearance.   HENT:      Head: Normocephalic and atraumatic.      Nose: Nose normal.   Cardiovascular:      Pulses:           Dorsalis pedis pulses are 2+ on the left side.        Posterior tibial pulses are 1+ on the left side.   Pulmonary:      Effort: Pulmonary effort is normal.   Feet:      Comments: The patient's clinical examination today is relatively benign.  The Phoenix graft is still present in the postsurgical wound and was left in place.  There are no signs of infection noted to the left lower extremity.  There are no pretrophic changes  to the lower leg or heel.  Periwound margins were cleansed with an antiseptic dermal cleanser and the wound was redressed with Adaptic and a dry sterile dressing.    Skin:     General: Skin is warm.      Capillary Refill: Capillary refill takes less than 2 seconds.   Neurological:      General: No focal deficit present.      Mental Status: She is alert and oriented to person, place, and time.   Psychiatric:         Mood and Affect: Mood normal.         Behavior: Behavior normal.         Thought Content: Thought content normal.

## 2024-02-08 ENCOUNTER — OFFICE VISIT (OUTPATIENT)
Dept: PODIATRY | Facility: CLINIC | Age: 69
End: 2024-02-08

## 2024-02-08 VITALS
SYSTOLIC BLOOD PRESSURE: 148 MMHG | HEIGHT: 66 IN | BODY MASS INDEX: 36.48 KG/M2 | DIASTOLIC BLOOD PRESSURE: 99 MMHG | OXYGEN SATURATION: 98 % | HEART RATE: 82 BPM

## 2024-02-08 DIAGNOSIS — M76.62 ACHILLES TENDINITIS OF LEFT LOWER EXTREMITY: ICD-10-CM

## 2024-02-08 DIAGNOSIS — Z98.890 POSTOPERATIVE STATE: Primary | ICD-10-CM

## 2024-02-08 DIAGNOSIS — G89.18 ACUTE POST-OPERATIVE PAIN: ICD-10-CM

## 2024-02-08 DIAGNOSIS — T81.31XD POSTOPERATIVE WOUND DEHISCENCE, SUBSEQUENT ENCOUNTER: ICD-10-CM

## 2024-02-08 PROCEDURE — 99024 POSTOP FOLLOW-UP VISIT: CPT | Performed by: PODIATRIST

## 2024-02-08 NOTE — PROGRESS NOTES
Assessment/Plan:     The patient's clinical examination today is relatively benign.  The Phoenix graft is still present proximally, the rest of the graft seems to have absorbed into the wound as anticipated.  here are no signs of infection noted to the left lower extremity.  There are no pretrophic changes to the lower leg or heel.  Periwound margins were cleansed with an antiseptic dermal cleanser and the wound was redressed with Adaptic and a dry sterile dressing.       She can resume dressing changes every other day with Telfa pads and a dry sterile dressing.  Continue guarded weightbearing in a cam boot as tolerated.  She was fitted for a low tide Cam boot today as the high tide boot was causing irritation.     Diagnoses and all orders for this visit:    Postoperative state  -     Cam Boot    Acute post-operative pain  -     Cam Boot    Postoperative wound dehiscence, subsequent encounter  -     Cam Boot    Achilles tendinitis of left lower extremity  -     Cam Boot          Subjective:     Patient ID: Jillian Arango is a 68 y.o. female.    The patient presents today for follow-up status post debridement of a left foot wound with application of the Phoenix graft.  The patient has been doing fairly well and presents with a dressing clean dry and intact since her last visit.        Review of Systems   Constitutional: Negative.    HENT: Negative.     Eyes: Negative.    Respiratory: Negative.     Cardiovascular: Negative.    Endocrine: Negative.    Musculoskeletal: Negative.    Neurological: Negative.    Hematological: Negative.    Psychiatric/Behavioral: Negative.           Objective:     Physical Exam  Constitutional:       Appearance: Normal appearance.   HENT:      Head: Normocephalic and atraumatic.      Nose: Nose normal.   Cardiovascular:      Pulses:           Dorsalis pedis pulses are 2+ on the left side.        Posterior tibial pulses are 1+ on the left side.   Pulmonary:      Effort: Pulmonary effort is  normal.   Feet:      Left foot:      Skin integrity: Skin breakdown present. No erythema or warmth.      Comments: The patient's clinical examination today is relatively benign.  The Phoenix graft is still present proximally, the rest of the graft seems to have absorbed into the wound as anticipated.  here are no signs of infection noted to the left lower extremity.  There are no pretrophic changes to the lower leg or heel.  Periwound margins were cleansed with an antiseptic dermal cleanser and the wound was redressed with Adaptic and a dry sterile dressing.    Skin:     General: Skin is warm.      Capillary Refill: Capillary refill takes less than 2 seconds.   Neurological:      General: No focal deficit present.      Mental Status: She is alert and oriented to person, place, and time.   Psychiatric:         Mood and Affect: Mood normal.         Behavior: Behavior normal.         Thought Content: Thought content normal.

## 2024-02-15 ENCOUNTER — OFFICE VISIT (OUTPATIENT)
Dept: PODIATRY | Facility: CLINIC | Age: 69
End: 2024-02-15

## 2024-02-15 VITALS
HEIGHT: 66 IN | OXYGEN SATURATION: 99 % | DIASTOLIC BLOOD PRESSURE: 92 MMHG | SYSTOLIC BLOOD PRESSURE: 142 MMHG | HEART RATE: 92 BPM | BODY MASS INDEX: 36.48 KG/M2

## 2024-02-15 DIAGNOSIS — T81.31XD POSTOPERATIVE WOUND DEHISCENCE, SUBSEQUENT ENCOUNTER: ICD-10-CM

## 2024-02-15 DIAGNOSIS — G89.18 ACUTE POST-OPERATIVE PAIN: Primary | ICD-10-CM

## 2024-02-15 PROCEDURE — 99024 POSTOP FOLLOW-UP VISIT: CPT | Performed by: PODIATRIST

## 2024-02-15 RX ORDER — TRAMADOL HYDROCHLORIDE 50 MG/1
50 TABLET ORAL EVERY 8 HOURS PRN
Qty: 9 TABLET | Refills: 0 | Status: SHIPPED | OUTPATIENT
Start: 2024-02-15

## 2024-02-15 NOTE — PROGRESS NOTES
Assessment/Plan:     The patient's clinical examination today is relatively benign.  The postsurgical wound dehiscence site remains stable there is increased granulation around the periphery of the wound.  The centrally there is still some yellow fibrotic tissue.  There are no signs of infection noted.  There is persistent burning and tenderness associated with the wound.    The wound was redressed with a dry sterile dressing and Xeroform as a contact layer.  Due to the patient's persistent and increasing pain, I did prescribe a short course of tramadol 50 mg, 90 tablets, 1 tablet by mouth every 8 hours as needed for severe pain only.  The [place] PDMP was checked and was consistent.      Recommend dressing changes at home to the left foot wound with Xeroform every other day.  Continue guarded weightbearing in a low tide Cam boot.    Follow-up in 1 week.     Diagnoses and all orders for this visit:    Acute post-operative pain  -     traMADol (Ultram) 50 mg tablet; Take 1 tablet (50 mg total) by mouth every 8 (eight) hours as needed for severe pain for up to 9 doses    Postoperative wound dehiscence, subsequent encounter          Subjective:     Patient ID: Jillian Arango is a 68 y.o. female.    The patient presents today for follow-up of a posterior left foot wound status post Ervin's resection.  The patient still notes persistent burning sensations to the posterior left heel that seems to be worse at nighttime.  She states that she does have a few Percocet tablets at home however she cannot tolerate them as they make her very dizzy.  She would like a milder pain medication if possible.  She is currently ambulating in a cam boot.        Review of Systems   Constitutional: Negative.    HENT: Negative.     Eyes: Negative.    Respiratory: Negative.     Cardiovascular: Negative.    Endocrine: Negative.    Musculoskeletal: Negative.    Neurological: Negative.    Hematological: Negative.     Psychiatric/Behavioral: Negative.           Objective:     Physical Exam  Constitutional:       Appearance: Normal appearance.   HENT:      Head: Normocephalic and atraumatic.      Nose: Nose normal.   Pulmonary:      Effort: Pulmonary effort is normal.   Feet:      Left foot:      Skin integrity: Ulcer and skin breakdown present. No erythema or warmth.      Comments: The patient's clinical examination today is relatively benign.  The postsurgical wound dehiscence site remains stable there is increased granulation around the periphery of the wound.  The centrally there is still some yellow fibrotic tissue.  There are no signs of infection noted.  There is persistent burning and tenderness associated with the wound.    Skin:     General: Skin is warm.      Capillary Refill: Capillary refill takes less than 2 seconds.   Neurological:      General: No focal deficit present.      Mental Status: She is alert and oriented to person, place, and time.   Psychiatric:         Mood and Affect: Mood normal.         Behavior: Behavior normal.         Thought Content: Thought content normal.

## 2024-02-21 ENCOUNTER — OFFICE VISIT (OUTPATIENT)
Dept: PODIATRY | Facility: CLINIC | Age: 69
End: 2024-02-21

## 2024-02-21 VITALS
BODY MASS INDEX: 36.48 KG/M2 | DIASTOLIC BLOOD PRESSURE: 99 MMHG | HEART RATE: 70 BPM | OXYGEN SATURATION: 99 % | SYSTOLIC BLOOD PRESSURE: 147 MMHG | HEIGHT: 66 IN

## 2024-02-21 DIAGNOSIS — M76.62 ACHILLES TENDINITIS OF LEFT LOWER EXTREMITY: ICD-10-CM

## 2024-02-21 DIAGNOSIS — F41.9 ANXIETY: ICD-10-CM

## 2024-02-21 DIAGNOSIS — G89.18 ACUTE POST-OPERATIVE PAIN: Primary | ICD-10-CM

## 2024-02-21 PROCEDURE — 99024 POSTOP FOLLOW-UP VISIT: CPT | Performed by: PODIATRIST

## 2024-02-21 RX ORDER — ALPRAZOLAM 0.5 MG/1
0.25 TABLET ORAL
Qty: 30 TABLET | Refills: 0 | Status: SHIPPED | OUTPATIENT
Start: 2024-02-21

## 2024-02-21 NOTE — PROGRESS NOTES
Assessment/Plan:     The patient's clinical examination today is relatively benign.  The postsurgical wound dehiscence site remains stable there is continued increased granulation around the periphery of the wound. Centrally there is still some yellow fibrotic tissue.  There are no signs of infection noted.  There is persistent burning and tenderness associated with the wound, though decreased compared to last week.    The wound was cleansed with an antiseptic dermal cleanser.  It was redressed with a dry sterile dressing utilizing Xeroform as a contact layer.  There is no clinical need for antibiosis.  Continue dressing changes at home with Xeroform as previously recommended.  She notes continued irritation from the cam boot despite switching to a low tide.  Will attempt to transition her to a lace up ankle brace that she can wear with a accommodative shoe or sneaker.    Follow-up in 1 week.        Diagnoses and all orders for this visit:    Acute post-operative pain  -     Ankle Cude ankle/Ankle Brace    Achilles tendinitis of left lower extremity  -     Ankle Cude ankle/Ankle Brace          Subjective:     Patient ID: Jillian Arango is a 68 y.o. female.    The patient presents today for follow-up of a postoperative wound dehiscence to the posterior aspect of the left heel.  She does note some improvement in the wound with slight decrease in pain as well.  She denies any drainage emanating from the wound.  She does note persistent stiffness in her left lower extremity due to being in a cam boot on these weeks.        Review of Systems   Constitutional: Negative.    HENT: Negative.     Eyes: Negative.    Respiratory: Negative.     Cardiovascular: Negative.    Endocrine: Negative.    Musculoskeletal: Negative.    Neurological: Negative.    Hematological: Negative.    Psychiatric/Behavioral: Negative.           Objective:     Physical Exam  Constitutional:       Appearance: Normal appearance.   HENT:      Head:  Normocephalic and atraumatic.      Nose: Nose normal.   Cardiovascular:      Pulses:           Dorsalis pedis pulses are 2+ on the left side.        Posterior tibial pulses are 1+ on the left side.   Pulmonary:      Effort: Pulmonary effort is normal.   Feet:      Comments:   The patient's clinical examination today is relatively benign.  The postsurgical wound dehiscence site remains stable there is continued increased granulation around the periphery of the wound. Centrally there is still some yellow fibrotic tissue.  There are no signs of infection noted.  There is persistent burning and tenderness associated with the wound, though decreased compared to last week.    Skin:     General: Skin is warm.      Capillary Refill: Capillary refill takes less than 2 seconds.   Neurological:      General: No focal deficit present.      Mental Status: She is alert and oriented to person, place, and time.   Psychiatric:         Mood and Affect: Mood normal.         Behavior: Behavior normal.         Thought Content: Thought content normal.

## 2024-02-24 DIAGNOSIS — E11.69 TYPE 2 DIABETES MELLITUS WITH OTHER SPECIFIED COMPLICATION, WITH LONG-TERM CURRENT USE OF INSULIN (HCC): Primary | ICD-10-CM

## 2024-02-24 DIAGNOSIS — Z79.4 TYPE 2 DIABETES MELLITUS WITH OTHER SPECIFIED COMPLICATION, WITH LONG-TERM CURRENT USE OF INSULIN (HCC): Primary | ICD-10-CM

## 2024-02-26 DIAGNOSIS — K21.9 GASTROESOPHAGEAL REFLUX DISEASE WITHOUT ESOPHAGITIS: ICD-10-CM

## 2024-02-26 RX ORDER — FAMOTIDINE 20 MG/1
20 TABLET, FILM COATED ORAL 2 TIMES DAILY
Qty: 60 TABLET | Refills: 1 | Status: SHIPPED | OUTPATIENT
Start: 2024-02-26 | End: 2024-04-26

## 2024-02-27 DIAGNOSIS — I82.511 CHRONIC DEEP VEIN THROMBOSIS (DVT) OF FEMORAL VEIN OF RIGHT LOWER EXTREMITY (HCC): Primary | ICD-10-CM

## 2024-02-27 NOTE — TELEPHONE ENCOUNTER
Reached out to podiatry to see if she has any further interventions planned, if not we can resend pradaxa

## 2024-02-28 ENCOUNTER — OFFICE VISIT (OUTPATIENT)
Dept: PODIATRY | Facility: CLINIC | Age: 69
End: 2024-02-28
Payer: COMMERCIAL

## 2024-02-28 VITALS
HEIGHT: 66 IN | HEART RATE: 88 BPM | DIASTOLIC BLOOD PRESSURE: 99 MMHG | BODY MASS INDEX: 36.48 KG/M2 | SYSTOLIC BLOOD PRESSURE: 148 MMHG

## 2024-02-28 DIAGNOSIS — Z98.890 POSTOPERATIVE STATE: Primary | ICD-10-CM

## 2024-02-28 DIAGNOSIS — T81.31XD POSTOPERATIVE WOUND DEHISCENCE, SUBSEQUENT ENCOUNTER: ICD-10-CM

## 2024-02-28 PROCEDURE — 99213 OFFICE O/P EST LOW 20 MIN: CPT | Performed by: PODIATRIST

## 2024-02-28 NOTE — PROGRESS NOTES
Assessment/Plan:     The patient's clinical examination today is relatively benign.  The postsurgical wound dehiscence site remains stable there is continued increased granulation around the periphery of the wound.  The proximal margins of the wound are healing nicely.  Centrally there is still some yellow fibrotic tissue.  There are no signs of infection noted.  There is persistent burning and tenderness associated with the wound, though decreased compared to last week.     The wound was cleansed with an antiseptic dermal cleanser.  It was redressed with a dry sterile dressing utilizing silver alginate as a contact layer.  There is no clinical need for antibiosis.  Continue dressing changes at home with silver alginate.  The patient was retrained on the proper use of a lace up ankle brace and attempt to transition her out of the cam boot.       Follow-up in 2 weeks, as she is leaving on a cruise.     Diagnoses and all orders for this visit:    Postoperative state    Postoperative wound dehiscence, subsequent encounter          Subjective:     Patient ID: Jillian Arango is a 68 y.o. female.    The patient presents today for follow-up of a postoperative wound dehiscence to the posterior aspect of the left heel.  She notes difficulty putting on the ankle brace at home.         Review of Systems   Constitutional: Negative.    HENT: Negative.     Eyes: Negative.    Respiratory: Negative.     Cardiovascular: Negative.    Endocrine: Negative.    Musculoskeletal: Negative.    Neurological: Negative.    Hematological: Negative.    Psychiatric/Behavioral: Negative.           Objective:     Physical Exam  Constitutional:       Appearance: Normal appearance.   HENT:      Head: Normocephalic and atraumatic.      Nose: Nose normal.   Cardiovascular:      Pulses:           Dorsalis pedis pulses are 2+ on the right side.        Posterior tibial pulses are 1+ on the right side.   Pulmonary:      Effort: Pulmonary effort is  normal.   Feet:      Right foot:      Skin integrity: Ulcer and skin breakdown present. No erythema or warmth.      Comments: The patient's clinical examination today is relatively benign.  The postsurgical wound dehiscence site remains stable there is continued increased granulation around the periphery of the wound.  The proximal margins of the wound are healing nicely.  Centrally there is still some yellow fibrotic tissue.  There are no signs of infection noted.  There is persistent burning and tenderness associated with the wound, though decreased compared to last week.  Skin:     General: Skin is warm.      Capillary Refill: Capillary refill takes less than 2 seconds.   Neurological:      General: No focal deficit present.      Mental Status: She is alert and oriented to person, place, and time.   Psychiatric:         Mood and Affect: Mood normal.         Behavior: Behavior normal.         Thought Content: Thought content normal.

## 2024-02-29 RX ORDER — DABIGATRAN ETEXILATE 150 MG/1
150 CAPSULE ORAL 2 TIMES DAILY
Qty: 180 CAPSULE | Refills: 0 | Status: SHIPPED | OUTPATIENT
Start: 2024-02-29 | End: 2024-05-29

## 2024-03-15 ENCOUNTER — OFFICE VISIT (OUTPATIENT)
Dept: PODIATRY | Facility: CLINIC | Age: 69
End: 2024-03-15
Payer: COMMERCIAL

## 2024-03-15 VITALS
BODY MASS INDEX: 36.48 KG/M2 | HEART RATE: 89 BPM | DIASTOLIC BLOOD PRESSURE: 98 MMHG | HEIGHT: 66 IN | OXYGEN SATURATION: 99 % | SYSTOLIC BLOOD PRESSURE: 142 MMHG

## 2024-03-15 DIAGNOSIS — T81.31XD POSTOPERATIVE WOUND DEHISCENCE, SUBSEQUENT ENCOUNTER: Primary | ICD-10-CM

## 2024-03-15 PROCEDURE — 99213 OFFICE O/P EST LOW 20 MIN: CPT | Performed by: PODIATRIST

## 2024-03-15 NOTE — PROGRESS NOTES
Assessment/Plan:        The patient's clinical examination today is relatively benign.  The postsurgical wound dehiscence site remains stable there is continued increased granulation within the wound.  The proximal margins of the wound continues to heal nicely.  There are no signs of infection noted.  There is no active drainage nor purulence.    The wound was cleansed with an antiseptic dermal cleanser.  It was redressed with a dry sterile dressing utilizing silver alginate as a contact layer.  There is no clinical need for antibiosis.      Continue dressing changes at home with silver alginate with a dry sterile dressing at least every other day.  Some silver alginate was provided to the patient.     Follow-up in 2 weeks, as she is leaving on a cruise.        Diagnoses and all orders for this visit:    Postoperative wound dehiscence, subsequent encounter          Subjective:     Patient ID: Jillian Arango is a 68 y.o. female.    The patient presents today for follow-up of a postoperative wound dehiscence to the posterior aspect of the left heel.  She has been dressing the wound as directed.  She does note decreased pain to the lesion.  She just returned from a cruise and noted no complications from the wound.        PAST MEDICAL HISTORY:  Past Medical History:   Diagnosis Date    Anemia     Back pain     Bowel obstruction (HCC) 2017,2018    Cancer (HCC)     uterine CA radiation destoried urethra.   Patient has urostomy    Cholelithiasis     Colon polyp     Deep vein thrombosis of left lower extremity (HCC) 01/11/2004    on blood thinner for 3 years.    Diabetes mellitus (HCC)     type 2 BS 6/6/22 @ 0600 was 145    Disease of thyroid gland     Elevated lactic acid level 01/16/2021    Endometrial cancer (HCC) 1999    GERD (gastroesophageal reflux disease)     Gross hematuria     Hiatal hernia     History of transfusion 1999    Post Hysterectomy    History of urostomy 01/11/2004    uretheral stricture from  radiation for endometrial cancer.    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    IR NEPHROSTOMY TUBE PLACEMENT  01/18/2021    LAPAROSCOPIC GASTRIC BANDING N/A 2006    [place], [place]    OTHER SURGICAL HISTORY  2004    Urine shunt to intestine, transverse colon    PCNL Left 02/16/2021    Procedure: NEPHROLITHOTOMY  PERCUTANEOUS (PCNL);  Surgeon: Ziggy Roldan MD;  [facility];  Service: Urology    CA CYSTO/URETERO W/LITHOTRIPSY &INDWELL STENT INSRT Left 02/16/2021    Procedure: anterograde  URETEROSCOPY with dilation of ureteral stricture, INSERTION STENT URETERAL, exchange  of nephrostomy tube;  Surgeon: Ziggy Roldan MD;  [facility];  Service: Urology    CA DEBRIDEMENT SUBCUTANEOUS TISSUE 1ST 20 SQ CM/< Left 1/29/2024    Procedure: DEBRIDEMENT LOWER EXTREMITY (WASH OUT) with PHOENIX GRAFT APPLICATION, foot wound;  Surgeon: Darron Frias DPM;  [facility];  Service: Podiatry    CA LAPAROSCOPY ENTEROLYSIS SEPARATE PROCEDURE N/A 01/17/2019    Procedure: LAPAROSCOPIC LYSIS OF ADHESIONS;  Surgeon: Omar Jack MD;  Location:  MAIN OR;  Service: General    CA REPAIR SECONDARY ACHILLES TENDON W/WO GRAFT Left 11/24/2023    Procedure: REMOVAL OF POSTERIOR HEEL SPUR WITH REPAIR TENDON ACHILLES;  Surgeon: Darron Frias DPM;  [facility];  Service: Podiatry    [facility]    SLEEVE GA[facility]OPLASTY N/A 09/2015      [facility]    URETER REVISION  2010    WEILBY THUMB ARTHROPLASTY      WEILBY THUMB ARTHROPLASTY          ALLERGIES:  Amoxicillin, Penicillins, and Adhesive [medical tape]    MEDICATIONS:  Current Outpatient Medications   Medication Sig Dispense Refill    ALPRAZolam (XANAX) 0.5 mg tablet Take 0.5 tablets (0.25 mg total) by mouth daily at bedtime as needed for anxiety Takes 1/2 0.5mg = 0.25 mg @ bedtime prn 30 tablet 0    Ascorbic Acid (VITAMIN C PO) Take 1 capsule by mouth daily      BIOTIN PO Take 1 capsule by mouth daily      dabigatran etexilate (PRADAXA) 150 mg capsu Take 1 capsule (150 mg total) by mouth 2 (two) times a day 180 capsule 0    famotidine (PEPCID) 20 mg tablet Take 1 tablet (20 mg total) by mouth 2 (two) times a day 60 tablet 1    FREESTYLE LITE test strip       metFORMIN (GLUCOPHAGE) 500 mg tablet Take 1 tablet (500 mg total) by mouth 2 (two) times a day with meals 180 tablet 0    mupirocin (BACTROBAN) 2 % ointment Apply topically 3 (three) times a day (Patient taking differently: Apply 1 Application topically 3 (three) times a day) 22 g 6    pantoprazole (PROTONIX) 40 mg tablet Take 1 tablet (40 mg total) by mouth daily in the early morning 90 tablet 0    pramipexole (MIRAPEX) 0.5 mg tablet Take 1 mg by mouth daily at bedtime      Probiotic Product (PROBIOTIC DAILY PO) Take 1 capsule by mouth in the morning      Synthroid 75 MCG tablet Take 1 tablet (75 mcg total) by mouth daily in the early morning 90 tablet 2    traMADol (Ultram) 50 mg tablet Take 1 tablet (50 mg total) by mouth every 8 (eight) hours as needed for severe pain for up to 9 doses 9 tablet 0    Trulicity 4.5 MG/0.5ML injection Inject 0.5 mL (4.5 mg total) under the skin every 7 days (Patient taking differently: Inject 4.5 mg under the skin every 7 days Takes on Monday) 12 mL 0    enoxaparin (LOVENOX) 40 mg/0.4 mL Inject 0.4 mL (40 mg total) under the skin in the morning 3 mL 0    meclizine (ANTIVERT) 12.5 MG tablet Take 1 tablet  (12.5 mg total) by mouth every 8 (eight) hours as needed for nausea or dizziness for up to 14 days 40 tablet 0     No current facility-administered medications for this visit.       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[employer]    Tobacco Use    Smoking status: Former     Current packs/day: 0.00     Average packs/day: 1.5 packs/day for 12.0 years (18.0 ttl pk-yrs)     Types: Cigarettes     Start date: 1974     Quit date: 1986     Years since quittin.2     Passive exposure: Never    Smokeless tobacco: Never   Vaping Use    Vaping status: Never Used   Substance and Sexual Activity    Alcohol use: Not Currently    Drug use: Never    Sexual activity: Never     Comment: s/p hysterectomy   Other Topics Concern    None   Social History Narrative    None     Social Determinants of Health     Financial Resource Strain: Not on file   Food Insecurity: Not on file   Transportation Needs: No Transportation Needs (2023)    PRAPARE - Transportation     Lack of Transportation (Medical): No     Lack of Transportation (Non-Medical): No   Physical Activity: Not on file   Stress: Not on file   Social Connections: Not on file   Intimate Partner Violence: Not on file   Housing Stability: Unknown (2023)    Housing Stability Vital Sign     Unable to Pay for Housing in the Last Year: No     Number of Places Lived in the Last Year: Not on file     Unstable Housing in the Last Year: No        Review of Systems   Constitutional: Negative.    HENT: Negative.     Eyes: Negative.    Respiratory: Negative.     Cardiovascular: Negative.    Endocrine: Negative.    Musculoskeletal: Negative.    Neurological: Negative.    Hematological: Negative.    Psychiatric/Behavioral: Negative.           Objective:     Physical  Exam  Constitutional:       Appearance: Normal appearance.   HENT:      Head: Normocephalic and atraumatic.      Nose: Nose normal.   Pulmonary:      Effort: Pulmonary effort is normal.   Skin:     General: Skin is warm.      Capillary Refill: Capillary refill takes less than 2 seconds.   Neurological:      General: No focal deficit present.      Mental Status: She is alert and oriented to person, place, and time.   Psychiatric:         Mood and Affect: Mood normal.         Behavior: Behavior normal.         Thought Content: Thought content normal.

## 2024-03-26 DIAGNOSIS — F41.9 ANXIETY: ICD-10-CM

## 2024-03-26 DIAGNOSIS — K21.9 GASTROESOPHAGEAL REFLUX DISEASE WITHOUT ESOPHAGITIS: ICD-10-CM

## 2024-03-26 DIAGNOSIS — I82.511 CHRONIC DEEP VEIN THROMBOSIS (DVT) OF FEMORAL VEIN OF RIGHT LOWER EXTREMITY (HCC): ICD-10-CM

## 2024-03-26 RX ORDER — DABIGATRAN ETEXILATE 150 MG/1
150 CAPSULE ORAL 2 TIMES DAILY
Qty: 180 CAPSULE | Refills: 0 | Status: SHIPPED | OUTPATIENT
Start: 2024-03-26 | End: 2024-06-24

## 2024-03-26 RX ORDER — ALPRAZOLAM 0.5 MG/1
0.25 TABLET ORAL
Qty: 30 TABLET | Refills: 0 | Status: SHIPPED | OUTPATIENT
Start: 2024-03-26

## 2024-03-26 RX ORDER — PANTOPRAZOLE SODIUM 40 MG/1
40 TABLET, DELAYED RELEASE ORAL
Qty: 90 TABLET | Refills: 0 | Status: SHIPPED | OUTPATIENT
Start: 2024-03-26

## 2024-03-26 NOTE — TELEPHONE ENCOUNTER
Rae, my name is Laura  Birth date is [birth year]. I need my Protonix and the dabigatran and the Xanax. I lost the pill bottles. I don't have the numbers because I thought I already called them in, but so if you can get them refilled for me. So that's the protonix, the dabigatran. That number I have is [phone] and there's Xanax. I go to [facility] [employer] and the phone number there, Boy, I don't have the phone number on this bottle, so if you can refill them for me, Thank you. Goodbye.

## 2024-03-28 ENCOUNTER — TELEPHONE (OUTPATIENT)
Age: 69
End: 2024-03-28

## 2024-03-28 ENCOUNTER — OFFICE VISIT (OUTPATIENT)
Dept: PODIATRY | Facility: CLINIC | Age: 69
End: 2024-03-28
Payer: COMMERCIAL

## 2024-03-28 VITALS
HEIGHT: 66 IN | HEART RATE: 65 BPM | DIASTOLIC BLOOD PRESSURE: 99 MMHG | WEIGHT: 220 LBS | SYSTOLIC BLOOD PRESSURE: 142 MMHG | OXYGEN SATURATION: 99 % | BODY MASS INDEX: 35.36 KG/M2

## 2024-03-28 DIAGNOSIS — T81.31XD POSTOPERATIVE WOUND DEHISCENCE, SUBSEQUENT ENCOUNTER: ICD-10-CM

## 2024-03-28 DIAGNOSIS — L03.116 CELLULITIS OF LEFT LOWER EXTREMITY: Primary | ICD-10-CM

## 2024-03-28 PROCEDURE — 99213 OFFICE O/P EST LOW 20 MIN: CPT | Performed by: PODIATRIST

## 2024-03-28 RX ORDER — FLUCONAZOLE 150 MG/1
150 TABLET ORAL ONCE
Qty: 1 TABLET | Refills: 0 | Status: SHIPPED | OUTPATIENT
Start: 2024-03-28 | End: 2024-03-28

## 2024-03-28 RX ORDER — SULFAMETHOXAZOLE AND TRIMETHOPRIM 800; 160 MG/1; MG/1
1 TABLET ORAL EVERY 12 HOURS SCHEDULED
Qty: 20 TABLET | Refills: 0 | Status: SHIPPED | OUTPATIENT
Start: 2024-03-28 | End: 2024-04-03 | Stop reason: SDUPTHER

## 2024-03-28 RX ORDER — CIPROFLOXACIN 500 MG/1
500 TABLET, FILM COATED ORAL EVERY 12 HOURS SCHEDULED
Qty: 20 TABLET | Refills: 0 | Status: SHIPPED | OUTPATIENT
Start: 2024-03-28 | End: 2024-03-28 | Stop reason: ALTCHOICE

## 2024-03-28 NOTE — TELEPHONE ENCOUNTER
Caller: Jillian Arango    Doctor: Rodriguez    Reason for call: Incision is not looking good.  It's red, painful, discolored.  Jillian says it is completely opened.  Also there is a discharge.  Can someone please reach out to her ASAP.  Thank you.     Call back#: [phone]

## 2024-03-28 NOTE — PROGRESS NOTES
Assessment/Plan:     The patient's clinical examination today is significant for a postsurgical wound dehiscence site.  The most distal aspect of the wound does appear to be generally improved with some increased granulation and decreased depth.  However the proximal margins which were previously completely close have now appeared to develop a superficial stasis type ulceration.  There is no active drainage no purulence noted.  But there is some very mild localized erythema.       The wound was cleansed with a Hibiclens scrub..  It was redressed with a dry sterile dressing utilizing silver alginate as a contact layer.  I will start her on a 10-day course of Bactrim DS.  She notes a history of yeast infections with prolonged antibiotic therapy.  A single dose of Diflucan 150 mg was also sent to her pharmacy.    Continue dressing changes at home with silver alginate with a dry sterile dressing at least every other day.  Recommend cleansing of the wound with a Hibiclens scrub at home as well.    Follow-up next week as scheduled, the patient does state that she is leaving for another cruise on April 7.  We did discuss the potential need for repeat debridement and primary closure of the wound, however her current travel schedule does not allow it.     Diagnoses and all orders for this visit:    Cellulitis of left lower extremity  -     Discontinue: ciprofloxacin (CIPRO) 500 mg tablet; Take 1 tablet (500 mg total) by mouth every 12 (twelve) hours for 10 days  -     fluconazole (DIFLUCAN) 150 mg tablet; Take 1 tablet (150 mg total) by mouth once for 1 dose    Postoperative wound dehiscence, subsequent encounter          Subjective:     Patient ID: Jillian Arango is a 68 y.o. female.    The patient presents today for follow-up of a wound dehiscence site to her left posterior heel.  The patient states that the wound seems to have gotten bigger and she has noticed some increased drainage from the wound as well.  She does  admit to being on it a bit more frequently and has tried to transition out of the cam boot.  She was ambulating today in a pair of sandals.        Review of Systems   Constitutional: Negative.    HENT: Negative.     Eyes: Negative.    Respiratory: Negative.     Cardiovascular: Negative.    Endocrine: Negative.    Musculoskeletal: Negative.    Neurological: Negative.    Hematological: Negative.    Psychiatric/Behavioral: Negative.           Objective:     Physical Exam  Constitutional:       Appearance: Normal appearance.   HENT:      Head: Normocephalic and atraumatic.      Nose: Nose normal.   Cardiovascular:      Pulses:           Dorsalis pedis pulses are 2+ on the left side.        Posterior tibial pulses are 1+ on the left side.   Pulmonary:      Effort: Pulmonary effort is normal.   Feet:      Left foot:      Skin integrity: Ulcer and skin breakdown present.      Comments: The patient's clinical examination today is significant for a postsurgical wound dehiscence site.  The most distal aspect of the wound does appear to be generally improved with some increased granulation and decreased depth.  However the proximal margins which were previously completely close have now appeared to develop a superficial stasis type ulceration.  There is no active drainage no purulence noted.  But there is some very mild localized erythema.    Skin:     General: Skin is warm.      Capillary Refill: Capillary refill takes less than 2 seconds.   Neurological:      General: No focal deficit present.      Mental Status: She is alert and oriented to person, place, and time.   Psychiatric:         Mood and Affect: Mood normal.         Behavior: Behavior normal.         Thought Content: Thought content normal.

## 2024-03-28 NOTE — TELEPHONE ENCOUNTER
Caller: Jacqueline     Doctor and/or Office: Cici CALABRESE#: [phone]     Escalation: Medication Ciprofloxain She has had some issues with her tendon in the past with this medication.  Is the Dr ok with her taking this.  Please advise thank you

## 2024-04-03 ENCOUNTER — OFFICE VISIT (OUTPATIENT)
Dept: PODIATRY | Facility: CLINIC | Age: 69
End: 2024-04-03
Payer: COMMERCIAL

## 2024-04-03 VITALS
WEIGHT: 216 LBS | HEART RATE: 84 BPM | HEIGHT: 66 IN | BODY MASS INDEX: 34.72 KG/M2 | DIASTOLIC BLOOD PRESSURE: 92 MMHG | OXYGEN SATURATION: 99 % | SYSTOLIC BLOOD PRESSURE: 140 MMHG

## 2024-04-03 DIAGNOSIS — T81.31XD POSTOPERATIVE WOUND DEHISCENCE, SUBSEQUENT ENCOUNTER: Primary | ICD-10-CM

## 2024-04-03 DIAGNOSIS — L03.116 CELLULITIS OF LEFT LOWER EXTREMITY: ICD-10-CM

## 2024-04-03 PROCEDURE — 99213 OFFICE O/P EST LOW 20 MIN: CPT | Performed by: PODIATRIST

## 2024-04-03 RX ORDER — SULFAMETHOXAZOLE AND TRIMETHOPRIM 800; 160 MG/1; MG/1
1 TABLET ORAL EVERY 12 HOURS SCHEDULED
Qty: 20 TABLET | Refills: 0 | Status: SHIPPED | OUTPATIENT
Start: 2024-04-03 | End: 2024-04-13

## 2024-04-03 NOTE — PROGRESS NOTES
Assessment/Plan:     The patient's clinical examination today is significant for a postsurgical wound dehiscence site.  The most distal aspect of the wound continues to improve with some new buds of granulation tissue.  However the proximal margins that 3 ulcerated seems to be trying to reepithelialized. There is no active drainage no purulence noted. The localized erythema noted at her prior visit has resolved.       The wound was cleansed with a Hibiclens scrub..  It was redressed with a dry sterile dressing utilizing silver alginate as a contact layer.  She will complete her 10-day course of Bactrim as prescribed previously.    She will be leaving on a cruise this coming Sunday.  I did send a refill for the antibiotic to take with her in case she needs it.  Continue dressing changes at home with silver alginate with a dry sterile dressing at least every other day.  Continue cleansing of the wound with a Hibiclens scrub.     The patient will be on her cruise in the next 2 weeks.  Recommend follow-up the week when she gets back.     Diagnoses and all orders for this visit:    Postoperative wound dehiscence, subsequent encounter    Cellulitis of left lower extremity  -     sulfamethoxazole-trimethoprim (BACTRIM DS) 800-160 mg per tablet; Take 1 tablet by mouth every 12 (twelve) hours for 10 days          Subjective:     Patient ID: Jillian Arango is a 68 y.o. female.    The patient presents today for follow-up of a postoperative wound dehiscence site to her posterior left foot and ankle.  She notes improvement since her last visit.  She is tolerating her antibiotic therapy well.  She is currently wearing open back sandals to prevent irritation to the posterior heel wound.  She does note decreased tenderness and resolution of drainage from the wound.      PAST MEDICAL HISTORY:  Past Medical History:   Diagnosis Date    Anemia     Back pain     Bowel obstruction (HCC) 2017,2018    Cancer (HCC)     uterine CA  radiation destoried urethra.   Patient has urostomy    Cholelithiasis     Colon polyp     Deep vein thrombosis of left lower extremity (HCC) 01/11/2004    on blood thinner for 3 years.    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    IR NEPHROSTOMY TUBE PLACEMENT  01/18/2021    LAPAROSCOPIC GASTRIC BANDING N/A 2006    [place], [place]    OTHER SURGICAL HISTORY  2004    Urine shunt to intestine, transverse colon    PCNL Left 02/16/2021    Procedure: NEPHROLITHOTOMY  PERCUTANEOUS (PCNL);  Surgeon: Ziggy Roldan MD;  [facility];  Service: Urology    WV CYSTO/URETERO W/LITHOTRIPSY &INDWELL STENT INSRT Left 02/16/2021    Procedure: anterograde  URETEROSCOPY with dilation of ureteral stricture, INSERTION STENT URETERAL, exchange  of nephrostomy tube;  Surgeon: Ziggy Roldan MD;  [facility];  Service: Urology    WV DEBRIDEMENT SUBCUTANEOUS TISSUE 1ST 20 SQ CM/< Left 1/29/2024    Procedure: DEBRIDEMENT LOWER EXTREMITY (WASH OUT) with PHOENIX GRAFT APPLICATION, foot wound;  Surgeon: Darron Frias DPM;   [facility];  Service: Podiatry    HI LAPAROSCOPY ENTEROLYSIS SEPARATE PROCEDURE N/A 01/17/2019    Procedure: LAPAROSCOPIC LYSIS OF ADHESIONS;  Surgeon: Omar Jack MD;  Location:  MAIN OR;  Service: General    HI REPAIR SECONDARY ACHILLES TENDON W/WO GRAFT Left 11/24/2023    Procedure: REMOVAL OF POSTERIOR HEEL SPUR WITH REPAIR TENDON ACHILLES;  Surgeon: Darron Frias DPM;  Location:  MAIN OR;  Service: Podiatry    [facility].    SLEEVE GASTROPLASTY N/A 09/2015    Dr. Jack, [facility]    URETER REVISION  2010    WEILBY THUMB ARTHROPLASTY      WEILBY THUMB ARTHROPLASTY          ALLERGIES:  Amoxicillin, Penicillins, and Adhesive [medical tape]    MEDICATIONS:  Current Outpatient Medications   Medication Sig Dispense Refill    ALPRAZolam (XANAX) 0.5 mg tablet Take 0.5 tablets (0.25 mg total) by mouth daily at bedtime as needed for anxiety Takes 1/2 0.5mg = 0.25 mg @ bedtime prn 30 tablet 0    Ascorbic Acid (VITAMIN C PO) Take 1 capsule by mouth daily      BIOTIN PO Take 1 capsule by mouth daily      dabigatran etexilate (PRADAXA) 150 mg capsu Take 1 capsule (150 mg total) by mouth 2 (two) times a day 180 capsule 0    famotidine (PEPCID) 20 mg tablet Take 1 tablet (20 mg total) by mouth 2 (two) times a day 60 tablet 1    FREESTYLE LITE test strip       metFORMIN (GLUCOPHAGE) 500 mg tablet Take 1 tablet (500 mg total) by mouth 2 (two) times a day with meals 180 tablet 0    mupirocin (BACTROBAN) 2 % ointment Apply topically 3 (three) times a day 22 g 6    pantoprazole (PROTONIX) 40 mg tablet Take 1 tablet (40 mg total) by mouth daily in the early morning 90 tablet 0    pramipexole (MIRAPEX) 0.5 mg tablet Take 1 mg by mouth daily at bedtime      Probiotic Product (PROBIOTIC DAILY PO) Take 1 capsule by mouth in the morning      sulfamethoxazole-trimethoprim (BACTRIM DS) 800-160 mg per tablet Take 1 tablet by mouth every 12 (twelve) hours for 10 days  20 tablet 0    Synthroid 75 MCG tablet Take 1 tablet (75 mcg total) by mouth daily in the early morning 90 tablet 2    traMADol (Ultram) 50 mg tablet Take 1 tablet (50 mg total) by mouth every 8 (eight) hours as needed for severe pain for up to 9 doses 9 tablet 0    Trulicity 4.5 MG/0.5ML injection Inject 0.5 mL (4.5 mg total) under the skin every 7 days (Patient taking differently: Inject 4.5 mg under the skin every 7 days Takes on Monday) 12 mL 0    enoxaparin (LOVENOX) 40 mg/0.4 mL Inject 0.4 mL (40 mg total) under the skin in the morning 3 mL 0    meclizine (ANTIVERT) 12.5 MG tablet Take 1 tablet (12.5 mg total) by mouth every 8 (eight) hours as needed for nausea or dizziness for up to 14 days 40 tablet 0     No current facility-administered medications for this visit.       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    Tobacco Use    Smoking status: Former     Current packs/day: 0.00     Average packs/day: 1.5 packs/day for 12.0 years (18.0 ttl pk-yrs)     Types: Cigarettes     Start date: 1974     Quit date: 1986     Years since quittin.2     Passive exposure: Never    Smokeless tobacco: Never   Vaping Use    Vaping status: Never Used   Substance and Sexual Activity    Alcohol use: Not Currently    Drug use: Never    Sexual activity: Never     Comment: s/p hysterectomy   Other Topics Concern    None   Social History Narrative    None     Social Determinants of Health     Financial Resource Strain: Not on file   Food Insecurity: Not on file   Transportation Needs: No Transportation Needs (2023)    PRAPARE - Transportation     Lack of Transportation (Medical): No     Lack of Transportation (Non-Medical): No   Physical Activity: Not on file   Stress: Not on file   Social Connections: Not  on file   Intimate Partner Violence: Not on file   Housing Stability: Unknown (6/26/2023)    Housing Stability Vital Sign     Unable to Pay for Housing in the Last Year: No     Number of Places Lived in the Last Year: Not on file     Unstable Housing in the Last Year: No        Review of Systems   Constitutional: Negative.    HENT: Negative.     Eyes: Negative.    Respiratory: Negative.     Cardiovascular: Negative.    Endocrine: Negative.    Musculoskeletal: Negative.    Skin:  Positive for wound.   Neurological: Negative.    Hematological: Negative.    Psychiatric/Behavioral: Negative.           Objective:     Physical Exam  Constitutional:       Appearance: Normal appearance.   HENT:      Head: Normocephalic and atraumatic.      Nose: Nose normal.   Cardiovascular:      Pulses:           Dorsalis pedis pulses are 2+ on the left side.        Posterior tibial pulses are 2+ on the left side.   Pulmonary:      Effort: Pulmonary effort is normal.   Feet:      Left foot:      Skin integrity: Ulcer and skin breakdown present. No erythema or warmth.      Comments: The patient's clinical examination today is significant for a postsurgical wound dehiscence site.  The most distal aspect of the wound continues to improve with some new buds of granulation tissue.  However the proximal margins that 3 ulcerated seems to be trying to reepithelialized. There is no active drainage no purulence noted. The localized erythema noted at her prior visit has resolved.    Skin:     General: Skin is warm.      Capillary Refill: Capillary refill takes less than 2 seconds.   Neurological:      General: No focal deficit present.      Mental Status: She is alert and oriented to person, place, and time.   Psychiatric:         Mood and Affect: Mood normal.         Behavior: Behavior normal.         Thought Content: Thought content normal.

## 2024-04-25 ENCOUNTER — TELEPHONE (OUTPATIENT)
Dept: PODIATRY | Facility: CLINIC | Age: 69
End: 2024-04-25

## 2024-04-25 ENCOUNTER — OFFICE VISIT (OUTPATIENT)
Dept: PODIATRY | Facility: CLINIC | Age: 69
End: 2024-04-25
Payer: COMMERCIAL

## 2024-04-25 VITALS
BODY MASS INDEX: 34.72 KG/M2 | SYSTOLIC BLOOD PRESSURE: 100 MMHG | WEIGHT: 216 LBS | DIASTOLIC BLOOD PRESSURE: 77 MMHG | HEART RATE: 65 BPM | OXYGEN SATURATION: 98 % | HEIGHT: 66 IN

## 2024-04-25 DIAGNOSIS — T81.31XD POSTOPERATIVE WOUND DEHISCENCE, SUBSEQUENT ENCOUNTER: Primary | ICD-10-CM

## 2024-04-25 DIAGNOSIS — L03.116 CELLULITIS OF LEFT LOWER EXTREMITY: ICD-10-CM

## 2024-04-25 PROCEDURE — 99213 OFFICE O/P EST LOW 20 MIN: CPT | Performed by: PODIATRIST

## 2024-04-25 RX ORDER — FLUCONAZOLE 150 MG/1
TABLET ORAL
COMMUNITY
Start: 2024-03-28

## 2024-04-25 RX ORDER — SULFAMETHOXAZOLE AND TRIMETHOPRIM 800; 160 MG/1; MG/1
1 TABLET ORAL EVERY 12 HOURS SCHEDULED
Qty: 14 TABLET | Refills: 1 | Status: SHIPPED | OUTPATIENT
Start: 2024-04-25 | End: 2024-05-09

## 2024-04-25 NOTE — TELEPHONE ENCOUNTER
Caller: [facility]    Doctor: Darron Frias DPM    Reason for call: The Rx from today for Bactrim. It should be for 28 pills as it is twice a day.    Call back#: [phone] - You can call or send a new Rx

## 2024-04-25 NOTE — PROGRESS NOTES
Assessment/Plan:     The patient's clinical examination today significant for a generally improved appearance to the left lower extremity wound dehiscence site.  The wound base is completely granular now and has filled in nicely.  There is no significant depth associated the wound.  There is no tenderness to palpation today.  There is no active drainage nor purulence.    The wound was cleansed with Hibiclens scrub today and redressed with silver alginate dressing.  She will continue dressing changes at home with the same materials.  She is planning on going on another cruise.  I will prescribe a another course of Bactrim DS to be taken twice daily for 7 days if infection is set in.  1 refill was also provided.    Recommend follow-up in 2 to 3 weeks.     Diagnoses and all orders for this visit:    Postoperative wound dehiscence, subsequent encounter    Cellulitis of left lower extremity  -     sulfamethoxazole-trimethoprim (BACTRIM DS) 800-160 mg per tablet; Take 1 tablet by mouth every 12 (twelve) hours for 14 days    Other orders  -     fluconazole (DIFLUCAN) 150 mg tablet          Subjective:     Patient ID: Jillian Arango is a 68 y.o. female.    The patient presents today for follow-up of a postop wound dehiscence site to the left lower extremity.  The patient notes generalized improvement to the wound.  She did note a bout of localized cellulitis which was treated with a course of Bactrim DS.  The wound did respond well.  She has no significant pain or discomfort today.      PAST MEDICAL HISTORY:  Past Medical History:   Diagnosis Date    Anemia     Back pain     Bowel obstruction (HCC) 2017,2018    Cancer (HCC)     uterine CA radiation destoried urethra.   Patient has urostomy    Cholelithiasis     Colon polyp     Deep vein thrombosis of left lower extremity (HCC) 01/11/2004    on blood thinner for 3 years.    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    IR NEPHROSTOMY TUBE PLACEMENT  01/18/2021    LAPAROSCOPIC GASTRIC BANDING N/A 2006    [place], [place]    OTHER SURGICAL HISTORY  2004    Urine shunt to intestine, transverse colon    PCNL Left 02/16/2021    Procedure: NEPHROLITHOTOMY  PERCUTANEOUS (PCNL);  Surgeon: Ziggy Roldan MD;  [facility]: [facility];  Service: Urology    KY CYSTO/URETERO W/LITHOTRIPSY &INDWELL STENT INSRT Left 02/16/2021    Procedure: anterograde  URETEROSCOPY with dilation of ureteral stricture, INSERTION STENT URETERAL, exchange  of nephrostomy tube;  Surgeon: Ziggy Roldan MD;  [facility];  Service: Urology    KY DEBRIDEMENT SUBCUTANEOUS TISSUE 1ST 20 SQ CM/< Left 1/29/2024    Procedure: DEBRIDEMENT LOWER EXTREMITY (WASH OUT) with PHOENIX GRAFT APPLICATION, foot wound;  Surgeon: Darron Frias DPM;  Location:  MAIN OR;  Service: Podiatry    KY LAPAROSCOPY ENTEROLYSIS SEPARATE PROCEDURE N/A 01/17/2019    Procedure: LAPAROSCOPIC LYSIS OF ADHESIONS;  Surgeon: Omar Jack MD;  Location:  MAIN OR;  Service: General    KY REPAIR SECONDARY ACHILLES TENDON W/WO GRAFT Left 11/24/2023     Procedure: REMOVAL OF POSTERIOR HEEL SPUR WITH REPAIR TENDON ACHILLES;  Surgeon: Darron Frias DPM;  Location:  MAIN OR;  Service: Podiatry    [facility]    SLEEVE GASTROPLASTY N/A 09/2015    Dr. Jack, [facility]    URETER REVISION  2010    WEILBY THUMB ARTHROPLASTY      WEILBY THUMB ARTHROPLASTY          ALLERGIES:  Amoxicillin, Penicillins, and Adhesive [medical tape]    MEDICATIONS:  Current Outpatient Medications   Medication Sig Dispense Refill    ALPRAZolam (XANAX) 0.5 mg tablet Take 0.5 tablets (0.25 mg total) by mouth daily at bedtime as needed for anxiety Takes 1/2 0.5mg = 0.25 mg @ bedtime prn 30 tablet 0    Ascorbic Acid (VITAMIN C PO) Take 1 capsule by mouth daily      BIOTIN PO Take 1 capsule by mouth daily      dabigatran etexilate (PRADAXA) 150 mg capsu Take 1 capsule (150 mg total) by mouth 2 (two) times a day 180 capsule 0    famotidine (PEPCID) 20 mg tablet Take 1 tablet (20 mg total) by mouth 2 (two) times a day 60 tablet 1    fluconazole (DIFLUCAN) 150 mg tablet       FREESTYLE LITE test strip       metFORMIN (GLUCOPHAGE) 500 mg tablet Take 1 tablet (500 mg total) by mouth 2 (two) times a day with meals 180 tablet 0    mupirocin (BACTROBAN) 2 % ointment Apply topically 3 (three) times a day 22 g 6    pantoprazole (PROTONIX) 40 mg tablet Take 1 tablet (40 mg total) by mouth daily in the early morning 90 tablet 0    pramipexole (MIRAPEX) 0.5 mg tablet Take 1 mg by mouth daily at bedtime      Probiotic Product (PROBIOTIC DAILY PO) Take 1 capsule by mouth in the morning      sulfamethoxazole-trimethoprim (BACTRIM DS) 800-160 mg per tablet Take 1 tablet by mouth every 12 (twelve) hours for 14 days 14 tablet 1    Synthroid 75 MCG tablet Take 1 tablet (75 mcg total) by mouth daily in the early morning 90 tablet 2    traMADol (Ultram) 50 mg tablet Take 1 tablet (50 mg total) by mouth every 8 (eight) hours as needed for severe pain for up to 9  doses 9 tablet 0    Trulicity 4.5 MG/0.5ML injection Inject 0.5 mL (4.5 mg total) under the skin every 7 days (Patient taking differently: Inject 4.5 mg under the skin every 7 days Takes on Monday) 12 mL 0    enoxaparin (LOVENOX) 40 mg/0.4 mL Inject 0.4 mL (40 mg total) under the skin in the morning 3 mL 0    meclizine (ANTIVERT) 12.5 MG tablet Take 1 tablet (12.5 mg total) by mouth every 8 (eight) hours as needed for nausea or dizziness for up to 14 days 40 tablet 0     No current facility-administered medications for this visit.       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    Tobacco Use    Smoking status: Former     Current packs/day: 0.00     Average packs/day: 1.5 packs/day for 12.0 years (18.0 ttl pk-yrs)     Types: Cigarettes     Start date: 1974     Quit date: 1986     Years since quittin.3     Passive exposure: Never    Smokeless tobacco: Never   Vaping Use    Vaping status: Never Used   Substance and Sexual Activity    Alcohol use: Not Currently    Drug use: Never    Sexual activity: Never     Comment: s/p hysterectomy   Other Topics Concern    None   Social History Narrative    None     Social Determinants of Health     Financial Resource Strain: Not on file   Food Insecurity: Not on file   Transportation Needs: No Transportation Needs (2023)    PRAPARE - Transportation     Lack of Transportation (Medical): No     Lack of Transportation (Non-Medical): No   Physical Activity: Not on file   Stress: Not on file   Social Connections: Not on file   Intimate Partner Violence: Not on file   Housing Stability: Unknown (2023)    Housing Stability Vital Sign     Unable to Pay for Housing in the Last Year: No     Number of Places Lived in the Last Year: Not on file     Unstable  Housing in the Last Year: No        Review of Systems   Constitutional: Negative.    HENT: Negative.     Eyes: Negative.    Respiratory: Negative.     Cardiovascular: Negative.    Endocrine: Negative.    Musculoskeletal: Negative.    Skin:  Positive for wound.   Neurological: Negative.    Hematological: Negative.    Psychiatric/Behavioral: Negative.           Objective:     Physical Exam  Constitutional:       Appearance: Normal appearance.   HENT:      Head: Normocephalic and atraumatic.      Nose: Nose normal.   Cardiovascular:      Pulses:           Dorsalis pedis pulses are 2+ on the left side.        Posterior tibial pulses are 1+ on the left side.   Pulmonary:      Effort: Pulmonary effort is normal.   Feet:      Left foot:      Skin integrity: Ulcer and skin breakdown present. No erythema or warmth.      Comments: The patient's clinical examination today significant for a generally improved appearance to the left lower extremity wound dehiscence site.  The wound base is completely granular now and has filled in nicely.  There is no significant depth associated the wound.  There is no tenderness to palpation today.  There is no active drainage nor purulence.    Skin:     General: Skin is warm.      Capillary Refill: Capillary refill takes less than 2 seconds.   Neurological:      General: No focal deficit present.      Mental Status: She is alert and oriented to person, place, and time.   Psychiatric:         Mood and Affect: Mood normal.         Behavior: Behavior normal.         Thought Content: Thought content normal.

## 2024-05-20 ENCOUNTER — TELEPHONE (OUTPATIENT)
Age: 69
End: 2024-05-20

## 2024-05-20 NOTE — TELEPHONE ENCOUNTER
Caller: Jillian Arango    Doctor and/or Office: Dr. Frias/Jakob    #: [phone]    Escalation: Surgery Patient has to reschedule her post op with Dr. Frias. She is hoping to get in June 6 or 10. Please return call. Thank you

## 2024-05-21 ENCOUNTER — TELEPHONE (OUTPATIENT)
Age: 69
End: 2024-05-21

## 2024-05-21 DIAGNOSIS — Z79.4 TYPE 2 DIABETES MELLITUS WITH OTHER SPECIFIED COMPLICATION, WITH LONG-TERM CURRENT USE OF INSULIN (HCC): Primary | ICD-10-CM

## 2024-05-21 DIAGNOSIS — E11.69 TYPE 2 DIABETES MELLITUS WITH OTHER SPECIFIED COMPLICATION, WITH LONG-TERM CURRENT USE OF INSULIN (HCC): Primary | ICD-10-CM

## 2024-05-21 DIAGNOSIS — R31.9 HEMATURIA, UNSPECIFIED TYPE: ICD-10-CM

## 2024-05-21 RX ORDER — BLOOD-GLUCOSE METER
KIT MISCELLANEOUS
Qty: 100 EACH | Refills: 4 | Status: SHIPPED | OUTPATIENT
Start: 2024-05-21

## 2024-05-21 NOTE — TELEPHONE ENCOUNTER
Patient coming in tomorrow afternoon for not feeling well. had blood clots in rt leg. left leg swelling.. blood in urine- past few months. She would like to know if labs or urine testing would be wanted prior to her appt. Please call to discuss. Thank you!

## 2024-05-21 NOTE — TELEPHONE ENCOUNTER
Reason for call:   [x] Refill   [] Prior Auth  [] Other:     Office:   [x] PCP/Provider -   [] Specialty/Provider -     Medication:     FREESTYLE LITE test strip           Pharmacy: [id number] - [place] - [address] [phone]     Does the patient have enough for 3 days?   [] Yes   [x] No - Send as HP to POD

## 2024-05-22 ENCOUNTER — APPOINTMENT (OUTPATIENT)
Dept: LAB | Facility: AMBULARY SURGERY CENTER | Age: 69
End: 2024-05-22
Payer: COMMERCIAL

## 2024-05-22 ENCOUNTER — OFFICE VISIT (OUTPATIENT)
Dept: INTERNAL MEDICINE CLINIC | Facility: CLINIC | Age: 69
End: 2024-05-22
Payer: COMMERCIAL

## 2024-05-22 VITALS
TEMPERATURE: 98.9 F | WEIGHT: 224 LBS | HEIGHT: 66 IN | OXYGEN SATURATION: 98 % | HEART RATE: 74 BPM | DIASTOLIC BLOOD PRESSURE: 86 MMHG | RESPIRATION RATE: 16 BRPM | BODY MASS INDEX: 36 KG/M2 | SYSTOLIC BLOOD PRESSURE: 140 MMHG

## 2024-05-22 DIAGNOSIS — M81.0 POSTMENOPAUSAL BONE LOSS: ICD-10-CM

## 2024-05-22 DIAGNOSIS — F41.9 ANXIETY: ICD-10-CM

## 2024-05-22 DIAGNOSIS — I89.0 LYMPHEDEMA: ICD-10-CM

## 2024-05-22 DIAGNOSIS — R31.9 HEMATURIA, UNSPECIFIED TYPE: ICD-10-CM

## 2024-05-22 DIAGNOSIS — E11.42 TYPE 2 DIABETES MELLITUS WITH DIABETIC POLYNEUROPATHY, WITHOUT LONG-TERM CURRENT USE OF INSULIN (HCC): ICD-10-CM

## 2024-05-22 DIAGNOSIS — E11.69 TYPE 2 DIABETES MELLITUS WITH OTHER SPECIFIED COMPLICATION, WITH LONG-TERM CURRENT USE OF INSULIN (HCC): ICD-10-CM

## 2024-05-22 DIAGNOSIS — N30.01 ACUTE CYSTITIS WITH HEMATURIA: ICD-10-CM

## 2024-05-22 DIAGNOSIS — Z79.4 TYPE 2 DIABETES MELLITUS WITH OTHER SPECIFIED COMPLICATION, WITH LONG-TERM CURRENT USE OF INSULIN (HCC): ICD-10-CM

## 2024-05-22 DIAGNOSIS — L73.9 FOLLICULITIS: Primary | ICD-10-CM

## 2024-05-22 DIAGNOSIS — E11.9 DIABETES MELLITUS TYPE 2 IN NONOBESE (HCC): ICD-10-CM

## 2024-05-22 LAB
ALBUMIN SERPL BCP-MCNC: 3.7 G/DL (ref 3.5–5)
ALP SERPL-CCNC: 102 U/L (ref 34–104)
ALT SERPL W P-5'-P-CCNC: 14 U/L (ref 7–52)
ANION GAP SERPL CALCULATED.3IONS-SCNC: 5 MMOL/L (ref 4–13)
AST SERPL W P-5'-P-CCNC: 15 U/L (ref 13–39)
BACTERIA UR QL AUTO: ABNORMAL /HPF
BASOPHILS # BLD AUTO: 0.03 THOUSANDS/ÂΜL (ref 0–0.1)
BASOPHILS NFR BLD AUTO: 1 % (ref 0–1)
BILIRUB SERPL-MCNC: 0.6 MG/DL (ref 0.2–1)
BILIRUB UR QL STRIP: NEGATIVE
BUN SERPL-MCNC: 23 MG/DL (ref 5–25)
CALCIUM SERPL-MCNC: 9.7 MG/DL (ref 8.4–10.2)
CHLORIDE SERPL-SCNC: 112 MMOL/L (ref 96–108)
CLARITY UR: ABNORMAL
CO2 SERPL-SCNC: 24 MMOL/L (ref 21–32)
COLOR UR: ABNORMAL
CREAT SERPL-MCNC: 0.94 MG/DL (ref 0.6–1.3)
EOSINOPHIL # BLD AUTO: 0.36 THOUSAND/ÂΜL (ref 0–0.61)
EOSINOPHIL NFR BLD AUTO: 6 % (ref 0–6)
ERYTHROCYTE [DISTWIDTH] IN BLOOD BY AUTOMATED COUNT: 14.9 % (ref 11.6–15.1)
EST. AVERAGE GLUCOSE BLD GHB EST-MCNC: 134 MG/DL
GFR SERPL CREATININE-BSD FRML MDRD: 62 ML/MIN/1.73SQ M
GLUCOSE P FAST SERPL-MCNC: 103 MG/DL (ref 65–99)
GLUCOSE UR STRIP-MCNC: NEGATIVE MG/DL
HBA1C MFR BLD: 6.3 %
HCT VFR BLD AUTO: 33.6 % (ref 34.8–46.1)
HGB BLD-MCNC: 10.9 G/DL (ref 11.5–15.4)
HGB UR QL STRIP.AUTO: ABNORMAL
IMM GRANULOCYTES # BLD AUTO: 0.01 THOUSAND/UL (ref 0–0.2)
IMM GRANULOCYTES NFR BLD AUTO: 0 % (ref 0–2)
KETONES UR STRIP-MCNC: NEGATIVE MG/DL
LEUKOCYTE ESTERASE UR QL STRIP: ABNORMAL
LYMPHOCYTES # BLD AUTO: 1.08 THOUSANDS/ÂΜL (ref 0.6–4.47)
LYMPHOCYTES NFR BLD AUTO: 18 % (ref 14–44)
MCH RBC QN AUTO: 31.8 PG (ref 26.8–34.3)
MCHC RBC AUTO-ENTMCNC: 32.4 G/DL (ref 31.4–37.4)
MCV RBC AUTO: 98 FL (ref 82–98)
MONOCYTES # BLD AUTO: 0.49 THOUSAND/ÂΜL (ref 0.17–1.22)
MONOCYTES NFR BLD AUTO: 8 % (ref 4–12)
NEUTROPHILS # BLD AUTO: 4 THOUSANDS/ÂΜL (ref 1.85–7.62)
NEUTS SEG NFR BLD AUTO: 67 % (ref 43–75)
NITRITE UR QL STRIP: NEGATIVE
NON-SQ EPI CELLS URNS QL MICRO: ABNORMAL /HPF
NRBC BLD AUTO-RTO: 0 /100 WBCS
PH UR STRIP.AUTO: 7.5 [PH]
PLATELET # BLD AUTO: 230 THOUSANDS/UL (ref 149–390)
PMV BLD AUTO: 9.4 FL (ref 8.9–12.7)
POTASSIUM SERPL-SCNC: 4.3 MMOL/L (ref 3.5–5.3)
PROT SERPL-MCNC: 6.3 G/DL (ref 6.4–8.4)
PROT UR STRIP-MCNC: ABNORMAL MG/DL
RBC # BLD AUTO: 3.43 MILLION/UL (ref 3.81–5.12)
RBC #/AREA URNS AUTO: ABNORMAL /HPF
SODIUM SERPL-SCNC: 141 MMOL/L (ref 135–147)
SP GR UR STRIP.AUTO: 1.01 (ref 1–1.03)
UROBILINOGEN UR STRIP-ACNC: <2 MG/DL
WBC # BLD AUTO: 5.97 THOUSAND/UL (ref 4.31–10.16)
WBC #/AREA URNS AUTO: ABNORMAL /HPF

## 2024-05-22 PROCEDURE — 99214 OFFICE O/P EST MOD 30 MIN: CPT | Performed by: HOSPITALIST

## 2024-05-22 PROCEDURE — 87086 URINE CULTURE/COLONY COUNT: CPT

## 2024-05-22 PROCEDURE — 87181 SC STD AGAR DILUTION PER AGT: CPT

## 2024-05-22 PROCEDURE — 81001 URINALYSIS AUTO W/SCOPE: CPT

## 2024-05-22 PROCEDURE — 85025 COMPLETE CBC W/AUTO DIFF WBC: CPT

## 2024-05-22 PROCEDURE — 83036 HEMOGLOBIN GLYCOSYLATED A1C: CPT

## 2024-05-22 PROCEDURE — 80053 COMPREHEN METABOLIC PANEL: CPT

## 2024-05-22 PROCEDURE — 36415 COLL VENOUS BLD VENIPUNCTURE: CPT

## 2024-05-22 PROCEDURE — 87186 SC STD MICRODIL/AGAR DIL: CPT

## 2024-05-22 RX ORDER — FUROSEMIDE 20 MG/1
20 TABLET ORAL DAILY PRN
Qty: 10 TABLET | Refills: 0 | Status: SHIPPED | OUTPATIENT
Start: 2024-05-22 | End: 2024-06-01

## 2024-05-22 RX ORDER — DOXYCYCLINE HYCLATE 100 MG/1
100 CAPSULE ORAL EVERY 12 HOURS SCHEDULED
Qty: 10 CAPSULE | Refills: 0 | Status: SHIPPED | OUTPATIENT
Start: 2024-05-22 | End: 2024-05-27

## 2024-05-22 RX ORDER — ALPRAZOLAM 0.5 MG/1
0.25 TABLET ORAL
Qty: 30 TABLET | Refills: 0 | Status: SHIPPED | OUTPATIENT
Start: 2024-05-22

## 2024-05-22 RX ORDER — ZINC OXIDE 13 %
1 CREAM (GRAM) TOPICAL DAILY
Qty: 10 CAPSULE | Refills: 0 | Status: SHIPPED | OUTPATIENT
Start: 2024-05-22

## 2024-05-22 NOTE — PROGRESS NOTES
INTERNAL MEDICINE FOLLOW-UP OFFICE VISIT  [facility] - [facility] INTERNAL MEDICINE [place]    NAME: Jillian Arango  AGE: 69 y.o. SEX: female  : 1955     DATE: 2024     Assessment and Plan:     1. Anxiety  Takes Xanax 25 mg as needed.  Will refill today.    2. Folliculitis  Anterior chest wall left side.  No evidence of any deep-seated abscess.  Will start doxycycline 100 mg twice a day for 5 days.  Also recommend warm compresses    3. Acute cystitis with hematuria  UTI-start doxycycline which would treat the folliculitis as well as UTI.  I will also add a probiotic  Recommend drinking 4 to 6 glasses of water a day    Diabetic eye exam  Annual physical next visit  Osteoporosis screening     Chief Complaint:     Chief Complaint   Patient presents with   • Leg Pain     Boil to right chest started 2 weeks ago        History of Present Illness:     Roxanne comes in complaining of some burning and blood in the urine over the past few days.  Has a history of APL on Pradaxa.  Has been on a cruise recently-  And was not [place].  Admits to not drinking enough water.  UA reveals innumerable bacteria, WBCs and RBCs.  Also with worsening edema of both feet.  No fever.  Has also developed a boil in the anterior part of the chest left side.  States that he gets these boils often.  Response to antibiotics.    The following portions of the patient's history were reviewed and updated as appropriate: allergies, current medications, past family history, past medical history, past social history, past surgical history and problem list.     Review of Systems:     Review of Systems all other review of symptoms negative unless specified     Problem List:     Patient Active Problem List   Diagnosis   • Small bowel obstruction (HCC)   • Type 2 diabetes mellitus (HCC)   • History of urostomy   • Endometrial cancer (HCC)   • Hypothyroidism   • In vitro fertilization   • Anemia   • Hydroureteronephrosis   • HTN  "(hypertension)   • Migraine   • Abdominal adhesions   • Tooth infection   • Class 2 obesity with alveolar hypoventilation without serious comorbidity with body mass index (BMI) of 36.0 to 36.9 in adult ([facility])   • Headache   • Ureteral-ileal loop anastomotic stricture   • Chronic deep vein thrombosis (DVT) of femoral vein of right lower extremity ([facility])   • Bone infarction ([facility])   • Anti-cardiolipin antibody positive   • Encounter for attention to other artificial openings of urinary tract ([facility])   • Leg pain, anterior, right   • Nausea and vomiting   • Hypercalcemia   • Vertigo   • History of smoking   • Decreased appetite   • Postoperative state   • Bursitis of posterior heel, left   • Achilles tendinosis of left ankle   • Depression, recurrent ([facility])   • Postoperative wound dehiscence, subsequent encounter   • Achilles tendinitis of left lower extremity   • Acute post-operative pain        Objective:     /86 (BP Location: Left arm, Patient Position: Sitting, Cuff Size: Large)   Pulse 74   Temp 98.9 °F (37.2 °C) (Tympanic)   Resp 16   Ht 5' 6\" (1.676 m)   Wt 102 kg (224 lb)   SpO2 98%   BMI 36.15 kg/m²     Physical Exam  General Appearance:    Alert, cooperative, no distress   Head:    Normocephalic, without obvious abnormality, atraumatic   Eyes:    PERRL, conjunctiva/corneas clear, EOM's intact, fundi     benign, both eyes        Neck:   Supple, no adenopathy, no JVD  Boil over the left sternum.  1 cm x 0.5 cm nonfluctuant.   Back:     Symmetric, no curvature, ROM normal, no CVA tenderness   Lungs:     Clear to auscultation bilaterally, no wheezing or rhonchi   Heart:    Regular rate and rhythm, S1 and S2 normal, no murmur, rub   or gallop   Abdomen:     Soft, non-tender, bowel sounds active    Extremities:   Extremities normal, atraumatic, bilateral lower extremity lymphedema with 1+ pitting   Psych:   Normal Affect   Neurologic:   CNII-XII intact. Normal strength, sensation and reflexes       " Throughout       Pertinent Laboratory/Diagnostic Studies:    Laboratory Results: I have personally reviewed the pertinent laboratory results/reports             Augustine Lloyd MD  [facility] INTERNAL MEDICINE [place]

## 2024-05-24 LAB — BACTERIA UR CULT: ABNORMAL

## 2024-05-29 NOTE — PROGRESS NOTES
Assessment/Plan:      There are no diagnoses linked to this encounter.      Subjective:     Patient ID: Jillian Arango is a 69 y.o. female.    Patient presents today for 6 month f/u visit due to ongoing BLE pain and swelling w/ h/o recurrent DVT.     HPI    Review of Systems   Constitutional: Negative.    HENT: Negative.     Eyes: Negative.    Respiratory: Negative.     Cardiovascular:  Positive for leg swelling.   Gastrointestinal: Negative.    Endocrine: Negative.    Genitourinary: Negative.    Musculoskeletal: Negative.    Skin: Negative.    Allergic/Immunologic: Negative.    Neurological: Negative.    Hematological: Negative.    Psychiatric/Behavioral: Negative.           Objective:     Physical Exam

## 2024-05-30 ENCOUNTER — OFFICE VISIT (OUTPATIENT)
Dept: VASCULAR SURGERY | Facility: CLINIC | Age: 69
End: 2024-05-30
Payer: COMMERCIAL

## 2024-05-30 VITALS
HEIGHT: 66 IN | WEIGHT: 221 LBS | HEART RATE: 78 BPM | SYSTOLIC BLOOD PRESSURE: 142 MMHG | BODY MASS INDEX: 35.52 KG/M2 | DIASTOLIC BLOOD PRESSURE: 84 MMHG

## 2024-05-30 DIAGNOSIS — I82.511 CHRONIC DEEP VEIN THROMBOSIS (DVT) OF FEMORAL VEIN OF RIGHT LOWER EXTREMITY (HCC): Primary | ICD-10-CM

## 2024-05-30 PROCEDURE — 99213 OFFICE O/P EST LOW 20 MIN: CPT | Performed by: NURSE PRACTITIONER

## 2024-05-30 NOTE — PROGRESS NOTES
Ambulatory Visit  Name: Jillian Arango      : 1955      MRN: [id number]  Encounter Provider: TRACY Banuelos  Encounter Date: 2024   Encounter department: [facility] [place]    Assessment & Plan   1. Chronic deep vein thrombosis (DVT) of femoral vein of right lower extremity (HCC)  Assessment & Plan:  69-year-old female with HTN, DM, migraines, hypothyroid, uterine cancer s/p hysterectomy, provoked LLE DVT '00 after urostomy surgery, R posterior tibial DVT in  with propagation to R mid to distal femoral vein DVT  (Xarelto failure/switched to pradaxa), chronic R femoral and PT DVT , left achilles tendoitis, chronic BLE pain, s/p Left achilles repair  and left achilles incisional debridement and skin grafting     Patient returns to the office for 6 month follow up      -Improved BLE pain. Complains of heaviness with walking  -L>R lower extremity swelling   -Palpable DP pulse bilaterally   -Left achilles wound healing. Continue local wound care with podiatry   -Tubi  compression, double layer, size F. Rx given for box of tubi . Wear daily and remove at night    -Continue long-term anticoagulation due to recurrent DVT  -Additionally periodic leg elevation and calf pump exercises  -Follow-up PRN     Orders:  -     Compression bandages      History of Present Illness     Jillian Arango is a 69 y.o. female 69-year-old female with HTN, DM, migraines, hypothyroid, uterine cancer s/p hysterectomy, provoked LLE DVT '00 after urostomy surgery, R posterior tibial DVT in  with propagation to R mid to distal femoral vein DVT  (Xarelto failure/switched to pradaxa), chronic R femoral and PT DVT , left achilles tendoitis, chronic BLE pain, s/p Left achilles repair  and left achilles incisional debridement and skin grafting . She returns to the office for 6 month follow up. She has improved BLE pain though complains of heaviness  "with walking. She has L>R lower extremity swelling with palpable DP pulse bilaterally     Review of Systems   Cardiovascular:  Positive for leg swelling.   Musculoskeletal:  Positive for arthralgias and gait problem.       Objective   I have reviewed and made appropriate changes to the review of systems input by the medical assistant.    Vitals:    05/30/24 0925   BP: 142/84   BP Location: Left arm   Patient Position: Sitting   Pulse: 78   Weight: 100 kg (221 lb)   Height: 5' 6\" (1.676 m)       Patient Active Problem List   Diagnosis    Small bowel obstruction (HCC)    Type 2 diabetes mellitus (HCC)    History of urostomy    Endometrial cancer (HCC)    Hypothyroidism    In vitro fertilization    Anemia    Hydroureteronephrosis    HTN (hypertension)    Migraine    Abdominal adhesions    Tooth infection    Class 2 obesity with alveolar hypoventilation without serious comorbidity with body mass index (BMI) of 36.0 to 36.9 in adult (HCC)    Headache    Ureteral-ileal loop anastomotic stricture    Chronic deep vein thrombosis (DVT) of femoral vein of right lower extremity (HCC)    Bone infarction (HCC)    Anti-cardiolipin antibody positive    Encounter for attention to other artificial openings of urinary tract (HCC)    Leg pain, anterior, right    Nausea and vomiting    Hypercalcemia    Vertigo    History of smoking    Decreased appetite    Postoperative state    Bursitis of posterior heel, left    Achilles tendinosis of left ankle    Depression, recurrent (HCC)    Postoperative wound dehiscence, subsequent encounter    Achilles tendinitis of left lower extremity    Acute post-operative pain       Past Surgical History:   Procedure Laterality Date    ACHILLES TENDON REPAIR Right     APPENDECTOMY      COLONOSCOPY      6/6/22    COLONOSCOPY W/ BIOPSIES N/A 12/2015    EGD      HEEL SPUR SURGERY Right 1996    HYSTERECTOMY      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    IR NEPHROSTOMY TUBE PLACEMENT  01/18/2021    " LAPAROSCOPIC GASTRIC BANDING N/A 2006    [place], [place]    OTHER SURGICAL HISTORY  2004    Urine shunt to intestine, transverse colon    PCNL Left 02/16/2021    Procedure: NEPHROLITHOTOMY  PERCUTANEOUS (PCNL);  Surgeon: Ziggy Roldan MD;  [facility];  Service: Urology    WA CYSTO/URETERO W/LITHOTRIPSY &INDWELL STENT INSRT Left 02/16/2021    Procedure: anterograde  URETEROSCOPY with dilation of ureteral stricture, INSERTION STENT URETERAL, exchange  of nephrostomy tube;  Surgeon: Ziggy Roldan MD;  [facility];  Service: Urology    WA DEBRIDEMENT SUBCUTANEOUS TISSUE 1ST 20 SQ CM/< Left 1/29/2024    Procedure: DEBRIDEMENT LOWER EXTREMITY (WASH OUT) with PHOENIX GRAFT APPLICATION, foot wound;  Surgeon: Darron Frias DPM;  [facility];  Service: Podiatry    WA LAPAROSCOPY ENTEROLYSIS SEPARATE PROCEDURE N/A 01/17/2019    Procedure: LAPAROSCOPIC LYSIS OF ADHESIONS;  Surgeon: Omar Jack MD;  Location:  MAIN OR;  Service: General    WA REPAIR SECONDARY ACHILLES TENDON W/WO GRAFT Left 11/24/2023    Procedure: REMOVAL OF POSTERIOR HEEL SPUR WITH REPAIR TENDON ACHILLES;  Surgeon: Darron Frias DPM;  [facility];  Service: Podiatry    [facility]    SLEEVE GASTROPLASTY N/A 09/2015    Dr. Jack, [facility]    URETER REVISION  2010    WEILBY THUMB ARTHROPLASTY      WEILBY THUMB ARTHROPLASTY         Family History   Problem Relation Age of Onset    No Known Problems Mother     Kidney failure Father     Brain cancer Father     Kidney cancer Father     Diabetes Sister     Alcohol abuse Sister     Diabetes Brother        Social History     Socioeconomic History    Marital status: /Civil Union     Spouse name: Ari    Number of children: 0    Years of education: 14    Highest education level: Not on file   Occupational History    Occupation: Hair styist    Tobacco Use    Smoking status: Former     Current  "packs/day: 0.00     Average packs/day: 1.5 packs/day for 12.0 years (18.0 ttl pk-yrs)     Types: Cigarettes     Start date: 1974     Quit date: 1986     Years since quittin.4     Passive exposure: Never    Smokeless tobacco: Never   Vaping Use    Vaping status: Never Used   Substance and Sexual Activity    Alcohol use: Not Currently    Drug use: Never    Sexual activity: Never     Comment: s/p hysterectomy   Other Topics Concern    Not on file   Social History Narrative    Not on file     Social Determinants of Health     Financial Resource Strain: Not on file   Food Insecurity: Not on file   Transportation Needs: No Transportation Needs (2023)    PRAPARE - Transportation     Lack of Transportation (Medical): No     Lack of Transportation (Non-Medical): No   Physical Activity: Not on file   Stress: Not on file   Social Connections: Not on file   Intimate Partner Violence: Not on file   Housing Stability: Unknown (2023)    Housing Stability Vital Sign     Unable to Pay for Housing in the Last Year: No     Number of Places Lived in the Last Year: Not on file     Unstable Housing in the Last Year: No       Allergies   Allergen Reactions    Amoxicillin Rash    Penicillins Rash    Adhesive [Medical Tape] Rash     \"Paper Tape\" Causes the rash         Current Outpatient Medications:     ALPRAZolam (XANAX) 0.5 mg tablet, Take 0.5 tablets (0.25 mg total) by mouth daily at bedtime as needed for anxiety Takes 1/2 0.5mg = 0.25 mg @ bedtime prn, Disp: 30 tablet, Rfl: 0    Ascorbic Acid (VITAMIN C PO), Take 1 capsule by mouth daily, Disp: , Rfl:     BIOTIN PO, Take 1 capsule by mouth daily, Disp: , Rfl:     dabigatran etexilate (PRADAXA) 150 mg capsu, Take 1 capsule (150 mg total) by mouth 2 (two) times a day, Disp: 180 capsule, Rfl: 0    famotidine (PEPCID) 20 mg tablet, Take 1 tablet (20 mg total) by mouth 2 (two) times a day, Disp: 60 tablet, Rfl: 1    FREESTYLE LITE test strip, Check blood sugar 1-2 " "times daily, Disp: 100 each, Rfl: 4    furosemide (LASIX) 20 mg tablet, Take 1 tablet (20 mg total) by mouth daily as needed (Swelling in the legs) for up to 10 days, Disp: 10 tablet, Rfl: 0    meclizine (ANTIVERT) 12.5 MG tablet, Take 1 tablet (12.5 mg total) by mouth every 8 (eight) hours as needed for nausea or dizziness for up to 14 days, Disp: 40 tablet, Rfl: 0    metFORMIN (GLUCOPHAGE) 500 mg tablet, Take 1 tablet (500 mg total) by mouth 2 (two) times a day with meals, Disp: 180 tablet, Rfl: 0    pantoprazole (PROTONIX) 40 mg tablet, Take 1 tablet (40 mg total) by mouth daily in the early morning, Disp: 90 tablet, Rfl: 0    pramipexole (MIRAPEX) 0.5 mg tablet, Take 1 mg by mouth daily at bedtime, Disp: , Rfl:     Probiotic Product (Probiotic Daily) CAPS, Take 1 capsule by mouth in the morning, Disp: 10 capsule, Rfl: 0    Synthroid 75 MCG tablet, Take 1 tablet (75 mcg total) by mouth daily in the early morning, Disp: 90 tablet, Rfl: 2    fluconazole (DIFLUCAN) 150 mg tablet, , Disp: , Rfl:     Trulicity 4.5 MG/0.5ML injection, Inject 0.5 mL (4.5 mg total) under the skin every 7 days (Patient not taking: Reported on 5/30/2024), Disp: 12 mL, Rfl: 0    /84 (BP Location: Left arm, Patient Position: Sitting)   Pulse 78   Ht 5' 6\" (1.676 m)   Wt 100 kg (221 lb) Comment: verbal, per patient  BMI 35.67 kg/m²     Physical Exam  Vitals and nursing note reviewed.   Constitutional:       Appearance: She is obese.   HENT:      Head: Normocephalic and atraumatic.   Eyes:      Extraocular Movements: Extraocular movements intact.   Cardiovascular:      Pulses:           Dorsalis pedis pulses are 2+ on the right side and 2+ on the left side.      Heart sounds: Normal heart sounds.   Pulmonary:      Effort: Pulmonary effort is normal.      Breath sounds: Normal breath sounds.   Abdominal:      Palpations: Abdomen is soft.   Musculoskeletal:         General: Swelling present.      Left lower leg: Edema present.      " Comments: Left achilles wound with thickened scab, serous drainage    Skin:     General: Skin is warm.   Neurological:      General: No focal deficit present.      Mental Status: She is alert and oriented to person, place, and time.   Psychiatric:         Mood and Affect: Mood normal.         Behavior: Behavior normal.       Administrative Statements   I have spent a total time of 15 minutes on 05/30/24 In caring for this patient including Prognosis, Risks and benefits of tx options, Instructions for management, Patient and family education, Risk factor reductions, Impressions, Counseling / Coordination of care, and Documenting in the medical record.

## 2024-05-30 NOTE — ASSESSMENT & PLAN NOTE
69-year-old female with HTN, DM, migraines, hypothyroid, uterine cancer s/p hysterectomy, provoked LLE DVT '00 after urostomy surgery, R posterior tibial DVT in April '23 with propagation to R mid to distal femoral vein DVT June '23 (Xarelto failure/switched to pradaxa), chronic R femoral and PT DVT , left achilles tendoitis, chronic BLE pain, s/p Left achilles repair Nov '23 and left achilles incisional debridement and skin grafting Jan '24    Patient returns to the office for 6 month follow up      -Improved BLE pain. Complains of heaviness with walking  -L>R lower extremity swelling   -Palpable DP pulse bilaterally   -Left achilles wound healing. Continue local wound care with podiatry   -Tubi  compression, double layer, size F. Rx given for box of tubi . Wear daily and remove at night    -Continue long-term anticoagulation due to recurrent DVT  -Additionally periodic leg elevation and calf pump exercises  -Follow-up PRN

## 2024-06-10 ENCOUNTER — OFFICE VISIT (OUTPATIENT)
Dept: PODIATRY | Facility: CLINIC | Age: 69
End: 2024-06-10
Payer: COMMERCIAL

## 2024-06-10 VITALS
HEIGHT: 66 IN | OXYGEN SATURATION: 99 % | SYSTOLIC BLOOD PRESSURE: 140 MMHG | BODY MASS INDEX: 35.67 KG/M2 | DIASTOLIC BLOOD PRESSURE: 90 MMHG | HEART RATE: 59 BPM

## 2024-06-10 DIAGNOSIS — T81.31XD POSTOPERATIVE WOUND DEHISCENCE, SUBSEQUENT ENCOUNTER: Primary | ICD-10-CM

## 2024-06-10 PROCEDURE — 99213 OFFICE O/P EST LOW 20 MIN: CPT | Performed by: PODIATRIST

## 2024-06-10 RX ORDER — SULFAMETHOXAZOLE AND TRIMETHOPRIM 800; 160 MG/1; MG/1
1 TABLET ORAL EVERY 12 HOURS SCHEDULED
Qty: 14 TABLET | Refills: 0 | Status: SHIPPED | OUTPATIENT
Start: 2024-06-10 | End: 2024-06-17

## 2024-06-10 NOTE — PROGRESS NOTES
Podiatry Clinic Visit  Jillian Arango 69 y.o. female MRN: [id number]  Encounter: [id number]    Assessment & Plan        Diagnoses and all orders for this visit:    Postoperative wound dehiscence, subsequent encounter  -     sulfamethoxazole-trimethoprim (BACTRIM DS) 800-160 mg per tablet; Take 1 tablet by mouth every 12 (twelve) hours for 7 days         Plan:  Patient was examined, evaluated, and treated with all questions and concerns addressed.  Patient s/p left posterior heel spur removal (DOS: 11/24/2023) and subsequent lower extremity debridement with [place] graft application (DOS: 1/29/2024). Improved clinical appearance noted to wound dehiscence site.   No active drainage or acute clinical signs of infection noted.   Wound dressed with dermagran, DSD. Recommend patient continue every other day dressing changes with dermagran to aid in wound healing.   Advised patient to refrain from going in the ocean or submerging her left lower extremity in a pool to prevent infection.   Patient given a script for Bactrim to be taken twice daily for 7 days should infection set in.   Patient was re-appointed for follow up on 7/9/2024 as she is going on a cruise and will be out of the country.      - Dr. Frias  was available/present for entirety of patient encounter and present for all procedures.    History of Present Illness     HPI: Jillian Arango is a 69 y.o. female who presents for follow up of wound dehiscence to prior left posterior heel surgical site. Patient states she just came back from a cruise and is still very tender to the area but otherwise is doing well. She is ambulating today in flip flips. The patient has no further podiatric complaints at this time.    Review of Systems   Constitutional: Negative.    HENT: Negative.    Eyes: Negative.    Respiratory: Negative.    Cardiovascular: Negative.    Gastrointestinal: Negative.    Musculoskeletal: Left posterior heel pain  Skin: Left posterior heel wound    Neurological: Negative.        Historical Information   Past Medical History:   Diagnosis Date    Anemia     Back pain     Bowel obstruction (HCC) 2017,2018    Cancer (HCC)     uterine CA radiation destoried urethra.   Patient has urostomy    Cholelithiasis     Colon polyp     Deep vein thrombosis of left lower extremity (HCC) 01/11/2004    on blood thinner for 3 years.    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    IR NEPHROSTOMY TUBE PLACEMENT  01/18/2021    LAPAROSCOPIC GASTRIC BANDING N/A 2006    [place], [place]    OTHER SURGICAL HISTORY  2004    Urine shunt to intestine, transverse colon    PCNL Left 02/16/2021    Procedure: NEPHROLITHOTOMY  PERCUTANEOUS (PCNL);  Surgeon: Ziggy Roldan MD;  [facility];  Service: Urology    AR CYSTO/URETERO W/LITHOTRIPSY &INDWELL STENT INSRT Left 02/16/2021    Procedure: anterograde  URETEROSCOPY with dilation of ureteral stricture, INSERTION STENT URETERAL, exchange  of nephrostomy tube;  Surgeon: Ziggy Roldan MD;  [facility];  Service: Urology    AR DEBRIDEMENT SUBCUTANEOUS TISSUE 1ST  "20 SQ CM/< Left 2024    Procedure: DEBRIDEMENT LOWER EXTREMITY (WASH OUT) with PHOENIX GRAFT APPLICATION, foot wound;  Surgeon: Darron Frias DPM;  [facility];  Service: Podiatry    HI LAPAROSCOPY ENTEROLYSIS SEPARATE PROCEDURE N/A 2019    Procedure: LAPAROSCOPIC LYSIS OF ADHESIONS;  Surgeon: Omar Jack MD;  Location:  MAIN OR;  Service: General    HI REPAIR SECONDARY ACHILLES TENDON W/WO GRAFT Left 2023    Procedure: REMOVAL OF POSTERIOR HEEL SPUR WITH REPAIR TENDON ACHILLES;  Surgeon: Darron Frias DPM;  Location:  MAIN OR;  Service: Podiatry    RADICAL HYSTERECTOMY N/A     [facility] endometrial cancer.    SLEEVE GASTROPLASTY N/A 2015    Dr. Jack, [facility]    URETER REVISION      WEILBY THUMB ARTHROPLASTY      WEILBY THUMB ARTHROPLASTY       Social History   Social History     Substance and Sexual Activity   Alcohol Use Not Currently     Social History     Substance and Sexual Activity   Drug Use Never     Social History     Tobacco Use   Smoking Status Former    Current packs/day: 0.00    Average packs/day: 1.5 packs/day for 12.0 years (18.0 ttl pk-yrs)    Types: Cigarettes    Start date: 1974    Quit date: 1986    Years since quittin.4    Passive exposure: Never   Smokeless Tobacco Never     Family History:   Family History   Problem Relation Age of Onset    No Known Problems Mother     Kidney failure Father     Brain cancer Father     Kidney cancer Father     Diabetes Sister     Alcohol abuse Sister     Diabetes Brother        Meds/Allergies   Not in a hospital admission.  Allergies   Allergen Reactions    Amoxicillin Rash    Penicillins Rash    Adhesive [Medical Tape] Rash     \"Paper Tape\" Causes the rash       Objective     Current Vitals:   Blood Pressure: 140/90 (06/10/24 1008)  Pulse: 59 (06/10/24 1008)  Height: 5' 6\" (167.6 cm) (06/10/24 1008)  SpO2: 99 % (06/10/24 1008)        /90 (BP Location: Left arm, Patient Position: " "Sitting, Cuff Size: Standard)   Pulse 59   Ht 5' 6\" (1.676 m)   SpO2 99%   BMI 35.67 kg/m²       Lower Extremity Exam:    Foot Exam    Musculoskeletal:  MMT is 5/5 to all compartments of the LE B/L, +1/4 edema B/L, Mild Pain on palpation of left posterior heel about the wound site. Tenderness to the periwound     Vascular:   DP pulses are present bilaterally and PT pulses are present bilaterally., CFT< 3sec to all digits. Pedal hair is Present. Skin temperature within normal limits B/L.     Dermatological:  Skin of the LE is normal texture, temperature, turgor B/L. Interdigital maceration is not present.     Left lower extremity: Wound dehiscence noted to prior surgical site of the posterior heel with improved clinical appearance as compared to prior visit. Wound bed noted to be superficial and fibrogranular in nature. Negative probe to bone. No active drainage. No ascending edema or erythema.        Neurologic:  Gross sensation is intact. Monofilament sensation is Intact. Sharp sensation is present.                  "

## 2024-07-01 DIAGNOSIS — E11.69 TYPE 2 DIABETES MELLITUS WITH OTHER SPECIFIED COMPLICATION, WITH LONG-TERM CURRENT USE OF INSULIN (HCC): ICD-10-CM

## 2024-07-01 DIAGNOSIS — Z79.4 TYPE 2 DIABETES MELLITUS WITH OTHER SPECIFIED COMPLICATION, WITH LONG-TERM CURRENT USE OF INSULIN (HCC): ICD-10-CM

## 2024-07-12 DIAGNOSIS — I82.511 CHRONIC DEEP VEIN THROMBOSIS (DVT) OF FEMORAL VEIN OF RIGHT LOWER EXTREMITY (HCC): ICD-10-CM

## 2024-07-12 RX ORDER — DABIGATRAN ETEXILATE 150 MG/1
150 CAPSULE ORAL 2 TIMES DAILY
Qty: 200 CAPSULE | Refills: 1 | Status: SHIPPED | OUTPATIENT
Start: 2024-07-12

## 2024-07-17 ENCOUNTER — OFFICE VISIT (OUTPATIENT)
Dept: INTERNAL MEDICINE CLINIC | Facility: CLINIC | Age: 69
End: 2024-07-17
Payer: COMMERCIAL

## 2024-07-17 VITALS
RESPIRATION RATE: 16 BRPM | WEIGHT: 224 LBS | HEIGHT: 66 IN | OXYGEN SATURATION: 97 % | HEART RATE: 80 BPM | BODY MASS INDEX: 36 KG/M2 | SYSTOLIC BLOOD PRESSURE: 120 MMHG | DIASTOLIC BLOOD PRESSURE: 78 MMHG | TEMPERATURE: 97.7 F

## 2024-07-17 DIAGNOSIS — I73.9 PAD (PERIPHERAL ARTERY DISEASE) (HCC): Primary | ICD-10-CM

## 2024-07-17 DIAGNOSIS — M67.874 ACHILLES TENDINOSIS OF LEFT ANKLE: ICD-10-CM

## 2024-07-17 DIAGNOSIS — R19.7 DIARRHEA, UNSPECIFIED TYPE: ICD-10-CM

## 2024-07-17 DIAGNOSIS — Z79.4 TYPE 2 DIABETES MELLITUS WITH OTHER SPECIFIED COMPLICATION, WITH LONG-TERM CURRENT USE OF INSULIN (HCC): ICD-10-CM

## 2024-07-17 DIAGNOSIS — T81.31XD POSTOPERATIVE WOUND DEHISCENCE, SUBSEQUENT ENCOUNTER: ICD-10-CM

## 2024-07-17 DIAGNOSIS — E11.69 TYPE 2 DIABETES MELLITUS WITH OTHER SPECIFIED COMPLICATION, WITHOUT LONG-TERM CURRENT USE OF INSULIN (HCC): ICD-10-CM

## 2024-07-17 DIAGNOSIS — E66.01 CLASS 2 SEVERE OBESITY WITH SERIOUS COMORBIDITY AND BODY MASS INDEX (BMI) OF 36.0 TO 36.9 IN ADULT, UNSPECIFIED OBESITY TYPE (HCC): ICD-10-CM

## 2024-07-17 DIAGNOSIS — E11.69 TYPE 2 DIABETES MELLITUS WITH OTHER SPECIFIED COMPLICATION, WITH LONG-TERM CURRENT USE OF INSULIN (HCC): ICD-10-CM

## 2024-07-17 DIAGNOSIS — E66.2 CLASS 2 OBESITY WITH ALVEOLAR HYPOVENTILATION WITHOUT SERIOUS COMORBIDITY WITH BODY MASS INDEX (BMI) OF 36.0 TO 36.9 IN ADULT (HCC): ICD-10-CM

## 2024-07-17 PROCEDURE — 99214 OFFICE O/P EST MOD 30 MIN: CPT

## 2024-07-17 RX ORDER — DIPHENOXYLATE HYDROCHLORIDE AND ATROPINE SULFATE 2.5; .025 MG/1; MG/1
1 TABLET ORAL 4 TIMES DAILY PRN
Start: 2024-07-17 | End: 2024-07-17 | Stop reason: SDUPTHER

## 2024-07-17 RX ORDER — DULAGLUTIDE 4.5 MG/.5ML
4.5 INJECTION, SOLUTION SUBCUTANEOUS
Qty: 12 ML | Refills: 0 | Status: SHIPPED | OUTPATIENT
Start: 2024-07-17

## 2024-07-17 RX ORDER — PENTOXIFYLLINE 400 MG/1
400 TABLET, EXTENDED RELEASE ORAL
Qty: 270 TABLET | Refills: 0 | Status: SHIPPED | OUTPATIENT
Start: 2024-07-17 | End: 2024-10-15

## 2024-07-17 RX ORDER — DIPHENOXYLATE HYDROCHLORIDE AND ATROPINE SULFATE 2.5; .025 MG/1; MG/1
1 TABLET ORAL 4 TIMES DAILY PRN
Qty: 30 TABLET | Refills: 0 | Status: SHIPPED | OUTPATIENT
Start: 2024-07-17 | End: 2024-07-25

## 2024-07-17 NOTE — ASSESSMENT & PLAN NOTE
Complaining of increased pain and describes heaviness when ambulation   Suspect vascular compromise in setting of Obesity, HLD, T2DM, Hx DVT  Follows Vascular Surgery for Chronic DVT    Plan-   Will trial Trental for 3 months and re-eval  Ambulatory referral to PT   Wound care for chronic wound

## 2024-07-17 NOTE — PROGRESS NOTES
Adult Annual Physical  Name: Jillian Arango      : 1955      MRN: [id number]  Encounter Provider: Raquel Gonzales MD  Encounter Date: 2024   Encounter department: [facility] INTERNAL MEDICINE [place]    Assessment & Plan   1. PAD (peripheral artery disease) (HCC)  Assessment & Plan:  Complaining of increased pain and describes heaviness when ambulation   Suspect vascular compromise in setting of Obesity, HLD, T2DM, Hx DVT  Follows Vascular Surgery for Chronic DVT    Plan-   Will trial Trental for 3 months and re-eval  Ambulatory referral to PT   Wound care for chronic wound   Orders:  -     Ambulatory Referral to Physical Therapy; Future  -     pentoxifylline (TRENtal) 400 mg ER tablet; Take 1 tablet (400 mg total) by mouth 3 (three) times a day with meals  2. Achilles tendinosis of left ankle  -     Ambulatory Referral to Physical Therapy; Future  3. Diarrhea, unspecified type  -     diphenoxylate-atropine (LOMOTIL) 2.5-0.025 mg per tablet; Take 1 tablet by mouth 4 (four) times a day as needed for diarrhea for up to 8 days  4. Postoperative wound dehiscence, subsequent encounter  -     Ambulatory Referral to Wound Care; Future  5. Type 2 diabetes mellitus with other specified complication, without long-term current use of insulin (HCC)  -     tirzepatide (Mounjaro) 2.5 MG/0.5ML; Inject 0.5 mL (2.5 mg total) under the skin every 7 days  6. Class 2 severe obesity with serious comorbidity and body mass index (BMI) of 36.0 to 36.9 in adult, unspecified obesity type (HCC)  -     tirzepatide (Mounjaro) 2.5 MG/0.5ML; Inject 0.5 mL (2.5 mg total) under the skin every 7 days  7. Type 2 diabetes mellitus with other specified complication, with long-term current use of insulin (HCC)  -     Trulicity 4.5 MG/0.5ML injection; Inject 0.5 mL (4.5 mg total) under the skin every 7 days  8. Class 2 obesity with alveolar hypoventilation without serious comorbidity with body mass index (BMI) of 36.0 to 36.9 in adult  (HCC)  -     Trulicity 4.5 MG/0.5ML injection; Inject 0.5 mL (4.5 mg total) under the skin every 7 days    Immunizations and preventive care screenings were discussed with patient today. Appropriate education was printed on patient's after visit summary.    Counseling:  Alcohol/drug use: discussed moderation in alcohol intake, the recommendations for healthy alcohol use, and avoidance of illicit drug use.  Dental Health: discussed importance of regular tooth brushing, flossing, and dental visits.  Exercise: the importance of regular exercise/physical activity was discussed. Recommend exercise 3-5 times per week for at least 30 minutes.        Ms. Arango is a 70 yo F with a PMH of Obesity, T2DM, HLD, HTN, Chronic DVT, S/p urostomy, s/p L heel spur and Achilles repair who presents to the office for an annual physical. Patient endorses that she feels her legs are heavy with ambulation which impedes her ability to exercise. She also is s/p L achilles and bone spur repair where she did not complete any physical therapy so her ROM is also decreased from that standpoint. Patient is an avid traveler and requests for improved QOL for her trips. She notes that when she travels she uses Lomotil that provides relief with diarrhea on vacations, she also states that she has not lost any weight while on Trulicity and requests another medication. Patient denies fevers, HA, dizziness, CP, SOB, palpitations, unintentional weight loss, night sweats, abdominal pain, NVD, changes with urostomy or output, leg swelling, or any other sx at this time.         Adult Annual Physical:  Patient presents for annual physical.     Diet and Physical Activity:  - Diet/Nutrition: portion control and well balanced diet.  - Exercise: walking.    General Health:  - Sleep: daytime hypersomnolence and 4-6 hours of sleep on average.  - Hearing: normal hearing bilateral ears.  - Vision: goes for regular eye exams.  - Dental: regular dental  "visits.    /GYN Health:    - Menopause: postmenopausal.   - History of STDs: no    Advanced Care Planning:  - Has an advanced directive?: no    - Has a durable medical POA?: no      Review of Systems   Constitutional:  Negative for chills and fever.   HENT:  Negative for ear pain, rhinorrhea and sore throat.    Eyes:  Negative for pain and visual disturbance.   Respiratory:  Negative for cough, chest tightness, shortness of breath and wheezing.    Cardiovascular:  Negative for chest pain and palpitations.   Gastrointestinal:  Negative for abdominal distention, abdominal pain, diarrhea and vomiting.   Genitourinary:  Negative for dysuria, flank pain, frequency, hematuria and urgency.   Musculoskeletal:  Negative for arthralgias, back pain and myalgias.   Skin:  Negative for color change and rash.   Neurological:  Negative for dizziness, seizures, syncope, weakness, light-headedness, numbness and headaches.   Psychiatric/Behavioral:  Negative for agitation and confusion.    All other systems reviewed and are negative.            Objective     /78 (BP Location: Left arm, Patient Position: Sitting, Cuff Size: Large)   Pulse 80   Temp 97.7 °F (36.5 °C) (Tympanic)   Resp 16   Ht 5' 6\" (1.676 m)   Wt 102 kg (224 lb)   SpO2 97%   BMI 36.15 kg/m²     Physical Exam  Constitutional:       General: She is not in acute distress.     Appearance: Normal appearance. She is obese. She is not ill-appearing, toxic-appearing or diaphoretic.   HENT:      Head: Normocephalic and atraumatic.      Mouth/Throat:      Mouth: Mucous membranes are moist.   Eyes:      Extraocular Movements: Extraocular movements intact.      Conjunctiva/sclera: Conjunctivae normal.      Pupils: Pupils are equal, round, and reactive to light.   Cardiovascular:      Rate and Rhythm: Normal rate and regular rhythm.      Pulses: Pulses are weak.           Dorsalis pedis pulses are 1+ on the right side and 1+ on the left side.        Posterior tibial " pulses are 1+ on the right side and 1+ on the left side.      Heart sounds: Murmur heard.      No friction rub. No gallop.   Pulmonary:      Effort: Pulmonary effort is normal. No respiratory distress.      Breath sounds: Normal breath sounds. No wheezing or rhonchi.   Chest:      Chest wall: No tenderness.   Abdominal:      General: Bowel sounds are normal. There is no distension.      Tenderness: There is no abdominal tenderness. There is no right CVA tenderness, left CVA tenderness, guarding or rebound.      Comments: Urostomy in place draining appropriately    Musculoskeletal:         General: Normal range of motion.      Cervical back: Normal range of motion.      Comments: Nonhealing wound to the L ankle no surrounding erythema, wamrth. Mild tenderness to palpation, decreased pulses bilaterally    Feet:      Right foot:      Skin integrity: Callus present. No erythema.      Left foot:      Skin integrity: Callus present. No erythema.   Skin:     General: Skin is warm.   Neurological:      General: No focal deficit present.      Mental Status: She is alert and oriented to person, place, and time.      Cranial Nerves: No cranial nerve deficit.      Sensory: No sensory deficit.      Motor: No weakness.      Coordination: Coordination normal.      Gait: Gait normal.   Psychiatric:         Mood and Affect: Mood normal.         Behavior: Behavior normal.         Thought Content: Thought content normal.       Diabetic Foot Exam    Patient's shoes and socks removed.    Right Foot/Ankle   Right Foot Inspection  Skin Exam: callus and callus. Skin not intact and no erythema.     Toe Exam: ROM and strength within normal limits.     Sensory   Proprioception: diminished  Monofilament testing: absent    Vascular  Capillary refills: elevated  The right DP pulse is 1+. The right PT pulse is 1+.     Left Foot/Ankle  Left Foot Inspection  Skin Exam: callus. Skin not intact and no erythema.     Toe Exam: ROM and strength within  normal limits.     Sensory   Proprioception: diminished  Monofilament testing: absent    Vascular  Capillary refills: elevated  The left DP pulse is 1+. The left PT pulse is 1+.     Assign Risk Category  No deformity present  Loss of protective sensation  Weak pulses  Risk: 2    Administrative Statements   I have spent a total time of 35 minutes in caring for this patient on the day of the visit/encounter including Diagnostic results, Instructions for management, Patient and family education, Risk factor reductions, Counseling / Coordination of care, Reviewing / ordering tests, medicine, procedures  , and Communicating with other healthcare professionals .

## 2024-07-29 ENCOUNTER — TELEPHONE (OUTPATIENT)
Age: 69
End: 2024-07-29

## 2024-07-29 DIAGNOSIS — K21.9 GASTROESOPHAGEAL REFLUX DISEASE WITHOUT ESOPHAGITIS: ICD-10-CM

## 2024-07-29 RX ORDER — PANTOPRAZOLE SODIUM 40 MG/1
TABLET, DELAYED RELEASE ORAL
Qty: 90 TABLET | Refills: 0 | Status: SHIPPED | OUTPATIENT
Start: 2024-07-29

## 2024-07-29 NOTE — TELEPHONE ENCOUNTER
PA for Mounjaro 2.5MG Approved     Date(s) approved     Case #: PA Case ID: [id number]          Patient advised by          [] [employer] Message  [x] Phone call   [x]LMOM  []L/M to call office as no active Communication consent on file  []Unable to leave detailed message as VM not approved on Communication consent       Pharmacy advised by    [x]Fax  []Phone call    Approval letter scanned into Media No

## 2024-07-29 NOTE — TELEPHONE ENCOUNTER
PA for Mounjaro 2.5MG    Submitted via    [x]CMM-KEY:  [id number] - PA Case ID: [id number]  [][employer]-Case ID #   []Faxed to plan   []Other website   []Phone call Case ID #     Office notes sent, clinical questions answered. Awaiting determination    Turnaround time for your insurance to make a decision on your Prior Authorization can take 7-21 business days.

## 2024-07-31 ENCOUNTER — TELEPHONE (OUTPATIENT)
Age: 69
End: 2024-07-31

## 2024-07-31 NOTE — TELEPHONE ENCOUNTER
Pt has questions about her new rx for Monjaro. Pt state she just took her medication Trulicity 4.5 MG/0.5ML injection on 7/28/24 and would like to know of she should start her     tirzepatide (Mounjaro) 2.5 MG/0.5ML  On 8/4/24 or 8/11/24. Please advise

## 2024-07-31 NOTE — TELEPHONE ENCOUNTER
I called and left message for patient to start the Mounjaro medication on 08/04 as per Dr. Lloyd's instructions.

## 2024-08-02 ENCOUNTER — OFFICE VISIT (OUTPATIENT)
Dept: WOUND CARE | Facility: HOSPITAL | Age: 69
End: 2024-08-02
Payer: COMMERCIAL

## 2024-08-02 VITALS
SYSTOLIC BLOOD PRESSURE: 175 MMHG | BODY MASS INDEX: 35.52 KG/M2 | HEART RATE: 68 BPM | RESPIRATION RATE: 16 BRPM | DIASTOLIC BLOOD PRESSURE: 90 MMHG | TEMPERATURE: 98 F | WEIGHT: 221 LBS | HEIGHT: 66 IN

## 2024-08-02 DIAGNOSIS — L97.322 NON-PRESSURE CHRONIC ULCER OF LEFT ANKLE WITH FAT LAYER EXPOSED (HCC): Primary | ICD-10-CM

## 2024-08-02 DIAGNOSIS — E11.9 TYPE 2 DIABETES MELLITUS TREATED WITHOUT INSULIN (HCC): ICD-10-CM

## 2024-08-02 PROCEDURE — 11042 DBRDMT SUBQ TIS 1ST 20SQCM/<: CPT | Performed by: PODIATRIST

## 2024-08-02 PROCEDURE — 99213 OFFICE O/P EST LOW 20 MIN: CPT | Performed by: PODIATRIST

## 2024-08-02 PROCEDURE — G0463 HOSPITAL OUTPT CLINIC VISIT: HCPCS | Performed by: PODIATRIST

## 2024-08-02 RX ORDER — LIDOCAINE 40 MG/G
CREAM TOPICAL ONCE
Status: COMPLETED | OUTPATIENT
Start: 2024-08-02 | End: 2024-08-02

## 2024-08-02 RX ADMIN — LIDOCAINE: 40 CREAM TOPICAL at 09:36

## 2024-08-02 NOTE — PATIENT INSTRUCTIONS
Orders Placed This Encounter   Procedures    Wound cleansing and dressings Diabetic Ulcer Left;Posterior Ankle     Wound location Left posterior ankle.    Change dressing 3x per week.    You may remove the dressing and shower. Do not leave wound open to air, apply new dressing immediately.  Cleanse the wound with Prophase, pat dry. (Sent with patient).   Apply Silver Alginate to the wound.  Cover with ABD.    Secure with roll gauze and tape.          Off-loading Instructions:  Wear CAM Boot as directed by your physician. Put on immediately when rising in the morning and remove when going to bed.        Dr. Rodriguez has ordered vascular testing.  This will need to be scheduled ASAP.    Supplies ordered today through [facility]  3      Standing Status:   Future     Standing Expiration Date:   8/16/2024

## 2024-08-02 NOTE — PROGRESS NOTES
Wound Procedure Treatment Diabetic Ulcer Left;Posterior Ankle    Performed by: Graciela Bang RN  Authorized by: Aram Rodriguez DPM    Associated wounds:   Wound 08/02/24 Diabetic Ulcer Ankle Left;Posterior  Wound cleansed with:  NSS and Wound aggrssively cleansed with NSS and gauze  Applied primary dressing:  Calcium alginate and Silver  Applied secondary dressing:  ABD  Dressing secured with:  Christa, Tape and Tubifast

## 2024-08-02 NOTE — PROGRESS NOTES
Patient ID: Jillian Arango is a 69 y.o. female Date of Birth 1955     Diagnosis:  1. Non-pressure chronic ulcer of left ankle with fat layer exposed (HCC)  -     lidocaine (LMX) 4 % cream  -     Wound cleansing and dressings Diabetic Ulcer Left;Posterior Ankle; Future  -     Wound Procedure Treatment Diabetic Ulcer Left;Posterior Ankle  -     VAS ARTERIAL DUPLEX-LOWER LIMB UNILATERAL; Future; Expected date: 08/02/2024  -     Debridement Diabetic Ulcer Left;Posterior Ankle  2. Type 2 diabetes mellitus treated without insulin (HCC)     Diagnosis ICD-10-CM Associated Orders   1. Non-pressure chronic ulcer of left ankle with fat layer exposed (HCC)  L97.322 lidocaine (LMX) 4 % cream     Wound cleansing and dressings Diabetic Ulcer Left;Posterior Ankle     Wound Procedure Treatment Diabetic Ulcer Left;Posterior Ankle     VAS ARTERIAL DUPLEX-LOWER LIMB UNILATERAL     Debridement Diabetic Ulcer Left;Posterior Ankle      2. Type 2 diabetes mellitus treated without insulin (HCC)  E11.9            Assessment & Plan:  Plan for evaluation of arterial status, I cannot feel pulses at this point.  Given the duration of time the wound has been present I like to order these.    I would like her to utilize her cam boot to help decrease motion at the level of the Achilles tendon.  Will also recommend padding along the posterior aspect of the Achilles for protection.    Surgical debridement completed today to the area.    Will plan on checking her back for reevaluation in 1 to 2 weeks.    I did review the notes from patient surgical intervention as well as her vascular surgery evaluation for her history of DVT.  Reviewed patient's A1c which was 6.3.        If the patient notices any systemic signs of infection including but not limited to fever, chills, nausea, vomiting or significant worsening of wound with increased drainage, redness or streaking up the foot or leg the patient should notify the office or present to the  emergency room.      If wound debridement was completed today this was done in an attempt to promote healing, decrease risk of infection and for limb salvage efforts.     Goal of treatment: wound healing     Efforts to decrease pressure on the wound(s), evaluation and discussion of nutritional status, peripheral vascular status, and infection control have all been addressed with this patient.    Return in about 2 weeks (around 8/16/2024) for Next scheduled follow up, Wound Assessment, recheck.      Chief Complaint   Patient presents with   • New Patient Visit     Left ankle wound           Subjective:   Patient has history of surgical intervention in November 2023 for Achilles tendon and posterior calcaneal surgical intervention.  She unfortunately developed ulceration to this area she is underwent grafting with [place] graft in the past.  She has had courses of antibiotics as well.  She was referred to the wound center for further evaluation and treatment.  She is currently wearing regular shoes she has Band-Aids in place to the area.  She is denying any fevers chills nausea vomiting or other issues she does have a history of DVT in the past she is currently on chronic anticoagulation with Pradaxa.        The following portions of the patient's history were reviewed and updated as appropriate:   Patient Active Problem List   Diagnosis   • Small bowel obstruction (HCC)   • Type 2 diabetes mellitus (HCC)   • History of urostomy   • Endometrial cancer (HCC)   • Hypothyroidism   • In vitro fertilization   • Anemia   • Hydroureteronephrosis   • HTN (hypertension)   • Migraine   • Abdominal adhesions   • Tooth infection   • Class 2 obesity with alveolar hypoventilation without serious comorbidity with body mass index (BMI) of 36.0 to 36.9 in adult (HCC)   • Headache   • Ureteral-ileal loop anastomotic stricture   • Chronic deep vein thrombosis (DVT) of femoral vein of right lower extremity (HCC)   • Bone infarction (HCC)    • Anti-cardiolipin antibody positive   • Encounter for attention to other artificial openings of urinary tract (HCC)   • Leg pain, anterior, right   • Nausea and vomiting   • Hypercalcemia   • Vertigo   • History of smoking   • Decreased appetite   • Postoperative state   • Bursitis of posterior heel, left   • Achilles tendinosis of left ankle   • Depression, recurrent (HCC)   • Postoperative wound dehiscence, subsequent encounter   • Achilles tendinitis of left lower extremity   • Acute post-operative pain   • PAD (peripheral artery disease) (HCC)   • Diarrhea     Past Medical History:   Diagnosis Date   • Anemia    • Back pain    • Bowel obstruction (HCC) 2017,2018   • Cancer (HCC)     uterine CA radiation destoried urethra.   Patient has urostomy   • Cholelithiasis    • Colon polyp    • Deep vein thrombosis of left lower extremity (HCC) 01/11/2004    on blood thinner for 3 years.   • Diabetes mellitus (HCC)     type 2 BS 6/6/22 @ 0600 was 145   • Disease of thyroid gland    • Elevated lactic acid level 01/16/2021   • Endometrial cancer (HCC) 1999   • GERD (gastroesophageal reflux disease)    • Gross hematuria    • Hiatal hernia    • History of transfusion 1999    Post Hysterectomy   • History of urostomy 01/11/2004    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.   • Hypothyroid    • In vitro fertilization    • Kidney stone    • Migraines    • Muscle weakness     abdominal   • Nausea and vomiting 10/05/2023   • Personal history of irradiation    • Renal cyst    • Sleep apnea     Resolved due to Weight Loss     Past Surgical History:   Procedure Laterality Date   • ACHILLES TENDON REPAIR Right    • APPENDECTOMY     • COLONOSCOPY      6/6/22   • COLONOSCOPY W/ BIOPSIES N/A 12/2015   • EGD     • HEEL SPUR SURGERY Right 1996   • HYSTERECTOMY     • 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   • IR NEPHROSTOMY TUBE PLACEMENT  01/18/2021   • LAPAROSCOPIC GASTRIC BANDING N/A 2006    [place], [place]    • OTHER SURGICAL HISTORY      Urine shunt to intestine, transverse colon   • PCNL Left 2021    Procedure: NEPHROLITHOTOMY  PERCUTANEOUS (PCNL);  Surgeon: Ziggy Roldan MD;  [facility];  Service: Urology   • HI CYSTO/URETERO W/LITHOTRIPSY &INDWELL STENT INSRT Left 2021    Procedure: anterograde  URETEROSCOPY with dilation of ureteral stricture, INSERTION STENT URETERAL, exchange  of nephrostomy tube;  Surgeon: Ziggy Roldan MD;  [facility];  Service: Urology   • HI DEBRIDEMENT SUBCUTANEOUS TISSUE 1ST 20 SQ CM/< Left 2024    Procedure: DEBRIDEMENT LOWER EXTREMITY (WASH OUT) with PHOENIX GRAFT APPLICATION, foot wound;  Surgeon: Darron Frias DPM;  Location: [facility];  Service: Podiatry   • HI LAPAROSCOPY ENTEROLYSIS SEPARATE PROCEDURE N/A 2019    Procedure: LAPAROSCOPIC LYSIS OF ADHESIONS;  Surgeon: Omar Jack MD;  Location:  MAIN OR;  Service: General   • HI REPAIR SECONDARY ACHILLES TENDON W/WO GRAFT Left 2023    Procedure: REMOVAL OF POSTERIOR HEEL SPUR WITH REPAIR TENDON ACHILLES;  Surgeon: Darron Frias DPM;  Location: [facility];  Service: Podiatry   • [facility]   • SLEEVE GASTROPLASTY N/A 2015    Dr. Jack, [facility]   • URETER REVISION     • WEILBY THUMB ARTHROPLASTY     • WEILBY THUMB ARTHROPLASTY       Social History     Socioeconomic History   • Marital status: /Civil Union     Spouse name: Ari   • Number of children: 0   • Years of education: 14   • Highest education level: None   Occupational History   • Occupation: Hair styist    Tobacco Use   • Smoking status: Former     Current packs/day: 0.00     Average packs/day: 1.5 packs/day for 12.0 years (18.0 ttl pk-yrs)     Types: Cigarettes     Start date: 1974     Quit date: 1986     Years since quittin.5     Passive exposure: Never   • Smokeless tobacco: Never   Vaping Use   • Vaping  status: Never Used   Substance and Sexual Activity   • Alcohol use: Not Currently   • Drug use: Never   • Sexual activity: Never     Comment: s/p hysterectomy   Other Topics Concern   • None   Social History Narrative   • None     Social Determinants of Health     Financial Resource Strain: Not on file   Food Insecurity: Not on file   Transportation Needs: No Transportation Needs (6/26/2023)    PRAPARE - Transportation    • Lack of Transportation (Medical): No    • Lack of Transportation (Non-Medical): No   Physical Activity: Not on file   Stress: Not on file   Social Connections: Not on file   Intimate Partner Violence: Not on file   Housing Stability: Unknown (6/26/2023)    Housing Stability Vital Sign    • Unable to Pay for Housing in the Last Year: No    • Number of Times Moved in the Last Year: Not on file    • Homeless in the Last Year: No        Current Outpatient Medications:   •  ALPRAZolam (XANAX) 0.5 mg tablet, Take 0.5 tablets (0.25 mg total) by mouth daily at bedtime as needed for anxiety Takes 1/2 0.5mg = 0.25 mg @ bedtime prn, Disp: 30 tablet, Rfl: 0  •  Ascorbic Acid (VITAMIN C PO), Take 1 capsule by mouth daily, Disp: , Rfl:   •  BIOTIN PO, Take 1 capsule by mouth daily, Disp: , Rfl:   •  dabigatran etexilate (PRADAXA) 150 mg capsu, TAKE ONE CAPSULE BY MOUTH TWO TIMES A DAY, Disp: 200 capsule, Rfl: 1  •  FREESTYLE LITE test strip, Check blood sugar 1-2 times daily, Disp: 100 each, Rfl: 4  •  furosemide (LASIX) 20 mg tablet, Take 1 tablet (20 mg total) by mouth daily as needed (Swelling in the legs) for up to 10 days, Disp: 10 tablet, Rfl: 0  •  meclizine (ANTIVERT) 12.5 MG tablet, Take 1 tablet (12.5 mg total) by mouth every 8 (eight) hours as needed for nausea or dizziness for up to 14 days, Disp: 40 tablet, Rfl: 0  •  metFORMIN (GLUCOPHAGE) 500 mg tablet, TAKE ONE TABLET BY MOUTH TWICE A DAY WITH MEALS, Disp: 180 tablet, Rfl: 1  •  pantoprazole (PROTONIX) 40 mg tablet, TAKE ONE TABLET BY MOUTH  "EVERY DAY IN THE EARLY MORNING, Disp: 90 tablet, Rfl: 0  •  pentoxifylline (TRENtal) 400 mg ER tablet, Take 1 tablet (400 mg total) by mouth 3 (three) times a day with meals, Disp: 270 tablet, Rfl: 0  •  pramipexole (MIRAPEX) 0.5 mg tablet, Take 1 mg by mouth daily at bedtime, Disp: , Rfl:   •  Probiotic Product (Probiotic Daily) CAPS, Take 1 capsule by mouth in the morning, Disp: 10 capsule, Rfl: 0  •  Synthroid 75 MCG tablet, Take 1 tablet (75 mcg total) by mouth daily in the early morning, Disp: 90 tablet, Rfl: 2  •  tirzepatide (Mounjaro) 2.5 MG/0.5ML, Inject 0.5 mL (2.5 mg total) under the skin every 7 days, Disp: 2 mL, Rfl: 0  •  Trulicity 4.5 MG/0.5ML injection, Inject 0.5 mL (4.5 mg total) under the skin every 7 days (Patient not taking: Reported on 8/2/2024), Disp: 12 mL, Rfl: 0  No current facility-administered medications for this visit.  Family History   Problem Relation Age of Onset   • No Known Problems Mother    • Kidney failure Father    • Brain cancer Father    • Kidney cancer Father    • Diabetes Sister    • Alcohol abuse Sister    • Diabetes Brother       Review of Systems  Allergies:  Amoxicillin, Penicillins, and Adhesive [medical tape]      Objective:  BP (!) 175/90   Pulse 68   Temp 98 °F (36.7 °C)   Resp 16   Ht 5' 6\" (1.676 m)   Wt 100 kg (221 lb)   BMI 35.67 kg/m²     Physical Exam      Wound 08/02/24 Diabetic Ulcer Ankle Left;Posterior (Active)   Wound Image Images linked 08/02/24 1002   Wound Description Pink;Granulation tissue;Yellow;Slough 08/02/24 0929   Dorina-wound Assessment Scar Tissue;Intact 08/02/24 0929   Wound Length (cm) 5 cm 08/02/24 0929   Wound Width (cm) 2 cm 08/02/24 0929   Wound Depth (cm) 0.1 cm 08/02/24 0929   Wound Surface Area (cm^2) 10 cm^2 08/02/24 0929   Wound Volume (cm^3) 1 cm^3 08/02/24 0929   Calculated Wound Volume (cm^3) 1 cm^3 08/02/24 0929   Drainage Amount Moderate 08/02/24 0929   Drainage Description Serosanguineous;Yellow 08/02/24 0929   Non-staged " "Wound Description Full thickness 08/02/24 0929   Dressing Status Intact 08/02/24 0929                    Debridement   Wound 08/02/24 Diabetic Ulcer Ankle Left;Posterior    Universal Protocol:  Procedure performed by: (Barber Cannon PGY4)  Consent: Verbal consent obtained.  Risks and benefits: risks, benefits and alternatives were discussed  Consent given by: patient  Time out: Immediately prior to procedure a \"time out\" was called to verify the correct patient, procedure, equipment, support staff and site/side marked as required.  Patient understanding: patient states understanding of the procedure being performed  Patient identity confirmed: verbally with patient    Debridement Details  Performed by: resident  Debridement type: surgical  Level of debridement: subcutaneous tissue  Pain control: lidocaine 4%      Post-debridement measurements  Length (cm): 5.4  Width (cm): 2.4  Depth (cm): 0.1  Percent debrided: 100%  Surface Area (cm^2): 12.96  Area Debrided (cm^2): 12.96  Volume (cm^3): 1.3    Tissue and other material debrided: subcutaneous tissue  Devitalized tissue debrided: biofilm, callus and fibrin  Instrument(s) utilized: blade  Bleeding: small  Hemostasis obtained with: pressure  Procedural pain (0-10): insensate  Post-procedural pain: insensate   Response to treatment: procedure was tolerated well                 Wound Instructions:  Orders Placed This Encounter   Procedures   • Wound cleansing and dressings Diabetic Ulcer Left;Posterior Ankle     Wound location Left posterior ankle.    Change dressing 3x per week.    You may remove the dressing and shower. Do not leave wound open to air, apply new dressing immediately.  Cleanse the wound with Prophase, pat dry. (Sent with patient).   Apply Silver Alginate to the wound.  Cover with ABD.    Secure with roll gauze and tape.          Off-loading Instructions:  Wear CAM Boot as directed by your physician. Put on immediately when rising in the morning and " "remove when going to bed.        Dr. Rodriguez has ordered vascular testing.  This will need to be scheduled ASAP.    Supplies ordered today through [facility]  4      Standing Status:   Future     Standing Expiration Date:   8/16/2024   • Wound Procedure Treatment Diabetic Ulcer Left;Posterior Ankle     This order was created via procedure documentation   • Debridement Diabetic Ulcer Left;Posterior Ankle     This order was created via procedure documentation         Aram Rodriguez DPM      Portions of the record may have been created with voice recognition software. Occasional wrong word or \"sound a like\" substitutions may have occurred due to the inherent limitations of voice recognition software. Read the chart carefully and recognize, using context, where substitutions have occurred.    "

## 2024-08-06 ENCOUNTER — TELEPHONE (OUTPATIENT)
Dept: WOUND CARE | Facility: HOSPITAL | Age: 69
End: 2024-08-06

## 2024-08-06 NOTE — TELEPHONE ENCOUNTER
Received call from patient reporting that since wearing the cam boot, she has been having more bleeding from her left ankle wound.  Patient reports that she has been changing the dressing daily and it has been coming through the abd pad.    Spoke with Dr. Rodriguez regarding same.  Received order for patient to wear surgical shoe until her next appt if CAM boot seems to be increasing the bleeding.  Discussed increasing padding to area to protect.  Patient has regular gauze in home.  Aware to apply Silver Alginate then gauze then ABD to add a bit more padding to area.  Patient verbalized understanding of above.

## 2024-08-09 LAB
LEFT EYE DIABETIC RETINOPATHY: POSITIVE
RIGHT EYE DIABETIC RETINOPATHY: POSITIVE

## 2024-08-10 DIAGNOSIS — F41.9 ANXIETY: ICD-10-CM

## 2024-08-12 ENCOUNTER — TELEPHONE (OUTPATIENT)
Age: 69
End: 2024-08-12

## 2024-08-12 RX ORDER — ALPRAZOLAM 0.5 MG
TABLET ORAL
Qty: 30 TABLET | Refills: 0 | Status: SHIPPED | OUTPATIENT
Start: 2024-08-12

## 2024-08-12 NOTE — TELEPHONE ENCOUNTER
Pt called requesting a doctor note to be excused from jury duty. She needs the note within 5 days. She is due to report on 9/18/24 in [place] for three days. Her juror number is [phone].    Please advise patient at [phone]

## 2024-08-13 ENCOUNTER — TELEPHONE (OUTPATIENT)
Age: 69
End: 2024-08-13

## 2024-08-13 ENCOUNTER — TELEPHONE (OUTPATIENT)
Dept: INTERNAL MEDICINE CLINIC | Facility: CLINIC | Age: 69
End: 2024-08-13

## 2024-08-13 NOTE — TELEPHONE ENCOUNTER
Pt following up on the jury duty letter pt stated she needs its asap her deadline to submit is in 4 days. Please call pt when letter is available to be picked up

## 2024-08-13 NOTE — TELEPHONE ENCOUNTER
Chart reviewed prior to calling patient.     Spoke with patient reviewed current health status and plan of care. Patient does have history/current conditions including but not limited to     Hydroureteronephrosis with Urostomy bag  Q 2hrs  Deconditioning  Ambulatory dysfunction   Non healing non pressure chronic ulcer of left ankle with reccurent wound care evaluations.     For which I believe patient is not fit for Jury Duty at this time.     Jury duty letter created and left for patient

## 2024-08-16 ENCOUNTER — OFFICE VISIT (OUTPATIENT)
Dept: WOUND CARE | Facility: HOSPITAL | Age: 69
End: 2024-08-16
Payer: COMMERCIAL

## 2024-08-16 VITALS
DIASTOLIC BLOOD PRESSURE: 93 MMHG | TEMPERATURE: 97.6 F | RESPIRATION RATE: 16 BRPM | SYSTOLIC BLOOD PRESSURE: 145 MMHG | HEART RATE: 75 BPM

## 2024-08-16 DIAGNOSIS — E11.9 TYPE 2 DIABETES MELLITUS TREATED WITHOUT INSULIN (HCC): ICD-10-CM

## 2024-08-16 DIAGNOSIS — L97.322 NON-PRESSURE CHRONIC ULCER OF LEFT ANKLE WITH FAT LAYER EXPOSED (HCC): Primary | ICD-10-CM

## 2024-08-16 PROCEDURE — 97597 DBRDMT OPN WND 1ST 20 CM/<: CPT | Performed by: PODIATRIST

## 2024-08-16 RX ORDER — LIDOCAINE 40 MG/G
CREAM TOPICAL ONCE
Status: COMPLETED | OUTPATIENT
Start: 2024-08-16 | End: 2024-08-16

## 2024-08-16 RX ADMIN — LIDOCAINE: 40 CREAM TOPICAL at 08:51

## 2024-08-16 NOTE — PATIENT INSTRUCTIONS
Orders Placed This Encounter   Procedures    Wound cleansing and dressings Diabetic Ulcer Left;Posterior Ankle     Wound location Left posterior ankle.    Change dressing 3x/wk and as needed for strikethrough drainage   You may remove the dressing and shower. Do not leave wound open to air, apply new dressing immediately.  Cleanse the wound with Prophase (given)  Apply Silver Alginate cut to fit the wound  Cover with ABD.    Secure with roll gauze and tape.       Purchase open back CAM as discussed with Dr Rodriguez and added to your check out basket on [employer].  You may also purchase more ABDs on [employer].    Do not debride your wound with sharp instruments - leave that to Dr. Rodriguez at the wound center.      Off-loading Instructions:  Wear CAM Boot as directed by your physician.   Put on immediately when rising in the morning and remove when going to bed.     Standing Status:   Future     Standing Expiration Date:   8/30/2024

## 2024-08-16 NOTE — PROGRESS NOTES
Wound Procedure Treatment Diabetic Ulcer Left;Posterior Ankle    Performed by: Verenice Subramanian RN  Authorized by: Aram Rodriguez DPM    Associated wounds:   Wound 08/02/24 Diabetic Ulcer Ankle Left;Posterior  Wound cleansed with:  NSS  Applied primary dressing:  Calcium alginate and Silver  Applied secondary dressing:  ABD  Dressing secured with:  Christa, Tape and Tubifast  Offloading device appllied:  Surgical shoe

## 2024-08-16 NOTE — PROGRESS NOTES
Patient ID: Jillian Arango is a 69 y.o. female Date of Birth 1955     Diagnosis:  1. Non-pressure chronic ulcer of left ankle with fat layer exposed (HCC)  -     lidocaine (LMX) 4 % cream  -     Wound cleansing and dressings Diabetic Ulcer Left;Posterior Ankle; Future  -     Wound Procedure Treatment Diabetic Ulcer Left;Posterior Ankle  -     Debridement Diabetic Ulcer Left;Posterior Ankle  2. Type 2 diabetes mellitus treated without insulin (HCC)  -     lidocaine (LMX) 4 % cream  -     Wound cleansing and dressings Diabetic Ulcer Left;Posterior Ankle; Future  -     Wound Procedure Treatment Diabetic Ulcer Left;Posterior Ankle  -     Debridement Diabetic Ulcer Left;Posterior Ankle     Diagnosis ICD-10-CM Associated Orders   1. Non-pressure chronic ulcer of left ankle with fat layer exposed (HCC)  L97.322 lidocaine (LMX) 4 % cream     Wound cleansing and dressings Diabetic Ulcer Left;Posterior Ankle     Wound Procedure Treatment Diabetic Ulcer Left;Posterior Ankle     Debridement Diabetic Ulcer Left;Posterior Ankle      2. Type 2 diabetes mellitus treated without insulin (HCC)  E11.9 lidocaine (LMX) 4 % cream     Wound cleansing and dressings Diabetic Ulcer Left;Posterior Ankle     Wound Procedure Treatment Diabetic Ulcer Left;Posterior Ankle     Debridement Diabetic Ulcer Left;Posterior Ankle           Assessment & Plan:  Recommend cam boot that has opened back to it she felt there was rubbing from the cam boot that she obtained.    We did go on [employer] and found 1 that would likely work for her.    I also performed a selective debridement today.    Her wound is slightly improved.  I also discussed with the option of skin grafting if she does not have improvement with localized wound care.          If the patient notices any systemic signs of infection including but not limited to fever, chills, nausea, vomiting or significant worsening of wound with increased drainage, redness or streaking up the foot or leg  the patient should notify the office or present to the emergency room.      If wound debridement was completed today this was done in an attempt to promote healing, decrease risk of infection and for limb salvage efforts.     Goal of treatment: wound healing     Efforts to decrease pressure on the wound(s), evaluation and discussion of nutritional status, peripheral vascular status, and infection control have all been addressed with this patient.    Return in about 2 weeks (around 8/30/2024) for Recheck, Wound Assessment.      Chief Complaint   Patient presents with   • Follow Up Wound Care Visit     L heel wound           Subjective:   Patient returns for follow-up on left ankle wound.  She was sent previously for vascular studies.  We had recommended cam boot immobilization to minimize motion at the level of the Achilles tendon.  In addition to some padding to the area.    A1C was 6.3 5/22/24        The following portions of the patient's history were reviewed and updated as appropriate:   Patient Active Problem List   Diagnosis   • Small bowel obstruction (HCC)   • Type 2 diabetes mellitus (HCC)   • History of urostomy   • Endometrial cancer (HCC)   • Hypothyroidism   • In vitro fertilization   • Anemia   • Hydroureteronephrosis   • HTN (hypertension)   • Migraine   • Abdominal adhesions   • Tooth infection   • Class 2 obesity with alveolar hypoventilation without serious comorbidity with body mass index (BMI) of 36.0 to 36.9 in adult (HCC)   • Headache   • Ureteral-ileal loop anastomotic stricture   • Chronic deep vein thrombosis (DVT) of femoral vein of right lower extremity (HCC)   • Bone infarction (HCC)   • Anti-cardiolipin antibody positive   • Encounter for attention to other artificial openings of urinary tract (HCC)   • Leg pain, anterior, right   • Nausea and vomiting   • Hypercalcemia   • Vertigo   • History of smoking   • Decreased appetite   • Postoperative state   • Bursitis of posterior heel, left    • Achilles tendinosis of left ankle   • Depression, recurrent (HCC)   • Postoperative wound dehiscence, subsequent encounter   • Achilles tendinitis of left lower extremity   • Acute post-operative pain   • PAD (peripheral artery disease) (HCC)   • Diarrhea     Past Medical History:   Diagnosis Date   • Anemia    • Back pain    • Bowel obstruction (HCC) 2017,2018   • Cancer (HCC)     uterine CA radiation destoried urethra.   Patient has urostomy   • Cholelithiasis    • Colon polyp    • Deep vein thrombosis of left lower extremity (HCC) 01/11/2004    on blood thinner for 3 years.   • Diabetes mellitus (HCC)     type 2 BS 6/6/22 @ 0600 was 145   • Disease of thyroid gland    • Elevated lactic acid level 01/16/2021   • Endometrial cancer (HCC) 1999   • GERD (gastroesophageal reflux disease)    • Gross hematuria    • Hiatal hernia    • History of transfusion 1999    Post Hysterectomy   • History of urostomy 01/11/2004    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.   • Hypothyroid    • In vitro fertilization    • Kidney stone    • Migraines    • Muscle weakness     abdominal   • Nausea and vomiting 10/05/2023   • Personal history of irradiation    • Renal cyst    • Sleep apnea     Resolved due to Weight Loss     Past Surgical History:   Procedure Laterality Date   • ACHILLES TENDON REPAIR Right    • APPENDECTOMY     • COLONOSCOPY      6/6/22   • COLONOSCOPY W/ BIOPSIES N/A 12/2015   • EGD     • HEEL SPUR SURGERY Right 1996   • HYSTERECTOMY     • 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   • IR NEPHROSTOMY TUBE PLACEMENT  01/18/2021   • LAPAROSCOPIC GASTRIC BANDING N/A 2006    [facility] [place]   • OTHER SURGICAL HISTORY  2004    Urine shunt to intestine, transverse colon   • PCNL Left 02/16/2021    Procedure: NEPHROLITHOTOMY  PERCUTANEOUS (PCNL);  Surgeon: Ziggy Roldan MD;  Location: [facility];  Service: Urology   • SC CYSTO/URETERO W/LITHOTRIPSY &INDWELL STENT INSRT Left 02/16/2021     Procedure: anterograde  URETEROSCOPY with dilation of ureteral stricture, INSERTION STENT URETERAL, exchange  of nephrostomy tube;  Surgeon: Ziggy Roldan MD;  Location[facility];  Service: Urology   • TX DEBRIDEMENT SUBCUTANEOUS TISSUE 1ST 20 SQ CM/< Left 2024    Procedure: DEBRIDEMENT LOWER EXTREMITY (WASH OUT) with PHOENIX GRAFT APPLICATION, foot wound;  Surgeon: Darron Frias DPM;  Location: [facility];  Service: Podiatry   • TX LAPAROSCOPY ENTEROLYSIS SEPARATE PROCEDURE N/A 2019    Procedure: LAPAROSCOPIC LYSIS OF ADHESIONS;  Surgeon: Omar Jack MD;  Location:  MAIN OR;  Service: General   • TX REPAIR SECONDARY ACHILLES TENDON W/WO GRAFT Left 2023    Procedure: REMOVAL OF POSTERIOR HEEL SPUR WITH REPAIR TENDON ACHILLES;  Surgeon: Darron Frias DPM;  Location:  MAIN OR;  Service: Podiatry   [facility]   • SLEEVE GASTROPLASTY N/A 2015    Dr. Jack, [facility]   • URETER REVISION     • WEILBY THUMB ARTHROPLASTY     • WEILBY THUMB ARTHROPLASTY       Social History     Socioeconomic History   • Marital status: /Civil Union     Spouse name: Ari   • Number of children: 0   • Years of education: 14   • Highest education level: None   Occupational History   • Occupation: [employer]    Tobacco Use   • Smoking status: Former     Current packs/day: 0.00     Average packs/day: 1.5 packs/day for 12.0 years (18.0 ttl pk-yrs)     Types: Cigarettes     Start date: 1974     Quit date: 1986     Years since quittin.6     Passive exposure: Never   • Smokeless tobacco: Never   Vaping Use   • Vaping status: Never Used   Substance and Sexual Activity   • Alcohol use: Not Currently   • Drug use: Never   • Sexual activity: Never     Comment: s/p hysterectomy   Other Topics Concern   • None   Social History Narrative   • None     Social Determinants of Health     Financial Resource Strain: Not on file   Food  Insecurity: Not on file   Transportation Needs: No Transportation Needs (6/26/2023)    PRAPARE - Transportation    • Lack of Transportation (Medical): No    • Lack of Transportation (Non-Medical): No   Physical Activity: Not on file   Stress: Not on file   Social Connections: Not on file   Intimate Partner Violence: Not on file   Housing Stability: Unknown (6/26/2023)    Housing Stability Vital Sign    • Unable to Pay for Housing in the Last Year: No    • Number of Times Moved in the Last Year: Not on file    • Homeless in the Last Year: No        Current Outpatient Medications:   •  ALPRAZolam (XANAX) 0.5 mg tablet, TAKE ONE-HALF TABLET BY MOUTH EVERY DAY AT BEDTIME AS NEEDED FOR ANXIETY, Disp: 30 tablet, Rfl: 0  •  Ascorbic Acid (VITAMIN C PO), Take 1 capsule by mouth daily, Disp: , Rfl:   •  BIOTIN PO, Take 1 capsule by mouth daily, Disp: , Rfl:   •  dabigatran etexilate (PRADAXA) 150 mg capsu, TAKE ONE CAPSULE BY MOUTH TWO TIMES A DAY, Disp: 200 capsule, Rfl: 1  •  FREESTYLE LITE test strip, Check blood sugar 1-2 times daily, Disp: 100 each, Rfl: 4  •  furosemide (LASIX) 20 mg tablet, Take 1 tablet (20 mg total) by mouth daily as needed (Swelling in the legs) for up to 10 days, Disp: 10 tablet, Rfl: 0  •  meclizine (ANTIVERT) 12.5 MG tablet, Take 1 tablet (12.5 mg total) by mouth every 8 (eight) hours as needed for nausea or dizziness for up to 14 days, Disp: 40 tablet, Rfl: 0  •  metFORMIN (GLUCOPHAGE) 500 mg tablet, TAKE ONE TABLET BY MOUTH TWICE A DAY WITH MEALS, Disp: 180 tablet, Rfl: 1  •  pantoprazole (PROTONIX) 40 mg tablet, TAKE ONE TABLET BY MOUTH EVERY DAY IN THE EARLY MORNING, Disp: 90 tablet, Rfl: 0  •  pentoxifylline (TRENtal) 400 mg ER tablet, Take 1 tablet (400 mg total) by mouth 3 (three) times a day with meals, Disp: 270 tablet, Rfl: 0  •  pramipexole (MIRAPEX) 0.5 mg tablet, Take 1 mg by mouth daily at bedtime, Disp: , Rfl:   •  Probiotic Product (Probiotic Daily) CAPS, Take 1 capsule by mouth  in the morning, Disp: 10 capsule, Rfl: 0  •  Synthroid 75 MCG tablet, Take 1 tablet (75 mcg total) by mouth daily in the early morning, Disp: 90 tablet, Rfl: 2  •  tirzepatide (Mounjaro) 2.5 MG/0.5ML, Inject 0.5 mL (2.5 mg total) under the skin every 7 days, Disp: 2 mL, Rfl: 0  •  Trulicity 4.5 MG/0.5ML injection, Inject 0.5 mL (4.5 mg total) under the skin every 7 days (Patient not taking: Reported on 8/2/2024), Disp: 12 mL, Rfl: 0  No current facility-administered medications for this visit.  Family History   Problem Relation Age of Onset   • No Known Problems Mother    • Kidney failure Father    • Brain cancer Father    • Kidney cancer Father    • Diabetes Sister    • Alcohol abuse Sister    • Diabetes Brother       Review of Systems  Allergies:  Amoxicillin, Penicillins, and Adhesive [medical tape]      Objective:  /93   Pulse 75   Temp 97.6 °F (36.4 °C)   Resp 16     Physical Exam      Wound 08/02/24 Diabetic Ulcer Ankle Left;Posterior (Active)   Enter Katz score: Katz Grade 1: Partial or full-thickness ulcer (superficial) 08/16/24 0850   Wound Image Images linked 08/16/24 0845   Wound Description Pink;Granulation tissue;Yellow;Slough 08/16/24 0850   Dorina-wound Assessment Scar Tissue;Intact 08/16/24 0850   Wound Length (cm) 4 cm 08/16/24 0850   Wound Width (cm) 1.4 cm 08/16/24 0850   Wound Depth (cm) 0.1 cm 08/16/24 0850   Wound Surface Area (cm^2) 5.6 cm^2 08/16/24 0850   Wound Volume (cm^3) 0.56 cm^3 08/16/24 0850   Calculated Wound Volume (cm^3) 0.56 cm^3 08/16/24 0850   Change in Wound Size % 44 08/16/24 0850   Drainage Amount Moderate 08/16/24 0850   Drainage Description Serosanguineous 08/16/24 0850   Non-staged Wound Description Full thickness 08/16/24 0850   Dressing Status Intact 08/16/24 0850         No images are attached to the encounter.         Debridement   Wound 08/02/24 Diabetic Ulcer Ankle Left;Posterior    Universal Protocol:  procedure performed by consultantConsent: Verbal  "consent obtained.  Risks and benefits: risks, benefits and alternatives were discussed  Consent given by: patient  Time out: Immediately prior to procedure a \"time out\" was called to verify the correct patient, procedure, equipment, support staff and site/side marked as required.  Patient understanding: patient states understanding of the procedure being performed  Patient identity confirmed: verbally with patient    Debridement Details  Performed by: physician  Debridement type: selective  Pain control: lidocaine 4%      Post-debridement measurements  Length (cm): 4  Width (cm): 1.4  Depth (cm): 0.1  Percent debrided: 100%  Surface Area (cm^2): 5.6  Area Debrided (cm^2): 5.6  Volume (cm^3): 0.56    Devitalized tissue debrided: biofilm, callus and fibrin  Instrument(s) utilized: blade  Bleeding: small  Hemostasis obtained with: pressure  Procedural pain (0-10): insensate  Post-procedural pain: insensate   Response to treatment: procedure was tolerated well                 Wound Instructions:  Orders Placed This Encounter   Procedures   • Wound cleansing and dressings Diabetic Ulcer Left;Posterior Ankle     Wound location Left posterior ankle.    Change dressing 3x/wk and as needed for strikethrough drainage   You may remove the dressing and shower. Do not leave wound open to air, apply new dressing immediately.  Cleanse the wound with Prophase (given)  Apply Silver Alginate cut to fit the wound  Cover with ABD.    Secure with roll gauze and tape.       Purchase open back CAM as discussed with Dr Rodriguez and added to your check out basket on [employer].  You may also purchase more ABDs on [employer].    Do not debride your wound with sharp instruments - leave that to Dr. Rodriguez at the wound center.      Off-loading Instructions:  Wear CAM Boot as directed by your physician.   Put on immediately when rising in the morning and remove when going to bed.     Standing Status:   Future     Standing Expiration Date:   8/30/2024   • " "Wound Procedure Treatment Diabetic Ulcer Left;Posterior Ankle     This order was created via procedure documentation   • Debridement Diabetic Ulcer Left;Posterior Ankle     This order was created via procedure documentation         Aram Rodriguez DPM      Portions of the record may have been created with voice recognition software. Occasional wrong word or \"sound a like\" substitutions may have occurred due to the inherent limitations of voice recognition software. Read the chart carefully and recognize, using context, where substitutions have occurred.    "

## 2024-08-21 ENCOUNTER — OFFICE VISIT (OUTPATIENT)
Dept: INTERNAL MEDICINE CLINIC | Facility: CLINIC | Age: 69
End: 2024-08-21
Payer: COMMERCIAL

## 2024-08-21 VITALS
HEIGHT: 66 IN | HEART RATE: 71 BPM | BODY MASS INDEX: 34.87 KG/M2 | DIASTOLIC BLOOD PRESSURE: 78 MMHG | SYSTOLIC BLOOD PRESSURE: 122 MMHG | OXYGEN SATURATION: 98 % | WEIGHT: 217 LBS | RESPIRATION RATE: 16 BRPM | TEMPERATURE: 98.2 F

## 2024-08-21 DIAGNOSIS — Z00.00 HEALTHCARE MAINTENANCE: ICD-10-CM

## 2024-08-21 DIAGNOSIS — I73.9 PAD (PERIPHERAL ARTERY DISEASE) (HCC): Primary | ICD-10-CM

## 2024-08-21 DIAGNOSIS — B00.1 HERPES LABIALIS: ICD-10-CM

## 2024-08-21 DIAGNOSIS — G43.009 MIGRAINE WITHOUT AURA AND WITHOUT STATUS MIGRAINOSUS, NOT INTRACTABLE: ICD-10-CM

## 2024-08-21 DIAGNOSIS — E66.01 CLASS 2 SEVERE OBESITY WITH SERIOUS COMORBIDITY AND BODY MASS INDEX (BMI) OF 35.0 TO 35.9 IN ADULT, UNSPECIFIED OBESITY TYPE (HCC): ICD-10-CM

## 2024-08-21 DIAGNOSIS — I10 HYPERTENSION, UNSPECIFIED TYPE: ICD-10-CM

## 2024-08-21 DIAGNOSIS — Z11.59 NEED FOR HEPATITIS C SCREENING TEST: ICD-10-CM

## 2024-08-21 PROCEDURE — 99214 OFFICE O/P EST MOD 30 MIN: CPT

## 2024-08-21 RX ORDER — SUMATRIPTAN 100 MG/1
100 TABLET, FILM COATED ORAL ONCE AS NEEDED
Qty: 20 TABLET | Refills: 0 | Status: SHIPPED | OUTPATIENT
Start: 2024-08-21 | End: 2024-08-21 | Stop reason: SDUPTHER

## 2024-08-21 RX ORDER — SUMATRIPTAN 100 MG/1
100 TABLET, FILM COATED ORAL ONCE AS NEEDED
Qty: 20 TABLET | Refills: 0 | Status: SHIPPED | OUTPATIENT
Start: 2024-08-21 | End: 2024-09-20

## 2024-08-21 RX ORDER — VALACYCLOVIR HYDROCHLORIDE 1 G/1
1000 TABLET, FILM COATED ORAL 2 TIMES DAILY
Qty: 2 TABLET | Refills: 0 | Status: SHIPPED | OUTPATIENT
Start: 2024-08-21 | End: 2024-08-22

## 2024-08-21 NOTE — PROGRESS NOTES
Ambulatory Visit  Name: Jillian Arango      : 1955      MRN: [id number]  Encounter Provider: Raquel Gonzales MD  Encounter Date: 2024   Encounter department: [facility] INTERNAL MEDICINE [place]    Assessment & Plan   1. PAD (peripheral artery disease) (HCC)  Assessment & Plan:  Improvement with sx with exertion, intermittent experiences leg heaviness when ambulation   Suspect vascular compromise in setting of Obesity, HLD, T2DM, Hx DVT  Follows Vascular Surgery for Chronic DVT  Referral sent for PT and follows wound care    Plan-   Continue Trental reassess at next visit, tolerating well with improvement of sx  Wound care and foot checks daily, moisturize bilateral extremities, continue PT  VAS Arterial Doppler scheduled for tomorrow  Follow up with Vascular Surgery   2. Class 2 severe obesity with serious comorbidity and body mass index (BMI) of 35.0 to 35.9 in adult, unspecified obesity type (HCC)  Assessment & Plan:  Body mass index is 35.02 kg/m².  Previously on Trulicity without significant weight loss   Switched to Mounjaro and tolerating well   Per patient she is down 10lbs since starting    Plan-   Continue Mounjaro 2.5mg  RTO 3 months     3. Migraine without aura and without status migrainosus, not intractable  Assessment & Plan:  Infrequent migraines throughout the year w/ associated neck discomfort   Previous abortive therapy Imitrex with resolution of sx  Migraines likely exacerbated in the setting of new mounjaro and trental use    Plan-   Will send Imitrex  Re-assess in office in 3 months  Conservative management recommended in addition    Orders:  -     SUMAtriptan (IMITREX) 100 mg tablet; Take 1 tablet (100 mg total) by mouth once as needed for migraine (repeat in 2 hours if needed.No more than 2 in 24 hours)  4. Herpes labialis  -     valACYclovir (VALTREX) 1,000 mg tablet; Take 1 tablet (1,000 mg total) by mouth 2 (two) times a day for 1 day  5. Hypertension, unspecified type  -      Albumin / creatinine urine ratio; Future  6. Need for hepatitis C screening test  -     Hepatitis C antibody; Future  7. Healthcare maintenance  Assessment & Plan:  Outstanding screenings include- DEXA last completed 2023  Pt states Mammogram is scheduled and upcoming  Colonoscopy completed 2022- Hx of Polyps, repeat 2027    Plan-   Will review new mammogram when completed   Ordered DEXA, educated on importance of completion  Other health care maintenance labs ordered  RTO 3 months   Orders:  -     CBC and Platelet; Future  -     Comprehensive metabolic panel; Future  -     Albumin / creatinine urine ratio; Future  -     Lipid Panel with Direct LDL reflex; Future  -     Hemoglobin A1C; Future  -     Vitamin D 25 hydroxy; Future  -     Hepatitis C antibody; Future         History of Present Illness     Ms. Arango is a 68 yo F with a PMH of Obesity, PAD, T2DM, HLD, HTN, Chronic DVT, S/p urostomy, s/p L heel spur and Achilles repair, Chronic LE wound who presents to the office for a follow up visit. Patient endorses that she is experiencing migraines w/ associated neck discomfort that are not responsive to tylenol, she notes that Imitrex has previously worked for her. She also has a herpes labialis lesion on the L corner of the mouth that is healing, also noting she has run out of valtrex at home. Since start mounjaro she notes to losing almost 10lbs and is pverall tolerating well, since start Trental sx during ambulation have significantly improved.  Patient denies fevers, chills, HA, neck pain, NVD, night sweats, abdominal pain, decreased urinary output, overt leg pain, new wounds/ulcerations, leg swelling, rashes, joint pain, numbness, weakness, or any other sx at this time.        Review of Systems   Constitutional:  Negative for chills, fatigue, fever and unexpected weight change.   HENT:  Negative for congestion, ear pain, rhinorrhea and sore throat.    Eyes:  Negative for pain and visual disturbance.    Respiratory:  Negative for cough and shortness of breath.    Cardiovascular:  Negative for chest pain, palpitations and leg swelling.   Gastrointestinal:  Negative for abdominal distention, abdominal pain, constipation, diarrhea, nausea, rectal pain and vomiting.   Endocrine: Negative for cold intolerance, heat intolerance, polydipsia and polyuria.   Genitourinary:  Negative for decreased urine volume, difficulty urinating, dysuria, enuresis, frequency, hematuria and urgency.   Musculoskeletal:  Negative for arthralgias, back pain, joint swelling, myalgias, neck pain and neck stiffness.   Skin:  Positive for wound. Negative for color change and rash.        Herpetic lesion to the L side of mouth    Neurological:  Positive for headaches. Negative for dizziness, tremors, syncope, facial asymmetry, speech difficulty, weakness, light-headedness and numbness.   Psychiatric/Behavioral:  Negative for agitation and confusion.    All other systems reviewed and are negative.    Past Medical History:   Diagnosis Date    Anemia     Back pain     Bowel obstruction (HCC) 2017,2018    Cancer (HCC)     uterine CA radiation destoried urethra.   Patient has urostomy    Cholelithiasis     Colon polyp     Deep vein thrombosis of left lower extremity (HCC) 01/11/2004    on blood thinner for 3 years.    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    IR NEPHROSTOMY TUBE PLACEMENT  01/18/2021    LAPAROSCOPIC GASTRIC BANDING N/A 2006    [place], [place]    OTHER SURGICAL HISTORY  2004    Urine shunt to intestine, transverse colon    PCNL Left 02/16/2021    Procedure: NEPHROLITHOTOMY  PERCUTANEOUS (PCNL);  Surgeon: Ziggy Roldan MD;  Location: [facility];  Service: Urology    IN CYSTO/URETERO W/LITHOTRIPSY &INDWELL STENT INSRT Left 02/16/2021    Procedure: anterograde  URETEROSCOPY with dilation of ureteral stricture, INSERTION STENT URETERAL, exchange  of nephrostomy tube;  Surgeon: Ziggy Roldan MD;  Location: [facility];  Service: Urology    IN DEBRIDEMENT SUBCUTANEOUS TISSUE 1ST 20 SQ CM/< Left 1/29/2024    Procedure: DEBRIDEMENT LOWER EXTREMITY (WASH OUT) with PHOENIX GRAFT APPLICATION, foot wound;  Surgeon: Darron Frias DPM;  Location: [facility];  Service: Podiatry    IN LAPAROSCOPY ENTEROLYSIS SEPARATE PROCEDURE N/A 01/17/2019    Procedure: LAPAROSCOPIC LYSIS OF ADHESIONS;  Surgeon: Omar Jack MD;  Location:  MAIN OR;  Service: General    IN REPAIR SECONDARY ACHILLES TENDON W/WO GRAFT Left 11/24/2023    Procedure: REMOVAL OF POSTERIOR HEEL SPUR WITH REPAIR TENDON ACHILLES;  Surgeon: Darron Frias DPM;  Location: [facility];  Service: Podiatry    [facility]    SLEEVE GA[facility]OPLASTY N/A 09/2015    Dr. Jack, [facility]    URETER REVISION  2010    WEILBY THUMB ARTHROPLASTY      WEILBY THUMB ARTHROPLASTY       Family History   Problem Relation Age of Onset    No Known Problems Mother     Kidney failure Father     Brain cancer Father     Kidney cancer Father     Diabetes Sister     Alcohol abuse Sister      Diabetes Brother      Social History     Tobacco Use    Smoking status: Former     Current packs/day: 0.00     Average packs/day: 1.5 packs/day for 12.0 years (18.0 ttl pk-yrs)     Types: Cigarettes     Start date: 1974     Quit date: 1986     Years since quittin.6     Passive exposure: Never    Smokeless tobacco: Never   Vaping Use    Vaping status: Never Used   Substance and Sexual Activity    Alcohol use: Not Currently    Drug use: Never    Sexual activity: Never     Comment: s/p hysterectomy     Current Outpatient Medications on File Prior to Visit   Medication Sig    ALPRAZolam (XANAX) 0.5 mg tablet TAKE ONE-HALF TABLET BY MOUTH EVERY DAY AT BEDTIME AS NEEDED FOR ANXIETY    Ascorbic Acid (VITAMIN C PO) Take 1 capsule by mouth daily    BIOTIN PO Take 1 capsule by mouth daily    dabigatran etexilate (PRADAXA) 150 mg capsu TAKE ONE CAPSULE BY MOUTH TWO TIMES A DAY    FREESTYLE LITE test strip Check blood sugar 1-2 times daily    furosemide (LASIX) 20 mg tablet Take 1 tablet (20 mg total) by mouth daily as needed (Swelling in the legs) for up to 10 days    meclizine (ANTIVERT) 12.5 MG tablet Take 1 tablet (12.5 mg total) by mouth every 8 (eight) hours as needed for nausea or dizziness for up to 14 days    metFORMIN (GLUCOPHAGE) 500 mg tablet TAKE ONE TABLET BY MOUTH TWICE A DAY WITH MEALS    pantoprazole (PROTONIX) 40 mg tablet TAKE ONE TABLET BY MOUTH EVERY DAY IN THE EARLY MORNING    pentoxifylline (TRENtal) 400 mg ER tablet Take 1 tablet (400 mg total) by mouth 3 (three) times a day with meals    pramipexole (MIRAPEX) 0.5 mg tablet Take 1 mg by mouth daily at bedtime    Probiotic Product (Probiotic Daily) CAPS Take 1 capsule by mouth in the morning    Synthroid 75 MCG tablet Take 1 tablet (75 mcg total) by mouth daily in the early morning    tirzepatide 2.5 MG/0.5ML Inject 2.5 mg under the skin every 7 days    [DISCONTINUED] Trulicity 4.5 MG/0.5ML injection Inject 0.5 mL (4.5 mg total) under the  "skin every 7 days (Patient not taking: Reported on 8/2/2024)     Allergies   Allergen Reactions    Amoxicillin Rash    Penicillins Rash    Adhesive [Medical Tape] Rash     \"Paper Tape\" Causes the rash     Immunization History   Administered Date(s) Administered    COVID-19 PFIZER VACCINE 0.3 ML IM 03/12/2021, 04/01/2021, 10/02/2021    INFLUENZA 11/07/2017, 03/13/2019, 09/18/2020, 09/13/2021, 08/31/2022    Influenza, high dose seasonal 0.7 mL 10/05/2023    Influenza, seasonal, injectable 10/26/2018     Objective     /78 (BP Location: Left arm, Patient Position: Sitting, Cuff Size: Large)   Pulse 71   Temp 98.2 °F (36.8 °C) (Tympanic)   Resp 16   Ht 5' 6\" (1.676 m)   Wt 98.4 kg (217 lb)   SpO2 98%   BMI 35.02 kg/m²     Physical Exam  Vitals and nursing note reviewed.   Constitutional:       General: She is not in acute distress.     Appearance: She is well-developed. She is obese. She is not ill-appearing, toxic-appearing or diaphoretic.   HENT:      Head: Normocephalic and atraumatic.      Mouth/Throat:      Mouth: Mucous membranes are moist.   Eyes:      Extraocular Movements: Extraocular movements intact.      Conjunctiva/sclera: Conjunctivae normal.      Pupils: Pupils are equal, round, and reactive to light.   Cardiovascular:      Rate and Rhythm: Normal rate and regular rhythm.      Pulses: Normal pulses.      Heart sounds: Murmur heard.      No friction rub. No gallop.   Pulmonary:      Effort: Pulmonary effort is normal. No respiratory distress.      Breath sounds: Normal breath sounds. No wheezing or rhonchi.   Chest:      Chest wall: No tenderness.   Abdominal:      General: Bowel sounds are normal. There is no distension.      Palpations: Abdomen is soft.      Tenderness: There is no abdominal tenderness. There is no guarding or rebound.      Hernia: No hernia is present.      Comments: Urostomy bag in place    Musculoskeletal:         General: No swelling or tenderness.      Cervical back: " Normal range of motion and neck supple.      Right lower leg: No edema.      Left lower leg: No edema.   Skin:     General: Skin is warm and dry.      Capillary Refill: Capillary refill takes less than 2 seconds.      Comments: L foot in boot for chronic wound, pulses intact bilaterally, R foot slightly cooler that L, skin is dry and cracked at the toes on the R no opens wounds/pus/drainage    Neurological:      General: No focal deficit present.      Mental Status: She is alert and oriented to person, place, and time.      Cranial Nerves: No cranial nerve deficit.      Sensory: No sensory deficit.      Motor: No weakness.      Gait: Gait normal.   Psychiatric:         Mood and Affect: Mood normal.         Behavior: Behavior normal.         Thought Content: Thought content normal.       Administrative Statements   I have spent a total time of 35 minutes in caring for this patient on the day of the visit/encounter including Risks and benefits of tx options, Patient and family education, Risk factor reductions, Counseling / Coordination of care, Documenting in the medical record, Reviewing / ordering tests, medicine, procedures  , and Obtaining or reviewing history  .

## 2024-08-21 NOTE — ASSESSMENT & PLAN NOTE
Infrequent migraines throughout the year w/ associated neck discomfort   Previous abortive therapy Imitrex with resolution of sx  Migraines likely exacerbated in the setting of new mounjaro and trental use    Plan-   Will send Imitrex  Re-assess in office in 3 months  Conservative management recommended in addition

## 2024-08-21 NOTE — ASSESSMENT & PLAN NOTE
Improvement with sx with exertion, intermittent experiences leg heaviness when ambulation   Suspect vascular compromise in setting of Obesity, HLD, T2DM, Hx DVT  Follows Vascular Surgery for Chronic DVT  Referral sent for PT and follows wound care    Plan-   Continue Trental reassess at next visit, tolerating well with improvement of sx  Wound care and foot checks daily, moisturize bilateral extremities, continue PT  VAS Arterial Doppler scheduled for tomorrow  Follow up with Vascular Surgery

## 2024-08-21 NOTE — ASSESSMENT & PLAN NOTE
Body mass index is 35.02 kg/m².  Previously on Trulicity without significant weight loss   Switched to Mounjaro and tolerating well   Per patient she is down 10lbs since starting    Plan-   Continue Mounjaro 2.5mg  RTO 3 months

## 2024-08-21 NOTE — ASSESSMENT & PLAN NOTE
Outstanding screenings include- DEXA last completed 2023  Pt states Mammogram is scheduled and upcoming  Colonoscopy completed 2022- Hx of Polyps, repeat 2027    Plan-   Will review new mammogram when completed   Ordered DEXA, educated on importance of completion  Other health care maintenance labs ordered  RTO 3 months

## 2024-08-23 ENCOUNTER — VBI (OUTPATIENT)
Dept: ADMINISTRATIVE | Facility: OTHER | Age: 69
End: 2024-08-23

## 2024-08-23 DIAGNOSIS — E66.01 CLASS 2 SEVERE OBESITY DUE TO EXCESS CALORIES WITH SERIOUS COMORBIDITY AND BODY MASS INDEX (BMI) OF 35.0 TO 35.9 IN ADULT (HCC): Primary | ICD-10-CM

## 2024-08-23 DIAGNOSIS — E11.69 TYPE 2 DIABETES MELLITUS WITH OTHER SPECIFIED COMPLICATION, WITH LONG-TERM CURRENT USE OF INSULIN (HCC): ICD-10-CM

## 2024-08-23 DIAGNOSIS — Z79.4 TYPE 2 DIABETES MELLITUS WITH OTHER SPECIFIED COMPLICATION, WITH LONG-TERM CURRENT USE OF INSULIN (HCC): ICD-10-CM

## 2024-08-23 NOTE — TELEPHONE ENCOUNTER
08/23/24 2:33 PM     Chart reviewed for Diabetic Eye Exam ; nothing is submitted to the patient's insurance at this time.     Paris Kirby MA   [facility]

## 2024-08-26 ENCOUNTER — HOSPITAL ENCOUNTER (OUTPATIENT)
Dept: RADIOLOGY | Facility: HOSPITAL | Age: 69
Discharge: HOME | End: 2024-08-26
Payer: COMMERCIAL

## 2024-08-26 ENCOUNTER — APPOINTMENT (OUTPATIENT)
Dept: RADIOLOGY | Facility: HOSPITAL | Age: 69
End: 2024-08-26
Payer: COMMERCIAL

## 2024-08-26 DIAGNOSIS — L97.322 NON-PRESSURE CHRONIC ULCER OF LEFT ANKLE WITH FAT LAYER EXPOSED (HCC): ICD-10-CM

## 2024-08-26 PROCEDURE — 93926 LOWER EXTREMITY STUDY: CPT | Performed by: SURGERY

## 2024-08-26 PROCEDURE — 93926 LOWER EXTREMITY STUDY: CPT

## 2024-08-26 PROCEDURE — 93922 UPR/L XTREMITY ART 2 LEVELS: CPT | Performed by: SURGERY

## 2024-08-26 RX ORDER — TIRZEPATIDE 2.5 MG/.5ML
INJECTION, SOLUTION SUBCUTANEOUS
Qty: 2 ML | Refills: 0 | Status: SHIPPED | OUTPATIENT
Start: 2024-08-26

## 2024-08-26 NOTE — PROGRESS NOTES
VAS ARTERIAL DUPLEX-LOWER LIMB UNILATERAL  Order: [id number]   Status: Final result       Visible to patient: Yes (not seen)       Dx: Non-pressure chronic ulcer of left an...    0 Result Notes  Details    Reading Physician Reading Date Result Priority   Unknown Provider 8/26/2024    Shlomo Palmer MD  [phone] 8/26/2024      Result Text     [facility] REPORT  CLINICAL:  Indications:  Patient presents with an ulcer on the left ankle which started 9 months ago.  Patient reports bilateral leg rest pain and cramping. Patient also reports  bilateral leg heaviness with walking.  .Operative History:  No prior cardiovascular surgeries.  Risk Factors:  The patient has history of HTN, Diabetes (NIDDM (Diet)) and previous smoking  (quit >10yrs ago).  Clinical:  Right Pressure:  153/ mm Hg, Left Pressure:  152/ mm Hg.     FINDINGS:     Left                   PSV (cm/s)    Common Femoral Artery          75    Prox Profunda                  54    Prox SFA                       59    Mid SFA                        81    Dist SFA                      100    Proximal Pop                   67    Distal Pop                     87    Tibioperoneal                  59    Dist Post Tibial               92    Dist. Ant. Tibial              71    Dist Peroneal                  33             CONCLUSION:     Impression:  RIGHT LOWER LIMB LIMITED:  Ankle/Brachial index: 1.27, which is in the normal category.  Metatarsal pressure of 194 mmHg.  Great toe pressure of 140 mmHg, within healing range.  PVR/ PPG tracings are normal.     LEFT LOWER LIMB:  This resting evaluation shows no evidence of significant lower extremity  arterial occlusive disease.  Ankle/Brachial index: 1.06, which is in the normal category.  Metatarsal pressure of 150 mmHg.  Great toe pressure of 86mHg, within healing range.  PVR/ PPG tracings are normal.

## 2024-08-30 ENCOUNTER — OFFICE VISIT (OUTPATIENT)
Dept: WOUND CARE | Facility: HOSPITAL | Age: 69
End: 2024-08-30
Payer: COMMERCIAL

## 2024-08-30 VITALS
DIASTOLIC BLOOD PRESSURE: 84 MMHG | HEART RATE: 64 BPM | SYSTOLIC BLOOD PRESSURE: 184 MMHG | TEMPERATURE: 96.6 F | RESPIRATION RATE: 20 BRPM

## 2024-08-30 DIAGNOSIS — L97.322 NON-PRESSURE CHRONIC ULCER OF LEFT ANKLE WITH FAT LAYER EXPOSED (HCC): Primary | ICD-10-CM

## 2024-08-30 PROCEDURE — 97597 DBRDMT OPN WND 1ST 20 CM/<: CPT | Performed by: PODIATRIST

## 2024-08-30 PROCEDURE — 97597 DBRDMT OPN WND 1ST 20 CM/<: CPT | Performed by: STUDENT IN AN ORGANIZED HEALTH CARE EDUCATION/TRAINING PROGRAM

## 2024-08-30 RX ORDER — LIDOCAINE 40 MG/G
CREAM TOPICAL ONCE
Status: COMPLETED | OUTPATIENT
Start: 2024-08-30 | End: 2024-08-30

## 2024-08-30 RX ADMIN — LIDOCAINE: 40 CREAM TOPICAL at 09:32

## 2024-08-30 NOTE — PATIENT INSTRUCTIONS
Orders Placed This Encounter   Procedures    Wound cleansing and dressings Diabetic Ulcer Left;Posterior Ankle     Wound location Left posterior ankle.    Change dressing 3x/wk and as needed for strikethrough drainage   You may remove the dressing and shower. Do not leave wound open to air, apply new dressing immediately.  Cleanse the wound with Prophase (given)  Apply Silver Alginate cut to fit the wound  Cover with ABD.    Secure with roll gauze and tape.         Off-loading Instructions:  Wear CAM Boot as directed by your physician.   Put on immediately when rising in the morning and remove when going to bed.         Elastic Tubular Stocking:  Spandagrip size F to left leg.      Tubular elastic bandage: Apply from base of toes to behind the knee. Apply in AM, may remove for sleep.    Avoid prolonged standing in one place.    Elevate leg(s) above the level of the heart when sitting or as much as possible.    Supplies ordered today from [facility].  Ph 7 .     Standing Status:   Future     Standing Expiration Date:   9/6/2024

## 2024-08-30 NOTE — PROGRESS NOTES
Patient ID: Jillian Arango is a 69 y.o. female Date of Birth 1955     Diagnosis:  1. 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 Non-pressure chronic ulcer of left ankle with fat layer exposed (HCC)  L97.322 Wound cleansing and dressings Diabetic Ulcer Left;Posterior Ankle     lidocaine (LMX) 4 % cream     Debridement Diabetic Ulcer Left;Posterior Ankle           Assessment & Plan:  Wound improving, with decrease in size, no SOI. Continue with dressings changes, boot for immobilizing tendon. Spoke with patient regarding possible STSG for speeding up healing, and while patient is amenable, with her frequent travel post-op follow-up would be difficult. Will hold off on scheduling that for now.    If the patient notices any systemic signs of infection including but not limited to fever, chills, nausea, vomiting or significant worsening of wound with increased drainage, redness or streaking up the foot or leg the patient should notify the office or present to the emergency room.      If wound debridement was completed today this was done in an attempt to promote healing, decrease risk of infection and for limb salvage efforts.     Goal of treatment: wound healing     Efforts to decrease pressure on the wound(s), evaluation and discussion of nutritional status, peripheral vascular status, and infection control have all been addressed with this patient.    Return in about 12 days (around 9/11/2024).      Chief Complaint   Patient presents with   • Follow Up Wound Care Visit     Left leg wound           Subjective:   Patient f/u for posterior left heel wound. Has been wearing the boot with soft heel counter/backing. States she goes on cruises 2 times per month, as she is  retired. Has been doing dressing changes as ordered. Denies N/V/D, fever, chills.    The following portions of the patient's history were reviewed and updated as appropriate:   Patient Active Problem List   Diagnosis   • Small bowel obstruction (HCC)   • Type 2 diabetes mellitus (HCC)   • History of urostomy   • Endometrial cancer (HCC)   • Hypothyroidism   • Healthcare maintenance   • Anemia   • Hydroureteronephrosis   • HTN (hypertension)   • Migraine   • Abdominal adhesions   • Tooth infection   • Class 2 severe obesity with serious comorbidity and body mass index (BMI) of 35.0 to 35.9 in adult (HCC)   • Headache   • Ureteral-ileal loop anastomotic stricture   • Chronic deep vein thrombosis (DVT) of femoral vein of right lower extremity (HCC)   • Bone infarction (HCC)   • Anti-cardiolipin antibody positive   • Encounter for attention to other artificial openings of urinary tract (HCC)   • Leg pain, anterior, right   • Nausea and vomiting   • Hypercalcemia   • Vertigo   • History of smoking   • Decreased appetite   • Postoperative state   • Bursitis of posterior heel, left   • Achilles tendinosis of left ankle   • Depression, recurrent (HCC)   • Postoperative wound dehiscence, subsequent encounter   • Achilles tendinitis of left lower extremity   • Acute post-operative pain   • PAD (peripheral artery disease) (HCC)   • Diarrhea     Past Medical History:   Diagnosis Date   • Anemia    • Back pain    • Bowel obstruction (HCC) 2017,2018   • Cancer (HCC)     uterine CA radiation destoried urethra.   Patient has urostomy   • Cholelithiasis    • Colon polyp    • Deep vein thrombosis of left lower extremity (HCC) 01/11/2004    on blood thinner for 3 years.   • Diabetes mellitus (HCC)     type 2 BS 6/6/22 @ 0600 was 145   • Disease of thyroid gland    • Elevated lactic acid level 01/16/2021   • Endometrial cancer (HCC) 1999   • GERD (gastroesophageal reflux disease)    • Gross hematuria    • Hiatal hernia    • History of  transfusion 1999    Post Hysterectomy   • History of urostomy 01/11/2004    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.   • Hypothyroid    • In vitro fertilization    • Kidney stone    • Migraines    • Muscle weakness     abdominal   • Nausea and vomiting 10/05/2023   • Personal history of irradiation    • Renal cyst    • Sleep apnea     Resolved due to Weight Loss     Past Surgical History:   Procedure Laterality Date   • ACHILLES TENDON REPAIR Right    • APPENDECTOMY     • COLONOSCOPY      6/6/22   • COLONOSCOPY W/ BIOPSIES N/A 12/2015   • EGD     • HEEL SPUR SURGERY Right 1996   • HYSTERECTOMY     • 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   • IR NEPHROSTOMY TUBE PLACEMENT  01/18/2021   • LAPAROSCOPIC GASTRIC BANDING N/A 2006    [place], [place]   • OTHER SURGICAL HISTORY  2004    Urine shunt to intestine, transverse colon   • PCNL Left 02/16/2021    Procedure: NEPHROLITHOTOMY  PERCUTANEOUS (PCNL);  Surgeon: Ziggy Roldan MD;  [facility];  Service: Urology   • MN CYSTO/URETERO W/LITHOTRIPSY &INDWELL STENT INSRT Left 02/16/2021    Procedure: anterograde  URETEROSCOPY with dilation of ureteral stricture, INSERTION STENT URETERAL, exchange  of nephrostomy tube;  Surgeon: Ziggy Roldan MD;  [facility];  Service: Urology   • MN DEBRIDEMENT SUBCUTANEOUS TISSUE 1ST 20 SQ CM/< Left 1/29/2024    Procedure: DEBRIDEMENT LOWER EXTREMITY (WASH OUT) with PHOENIX GRAFT APPLICATION, foot wound;  Surgeon: Darron Frias DPM;  Location:  MAIN OR;  Service: Podiatry   • MN LAPAROSCOPY ENTEROLYSIS SEPARATE PROCEDURE N/A 01/17/2019    Procedure: LAPAROSCOPIC LYSIS OF ADHESIONS;  Surgeon: Omar Jack MD;  Location:  MAIN OR;  Service: General   • MN REPAIR SECONDARY ACHILLES TENDON W/WO GRAFT Left 11/24/2023    Procedure: REMOVAL OF POSTERIOR HEEL SPUR WITH REPAIR TENDON ACHILLES;  Surgeon: Darron Frias DPM;  Location:  MAIN OR;  Service: Podiatry   • [facility]   • SLEEVE GASTROPLASTY N/A 2015    Dr. Jack, [facility]   • URETER REVISION     • WEILBY THUMB ARTHROPLASTY     • WEILBY THUMB ARTHROPLASTY       Social History     Socioeconomic History   • Marital status: /Civil Union     Spouse name: Ari   • Number of children: 0   • Years of education: 14   • Highest education level: None   Occupational History   • Occupation: Hair styist    Tobacco Use   • Smoking status: Former     Current packs/day: 0.00     Average packs/day: 1.5 packs/day for 12.0 years (18.0 ttl pk-yrs)     Types: Cigarettes     Start date: 1974     Quit date: 1986     Years since quittin.6     Passive exposure: Never   • Smokeless tobacco: Never   Vaping Use   • Vaping status: Never Used   Substance and Sexual Activity   • Alcohol use: Not Currently   • Drug use: Never   • Sexual activity: Never     Comment: s/p hysterectomy   Other Topics Concern   • None   Social History Narrative   • None     Social Determinants of Health     Financial Resource Strain: Not on file   Food Insecurity: Not on file   Transportation Needs: No Transportation Needs (2023)    PRAPARE - Transportation    • Lack of Transportation (Medical): No    • Lack of Transportation (Non-Medical): No   Physical Activity: Not on file   Stress: Not on file   Social Connections: Not on file   Intimate Partner Violence: Not on file   Housing Stability: Unknown (2023)    Housing Stability Vital Sign    • Unable to Pay for Housing in the Last Year: No    • Number of Times Moved in the Last Year: Not on file    • Homeless in the Last Year: No        Current Outpatient Medications:   •  ALPRAZolam (XANAX) 0.5 mg tablet, TAKE ONE-HALF TABLET BY MOUTH EVERY DAY AT BEDTIME AS NEEDED FOR ANXIETY, Disp: 30 tablet, Rfl: 0  •  Ascorbic Acid (VITAMIN C PO), Take 1 capsule by mouth daily, Disp: , Rfl:   •  BIOTIN PO, Take 1 capsule by mouth daily, Disp: ,  Rfl:   •  dabigatran etexilate (PRADAXA) 150 mg capsu, TAKE ONE CAPSULE BY MOUTH TWO TIMES A DAY, Disp: 200 capsule, Rfl: 1  •  FREESTYLE LITE test strip, Check blood sugar 1-2 times daily, Disp: 100 each, Rfl: 4  •  furosemide (LASIX) 20 mg tablet, Take 1 tablet (20 mg total) by mouth daily as needed (Swelling in the legs) for up to 10 days, Disp: 10 tablet, Rfl: 0  •  meclizine (ANTIVERT) 12.5 MG tablet, Take 1 tablet (12.5 mg total) by mouth every 8 (eight) hours as needed for nausea or dizziness for up to 14 days, Disp: 40 tablet, Rfl: 0  •  metFORMIN (GLUCOPHAGE) 500 mg tablet, TAKE ONE TABLET BY MOUTH TWICE A DAY WITH MEALS, Disp: 180 tablet, Rfl: 1  •  Mounjaro 2.5 MG/0.5ML, INJECT 2.5 MG UNDER THE SKIN EVERY 7 DAYS, Disp: 2 mL, Rfl: 0  •  pantoprazole (PROTONIX) 40 mg tablet, TAKE ONE TABLET BY MOUTH EVERY DAY IN THE EARLY MORNING, Disp: 90 tablet, Rfl: 0  •  pentoxifylline (TRENtal) 400 mg ER tablet, Take 1 tablet (400 mg total) by mouth 3 (three) times a day with meals, Disp: 270 tablet, Rfl: 0  •  pramipexole (MIRAPEX) 0.5 mg tablet, Take 1 mg by mouth daily at bedtime, Disp: , Rfl:   •  Probiotic Product (Probiotic Daily) CAPS, Take 1 capsule by mouth in the morning, Disp: 10 capsule, Rfl: 0  •  SUMAtriptan (IMITREX) 100 mg tablet, Take 1 tablet (100 mg total) by mouth once as needed for migraine repeat in two hours if needed No more than two in twenty four hours, Disp: 20 tablet, Rfl: 0  •  Synthroid 75 MCG tablet, Take 1 tablet (75 mcg total) by mouth daily in the early morning, Disp: 90 tablet, Rfl: 2  •  valACYclovir (VALTREX) 1,000 mg tablet, Take 1 tablet (1,000 mg total) by mouth 2 (two) times a day for 1 day, Disp: 2 tablet, Rfl: 0  No current facility-administered medications for this visit.  Family History   Problem Relation Age of Onset   • No Known Problems Mother    • Kidney failure Father    • Brain cancer Father    • Kidney cancer Father    • Diabetes Sister    • Alcohol abuse Sister    •  "Diabetes Brother       Review of Systems  Allergies:  Amoxicillin, Penicillins, and Adhesive [medical tape]      Objective:  BP (!) 184/84   Pulse 64   Temp (!) 96.6 °F (35.9 °C)   Resp 20     Physical Exam      Wound 08/02/24 Diabetic Ulcer Ankle Left;Posterior (Active)   Wound Image Images linked 08/30/24 0925   Wound Description Pink;Granulation tissue;Yellow;Slough 08/30/24 0930   Dorina-wound Assessment Scar Tissue;Intact;Dry 08/30/24 0930   Wound Length (cm) 3.5 cm 08/30/24 0930   Wound Width (cm) 1.2 cm 08/30/24 0930   Wound Depth (cm) 0.1 cm 08/30/24 0930   Wound Surface Area (cm^2) 4.2 cm^2 08/30/24 0930   Wound Volume (cm^3) 0.42 cm^3 08/30/24 0930   Calculated Wound Volume (cm^3) 0.42 cm^3 08/30/24 0930   Change in Wound Size % 58 08/30/24 0930   Drainage Amount Moderate 08/30/24 0930   Drainage Description Serosanguineous;Tan 08/30/24 0930   Non-staged Wound Description Full thickness 08/30/24 0930   Dressing Status Intact 08/30/24 0930                  Debridement   Wound 08/02/24 Diabetic Ulcer Ankle Left;Posterior    Universal Protocol:  Procedure performed by:  Consent: Verbal consent obtained. Written consent obtained.  Risks and benefits: risks, benefits and alternatives were discussed  Consent given by: patient  Time out: Immediately prior to procedure a \"time out\" was called to verify the correct patient, procedure, equipment, support staff and site/side marked as required.  Patient understanding: patient states understanding of the procedure being performed  Patient identity confirmed: verbally with patient    Debridement Details  Performed by: resident  Debridement type: selective  Pain control: lidocaine 4%      Post-debridement measurements  Length (cm): 3.5  Width (cm): 1.2  Depth (cm): 0.1  Percent debrided: 90%  Surface Area (cm^2): 4.2  Area Debrided (cm^2): 3.78  Volume (cm^3): 0.42    Devitalized tissue debrided: biofilm, callus and fibrin  Instrument(s) utilized: blade  Technique " "utilized: excisionalBleeding: small  Hemostasis obtained with: pressure  Procedural pain (0-10): insensate  Post-procedural pain: insensate   Response to treatment: procedure was tolerated well                 Wound Instructions:  Orders Placed This Encounter   Procedures   • Wound cleansing and dressings Diabetic Ulcer Left;Posterior Ankle     Wound location Left posterior ankle.    Change dressing 3x/wk and as needed for strikethrough drainage   You may remove the dressing and shower. Do not leave wound open to air, apply new dressing immediately.  Cleanse the wound with Prophase (given)  Apply Silver Alginate cut to fit the wound  Cover with ABD.    Secure with roll gauze and tape.         Off-loading Instructions:  Wear CAM Boot as directed by your physician.   Put on immediately when rising in the morning and remove when going to bed.         Elastic Tubular Stocking:  Spandagrip size F to left leg.      Tubular elastic bandage: Apply from base of toes to behind the knee. Apply in AM, may remove for sleep.    Avoid prolonged standing in one place.    Elevate leg(s) above the level of the heart when sitting or as much as possible.    Supplies ordered today from [facility].  Ph 7 .     Standing Status:   Future     Standing Expiration Date:   9/6/2024   • Debridement Diabetic Ulcer Left;Posterior Ankle     This order was created via procedure documentation           Barber Cannon DPM      Portions of the record may have been created with voice recognition software. Occasional wrong word or \"sound a like\" substitutions may have occurred due to the inherent limitations of voice recognition software. Read the chart carefully and recognize, using context, where substitutions have occurred.    "

## 2024-08-30 NOTE — PROGRESS NOTES
Wound Procedure Treatment Diabetic Ulcer Left;Posterior Ankle    Performed by: Graciela Bang RN  Authorized by: Aram Rodriguez DPM    Associated wounds:   Wound 08/02/24 Diabetic Ulcer Ankle Left;Posterior  Wound cleansed with:  NSS  Applied primary dressing:  Calcium alginate and Silver  Applied secondary dressing:  ABD  Dressing secured with:  Christa, Tape, Elastic tubular stocking and Size F  Offloading device appllied:  CAM

## 2024-09-11 ENCOUNTER — OFFICE VISIT (OUTPATIENT)
Dept: WOUND CARE | Facility: HOSPITAL | Age: 69
End: 2024-09-11
Payer: COMMERCIAL

## 2024-09-11 ENCOUNTER — OFFICE VISIT (OUTPATIENT)
Dept: INTERNAL MEDICINE CLINIC | Facility: CLINIC | Age: 69
End: 2024-09-11
Payer: COMMERCIAL

## 2024-09-11 VITALS
DIASTOLIC BLOOD PRESSURE: 80 MMHG | SYSTOLIC BLOOD PRESSURE: 140 MMHG | OXYGEN SATURATION: 98 % | TEMPERATURE: 98.9 F | BODY MASS INDEX: 34.55 KG/M2 | HEIGHT: 66 IN | HEART RATE: 71 BPM | RESPIRATION RATE: 16 BRPM | WEIGHT: 215 LBS

## 2024-09-11 VITALS
DIASTOLIC BLOOD PRESSURE: 91 MMHG | RESPIRATION RATE: 18 BRPM | TEMPERATURE: 97 F | SYSTOLIC BLOOD PRESSURE: 183 MMHG | HEART RATE: 66 BPM

## 2024-09-11 DIAGNOSIS — L97.322 NON-PRESSURE CHRONIC ULCER OF LEFT ANKLE WITH FAT LAYER EXPOSED (HCC): Primary | ICD-10-CM

## 2024-09-11 DIAGNOSIS — R31.9 URINARY TRACT INFECTION WITH HEMATURIA, SITE UNSPECIFIED: Primary | ICD-10-CM

## 2024-09-11 DIAGNOSIS — N39.0 URINARY TRACT INFECTION WITH HEMATURIA, SITE UNSPECIFIED: Primary | ICD-10-CM

## 2024-09-11 PROBLEM — M79.604 LEG PAIN, ANTERIOR, RIGHT: Status: RESOLVED | Noted: 2023-07-20 | Resolved: 2024-09-11

## 2024-09-11 PROBLEM — G89.18 ACUTE POST-OPERATIVE PAIN: Status: RESOLVED | Noted: 2024-02-08 | Resolved: 2024-09-11

## 2024-09-11 PROBLEM — R42 VERTIGO: Status: RESOLVED | Noted: 2023-10-10 | Resolved: 2024-09-11

## 2024-09-11 PROBLEM — M76.62 ACHILLES TENDINITIS OF LEFT LOWER EXTREMITY: Status: RESOLVED | Noted: 2024-02-08 | Resolved: 2024-09-11

## 2024-09-11 PROBLEM — T81.31XD POSTOPERATIVE WOUND DEHISCENCE, SUBSEQUENT ENCOUNTER: Status: RESOLVED | Noted: 2024-02-08 | Resolved: 2024-09-11

## 2024-09-11 PROBLEM — Z98.890 POSTOPERATIVE STATE: Status: RESOLVED | Noted: 2023-11-27 | Resolved: 2024-09-11

## 2024-09-11 PROBLEM — M87.9 BONE INFARCTION (HCC): Status: RESOLVED | Noted: 2023-06-24 | Resolved: 2024-09-11

## 2024-09-11 PROBLEM — R63.0 DECREASED APPETITE: Status: RESOLVED | Noted: 2023-10-10 | Resolved: 2024-09-11

## 2024-09-11 PROBLEM — M67.874 ACHILLES TENDINOSIS OF LEFT ANKLE: Status: RESOLVED | Noted: 2023-11-27 | Resolved: 2024-09-11

## 2024-09-11 PROBLEM — R11.2 NAUSEA AND VOMITING: Status: RESOLVED | Noted: 2023-10-05 | Resolved: 2024-09-11

## 2024-09-11 PROBLEM — E83.52 HYPERCALCEMIA: Status: RESOLVED | Noted: 2023-10-10 | Resolved: 2024-09-11

## 2024-09-11 PROBLEM — M77.52: Status: RESOLVED | Noted: 2023-11-27 | Resolved: 2024-09-11

## 2024-09-11 PROBLEM — K04.7 TOOTH INFECTION: Status: RESOLVED | Noted: 2019-01-28 | Resolved: 2024-09-11

## 2024-09-11 PROBLEM — R19.7 DIARRHEA: Status: RESOLVED | Noted: 2024-07-17 | Resolved: 2024-09-11

## 2024-09-11 LAB
SL AMB  POCT GLUCOSE, UA: ABNORMAL
SL AMB LEUKOCYTE ESTERASE,UA: ABNORMAL
SL AMB POCT BILIRUBIN,UA: ABNORMAL
SL AMB POCT BLOOD,UA: ABNORMAL
SL AMB POCT CLARITY,UA: ABNORMAL
SL AMB POCT COLOR,UA: ABNORMAL
SL AMB POCT KETONES,UA: ABNORMAL
SL AMB POCT NITRITE,UA: ABNORMAL
SL AMB POCT PH,UA: 6.5
SL AMB POCT SPECIFIC GRAVITY,UA: 1.01
SL AMB POCT URINE PROTEIN: ABNORMAL
SL AMB POCT UROBILINOGEN: 0.2

## 2024-09-11 PROCEDURE — 99213 OFFICE O/P EST LOW 20 MIN: CPT | Performed by: INTERNAL MEDICINE

## 2024-09-11 PROCEDURE — 87086 URINE CULTURE/COLONY COUNT: CPT | Performed by: INTERNAL MEDICINE

## 2024-09-11 PROCEDURE — 87186 SC STD MICRODIL/AGAR DIL: CPT | Performed by: INTERNAL MEDICINE

## 2024-09-11 PROCEDURE — 97597 DBRDMT OPN WND 1ST 20 CM/<: CPT | Performed by: PODIATRIST

## 2024-09-11 PROCEDURE — 87077 CULTURE AEROBIC IDENTIFY: CPT | Performed by: INTERNAL MEDICINE

## 2024-09-11 PROCEDURE — 81002 URINALYSIS NONAUTO W/O SCOPE: CPT | Performed by: INTERNAL MEDICINE

## 2024-09-11 RX ORDER — DOXYCYCLINE HYCLATE 100 MG
100 TABLET ORAL 2 TIMES DAILY
Qty: 10 TABLET | Refills: 0 | Status: SHIPPED | OUTPATIENT
Start: 2024-09-11 | End: 2024-09-16

## 2024-09-11 RX ORDER — LIDOCAINE 40 MG/G
CREAM TOPICAL ONCE
Status: COMPLETED | OUTPATIENT
Start: 2024-09-11 | End: 2024-09-11

## 2024-09-11 RX ADMIN — LIDOCAINE: 40 CREAM TOPICAL at 15:41

## 2024-09-11 NOTE — PROGRESS NOTES
Wound Procedure Treatment Diabetic Ulcer Left;Posterior Ankle    Performed by: Chuyita Crisostomo RN  Authorized by: Aram Rodriguez DPM    Associated wounds:   Wound 08/02/24 Diabetic Ulcer Ankle Left;Posterior  Wound cleansed with:  Wound aggrssively cleansed with NSS and gauze  Applied to periwound:  Other  Applied primary dressing:  Collagen dressing and Silver  Applied secondary dressing:  ABD  Dressing secured with:  Christa, Tape, Elastic tubular stocking and Size F  Offloading device appllied:  CAM    Prophase

## 2024-09-11 NOTE — PATIENT INSTRUCTIONS
Orders Placed This Encounter   Procedures    Wound cleansing and dressings Diabetic Ulcer Left;Posterior Ankle     Wound location Left posterior ankle.    Change dressing every other day  You may remove the dressing and shower. Do not leave wound open to air, apply new dressing immediately.  Cleanse the wound with Prophase (given)  Apply Collagen with Silver cut to fit the wound. May place a drop of NSS on top of collagen to help it dissolve. ( If you run out of Collagen with Silver, please use Calcium Alginate with Silver over wound )  Cover with ABD.    Secure with roll gauze and tape.       Off-loading Instructions:  Wear CAM Boot as directed by your physician. ( May use shorter boot if CAM is uncomfortable )  Put on immediately when rising in the morning and remove when going to bed.      Elastic Tubular Stocking:  Spandagrip size F to left leg.    Tubular elastic bandage: Apply from base of toes to behind the knee. Apply in AM, may remove for sleep.  Avoid prolonged standing in one place.  Elevate leg(s) above the level of the heart when sitting or as much as possible.    [facility] supplies ordered 9/11/2024     Standing Status:   Future     Standing Expiration Date:   9/25/2024

## 2024-09-11 NOTE — PROGRESS NOTES
Patient ID: Jillian Arango is a 69 y.o. female Date of Birth 1955     Diagnosis:  1. Non-pressure chronic ulcer of left ankle with fat layer exposed (HCC)  -     Wound cleansing and dressings Diabetic Ulcer Left;Posterior Ankle; Future  -     lidocaine (LMX) 4 % cream     Diagnosis ICD-10-CM Associated Orders   1. Non-pressure chronic ulcer of left ankle with fat layer exposed (HCC)  L97.322 Wound cleansing and dressings Diabetic Ulcer Left;Posterior Ankle     lidocaine (LMX) 4 % cream           Assessment & Plan:  Selective debridement completed today.    Patient is going to be going on another trip.  She does want to consider possible skin grafting at the end of November.    Will plan on checking her back reevaluation in 2 weeks.  Will switch her to a collagen at this time.    Discussed with her continued immobilization with the boot.  She is frustrated with wearing the tall cam boot she does have a short cam boot which we can have her use.    Return for reevaluation and continued close management.        If the patient notices any systemic signs of infection including but not limited to fever, chills, nausea, vomiting or significant worsening of wound with increased drainage, redness or streaking up the foot or leg the patient should notify the office or present to the emergency room.      If wound debridement was completed today this was done in an attempt to promote healing, decrease risk of infection and for limb salvage efforts.     Goal of treatment: wound healing     Efforts to decrease pressure on the wound(s), evaluation and discussion of nutritional status, peripheral vascular status, and infection control have all been addressed with this patient.    Return in about 2 weeks (around 9/25/2024) for Recheck, Next scheduled follow up.      Chief Complaint   Patient presents with    Follow Up Wound Care Visit     Left ankle           Subjective:   Patient has been using her previous boot as she stated  that the other boot was not holding up very well.  She has continued to have issues with healing of this posterior wound however the wound does show nice granulation tissue formation at this point.  Denies any fevers chills nausea vomiting or other issues.  She is doing a lot of traveling over the next couple months.    The following portions of the patient's history were reviewed and updated as appropriate:   Patient Active Problem List   Diagnosis    Small bowel obstruction (HCC)    Type 2 diabetes mellitus (HCC)    History of urostomy    Endometrial cancer (HCC)    Hypothyroidism    Anemia    Hydroureteronephrosis    HTN (hypertension)    Migraine    Abdominal adhesions    Class 2 severe obesity with serious comorbidity and body mass index (BMI) of 35.0 to 35.9 in adult (HCC)    Headache    Ureteral-ileal loop anastomotic stricture    Chronic deep vein thrombosis (DVT) of femoral vein of right lower extremity (HCC)    Anti-cardiolipin antibody positive    Encounter for attention to other artificial openings of urinary tract (HCC)    History of smoking    Depression, recurrent (HCC)    PAD (peripheral artery disease) (HCC)    Urinary tract infection with hematuria     Past Medical History:   Diagnosis Date    Achilles tendinitis of left lower extremity 02/08/2024    Achilles tendinosis of left ankle 11/27/2023    Anemia     Back pain     Bone infarction (HCC) 06/24/2023    Bowel obstruction (HCC) 2017,2018    Bursitis of posterior heel, left 11/27/2023    Cancer (HCC)     uterine CA radiation destoried urethra.   Patient has urostomy    Cholelithiasis     Colon polyp     Deep vein thrombosis of left lower extremity (HCC) 01/11/2004    on blood thinner for 3 years.    Diabetes mellitus (HCC)     type 2 BS 6/6/22 @ 0600 was 145    Disease of thyroid gland     Elevated lactic acid level 01/16/2021    Endometrial cancer (HCC) 1999    GERD (gastroesophageal reflux disease)     Gross hematuria     Hiatal hernia      History of transfusion 1999    Post Hysterectomy    History of urostomy 01/11/2004    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.    Hypercalcemia 10/10/2023    Hypothyroid     In vitro fertilization     Kidney stone     Migraines     Muscle weakness     abdominal    Nausea and vomiting 10/05/2023    Personal history of irradiation     Postoperative wound dehiscence, subsequent encounter 02/08/2024    Renal cyst     Sleep apnea     Resolved due to Weight Loss    Vertigo 10/10/2023     Past Surgical History:   Procedure Laterality Date    ACHILLES TENDON REPAIR Right     APPENDECTOMY      COLONOSCOPY      6/6/22    COLONOSCOPY W/ BIOPSIES N/A 12/2015    EGD      HEEL SPUR SURGERY Right 1996    HYSTERECTOMY      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    IR NEPHROSTOMY TUBE PLACEMENT  01/18/2021    LAPAROSCOPIC GASTRIC BANDING N/A 2006    [place], [place]    OTHER SURGICAL HISTORY  2004    Urine shunt to intestine, transverse colon    PCNL Left 02/16/2021    Procedure: NEPHROLITHOTOMY  PERCUTANEOUS (PCNL);  Surgeon: Ziggy Roldan MD;  Location: [facility];  Service: Urology    WI CYSTO/URETERO W/LITHOTRIPSY &INDWELL STENT INSRT Left 02/16/2021    Procedure: anterograde  URETEROSCOPY with dilation of ureteral stricture, INSERTION STENT URETERAL, exchange  of nephrostomy tube;  Surgeon: Ziggy Roldan MD;  Location[facility];  Service: Urology    WI DEBRIDEMENT SUBCUTANEOUS TISSUE 1ST 20 SQ CM/< Left 1/29/2024    Procedure: DEBRIDEMENT LOWER EXTREMITY (WASH OUT) with PHOENIX GRAFT APPLICATION, foot wound;  Surgeon: Darron Frias DPM;  Location:  MAIN OR;  Service: Podiatry    WI LAPAROSCOPY ENTEROLYSIS SEPARATE PROCEDURE N/A 01/17/2019    Procedure: LAPAROSCOPIC LYSIS OF ADHESIONS;  Surgeon: Omar Jack MD;  Location:  MAIN OR;  Service: General    WI REPAIR SECONDARY ACHILLES TENDON W/WO GRAFT Left 11/24/2023    Procedure: REMOVAL OF POSTERIOR HEEL SPUR WITH REPAIR TENDON  JUANA;  Surgeon: Darron Frias DPM;  Location:  MAIN OR;  Service: Podiatry    RADICAL HYSTERECTOMY N/A     [facility] endometrial cancer.    SLEEVE GASTROPLASTY N/A 2015    Dr. Jack, [facility]    URETER REVISION      WEILBY THUMB ARTHROPLASTY      WEILBY THUMB ARTHROPLASTY       Social History     Socioeconomic History    Marital status: /Civil Union     Spouse name: Ari    Number of children: 0    Years of education: 14    Highest education level: None   Occupational History    Occupation: [employer]    Tobacco Use    Smoking status: Former     Current packs/day: 0.00     Average packs/day: 1.5 packs/day for 12.0 years (18.0 ttl pk-yrs)     Types: Cigarettes     Start date: 1974     Quit date: 1986     Years since quittin.6     Passive exposure: Never    Smokeless tobacco: Never   Vaping Use    Vaping status: Never Used   Substance and Sexual Activity    Alcohol use: Not Currently    Drug use: Never    Sexual activity: Never     Comment: s/p hysterectomy   Other Topics Concern    None   Social History Narrative    None     Social Determinants of Health     Financial Resource Strain: Not on file   Food Insecurity: Not on file   Transportation Needs: No Transportation Needs (2023)    PRAPARE - Transportation     Lack of Transportation (Medical): No     Lack of Transportation (Non-Medical): No   Physical Activity: Not on file   Stress: Not on file   Social Connections: Not on file   Intimate Partner Violence: Not on file   Housing Stability: Unknown (2023)    Housing Stability Vital Sign     Unable to Pay for Housing in the Last Year: No     Number of Times Moved in the Last Year: Not on file     Homeless in the Last Year: No        Current Outpatient Medications:     ALPRAZolam (XANAX) 0.5 mg tablet, TAKE ONE-HALF TABLET BY MOUTH EVERY DAY AT BEDTIME AS NEEDED FOR ANXIETY, Disp: 30 tablet, Rfl: 0    Ascorbic Acid (VITAMIN C PO), Take 1 capsule by mouth daily  (Patient not taking: Reported on 9/11/2024), Disp: , Rfl:     BIOTIN PO, Take 1 capsule by mouth daily, Disp: , Rfl:     dabigatran etexilate (PRADAXA) 150 mg capsu, TAKE ONE CAPSULE BY MOUTH TWO TIMES A DAY, Disp: 200 capsule, Rfl: 1    doxycycline hyclate (VIBRA-TABS) 100 mg tablet, Take 1 tablet (100 mg total) by mouth 2 (two) times a day for 5 days, Disp: 10 tablet, Rfl: 0    FREESTYLE LITE test strip, Check blood sugar 1-2 times daily, Disp: 100 each, Rfl: 4    furosemide (LASIX) 20 mg tablet, Take 1 tablet (20 mg total) by mouth daily as needed (Swelling in the legs) for up to 10 days (Patient not taking: Reported on 9/11/2024), Disp: 10 tablet, Rfl: 0    meclizine (ANTIVERT) 12.5 MG tablet, Take 1 tablet (12.5 mg total) by mouth every 8 (eight) hours as needed for nausea or dizziness for up to 14 days, Disp: 40 tablet, Rfl: 0    metFORMIN (GLUCOPHAGE) 500 mg tablet, TAKE ONE TABLET BY MOUTH TWICE A DAY WITH MEALS, Disp: 180 tablet, Rfl: 1    Mounjaro 2.5 MG/0.5ML, INJECT 2.5 MG UNDER THE SKIN EVERY 7 DAYS, Disp: 2 mL, Rfl: 0    pantoprazole (PROTONIX) 40 mg tablet, TAKE ONE TABLET BY MOUTH EVERY DAY IN THE EARLY MORNING, Disp: 90 tablet, Rfl: 0    pentoxifylline (TRENtal) 400 mg ER tablet, Take 1 tablet (400 mg total) by mouth 3 (three) times a day with meals, Disp: 270 tablet, Rfl: 0    pramipexole (MIRAPEX) 0.5 mg tablet, Take 1 mg by mouth daily at bedtime, Disp: , Rfl:     Probiotic Product (Probiotic Daily) CAPS, Take 1 capsule by mouth in the morning, Disp: 10 capsule, Rfl: 0    SUMAtriptan (IMITREX) 100 mg tablet, Take 1 tablet (100 mg total) by mouth once as needed for migraine repeat in two hours if needed No more than two in twenty four hours (Patient not taking: Reported on 9/11/2024), Disp: 20 tablet, Rfl: 0    Synthroid 75 MCG tablet, Take 1 tablet (75 mcg total) by mouth daily in the early morning, Disp: 90 tablet, Rfl: 2    valACYclovir (VALTREX) 1,000 mg tablet, Take 1 tablet (1,000 mg total)  "by mouth 2 (two) times a day for 1 day, Disp: 2 tablet, Rfl: 0  No current facility-administered medications for this visit.  Family History   Problem Relation Age of Onset    No Known Problems Mother     Kidney failure Father     Brain cancer Father     Kidney cancer Father     Diabetes Sister     Alcohol abuse Sister     Diabetes Brother       Review of Systems  Allergies:  Amoxicillin, Penicillins, and Adhesive [medical tape]      Objective:  BP (!) 183/91   Pulse 66   Temp (!) 97 °F (36.1 °C)   Resp 18     Physical Exam      Wound 08/02/24 Diabetic Ulcer Ankle Left;Posterior (Active)   Enter Katz score: Katz Grade 1: Partial or full-thickness ulcer (superficial) 09/11/24 1536   Wound Image Images linked 09/11/24 1534   Wound Description Pink;Granulation tissue;Yellow;Slough 09/11/24 1536   Dorina-wound Assessment Scar Tissue;Intact;Dry 09/11/24 1536   Wound Length (cm) 3.5 cm 09/11/24 1536   Wound Width (cm) 1.2 cm 09/11/24 1536   Wound Depth (cm) 0.1 cm 09/11/24 1536   Wound Surface Area (cm^2) 4.2 cm^2 09/11/24 1536   Wound Volume (cm^3) 0.42 cm^3 09/11/24 1536   Calculated Wound Volume (cm^3) 0.42 cm^3 09/11/24 1536   Change in Wound Size % 58 09/11/24 1536   Drainage Amount Moderate 09/11/24 1536   Drainage Description Serosanguineous 09/11/24 1536   Non-staged Wound Description Full thickness 09/11/24 1536   Dressing Status Intact 09/11/24 1536                  Debridement   Wound 08/02/24 Diabetic Ulcer Ankle Left;Posterior    Universal Protocol:  procedure performed by consultantConsent: Verbal consent obtained.  Risks and benefits: risks, benefits and alternatives were discussed  Consent given by: patient  Time out: Immediately prior to procedure a \"time out\" was called to verify the correct patient, procedure, equipment, support staff and site/side marked as required.  Patient understanding: patient states understanding of the procedure being performed  Patient identity confirmed: verbally with " "patient    Debridement Details  Performed by: physician  Debridement type: selective  Pain control: lidocaine 4%      Post-debridement measurements  Length (cm): 3.5  Width (cm): 1.2  Depth (cm): 0.1  Percent debrided: 100%  Surface Area (cm^2): 4.2  Area Debrided (cm^2): 4.2  Volume (cm^3): 0.42    Devitalized tissue debrided: biofilm, callus and fibrin  Instrument(s) utilized: blade  Bleeding: small  Hemostasis obtained with: pressure  Procedural pain (0-10): insensate  Post-procedural pain: insensate   Response to treatment: procedure was tolerated well                 Wound Instructions:  Orders Placed This Encounter   Procedures    Wound cleansing and dressings Diabetic Ulcer Left;Posterior Ankle     Wound location Left posterior ankle.    Change dressing every other day  You may remove the dressing and shower. Do not leave wound open to air, apply new dressing immediately.  Cleanse the wound with Prophase (given)  Apply Collagen with Silver cut to fit the wound. May place a drop of NSS on top of collagen to help it dissolve. ( If you run out of Collagen with Silver, please use Calcium Alginate with Silver over wound )  Cover with ABD.    Secure with roll gauze and tape.       Off-loading Instructions:  Wear CAM Boot as directed by your physician. ( May use shorter boot if CAM is uncomfortable )  Put on immediately when rising in the morning and remove when going to bed.      Elastic Tubular Stocking:  Spandagrip size F to left leg.    Tubular elastic bandage: Apply from base of toes to behind the knee. Apply in AM, may remove for sleep.  Avoid prolonged standing in one place.  Elevate leg(s) above the level of the heart when sitting or as much as possible.    [facility] supplies ordered 9/11/2024     Standing Status:   Future     Standing Expiration Date:   9/25/2024         Aram Rodriguez DPM      Portions of the record may have been created with voice recognition software. Occasional wrong word or \"sound a " "like\" substitutions may have occurred due to the inherent limitations of voice recognition software. Read the chart carefully and recognize, using context, where substitutions have occurred.    "

## 2024-09-11 NOTE — ASSESSMENT & PLAN NOTE
Patient is going out of town, prior urine culture showed E. coli sensitive to tetracycline and she tolerated a course of doxycycline will repeat the course and send urine culture for confirmation.

## 2024-09-11 NOTE — PROGRESS NOTES
"Assessment:       1. Urinary tract infection with hematuria, site unspecified  Assessment & Plan:  Patient is going out of town, prior urine culture showed E. coli sensitive to tetracycline and she tolerated a course of doxycycline will repeat the course and send urine culture for confirmation.  Orders:  -     POCT urine dip  -     Urine culture; Future  -     doxycycline hyclate (VIBRA-TABS) 100 mg tablet; Take 1 tablet (100 mg total) by mouth 2 (two) times a day for 5 days  -     Urine culture; Future  -     Urine culture        Orders Placed This Encounter   Procedures    Urine culture    Urine culture    POCT urine dip       Plan:  Plan:      1. Medications:  Doxycycline 100 mg twice a day for 5 days as it worked last time based on urine culture and with patient history of allergy to amoxicillin and penicillin  2. Maintain adequate hydration  3. Follow up if symptoms not improving, and prn.     Subjective:      Jillian Arango is a 69 y.o. female who complains of dysuria and hematuria for 7 days.  Patient also complains of headache and rhinitis, sinus pressure and sinus pain. Patient denies back pain, fever, and stomach ache.  Patient does have a history of recurrent UTI.    The following portions of the patient's history were reviewed and updated as appropriate: allergies, current medications, past family history, past medical history, past social history, past surgical history, and problem list.    Review of Systems  Pertinent items are noted in HPI.      Objective:      /80 (BP Location: Left arm, Patient Position: Sitting, Cuff Size: Large)   Pulse 71   Temp 98.9 °F (37.2 °C) (Tympanic)   Resp 16   Ht 5' 6\" (1.676 m)   Wt 97.5 kg (215 lb)   SpO2 98%   BMI 34.70 kg/m²   General: alert and oriented, in no acute distress   Abdomen: soft, non-tender, without masses or organomegaly    Back: CVA tenderness absent   : defer exam     Laboratory:   Urine dipstick shows  positive for blood, wbc .  "   Micro exam: not done.

## 2024-09-14 LAB
BACTERIA UR CULT: ABNORMAL
BACTERIA UR CULT: ABNORMAL

## 2024-09-22 DIAGNOSIS — E11.69 TYPE 2 DIABETES MELLITUS WITH OTHER SPECIFIED COMPLICATION, WITH LONG-TERM CURRENT USE OF INSULIN (HCC): ICD-10-CM

## 2024-09-22 DIAGNOSIS — Z79.4 TYPE 2 DIABETES MELLITUS WITH OTHER SPECIFIED COMPLICATION, WITH LONG-TERM CURRENT USE OF INSULIN (HCC): ICD-10-CM

## 2024-09-22 DIAGNOSIS — E66.812 CLASS 2 SEVERE OBESITY DUE TO EXCESS CALORIES WITH SERIOUS COMORBIDITY AND BODY MASS INDEX (BMI) OF 35.0 TO 35.9 IN ADULT (HCC): ICD-10-CM

## 2024-09-22 DIAGNOSIS — E66.01 CLASS 2 SEVERE OBESITY DUE TO EXCESS CALORIES WITH SERIOUS COMORBIDITY AND BODY MASS INDEX (BMI) OF 35.0 TO 35.9 IN ADULT (HCC): ICD-10-CM

## 2024-09-23 RX ORDER — TIRZEPATIDE 2.5 MG/.5ML
INJECTION, SOLUTION SUBCUTANEOUS
Qty: 2 ML | Refills: 0 | Status: SHIPPED | OUTPATIENT
Start: 2024-09-23

## 2024-09-27 ENCOUNTER — OFFICE VISIT (OUTPATIENT)
Dept: WOUND CARE | Facility: HOSPITAL | Age: 69
End: 2024-09-27
Payer: COMMERCIAL

## 2024-09-27 VITALS
RESPIRATION RATE: 18 BRPM | SYSTOLIC BLOOD PRESSURE: 163 MMHG | HEART RATE: 72 BPM | DIASTOLIC BLOOD PRESSURE: 87 MMHG | TEMPERATURE: 96.9 F

## 2024-09-27 DIAGNOSIS — L97.322 NON-PRESSURE CHRONIC ULCER OF LEFT ANKLE WITH FAT LAYER EXPOSED (HCC): Primary | ICD-10-CM

## 2024-09-27 DIAGNOSIS — E11.9 TYPE 2 DIABETES MELLITUS TREATED WITHOUT INSULIN (HCC): ICD-10-CM

## 2024-09-27 PROCEDURE — G0463 HOSPITAL OUTPT CLINIC VISIT: HCPCS

## 2024-09-27 PROCEDURE — 99213 OFFICE O/P EST LOW 20 MIN: CPT

## 2024-09-27 RX ORDER — LIDOCAINE 40 MG/G
CREAM TOPICAL ONCE
Status: COMPLETED | OUTPATIENT
Start: 2024-09-27 | End: 2024-09-27

## 2024-09-27 RX ADMIN — LIDOCAINE: 40 CREAM TOPICAL at 09:11

## 2024-09-27 NOTE — PROGRESS NOTES
Wound Procedure Treatment Diabetic Ulcer Left;Posterior Ankle    Performed by: Chuyita Crisostomo RN  Authorized by: TRACY Mast    Associated wounds:   Wound 08/02/24 Diabetic Ulcer Ankle Left;Posterior  Wound cleansed with:  Wound aggrssively cleansed with NSS and gauze  Applied primary dressing:  Collagen dressing and Silver  Applied secondary dressing:  ABD  Dressing secured with:  Christa, Tape, Elastic tubular stocking and Size F  Offloading device appllied:  CAM

## 2024-09-27 NOTE — PATIENT INSTRUCTIONS
Orders Placed This Encounter   Procedures    Wound cleansing and dressings Diabetic Ulcer Left;Posterior Ankle     Wound location Left posterior ankle.    Change dressing every other day  You may remove the dressing and shower. Do not leave wound open to air, apply new dressing immediately.  Cleanse the wound with Prophase   Apply Collagen with Silver cut to fit the wound. May place a drop of NSS on top of collagen to help it dissolve. ( Rest of supplies sent home with patient )  Cover with ABD.    Secure with roll gauze and tape.       Off-loading Instructions:  Wear CAM Boot as directed by your physician. ( May use shorter boot if CAM is uncomfortable )  Put on immediately when rising in the morning and remove when going to bed.      Elastic Tubular Stocking:  Spandagrip size F to left leg.    Tubular elastic bandage: Apply from base of toes to behind the knee. Apply in AM, may remove for sleep.  Avoid prolonged standing in one place.  Elevate leg(s) above the level of the heart when sitting or as much as possible.    Please call PCP regarding new medication and side effects you are experiencing ASAP.    [facility] supplies ordered 9/27/2024.     Standing Status:   Future     Standing Expiration Date:   10/11/2024

## 2024-09-27 NOTE — PROGRESS NOTES
Patient ID: Jillian Arango is a 69 y.o. female Date of Birth 1955       Chief Complaint   Patient presents with    Follow Up Wound Care Visit     Left ankle wound       Allergies:  Amoxicillin, Penicillins, and Adhesive [medical tape]    Diagnosis:      Diagnosis ICD-10-CM Associated Orders   1. 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 Type 2 diabetes mellitus treated without insulin ([facility])  E11.9               Assessment & Plan:  Left posterior lower extremity wound is slightly smaller on exam today with improved wound bed appearance.  Wound cleansed with saline and gauze today.  Will recommend to continue with current wound management with silver collagen.  Offloading as per Dr. Rodriguez.  See wounds orders below  Wound is not showing any s/s of clinical infection.   Pressure relief, offload pressure to wound as much as possible  Elevate lower extremities and avoid prolonged standing/keeping legs in dependent position for edema management.   Counseled patient on the importance of tight glycemic control and adequate protein intake (3-4 servings per day) to promote wound healing.   A1C results reviewed with the patient today.   Follow-up in 2 week(s). Call sooner with questions or concerns or any signs of infection such as redness, swelling, increased/purulent drainage, fever or chills, and increased pain.  Patient verbalized understanding and agrees with plan of care.          Subjective:   9/27/2024: Patient presents to wound care center for follow-up visit of left lower extremity wound.  Patient offers no wound related complaints, has been wearing cam boot for offloading and using collagen with silver to the wound bed.  Patient cleanses the wound using prophase.  Patient denies increased pain or drainage  from the wound.  Denies fever or chills.  Patient states that she will be going on a cruise next week and will follow-up next in 2 weeks.        The following portions of the patient's history were reviewed and updated as appropriate:   Patient Active Problem List   Diagnosis    Small bowel obstruction (HCC)    Type 2 diabetes mellitus (HCC)    History of urostomy    Endometrial cancer (HCC)    Hypothyroidism    Anemia    Hydroureteronephrosis    HTN (hypertension)    Migraine    Abdominal adhesions    Class 2 severe obesity with serious comorbidity and body mass index (BMI) of 35.0 to 35.9 in adult (HCC)    Headache    Ureteral-ileal loop anastomotic stricture    Chronic deep vein thrombosis (DVT) of femoral vein of right lower extremity (HCC)    Anti-cardiolipin antibody positive    Encounter for attention to other artificial openings of urinary tract (HCC)    History of smoking    Depression, recurrent (HCC)    PAD (peripheral artery disease) (HCC)    Urinary tract infection with hematuria     Past Medical History:   Diagnosis Date    Achilles tendinitis of left lower extremity 02/08/2024    Achilles tendinosis of left ankle 11/27/2023    Anemia     Back pain     Bone infarction (HCC) 06/24/2023    Bowel obstruction (HCC) 2017,2018    Bursitis of posterior heel, left 11/27/2023    Cancer (HCC)     uterine CA radiation destoried urethra.   Patient has urostomy    Cholelithiasis     Colon polyp     Deep vein thrombosis of left lower extremity (HCC) 01/11/2004    on blood thinner for 3 years.    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    Hypercalcemia 10/10/2023     Hypothyroid     In vitro fertilization     Kidney stone     Migraines     Muscle weakness     abdominal    Nausea and vomiting 10/05/2023    Personal history of irradiation     Postoperative wound dehiscence, subsequent encounter 02/08/2024    Renal cyst     Sleep apnea     Resolved due to Weight Loss    Vertigo 10/10/2023     Past Surgical History:   Procedure Laterality Date    ACHILLES TENDON REPAIR Right     APPENDECTOMY      COLONOSCOPY      6/6/22    COLONOSCOPY W/ BIOPSIES N/A 12/2015    EGD      HEEL SPUR SURGERY Right 1996    HYSTERECTOMY      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    IR NEPHROSTOMY TUBE PLACEMENT  01/18/2021    LAPAROSCOPIC GASTRIC BANDING N/A 2006    [place], [place]    OTHER SURGICAL HISTORY  2004    Urine shunt to intestine, transverse colon    PCNL Left 02/16/2021    Procedure: NEPHROLITHOTOMY  PERCUTANEOUS (PCNL);  Surgeon: Ziggy Roldan MD;  Location: [facility];  Service: Urology    MI CYSTO/URETERO W/LITHOTRIPSY &INDWELL STENT INSRT Left 02/16/2021    Procedure: anterograde  URETEROSCOPY with dilation of ureteral stricture, INSERTION STENT URETERAL, exchange  of nephrostomy tube;  Surgeon: Ziggy Roldan MD;  [facility];  Service: Urology    MI DEBRIDEMENT SUBCUTANEOUS TISSUE 1ST 20 SQ CM/< Left 1/29/2024    Procedure: DEBRIDEMENT LOWER EXTREMITY (WASH OUT) with PHOENIX GRAFT APPLICATION, foot wound;  Surgeon: Darron Frias DPM;  Location:  MAIN OR;  Service: Podiatry    MI LAPAROSCOPY ENTEROLYSIS SEPARATE PROCEDURE N/A 01/17/2019    Procedure: LAPAROSCOPIC LYSIS OF ADHESIONS;  Surgeon: Omar Jack MD;  Location:  MAIN OR;  Service: General    MI REPAIR SECONDARY ACHILLES TENDON W/WO GRAFT Left 11/24/2023    Procedure: REMOVAL OF POSTERIOR HEEL SPUR WITH REPAIR TENDON ACHILLES;  Surgeon: Darron Frias DPM;  Location:  MAIN OR;  Service: Podiatry    RADICAL HYSTERECTOMY N/A 1999    [facility] endometrial cancer.    SLEEVE  GASTROPLASTY N/A 2015    Dr. Jack, [facility]    URETER REVISION      WEILBY THUMB ARTHROPLASTY      WEILBY THUMB ARTHROPLASTY       Family History   Problem Relation Age of Onset    No Known Problems Mother     Kidney failure Father     Brain cancer Father     Kidney cancer Father     Diabetes Sister     Alcohol abuse Sister     Diabetes Brother       Social History     Socioeconomic History    Marital status: /Civil Union     Spouse name: Ari    Number of children: 0    Years of education: 14    Highest education level: None   Occupational History    Occupation: Hair styist    Tobacco Use    Smoking status: Former     Current packs/day: 0.00     Average packs/day: 1.5 packs/day for 12.0 years (18.0 ttl pk-yrs)     Types: Cigarettes     Start date: 1974     Quit date: 1986     Years since quittin.7     Passive exposure: Never    Smokeless tobacco: Never   Vaping Use    Vaping status: Never Used   Substance and Sexual Activity    Alcohol use: Not Currently    Drug use: Never    Sexual activity: Never     Comment: s/p hysterectomy   Other Topics Concern    None   Social History Narrative    None     Social Determinants of Health     Financial Resource Strain: Not on file   Food Insecurity: Not on file   Transportation Needs: No Transportation Needs (2023)    PRAPARE - Transportation     Lack of Transportation (Medical): No     Lack of Transportation (Non-Medical): No   Physical Activity: Not on file   Stress: Not on file   Social Connections: Not on file   Intimate Partner Violence: Not on file   Housing Stability: Unknown (2023)    Housing Stability Vital Sign     Unable to Pay for Housing in the Last Year: No     Number of Times Moved in the Last Year: Not on file     Homeless in the Last Year: No        Current Outpatient Medications:     ALPRAZolam (XANAX) 0.5 mg tablet, TAKE ONE-HALF TABLET BY MOUTH EVERY DAY AT BEDTIME AS NEEDED FOR ANXIETY, Disp: 30 tablet, Rfl: 0    Ascorbic  Acid (VITAMIN C PO), Take 1 capsule by mouth daily (Patient not taking: Reported on 9/11/2024), Disp: , Rfl:     BIOTIN PO, Take 1 capsule by mouth daily, Disp: , Rfl:     dabigatran etexilate (PRADAXA) 150 mg capsu, TAKE ONE CAPSULE BY MOUTH TWO TIMES A DAY, Disp: 200 capsule, Rfl: 1    FREESTYLE LITE test strip, Check blood sugar 1-2 times daily, Disp: 100 each, Rfl: 4    furosemide (LASIX) 20 mg tablet, Take 1 tablet (20 mg total) by mouth daily as needed (Swelling in the legs) for up to 10 days (Patient not taking: Reported on 9/11/2024), Disp: 10 tablet, Rfl: 0    meclizine (ANTIVERT) 12.5 MG tablet, Take 1 tablet (12.5 mg total) by mouth every 8 (eight) hours as needed for nausea or dizziness for up to 14 days, Disp: 40 tablet, Rfl: 0    metFORMIN (GLUCOPHAGE) 500 mg tablet, TAKE ONE TABLET BY MOUTH TWICE A DAY WITH MEALS, Disp: 180 tablet, Rfl: 1    Mounjaro 2.5 MG/0.5ML, INJECT 2.5MG UNDER THE SKIN EVERY 7 DAYS, Disp: 2 mL, Rfl: 0    pantoprazole (PROTONIX) 40 mg tablet, TAKE ONE TABLET BY MOUTH EVERY DAY IN THE EARLY MORNING, Disp: 90 tablet, Rfl: 0    pentoxifylline (TRENtal) 400 mg ER tablet, Take 1 tablet (400 mg total) by mouth 3 (three) times a day with meals, Disp: 270 tablet, Rfl: 0    pramipexole (MIRAPEX) 0.5 mg tablet, Take 1 mg by mouth daily at bedtime, Disp: , Rfl:     Probiotic Product (Probiotic Daily) CAPS, Take 1 capsule by mouth in the morning, Disp: 10 capsule, Rfl: 0    SUMAtriptan (IMITREX) 100 mg tablet, Take 1 tablet (100 mg total) by mouth once as needed for migraine repeat in two hours if needed No more than two in twenty four hours (Patient not taking: Reported on 9/11/2024), Disp: 20 tablet, Rfl: 0    Synthroid 75 MCG tablet, Take 1 tablet (75 mcg total) by mouth daily in the early morning, Disp: 90 tablet, Rfl: 2    valACYclovir (VALTREX) 1,000 mg tablet, Take 1 tablet (1,000 mg total) by mouth 2 (two) times a day for 1 day, Disp: 2 tablet, Rfl: 0  No current  facility-administered medications for this visit.    Review of Systems   Constitutional:  Negative for chills and fever.   HENT:  Negative for congestion and sneezing.    Respiratory:  Negative for cough and shortness of breath.    Cardiovascular:  Positive for leg swelling.   Musculoskeletal:  Positive for gait problem.   Skin:  Positive for wound.        LLE   Psychiatric/Behavioral:  Negative for agitation.        Objective:  /87   Pulse 72   Temp (!) 96.9 °F (36.1 °C)   Resp 18   Pain Score:   8     Physical Exam  Constitutional:       General: She is awake. She is not in acute distress.     Appearance: She is obese. She is not ill-appearing, toxic-appearing or diaphoretic.   HENT:      Head: Normocephalic and atraumatic.      Right Ear: External ear normal.      Left Ear: External ear normal.   Eyes:      Conjunctiva/sclera: Conjunctivae normal.   Pulmonary:      Effort: Pulmonary effort is normal. No respiratory distress.   Musculoskeletal:      Left lower leg: Edema present.   Skin:     General: Skin is warm and dry.      Findings: Wound present.      Comments: 1.  Left posterior lower extremity wound.  Wound bed is mostly pink/red with small amount of yellow slough that was removed with cleansing with saline and gauze.  Periwound is dry and intact with scar tissue.  No purulent drainage. No erythema, warmth or lymphangitic streaking to suggest cellulitis.  Refer to wound assessment for additional details.   Neurological:      Mental Status: She is alert.      Gait: Gait abnormal.   Psychiatric:         Mood and Affect: Mood normal.         Behavior: Behavior normal. Behavior is cooperative.         Wound 08/02/24 Diabetic Ulcer Ankle Left;Posterior (Active)   Enter Katz score: Katz Grade 1: Partial or full-thickness ulcer (superficial) 09/27/24 0909   Wound Image   09/27/24 0908   Wound Description Pink;Granulation tissue;Yellow 09/27/24 0909   Dorina-wound Assessment Scar  Tissue;Intact;Dry;Swelling;Pink 09/27/24 0909   Wound Length (cm) 3.4 cm 09/27/24 0909   Wound Width (cm) 1 cm 09/27/24 0909   Wound Depth (cm) 0.1 cm 09/27/24 0909   Wound Surface Area (cm^2) 3.4 cm^2 09/27/24 0909   Wound Volume (cm^3) 0.34 cm^3 09/27/24 0909   Calculated Wound Volume (cm^3) 0.34 cm^3 09/27/24 0909   Change in Wound Size % 66 09/27/24 0909   Drainage Amount Moderate 09/27/24 0909   Drainage Description Serosanguineous 09/27/24 0909   Non-staged Wound Description Full thickness 09/27/24 0909   Dressing Status Intact 09/27/24 0909             Lab Results   Component Value Date    HGBA1C 6.3 (H) 05/22/2024       Wound Instructions:  Orders Placed This Encounter   Procedures    Wound cleansing and dressings Diabetic Ulcer Left;Posterior Ankle     Wound location Left posterior ankle.    Change dressing every other day  You may remove the dressing and shower. Do not leave wound open to air, apply new dressing immediately.  Cleanse the wound with Prophase   Apply Collagen with Silver cut to fit the wound. May place a drop of NSS on top of collagen to help it dissolve. ( Rest of supplies sent home with patient )  Cover with ABD.    Secure with roll gauze and tape.       Off-loading Instructions:  Wear CAM Boot as directed by your physician. ( May use shorter boot if CAM is uncomfortable )  Put on immediately when rising in the morning and remove when going to bed.      Elastic Tubular Stocking:  Spandagrip size F to left leg.    Tubular elastic bandage: Apply from base of toes to behind the knee. Apply in AM, may remove for sleep.  Avoid prolonged standing in one place.  Elevate leg(s) above the level of the heart when sitting or as much as possible.    Please call PCP regarding new medication and side effects you are experiencing ASAP.    [facility] supplies ordered 9/27/2024.     Standing Status:   Future     Standing Expiration Date:   10/11/2024    Wound Procedure Treatment Diabetic Ulcer Left;Posterior  "Ankle     This order was created via procedure documentation           TRACY Wheatley, CORY, CWON    Portions of the record may have been created with voice recognition software. Occasional wrong word or \"sound alike\" substitutions may have occurred due to the inherent limitations of voice recognition software. Read the chart carefully and recognize, using context, where substitutions have occurred.          Total time spent today:    Total time (face-to-face and non-face-to-face) spent on today's visit was 18 minutes. This includes preparation for the visits (i.e. reviewing test results from date recent hospitalizations, ER/Urgent Care/primary care visits and recent consultant office visits), performance of a medically appropriate history and examination, and orders for medications or testing.     "

## 2024-10-06 DIAGNOSIS — E06.3 HYPOTHYROIDISM DUE TO HASHIMOTO'S THYROIDITIS: ICD-10-CM

## 2024-10-08 RX ORDER — LEVOTHYROXINE SODIUM 75 UG/1
75 TABLET ORAL
Qty: 90 TABLET | Refills: 0 | Status: SHIPPED | OUTPATIENT
Start: 2024-10-08

## 2024-10-11 PROBLEM — N39.0 URINARY TRACT INFECTION WITH HEMATURIA: Status: RESOLVED | Noted: 2024-09-11 | Resolved: 2024-10-11

## 2024-10-11 PROBLEM — R31.9 URINARY TRACT INFECTION WITH HEMATURIA: Status: RESOLVED | Noted: 2024-09-11 | Resolved: 2024-10-11

## 2024-10-16 ENCOUNTER — OFFICE VISIT (OUTPATIENT)
Dept: INTERNAL MEDICINE CLINIC | Facility: CLINIC | Age: 69
End: 2024-10-16
Payer: COMMERCIAL

## 2024-10-16 ENCOUNTER — HOSPITAL ENCOUNTER (OUTPATIENT)
Dept: RADIOLOGY | Facility: HOSPITAL | Age: 69
Discharge: HOME | End: 2024-10-16
Payer: COMMERCIAL

## 2024-10-16 ENCOUNTER — OFFICE VISIT (OUTPATIENT)
Dept: WOUND CARE | Facility: HOSPITAL | Age: 69
End: 2024-10-16
Payer: COMMERCIAL

## 2024-10-16 VITALS
TEMPERATURE: 99.2 F | SYSTOLIC BLOOD PRESSURE: 160 MMHG | HEIGHT: 66 IN | WEIGHT: 214 LBS | RESPIRATION RATE: 16 BRPM | BODY MASS INDEX: 34.39 KG/M2 | OXYGEN SATURATION: 99 % | HEART RATE: 72 BPM | DIASTOLIC BLOOD PRESSURE: 90 MMHG

## 2024-10-16 VITALS
RESPIRATION RATE: 18 BRPM | SYSTOLIC BLOOD PRESSURE: 162 MMHG | HEART RATE: 60 BPM | TEMPERATURE: 96.6 F | DIASTOLIC BLOOD PRESSURE: 90 MMHG

## 2024-10-16 DIAGNOSIS — Z79.4 TYPE 2 DIABETES MELLITUS WITH OTHER SPECIFIED COMPLICATION, WITH LONG-TERM CURRENT USE OF INSULIN (HCC): ICD-10-CM

## 2024-10-16 DIAGNOSIS — E66.812 CLASS 2 SEVERE OBESITY WITH SERIOUS COMORBIDITY AND BODY MASS INDEX (BMI) OF 35.0 TO 35.9 IN ADULT, UNSPECIFIED OBESITY TYPE (HCC): ICD-10-CM

## 2024-10-16 DIAGNOSIS — L97.322 NON-PRESSURE CHRONIC ULCER OF LEFT ANKLE WITH FAT LAYER EXPOSED (HCC): ICD-10-CM

## 2024-10-16 DIAGNOSIS — I15.9 SECONDARY HYPERTENSION: ICD-10-CM

## 2024-10-16 DIAGNOSIS — E66.812 CLASS 2 SEVERE OBESITY DUE TO EXCESS CALORIES WITH SERIOUS COMORBIDITY AND BODY MASS INDEX (BMI) OF 35.0 TO 35.9 IN ADULT (HCC): ICD-10-CM

## 2024-10-16 DIAGNOSIS — E11.9 TYPE 2 DIABETES MELLITUS TREATED WITHOUT INSULIN (HCC): Primary | ICD-10-CM

## 2024-10-16 DIAGNOSIS — I82.511 CHRONIC DEEP VEIN THROMBOSIS (DVT) OF FEMORAL VEIN OF RIGHT LOWER EXTREMITY (HCC): Primary | ICD-10-CM

## 2024-10-16 DIAGNOSIS — E11.9 TYPE 2 DIABETES MELLITUS TREATED WITHOUT INSULIN (HCC): ICD-10-CM

## 2024-10-16 DIAGNOSIS — B00.1 HERPES LABIALIS: ICD-10-CM

## 2024-10-16 DIAGNOSIS — E66.01 CLASS 2 SEVERE OBESITY WITH SERIOUS COMORBIDITY AND BODY MASS INDEX (BMI) OF 35.0 TO 35.9 IN ADULT, UNSPECIFIED OBESITY TYPE (HCC): ICD-10-CM

## 2024-10-16 DIAGNOSIS — E11.69 TYPE 2 DIABETES MELLITUS WITH OTHER SPECIFIED COMPLICATION, WITH LONG-TERM CURRENT USE OF INSULIN (HCC): ICD-10-CM

## 2024-10-16 DIAGNOSIS — M66.0 BAKER'S CYST, RUPTURED: ICD-10-CM

## 2024-10-16 DIAGNOSIS — E66.01 CLASS 2 SEVERE OBESITY DUE TO EXCESS CALORIES WITH SERIOUS COMORBIDITY AND BODY MASS INDEX (BMI) OF 35.0 TO 35.9 IN ADULT (HCC): ICD-10-CM

## 2024-10-16 PROBLEM — I82.409 DVT (DEEP VENOUS THROMBOSIS) (HCC): Status: ACTIVE | Noted: 2017-08-04

## 2024-10-16 PROCEDURE — 99214 OFFICE O/P EST MOD 30 MIN: CPT

## 2024-10-16 PROCEDURE — 99214 OFFICE O/P EST MOD 30 MIN: CPT | Performed by: PODIATRIST

## 2024-10-16 PROCEDURE — 97597 DBRDMT OPN WND 1ST 20 CM/<: CPT | Performed by: PODIATRIST

## 2024-10-16 PROCEDURE — 73610 X-RAY EXAM OF ANKLE: CPT

## 2024-10-16 RX ORDER — VALACYCLOVIR HYDROCHLORIDE 1 G/1
1000 TABLET, FILM COATED ORAL 2 TIMES DAILY
Qty: 2 TABLET | Refills: 0 | Status: SHIPPED | OUTPATIENT
Start: 2024-10-16 | End: 2024-10-17

## 2024-10-16 RX ORDER — ACYCLOVIR 50 MG/G
OINTMENT TOPICAL
Qty: 15 G | Refills: 1 | Status: SHIPPED | OUTPATIENT
Start: 2024-10-16 | End: 2024-10-21

## 2024-10-16 RX ORDER — CEFAZOLIN SODIUM 2 G/50ML
2000 SOLUTION INTRAVENOUS ONCE
OUTPATIENT
Start: 2024-10-16 | End: 2024-10-16

## 2024-10-16 RX ORDER — TRAMADOL HYDROCHLORIDE 50 MG/1
50 TABLET ORAL EVERY 8 HOURS PRN
Qty: 20 TABLET | Refills: 0 | Status: SHIPPED | OUTPATIENT
Start: 2024-10-16

## 2024-10-16 RX ORDER — CHLORHEXIDINE GLUCONATE ORAL RINSE 1.2 MG/ML
15 SOLUTION DENTAL ONCE
OUTPATIENT
Start: 2024-10-16 | End: 2024-10-16

## 2024-10-16 RX ORDER — LIDOCAINE 40 MG/G
CREAM TOPICAL ONCE
Status: COMPLETED | OUTPATIENT
Start: 2024-10-16 | End: 2024-10-16

## 2024-10-16 RX ADMIN — LIDOCAINE: 40 CREAM TOPICAL at 13:47

## 2024-10-16 NOTE — ASSESSMENT & PLAN NOTE
provoked LLE DVT 2000 pos top  R post tibial DVT 2023 xerelto failure switched to pradaxa   chronic R femoral and PT DVT ,   On pradaxa    PLAN:  Lifestyle management and increased physical activity   Follows Vascular Surgery for Chronic DVT  Referral sent for PT but has not started  Follows wound care.      Plan-   Continue Trental reassess at next visit, tolerating well with improvement of sx  Wound care and foot checks daily, moisturize bilateral extremities, continue PT  VAS Arterial Doppler scheduled for tomorrow  Follow up with Vascular Surgery

## 2024-10-16 NOTE — ASSESSMENT & PLAN NOTE
Lab Results   Component Value Date    HGBA1C 6.3 (H) 05/22/2024       Orders:    tirzepatide (Mounjaro) 2.5 MG/0.5ML; Inject 0.5 mL (2.5 mg total) under the skin every 7 days

## 2024-10-16 NOTE — PROGRESS NOTES
Ambulatory Visit  Name: Jillian Arango      : 1955      MRN: [id number]  Encounter Provider: Bill Joshua MD  Encounter Date: 10/16/2024   Encounter department: [facility] INTERNAL MEDICINE [place]    Assessment & Plan  Chronic deep vein thrombosis (DVT) of femoral vein of right lower extremity (HCC)  provoked LLE DVT  pos top  R post tibial DVT  xerelto failure switched to pradaxa   chronic R femoral and PT DVT ,   On pradaxa    PLAN:  Lifestyle management and increased physical activity   Follows Vascular Surgery for Chronic DVT  Referral sent for PT but has not started  Follows wound care.      Plan-   Continue Trental reassess at next visit, tolerating well with improvement of sx  Wound care and foot checks daily, moisturize bilateral extremities, continue PT  VAS Arterial Doppler scheduled for tomorrow  Follow up with Vascular Surgery          Class 2 severe obesity with serious comorbidity and body mass index (BMI) of 35.0 to 35.9 in adult, unspecified obesity type (HCC)  Plan  Continue with diabetes management  Life style modifications  Eat real food, not too much, mostly plants  Avoid processed foods  DASH Diet  Limit salt intake: education given on how to read nutriton labels to find salt (ie listed as Salt, NaCl, Sodium). Pt was educated on types of foods and drinks with high salt content.   Do not drink your calories  Eat a piece of fruit or vegetable 30 mins prior to meals  Avoid sugar and sugar substitutes  Limit meat products  Per AHA guidelines, recommend moderate-vigorous intensity exercise for 30 minutes a day for 5 days a week or a total of 150 min/week  Keep exercise journal  Small changes in activity makes a big difference ie take stairs instead of elevators or park farther away from you destination  Try to weigh yourself daily at same time of day and keep journal  Keep food journal   Sleep hygine  Mindful meditation for 15mins a day 5 days a week. Marilin cox  good free jaqueline.            Secondary hypertension  Hx of HTN  Not on treatment  Was prescribed dlasix 20mg PO PRN but not taking.     PLAN:  Pt refusing medication for now  Readdress subject at next visit and start losartan         Baker's cyst, ruptured  Most likely baker cyst rupture    PLAN:  Doppler to r/o clot as pt has hx  Compression stalking pt has  Elevate leg.   Should resolve in 2 weeks if not should come back in.     Orders:    VAS ARTERIAL DUPLEX-LOWER LIMB UNILATERAL; Future    Type 2 diabetes mellitus with other specified complication, with long-term current use of insulin ([facility])    Lab Results   Component Value Date    HGBA1C 6.3 (H) 05/22/2024       Orders:    tirzepatide (Mounjaro) 2.5 MG/0.5ML; Inject 0.5 mL (2.5 mg total) under the skin every 7 days    Class 2 severe obesity due to excess calories with serious comorbidity and body mass index (BMI) of 35.0 to 35.9 in adult ([facility])  Plan  Continue with diabetes management  Life style modifications  Eat real food, not too much, mostly plants  Avoid processed foods  DASH Diet  Limit salt intake: education given on how to read nutriton labels to find salt (ie listed as Salt, NaCl, Sodium). Pt was educated on types of foods and drinks with high salt content.   Do not drink your calories  Eat a piece of fruit or vegetable 30 mins prior to meals  Avoid sugar and sugar substitutes  Limit meat products  Per AHA guidelines, recommend moderate-vigorous intensity exercise for 30 minutes a day for 5 days a week or a total of 150 min/week  Keep exercise journal  Small changes in activity makes a big difference ie take stairs instead of elevators or park farther away from you destination  Try to weigh yourself daily at same time of day and keep journal  Keep food journal   Sleep hygine  Mindful meditation for 15mins a day 5 days a week. Insight timer a good free jaqueline.       Orders:    tirzepatide (Mounjaro) 2.5 MG/0.5ML; Inject 0.5 mL (2.5 mg total) under the skin  every 7 days    Herpes labialis    Orders:    valACYclovir (VALTREX) 1,000 mg tablet; Take 1 tablet (1,000 mg total) by mouth 2 (two) times a day for 1 day    acyclovir (Zovirax) 5 % ointment; Apply topically every 3 (three) hours for 5 days       History of Present Illness     Pt notes one week hx of left knee pain and swelling  Pt noted starting doxycycline.  Pain is generalized in the left knee radiating down to ankle.  Started in the posterior knee.            History obtained from : patient  Review of Systems   Constitutional:  Negative for fever and unexpected weight change.   Eyes: Negative.    Respiratory: Negative.     Cardiovascular: Negative.    Musculoskeletal:  Positive for gait problem, joint swelling and myalgias.     Pertinent Medical History         Medical History Reviewed by provider this encounter:  Tobacco  Allergies  Meds  Problems  Med Hx  Surg Hx  Fam Hx       Current Outpatient Medications on File Prior to Visit   Medication Sig Dispense Refill    ALPRAZolam (XANAX) 0.5 mg tablet TAKE ONE-HALF TABLET BY MOUTH EVERY DAY AT BEDTIME AS NEEDED FOR ANXIETY 30 tablet 0    BIOTIN PO Take 1 capsule by mouth daily      dabigatran etexilate (PRADAXA) 150 mg capsu TAKE ONE CAPSULE BY MOUTH TWO TIMES A  capsule 1    FREESTYLE LITE test strip Check blood sugar 1-2 times daily 100 each 4    levothyroxine 75 mcg tablet TAKE 1 TABLET DAILY EARLY IN THE MORNING 90 tablet 0    metFORMIN (GLUCOPHAGE) 500 mg tablet TAKE ONE TABLET BY MOUTH TWICE A DAY WITH MEALS 180 tablet 1    Mounjaro 2.5 MG/0.5ML INJECT 2.5MG UNDER THE SKIN EVERY 7 DAYS 2 mL 0    pantoprazole (PROTONIX) 40 mg tablet TAKE ONE TABLET BY MOUTH EVERY DAY IN THE EARLY MORNING 90 tablet 0    pramipexole (MIRAPEX) 0.5 mg tablet Take 1 mg by mouth daily at bedtime      valACYclovir (VALTREX) 1,000 mg tablet Take 1 tablet (1,000 mg total) by mouth 2 (two) times a day for 1 day 2 tablet 0    Ascorbic Acid (VITAMIN C PO) Take 1 capsule by  "mouth daily (Patient not taking: Reported on 2024)      furosemide (LASIX) 20 mg tablet Take 1 tablet (20 mg total) by mouth daily as needed (Swelling in the legs) for up to 10 days (Patient not taking: Reported on 2024) 10 tablet 0    meclizine (ANTIVERT) 12.5 MG tablet Take 1 tablet (12.5 mg total) by mouth every 8 (eight) hours as needed for nausea or dizziness for up to 14 days 40 tablet 0    pentoxifylline (TRENtal) 400 mg ER tablet Take 1 tablet (400 mg total) by mouth 3 (three) times a day with meals 270 tablet 0    Probiotic Product (Probiotic Daily) CAPS Take 1 capsule by mouth in the morning 10 capsule 0    SUMAtriptan (IMITREX) 100 mg tablet Take 1 tablet (100 mg total) by mouth once as needed for migraine repeat in two hours if needed No more than two in twenty four hours (Patient not taking: Reported on 2024) 20 tablet 0     No current facility-administered medications on file prior to visit.      Social History     Tobacco Use    Smoking status: Former     Current packs/day: 0.00     Average packs/day: 1.5 packs/day for 12.0 years (18.0 ttl pk-yrs)     Types: Cigarettes     Start date: 1974     Quit date: 1986     Years since quittin.7     Passive exposure: Never    Smokeless tobacco: Never   Vaping Use    Vaping status: Never Used   Substance and Sexual Activity    Alcohol use: Not Currently    Drug use: Never    Sexual activity: Never     Comment: s/p hysterectomy         Objective     /90 (BP Location: Right arm, Patient Position: Sitting, Cuff Size: Large)   Pulse 72   Temp 99.2 °F (37.3 °C) (Tympanic)   Resp 16   Ht 5' 6\" (1.676 m)   Wt 97.1 kg (214 lb)   SpO2 99%   BMI 34.54 kg/m²     Physical Exam  HENT:      Head: Normocephalic.      Nose: Nose normal.   Eyes:      Conjunctiva/sclera: Conjunctivae normal.   Cardiovascular:      Rate and Rhythm: Normal rate.   Musculoskeletal:         General: Tenderness present.      Left lower leg: Edema present. "   Neurological:      Mental Status: She is alert and oriented to person, place, and time.   Psychiatric:         Behavior: Behavior normal.       Nutrition Assessment and Intervention:     Recommended completion of food recall journal      Physical Activity Assessment and Intervention:    Activity journal completion recommended

## 2024-10-16 NOTE — PATIENT INSTRUCTIONS
Orders Placed This Encounter   Procedures    Wound cleansing and dressings Diabetic Ulcer Left;Posterior Ankle     Wound location Left posterior ankle.    Change dressing every other day  You may remove the dressing and shower. Do not leave wound open to air, apply new dressing immediately.  Cleanse the wound with Prophase   Apply Collagen with Silver cut to fit the wound. May place a drop of NSS on top of collagen to help it dissolve. ( Rest of supplies sent home with patient )  Cover with ABD.    Secure with roll gauze and tape.       Off-loading Instructions:  Wear CAM Boot as directed by your physician. ( May use shorter boot if CAM is uncomfortable )  Put on immediately when rising in the morning and remove when going to bed.      Elastic Tubular Stocking:  Spandagrip size F to left leg.    Tubular elastic bandage: Apply from base of toes to behind the knee. Apply in AM, may remove for sleep.  Avoid prolonged standing in one place.  Elevate leg(s) above the level of the heart when sitting or as much as possible.    As per discussion today with Dr. Rodriguez, skin graft will be next step in treatment of wound.  Dr. Rodriguez is ordering a repeat X ray of left ankle.    Expect a call back from surgery scheduler this week. If you do not receive a call, please give the Wound Center a call immediatly.     Standing Status:   Future     Standing Expiration Date:   10/23/2024    XR ankle 3+ vw left     Standing Status:   Future     Standing Expiration Date:   10/16/2028     Scheduling Instructions:      Bring along any outside films relating to this procedure.

## 2024-10-16 NOTE — PROGRESS NOTES
Wound Procedure Treatment Diabetic Ulcer Left;Posterior Ankle    Performed by: Chuyita Crisostomo RN  Authorized by: Aram Rodriguez DPM    Associated wounds:   Wound 08/02/24 Diabetic Ulcer Ankle Left;Posterior  Wound cleansed with:  Wound aggrssively cleansed with NSS and gauze  Applied primary dressing:  Collagen dressing and Silver  Applied secondary dressing:  ABD  Dressing secured with:  Christa, Tape, Elastic tubular stocking and Size F  Offloading device appllied:  Surgical shoe

## 2024-10-16 NOTE — ASSESSMENT & PLAN NOTE
Plan  Continue with diabetes management  Life style modifications  Eat real food, not too much, mostly plants  Avoid processed foods  DASH Diet  Limit salt intake: education given on how to read nutriton labels to find salt (ie listed as Salt, NaCl, Sodium). Pt was educated on types of foods and drinks with high salt content.   Do not drink your calories  Eat a piece of fruit or vegetable 30 mins prior to meals  Avoid sugar and sugar substitutes  Limit meat products  Per AHA guidelines, recommend moderate-vigorous intensity exercise for 30 minutes a day for 5 days a week or a total of 150 min/week  Keep exercise journal  Small changes in activity makes a big difference ie take stairs instead of elevators or park farther away from you destination  Try to weigh yourself daily at same time of day and keep journal  Keep food journal   Sleep hygine  Mindful meditation for 15mins a day 5 days a week. Insight timer a good free jaqueline.       Orders:    tirzepatide (Mounjaro) 2.5 MG/0.5ML; Inject 0.5 mL (2.5 mg total) under the skin every 7 days

## 2024-10-16 NOTE — ASSESSMENT & PLAN NOTE
Hx of HTN  Not on treatment  Was prescribed dlasix 20mg PO PRN but not taking.     PLAN:  Pt refusing medication for now  Readdress subject at next visit and start losartan

## 2024-10-16 NOTE — PROGRESS NOTES
Patient ID: Jillian Arango is a 69 y.o. female Date of Birth 1955     Diagnosis:  1. Type 2 diabetes mellitus treated without insulin ([facility])  -     Wound cleansing and dressings Diabetic Ulcer Left;Posterior Ankle; Future  -     VAS VENOUS DUPLEX -LOWER LIMB UNILATERAL; Future; Expected date: 10/16/2024  -     XR ankle 3+ vw left; Future; Expected date: 10/16/2024  -     Wound Procedure Treatment Diabetic Ulcer Left;Posterior Ankle  -     lidocaine (LMX) 4 % cream  -     Case request operating room: Left ankle debridement with harvesting of split-thickness skin graft left lower leg for application to left ankle wound.; Standing  -     Case request operating room: Left ankle debridement with harvesting of split-thickness skin graft left lower leg for application to left ankle wound.  2. Non-pressure chronic ulcer of left ankle with fat layer exposed (HCC)  -     Wound cleansing and dressings Diabetic Ulcer Left;Posterior Ankle; Future  -     VAS VENOUS DUPLEX -LOWER LIMB UNILATERAL; Future; Expected date: 10/16/2024  -     XR ankle 3+ vw left; Future; Expected date: 10/16/2024  -     Wound Procedure Treatment Diabetic Ulcer Left;Posterior Ankle  -     lidocaine (LMX) 4 % cream  -     Case request operating room: Left ankle debridement with harvesting of split-thickness skin graft left lower leg for application to left ankle wound.; Standing  -     Case request operating room: Left ankle debridement with harvesting of split-thickness skin graft left lower leg for application to left ankle wound.  -     CBC and differential; Future  -     Basic metabolic panel; Future  -     ECG 12 lead; Future  -     Hemoglobin A1C; Future  -     Debridement Diabetic Ulcer Left;Posterior Ankle     Diagnosis ICD-10-CM Associated Orders   1. Type 2 diabetes mellitus treated without insulin (HCC)  E11.9 Wound cleansing and dressings Diabetic Ulcer Left;Posterior Ankle     VAS VENOUS DUPLEX -LOWER LIMB UNILATERAL     XR ankle 3+ vw  left     Wound Procedure Treatment Diabetic Ulcer Left;Posterior Ankle     lidocaine (LMX) 4 % cream     Case request operating room: Left ankle debridement with harvesting of split-thickness skin graft left lower leg for application to left ankle wound.     Case request operating room: Left ankle debridement with harvesting of split-thickness skin graft left lower leg for application to left ankle wound.      2. Non-pressure chronic ulcer of left ankle with fat layer exposed (HCC)  L97.322 Wound cleansing and dressings Diabetic Ulcer Left;Posterior Ankle     VAS VENOUS DUPLEX -LOWER LIMB UNILATERAL     XR ankle 3+ vw left     Wound Procedure Treatment Diabetic Ulcer Left;Posterior Ankle     lidocaine (LMX) 4 % cream     Case request operating room: Left ankle debridement with harvesting of split-thickness skin graft left lower leg for application to left ankle wound.     Case request operating room: Left ankle debridement with harvesting of split-thickness skin graft left lower leg for application to left ankle wound.     CBC and differential     Basic metabolic panel     ECG 12 lead     Hemoglobin A1C     Debridement Diabetic Ulcer Left;Posterior Ankle           Assessment & Plan:  Selective debridement completed today.        Due to continued issues with healing discussed with patient options of surgical management for split-thickness skin grafting of the heel with obtaining graft from the lower leg for application to the ankle wound.    I explained to patient risks and benefits of the procedure in detail.  Conservative options have been evaluated and exhausted. Complications of the procedure can include but are not limited to infection, prolonged pain, prolonged swelling, prolonged numbness, need for further surgery, wound healing problems, loss of function, painful scar, stiffness, arthritis, medical complications, recurrence, DVT, PE, death, nerve, tendon, ligament or blood vessel injury, need for amputation,  limb loss.  Patient understands the procedure and all questions have been answered to the patient satisfaction.  No guarantees have been given to the patient regarding outcome.  Will proceed with surgical intervention.  Patient has signed informed consent and understands the procedure and post operative course.        If the patient notices any systemic signs of infection including but not limited to fever, chills, nausea, vomiting or significant worsening of wound with increased drainage, redness or streaking up the foot or leg the patient should notify the office or present to the emergency room.      If wound debridement was completed today this was done in an attempt to promote healing, decrease risk of infection and for limb salvage efforts.     Goal of treatment: wound healing     Efforts to decrease pressure on the wound(s), evaluation and discussion of nutritional status, peripheral vascular status, and infection control have all been addressed with this patient.    Return in about 3 weeks (around 11/6/2024).      Chief Complaint   Patient presents with    Follow Up Wound Care Visit     Left ankle wound           Subjective:   Patient returns for follow-up on left ankle wound.  She continues to have issues with healing of this area.  She had been in a boot immobilization had been protected is using a collagen dressing.  She is asking to proceed at this point with skin grafting due to continued issues with healing.        The following portions of the patient's history were reviewed and updated as appropriate:   Patient Active Problem List   Diagnosis    Small bowel obstruction (HCC)    Diabetes mellitus (HCC)    History of urostomy    Endometrial cancer (HCC)    Thyroid disorder    Anemia    Hydroureteronephrosis    HTN (hypertension)    Migraine    Abdominal adhesions    Class 2 severe obesity with serious comorbidity and body mass index (BMI) of 35.0 to 35.9 in adult (HCC)    Headache    Ureteral-ileal  loop anastomotic stricture    Chronic deep vein thrombosis (DVT) of femoral vein of right lower extremity (HCC)    Anti-cardiolipin antibody positive    Encounter for attention to other artificial openings of urinary tract (HCC)    History of smoking    Depression, recurrent (HCC)    PAD (peripheral artery disease) (HCC)    Baker's cyst, ruptured     Past Medical History:   Diagnosis Date    Achilles tendinitis of left lower extremity 02/08/2024    Achilles tendinosis of left ankle 11/27/2023    Anemia     Back pain     Bone infarction (HCC) 06/24/2023    Bowel obstruction (HCC) 2017,2018    Bursitis of posterior heel, left 11/27/2023    Cancer (HCC)     uterine CA radiation destoried urethra.   Patient has urostomy    Cholelithiasis     Colon polyp     Deep vein thrombosis of left lower extremity (HCC) 01/11/2004    on blood thinner for 3 years.    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    Hypercalcemia 10/10/2023    Hypothyroid     In vitro fertilization     Kidney stone     Migraines     Muscle weakness     abdominal    Nausea and vomiting 10/05/2023    Personal history of irradiation     Postoperative wound dehiscence, subsequent encounter 02/08/2024    Renal cyst     Sleep apnea     Resolved due to Weight Loss    Vertigo 10/10/2023     Past Surgical History:   Procedure Laterality Date    ACHILLES TENDON REPAIR Right     APPENDECTOMY      COLONOSCOPY      6/6/22    COLONOSCOPY W/ BIOPSIES N/A 12/2015    EGD      HEEL SPUR SURGERY Right 1996    HYSTERECTOMY      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    IR NEPHROSTOMY TUBE PLACEMENT  01/18/2021     LAPAROSCOPIC GASTRIC BANDING N/A     [place], [place]    OTHER SURGICAL HISTORY      Urine shunt to intestine, transverse colon    PCNL Left 2021    Procedure: NEPHROLITHOTOMY  PERCUTANEOUS (PCNL);  Surgeon: Ziggy Roldan MD;  Location: [facility];  Service: Urology    PA CYSTO/URETERO W/LITHOTRIPSY &INDWELL STENT INSRT Left 2021    Procedure: anterograde  URETEROSCOPY with dilation of ureteral stricture, INSERTION STENT URETERAL, exchange  of nephrostomy tube;  Surgeon: Ziggy Roldan MD;  Location[facility];  Service: Urology    PA DEBRIDEMENT SUBCUTANEOUS TISSUE 1ST 20 SQ CM/< Left 2024    Procedure: DEBRIDEMENT LOWER EXTREMITY (WASH OUT) with PHOENIX GRAFT APPLICATION, foot wound;  Surgeon: Darron Frias DPM;  Location: [facility];  Service: Podiatry    PA LAPAROSCOPY ENTEROLYSIS SEPARATE PROCEDURE N/A 2019    Procedure: LAPAROSCOPIC LYSIS OF ADHESIONS;  Surgeon: Omar Jack MD;  Location:  MAIN OR;  Service: General    PA REPAIR SECONDARY ACHILLES TENDON W/WO GRAFT Left 2023    Procedure: REMOVAL OF POSTERIOR HEEL SPUR WITH REPAIR TENDON ACHILLES;  Surgeon: Darron Frias DPM;  Location: [facility];  Service: Podiatry    [facility]    SLEEVE GASTROPLASTY N/A 2015    Dr. Jack, [facility]    URETER REVISION      WEILBY THUMB ARTHROPLASTY      WEILBY THUMB ARTHROPLASTY       Social History     Socioeconomic History    Marital status: /Civil Union     Spouse name: Ari    Number of children: 0    Years of education: 14    Highest education level: None   Occupational History    Occupation: Hair styist    Tobacco Use    Smoking status: Former     Current packs/day: 0.00     Average packs/day: 1.5 packs/day for 12.0 years (18.0 ttl pk-yrs)     Types: Cigarettes     Start date: 1974     Quit date: 1986     Years since quittin.7     Passive exposure: Never    Smokeless  tobacco: Never   Vaping Use    Vaping status: Never Used   Substance and Sexual Activity    Alcohol use: Not Currently    Drug use: Never    Sexual activity: Never     Comment: s/p hysterectomy   Other Topics Concern    None   Social History Narrative    None     Social Determinants of Health     Financial Resource Strain: Not on file   Food Insecurity: Not on file   Transportation Needs: No Transportation Needs (6/26/2023)    PRAPARE - Transportation     Lack of Transportation (Medical): No     Lack of Transportation (Non-Medical): No   Physical Activity: Not on file   Stress: Not on file   Social Connections: Not on file   Intimate Partner Violence: Not on file   Housing Stability: Unknown (6/26/2023)    Housing Stability Vital Sign     Unable to Pay for Housing in the Last Year: No     Number of Times Moved in the Last Year: Not on file     Homeless in the Last Year: No        Current Outpatient Medications:     acyclovir (Zovirax) 5 % ointment, Apply topically every 3 (three) hours for 5 days, Disp: 15 g, Rfl: 1    ALPRAZolam (XANAX) 0.5 mg tablet, TAKE ONE-HALF TABLET BY MOUTH EVERY DAY AT BEDTIME AS NEEDED FOR ANXIETY, Disp: 30 tablet, Rfl: 0    BIOTIN PO, Take 1 capsule by mouth daily, Disp: , Rfl:     dabigatran etexilate (PRADAXA) 150 mg capsu, TAKE ONE CAPSULE BY MOUTH TWO TIMES A DAY, Disp: 200 capsule, Rfl: 1    FREESTYLE LITE test strip, Check blood sugar 1-2 times daily, Disp: 100 each, Rfl: 4    levothyroxine 75 mcg tablet, TAKE 1 TABLET DAILY EARLY IN THE MORNING, Disp: 90 tablet, Rfl: 0    meclizine (ANTIVERT) 12.5 MG tablet, Take 1 tablet (12.5 mg total) by mouth every 8 (eight) hours as needed for nausea or dizziness for up to 14 days, Disp: 40 tablet, Rfl: 0    metFORMIN (GLUCOPHAGE) 500 mg tablet, TAKE ONE TABLET BY MOUTH TWICE A DAY WITH MEALS, Disp: 180 tablet, Rfl: 1    pantoprazole (PROTONIX) 40 mg tablet, TAKE ONE TABLET BY MOUTH EVERY DAY IN THE EARLY MORNING, Disp: 90 tablet, Rfl: 0     pramipexole (MIRAPEX) 0.5 mg tablet, Take 1 mg by mouth daily at bedtime, Disp: , Rfl:     Probiotic Product (Probiotic Daily) CAPS, Take 1 capsule by mouth in the morning, Disp: 10 capsule, Rfl: 0    tirzepatide (Mounjaro) 2.5 MG/0.5ML, Inject 0.5 mL (2.5 mg total) under the skin every 7 days, Disp: 2 mL, Rfl: 0    traMADol (Ultram) 50 mg tablet, Take 1 tablet (50 mg total) by mouth every 8 (eight) hours as needed for moderate pain, Disp: 20 tablet, Rfl: 0    valACYclovir (VALTREX) 1,000 mg tablet, Take 1 tablet (1,000 mg total) by mouth 2 (two) times a day for 1 day, Disp: 2 tablet, Rfl: 0  No current facility-administered medications for this visit.  Family History   Problem Relation Age of Onset    No Known Problems Mother     Kidney failure Father     Brain cancer Father     Kidney cancer Father     Diabetes Sister     Alcohol abuse Sister     Diabetes Brother       Review of Systems  Allergies:  Amoxicillin, Penicillins, and Adhesive [medical tape]      Objective:  /90   Pulse 60   Temp (!) 96.6 °F (35.9 °C)   Resp 18     Physical Exam      Wound 08/02/24 Diabetic Ulcer Ankle Left;Posterior (Active)   Enter Katz score: Katz Grade 1: Partial or full-thickness ulcer (superficial) 10/16/24 1345   Wound Image Images linked 10/16/24 1311   Wound Description Pink;Granulation tissue;Yellow 10/16/24 1345   Dorina-wound Assessment Scar Tissue;Pink;Intact;Swelling 10/16/24 1345   Wound Length (cm) 3 cm 10/16/24 1345   Wound Width (cm) 1 cm 10/16/24 1345   Wound Depth (cm) 0.1 cm 10/16/24 1345   Wound Surface Area (cm^2) 3 cm^2 10/16/24 1345   Wound Volume (cm^3) 0.3 cm^3 10/16/24 1345   Calculated Wound Volume (cm^3) 0.3 cm^3 10/16/24 1345   Change in Wound Size % 70 10/16/24 1345   Drainage Amount Moderate 10/16/24 1345   Drainage Description Serosanguineous 10/16/24 1345   Non-staged Wound Description Full thickness 10/16/24 1345   Dressing Status Intact 10/16/24 1345                  Debridement   Wound  "08/02/24 Diabetic Ulcer Ankle Left;Posterior    Universal Protocol:  procedure performed by consultantConsent: Verbal consent obtained.  Risks and benefits: risks, benefits and alternatives were discussed  Consent given by: patient  Time out: Immediately prior to procedure a \"time out\" was called to verify the correct patient, procedure, equipment, support staff and site/side marked as required.  Patient understanding: patient states understanding of the procedure being performed  Patient identity confirmed: verbally with patient    Debridement Details  Performed by: physician  Debridement type: selective  Pain control: lidocaine 4%      Post-debridement measurements  Length (cm): 3  Width (cm): 1  Depth (cm): 0.1  Percent debrided: 100%  Surface Area (cm^2): 3  Area Debrided (cm^2): 3  Volume (cm^3): 0.3    Devitalized tissue debrided: biofilm, callus and fibrin  Instrument(s) utilized: blade  Bleeding: small  Hemostasis obtained with: pressure  Procedural pain (0-10): insensate  Post-procedural pain: insensate   Response to treatment: procedure was tolerated well                 Wound Instructions:  Orders Placed This Encounter   Procedures    Wound cleansing and dressings Diabetic Ulcer Left;Posterior Ankle     Wound location Left posterior ankle.    Change dressing every other day  You may remove the dressing and shower. Do not leave wound open to air, apply new dressing immediately.  Cleanse the wound with Prophase   Apply Collagen with Silver cut to fit the wound. May place a drop of NSS on top of collagen to help it dissolve. ( Rest of supplies sent home with patient )  Cover with ABD.    Secure with roll gauze and tape.       Off-loading Instructions:  Wear CAM Boot as directed by your physician. ( May use shorter boot if CAM is uncomfortable )  Put on immediately when rising in the morning and remove when going to bed.      Elastic Tubular Stocking:  Spandagrip size F to left leg.    Tubular elastic " "bandage: Apply from base of toes to behind the knee. Apply in AM, may remove for sleep.  Avoid prolonged standing in one place.  Elevate leg(s) above the level of the heart when sitting or as much as possible.    As per discussion today with Dr. Rodriguez, skin graft will be next step in treatment of wound.  Dr. Rodriguez is ordering a repeat X ray of left ankle.    Expect a call back from surgery scheduler this week. If you do not receive a call, please give the Wound Center a call immediatly.     Standing Status:   Future     Standing Expiration Date:   10/23/2024    Wound Procedure Treatment Diabetic Ulcer Left;Posterior Ankle     This order was created via procedure documentation    Debridement Diabetic Ulcer Left;Posterior Ankle     This order was created via procedure documentation    XR ankle 3+ vw left     Standing Status:   Future     Number of Occurrences:   1     Standing Expiration Date:   10/16/2028     Scheduling Instructions:      Bring along any outside films relating to this procedure.          CBC and differential     This is a patient instruction: This test is non-fasting. Please drink two glasses of water morning of bloodwork.        Standing Status:   Future     Standing Expiration Date:   10/16/2025    Basic metabolic panel     This is a patient instruction: Patient fasting for 8 hours or longer recommended.     Standing Status:   Future     Standing Expiration Date:   10/16/2025    Hemoglobin A1C     Standing Status:   Future     Standing Expiration Date:   10/16/2025    ECG 12 lead     Standing Status:   Future     Standing Expiration Date:   10/16/2025         Aram Rodriguez DPM      Portions of the record may have been created with voice recognition software. Occasional wrong word or \"sound a like\" substitutions may have occurred due to the inherent limitations of voice recognition software. Read the chart carefully and recognize, using context, where substitutions have occurred.      "

## 2024-10-16 NOTE — ASSESSMENT & PLAN NOTE
Plan  Continue with diabetes management  Life style modifications  Eat real food, not too much, mostly plants  Avoid processed foods  DASH Diet  Limit salt intake: education given on how to read nutriton labels to find salt (ie listed as Salt, NaCl, Sodium). Pt was educated on types of foods and drinks with high salt content.   Do not drink your calories  Eat a piece of fruit or vegetable 30 mins prior to meals  Avoid sugar and sugar substitutes  Limit meat products  Per AHA guidelines, recommend moderate-vigorous intensity exercise for 30 minutes a day for 5 days a week or a total of 150 min/week  Keep exercise journal  Small changes in activity makes a big difference ie take stairs instead of elevators or park farther away from you destination  Try to weigh yourself daily at same time of day and keep journal  Keep food journal   Sleep hygine  Mindful meditation for 15mins a day 5 days a week. Insight timer a good free jaqueline.

## 2024-10-16 NOTE — ASSESSMENT & PLAN NOTE
Most likely baker cyst rupture    PLAN:  Doppler to r/o clot as pt has hx  Compression stalking pt has  Elevate leg.   Should resolve in 2 weeks if not should come back in.     Orders:    VAS ARTERIAL DUPLEX-LOWER LIMB UNILATERAL; Future

## 2024-10-17 ENCOUNTER — TELEPHONE (OUTPATIENT)
Age: 69
End: 2024-10-17

## 2024-10-17 ENCOUNTER — HOSPITAL ENCOUNTER (OUTPATIENT)
Facility: HOSPITAL | Age: 69
Setting detail: OUTPATIENT SURGERY
End: 2024-10-17
Attending: PODIATRIST | Admitting: PODIATRIST
Payer: COMMERCIAL

## 2024-10-17 NOTE — TELEPHONE ENCOUNTER
Caller: Jillian     Doctor and/or Office: Dr Rodriguez/Wound care    #: [phone]    Escalation: Surgery Patient calling to see when her sx date is going to be.  Please advise thank you

## 2024-10-19 ENCOUNTER — APPOINTMENT (OUTPATIENT)
Dept: LAB | Facility: CLINIC | Age: 69
End: 2024-10-19
Payer: COMMERCIAL

## 2024-10-19 DIAGNOSIS — L97.322 NON-PRESSURE CHRONIC ULCER OF LEFT ANKLE WITH FAT LAYER EXPOSED (HCC): ICD-10-CM

## 2024-10-19 LAB
ANION GAP SERPL CALCULATED.3IONS-SCNC: 5 MMOL/L (ref 4–13)
BASOPHILS # BLD AUTO: 0.03 THOUSANDS/ΜL (ref 0–0.1)
BASOPHILS NFR BLD AUTO: 0 % (ref 0–1)
BUN SERPL-MCNC: 25 MG/DL (ref 5–25)
CALCIUM SERPL-MCNC: 10.1 MG/DL (ref 8.4–10.2)
CHLORIDE SERPL-SCNC: 108 MMOL/L (ref 96–108)
CO2 SERPL-SCNC: 25 MMOL/L (ref 21–32)
CREAT SERPL-MCNC: 0.97 MG/DL (ref 0.6–1.3)
EOSINOPHIL # BLD AUTO: 0.19 THOUSAND/ΜL (ref 0–0.61)
EOSINOPHIL NFR BLD AUTO: 2 % (ref 0–6)
ERYTHROCYTE [DISTWIDTH] IN BLOOD BY AUTOMATED COUNT: 14.4 % (ref 11.6–15.1)
EST. AVERAGE GLUCOSE BLD GHB EST-MCNC: 148 MG/DL
GFR SERPL CREATININE-BSD FRML MDRD: 60 ML/MIN/1.73SQ M
GLUCOSE P FAST SERPL-MCNC: 132 MG/DL (ref 65–99)
HBA1C MFR BLD: 6.8 %
HCT VFR BLD AUTO: 31.3 % (ref 34.8–46.1)
HGB BLD-MCNC: 9.9 G/DL (ref 11.5–15.4)
IMM GRANULOCYTES # BLD AUTO: 0.04 THOUSAND/UL (ref 0–0.2)
IMM GRANULOCYTES NFR BLD AUTO: 1 % (ref 0–2)
LYMPHOCYTES # BLD AUTO: 1.18 THOUSANDS/ΜL (ref 0.6–4.47)
LYMPHOCYTES NFR BLD AUTO: 15 % (ref 14–44)
MCH RBC QN AUTO: 29.6 PG (ref 26.8–34.3)
MCHC RBC AUTO-ENTMCNC: 31.6 G/DL (ref 31.4–37.4)
MCV RBC AUTO: 93 FL (ref 82–98)
MONOCYTES # BLD AUTO: 0.64 THOUSAND/ΜL (ref 0.17–1.22)
MONOCYTES NFR BLD AUTO: 8 % (ref 4–12)
NEUTROPHILS # BLD AUTO: 6.06 THOUSANDS/ΜL (ref 1.85–7.62)
NEUTS SEG NFR BLD AUTO: 74 % (ref 43–75)
NRBC BLD AUTO-RTO: 0 /100 WBCS
PLATELET # BLD AUTO: 224 THOUSANDS/UL (ref 149–390)
PMV BLD AUTO: 8.9 FL (ref 8.9–12.7)
POTASSIUM SERPL-SCNC: 4.3 MMOL/L (ref 3.5–5.3)
RBC # BLD AUTO: 3.35 MILLION/UL (ref 3.81–5.12)
SODIUM SERPL-SCNC: 138 MMOL/L (ref 135–147)
VIT B12 SERPL-MCNC: 840 PG/ML (ref 180–914)
WBC # BLD AUTO: 8.14 THOUSAND/UL (ref 4.31–10.16)

## 2024-10-19 PROCEDURE — 85025 COMPLETE CBC W/AUTO DIFF WBC: CPT

## 2024-10-19 PROCEDURE — 83036 HEMOGLOBIN GLYCOSYLATED A1C: CPT

## 2024-10-19 PROCEDURE — 36415 COLL VENOUS BLD VENIPUNCTURE: CPT

## 2024-10-19 PROCEDURE — 80048 BASIC METABOLIC PNL TOTAL CA: CPT

## 2024-10-21 ENCOUNTER — TELEPHONE (OUTPATIENT)
Age: 69
End: 2024-10-21

## 2024-10-21 DIAGNOSIS — D64.9 NORMOCYTIC ANEMIA: Primary | ICD-10-CM

## 2024-10-21 LAB
ATRIAL RATE: 62 BPM
P AXIS: 62 DEGREES
PR INTERVAL: 160 MS
QRS AXIS: -14 DEGREES
QRSD INTERVAL: 82 MS
QT INTERVAL: 384 MS
QTC INTERVAL: 389 MS
T WAVE AXIS: 18 DEGREES
VENTRICULAR RATE: 62 BPM

## 2024-10-21 PROCEDURE — 93010 ELECTROCARDIOGRAM REPORT: CPT | Performed by: INTERNAL MEDICINE

## 2024-10-21 NOTE — TELEPHONE ENCOUNTER
Patient called regarding lab work and ECG from 10/19/24. She is concerned about her H&H. She is having surgery 11/7/24. She mentioned she is unable to take Iron supplementation d/t constipation issues and she does try dietary measures to increase iron. She is calling from a cruise ship and phone disconnected towards end of conversation. She said she does have service if we can call her with PCP comments/recommendations.

## 2024-10-21 NOTE — TELEPHONE ENCOUNTER
I called and left message with Dr. Lara's instructions, she is to call back if wants to change her appointment to sooner than scheduled.

## 2024-10-25 LAB
ALP BONE CFR SERPL: 32 % (ref 14–68)
ALP INTEST CFR SERPL: 2 % (ref 0–18)
ALP LIVER CFR SERPL: 66 % (ref 18–85)
ALP SERPL-CCNC: 108 IU/L (ref 44–121)

## 2024-10-28 DIAGNOSIS — K21.9 GASTROESOPHAGEAL REFLUX DISEASE WITHOUT ESOPHAGITIS: ICD-10-CM

## 2024-10-29 ENCOUNTER — APPOINTMENT (OUTPATIENT)
Dept: LAB | Facility: CLINIC | Age: 69
End: 2024-10-29
Payer: COMMERCIAL

## 2024-10-29 DIAGNOSIS — D64.9 NORMOCYTIC ANEMIA: ICD-10-CM

## 2024-10-29 LAB
FERRITIN SERPL-MCNC: 35 NG/ML (ref 11–307)
IRON SATN MFR SERPL: 18 % (ref 15–50)
IRON SERPL-MCNC: 47 UG/DL (ref 50–212)
TIBC SERPL-MCNC: 264 UG/DL (ref 250–450)
UIBC SERPL-MCNC: 217 UG/DL (ref 155–355)

## 2024-10-29 PROCEDURE — 82728 ASSAY OF FERRITIN: CPT

## 2024-10-29 PROCEDURE — 36415 COLL VENOUS BLD VENIPUNCTURE: CPT

## 2024-10-29 PROCEDURE — 83550 IRON BINDING TEST: CPT

## 2024-10-29 PROCEDURE — 83540 ASSAY OF IRON: CPT

## 2024-10-29 RX ORDER — PANTOPRAZOLE SODIUM 40 MG/1
TABLET, DELAYED RELEASE ORAL
Qty: 90 TABLET | Refills: 0 | Status: SHIPPED | OUTPATIENT
Start: 2024-10-29 | End: 2024-11-08 | Stop reason: ALTCHOICE

## 2024-10-30 ENCOUNTER — OFFICE VISIT (OUTPATIENT)
Dept: INTERNAL MEDICINE CLINIC | Facility: CLINIC | Age: 69
End: 2024-10-30
Payer: COMMERCIAL

## 2024-10-30 ENCOUNTER — DOCUMENTATION (OUTPATIENT)
Dept: INTERNAL MEDICINE CLINIC | Facility: CLINIC | Age: 69
End: 2024-10-30

## 2024-10-30 VITALS
TEMPERATURE: 98.7 F | SYSTOLIC BLOOD PRESSURE: 160 MMHG | WEIGHT: 208 LBS | HEART RATE: 64 BPM | DIASTOLIC BLOOD PRESSURE: 88 MMHG | OXYGEN SATURATION: 100 % | BODY MASS INDEX: 33.43 KG/M2 | RESPIRATION RATE: 16 BRPM | HEIGHT: 66 IN

## 2024-10-30 DIAGNOSIS — I10 PRIMARY HYPERTENSION: ICD-10-CM

## 2024-10-30 DIAGNOSIS — J06.9 VIRAL UPPER RESPIRATORY TRACT INFECTION: ICD-10-CM

## 2024-10-30 DIAGNOSIS — D50.9 IRON DEFICIENCY ANEMIA, UNSPECIFIED IRON DEFICIENCY ANEMIA TYPE: Primary | ICD-10-CM

## 2024-10-30 DIAGNOSIS — I82.511 CHRONIC DEEP VEIN THROMBOSIS (DVT) OF FEMORAL VEIN OF RIGHT LOWER EXTREMITY (HCC): ICD-10-CM

## 2024-10-30 DIAGNOSIS — I10 PRIMARY HYPERTENSION: Primary | ICD-10-CM

## 2024-10-30 PROBLEM — K80.20 CHOLELITHIASIS: Status: ACTIVE | Noted: 2017-08-04

## 2024-10-30 PROCEDURE — 99214 OFFICE O/P EST MOD 30 MIN: CPT

## 2024-10-30 RX ORDER — FERROUS SULFATE 324(65)MG
324 TABLET, DELAYED RELEASE (ENTERIC COATED) ORAL EVERY OTHER DAY
Qty: 15 TABLET | Refills: 0 | Status: SHIPPED | OUTPATIENT
Start: 2024-10-30 | End: 2024-11-29

## 2024-10-30 RX ORDER — LOSARTAN POTASSIUM 25 MG/1
25 TABLET ORAL DAILY
Qty: 30 TABLET | Refills: 0 | Status: SHIPPED | OUTPATIENT
Start: 2024-10-30 | End: 2024-11-29

## 2024-10-30 NOTE — PROGRESS NOTES
Ambulatory Visit  Name: Jillian Arango      : 1955      MRN: [id number]  Encounter Provider: Raquel Gonzales MD  Encounter Date: 10/30/2024   Encounter department: [facility] INTERNAL MEDICINE [place]    Assessment & Plan  Iron deficiency anemia, unspecified iron deficiency anemia type  History of CHARY previously on iron supplementation however currently not taking  Colonoscopy  remarkable for polyps  History of ileal conduit-notes that intermittently has hematuria and bag   Denies overt bleeding, hematochezia, black/tarry stools, hemoptysis, hematemesis, falls    Plan-  Will start iron supplementation every other day  Repeat iron studies in 3 months next time   Occult blood     Orders:    ferrous sulfate 324 (65 Fe) mg; Take 1 tablet (324 mg total) by mouth every other day    Iron Panel (Includes Ferritin, Iron Sat%, Iron, and TIBC); Future    Occult Blood, Fecal Immunochemical; Future    Primary hypertension  BP has been consistently elevated in the office today 160/88    Plan-  Will start losartan 25 daily  BP log at home  RTO 1 week  Orders:    losartan (COZAAR) 25 mg tablet; Take 1 tablet (25 mg total) by mouth daily    Viral upper respiratory tract infection  Recently returned from a cruise on   Symptoms started on  including nasal congestion, rhinorrhea, sinus pressure  Sick contacts  Has been taking NyQuil with some improvement  Likely viral etiology       Plan-   Will send nasal saline for congestion  Conservative management  RTO 1 week, will reassess overall low suspicion for bacterial causes at this time     Orders:    sodium chloride (OCEAN) 0.65 % nasal spray; 1 spray into each nostril as needed for congestion or rhinitis    Chronic deep vein thrombosis (DVT) of femoral vein of right lower extremity (HCC)  Provoked LLE DVT '00 after urostomy surgery  R posterior tibial DVT in  with propagation to R mid to distal femoral vein DVT  (Xarelto failure/switched to  "pradaxa)    Plan-   Upcoming pre-op clearance needed for Podiatry intervention of skin graft and debridement  Will send message to Vascular surgeon for recommendations regarding bridging eith her complex hypercoagulable history            History of Present Illness     Garrett Arango is a 68 yo F with a PMH of DVT, Ileal conduit, T2DM who presents to the office for pre-op clearance however does have some concerns at this visit making it a regular follow-up.  Patient endorses recently returning from a cruise on Sunday when she started feeling symptoms of congestion, rhinorrhea, and headaches.  Of note she did have sick contacts while she was away as well.  She is also endorsing some lightheadedness with positional changes.  Patient recently had iron panel which demonstrates iron deficiency anemia, upon questioning she notes that she intermittently has some hematuria in her urostomy bag that is usual for her however has not had any rio blood, black tarry stools, hemoptysis, hematochezia, hematemesis.  Patient denies fever, chills, purulent drainage or secretions, cough,  chest pain, shortness of breath, palpitations, inability to tolerate p.o., abdominal pain, NVD, or any other symptoms at this time.        History obtained from : patient  Review of Systems  Pertinent Medical History           Objective     /88 (BP Location: Left arm, Patient Position: Sitting, Cuff Size: Large)   Pulse 64   Temp 98.7 °F (37.1 °C) (Tympanic)   Resp 16   Ht 5' 6\" (1.676 m)   Wt 94.3 kg (208 lb)   SpO2 100%   BMI 33.57 kg/m²     Physical Exam  Vitals and nursing note reviewed.   Constitutional:       General: She is not in acute distress.     Appearance: She is well-developed. She is obese. She is not ill-appearing, toxic-appearing or diaphoretic.   HENT:      Head: Normocephalic and atraumatic.      Nose: Nose normal.      Mouth/Throat:      Mouth: Mucous membranes are moist.   Eyes:      Extraocular Movements: Extraocular " movements intact.      Conjunctiva/sclera: Conjunctivae normal.      Right eye: Right conjunctiva is not injected.      Left eye: Left conjunctiva is not injected.      Pupils: Pupils are equal, round, and reactive to light.   Cardiovascular:      Rate and Rhythm: Normal rate and regular rhythm.      Pulses: Normal pulses.      Heart sounds: Normal heart sounds. No murmur heard.     No friction rub. No gallop.   Pulmonary:      Effort: Pulmonary effort is normal. No respiratory distress.      Breath sounds: Normal breath sounds. No wheezing or rhonchi.   Chest:      Chest wall: No tenderness.   Abdominal:      General: Bowel sounds are normal.      Palpations: Abdomen is soft.      Tenderness: There is no abdominal tenderness.      Comments: Urostomy bag in place in the RLQ   Musculoskeletal:         General: Normal range of motion.      Cervical back: Normal range of motion and neck supple.      Right lower leg: No edema.      Left lower leg: Edema present.      Comments: Venous insufficiency changes, left lower extremity Ace bandaged and in boot   Skin:     General: Skin is warm and dry.      Capillary Refill: Capillary refill takes less than 2 seconds.   Neurological:      Mental Status: She is alert.   Psychiatric:         Mood and Affect: Mood normal.       Administrative Statements   I have spent a total time of 35 minutes in caring for this patient on the day of the visit/encounter including Diagnostic results, Risks and benefits of tx options, Instructions for management, Reviewing / ordering tests, medicine, procedures  , Obtaining or reviewing history  , and Communicating with other healthcare professionals .

## 2024-10-30 NOTE — ASSESSMENT & PLAN NOTE
Provoked LLE DVT '00 after urostomy surgery  R posterior tibial DVT in April '23 with propagation to R mid to distal femoral vein DVT June '23 (Xarelto failure/switched to pradaxa)    Plan-   Upcoming pre-op clearance needed for Podiatry intervention of skin graft and debridement  Will send message to Vascular surgeon for recommendations regarding bridging eith her complex hypercoagulable history

## 2024-10-30 NOTE — ASSESSMENT & PLAN NOTE
Recently returned from a cruise on Sunday  Symptoms started on Sunday including nasal congestion, rhinorrhea, sinus pressure  Sick contacts  Has been taking NyQuil with some improvement  Likely viral etiology       Plan-   Will send nasal saline for congestion  Conservative management  RTO 1 week, will reassess overall low suspicion for bacterial causes at this time     Orders:    sodium chloride (OCEAN) 0.65 % nasal spray; 1 spray into each nostril as needed for congestion or rhinitis

## 2024-10-30 NOTE — ASSESSMENT & PLAN NOTE
History of CHARY previously on iron supplementation however currently not taking  Colonoscopy 2022 remarkable for polyps  History of ileal conduit-notes that intermittently has hematuria and bag   Denies overt bleeding, hematochezia, black/tarry stools, hemoptysis, hematemesis, falls    Plan-  Will start iron supplementation every other day  Repeat iron studies in 3 months next time   Occult blood     Orders:    ferrous sulfate 324 (65 Fe) mg; Take 1 tablet (324 mg total) by mouth every other day    Iron Panel (Includes Ferritin, Iron Sat%, Iron, and TIBC); Future    Occult Blood, Fecal Immunochemical; Future

## 2024-10-30 NOTE — ASSESSMENT & PLAN NOTE
BP has been consistently elevated in the office today 160/88    Plan-  Will start losartan 25 daily  BP log at home  RTO 1 week  Orders:    losartan (COZAAR) 25 mg tablet; Take 1 tablet (25 mg total) by mouth daily

## 2024-10-31 ENCOUNTER — APPOINTMENT (OUTPATIENT)
Dept: LAB | Facility: AMBULARY SURGERY CENTER | Age: 69
End: 2024-10-31
Payer: COMMERCIAL

## 2024-11-01 ENCOUNTER — APPOINTMENT (OUTPATIENT)
Dept: LAB | Facility: HOSPITAL | Age: 69
End: 2024-11-01
Payer: COMMERCIAL

## 2024-11-01 ENCOUNTER — APPOINTMENT (OUTPATIENT)
Dept: LAB | Facility: AMBULARY SURGERY CENTER | Age: 69
End: 2024-11-01
Payer: COMMERCIAL

## 2024-11-01 ENCOUNTER — TELEPHONE (OUTPATIENT)
Age: 69
End: 2024-11-01

## 2024-11-01 NOTE — TELEPHONE ENCOUNTER
I called patient to let her know that a Provider will look at results by Monday, she states she has no hemorrhoids or any visual bleeding after moving her bowels. She has an appointment to see Dr. Lloyd on the 7th for Pre-op of left ankle.

## 2024-11-01 NOTE — TELEPHONE ENCOUNTER
Pt called. Saw abnormal lab result for Occult Blood, Fecal Immunochemical . Would like to discuss with PCP before the weekend. Please review and advise. Thank you

## 2024-11-04 ENCOUNTER — NURSE TRIAGE (OUTPATIENT)
Age: 69
End: 2024-11-04

## 2024-11-04 ENCOUNTER — TELEPHONE (OUTPATIENT)
Age: 69
End: 2024-11-04

## 2024-11-04 DIAGNOSIS — D50.0 IRON DEFICIENCY ANEMIA DUE TO CHRONIC BLOOD LOSS: Primary | ICD-10-CM

## 2024-11-04 NOTE — TELEPHONE ENCOUNTER
"Reason for Disposition   Health information question, no triage required and triager able to answer question    Answer Assessment - Initial Assessment Questions  1. REASON FOR CALL: \"What is the main reason for your call?\" or \"How can I best help you?\"      SPOKE WITH PT, CALLING TO SCHEDULE APPOINTMENT FOR CHARY. APPOINTMENT SCHEDULED.    Protocols used: Information Only Call - No Triage-Adult-[place]    "

## 2024-11-04 NOTE — TELEPHONE ENCOUNTER
Pt calling because she received a referral for anemia due to chronic blood loss. Pt states she needs to be seen before she can have her surgery on 11/15/24. Transferred alexandr to [place]

## 2024-11-07 ENCOUNTER — TELEPHONE (OUTPATIENT)
Age: 69
End: 2024-11-07

## 2024-11-07 ENCOUNTER — CONSULT (OUTPATIENT)
Dept: INTERNAL MEDICINE CLINIC | Facility: CLINIC | Age: 69
End: 2024-11-07
Payer: COMMERCIAL

## 2024-11-07 VITALS
HEIGHT: 66 IN | HEART RATE: 72 BPM | DIASTOLIC BLOOD PRESSURE: 72 MMHG | TEMPERATURE: 99 F | SYSTOLIC BLOOD PRESSURE: 130 MMHG | BODY MASS INDEX: 33.43 KG/M2 | OXYGEN SATURATION: 98 % | WEIGHT: 208 LBS | RESPIRATION RATE: 16 BRPM

## 2024-11-07 DIAGNOSIS — Z01.818 PREOPERATIVE CLEARANCE: Primary | ICD-10-CM

## 2024-11-07 DIAGNOSIS — R76.0 ANTI-CARDIOLIPIN ANTIBODY POSITIVE: ICD-10-CM

## 2024-11-07 DIAGNOSIS — I82.511 CHRONIC DEEP VEIN THROMBOSIS (DVT) OF FEMORAL VEIN OF RIGHT LOWER EXTREMITY (HCC): ICD-10-CM

## 2024-11-07 DIAGNOSIS — I15.9 SECONDARY HYPERTENSION: ICD-10-CM

## 2024-11-07 DIAGNOSIS — D50.0 IRON DEFICIENCY ANEMIA DUE TO CHRONIC BLOOD LOSS: ICD-10-CM

## 2024-11-07 PROCEDURE — 99215 OFFICE O/P EST HI 40 MIN: CPT | Performed by: HOSPITALIST

## 2024-11-07 NOTE — TELEPHONE ENCOUNTER
Caller: Jillian Arango    Doctor and/or Office: Dr.Hawley CALABRESE#: [phone]    Escalation: Surgery Patient would like a return call from surgery scheduler. She was told by her internal med dr she should be admitted to the hospital a day before her surgery with Dr. Rodriguez bc she is on blood thinners. She would like to know if that is what will be happening? Please call and advise. Thank you

## 2024-11-07 NOTE — PROGRESS NOTES
INTERNAL MEDICINE FOLLOW-UP OFFICE VISIT  [facility] [place]    NAME: Jillian Arango  AGE: 69 y.o. SEX: female  : 1955     DATE: 2024     Assessment and Plan:     1. Preoperative clearance  She is a class II risk using the revised cardiac risk index.  However given her history of of  provoked LLE DVT '00 after urostomy surgery.  Second episode-R posterior tibial DVT in  with propagation to R mid to distal femoral vein DVT  -indicating a strong prothrombotic history would be leery of holding anticoagulation for more than a few hours.  Would recommend that she be admitted 1 day prior-stop the Pradaxa 24 hours prior and start heparin gtt. which could be held 6 hours prior to the procedure antibiotics were restarted immediately following surgery.  I have sent text message to Dr. Hernando Rodriguez her podiatrist-on  whether this is feasible.   She will also need to hold her Mounjaro 1 week prior to surgery.  Patient understands.    Medication Instructions:   - Avoid herbs or non-directed vitamins one week prior to surgery    - Avoid aspirin containing medications or non-steroidal anti-inflammatory drugs one week preceding surgery    - May take tylenol for pain up until the night before surgery    - 5HT3 Antagonist (ie, zofran):  Continue to take this medication on your normal schedule.   - Alpha Adrenergic Antagonist (ie, trazodone, viibryd, trintellix): Continue to take this medication on your normal schedule.  - Antidepressants: Continue to take this medication on your normal schedule.  - Diuretics: Continue taking this medication up to the evening before surgery/procedure, but do not take in the morning of the day of surgery/procedure.  - Other med instructions: Use insulin glargine 15 units on the day prior to surgery  No diuretics/Lasix on the day of surgery     Medicine Instructions for Adults with Diabetes (NO Bowel Prep)     Follow these  instructions when a BOWEL PREP is NOT required for your procedure or surgery!     NOTE:  GLP Agonists taken weekly: do not take in the 7 days before your procedure. **Bariatric surgery: do not take 4 weeks prior to your procedure.     SGLT-2 Inhibitors: do not take in the 4 days before your procedure     On the Day Before Surgery/Procedure  If you are having a procedure (e.g., Colonoscopy) or surgery which DOES NOT require a bowel prep, follow the directions below based on the type of medicine you take for your diabetes.  Type of Medicine You Take Examples What to Do   Pre-Mixed Insulin Intermediate  Pafgyxp13/25, Ddzmjsb73/30, Novolog 70/30, Regular Insulin Take 1/2 your regular dose the evening before our procedure.   Rapid/Fast Acting  Insulin and/or Long-Acting Insulin Humalog U200, NovoLog, Apidra,  Lantus, Levemir, Tresiba, Toujeo,  Fias, Basaglar Take your FULL regular dose the day before procedure.   Oral Diabetic Medicines (sulfonylurea) Glipizide/Glimepiride/  Glucotrol Take your regular dose with dinner the evening before your procedure.   Other Oral Diabetic Medicines Metformin, Glucophage, Glucophage  XR, Riomet, Glumetza, Actose,  Avandia, Gl set, Prandin Take your regular dose with dinner the evening before your procedure   GLP Agonists Adlyxin, Byetta, Bydureon,  Ozempic, Soliqua, Tanzeum,  Trulicity, Victoza, Saxenda,  Rybelsus, Wegovy, Mounjaro, Zepbound If taken daily, take as normal  If taken weekly, do not take this medicine for 7 days before your procedure including the day of the procedure (resume taking after the procedure). **Bariatric surgery: do not take 4 weeks prior to procedure   SGLT-2 Inhibitors Jardiance, Invokana, Farxiga, Steglatro, Brenzavvy, Qtern, Segluromet Glyxambi, Synjardy, Synjardy XR, Invokamet, InvokametXR, Trijary XR, Xigduo X Do not take for 4 days before your procedure including the day of the procedure (resume taking after the procedure)   This educational material  has been approved by the Patient Education Advisory Committee.     On the Day of Surgery/Procedure  Follow the directions below based on the type of medicine you take for your diabetes.  Type of Medicine You Take  Examples What to Do   Long-Acting Insulin Lantus, Levemir, Tresiba,  Toujeo, Basaglar, Semglee If you normally take your Long Acting Insulin in the morning, take the full dose as scheduled.   GLP-I Agonists Adlyxin, Byetta, Bydureon,  Ozempic, Soliqua, Tanzeum,  Trulicity, Victoza, Saxenda,  Rybelsus, Mounjaro Do NOT take this medicine on the day of your procedure (resume taking after the procedure)   Except for the morning Long-Acting Insulin, DO NOT take ANY diabetic medicine on the day of your procedure unless you were instructed by the doctor who manages your diabetes medicines.  Continue to check your blood sugars.  If you have an insulin pump, ask your endocrinologist for instructions at least 3 days before your procedure. NOTE: If you are not able to ask your endocrinologist in advance, on the day of the procedure set your insulin pump to your basal rate only. Bring your insulin pump supplies to the hospital.              2. Chronic deep vein thrombosis (DVT) of femoral vein of right lower extremity (HCC)  Please see my notes above with regards to her history of DVTs and strong prothrombotic history.    3. Anti-cardiolipin antibody positive      4. Secondary hypertension  Blood pressure stable with losartan and this will be continued .    5. Iron deficiency anemia due to chronic blood loss  Has iron deficiency anemia because of which is still undetermined however there is no evidence of any active bleeding. History of ileal conduit-notes that intermittently has hematuria - has iron deficiency anemia which could be secondary to gastric sleeve surgery leading to some iron malabsorption versus occult GI bleed.  Hemoglobin is stable.   Has an appointment with GI.  Is on iron pills     6-diabetes  mellitus type 2-glycosylated hemoglobin done a month ago was 6.8.  Patient on Mounjaro and metformin.  Mounjaro will need to be held 1 week prior to the procedure.         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  Second episode-R posterior tibial DVT in April '23 with propagation to R mid to distal femoral vein DVT June '23 (Xarelto failure/switched to pradaxa).   History of ileal conduit-notes that intermittently has hematuria - has iron deficiency anemia which could be secondary to gastric sleeve surgery leading to some iron malabsorption versus occult GI bleed.  Hemoglobin is stable.  No evidence of any active bleeding.  Has an appointment with GI.  Is on iron pills .  Started on losartan for her elevated blood pressure which is stable currently.  History of diabetes mellitus type 2 on Mounjaro and metformin.  Mounjaro will need to be held 1 week prior to her procedure.            The following portions of the patient's history were reviewed and updated as appropriate: allergies, current medications, past family history, past medical history, past social history, past surgical history and problem list.     Review of Systems:     Review of Systems all other review of symptoms negative unless specified otherwise.      Problem List:     Patient Active Problem List   Diagnosis    Small bowel obstruction (HCC)    Diabetes mellitus (HCC)    History of urostomy    Endometrial cancer (HCC)    Thyroid disorder    Normocytic anemia    Hydroureteronephrosis    HTN (hypertension)    Migraine    Abdominal adhesions    Class 2 severe  "obesity with serious comorbidity and body mass index (BMI) of 35.0 to 35.9 in adult (HCC)    Headache    Ureteral-ileal loop anastomotic stricture    Chronic deep vein thrombosis (DVT) of femoral vein of right lower extremity (HCC)    Anti-cardiolipin antibody positive    Encounter for attention to other artificial openings of urinary tract (HCC)    History of smoking    Depression, recurrent (HCC)    PAD (peripheral artery disease) (HCC)    Baker's cyst, ruptured    Cholelithiasis    CHARY (iron deficiency anemia)    Upper respiratory infection        Objective:     /72 (BP Location: Left arm, Patient Position: Sitting, Cuff Size: Large)   Pulse 72   Temp 99 °F (37.2 °C) (Tympanic)   Resp 16   Ht 5' 6\" (1.676 m)   Wt 94.3 kg (208 lb)   SpO2 98%   BMI 33.57 kg/m²     Physical ExamVitals and nursing note reviewed.   Constitutional:       General: She is not in acute distress.     Appearance: She is well-developed.  Obese   HENT:      Head: Normocephalic and atraumatic.      Nose: Nose normal.      Mouth/Throat:      Mouth: Mucous membranes are moist.   Eyes:      Extraocular Movements: Extraocular movements intact.      Conjunctiva/sclera: Conjunctivae normal.      Right eye: Right conjunctiva is not injected.      Left eye: Left conjunctiva is not injected.      Pupils: Pupils are equal, round, and reactive to light.   Cardiovascular:      Rate and Rhythm: Normal rate and regular rhythm.      Pulses: Normal pulses.      Heart sounds: Normal heart sounds. No murmur heard.     No friction rub. No gallop.   Pulmonary:      Effort: Pulmonary effort is normal. No respiratory distress.      Breath sounds: Normal breath sounds. No wheezing or rhonchi.   Chest:      Chest wall: No tenderness.   Abdominal:      General: Bowel sounds are normal.      Palpations: Abdomen is soft.      Tenderness: There is no abdominal tenderness.      Comments: Urostomy bag in place in the RLQ draining yellow " urine.  Musculoskeletal:         General: Normal range of motion.      Cervical back: Normal range of motion and neck supple.      Right lower leg: No edema.      Left lower leg: Edema present.      Comments:  left lower extremity Ace bandaged and in boot   Skin:     General: Skin is warm and dry.      Capillary Refill: Capillary refill takes less than 2 seconds.   Neurological:      Mental Status: She is alert.   Psychiatric:         Mood and Affect: Mood normal.     Pertinent Laboratory/Diagnostic Studies:    Laboratory Results: I have personally reviewed the pertinent laboratory results/reports             Augustine Lloyd MD  [facility] INTERNAL MEDICINE [place]

## 2024-11-08 ENCOUNTER — TELEPHONE (OUTPATIENT)
Dept: GASTROENTEROLOGY | Facility: CLINIC | Age: 69
End: 2024-11-08

## 2024-11-08 ENCOUNTER — OFFICE VISIT (OUTPATIENT)
Dept: GASTROENTEROLOGY | Facility: CLINIC | Age: 69
End: 2024-11-08
Payer: COMMERCIAL

## 2024-11-08 ENCOUNTER — TELEPHONE (OUTPATIENT)
Dept: PODIATRY | Facility: CLINIC | Age: 69
End: 2024-11-08

## 2024-11-08 VITALS
DIASTOLIC BLOOD PRESSURE: 90 MMHG | WEIGHT: 208 LBS | BODY MASS INDEX: 33.43 KG/M2 | HEIGHT: 66 IN | SYSTOLIC BLOOD PRESSURE: 153 MMHG | HEART RATE: 69 BPM

## 2024-11-08 DIAGNOSIS — K21.9 GASTROESOPHAGEAL REFLUX DISEASE, UNSPECIFIED WHETHER ESOPHAGITIS PRESENT: ICD-10-CM

## 2024-11-08 DIAGNOSIS — D50.0 IRON DEFICIENCY ANEMIA DUE TO CHRONIC BLOOD LOSS: Primary | ICD-10-CM

## 2024-11-08 DIAGNOSIS — T81.31XD POSTOPERATIVE WOUND DEHISCENCE, SUBSEQUENT ENCOUNTER: Primary | ICD-10-CM

## 2024-11-08 DIAGNOSIS — I73.9 PAD (PERIPHERAL ARTERY DISEASE) (HCC): ICD-10-CM

## 2024-11-08 DIAGNOSIS — I82.511 CHRONIC DEEP VEIN THROMBOSIS (DVT) OF FEMORAL VEIN OF RIGHT LOWER EXTREMITY (HCC): ICD-10-CM

## 2024-11-08 PROCEDURE — 99214 OFFICE O/P EST MOD 30 MIN: CPT | Performed by: NURSE PRACTITIONER

## 2024-11-08 RX ORDER — PANTOPRAZOLE SODIUM 20 MG/1
20 TABLET, DELAYED RELEASE ORAL DAILY
Qty: 90 TABLET | Refills: 3 | Status: SHIPPED | OUTPATIENT
Start: 2024-11-08 | End: 2025-11-03

## 2024-11-08 NOTE — ASSESSMENT & PLAN NOTE
Patient with history of anemia with baseline between 10-11 intermittently since 2021, most recent hemoglobin showed hemoglobin of 9.9 with low iron 47 & occult stool was positive so she was referred to GI for further evaluation.  Denies any melena, hematochezia, nosebleeds, abnormal vaginal bleeding; does report occasional hematuria in urostomy bag. She is on Pradaxa after failure of Xarelto in the past after she had developed new DVTs while on therapy.     -She had colonoscopy in 2022 with removal of polyps and incidental notation of diverticulosis and refuses to undergo another colonoscopy at this time, but is agreeable to pursuing EGD for further evaluation of iron deficiency anemia to evaluate for any PUD, AVMs, lesion. Her last EGD was in 2019 with small hiatal hernia and post sleeve gastrectomy anatomy noted with biopsies consistent with reflux gastritis and reflux esophagitis.  -EGD scheduled. Prep and procedure explained. Will obtain clearance to hold Pradaxa prior to procedure if possible otherwise may consider diagnostic  -PPI dose lowered as below as this may contribute to iron deficiency as well as intermittent hematuria, chronic wounds, hx of sleeve gastrectomy. She is supposed to undergo debridement/skin graft with podiatry 11/15   Orders:    EGD; Future

## 2024-11-08 NOTE — TELEPHONE ENCOUNTER
She has a strong thrombotic tendency--2 episodes of DVT following surgery-would prefer holding the pradaxa for max 24 hours rather than 48 hours.

## 2024-11-08 NOTE — TELEPHONE ENCOUNTER
Scheduled date of EGD(as of today): Feb. 11, 2025  Physician performing EGD: Dr. Diaz  Location of EGD: [facility].  Instructions reviewed with patient by: SEYMOUR  Clearances:  Pradaxa    Metformin  Maunjaro

## 2024-11-08 NOTE — TELEPHONE ENCOUNTER
Our mutual patient is scheduled for procedure: EGD    On: February 11 , 2024     With: Dr. Hernando Diaz MD     He/She is taking the following blood thinner:  PRADAXA    Can this be stopped  2 days prior to the procedure    Physician Approving clearance:

## 2024-11-08 NOTE — PRE-PROCEDURE INSTRUCTIONS
Pre-Surgery Instructions:   Medication Instructions    acyclovir (Zovirax) 5 % ointment Uses PRN- OK to take day of surgery    ALPRAZolam (XANAX) 0.5 mg tablet Uses PRN- OK to take day of surgery    BIOTIN PO Stop taking 7 days prior to surgery.    dabigatran etexilate (PRADAXA) 150 mg capsu Instructions provided by MD    ferrous sulfate 324 (65 Fe) mg Take day of surgery.    levothyroxine 75 mcg tablet Take day of surgery.    losartan (COZAAR) 25 mg tablet Hold day of surgery.    meclizine (ANTIVERT) 12.5 MG tablet Uses PRN- OK to take day of surgery    metFORMIN (GLUCOPHAGE) 500 mg tablet Hold day of surgery.    pantoprazole (PROTONIX) 40 mg tablet Take day of surgery.    pramipexole (MIRAPEX) 0.5 mg tablet Take night before surgery    Probiotic Product (Probiotic Daily) CAPS Hold day of surgery.    sodium chloride (OCEAN) 0.65 % nasal spray Uses PRN- OK to take day of surgery    tirzepatide (Mounjaro) 2.5 MG/0.5ML Stop taking 7 days prior to surgery.    traMADol (Ultram) 50 mg tablet Uses PRN- OK to take day of surgery    valACYclovir (VALTREX) 1,000 mg tablet Uses PRN- OK to take day of surgery        Medication instructions for day surgery reviewed. Please use only a sip of water to take your instructed medications. Avoid all over the counter vitamins, supplements and NSAIDS for one week prior to surgery per anesthesia guidelines. Tylenol is ok to take as needed.     You will receive a call one business day prior to surgery with an arrival time and hospital directions. If your surgery is scheduled on a Monday, the hospital will be calling you on the Friday prior to your surgery. If you have not heard from anyone by 8pm, please call the hospital supervisor through the hospital  at [phone].     Do not eat or drink anything after midnight the night before your surgery, including candy, mints, lifesavers, or chewing gum. Do not drink alcohol 24hrs before your surgery. Try not to smoke at least 24hrs  before your surgery.       Follow the pre surgery showering instructions as listed in the “My Surgical Experience Booklet” or otherwise provided by your surgeon's office. Do not use a blade to shave the surgical area 1 week before surgery. It is okay to use a clean electric clippers up to 24 hours before surgery. Do not apply any lotions, creams, including makeup, cologne, deodorant, or perfumes after showering on the day of your surgery. Do not use dry shampoo, hair spray, hair gel, or any type of hair products.     No contact lenses, eye make-up, or artificial eyelashes. Remove nail polish, including gel polish, and any artificial, gel, or acrylic nails if possible. Remove all jewelry including rings and body piercing jewelry.     Wear causal clothing that is easy to take on and off. Consider your type of surgery.    Keep any valuables, jewelry, piercings at home. Please bring any specially ordered equipment (sling, braces) if indicated.    Arrange for a responsible person to drive you to and from the hospital on the day of your surgery. Please confirm the visitor policy for the day of your procedure when you receive your phone call with an arrival time.     Call the surgeon's office with any new illnesses, exposures, or additional questions prior to surgery.    Please reference your “My Surgical Experience Booklet” for additional information to prepare for your upcoming surgery.

## 2024-11-11 ENCOUNTER — TELEPHONE (OUTPATIENT)
Age: 69
End: 2024-11-11

## 2024-11-11 DIAGNOSIS — F41.9 ANXIETY: ICD-10-CM

## 2024-11-11 RX ORDER — ALPRAZOLAM 0.5 MG
TABLET ORAL
Qty: 30 TABLET | Refills: 2 | Status: SHIPPED | OUTPATIENT
Start: 2024-11-11

## 2024-11-11 NOTE — TELEPHONE ENCOUNTER
Caller: Patient    Doctor: Jennifer    Reason for call: patient calling to verify her appointment for 11/14/24 is correctly scheduled ? Please contact patient     Please advise     Call back#: [phone]

## 2024-11-12 ENCOUNTER — TELEPHONE (OUTPATIENT)
Dept: GASTROENTEROLOGY | Facility: CLINIC | Age: 69
End: 2024-11-12

## 2024-11-12 NOTE — TELEPHONE ENCOUNTER
Pt is having an EGD on 2/11/2025.  Dr. Lloyd want s pt to hold Pradaxa for only 24 hours and not 48 hours.  I called the pt and informed her of this message and she understood the instructions.

## 2024-11-14 ENCOUNTER — ANESTHESIA EVENT (INPATIENT)
Dept: PERIOP | Facility: HOSPITAL | Age: 69
DRG: 902 | End: 2024-11-14
Payer: COMMERCIAL

## 2024-11-14 ENCOUNTER — HOSPITAL ENCOUNTER (INPATIENT)
Facility: HOSPITAL | Age: 69
LOS: 3 days | Discharge: HOME HEALTH | DRG: 902 | End: 2024-11-17
Attending: PODIATRIST | Admitting: PODIATRIST
Payer: COMMERCIAL

## 2024-11-14 DIAGNOSIS — I15.9 SECONDARY HYPERTENSION: Primary | ICD-10-CM

## 2024-11-14 DIAGNOSIS — E11.69 TYPE 2 DIABETES MELLITUS WITH OTHER SPECIFIED COMPLICATION, WITH LONG-TERM CURRENT USE OF INSULIN (HCC): ICD-10-CM

## 2024-11-14 DIAGNOSIS — Z98.890 POST-OPERATIVE STATE: ICD-10-CM

## 2024-11-14 DIAGNOSIS — Z79.4 TYPE 2 DIABETES MELLITUS WITH OTHER SPECIFIED COMPLICATION, WITH LONG-TERM CURRENT USE OF INSULIN (HCC): ICD-10-CM

## 2024-11-14 PROBLEM — S91.002A ANKLE WOUND, LEFT, INITIAL ENCOUNTER: Status: ACTIVE | Noted: 2024-11-14

## 2024-11-14 LAB
ALBUMIN SERPL BCG-MCNC: 3.4 G/DL (ref 3.5–5)
ALP SERPL-CCNC: 88 U/L (ref 34–104)
ALT SERPL W P-5'-P-CCNC: 13 U/L (ref 7–52)
ANION GAP SERPL CALCULATED.3IONS-SCNC: 6 MMOL/L (ref 4–13)
APTT PPP: 128 SECONDS (ref 23–34)
APTT PPP: 67 SECONDS (ref 23–34)
AST SERPL W P-5'-P-CCNC: 12 U/L (ref 13–39)
BASOPHILS # BLD AUTO: 0.07 THOUSANDS/ÂΜL (ref 0–0.1)
BASOPHILS NFR BLD AUTO: 1 % (ref 0–1)
BILIRUB SERPL-MCNC: 0.45 MG/DL (ref 0.2–1)
BUN SERPL-MCNC: 27 MG/DL (ref 5–25)
CALCIUM ALBUM COR SERPL-MCNC: 10 MG/DL (ref 8.3–10.1)
CALCIUM SERPL-MCNC: 9.5 MG/DL (ref 8.4–10.2)
CHLORIDE SERPL-SCNC: 112 MMOL/L (ref 96–108)
CO2 SERPL-SCNC: 24 MMOL/L (ref 21–32)
CREAT SERPL-MCNC: 0.87 MG/DL (ref 0.6–1.3)
EOSINOPHIL # BLD AUTO: 0.28 THOUSAND/ÂΜL (ref 0–0.61)
EOSINOPHIL NFR BLD AUTO: 4 % (ref 0–6)
ERYTHROCYTE [DISTWIDTH] IN BLOOD BY AUTOMATED COUNT: 15 % (ref 11.6–15.1)
EST. AVERAGE GLUCOSE BLD GHB EST-MCNC: 146 MG/DL
GFR SERPL CREATININE-BSD FRML MDRD: 69 ML/MIN/1.73SQ M
GLUCOSE SERPL-MCNC: 128 MG/DL (ref 65–140)
GLUCOSE SERPL-MCNC: 162 MG/DL (ref 65–140)
GLUCOSE SERPL-MCNC: 99 MG/DL (ref 65–140)
HBA1C MFR BLD: 6.7 %
HCT VFR BLD AUTO: 29.6 % (ref 34.8–46.1)
HGB BLD-MCNC: 9.2 G/DL (ref 11.5–15.4)
IMM GRANULOCYTES # BLD AUTO: 0.02 THOUSAND/UL (ref 0–0.2)
IMM GRANULOCYTES NFR BLD AUTO: 0 % (ref 0–2)
LYMPHOCYTES # BLD AUTO: 1.82 THOUSANDS/ÂΜL (ref 0.6–4.47)
LYMPHOCYTES NFR BLD AUTO: 27 % (ref 14–44)
MCH RBC QN AUTO: 29.9 PG (ref 26.8–34.3)
MCHC RBC AUTO-ENTMCNC: 31.1 G/DL (ref 31.4–37.4)
MCV RBC AUTO: 96 FL (ref 82–98)
MONOCYTES # BLD AUTO: 0.56 THOUSAND/ÂΜL (ref 0.17–1.22)
MONOCYTES NFR BLD AUTO: 8 % (ref 4–12)
NEUTROPHILS # BLD AUTO: 4.08 THOUSANDS/ÂΜL (ref 1.85–7.62)
NEUTS SEG NFR BLD AUTO: 60 % (ref 43–75)
NRBC BLD AUTO-RTO: 0 /100 WBCS
PLATELET # BLD AUTO: 236 THOUSANDS/UL (ref 149–390)
PMV BLD AUTO: 8.9 FL (ref 8.9–12.7)
POTASSIUM SERPL-SCNC: 4.4 MMOL/L (ref 3.5–5.3)
PROT SERPL-MCNC: 6.4 G/DL (ref 6.4–8.4)
RBC # BLD AUTO: 3.08 MILLION/UL (ref 3.81–5.12)
SODIUM SERPL-SCNC: 142 MMOL/L (ref 135–147)
WBC # BLD AUTO: 6.83 THOUSAND/UL (ref 4.31–10.16)

## 2024-11-14 PROCEDURE — 80053 COMPREHEN METABOLIC PANEL: CPT

## 2024-11-14 PROCEDURE — 83036 HEMOGLOBIN GLYCOSYLATED A1C: CPT

## 2024-11-14 PROCEDURE — 85025 COMPLETE CBC W/AUTO DIFF WBC: CPT

## 2024-11-14 PROCEDURE — NC001 PR NO CHARGE: Performed by: PODIATRIST

## 2024-11-14 PROCEDURE — 85730 THROMBOPLASTIN TIME PARTIAL: CPT | Performed by: PHYSICIAN ASSISTANT

## 2024-11-14 PROCEDURE — 99221 1ST HOSP IP/OBS SF/LOW 40: CPT | Performed by: STUDENT IN AN ORGANIZED HEALTH CARE EDUCATION/TRAINING PROGRAM

## 2024-11-14 PROCEDURE — 82948 REAGENT STRIP/BLOOD GLUCOSE: CPT

## 2024-11-14 RX ORDER — INSULIN LISPRO 100 [IU]/ML
10 INJECTION, SOLUTION INTRAVENOUS; SUBCUTANEOUS
Status: DISCONTINUED | OUTPATIENT
Start: 2024-11-14 | End: 2024-11-14

## 2024-11-14 RX ORDER — ONDANSETRON 2 MG/ML
4 INJECTION INTRAMUSCULAR; INTRAVENOUS ONCE AS NEEDED
Status: CANCELLED | OUTPATIENT
Start: 2024-11-14

## 2024-11-14 RX ORDER — INSULIN GLARGINE 100 [IU]/ML
10 INJECTION, SOLUTION SUBCUTANEOUS
Status: DISCONTINUED | OUTPATIENT
Start: 2024-11-15 | End: 2024-11-17 | Stop reason: HOSPADM

## 2024-11-14 RX ORDER — PROMETHAZINE HYDROCHLORIDE 25 MG/ML
6.25 INJECTION, SOLUTION INTRAMUSCULAR; INTRAVENOUS ONCE AS NEEDED
Status: CANCELLED | OUTPATIENT
Start: 2024-11-14

## 2024-11-14 RX ORDER — INSULIN LISPRO 100 [IU]/ML
1-6 INJECTION, SOLUTION INTRAVENOUS; SUBCUTANEOUS
Status: DISCONTINUED | OUTPATIENT
Start: 2024-11-14 | End: 2024-11-17 | Stop reason: HOSPADM

## 2024-11-14 RX ORDER — HEPARIN SODIUM 1000 [USP'U]/ML
4000 INJECTION, SOLUTION INTRAVENOUS; SUBCUTANEOUS ONCE
Status: COMPLETED | OUTPATIENT
Start: 2024-11-14 | End: 2024-11-14

## 2024-11-14 RX ORDER — PANTOPRAZOLE SODIUM 20 MG/1
20 TABLET, DELAYED RELEASE ORAL DAILY
Status: DISCONTINUED | OUTPATIENT
Start: 2024-11-15 | End: 2024-11-17 | Stop reason: HOSPADM

## 2024-11-14 RX ORDER — HYDROMORPHONE HCL/PF 1 MG/ML
0.5 SYRINGE (ML) INJECTION
Refills: 0 | Status: CANCELLED | OUTPATIENT
Start: 2024-11-14

## 2024-11-14 RX ORDER — LOSARTAN POTASSIUM 25 MG/1
25 TABLET ORAL DAILY
Status: DISCONTINUED | OUTPATIENT
Start: 2024-11-15 | End: 2024-11-17 | Stop reason: HOSPADM

## 2024-11-14 RX ORDER — LEVOTHYROXINE SODIUM 75 UG/1
75 TABLET ORAL
Status: DISCONTINUED | OUTPATIENT
Start: 2024-11-15 | End: 2024-11-17 | Stop reason: HOSPADM

## 2024-11-14 RX ORDER — HEPARIN SODIUM 1000 [USP'U]/ML
2000 INJECTION, SOLUTION INTRAVENOUS; SUBCUTANEOUS EVERY 6 HOURS PRN
Status: DISCONTINUED | OUTPATIENT
Start: 2024-11-14 | End: 2024-11-16

## 2024-11-14 RX ORDER — HYDRALAZINE HYDROCHLORIDE 20 MG/ML
5 INJECTION INTRAMUSCULAR; INTRAVENOUS EVERY 6 HOURS PRN
Status: DISCONTINUED | OUTPATIENT
Start: 2024-11-14 | End: 2024-11-17 | Stop reason: HOSPADM

## 2024-11-14 RX ORDER — MEPERIDINE HYDROCHLORIDE 25 MG/ML
12.5 INJECTION INTRAMUSCULAR; INTRAVENOUS; SUBCUTANEOUS ONCE AS NEEDED
Status: CANCELLED | OUTPATIENT
Start: 2024-11-14

## 2024-11-14 RX ORDER — HEPARIN SODIUM 10000 [USP'U]/100ML
3-20 INJECTION, SOLUTION INTRAVENOUS
Status: DISCONTINUED | OUTPATIENT
Start: 2024-11-14 | End: 2024-11-16

## 2024-11-14 RX ORDER — FENTANYL CITRATE/PF 50 MCG/ML
50 SYRINGE (ML) INJECTION
Refills: 0 | Status: CANCELLED | OUTPATIENT
Start: 2024-11-14

## 2024-11-14 RX ORDER — PRAMIPEXOLE DIHYDROCHLORIDE 1 MG/1
1 TABLET ORAL
Status: DISCONTINUED | OUTPATIENT
Start: 2024-11-14 | End: 2024-11-17 | Stop reason: HOSPADM

## 2024-11-14 RX ORDER — INSULIN GLARGINE 100 [IU]/ML
30 INJECTION, SOLUTION SUBCUTANEOUS
Status: DISCONTINUED | OUTPATIENT
Start: 2024-11-15 | End: 2024-11-14

## 2024-11-14 RX ORDER — INSULIN LISPRO 100 [IU]/ML
10 INJECTION, SOLUTION INTRAVENOUS; SUBCUTANEOUS
Status: DISCONTINUED | OUTPATIENT
Start: 2024-11-15 | End: 2024-11-14

## 2024-11-14 RX ORDER — SODIUM CHLORIDE 9 MG/ML
125 INJECTION, SOLUTION INTRAVENOUS CONTINUOUS
Status: CANCELLED | OUTPATIENT
Start: 2024-11-15

## 2024-11-14 RX ORDER — VANCOMYCIN HYDROCHLORIDE 1 G/200ML
1000 INJECTION, SOLUTION INTRAVENOUS
Status: DISPENSED | OUTPATIENT
Start: 2024-11-15 | End: 2024-11-16

## 2024-11-14 RX ORDER — GABAPENTIN 100 MG/1
100 CAPSULE ORAL 3 TIMES DAILY
Status: DISCONTINUED | OUTPATIENT
Start: 2024-11-14 | End: 2024-11-17 | Stop reason: HOSPADM

## 2024-11-14 RX ORDER — HEPARIN SODIUM 1000 [USP'U]/ML
4000 INJECTION, SOLUTION INTRAVENOUS; SUBCUTANEOUS EVERY 6 HOURS PRN
Status: DISCONTINUED | OUTPATIENT
Start: 2024-11-14 | End: 2024-11-16

## 2024-11-14 RX ADMIN — PRAMIPEXOLE DIHYDROCHLORIDE 1 MG: 1 TABLET ORAL at 21:32

## 2024-11-14 RX ADMIN — GABAPENTIN 100 MG: 100 CAPSULE ORAL at 21:32

## 2024-11-14 RX ADMIN — GABAPENTIN 100 MG: 100 CAPSULE ORAL at 17:15

## 2024-11-14 RX ADMIN — HEPARIN SODIUM 11.1 UNITS/KG/HR: 10000 INJECTION, SOLUTION INTRAVENOUS at 15:03

## 2024-11-14 RX ADMIN — HEPARIN SODIUM 4000 UNITS: 1000 INJECTION INTRAVENOUS; SUBCUTANEOUS at 15:06

## 2024-11-14 NOTE — ASSESSMENT & PLAN NOTE
History of normocytic anemia  Hemoglobin relatively stable at 9.2  Maintained outpatient on iron every other day  Recheck hemoglobin postoperatively and consider IV iron while inpatient

## 2024-11-14 NOTE — PLAN OF CARE
Problem: PAIN - ADULT  Goal: Verbalizes/displays adequate comfort level or baseline comfort level  Description: Interventions:  - Encourage patient to monitor pain and request assistance  - Assess pain using appropriate pain scale  - Administer analgesics based on type and severity of pain and evaluate response  - Implement non-pharmacological measures as appropriate and evaluate response  - Consider cultural and social influences on pain and pain management  - Notify physician/advanced practitioner if interventions unsuccessful or patient reports new pain  Outcome: Progressing     Problem: INFECTION - ADULT  Goal: Absence or prevention of progression during hospitalization  Description: INTERVENTIONS:  - Assess and monitor for signs and symptoms of infection  - Monitor lab/diagnostic results  - Monitor all insertion sites, i.e. indwelling lines, tubes, and drains  - Monitor endotracheal if appropriate and nasal secretions for changes in amount and color  - [place] appropriate cooling/warming therapies per order  - Administer medications as ordered  - Instruct and encourage patient and family to use good hand hygiene technique  - Identify and instruct in appropriate isolation precautions for identified infection/condition  Outcome: Progressing  Goal: Absence of fever/infection during neutropenic period  Description: INTERVENTIONS:  - Monitor WBC    Outcome: Progressing     Problem: HEMATOLOGIC - ADULT  Goal: Maintains hematologic stability  Description: INTERVENTIONS  - Assess for signs and symptoms of bleeding or hemorrhage  - Monitor labs  - Administer supportive blood products/factors as ordered and appropriate  Outcome: Progressing

## 2024-11-14 NOTE — ASSESSMENT & PLAN NOTE
Maintain outpatient on Pradaxa for history of DVTs  Provoked DVT 2000 postoperatively-started on Xarelto, additional DVT April 2023 reported as Xarelto failure and was switched to Pradaxa  Given high risk for recurrent DVT, will start heparin drip preoperatively  Patient did not take her Pradaxa this morning, will start heparin drip once IV access obtained  Resume Pradaxa when cleared by podiatry postoperatively

## 2024-11-14 NOTE — CONSULTS
Consultation - Hospitalist   Name: Jillian Arango 69 y.o. female I MRN: [id number]  Unit/Bed#: E2 -01 I Date of Admission: 11/14/2024   Date of Service: 11/14/2024 I Hospital Day: 0     Inpatient consult to Internal Medicine  Consult performed by: Landy Ellis PA-C  Consult ordered by: Lydia Lindsay DPM        Physician Requesting Evaluation: THOMAS Sharma   Reason for Evaluation / Principal Problem: Medical management    Assessment & Plan  Ankle wound, left, initial encounter  Patient admitted to the podiatry service for scheduled split thickness skin grafting to left ankle wound  N.p.o. at midnight  Check preop labs  Patient was recommended heparin drip for chronic DVT pre and postoperatively-will place orders  Attending to provide preoperative clearance  Diabetes mellitus (HCC)  Lab Results   Component Value Date    HGBA1C 6.8 (H) 10/19/2024     Patient with fairly well-controlled type 2 diabetes with most recent hemoglobin A1c 6.8  Maintain outpatient on only metformin  Will hold metformin while inpatient  Will decrease Lantus to 10 units at bedtime and discontinue standing insulin with meals as she does not use insulin at home and is high risk for hypoglycemia while n.p.o.  Will place on sliding scale insulin  Will adjust insulin regimen pending glucose trends      History of urostomy  Ileal conduit in place  Thyroid disorder  Continue daily levothyroxine  Normocytic anemia  History of normocytic anemia  Hemoglobin relatively stable at 9.2  Maintained outpatient on iron every other day  Recheck hemoglobin postoperatively and consider IV iron while inpatient  HTN (hypertension)  BP slightly elevated at time of admission  Maintained outpatient on losartan 25 mg daily, continue while inpatient  Will place IV as needed hydralazine  Class 2 severe obesity with serious comorbidity and body mass index (BMI) of 35.0 to 35.9 in adult ([facility])  Body mass index is 34.06 kg/m².    Chronic deep vein  thrombosis (DVT) of femoral vein of right lower extremity ([facility])  Maintain outpatient on Pradaxa for history of DVTs  Provoked DVT 2000 postoperatively-started on Xarelto, additional DVT April 2023 reported as Xarelto failure and was switched to Pradaxa  Given high risk for recurrent DVT, will start heparin drip preoperatively  Patient did not take her Pradaxa this morning, will start heparin drip once IV access obtained  Resume Pradaxa when cleared by podiatry postoperatively  PAD (peripheral artery disease) ([facility])          VTE Pharmacologic Prophylaxis:   High Risk (Score >/= 5) - Pharmacological DVT Prophylaxis Ordered: heparin drip. Sequential Compression Devices Ordered.  Code Status: Level 1 - Full Code   Discussion with family: Updated  () at bedside.    History of Present Illness   Chief Complaint: Ankle wound    Jillian Arango is a 69 y.o. female with a PMH of chronic DVT on Pradaxa, obesity, hypertension, peripheral arterial disease, diabetes who presents with ankle wound.  Patient was admitted to the podiatry service for elective split-thickness skin graft placement scheduled for 11/15/2024.  She presented to the hospital 1 day prior for heparin drip in the setting of multiple prior DVTs on anticoagulation.  Patient's outpatient provider suggested patient be managed with a heparin drip during hospital stay as holding anticoagulation would place her at too high of a risk for recurrent DVT.  Patient denies any shortness of breath or chest pain with exertion.  Notes she does have history of vertigo resulting in a few falls but denies any syncope.  In regards to her chronic medical conditions, she follows outpatient with a primary care doctor for hypertension and diabetes.  She was recently placed on losartan for her hypertension.  Takes metformin daily for her diabetes.  She does have history of endometrial cancer status post radiation resulting in ureteral strictures requiring urostomy.   Her main complaint at time of admission is pain in her leg due to wound, chronic DVT and Baker's cyst.    Review of Systems   Constitutional:  Negative for chills and fever.   HENT:  Negative for trouble swallowing.    Eyes:  Negative for visual disturbance.   Respiratory:  Negative for cough and shortness of breath.    Cardiovascular:  Positive for leg swelling. Negative for chest pain.   Gastrointestinal:  Negative for abdominal pain and vomiting.   Genitourinary:  Negative for difficulty urinating.   Musculoskeletal:  Positive for myalgias. Negative for arthralgias.   Skin:  Negative for rash.   Neurological:  Negative for dizziness and weakness.   Psychiatric/Behavioral:  Negative for sleep disturbance.        Historical Information   Past Medical History:   Diagnosis Date    Achilles tendinitis of left lower extremity 02/08/2024    Achilles tendinosis of left ankle 11/27/2023    Anemia     Back pain     Bone infarction (HCC) 06/24/2023    Bowel obstruction (HCC) 2017,2018    Bursitis of posterior heel, left 11/27/2023    Cancer (HCC)     uterine CA radiation destoried urethra.   Patient has urostomy    Cholelithiasis     Colon polyp     Deep vein thrombosis of left lower extremity (HCC) 01/11/2004    on blood thinner for 3 years.    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    Hypercalcemia 10/10/2023    Hypertension     Hypothyroid     In vitro fertilization     Kidney stone     Migraines     Muscle weakness     abdominal    Nausea and vomiting 10/05/2023    Personal history of irradiation     Postoperative wound dehiscence, subsequent encounter 02/08/2024    Renal  cyst     Sleep apnea     Resolved due to Weight Loss    Vertigo 10/10/2023     Past Surgical History:   Procedure Laterality Date    ACHILLES TENDON REPAIR Right     APPENDECTOMY      COLONOSCOPY      6/6/22    COLONOSCOPY W/ BIOPSIES N/A 12/2015    EGD      HEEL SPUR SURGERY Right 1996    HYSTERECTOMY      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    IR NEPHROSTOMY TUBE PLACEMENT  01/18/2021    LAPAROSCOPIC GASTRIC BANDING N/A 2006    [place], [place]    OTHER SURGICAL HISTORY  2004    Urine shunt to intestine, transverse colon    PCNL Left 02/16/2021    Procedure: NEPHROLITHOTOMY  PERCUTANEOUS (PCNL);  Surgeon: Ziggy Roldan MD;  Location: [facility];  Service: Urology    ME CYSTO/URETERO W/LITHOTRIPSY &INDWELL STENT INSRT Left 02/16/2021    Procedure: anterograde  URETEROSCOPY with dilation of ureteral stricture, INSERTION STENT URETERAL, exchange  of nephrostomy tube;  Surgeon: Ziggy Roldan MD;  Location: [facility];  Service: Urology    ME DEBRIDEMENT SUBCUTANEOUS TISSUE 1ST 20 SQ CM/< Left 01/29/2024    Procedure: DEBRIDEMENT LOWER EXTREMITY (WASH OUT) with [place]OENIX GRAFT APPLICATION, foot wound;  Surgeon: Darron Frias DPM;  Location: [facility];  Service: Podiatry    ME LAPAROSCOPY ENTEROLYSIS SEPARATE PROCEDURE N/A 01/17/2019    Procedure: LAPAROSCOPIC LYSIS OF ADHESIONS;  Surgeon: Omar Jack MD;  Location:  MAIN OR;  Service: General    ME REPAIR SECONDARY ACHILLES TENDON W/WO GRAFT Left 11/24/2023    Procedure: REMOVAL OF POSTERIOR HEEL SPUR WITH REPAIR TENDON ACHILLES;  Surgeon: Darron Frias DPM;  Location:  MAIN OR;  Service: Podiatry    RADICAL HYSTERECTOMY N/A 1999    [facility] endometrial cancer.    SLEEVE GASTROPLASTY N/A 09/2015    Dr. Jack, [facility]    UPPER GASTROINTESTINAL ENDOSCOPY      URETER REVISION  2010    WEIBY THUMB ARTHROPLASTY      WEILBY THUMB ARTHROPLASTY       Social History     Tobacco Use    Smoking status: Former     Current packs/day:  0.00     Average packs/day: 1.5 packs/day for 12.0 years (18.0 ttl pk-yrs)     Types: Cigarettes     Start date: 1974     Quit date: 1986     Years since quittin.8     Passive exposure: Never    Smokeless tobacco: Never   Vaping Use    Vaping status: Never Used   Substance and Sexual Activity    Alcohol use: Yes     Comment: rarely    Drug use: Never    Sexual activity: Never     Comment: s/p hysterectomy     E-Cigarette/Vaping    E-Cigarette Use Never User      E-Cigarette/Vaping Substances    Nicotine No     THC No     CBD No     Flavoring No     Other No     Unknown No      Family History   Problem Relation Age of Onset    No Known Problems Mother     Kidney failure Father     Brain cancer Father     Kidney cancer Father     Diabetes Sister     Alcohol abuse Sister     Diabetes Brother      Social History:  Marital Status: /Civil Union   Occupation: Unknown  Patient Pre-hospital Living Situation: Home  Patient Pre-hospital Level of Mobility: walks  Patient Pre-hospital Diet Restrictions: None    Meds/Allergies   I have reviewed home medications with patient personally.  Prior to Admission medications    Medication Sig Start Date End Date Taking? Authorizing Provider   ALPRAZolam (XANAX) 0.5 mg tablet TAKE 1/2 TABLET BY MOUTH ONCE DAILY AT BEDTIME AS NEEDED FOR ANXIETY 24  Yes Neyda Obregon MD   BIOTIN PO Take 1 capsule by mouth daily   Yes Historical Provider, MD   dabigatran etexilate (PRADAXA) 150 mg capsu TAKE ONE CAPSULE BY MOUTH TWO TIMES A DAY 24  Yes Neyda Obregon MD   ferrous sulfate 324 (65 Fe) mg Take 1 tablet (324 mg total) by mouth every other day 10/30/24 11/29/24 Yes Raquel Gonzales MD   levothyroxine 75 mcg tablet TAKE 1 TABLET DAILY EARLY IN THE MORNING 10/8/24  Yes Augustine Lloyd MD   losartan (COZAAR) 25 mg tablet Take 1 tablet (25 mg total) by mouth daily 10/30/24 11/29/24 Yes Raquel Gonzales MD   metFORMIN (GLUCOPHAGE) 500 mg tablet TAKE ONE TABLET BY  "MOUTH TWICE A DAY WITH MEALS 7/1/24  Yes Joaquín Hadley DO   pantoprazole (PROTONIX) 20 mg tablet Take 1 tablet (20 mg total) by mouth daily 11/8/24 11/3/25 Yes TRACY Frye   pramipexole (MIRAPEX) 0.5 mg tablet Take 1 mg by mouth daily at bedtime 3/11/23  Yes Zuri Harris MD   Probiotic Product (Probiotic Daily) CAPS Take 1 capsule by mouth in the morning 5/22/24  Yes Augustine Lloyd MD   sodium chloride (OCEAN) 0.65 % nasal spray 1 spray into each nostril as needed for congestion or rhinitis 10/30/24  Yes Raquel Gonzales MD   tirzepatide (Mounjaro) 2.5 MG/0.5ML Inject 0.5 mL (2.5 mg total) under the skin every 7 days 10/16/24 11/15/24 Yes Bill Joshua MD   acyclovir (Zovirax) 5 % ointment Apply topically every 3 (three) hours for 5 days  Patient taking differently: Apply topically every 8 (eight) hours as needed 10/16/24 11/8/24  Bill Joshua MD   FREESTYLE LITE test strip Check blood sugar 1-2 times daily 5/21/24   Joaquín Hadley DO   meclizine (ANTIVERT) 12.5 MG tablet Take 1 tablet (12.5 mg total) by mouth every 8 (eight) hours as needed for nausea or dizziness for up to 14 days 12/27/23 11/8/24  Augustine Lloyd MD   traMADol (Ultram) 50 mg tablet Take 1 tablet (50 mg total) by mouth every 8 (eight) hours as needed for moderate pain  Patient not taking: Reported on 11/14/2024 10/16/24   Augustine Lloyd MD   valACYclovir (VALTREX) 1,000 mg tablet Take 1 tablet (1,000 mg total) by mouth 2 (two) times a day for 1 day  Patient taking differently: Take 1,000 mg by mouth 2 (two) times a day as needed 10/16/24 11/8/24  Bill Joshua MD     Allergies   Allergen Reactions    Amoxicillin Rash    Penicillins Rash    Adhesive [Medical Tape] Rash     \"Paper Tape\" Causes the rash       Objective :  Temp:  [97.7 °F (36.5 °C)-98 °F (36.7 °C)] 98 °F (36.7 °C)  HR:  [63-66] 63  BP: (159-166)/(79-86) 159/86  Resp:  [18] 18  SpO2:  [98 %-99 %] 98 %  O2 " Device: None (Room air)    Physical Exam  Vitals reviewed.   Constitutional:       General: She is not in acute distress.  HENT:      Head: Normocephalic and atraumatic.   Eyes:      General: No scleral icterus.     Conjunctiva/sclera: Conjunctivae normal.   Cardiovascular:      Rate and Rhythm: Normal rate and regular rhythm.      Heart sounds: No murmur heard.  Pulmonary:      Effort: Pulmonary effort is normal. No respiratory distress.      Breath sounds: Normal breath sounds. No wheezing.   Abdominal:      General: Bowel sounds are normal. There is no distension.      Palpations: Abdomen is soft.      Tenderness: There is no abdominal tenderness.   Musculoskeletal:      Cervical back: Neck supple.      Right lower leg: No edema.      Left lower leg: No edema.      Comments: Foot/ankle wound wrapped in ACE   Skin:     General: Skin is warm and dry.   Neurological:      Mental Status: She is alert and oriented to person, place, and time.   Psychiatric:         Mood and Affect: Mood normal.         Behavior: Behavior normal.          Lines/Drains:            Lab Results: I have reviewed the following results:  Results from last 7 days   Lab Units 11/14/24  1435   WBC Thousand/uL 6.83   HEMOGLOBIN g/dL 9.2*   HEMATOCRIT % 29.6*   PLATELETS Thousands/uL 236   SEGS PCT % 60   LYMPHO PCT % 27   MONO PCT % 8   EOS PCT % 4                 Lab Results   Component Value Date    HGBA1C 6.8 (H) 10/19/2024    HGBA1C 6.3 (H) 05/22/2024    HGBA1C 7.0 (H) 10/05/2023                 Administrative Statements       ** Please Note: This note has been constructed using a voice recognition system. **

## 2024-11-14 NOTE — ASSESSMENT & PLAN NOTE
BP slightly elevated at time of admission  Maintained outpatient on losartan 25 mg daily, continue while inpatient  Will place IV as needed hydralazine

## 2024-11-14 NOTE — ANESTHESIA PREPROCEDURE EVALUATION
Procedure:  Left ankle debridement w/ harvesting of split-thickness skin graft left lower leg for application to left ankle wound. (Left: Leg Lower)    Relevant Problems   CARDIO   (+) Chronic deep vein thrombosis (DVT) of femoral vein of right lower extremity (HCC)   (+) HTN (hypertension)   (+) Migraine   (+) PAD (peripheral artery disease) (HCC)      GI/HEPATIC   (+) Small bowel obstruction (HCC)      /RENAL   (+) Hydroureteronephrosis      GYN   (+) Endometrial cancer (HCC)      HEMATOLOGY   (+) CHARY (iron deficiency anemia)   (+) Normocytic anemia      NEURO/PSYCH   (+) Depression, recurrent (HCC)   (+) Headache   (+) Migraine      PULMONARY   (+) Upper respiratory infection        Physical Exam    Airway    Mallampati score: II  TM Distance: >3 FB  Neck ROM: full     Dental   Comment: Multiple implants  , No notable dental hx implants    Cardiovascular  Cardiovascular exam normal    Pulmonary  Pulmonary exam normal     Other Findings  post-pubertal.      Anesthesia Plan  ASA Score- 2     Anesthesia Type- general with ASA Monitors.         Additional Monitors:     Airway Plan: LMA.           Plan Factors-Exercise tolerance (METS): <4 METS.    Chart reviewed. EKG reviewed.  Existing labs reviewed. Patient summary reviewed.    Patient is not a current smoker.              Induction- intravenous.    Postoperative Plan- Plan for postoperative opioid use.     Perioperative Resuscitation Plan - Level 1 - Full Code.       Informed Consent- Anesthetic plan and risks discussed with patient.

## 2024-11-14 NOTE — ASSESSMENT & PLAN NOTE
Patient admitted to the podiatry service for scheduled split thickness skin grafting to left ankle wound  N.p.o. at midnight  Check preop labs  Patient was recommended heparin drip for chronic DVT pre and postoperatively-will place orders  Attending to provide preoperative clearance

## 2024-11-14 NOTE — H&P
[facility] Podiatry - H&P  Jillian Arango 69 y.o. female MRN: [id number]  Unit/Bed#: E2 -01 Encounter: [id number]  Admission Date: 11/14/24    ASSESSMENT:    Jillian Arango is a 69 y.o. female with:    L posterior medial ankle wound  T2DM  Chronic DVT Right lower extremity  PAD  HTN    PLAN:    Patient being admitted under podiatry service in preparation for split thickness skin grafting to her left ankle wound with Dr. Rodriguez tomorrow 11/15.  NPO at midnight.  SLIM consulted for medical management. Chronic DVT RLE, appreciate assistance with anticoagulation bridging/management.  F/u labs.  Adaptic, DSD applied to L ankle wound.  Will discuss this plan with my attending and update as needed.      Antibiotics: None. Surgical prophylaxis Vancomycin 1g on call to OR  Pharmacologic VTE Prophylaxis: Heparin   Mechanical VTE Prophylaxis: sequential compression device   Weight Bearing Status: WBAT      Disposition:  Patient requires >2 midnight stay for split thickness skin graft surgery.  Code Status: Level 1 - Full Code      SUBJECTIVE    History of Present Illness:    Jillian Arango is a 69 y.o. female with pmh of T2DM, PAD, HTN, RLE chronic DVT, and endometrial cancer who presents as a direct admission under Dr. Rodriguez in preparation for her split thickness skin graft surgery to cover her left ankle wound. She has had the wound since she had haglunds/achilles surgery about 1 year ago. The incisional wound has not been able to close since. She also complains of some constant nerve pain to her left lateral foot and ankle. She denies nausea, vomiting, chills, shortness of breath, chest pain.    Review of Systems:    Constitutional: Negative.    HENT: Negative.    Eyes: Negative.    Respiratory: Negative.    Cardiovascular: Negative.    Gastrointestinal: Negative.    Musculoskeletal: Negative.   Skin:L posterior ankle wound   Neurological: Nerve pain to left lateral foot & ankle  Psych: Negative.     Past  Medical and Surgical History:     Past Medical History:   Diagnosis Date    Achilles tendinitis of left lower extremity 02/08/2024    Achilles tendinosis of left ankle 11/27/2023    Anemia     Back pain     Bone infarction (HCC) 06/24/2023    Bowel obstruction (HCC) 2017,2018    Bursitis of posterior heel, left 11/27/2023    Cancer (HCC)     uterine CA radiation destoried urethra.   Patient has urostomy    Cholelithiasis     Colon polyp     Deep vein thrombosis of left lower extremity (HCC) 01/11/2004    on blood thinner for 3 years.    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    Hypercalcemia 10/10/2023    Hypertension     Hypothyroid     In vitro fertilization     Kidney stone     Migraines     Muscle weakness     abdominal    Nausea and vomiting 10/05/2023    Personal history of irradiation     Postoperative wound dehiscence, subsequent encounter 02/08/2024    Renal cyst     Sleep apnea     Resolved due to Weight Loss    Vertigo 10/10/2023       Past Surgical History:   Procedure Laterality Date    ACHILLES TENDON REPAIR Right     APPENDECTOMY      COLONOSCOPY      6/6/22    COLONOSCOPY W/ BIOPSIES N/A 12/2015    EGD      HEEL SPUR SURGERY Right 1996    HYSTERECTOMY      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    IR NEPHROSTOMY TUBE PLACEMENT  01/18/2021    LAPAROSCOPIC GASTRIC BANDING N/A 2006    [place], [place]    OTHER SURGICAL HISTORY  2004    Urine shunt to intestine, transverse colon    PCNL Left 02/16/2021    Procedure: NEPHROLITHOTOMY  PERCUTANEOUS (PCNL);  Surgeon: Ziggy Roldan MD;  Location: [facility];  Service: Urology    NC CYSTO/URETERO  W/LITHOTRIPSY &INDWELL STENT INSRT Left 02/16/2021    Procedure: anterograde  URETEROSCOPY with dilation of ureteral stricture, INSERTION STENT URETERAL, exchange  of nephrostomy tube;  Surgeon: Ziggy Roldan MD;  Location: [facility];  Service: Urology    GA DEBRIDEMENT SUBCUTANEOUS TISSUE 1ST 20 SQ CM/< Left 01/29/2024    Procedure: DEBRIDEMENT LOWER EXTREMITY (WASH OUT) with PHOENIX GRAFT APPLICATION, foot wound;  Surgeon: Darron Frias DPM;  Location: [facility];  Service: Podiatry    GA LAPAROSCOPY ENTEROLYSIS SEPARATE PROCEDURE N/A 01/17/2019    Procedure: LAPAROSCOPIC LYSIS OF ADHESIONS;  Surgeon: Omar Jack MD;  Location:  MAIN OR;  Service: General    GA REPAIR SECONDARY ACHILLES TENDON W/WO GRAFT Left 11/24/2023    Procedure: REMOVAL OF POSTERIOR HEEL SPUR WITH REPAIR TENDON ACHILLES;  Surgeon: Darron Frias DPM;  Location: [facility];  Service: Podiatry    RADICAL HYSTERECTOMY N/A 1999    [facility] endometrial cancer.    SLEEVE GASTROPLASTY N/A 09/2015    Dr. Jack, [facility]    UPPER GASTROINTESTINAL ENDOSCOPY      URETER REVISION  2010    WEILBY THUMB ARTHROPLASTY      WEILBY THUMB ARTHROPLASTY         Meds/Allergies:      Medications Prior to Admission:     ALPRAZolam (XANAX) 0.5 mg tablet    BIOTIN PO    dabigatran etexilate (PRADAXA) 150 mg capsu    ferrous sulfate 324 (65 Fe) mg    levothyroxine 75 mcg tablet    losartan (COZAAR) 25 mg tablet    metFORMIN (GLUCOPHAGE) 500 mg tablet    pantoprazole (PROTONIX) 20 mg tablet    pramipexole (MIRAPEX) 0.5 mg tablet    Probiotic Product (Probiotic Daily) CAPS    sodium chloride (OCEAN) 0.65 % nasal spray    tirzepatide (Mounjaro) 2.5 MG/0.5ML    acyclovir (Zovirax) 5 % ointment    FREESTYLE LITE test strip    meclizine (ANTIVERT) 12.5 MG tablet    traMADol (Ultram) 50 mg tablet    valACYclovir (VALTREX) 1,000 mg tablet    Allergies   Allergen Reactions    Amoxicillin Rash    Penicillins Rash    Adhesive [Medical Tape] Rash      "\"Paper Tape\" Causes the rash       Social History:    Social History     Marital Status: /Civil Union    Substance Use History:   Social History     Substance and Sexual Activity   Alcohol Use Yes    Comment: rarely     Social History     Tobacco Use   Smoking Status Former    Current packs/day: 0.00    Average packs/day: 1.5 packs/day for 12.0 years (18.0 ttl pk-yrs)    Types: Cigarettes    Start date: 1974    Quit date: 1986    Years since quittin.8    Passive exposure: Never   Smokeless Tobacco Never     Social History     Substance and Sexual Activity   Drug Use Never       Family History:    Family History   Problem Relation Age of Onset    No Known Problems Mother     Kidney failure Father     Brain cancer Father     Kidney cancer Father     Diabetes Sister     Alcohol abuse Sister     Diabetes Brother          OBJECTIVE:    First Vitals:   Blood Pressure: 166/79 (24)  Pulse: 66 (24)  Temperature: 97.7 °F (36.5 °C) (24)  Temp Source: Oral (24)  Respirations: 18 (24)  Height: 5' 6\" (167.6 cm) (24)  Weight - Scale: 95.7 kg (211 lb) (24)  SpO2: 99 % (24)    Current Vitals:   Blood Pressure: 166/79 (24)  Pulse: 66 (24)  Temperature: 97.7 °F (36.5 °C) (24)  Temp Source: Oral (24)  Respirations: 18 (24)  Height: 5' 6\" (167.6 cm) (24)  Weight - Scale: 95.7 kg (211 lb) (24)  SpO2: 99 % (24)      Physical Exam :    General Appearance: Alert, cooperative, no distress.  HEENT: Head normocephalic, atraumatic, without obvious abnormality.  Heart: Normal rate and rhythm.  Lungs: Non-labored breathing. No respiratory distress.  Abdomen: Without distension.  Psychiatric: AAOx3  Lower Extremity:  Vascular:    DP & PT pulses palpable B/L. Capillary refill time <3 seconds B/L. Skin temperature WNL B/L.    Musculoskeletal:  MMT is 5/5 in " all muscle compartments bilaterally. Passive ROM at the 1st MPJ and ankle joint are Normal bilaterally with the leg extended. Active ROM at the lesser digits is intact. Pain on palpation of left lateral foot and ankle in the general region of the sural nerve. No gross deformities noted.     Dermatological:  No open lesions noted to the RLE.    LE Wound Exam:   Wound #: 1  Location: Left posterior medial achilles  Length 3cm: Width 1cm: Depth 0.1cm:   Deepest Tissue Noted in Base: subcutaneous  Probe to Bone: No  Peripheral Skin Description: Attached  Granulation: 50% Fibrotic Tissue: 50% Necrotic Tissue: 0%   Drainage Amount: minimal  Signs of Infection: No    No purulence, no erythema noted to periwound.      Neurological:  Gross sensation is intact. Protective sensation is intact. Patient Denies numbness and/or paresthesias. She does report some constant nerve pain along the path of the sural nerve in the left lower extremity.    Clinical Images 11/14/24:    L posterior medial ankle    Lab Results:   No visits with results within 1 Day(s) from this visit.   Latest known visit with results is:   Appointment on 11/01/2024   Component Date Value    OCCULT BLD, FECAL IMMUNO* 11/01/2024 Positive (A)        Cultures:            Imaging: I have personally reviewed pertinent films in PACS  No results found.  EKG, Pathology, and Other Studies: I have personally reviewed pertinent reports.

## 2024-11-14 NOTE — ASSESSMENT & PLAN NOTE
Lab Results   Component Value Date    HGBA1C 6.8 (H) 10/19/2024     Patient with fairly well-controlled type 2 diabetes with most recent hemoglobin A1c 6.8  Maintain outpatient on only metformin  Will hold metformin while inpatient  Will decrease Lantus to 10 units at bedtime and discontinue standing insulin with meals as she does not use insulin at home and is high risk for hypoglycemia while n.p.o.  Will place on sliding scale insulin  Will adjust insulin regimen pending glucose trends

## 2024-11-15 ENCOUNTER — ANESTHESIA (INPATIENT)
Dept: PERIOP | Facility: HOSPITAL | Age: 69
DRG: 902 | End: 2024-11-15
Payer: COMMERCIAL

## 2024-11-15 LAB
ANION GAP SERPL CALCULATED.3IONS-SCNC: 6 MMOL/L (ref 4–13)
APTT PPP: 52 SECONDS (ref 23–34)
APTT PPP: 59 SECONDS (ref 23–34)
BASOPHILS # BLD AUTO: 0.03 THOUSANDS/ÂΜL (ref 0–0.1)
BASOPHILS NFR BLD AUTO: 1 % (ref 0–1)
BUN SERPL-MCNC: 22 MG/DL (ref 5–25)
CALCIUM SERPL-MCNC: 9.1 MG/DL (ref 8.4–10.2)
CHLORIDE SERPL-SCNC: 111 MMOL/L (ref 96–108)
CO2 SERPL-SCNC: 24 MMOL/L (ref 21–32)
CREAT SERPL-MCNC: 0.82 MG/DL (ref 0.6–1.3)
EOSINOPHIL # BLD AUTO: 0.29 THOUSAND/ÂΜL (ref 0–0.61)
EOSINOPHIL NFR BLD AUTO: 5 % (ref 0–6)
ERYTHROCYTE [DISTWIDTH] IN BLOOD BY AUTOMATED COUNT: 15 % (ref 11.6–15.1)
GFR SERPL CREATININE-BSD FRML MDRD: 74 ML/MIN/1.73SQ M
GLUCOSE SERPL-MCNC: 106 MG/DL (ref 65–140)
GLUCOSE SERPL-MCNC: 119 MG/DL (ref 65–140)
GLUCOSE SERPL-MCNC: 126 MG/DL (ref 65–140)
GLUCOSE SERPL-MCNC: 258 MG/DL (ref 65–140)
GLUCOSE SERPL-MCNC: 87 MG/DL (ref 65–140)
GLUCOSE SERPL-MCNC: 89 MG/DL (ref 65–140)
HCT VFR BLD AUTO: 27.8 % (ref 34.8–46.1)
HGB BLD-MCNC: 8.8 G/DL (ref 11.5–15.4)
IMM GRANULOCYTES # BLD AUTO: 0.01 THOUSAND/UL (ref 0–0.2)
IMM GRANULOCYTES NFR BLD AUTO: 0 % (ref 0–2)
LYMPHOCYTES # BLD AUTO: 1.41 THOUSANDS/ÂΜL (ref 0.6–4.47)
LYMPHOCYTES NFR BLD AUTO: 26 % (ref 14–44)
MCH RBC QN AUTO: 30 PG (ref 26.8–34.3)
MCHC RBC AUTO-ENTMCNC: 31.7 G/DL (ref 31.4–37.4)
MCV RBC AUTO: 95 FL (ref 82–98)
MONOCYTES # BLD AUTO: 0.5 THOUSAND/ÂΜL (ref 0.17–1.22)
MONOCYTES NFR BLD AUTO: 9 % (ref 4–12)
NEUTROPHILS # BLD AUTO: 3.15 THOUSANDS/ÂΜL (ref 1.85–7.62)
NEUTS SEG NFR BLD AUTO: 59 % (ref 43–75)
NRBC BLD AUTO-RTO: 0 /100 WBCS
PLATELET # BLD AUTO: 220 THOUSANDS/UL (ref 149–390)
PMV BLD AUTO: 9.4 FL (ref 8.9–12.7)
POTASSIUM SERPL-SCNC: 4.3 MMOL/L (ref 3.5–5.3)
RBC # BLD AUTO: 2.93 MILLION/UL (ref 3.81–5.12)
SODIUM SERPL-SCNC: 141 MMOL/L (ref 135–147)
WBC # BLD AUTO: 5.39 THOUSAND/UL (ref 4.31–10.16)

## 2024-11-15 PROCEDURE — 15100 SPLT AGRFT T/A/L 1ST 100SQCM: CPT | Performed by: PODIATRIST

## 2024-11-15 PROCEDURE — 0HRNX74 REPLACEMENT OF LEFT FOOT SKIN WITH AUTOLOGOUS TISSUE SUBSTITUTE, PARTIAL THICKNESS, EXTERNAL APPROACH: ICD-10-PCS | Performed by: PODIATRIST

## 2024-11-15 PROCEDURE — NC001 PR NO CHARGE: Performed by: PODIATRIST

## 2024-11-15 PROCEDURE — 99232 SBSQ HOSP IP/OBS MODERATE 35: CPT | Performed by: PHYSICIAN ASSISTANT

## 2024-11-15 PROCEDURE — 80048 BASIC METABOLIC PNL TOTAL CA: CPT

## 2024-11-15 PROCEDURE — 85730 THROMBOPLASTIN TIME PARTIAL: CPT | Performed by: PODIATRIST

## 2024-11-15 PROCEDURE — 0JBR0ZZ EXCISION OF LEFT FOOT SUBCUTANEOUS TISSUE AND FASCIA, OPEN APPROACH: ICD-10-PCS | Performed by: PODIATRIST

## 2024-11-15 PROCEDURE — 15002 WOUND PREP TRK/ARM/LEG: CPT | Performed by: PODIATRIST

## 2024-11-15 PROCEDURE — 0HBLXZZ EXCISION OF LEFT LOWER LEG SKIN, EXTERNAL APPROACH: ICD-10-PCS | Performed by: PODIATRIST

## 2024-11-15 PROCEDURE — 85025 COMPLETE CBC W/AUTO DIFF WBC: CPT

## 2024-11-15 PROCEDURE — 82948 REAGENT STRIP/BLOOD GLUCOSE: CPT

## 2024-11-15 RX ORDER — ACETAMINOPHEN 325 MG/1
650 TABLET ORAL EVERY 6 HOURS PRN
Status: DISCONTINUED | OUTPATIENT
Start: 2024-11-15 | End: 2024-11-17 | Stop reason: HOSPADM

## 2024-11-15 RX ORDER — LIDOCAINE HCL/PF 100 MG/5ML
SYRINGE (ML) INJECTION AS NEEDED
Status: DISCONTINUED | OUTPATIENT
Start: 2024-11-15 | End: 2024-11-15

## 2024-11-15 RX ORDER — PROPOFOL 10 MG/ML
INJECTION, EMULSION INTRAVENOUS AS NEEDED
Status: DISCONTINUED | OUTPATIENT
Start: 2024-11-15 | End: 2024-11-15

## 2024-11-15 RX ORDER — DEXTROSE MONOHYDRATE AND SODIUM CHLORIDE 5; .9 G/100ML; G/100ML
50 INJECTION, SOLUTION INTRAVENOUS CONTINUOUS
Status: DISCONTINUED | OUTPATIENT
Start: 2024-11-15 | End: 2024-11-16

## 2024-11-15 RX ORDER — SODIUM CHLORIDE 9 MG/ML
50 INJECTION, SOLUTION INTRAVENOUS CONTINUOUS
Status: DISCONTINUED | OUTPATIENT
Start: 2024-11-15 | End: 2024-11-15

## 2024-11-15 RX ORDER — PROMETHAZINE HYDROCHLORIDE 25 MG/ML
6.25 INJECTION, SOLUTION INTRAMUSCULAR; INTRAVENOUS ONCE AS NEEDED
Status: DISCONTINUED | OUTPATIENT
Start: 2024-11-15 | End: 2024-11-15 | Stop reason: HOSPADM

## 2024-11-15 RX ORDER — ONDANSETRON 2 MG/ML
INJECTION INTRAMUSCULAR; INTRAVENOUS AS NEEDED
Status: DISCONTINUED | OUTPATIENT
Start: 2024-11-15 | End: 2024-11-15

## 2024-11-15 RX ORDER — FENTANYL CITRATE 50 UG/ML
INJECTION, SOLUTION INTRAMUSCULAR; INTRAVENOUS AS NEEDED
Status: DISCONTINUED | OUTPATIENT
Start: 2024-11-15 | End: 2024-11-15

## 2024-11-15 RX ORDER — FENTANYL CITRATE/PF 50 MCG/ML
50 SYRINGE (ML) INJECTION
Refills: 0 | Status: DISCONTINUED | OUTPATIENT
Start: 2024-11-15 | End: 2024-11-15 | Stop reason: HOSPADM

## 2024-11-15 RX ORDER — HYDROMORPHONE HCL/PF 1 MG/ML
0.5 SYRINGE (ML) INJECTION
Refills: 0 | Status: DISCONTINUED | OUTPATIENT
Start: 2024-11-15 | End: 2024-11-15 | Stop reason: HOSPADM

## 2024-11-15 RX ORDER — EPHEDRINE SULFATE 50 MG/ML
INJECTION INTRAVENOUS AS NEEDED
Status: DISCONTINUED | OUTPATIENT
Start: 2024-11-15 | End: 2024-11-15

## 2024-11-15 RX ORDER — ONDANSETRON 2 MG/ML
4 INJECTION INTRAMUSCULAR; INTRAVENOUS ONCE AS NEEDED
Status: DISCONTINUED | OUTPATIENT
Start: 2024-11-15 | End: 2024-11-15 | Stop reason: HOSPADM

## 2024-11-15 RX ORDER — MINERAL OIL
OIL (ML) MISCELLANEOUS AS NEEDED
Status: DISCONTINUED | OUTPATIENT
Start: 2024-11-15 | End: 2024-11-15 | Stop reason: HOSPADM

## 2024-11-15 RX ORDER — ALPRAZOLAM 0.25 MG/1
0.25 TABLET ORAL
Status: DISCONTINUED | OUTPATIENT
Start: 2024-11-15 | End: 2024-11-17 | Stop reason: HOSPADM

## 2024-11-15 RX ORDER — MEPERIDINE HYDROCHLORIDE 25 MG/ML
12.5 INJECTION INTRAMUSCULAR; INTRAVENOUS; SUBCUTANEOUS ONCE AS NEEDED
Status: DISCONTINUED | OUTPATIENT
Start: 2024-11-15 | End: 2024-11-15 | Stop reason: HOSPADM

## 2024-11-15 RX ORDER — CEFAZOLIN SODIUM 2 G/50ML
2000 SOLUTION INTRAVENOUS ONCE
Status: COMPLETED | OUTPATIENT
Start: 2024-11-15 | End: 2024-11-15

## 2024-11-15 RX ORDER — OXYCODONE HYDROCHLORIDE 5 MG/1
5 TABLET ORAL EVERY 4 HOURS PRN
Status: DISCONTINUED | OUTPATIENT
Start: 2024-11-15 | End: 2024-11-17 | Stop reason: HOSPADM

## 2024-11-15 RX ORDER — LIDOCAINE HYDROCHLORIDE AND EPINEPHRINE 10; 10 MG/ML; UG/ML
INJECTION, SOLUTION INFILTRATION; PERINEURAL AS NEEDED
Status: DISCONTINUED | OUTPATIENT
Start: 2024-11-15 | End: 2024-11-15 | Stop reason: HOSPADM

## 2024-11-15 RX ORDER — SODIUM CHLORIDE 9 MG/ML
125 INJECTION, SOLUTION INTRAVENOUS CONTINUOUS
Status: DISCONTINUED | OUTPATIENT
Start: 2024-11-15 | End: 2024-11-15

## 2024-11-15 RX ORDER — CHLORHEXIDINE GLUCONATE ORAL RINSE 1.2 MG/ML
15 SOLUTION DENTAL ONCE
Status: DISCONTINUED | OUTPATIENT
Start: 2024-11-15 | End: 2024-11-17 | Stop reason: HOSPADM

## 2024-11-15 RX ORDER — MIDAZOLAM HYDROCHLORIDE 2 MG/2ML
INJECTION, SOLUTION INTRAMUSCULAR; INTRAVENOUS AS NEEDED
Status: DISCONTINUED | OUTPATIENT
Start: 2024-11-15 | End: 2024-11-15

## 2024-11-15 RX ADMIN — HEPARIN SODIUM 2000 UNITS: 1000 INJECTION INTRAVENOUS; SUBCUTANEOUS at 04:40

## 2024-11-15 RX ADMIN — ALPRAZOLAM 0.25 MG: 0.25 TABLET ORAL at 21:44

## 2024-11-15 RX ADMIN — ACETAMINOPHEN 650 MG: 325 TABLET, FILM COATED ORAL at 06:36

## 2024-11-15 RX ADMIN — EPHEDRINE SULFATE 10 MG: 50 INJECTION INTRAVENOUS at 18:06

## 2024-11-15 RX ADMIN — PRAMIPEXOLE DIHYDROCHLORIDE 1 MG: 1 TABLET ORAL at 21:36

## 2024-11-15 RX ADMIN — LIDOCAINE HYDROCHLORIDE 100 MG: 20 INJECTION INTRAVENOUS at 17:50

## 2024-11-15 RX ADMIN — Medication 2.5 MG: at 19:51

## 2024-11-15 RX ADMIN — LOSARTAN POTASSIUM 25 MG: 25 TABLET, FILM COATED ORAL at 08:07

## 2024-11-15 RX ADMIN — HEPARIN SODIUM 2000 UNITS: 1000 INJECTION INTRAVENOUS; SUBCUTANEOUS at 11:26

## 2024-11-15 RX ADMIN — FENTANYL CITRATE 50 MCG: 50 INJECTION INTRAMUSCULAR; INTRAVENOUS at 17:56

## 2024-11-15 RX ADMIN — DEXTROSE AND SODIUM CHLORIDE 50 ML/HR: 5; .9 INJECTION, SOLUTION INTRAVENOUS at 12:34

## 2024-11-15 RX ADMIN — CEFAZOLIN SODIUM 2000 MG: 2 SOLUTION INTRAVENOUS at 17:47

## 2024-11-15 RX ADMIN — PROPOFOL 200 MG: 10 INJECTION, EMULSION INTRAVENOUS at 17:50

## 2024-11-15 RX ADMIN — LEVOTHYROXINE SODIUM 75 MCG: 75 TABLET ORAL at 06:12

## 2024-11-15 RX ADMIN — GABAPENTIN 100 MG: 100 CAPSULE ORAL at 21:36

## 2024-11-15 RX ADMIN — MIDAZOLAM 2 MG: 1 INJECTION INTRAMUSCULAR; INTRAVENOUS at 17:42

## 2024-11-15 RX ADMIN — FENTANYL CITRATE 50 MCG: 50 INJECTION INTRAMUSCULAR; INTRAVENOUS at 17:52

## 2024-11-15 RX ADMIN — GABAPENTIN 100 MG: 100 CAPSULE ORAL at 08:07

## 2024-11-15 RX ADMIN — PANTOPRAZOLE SODIUM 20 MG: 20 TABLET, DELAYED RELEASE ORAL at 08:07

## 2024-11-15 RX ADMIN — SODIUM CHLORIDE: 0.9 INJECTION, SOLUTION INTRAVENOUS at 17:40

## 2024-11-15 RX ADMIN — HEPARIN SODIUM 9.1 UNITS/KG/HR: 10000 INJECTION, SOLUTION INTRAVENOUS at 08:06

## 2024-11-15 RX ADMIN — INSULIN GLARGINE 10 UNITS: 100 INJECTION, SOLUTION SUBCUTANEOUS at 08:07

## 2024-11-15 RX ADMIN — ONDANSETRON 4 MG: 2 INJECTION INTRAMUSCULAR; INTRAVENOUS at 18:28

## 2024-11-15 NOTE — OP NOTE
OPERATIVE REPORT - Podiatry  PATIENT NAME: Jillian Arango    :  1955  MRN: [id number]  Pt Location: [facility] OR ROOM 02    SURGERY DATE: 11/15/2024    Surgeons and Role:     * Aram Rodriguez, RHONDA - Primary     * Lydia Lindsay DPM - Assisting    Pre-op Diagnosis:  Type 2 diabetes mellitus treated without insulin (HCC) [E11.9]  Non-pressure chronic ulcer of left ankle with fat layer exposed (HCC) [L97.322]    Post-Op Diagnosis Codes:     * Type 2 diabetes mellitus treated without insulin (HCC) [E11.9]     * Non-pressure chronic ulcer of left ankle with fat layer exposed (HCC) [L97.322]    Procedure(s) (LRB):  Left ankle debridement w/ harvesting of split-thickness skin graft left lower leg for application to left ankle wound. (Left)    Specimen(s):  * No specimens in log *    Estimated Blood Loss:   Minimal    Drains:  Urostomy Ureterostomy right RUQ (Active)   Stoma Assessment Red 11/15/24 1234   Peristomal Assessment Clean;Intact 11/15/24 1234   Output (mL) 400 mL 11/15/24 1130   Number of days: 1333       Percutaneous Nephroureteral Tube (PCNU) Left 1 8.5 Fr 22 Cm (Active)   Number of days: 1333       Anesthesia Type:   General/LMA with 10 ml of 1% Lidocaine and 0.5% Bupivacaine in a 1:1 mixture. 10 ml of 1% lidocaine with epinephrine.     Hemostasis:  Atraumatic technique    Materials:  * No implants in log *    Operative Findings:  Consistent with diagnosis    Complications:   None    Procedure and Technique:     Under mild sedation, the patient was brought into the operating room and placed on the operating room table in the supine position. IV sedation was achieved by anesthesia team and a universal timeout was performed where all parties are in agreement of correct patient, correct procedure and correct site. The foot was then prepped and draped in the usual aseptic manner.     Attention directed to left ankle where pre-existing wound measured 2.5 x 1.1 x 0.1cm. 10 ml of 1% Lidocaine and 0.5%  "Bupivacaine in a 1:1 mixture was administered. This wound was then excisionally debrided with 15 blade of all nonviable, devitalized, fibrotic tissue w/ excision of skin and subcutaneous tissue to depth of subcutaneous tissue. Post debridement wound measurements 3.5x1.5x0.1cm, with appearance of wound fresh bleeding viable. <20sq cm debrided.    Attention directed to left lateral leg where 10 ml of 1% lidocaine with epinephrine was administered to skin graft donor site. Mineral oil used to lubricate skin surface and wooden tongue depressor used to hold skin taut. Dermatome was used to harvest the donor graft which was then meshed with mesher. This was then placed onto the debrided wound bed, secured with staples. Wound was dressed with xeroform, mineral oil soaked cotton balls, additional xeroform, 4x4 gauze and kerlix. Donor site was dressed with xeroform, 4x4 gauze, and kerlix. Posterior splint applied with plenty of cast padding and ankle at 90 degrees.     The patient tolerated the procedure and anesthesia well without immediate complications and transferred to PACU with vital signs stable.     As with many limb salvage procedures, we contemplate the possibility of performing further stages to this procedure. Procedures may include debridements, delayed closure, plastic surgery techniques, or more proximal amputations. This procedure may be considered part of a multi-staged limb salvage treatment plan.     Dr. Rodriguez was present during the entire procedure and participated in all key aspects.    SIGNATURE: Lydia Lindsay DPM  DATE: November 15, 2024  TIME: 6:52 PM      Portions of the record may have been created with voice recognition software. Occasional wrong word or \"sound a like\" substitutions may have occurred due to the inherent limitations of voice recognition software. Read the chart carefully and recognize, using context, where substitutions have occurred.    "

## 2024-11-15 NOTE — ANESTHESIA POSTPROCEDURE EVALUATION
Post-Op Assessment Note    CV Status:  Stable  Pain Score: 0    Pain management: adequate       Mental Status:  Alert   Hydration Status:  Stable and euvolemic   PONV Controlled:  None     Post Op Vitals Reviewed: Yes    No anethesia notable event occurred.    Staff: CRNA           Last Filed PACU Vitals:VS.    Vitals Value Taken Time   Temp     Pulse 92 11/15/24 1854   BP     Resp 15 11/15/24 1854   SpO2 94 % 11/15/24 1854   Vitals shown include unfiled device data.    Modified Dina:  No data recorded

## 2024-11-15 NOTE — PROGRESS NOTES
"Podiatry - Progress Note  Patient: Jillian Arango 69 y.o. female   MRN: [id number]  PCP: Augustine Lloyd MD  Unit/Bed#: E2 -01 Encounter: [id number]  Date Of Visit: 11/15/24    ASSESSMENT:    iJllian Arango is a 69 y.o. female with:    L posterior medial ankle wound  T2DM  Chronic DVT Right lower extremity  PAD  HTN      PLAN:    Patient to go to OR today,11/15/24, for left STSG with Dr. Rodriguez .  Consent signed prior.  Confirmed NPO status.  H&P, vitals, and current labs reviewed.  No acute changes noted.  Alternatives, risks, and complications discussed with patient.  All questions answered.  No guarantees given of outcome.  Rest of medical care per primary team.     Antibiotics: None. Surgical prophylaxis Vancomycin 1g on call to OR  Pharmacologic VTE Prophylaxis: Heparin   Mechanical VTE Prophylaxis: sequential compression device   Weight Bearing Status: WBAT    SUBJECTIVE:     The patient was seen, evaluated, and assessed at bedside today. The patient was awake, alert, and in no acute distress. Patient confirmed NPO status. All questions and concerns regarding the surgical procedure addressed. Patient understands risks vs benefits of procedure and remains amenable with plan for surgery today. Patient denies N/V/F/chills/SOB/CP.      OBJECTIVE:     Vitals:   /80 (BP Location: Left arm)   Pulse 76   Temp 97.8 °F (36.6 °C) (Oral)   Resp 18   Ht 5' 6\" (1.676 m)   Wt 95.7 kg (211 lb)   SpO2 97%   BMI 34.06 kg/m²     Temp (24hrs), Av.9 °F (36.6 °C), Min:97.7 °F (36.5 °C), Max:98 °F (36.7 °C)      Physical Exam:     General:  Alert, cooperative, and in no distress.  Lower extremity exam:  Cardiovascular status at baseline.  Neurological status at baseline.  Musculoskeletal status at baseline. No calf tenderness noted bilaterally. Dressing left intact to the Operating Room.     Additional Data:     Labs:    Results from last 7 days   Lab Units 11/15/24  0505   WBC Thousand/uL 5.39 " "  HEMOGLOBIN g/dL 8.8*   HEMATOCRIT % 27.8*   PLATELETS Thousands/uL 220   SEGS PCT % 59   LYMPHO PCT % 26   MONO PCT % 9   EOS PCT % 5     Results from last 7 days   Lab Units 11/15/24  0505 11/14/24  1435   POTASSIUM mmol/L 4.3 4.4   CHLORIDE mmol/L 111* 112*   CO2 mmol/L 24 24   BUN mg/dL 22 27*   CREATININE mg/dL 0.82 0.87   CALCIUM mg/dL 9.1 9.5   ALK PHOS U/L  --  88   ALT U/L  --  13   AST U/L  --  12*           * I Have Reviewed All Lab Data Listed Above.    Recent Cultures (last 7 days):               Imaging: I have personally reviewed pertinent films in PACS  EKG, Pathology, and Other Studies: I have personally reviewed pertinent reports.    ** Please Note: Portions of the record may have been created with voice recognition software. Occasional wrong word or \"sound a like\" substitutions may have occurred due to the inherent limitations of voice recognition software. Read the chart carefully and recognize, using context, where substitutions have occurred. **      "

## 2024-11-15 NOTE — ASSESSMENT & PLAN NOTE
Lab Results   Component Value Date    HGBA1C 6.7 (H) 11/14/2024     Patient with fairly well-controlled type 2 diabetes with most recent hemoglobin A1c 6.8  Maintain outpatient on only metformin  Will hold metformin while inpatient  Will decrease Lantus to 10 units at bedtime and discontinue standing insulin with meals as she does not use insulin at home and is high risk for hypoglycemia while n.p.o.  Will place on sliding scale insulin  Will adjust insulin regimen pending glucose trends  (P) [id number]

## 2024-11-15 NOTE — PROGRESS NOTES
Progress Note - Hospitalist   Name: Jillian Arango 69 y.o. female I MRN: [id number]  Unit/Bed#: E2 -01 I Date of Admission: 11/14/2024   Date of Service: 11/15/2024 I Hospital Day: 1    Assessment & Plan  Ankle wound, left, initial encounter  Patient admitted to the podiatry service for scheduled split thickness skin grafting to left ankle wound  S/p preop clerance  Plan for Skin grafting today with podiatry  Patient was recommended heparin drip for chronic DVT pre and postoperatively-will place orders  Diabetes mellitus ([facility])  Lab Results   Component Value Date    HGBA1C 6.7 (H) 11/14/2024     Patient with fairly well-controlled type 2 diabetes with most recent hemoglobin A1c 6.8  Maintain outpatient on only metformin  Will hold metformin while inpatient  Will decrease Lantus to 10 units at bedtime and discontinue standing insulin with meals as she does not use insulin at home and is high risk for hypoglycemia while n.p.o.  Will place on sliding scale insulin  Will adjust insulin regimen pending glucose trends  (P) [id number]    History of urostomy  Ileal conduit in place  Thyroid disorder  Continue daily levothyroxine  Normocytic anemia  History of normocytic anemia  Hemoglobin relatively stable at 9.2  Maintained outpatient on iron every other day  Recheck hemoglobin postoperatively and consider IV iron while inpatient  HTN (hypertension)  BP slightly elevated at time of admission  Maintained outpatient on losartan 25 mg daily, continue while inpatient  Will place IV as needed hydralazine  Class 2 severe obesity with serious comorbidity and body mass index (BMI) of 35.0 to 35.9 in adult ([facility])  Body mass index is 34.06 kg/m².    Chronic deep vein thrombosis (DVT) of femoral vein of right lower extremity ([facility])  Maintain outpatient on Pradaxa for history of DVTs  Provoked DVT 2000 postoperatively-started on Xarelto, additional DVT April 2023 reported as Xarelto failure and was switched to  Pradaxa  Given high risk for recurrent DVT, will start heparin drip preoperatively  Patient did not take her Pradaxa this morning, will start heparin drip once IV access obtained  Resume Pradaxa when cleared by podiatry postoperatively    VTE Pharmacologic Prophylaxis:   Moderate Risk (Score 3-4) - Pharmacological DVT Prophylaxis Ordered: heparin drip.    Mobility:   Basic Mobility Inpatient Raw Score: 17  JH-HLM Goal: 5: Stand one or more mins  JH-HLM Achieved: 6: Walk 10 steps or more  JH-HLM Goal achieved. Continue to encourage appropriate mobility.    Patient Centered Rounds: I performed bedside rounds with nursing staff today.   Discussions with Specialists or Other Care Team Provider: None    Education and Discussions with Family / Patient: Patient declined call to .     Current Length of Stay: 1 day(s)  Current Patient Status: Inpatient   Certification Statement: The patient will continue to require additional inpatient hospital stay due to OR intervention today  Discharge Plan: Anticipate discharge in 24-48 hrs to home.    Code Status: Level 1 - Full Code    Subjective   No acute events overnight.  Denies any significant pain.    Objective :  Temp:  [97.7 °F (36.5 °C)-98 °F (36.7 °C)] 97.8 °F (36.6 °C)  HR:  [63-76] 76  BP: (125-166)/(75-86) 143/80  Resp:  [18] 18  SpO2:  [96 %-99 %] 97 %  O2 Device: None (Room air)    Body mass index is 34.06 kg/m².     Input and Output Summary (last 24 hours):     Intake/Output Summary (Last 24 hours) at 11/15/2024 1039  Last data filed at 11/15/2024 0645  Gross per 24 hour   Intake 240 ml   Output 2650 ml   Net -2410 ml       Physical Exam  Vitals and nursing note reviewed.   Constitutional:       Appearance: Normal appearance.   HENT:      Head: Normocephalic and atraumatic.      Mouth/Throat:      Mouth: Mucous membranes are moist.      Pharynx: Oropharynx is clear. No oropharyngeal exudate.   Eyes:      Extraocular Movements: Extraocular movements intact.    Cardiovascular:      Rate and Rhythm: Normal rate and regular rhythm.      Pulses: Normal pulses.      Heart sounds: Normal heart sounds. No murmur heard.     No friction rub. No gallop.   Pulmonary:      Effort: Pulmonary effort is normal. No respiratory distress.      Breath sounds: Normal breath sounds. No stridor. No wheezing or rales.   Abdominal:      General: Abdomen is flat. Bowel sounds are normal. There is no distension.      Palpations: Abdomen is soft.      Tenderness: There is no abdominal tenderness.   Musculoskeletal:      Right lower leg: No edema.      Left lower leg: No edema.   Skin:     General: Skin is warm and dry.   Neurological:      General: No focal deficit present.      Mental Status: She is alert and oriented to person, place, and time.           Lines/Drains:  Lines/Drains/Airways       Active Status       Name Placement date Placement time Site Days    Urostomy Ureterostomy right RUQ 03/23/21  0900  RUQ  1333                            Lab Results: I have reviewed the following results:   Results from last 7 days   Lab Units 11/15/24  0505   WBC Thousand/uL 5.39   HEMOGLOBIN g/dL 8.8*   HEMATOCRIT % 27.8*   PLATELETS Thousands/uL 220   SEGS PCT % 59   LYMPHO PCT % 26   MONO PCT % 9   EOS PCT % 5     Results from last 7 days   Lab Units 11/15/24  0505 11/14/24  1435   SODIUM mmol/L 141 142   POTASSIUM mmol/L 4.3 4.4   CHLORIDE mmol/L 111* 112*   CO2 mmol/L 24 24   BUN mg/dL 22 27*   CREATININE mg/dL 0.82 0.87   ANION GAP mmol/L 6 6   CALCIUM mg/dL 9.1 9.5   ALBUMIN g/dL  --  3.4*   TOTAL BILIRUBIN mg/dL  --  0.45   ALK PHOS U/L  --  88   ALT U/L  --  13   AST U/L  --  12*   GLUCOSE RANDOM mg/dL 106 128         Results from last 7 days   Lab Units 11/15/24  0611 11/14/24  2106 11/14/24  1545   POC GLUCOSE mg/dl 119 162* 99     Results from last 7 days   Lab Units 11/14/24  1435   HEMOGLOBIN A1C % 6.7*           Recent Cultures (last 7 days):         Imaging Results Review: No pertinent  imaging studies reviewed.  Other Study Results Review: No additional pertinent studies reviewed.    Last 24 Hours Medication List:     Current Facility-Administered Medications:     acetaminophen (TYLENOL) tablet 650 mg, Q6H PRN    gabapentin (NEURONTIN) capsule 100 mg, TID    heparin (porcine) 25,000 units in 0.45% NaCl 250 mL infusion (premix), Titrated, Last Rate: 9.1 Units/kg/hr (11/15/24 0806)    heparin (porcine) injection 2,000 Units, Q6H PRN    heparin (porcine) injection 4,000 Units, Q6H PRN    hydrALAZINE (APRESOLINE) injection 5 mg, Q6H PRN    insulin glargine (LANTUS) subcutaneous injection 10 Units 0.1 mL, Daily With Breakfast    insulin lispro (HumALOG/ADMELOG) 100 units/mL subcutaneous injection 1-6 Units, TID AC **AND** Fingerstick Glucose (POCT), TID AC    levothyroxine tablet 75 mcg, Early Morning    losartan (COZAAR) tablet 25 mg, Daily    pantoprazole (PROTONIX) EC tablet 20 mg, Daily    pramipexole (MIRAPEX) tablet 1 mg, HS    vancomycin (VANCOCIN) IVPB (premix in dextrose) 1,000 mg 200 mL, On Call To OR    Administrative Statements   Today, Patient Was Seen By: Garry Deleon PA-C      **Please Note: This note may have been constructed using a voice recognition system.**

## 2024-11-15 NOTE — PLAN OF CARE
Problem: PAIN - ADULT  Goal: Verbalizes/displays adequate comfort level or baseline comfort level  Description: Interventions:  - Encourage patient to monitor pain and request assistance  - Assess pain using appropriate pain scale  - Administer analgesics based on type and severity of pain and evaluate response  - Implement non-pharmacological measures as appropriate and evaluate response  - Consider cultural and social influences on pain and pain management  - Notify physician/advanced practitioner if interventions unsuccessful or patient reports new pain  Outcome: Progressing     Problem: INFECTION - ADULT  Goal: Absence or prevention of progression during hospitalization  Description: INTERVENTIONS:  - Assess and monitor for signs and symptoms of infection  - Monitor lab/diagnostic results  - Monitor all insertion sites, i.e. indwelling lines, tubes, and drains  - Monitor endotracheal if appropriate and nasal secretions for changes in amount and color  - [place] appropriate cooling/warming therapies per order  - Administer medications as ordered  - Instruct and encourage patient and family to use good hand hygiene technique  - Identify and instruct in appropriate isolation precautions for identified infection/condition  Outcome: Progressing  Goal: Absence of fever/infection during neutropenic period  Description: INTERVENTIONS:  - Monitor WBC    Outcome: Progressing     Problem: SAFETY ADULT  Goal: Patient will remain free of falls  Description: INTERVENTIONS:  - Educate patient/family on patient safety including physical limitations  - Instruct patient to call for assistance with activity   - Consult OT/PT to assist with strengthening/mobility   - Keep Call bell within reach  - Keep bed low and locked with side rails adjusted as appropriate  - Keep care items and personal belongings within reach  - Initiate and maintain comfort rounds  - Make Fall Risk Sign visible to staff  - Offer Toileting every 2 Hours,  in advance of need  - Initiate/Maintain bed alarm  - Obtain necessary fall risk management equipment:   - Apply yellow socks and bracelet for high fall risk patients  - Consider moving patient to room near nurses station  Outcome: Progressing  Goal: Maintain or return to baseline ADL function  Description: INTERVENTIONS:  -  Assess patient's ability to carry out ADLs; assess patient's baseline for ADL function and identify physical deficits which impact ability to perform ADLs (bathing, care of mouth/teeth, toileting, grooming, dressing, etc.)  - Assess/evaluate cause of self-care deficits   - Assess range of motion  - Assess patient's mobility; develop plan if impaired  - Assess patient's need for assistive devices and provide as appropriate  - Encourage maximum independence but intervene and supervise when necessary  - Involve family in performance of ADLs  - Assess for home care needs following discharge   - Consider OT consult to assist with ADL evaluation and planning for discharge  - Provide patient education as appropriate  Outcome: Progressing  Goal: Maintains/Returns to pre admission functional level  Description: INTERVENTIONS:  - Perform AM-PAC 6 Click Basic Mobility/ Daily Activity assessment daily.  - Set and communicate daily mobility goal to care team and patient/family/caregiver.   - Collaborate with rehabilitation services on mobility goals if consulted  - Perform Range of Motion 3 times a day.  - Reposition patient every 2 hours.  - Dangle patient 3 times a day  - Stand patient 3 times a day  - Ambulate patient 3 times a day  - Out of bed to chair 3 times a day   - Out of bed for meals 3 times a day  - Out of bed for toileting  - Record patient progress and toleration of activity level   Outcome: Progressing     Problem: DISCHARGE PLANNING  Goal: Discharge to home or other facility with appropriate resources  Description: INTERVENTIONS:  - Identify barriers to discharge w/patient and caregiver  -  Arrange for needed discharge resources and transportation as appropriate  - Identify discharge learning needs (meds, wound care, etc.)  - Arrange for interpretive services to assist at discharge as needed  - Refer to Case Management Department for coordinating discharge planning if the patient needs post-hospital services based on physician/advanced practitioner order or complex needs related to functional status, cognitive ability, or social support system  Outcome: Progressing     Problem: Knowledge Deficit  Goal: Patient/family/caregiver demonstrates understanding of disease process, treatment plan, medications, and discharge instructions  Description: Complete learning assessment and assess knowledge base.  Interventions:  - Provide teaching at level of understanding  - Provide teaching via preferred learning methods  Outcome: Progressing    Problem: HEMATOLOGIC - ADULT  Goal: Maintains hematologic stability  Description: INTERVENTIONS  - Assess for signs and symptoms of bleeding or hemorrhage  - Monitor labs  - Administer supportive blood products/factors as ordered and appropriate  Outcome: Progressing     Problem: MUSCULOSKELETAL - ADULT  Goal: Maintain or return mobility to safest level of function  Description: INTERVENTIONS:  - Assess patient's ability to carry out ADLs; assess patient's baseline for ADL function and identify physical deficits which impact ability to perform ADLs (bathing, care of mouth/teeth, toileting, grooming, dressing, etc.)  - Assess/evaluate cause of self-care deficits   - Assess range of motion  - Assess patient's mobility  - Assess patient's need for assistive devices and provide as appropriate  - Encourage maximum independence but intervene and supervise when necessary  - Involve family in performance of ADLs  - Assess for home care needs following discharge   - Consider OT consult to assist with ADL evaluation and planning for discharge  - Provide patient education as  appropriate  Outcome: Progressing  Goal: Maintain proper alignment of affected body part  Description: INTERVENTIONS:  - Support, maintain and protect limb and body alignment  - Provide patient/ family with appropriate education  Outcome: Progressing

## 2024-11-15 NOTE — UTILIZATION REVIEW
Initial Clinical Review    Admission: Date/Time/Statement:   Admission Orders (From admission, onward)       Ordered        11/14/24 1351  INPATIENT ADMISSION  Once                          Orders Placed This Encounter   Procedures    INPATIENT ADMISSION     Standing Status:   Standing     Number of Occurrences:   1     Level of Care:   Med Surg [16]     Estimated length of stay:   More than 2 Midnights     Certification:   I certify that inpatient services are medically necessary for this patient for a duration of greater than two midnights. See H&P and MD Progress Notes for additional information about the patient's course of treatment.         Initial Presentation: 69 y.o. female  directly admitted    in preparation for  STSG surgery  to cover left ankle wound.  Wound present for about 1  year.  Has not been able to close.  Has constant nerve pain  to left lateral foot  and ankle.  PMH is  Dme, PAD, HTN, chronic  DVT  RLE  and endometrial cancer.Admit  Ip with  L posterior medial ankle wound  and plan is   surgical intervention  11/15, local wound care, pain control and   IV  heparin.    IM consult  Elevated surgical risk.   METs  >  4.    Able to proceed with surgery.      Anticipated Length of Stay/Certification Statement:   Patient requires >2 midnight stay for split thickness skin graft surgery.     Date:   11/15   Day 2:   Surgery  Procedure(s) (LRB):  Left ankle debridement w/ harvesting of split-thickness skin graft left lower leg for application to left ankle wound. (Left)         Scheduled Medications:  gabapentin, 100 mg, Oral, TID  insulin glargine, 10 Units, Subcutaneous, Daily With Breakfast  insulin lispro, 1-6 Units, Subcutaneous, TID AC  levothyroxine, 75 mcg, Oral, Early Morning  losartan, 25 mg, Oral, Daily  pantoprazole, 20 mg, Oral, Daily  pramipexole, 1 mg, Oral, HS  vancomycin, 1,000 mg, Intravenous, On Call To OR      Continuous IV Infusions:  dextrose 5 % and sodium chloride 0.9 %, 50  mL/hr, Intravenous, Continuous  heparin (porcine), 3-20 Units/kg/hr (Order-Specific), Intravenous, Titrated      PRN Meds:  acetaminophen, 650 mg, Oral, Q6H PRN  heparin (porcine), 2,000 Units, Intravenous, Q6H PRN  heparin (porcine), 4,000 Units, Intravenous, Q6H PRN  hydrALAZINE, 5 mg, Intravenous, Q6H PRN      ED Triage Vitals [11/14/24 1319]   Temperature Pulse Respirations Blood Pressure SpO2 Pain Score   97.7 °F (36.5 °C) 66 18 166/79 99 % 8     Weight (last 2 days)       Date/Time Weight    11/14/24 1319 95.7 (211)            Vital Signs (last 3 days)       Date/Time Temp Pulse Resp BP MAP (mmHg) SpO2 O2 Device Patient Position - Orthostatic VS Pain    11/15/24 0841 -- -- -- -- -- -- -- -- 1    11/15/24 0705 97.8 °F (36.6 °C) 76 18 143/80 96 97 % None (Room air) Lying --    11/15/24 0636 -- -- -- -- -- -- -- -- 5    11/14/24 2324 97.9 °F (36.6 °C) 67 18 125/75 90 96 % None (Room air) Lying --    11/14/24 2300 -- -- -- -- -- -- -- -- No Pain    11/14/24 1440 98 °F (36.7 °C) 63 18 159/86 109 98 % None (Room air) Lying --    11/14/24 1319 97.7 °F (36.5 °C) 66 18 166/79 111 99 % None (Room air) Sitting 8              Pertinent Labs/Diagnostic Test Results:   Radiology:    Results from last 7 days   Lab Units 11/15/24  0505 11/14/24  1435   WBC Thousand/uL 5.39 6.83   HEMOGLOBIN g/dL 8.8* 9.2*   HEMATOCRIT % 27.8* 29.6*   PLATELETS Thousands/uL 220 236   TOTAL NEUT ABS Thousands/µL 3.15 4.08         Results from last 7 days   Lab Units 11/15/24  0505 11/14/24  1435   SODIUM mmol/L 141 142   POTASSIUM mmol/L 4.3 4.4   CHLORIDE mmol/L 111* 112*   CO2 mmol/L 24 24   ANION GAP mmol/L 6 6   BUN mg/dL 22 27*   CREATININE mg/dL 0.82 0.87   EGFR ml/min/1.73sq m 74 69   CALCIUM mg/dL 9.1 9.5     Results from last 7 days   Lab Units 11/14/24  1435   AST U/L 12*   ALT U/L 13   ALK PHOS U/L 88   TOTAL PROTEIN g/dL 6.4   ALBUMIN g/dL 3.4*   TOTAL BILIRUBIN mg/dL 0.45     Results from last 7 days   Lab Units 11/15/24  1118  11/15/24  0611 11/14/24  2106 11/14/24  1545   POC GLUCOSE mg/dl 89 119 162* 99     Results from last 7 days   Lab Units 11/15/24  0505 11/14/24  1435   GLUCOSE RANDOM mg/dL 106 128         Results from last 7 days   Lab Units 11/14/24  1435   HEMOGLOBIN A1C % 6.7*   EAG mg/dl 146           Results from last 7 days   Lab Units 11/15/24  1042 11/15/24  0341 11/14/24  2118   PTT seconds 52* 59* 67*                 Present on Admission:   Ankle wound, left, initial encounter   HTN (hypertension)   Thyroid disorder   Diabetes mellitus (HCC)   PAD (peripheral artery disease) (HCC)   Chronic deep vein thrombosis (DVT) of femoral vein of right lower extremity (HCC)   Normocytic anemia      Admitting Diagnosis: Postoperative wound dehiscence, subsequent encounter  Age/Sex: 69 y.o. female    Network Utilization Review Department  ATTENTION: Please call with any questions or concerns to [phone] and carefully listen to the prompts so that you are directed to the right person. All voicemails are confidential.   For Discharge needs, contact Care Management DC Support Team at [phone] opt. 2  Send all requests for admission clinical reviews, approved or denied determinations and any other requests to dedicated fax number below belonging to the campus where the patient is receiving treatment. List of dedicated fax numbers for the Facilities:  FACILITY NAME UR FAX NUMBER   ADMISSION DENIALS (Administrative/Medical Necessity) [phone]   DISCHARGE SUPPORT TEAM (NETWORK) [phone]   PARENT CHILD HEALTH (Maternity/NICU/Pediatrics) [phone]   [facility] [phone]   [facility] [phone]   [facility] [phone]   [facility] [phone]   Pending sale to [facility] [phone]   [facility] [phone]   [facility] [phone]   [facility]  [facility] [phone]   [facility] [phone]   Pending sale to [facility] [phone]   General acute hospital [phone]   [facility] [phone]

## 2024-11-15 NOTE — PLAN OF CARE
Problem: PAIN - ADULT  Goal: Verbalizes/displays adequate comfort level or baseline comfort level  Description: Interventions:  - Encourage patient to monitor pain and request assistance  - Assess pain using appropriate pain scale  - Administer analgesics based on type and severity of pain and evaluate response  - Implement non-pharmacological measures as appropriate and evaluate response  - Consider cultural and social influences on pain and pain management  - Notify physician/advanced practitioner if interventions unsuccessful or patient reports new pain  Outcome: Progressing     Problem: INFECTION - ADULT  Goal: Absence or prevention of progression during hospitalization  Description: INTERVENTIONS:  - Assess and monitor for signs and symptoms of infection  - Monitor lab/diagnostic results  - Monitor all insertion sites, i.e. indwelling lines, tubes, and drains  - Monitor endotracheal if appropriate and nasal secretions for changes in amount and color  - [place] appropriate cooling/warming therapies per order  - Administer medications as ordered  - Instruct and encourage patient and family to use good hand hygiene technique  - Identify and instruct in appropriate isolation precautions for identified infection/condition  Outcome: Progressing     Problem: SAFETY ADULT  Goal: Patient will remain free of falls  Description: INTERVENTIONS:  - Educate patient/family on patient safety including physical limitations  - Instruct patient to call for assistance with activity   - Consult OT/PT to assist with strengthening/mobility   - Keep Call bell within reach  - Keep bed low and locked with side rails adjusted as appropriate  - Keep care items and personal belongings within reach  - Initiate and maintain comfort rounds  - Make Fall Risk Sign visible to staff  - Offer Toileting every 2 Hours, in advance of need  - Initiate/Maintain bedalarm  - Obtain necessary fall risk management equipment: non slip socks, call bell    - Apply yellow socks and bracelet for high fall risk patients  - Consider moving patient to room near nurses station  Outcome: Progressing     Problem: DISCHARGE PLANNING  Goal: Discharge to home or other facility with appropriate resources  Description: INTERVENTIONS:  - Identify barriers to discharge w/patient and caregiver  - Arrange for needed discharge resources and transportation as appropriate  - Identify discharge learning needs (meds, wound care, etc.)  - Arrange for interpretive services to assist at discharge as needed  - Refer to Case Management Department for coordinating discharge planning if the patient needs post-hospital services based on physician/advanced practitioner order or complex needs related to functional status, cognitive ability, or social support system  Outcome: Progressing

## 2024-11-15 NOTE — ASSESSMENT & PLAN NOTE
Patient admitted to the podiatry service for scheduled split thickness skin grafting to left ankle wound  S/p preop clerance  Plan for Skin grafting today with podiatry  Patient was recommended heparin drip for chronic DVT pre and postoperatively-will place orders

## 2024-11-16 LAB
ANION GAP SERPL CALCULATED.3IONS-SCNC: 6 MMOL/L (ref 4–13)
APTT PPP: 49 SECONDS (ref 23–34)
BASOPHILS # BLD MANUAL: 0 THOUSAND/UL (ref 0–0.1)
BASOPHILS NFR MAR MANUAL: 0 % (ref 0–1)
BUN SERPL-MCNC: 15 MG/DL (ref 5–25)
CALCIUM SERPL-MCNC: 9.3 MG/DL (ref 8.4–10.2)
CHLORIDE SERPL-SCNC: 109 MMOL/L (ref 96–108)
CO2 SERPL-SCNC: 24 MMOL/L (ref 21–32)
CREAT SERPL-MCNC: 0.81 MG/DL (ref 0.6–1.3)
EOSINOPHIL # BLD MANUAL: 0 THOUSAND/UL (ref 0–0.4)
EOSINOPHIL NFR BLD MANUAL: 0 % (ref 0–6)
ERYTHROCYTE [DISTWIDTH] IN BLOOD BY AUTOMATED COUNT: 14.6 % (ref 11.6–15.1)
GFR SERPL CREATININE-BSD FRML MDRD: 75 ML/MIN/1.73SQ M
GLUCOSE SERPL-MCNC: 146 MG/DL (ref 65–140)
GLUCOSE SERPL-MCNC: 156 MG/DL (ref 65–140)
GLUCOSE SERPL-MCNC: 159 MG/DL (ref 65–140)
GLUCOSE SERPL-MCNC: 160 MG/DL (ref 65–140)
GLUCOSE SERPL-MCNC: 211 MG/DL (ref 65–140)
HCT VFR BLD AUTO: 32.8 % (ref 34.8–46.1)
HGB BLD-MCNC: 10.1 G/DL (ref 11.5–15.4)
LYMPHOCYTES # BLD AUTO: 0.26 THOUSAND/UL (ref 0.6–4.47)
LYMPHOCYTES # BLD AUTO: 5 % (ref 14–44)
MCH RBC QN AUTO: 29.2 PG (ref 26.8–34.3)
MCHC RBC AUTO-ENTMCNC: 30.8 G/DL (ref 31.4–37.4)
MCV RBC AUTO: 95 FL (ref 82–98)
MONOCYTES # BLD AUTO: 0 THOUSAND/UL (ref 0–1.22)
MONOCYTES NFR BLD: 0 % (ref 4–12)
NEUTROPHILS # BLD MANUAL: 5.01 THOUSAND/UL (ref 1.85–7.62)
NEUTS SEG NFR BLD AUTO: 95 % (ref 43–75)
PLATELET # BLD AUTO: 237 THOUSANDS/UL (ref 149–390)
PLATELET BLD QL SMEAR: ADEQUATE
PMV BLD AUTO: 9.3 FL (ref 8.9–12.7)
POTASSIUM SERPL-SCNC: 4.1 MMOL/L (ref 3.5–5.3)
RBC # BLD AUTO: 3.46 MILLION/UL (ref 3.81–5.12)
RBC MORPH BLD: NORMAL
SODIUM SERPL-SCNC: 139 MMOL/L (ref 135–147)
WBC # BLD AUTO: 5.27 THOUSAND/UL (ref 4.31–10.16)

## 2024-11-16 PROCEDURE — 80048 BASIC METABOLIC PNL TOTAL CA: CPT

## 2024-11-16 PROCEDURE — 82948 REAGENT STRIP/BLOOD GLUCOSE: CPT

## 2024-11-16 PROCEDURE — 85007 BL SMEAR W/DIFF WBC COUNT: CPT

## 2024-11-16 PROCEDURE — 85027 COMPLETE CBC AUTOMATED: CPT

## 2024-11-16 PROCEDURE — 99232 SBSQ HOSP IP/OBS MODERATE 35: CPT | Performed by: PHYSICIAN ASSISTANT

## 2024-11-16 PROCEDURE — 97163 PT EVAL HIGH COMPLEX 45 MIN: CPT

## 2024-11-16 PROCEDURE — 85730 THROMBOPLASTIN TIME PARTIAL: CPT

## 2024-11-16 PROCEDURE — 97167 OT EVAL HIGH COMPLEX 60 MIN: CPT

## 2024-11-16 PROCEDURE — 99024 POSTOP FOLLOW-UP VISIT: CPT | Performed by: PODIATRIST

## 2024-11-16 RX ORDER — LIDOCAINE 50 MG/G
1 PATCH TOPICAL DAILY
Status: DISCONTINUED | OUTPATIENT
Start: 2024-11-17 | End: 2024-11-16

## 2024-11-16 RX ORDER — DABIGATRAN ETEXILATE 150 MG/1
150 CAPSULE ORAL EVERY 12 HOURS SCHEDULED
Status: DISCONTINUED | OUTPATIENT
Start: 2024-11-16 | End: 2024-11-17 | Stop reason: HOSPADM

## 2024-11-16 RX ORDER — LIDOCAINE 50 MG/G
1 PATCH TOPICAL DAILY
Status: DISCONTINUED | OUTPATIENT
Start: 2024-11-16 | End: 2024-11-17 | Stop reason: HOSPADM

## 2024-11-16 RX ADMIN — OXYCODONE 5 MG: 5 TABLET ORAL at 21:26

## 2024-11-16 RX ADMIN — GABAPENTIN 100 MG: 100 CAPSULE ORAL at 08:23

## 2024-11-16 RX ADMIN — GABAPENTIN 100 MG: 100 CAPSULE ORAL at 16:06

## 2024-11-16 RX ADMIN — DABIGATRAN ETEXILATE MESYLATE 150 MG: 150 CAPSULE ORAL at 21:25

## 2024-11-16 RX ADMIN — OXYCODONE 5 MG: 5 TABLET ORAL at 02:38

## 2024-11-16 RX ADMIN — INSULIN LISPRO 1 UNITS: 100 INJECTION, SOLUTION INTRAVENOUS; SUBCUTANEOUS at 08:23

## 2024-11-16 RX ADMIN — LEVOTHYROXINE SODIUM 75 MCG: 75 TABLET ORAL at 05:20

## 2024-11-16 RX ADMIN — HEPARIN SODIUM 2000 UNITS: 1000 INJECTION INTRAVENOUS; SUBCUTANEOUS at 03:44

## 2024-11-16 RX ADMIN — DABIGATRAN ETEXILATE MESYLATE 150 MG: 150 CAPSULE ORAL at 09:03

## 2024-11-16 RX ADMIN — LOSARTAN POTASSIUM 25 MG: 25 TABLET, FILM COATED ORAL at 08:23

## 2024-11-16 RX ADMIN — PRAMIPEXOLE DIHYDROCHLORIDE 1 MG: 1 TABLET ORAL at 21:26

## 2024-11-16 RX ADMIN — LIDOCAINE 1 PATCH: 50 PATCH TOPICAL at 21:25

## 2024-11-16 RX ADMIN — GABAPENTIN 100 MG: 100 CAPSULE ORAL at 21:26

## 2024-11-16 RX ADMIN — PANTOPRAZOLE SODIUM 20 MG: 20 TABLET, DELAYED RELEASE ORAL at 08:23

## 2024-11-16 RX ADMIN — OXYCODONE 5 MG: 5 TABLET ORAL at 10:48

## 2024-11-16 RX ADMIN — INSULIN GLARGINE 10 UNITS: 100 INJECTION, SOLUTION SUBCUTANEOUS at 08:23

## 2024-11-16 RX ADMIN — INSULIN LISPRO 1 UNITS: 100 INJECTION, SOLUTION INTRAVENOUS; SUBCUTANEOUS at 11:43

## 2024-11-16 NOTE — PLAN OF CARE
Problem: PHYSICAL THERAPY ADULT  Goal: Performs mobility at highest level of function for planned discharge setting.  See evaluation for individualized goals.  Description: Treatment/Interventions: Functional transfer training, LE strengthening/ROM, Therapeutic exercise, Endurance training, Patient/family training, Equipment eval/education, Bed mobility, Gait training, Continued evaluation, Spoke to nursing, OT  Equipment Recommended: Wheelchair       See flowsheet documentation for full assessment, interventions and recommendations.  Note: Prognosis: Fair  Problem List: Decreased strength, Decreased endurance, Impaired balance, Decreased mobility, Decreased coordination, Impaired judgement, Decreased safety awareness, Obesity, Decreased skin integrity, Pain (NWB LLE)  Assessment: Pt is a 70 yo female admitted to [facility] 2* s/p L ankle debridement with harvesting of split thickness skin graft LLE. pt lives with  in ranch style home, (+)[place], reports no recent falls, (I) with mobility no DME,ADLs and IADLs PTA,(+)driving. pt currently is not at functional mobility baseline, needs significant Ax2 for mobility at this time,multiple lines, NWB LLE, recent surgical procedure. Pt demonstrates moderate deficits during functional mobility including dec strength BLE (LLE>RLE), pain, dec endurance, dec balance, dec ability to maintain NWB LLE during mobility, use of new DME (RW) for mobility and needs modAx2 for transfers and swiveling gait pattern with use of RW, S for bed mobility. Pt would cont to benefit from skilled inpt PT services to maximize functional independence and to dec caregiver burden upon being DC from the hospital.  Barriers to Discharge: Decreased caregiver support, Inaccessible home environment (pt reports  works during the day and (+)KATER[place])     Rehab Resource Intensity Level, PT: I (Maximum Resource Intensity)    See flowsheet documentation for full assessment.

## 2024-11-16 NOTE — ASSESSMENT & PLAN NOTE
Maintain outpatient on Pradaxa for history of DVTs  Provoked DVT 2000 postoperatively-started on Xarelto, additional DVT April 2023 reported as Xarelto failure and was switched to Pradaxa  Given high risk for recurrent DVT, will start heparin drip preoperatively  Pradaxa resumed

## 2024-11-16 NOTE — PROGRESS NOTES
[facility] Podiatry - Progress Note  Patient: Jillian Arango 69 y.o. female   MRN: [id number]  PCP: Augustine Lloyd MD  Unit/Bed#: E2 MS Natalee-01 Encounter: [id number]  Date Of Visit: 11/16/24    ASSESSMENT:    Jillian Arango is a 69 y.o. female with:    L posterior medial ankle wound   - s/p left lower extremity wound debridement, STSG placement (DOS: 11/15/24)  T2DM  Chronic DVT Right lower extremity  PAD  HTN    PLAN:    Patient POD1 s/p left lower extremity wound debridement, STSG placement.  Outer posterior splint dressing removed, immobilization now maintained with CAM boot on LLE. Inner post-surgical dressing was left intact today.  Pain is well controlled. Continue current pain management regimen.  Elevation on green foam wedges or pillows when non-ambulatory  Reviewed labs/vitals. Hemoglobin stable, remains afebrile post operatively  Discontinue heparin gtt, resume pradaxa per [facility]IM recommendations  Appreciate PT evaluation, recommend maximum resource intensity however patient refusing rehab at this time. Will follow up PT session tomorrow with change in WB status  Appreciate consulting services for recommendations and management.    Antibiotics started: None  Pharmacologic VTE Prophylaxis: VTE covered by:  dabigatran etexilate, Oral, 150 mg at 11/16/24 0903      Mechanical VTE Prophylaxis: sequential compression device   Weightbearing status: Partial weightbearing to left foot in CAM boot    Disposition: Patient will require continued inpatient stay for the above, likely discharge in 24 hours pending PT session and repeat evaluation with change in WB status    SUBJECTIVE:     The patient was seen, evaluated, and assessed at bedside today. The patient was awake, alert, and in no acute distress. No acute events overnight. The patient reports minimal pain at this time. Pain is well controlled with current pain management regimen. Patient reports normal appetite. Patient denies  "N/V/F/chills/SOB/CP.      OBJECTIVE:     Vitals:   /65 (BP Location: Right arm)   Pulse 59   Temp 97.6 °F (36.4 °C) (Temporal)   Resp 16   Ht 5' 6\" (1.676 m)   Wt 95.7 kg (211 lb)   SpO2 99%   BMI 34.06 kg/m²     Temp (24hrs), Av.9 °F (36.6 °C), Min:97.5 °F (36.4 °C), Max:98.8 °F (37.1 °C)      Physical Exam:     General:  Alert, cooperative, and in no distress.  Lungs: Non labored breathing  Abdomen: Soft, non-tender.  Lower extremity exam:  Cardiovascular status at baseline.  Neurological status at baseline.  S/p LLE wound debridement with STSG. No calf tenderness noted.    Dermatological:    Left lower extremity: Dressings remain clean, dry and intact. No ascending erythema, edema or strike through noted. Posterior splint removed, placed CAM boot on left lower extremity    Additional Data:     Labs:    Results from last 7 days   Lab Units 24  0512 11/15/24  0505   WBC Thousand/uL 5.27 5.39   HEMOGLOBIN g/dL 10.1* 8.8*   HEMATOCRIT % 32.8* 27.8*   PLATELETS Thousands/uL 237 220   SEGS PCT %  --  59   LYMPHO PCT % 5* 26   MONO PCT % 0* 9   EOS PCT % 0 5     Results from last 7 days   Lab Units 24  0512 11/15/24  0505 24  1435   POTASSIUM mmol/L 4.1   < > 4.4   CHLORIDE mmol/L 109*   < > 112*   CO2 mmol/L 24   < > 24   BUN mg/dL 15   < > 27*   CREATININE mg/dL 0.81   < > 0.87   CALCIUM mg/dL 9.3   < > 9.5   ALK PHOS U/L  --   --  88   ALT U/L  --   --  13   AST U/L  --   --  12*    < > = values in this interval not displayed.           * I Have Reviewed All Lab Data Listed Above.    Recent Cultures (last 7 days):               Imaging: I have personally reviewed pertinent films in PACS  EKG, Pathology, and Other Studies: I have personally reviewed pertinent reports.      ** Please Note: Portions of the record may have been created with voice recognition software. Occasional wrong word or \"sound a like\" substitutions may have occurred due to the inherent limitations of voice " recognition software. Read the chart carefully and recognize, using context, where substitutions have occurred. **

## 2024-11-16 NOTE — ASSESSMENT & PLAN NOTE
Patient admitted to the podiatry service for scheduled split thickness skin grafting to left ankle wound  S/p preop clerance  Status post skin grafting with podiatry on 11/15/2023, without complications  Temporarily on heparin drip, transition back to Pradaxa today

## 2024-11-16 NOTE — PLAN OF CARE
Problem: PAIN - ADULT  Goal: Verbalizes/displays adequate comfort level or baseline comfort level  Description: Interventions:  - Encourage patient to monitor pain and request assistance  - Assess pain using appropriate pain scale  - Administer analgesics based on type and severity of pain and evaluate response  - Implement non-pharmacological measures as appropriate and evaluate response  - Consider cultural and social influences on pain and pain management  - Notify physician/advanced practitioner if interventions unsuccessful or patient reports new pain  Outcome: Progressing     Problem: INFECTION - ADULT  Goal: Absence or prevention of progression during hospitalization  Description: INTERVENTIONS:  - Assess and monitor for signs and symptoms of infection  - Monitor lab/diagnostic results  - Monitor all insertion sites, i.e. indwelling lines, tubes, and drains  - Monitor endotracheal if appropriate and nasal secretions for changes in amount and color  - [place] appropriate cooling/warming therapies per order  - Administer medications as ordered  - Instruct and encourage patient and family to use good hand hygiene technique  - Identify and instruct in appropriate isolation precautions for identified infection/condition  Outcome: Progressing     Problem: SAFETY ADULT  Goal: Patient will remain free of falls  Description: INTERVENTIONS:  - Educate patient/family on patient safety including physical limitations  - Instruct patient to call for assistance with activity   - Consult OT/PT to assist with strengthening/mobility   - Keep Call bell within reach  - Keep bed low and locked with side rails adjusted as appropriate  - Keep care items and personal belongings within reach  - Initiate and maintain comfort rounds  - Make Fall Risk Sign visible to staff  - Offer Toileting every 2 Hours, in advance of need  - Initiate/Maintain bed alarm  - Obtain necessary fall risk management equipment: non slip socks, call  bell  - Apply yellow socks and bracelet for high fall risk patients  - Consider moving patient to room near nurses station  Outcome: Progressing     Problem: DISCHARGE PLANNING  Goal: Discharge to home or other facility with appropriate resources  Description: INTERVENTIONS:  - Identify barriers to discharge w/patient and caregiver  - Arrange for needed discharge resources and transportation as appropriate  - Identify discharge learning needs (meds, wound care, etc.)  - Arrange for interpretive services to assist at discharge as needed  - Refer to Case Management Department for coordinating discharge planning if the patient needs post-hospital services based on physician/advanced practitioner order or complex needs related to functional status, cognitive ability, or social support system  Outcome: Progressing

## 2024-11-16 NOTE — PLAN OF CARE
Problem: OCCUPATIONAL THERAPY ADULT  Goal: Performs self-care activities at highest level of function for planned discharge setting.  See evaluation for individualized goals.  Description: Treatment Interventions: ADL retraining, Functional transfer training, UE strengthening/ROM, Endurance training, Cognitive reorientation, Equipment evaluation/education, Patient/family training, Neuromuscular reeducation, Compensatory technique education, Continued evaluation, Energy conservation, Activityengagement  Equipment Recommended: Bedside commode, Other (comment) (w/c)       See flowsheet documentation for full assessment, interventions and recommendations.   Note: Limitation: Decreased ADL status, Decreased UE strength, Decreased Safe judgement during ADL, Decreased endurance, Decreased self-care trans, Decreased high-level ADLs (dec balance, functional reach, coordination)  Prognosis: Good  Assessment: Patient is a 69 y.o. year old female seen for OT eval s/p admit to [facility] on 11/14/2024 with L ankle wound s/p Left ankle debridement w/ harvesting of split-thickness skin graft left lower leg for application to left ankle wound - NWB to LLE.  OT consulted to assess ADLs/IADLs/functional mobility and assist w/ D/C planning. Patient demonstrates the following deficits impacting occupational performance: decreased strength , decreased balance, decreased activity tolerance, limited functional reach, impaired GMC, impaired problem solving, decreased safety awareness, increased pain, orthopedic restrictions, increased body habitus , impaired coordination, decreased cardiovascular endurance, and decreased skin integrity . These impairments, as well at pt’s [place] home environment, limited home support, difficulty performing ADLs, difficulty performing IADLs, difficulty performing transfers/mobility, limited insight into deficits, compliance, decreased initiation and engagement, fall risk , functional decline , new use of AD for  functional transfers/mobility, multiple admissions , inability to perform caregiver duties , and Inability to perform current job functions , limit pt’s ability to safely engage in all baseline areas of occupation. Pt's CLOF as follows: eating/grooming: Danay and supervision , UB ADLs: supervision , LB ADLs: Misty and modA, toileting: modA, bed mobility: supervision , functional transfers: modAx2, functional mobility: modAx2. Pt would benefit from continued skilled OT while in acute setting to address deficits as defined above and to maximize (I) w/ ADLs/functional mobility. Occupational performance areas to address include: grooming, bathing/shower, toilet hygiene, dressing, medication management, health maintenance, functional mobility, community mobility, clothing management, cleaning, meal prep, and household maintenance. Based on the aforementioned evaluation, functional performance deficits, and assessments, pt has been identified as a high complexity evaluation. At this time, recommendation for pt to receive post-acute rehabilitation services at a Level I (maximum resource intensity) due to above deficits and CLOF. OT will continue to follow pt 3-5x/wk to address the goals listed below to  w/in 10-14 days.     Rehab Resource Intensity Level, OT: I (Maximum Resource Intensity)

## 2024-11-16 NOTE — ASSESSMENT & PLAN NOTE
Lab Results   Component Value Date    HGBA1C 6.7 (H) 11/14/2024     Patient with fairly well-controlled type 2 diabetes with most recent hemoglobin A1c 6.8  Maintain outpatient on only metformin  Will hold metformin while inpatient  Will decrease Lantus to 10 units at bedtime and discontinue standing insulin with meals as she does not use insulin at home and is high risk for hypoglycemia while n.p.o.  Will place on sliding scale insulin  Will adjust insulin regimen pending glucose trends  (P) 137

## 2024-11-16 NOTE — PROGRESS NOTES
Progress Note - Hospitalist   Name: Jillian Arango 69 y.o. female I MRN: [id number]  Unit/Bed#: E2 -01 I Date of Admission: 11/14/2024   Date of Service: 11/16/2024 I Hospital Day: 2    Assessment & Plan  Ankle wound, left, initial encounter  Patient admitted to the podiatry service for scheduled split thickness skin grafting to left ankle wound  S/p preop clerance  Status post skin grafting with podiatry on 11/15/2023, without complications  Temporarily on heparin drip, transition back to Pradaxa today  Diabetes mellitus ([facility])  Lab Results   Component Value Date    HGBA1C 6.7 (H) 11/14/2024     Patient with fairly well-controlled type 2 diabetes with most recent hemoglobin A1c 6.8  Maintain outpatient on only metformin  Will hold metformin while inpatient  Will decrease Lantus to 10 units at bedtime and discontinue standing insulin with meals as she does not use insulin at home and is high risk for hypoglycemia while n.p.o.  Will place on sliding scale insulin  Will adjust insulin regimen pending glucose trends  (P) 137    History of urostomy  Ileal conduit in place  Thyroid disorder  Continue daily levothyroxine  Normocytic anemia  History of normocytic anemia  Hemoglobin relatively stable at 9.2  Maintained outpatient on iron every other day  Recheck hemoglobin postoperatively and consider IV iron while inpatient  HTN (hypertension)  BP slightly elevated at time of admission  Maintained outpatient on losartan 25 mg daily, continue while inpatient  Will place IV as needed hydralazine  Class 2 severe obesity with serious comorbidity and body mass index (BMI) of 35.0 to 35.9 in adult ([facility])  Body mass index is 34.06 kg/m².    Chronic deep vein thrombosis (DVT) of femoral vein of right lower extremity ([facility])  Maintain outpatient on Pradaxa for history of DVTs  Provoked DVT 2000 postoperatively-started on Xarelto, additional DVT April 2023 reported as Xarelto failure and was switched to Pradaxa  Given high risk  for recurrent DVT, will start heparin drip preoperatively  Pradaxa resumed  PAD (peripheral artery disease) ([facility])  On Pradaxa    VTE Pharmacologic Prophylaxis:   Moderate Risk (Score 3-4) - Pharmacological DVT Prophylaxis Ordered: dabigatran (Pradaxa).    Mobility:   Basic Mobility Inpatient Raw Score: 17  -HLM Goal: 5: Stand one or more mins  JH-HLM Achieved: 3: Sit at edge of bed  JH-HLM Goal NOT achieved. Continue with multidisciplinary rounding and encourage appropriate mobility to improve upon JH-HLM goals.    Patient Centered Rounds: I performed bedside rounds with nursing staff today.   Discussions with Specialists or Other Care Team Provider: None, reviewed podiatry operative note    Education and Discussions with Family / Patient: Patient declined call to .     Current Length of Stay: 2 day(s)  Current Patient Status: Inpatient   Discharge Plan: [facility] is following this patient on consult. They are medically stable for discharge when deemed appropriate by primary service.    Code Status: Level 1 - Full Code    Subjective   No acute events.  Patient underwent skin grafting with podiatry yesterday without any complications.  Her only complaints are lack of sleep, not liking the food, and being stuck for blood work repeatedly.  Transition her to Pradaxa for less frequent blood draws.    Objective :  Temp:  [97.5 °F (36.4 °C)-98.8 °F (37.1 °C)] 97.5 °F (36.4 °C)  HR:  [55-97] 57  BP: (133-161)/(68-87) 155/75  Resp:  [13-18] 16  SpO2:  [92 %-100 %] 100 %  O2 Device: Nasal cannula  Nasal Cannula O2 Flow Rate (L/min):  [2 L/min] 2 L/min    Body mass index is 34.06 kg/m².     Input and Output Summary (last 24 hours):     Intake/Output Summary (Last 24 hours) at 11/16/2024 1043  Last data filed at 11/16/2024 0201  Gross per 24 hour   Intake 1260 ml   Output 3150 ml   Net -1890 ml       Physical Exam  Vitals and nursing note reviewed.   Constitutional:       Appearance: Normal appearance.   HENT:       Head: Normocephalic and atraumatic.      Mouth/Throat:      Mouth: Mucous membranes are moist.      Pharynx: Oropharynx is clear. No oropharyngeal exudate.   Eyes:      Extraocular Movements: Extraocular movements intact.   Cardiovascular:      Rate and Rhythm: Normal rate and regular rhythm.      Pulses: Normal pulses.      Heart sounds: Normal heart sounds. No murmur heard.     No friction rub. No gallop.   Pulmonary:      Effort: Pulmonary effort is normal. No respiratory distress.      Breath sounds: Normal breath sounds. No stridor. No wheezing or rales.   Abdominal:      General: Abdomen is flat. Bowel sounds are normal. There is no distension.      Palpations: Abdomen is soft.      Tenderness: There is no abdominal tenderness.      Comments: Ileal conduit   Musculoskeletal:      Right lower leg: No edema.      Left lower leg: No edema.   Skin:     General: Skin is warm and dry.   Neurological:      General: No focal deficit present.      Mental Status: She is alert and oriented to person, place, and time.           Lines/Drains:  Lines/Drains/Airways       Active Status       Name Placement date Placement time Site Days    Urostomy Ureterostomy right RUQ 03/23/21  0900  RUQ  1334                            Lab Results: I have reviewed the following results:   Results from last 7 days   Lab Units 11/16/24  0512 11/15/24  0505   WBC Thousand/uL 5.27 5.39   HEMOGLOBIN g/dL 10.1* 8.8*   HEMATOCRIT % 32.8* 27.8*   PLATELETS Thousands/uL 237 220   SEGS PCT %  --  59   LYMPHO PCT % 5* 26   MONO PCT % 0* 9   EOS PCT % 0 5     Results from last 7 days   Lab Units 11/16/24  0512 11/15/24  0505 11/14/24  1435   SODIUM mmol/L 139   < > 142   POTASSIUM mmol/L 4.1   < > 4.4   CHLORIDE mmol/L 109*   < > 112*   CO2 mmol/L 24   < > 24   BUN mg/dL 15   < > 27*   CREATININE mg/dL 0.81   < > 0.87   ANION GAP mmol/L 6   < > 6   CALCIUM mg/dL 9.3   < > 9.5   ALBUMIN g/dL  --   --  3.4*   TOTAL BILIRUBIN mg/dL  --   --  0.45   ALK  PHOS U/L  --   --  88   ALT U/L  --   --  13   AST U/L  --   --  12*   GLUCOSE RANDOM mg/dL 211*   < > 128    < > = values in this interval not displayed.         Results from last 7 days   Lab Units 11/16/24  0615 11/15/24  2104 11/15/24  1909 11/15/24  1538 11/15/24  1118 11/15/24  0611 11/14/24  2106 11/14/24  1545   POC GLUCOSE mg/dl 156* 258* 126 87 89 119 162* 99     Results from last 7 days   Lab Units 11/14/24  1435   HEMOGLOBIN A1C % 6.7*           Recent Cultures (last 7 days):         Imaging Results Review: No pertinent imaging studies reviewed.  Other Study Results Review: No additional pertinent studies reviewed.    Last 24 Hours Medication List:     Current Facility-Administered Medications:     acetaminophen (TYLENOL) tablet 650 mg, Q6H PRN    ALPRAZolam (XANAX) tablet 0.25 mg, HS PRN    chlorhexidine (PERIDEX) 0.12 % oral rinse 15 mL, Once    dabigatran etexilate (PRADAXA) capsule 150 mg, Q12H BRETT    gabapentin (NEURONTIN) capsule 100 mg, TID    hydrALAZINE (APRESOLINE) injection 5 mg, Q6H PRN    insulin glargine (LANTUS) subcutaneous injection 10 Units 0.1 mL, Daily With Breakfast    insulin lispro (HumALOG/ADMELOG) 100 units/mL subcutaneous injection 1-6 Units, TID AC **AND** Fingerstick Glucose (POCT), TID AC    levothyroxine tablet 75 mcg, Early Morning    losartan (COZAAR) tablet 25 mg, Daily    naloxone (NARCAN) 0.04 mg/mL syringe 0.04 mg, Q1MIN PRN    oxyCODONE (ROXICODONE) split tablet 2.5 mg, Q4H PRN **OR** oxyCODONE (ROXICODONE) IR tablet 5 mg, Q4H PRN    pantoprazole (PROTONIX) EC tablet 20 mg, Daily    pramipexole (MIRAPEX) tablet 1 mg, HS    Administrative Statements   Today, Patient Was Seen By: Garry Deleon PA-C      **Please Note: This note may have been constructed using a voice recognition system.**

## 2024-11-16 NOTE — PHYSICAL THERAPY NOTE
Physical Therapy Evaluation:    2 forms of pt ID verified:name,birthdate and pt ID [email or web address]    Patient's Name: Jillian Arango    Admitting Diagnosis  Postoperative wound dehiscence, subsequent encounter    Problem List  Patient Active Problem List   Diagnosis    Small bowel obstruction (HCC)    Diabetes mellitus (HCC)    History of urostomy    Endometrial cancer (HCC)    Thyroid disorder    Normocytic anemia    Hydroureteronephrosis    HTN (hypertension)    Migraine    Abdominal adhesions    Class 2 severe obesity with serious comorbidity and body mass index (BMI) of 35.0 to 35.9 in adult (HCC)    Headache    Ureteral-ileal loop anastomotic stricture    Chronic deep vein thrombosis (DVT) of femoral vein of right lower extremity (HCC)    Anti-cardiolipin antibody positive    Encounter for attention to other artificial openings of urinary tract (HCC)    History of smoking    Depression, recurrent (HCC)    PAD (peripheral artery disease) (HCC)    Baker's cyst, ruptured    Cholelithiasis    CHARY (iron deficiency anemia)    Upper respiratory infection    Ankle wound, left, initial encounter       Past Medical History  Past Medical History:   Diagnosis Date    Achilles tendinitis of left lower extremity 02/08/2024    Achilles tendinosis of left ankle 11/27/2023    Anemia     Back pain     Bone infarction (HCC) 06/24/2023    Bowel obstruction (HCC) 2017,2018    Bursitis of posterior heel, left 11/27/2023    Cancer (HCC)     uterine CA radiation destoried urethra.   Patient has urostomy    Cholelithiasis     Colon polyp     Deep vein thrombosis of left lower extremity (HCC) 01/11/2004    on blood thinner for 3 years.    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    Hypercalcemia 10/10/2023    Hypertension     Hypothyroid     In vitro fertilization     Kidney stone     Migraines     Muscle weakness     abdominal    Nausea and vomiting 10/05/2023    Personal history of irradiation     Postoperative wound dehiscence, subsequent encounter 02/08/2024    Renal cyst     Sleep apnea     Resolved due to Weight Loss    Vertigo 10/10/2023       Past Surgical History  Past Surgical History:   Procedure Laterality Date    ACHILLES TENDON REPAIR Right     APPENDECTOMY      COLONOSCOPY      6/6/22    COLONOSCOPY W/ BIOPSIES N/A 12/2015    EGD      HEEL SPUR SURGERY Right 1996    HYSTERECTOMY      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    IR NEPHROSTOMY TUBE PLACEMENT  01/18/2021    LAPAROSCOPIC GASTRIC BANDING N/A 2006    [place], [place]    OTHER SURGICAL HISTORY  2004    Urine shunt to intestine, transverse colon    PCNL Left 02/16/2021    Procedure: NEPHROLITHOTOMY  PERCUTANEOUS (PCNL);  Surgeon: Ziggy Roldan MD;  Location: [facility];  Service: Urology    WI CYSTO/URETERO W/LITHOTRIPSY &INDWELL STENT INSRT Left 02/16/2021    Procedure: anterograde  URETEROSCOPY with dilation of ureteral stricture, INSERTION STENT URETERAL, exchange  of nephrostomy tube;  Surgeon: Ziggy Roldan MD;  Location[facility];  Service: Urology    WI DEBRIDEMENT SUBCUTANEOUS TISSUE 1ST 20 SQ CM/< Left 01/29/2024    Procedure: DEBRIDEMENT LOWER EXTREMITY (WASH OUT) with PHOENIX GRAFT APPLICATION, foot wound;  Surgeon: Darron Frias DPM;  Location:  MAIN OR;  Service: Podiatry    WI LAPAROSCOPY ENTEROLYSIS SEPARATE PROCEDURE N/A 01/17/2019    Procedure: LAPAROSCOPIC LYSIS OF ADHESIONS;  Surgeon: Omar Jack MD;  Location:  MAIN OR;  Service: General    WI REPAIR SECONDARY ACHILLES TENDON W/WO GRAFT Left 11/24/2023    Procedure: REMOVAL OF POSTERIOR HEEL SPUR WITH REPAIR TENDON  ACHILLES;  Surgeon: Darron Frias DPM;  Location:  MAIN OR;  Service: Podiatry    RADICAL HYSTERECTOMY N/A 1999    [facility] endometrial cancer.    SLEEVE GASTROPLASTY N/A 09/2015    Dr. Jack, [facility]    UPPER GASTROINTESTINAL ENDOSCOPY      URETER REVISION  2010    WEILBY THUMB ARTHROPLASTY      WEILBY THUMB ARTHROPLASTY          11/16/24 1025   PT Last Visit   PT Visit Date 11/16/24   Note Type   Note type Evaluation   Pain Assessment   Pain Assessment Tool 0-10   Pain Score 8   Hospital Pain Intervention(s) Repositioned;Ambulation/increased activity;Elevated;Emotional support;Rest   Restrictions/Precautions   Weight Bearing Precautions Per Order Yes   LLE Weight Bearing Per Order NWB  (per podiatry)   Other Precautions Chair Alarm;Bed Alarm;WBS;Multiple lines;Fall Risk;Pain  (body habitus, dec safety awareness,dec ability to maintain NWB % of the time)   Home Living   Type of Home House  (ranch style, 3 [address] with B HR)   Home Layout One level;Stairs to enter with rails;Performs ADLs on one level;Able to live on main level with bedroom/bathroom  (3 [address] with available B HR)   Home Equipment Crutches;Walker;Cane;Grab bars  (knee scooter, RW, SPC, B axillary crutches, GB in BR)   Additional Comments pt lives with  in ranch style home, reports 3 [address],  works during the day and sister lives close by,reports no recent falls and being (I) with mobility,ADL and IADL, (+)driving   Prior Function   Level of [place] Independent with ADLs;Independent with functional mobility;Independent with IADLS   Lives With Spouse   Receives Help From Family   IADLs Independent with driving;Independent with meal prep;Independent with medication management   Falls in the last 6 months 0   General   Additional Pertinent History s/p L ankle debridement with harvesting of split thickness skin graft LLE   Family/Caregiver Present No   Cognition   Overall Cognitive Status Impaired   Arousal/Participation  "Cooperative   Orientation Level Oriented X4   Following Commands Follows one step commands with increased time or repetition   Comments 2* pain and dec ability to maintain NWB % of the time   Subjective   Subjective Pt supine in bed resting comfortably with LLE elevated on pillows; pt willing and agreeable to work with PT and to participate in therapy intervention; \"I think I want to get up, Let's go\".   RLE Assessment   RLE Assessment   (at least 3+/5 grossly throughout)   LLE Assessment   LLE Assessment   (at least 3/5 grossly throughout; L ankle Df and PF not tested)   Coordination   Movements are Fluid and Coordinated 0   Coordination and Movement Description ataxic and unsteady movement pattern, pain, inability to maintain NWB % of the time during mobility and movement   Sensation WFL   Light Touch   RLE Light Touch Grossly intact   LLE Light Touch Grossly intact   Bed Mobility   Supine to Sit 5  Supervision   Transfers   Sit to Stand 3  Moderate assistance   Additional items Assist x 2;Bedrails;Increased time required;Verbal cues  (pt needed constant and max verbal and physical instruction to maintain NWB LLE during mobility and static stand)   Stand to Sit 3  Moderate assistance   Additional items Assist x 2;Armrests;Verbal cues;Impulsive  (A for control descent)   Stand pivot 3  Moderate assistance   Additional items Assist x 2;Increased time required;Verbal cues  (support of RW in front and mod verbal and physical instruction to maintain NWB LLE)   Ambulation/Elevation   Gait pattern   (pt able to swivel on RLE with support of RW in front to get to recliner chair; x4 swivels while trying to maintain NWB LLE needing modAX2)   Gait Assistance 3  Moderate assist   Additional items Assist x 2   Assistive Device Rolling walker   Distance x4 swivel on RLE bed->chair with use of RW on front   Balance   Static Sitting Fair   Dynamic Sitting   (zero)   Static Standing Zero   Dynamic Standing   (zero) "   Ambulatory Zero   Endurance Deficit   Endurance Deficit Yes   Endurance Deficit Description pain ,fatigue, dec activity tolerance, recent surgical procedure   Activity Tolerance   Activity Tolerance Patient limited by fatigue;Patient limited by pain  (fair)   Medical Staff Made Aware CHEKO Peng   Nurse Made Aware yes   Assessment   Prognosis Fair   Problem List Decreased strength;Decreased endurance;Impaired balance;Decreased mobility;Decreased coordination;Impaired judgement;Decreased safety awareness;Obesity;Decreased skin integrity;Pain  (NWB LLE)   Assessment Pt is a 68 yo female admitted to [facility] 2* s/p L ankle debridement with harvesting of split thickness skin graft LLE. pt lives with  in ranch style home, (+)KATERINA, reports no recent falls, (I) with mobility no DME,ADLs and IADLs PTA,(+)driving. pt currently is not at functional mobility baseline, needs significant Ax2 for mobility at this time,multiple lines, NWB LLE, recent surgical procedure. Pt demonstrates moderate deficits during functional mobility including dec strength BLE (LLE>RLE), pain, dec endurance, dec balance, dec ability to maintain NWB LLE during mobility, use of new DME (RW) for mobility and needs modAx2 for transfers and swiveling gait pattern with use of RW, S for bed mobility. Pt would cont to benefit from skilled inpt PT services to maximize functional independence and to dec caregiver burden upon being DC from the hospital.   Barriers to Discharge Decreased caregiver support;Inaccessible home environment  (pt reports  works during the day and (+)KATERINA)   Goals   Patient Goals to be able to walk more   STG Expiration Date 11/26/24   Short Term Goal #1 in 7-10 days: (1) Pt will be able to ambulate greater than 15 feet with use of RW and chair follow while maintaining NWB % of the time on various surfaces needing modAx1 in order to A pt to return to PLOF, (2) activity tolerance:45 mins/45mins, (3) pt will be able to  perform sit to stand transfers needing min level of A to and from various surfaces consistently in order to return to PLOF, (4) pt will be able to perform BM needing mod (I) level of A to A pt to return to PLOF, (5) (I) with BLE therapeutic ex HEP in various positions to A pt to inc balance,strength,mobility,endurance and to A to dec pain, (6) inc balance 1/2 grade in order to dec fall risk, (7) pt will be able to go up and down 3 steps with use of B HR needing modAX1 in order to navigate KATERINA  as able and as needed prior to D/C, (8) cont to provide pt and pt family education for safe D/C planning, (9) inc BLE strength 1/2 to 1 full grade in order to A pt to inc balance,strength,mobility,endurance and to A to dec pain, (10) pt will be able to perform wc mobility greater than 100 feet with use of RLE and BUE on various surfaces needing S level of A and wc management needing min Ax1 in order to navigate longer distances upon being DC from the hospital   PT Treatment Day 0   Plan   Treatment/Interventions Functional transfer training;LE strengthening/ROM;Therapeutic exercise;Endurance training;Patient/family training;Equipment eval/education;Bed mobility;Gait training;Continued evaluation;Spoke to nursing;OT   PT Frequency 3-5x/wk   Discharge Recommendation   Rehab Resource Intensity Level, PT Level I   Equipment Recommended Wheelchair   Wheelchair Package Recommended Standard   Change or Add item to Wheelchair Package? No   AM-PAC Basic Mobility Inpatient   Turning in Flat Bed Without Bedrails 4   Lying on Back to Sitting on Edge of Flat Bed Without Bedrails 4   Moving Bed to Chair 2   Standing Up From Chair Using Arms 2   Walk in Room 2   Climb 3-5 Stairs With Railing 1   Basic Mobility Inpatient Raw Score 15   Basic Mobility Standardized Score 36.97   [facility] Highest Level Of Mobility   -[facility] Goal 4: Move to chair/commode   -[facility] Achieved 4: Move to chair/commode           @Yanelis More, PT, DPT@

## 2024-11-16 NOTE — OCCUPATIONAL THERAPY NOTE
Occupational Therapy Evaluation     Patient Name: Jillian Arango  Today's Date: 11/16/2024  Problem List  Principal Problem:    Ankle wound, left, initial encounter  Active Problems:    Diabetes mellitus (HCC)    History of urostomy    Thyroid disorder    Normocytic anemia    HTN (hypertension)    Class 2 severe obesity with serious comorbidity and body mass index (BMI) of 35.0 to 35.9 in adult (HCC)    Chronic deep vein thrombosis (DVT) of femoral vein of right lower extremity (HCC)    PAD (peripheral artery disease) (HCC)    Past Medical History  Past Medical History:   Diagnosis Date    Achilles tendinitis of left lower extremity 02/08/2024    Achilles tendinosis of left ankle 11/27/2023    Anemia     Back pain     Bone infarction (HCC) 06/24/2023    Bowel obstruction ([facility]) 2017,2018    Bursitis of posterior heel, left 11/27/2023    Cancer ([facility])     uterine CA radiation destoried urethra.   Patient has urostomy    Cholelithiasis     Colon polyp     Deep vein thrombosis of left lower extremity (HCC) 01/11/2004    on blood thinner for 3 years.    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    Hypercalcemia 10/10/2023    Hypertension     Hypothyroid     In vitro fertilization     Kidney stone     Migraines     Muscle weakness     abdominal    Nausea and vomiting 10/05/2023    Personal history of irradiation     Postoperative wound dehiscence, subsequent encounter 02/08/2024    Renal cyst     Sleep apnea     Resolved due to Weight Loss    Vertigo 10/10/2023     Past Surgical History  Past Surgical History:   Procedure Laterality Date    ACHILLES TENDON REPAIR Right      APPENDECTOMY      COLONOSCOPY      6/6/22    COLONOSCOPY W/ BIOPSIES N/A 12/2015    EGD      HEEL SPUR SURGERY Right 1996    HYSTERECTOMY      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    IR NEPHROSTOMY TUBE PLACEMENT  01/18/2021    LAPAROSCOPIC GASTRIC BANDING N/A 2006    [place], [place]    OTHER SURGICAL HISTORY  2004    Urine shunt to intestine, transverse colon    PCNL Left 02/16/2021    Procedure: NEPHROLITHOTOMY  PERCUTANEOUS (PCNL);  Surgeon: Ziggy Roldan MD;  [facility];  Service: Urology    ID CYSTO/URETERO W/LITHOTRIPSY &INDWELL STENT INSRT Left 02/16/2021    Procedure: anterograde  URETEROSCOPY with dilation of ureteral stricture, INSERTION STENT URETERAL, exchange  of nephrostomy tube;  Surgeon: Ziggy Roldan MD;  Location[facility];  Service: Urology    ID DEBRIDEMENT SUBCUTANEOUS TISSUE 1ST 20 SQ CM/< Left 01/29/2024    Procedure: DEBRIDEMENT LOWER EXTREMITY (WASH OUT) with PHOENIX GRAFT APPLICATION, foot wound;  Surgeon: Darron Frias DPM;  Location: [facility];  Service: Podiatry    ID LAPAROSCOPY ENTEROLYSIS SEPARATE PROCEDURE N/A 01/17/2019    Procedure: LAPAROSCOPIC LYSIS OF ADHESIONS;  Surgeon: Omar Jack MD;  Location:  MAIN OR;  Service: General    ID REPAIR SECONDARY ACHILLES TENDON W/WO GRAFT Left 11/24/2023    Procedure: REMOVAL OF POSTERIOR HEEL SPUR WITH REPAIR TENDON ACHILLES;  Surgeon: Darron Frias DPM;  Location: [facility];  Service: Podiatry    [facility] [place].    SLEEVE GASTROPLASTY N/A 09/2015    Dr. Jack, [facility]    UPPER GASTROINTESTINAL ENDOSCOPY      URETER REVISION  2010    WEILBY THUMB ARTHROPLASTY      WEILBY THUMB ARTHROPLASTY          11/16/24 1005   OT Last Visit   OT Visit Date 11/16/24   Note Type   Note type Evaluation   Pain Assessment   Pain Assessment Tool 0-10   Pain Score 1   Pain Location/Orientation Orientation: Left;Orientation: Lower;Location: [facility]  "Pain Intervention(s) Repositioned;Emotional support;Elevated   Restrictions/Precautions   Weight Bearing Precautions Per Order Yes   LLE Weight Bearing Per Order (S)  NWB   Other Precautions Chair Alarm;Bed Alarm;Multiple lines;Fall Risk;Pain;WBS   Home Living   Type of Home House   Home Layout One level;Performs ADLs on one level;Stairs to enter with rails  (3 KATERINA w/ b/l HRs)   Bathroom Shower/Tub Walk-in shower  (+ tub/shower)   Bathroom Toilet Raised   Bathroom Equipment Grab bars in shower;Shower chair   Home Equipment Walker;Cane;Crutches;Other (Comment)  (knee scooter)   Prior Function   Level of Amelia Independent with ADLs;Independent with functional mobility;Independent with IADLS   Lives With Spouse   Receives Help From Family   IADLs Independent with driving;Independent with meal prep;Independent with medication management   Falls in the last 6 months 0   Vocational Other (Comment)  (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 Patient lives in a 1SH w/ 3 KATERINA (b/l HR). Uses a walk in shower w grab bar, SC. Raised toilet. Lives w spouse who works during the day - sister lives down the street but not always home. Has access to the following DME: RW, crutches, knee scooter, canes. (+) .   Reciprocal Relationships Spouse   Service to Others [facility]   Intrinsic Gratification Reading   General   Additional Pertinent History Comorbidities affecting pt’s functional performance include a significant PMH of: DM, h/o urostomy, thyroid disorder, HTN, obesity, DVT, PAD, SBO, endometrial CA, migraine, depression.  Patient with active OT orders.   Family/Caregiver Present No   Subjective   Subjective \"I can't even put my toes down?\" Agreeable to OT/PT co-eval.   ADL   Where Assessed Edge of bed   Eating Assistance 6  Modified independent   Grooming Assistance 5  Supervision/Setup   UB Bathing Assistance 5  " Supervision/Setup   LB Bathing Assistance 4  Minimal Assistance   UB Dressing Assistance 5  Supervision/Setup   LB Dressing Assistance 3  Moderate Assistance   Toileting Assistance  3  Moderate Assistance   Bed Mobility   Supine to Sit 5  Supervision   Additional items HOB elevated;Bedrails;Increased time required;Verbal cues   Transfers   Sit to Stand 3  Moderate assistance   Additional items Assist x 2;Bedrails;Increased time required;Other;Verbal cues  (RW)   Stand to Sit 3  Moderate assistance   Additional items Assist x 2;Bedrails;Increased time required;Other;Verbal cues  (RW)   Stand pivot 3  Moderate assistance   Additional items Assist x 2;Bedrails;Increased time required;Other;Verbal cues  (RW)   Additional Comments Demonstrated technique to maintain NWB. Requires frequent reinforcement t/o standing and transfer.   Functional Mobility   Additional Comments Unable to hop, swivels on foot w increased effort and decreased ability to maintain WBS. Discussed use of w/c.   Balance   Static Sitting Good   Dynamic Sitting Fair +   Static Standing Zero  (Ax2)   Ambulatory Zero   Activity Tolerance   Activity Tolerance Patient limited by fatigue;Treatment limited secondary to medical complications (Comment)  (WBS)   Medical Staff Made Aware PT Gabriella   Nurse Made Aware Yes   RUE Assessment   RUE Assessment WFL   LUE Assessment   LUE Assessment WFL   Hand Function   Gross Motor Coordination Functional   Fine Motor Coordination Functional   Vision-Basic Assessment   Current Vision Wears glasses only for reading   Vision - Complex Assessment   Ocular Range of Motion Intact   Acuity Able to read clock/calendar on wall without difficulty   Perception   Inattention/Neglect Appears intact   Cognition   Overall Cognitive Status WFL   Arousal/Participation Alert;Responsive;Cooperative   Attention Within functional limits   Orientation Level Oriented X4   Memory Decreased recall of precautions   Following Commands Follows one  step commands with increased time or repetition   Comments Decreased insight.   Assessment   Limitation Decreased ADL status;Decreased UE strength;Decreased Safe judgement during ADL;Decreased endurance;Decreased self-care trans;Decreased high-level ADLs  (dec balance, functional reach, coordination)   Prognosis Good   Assessment Patient is a 69 y.o. year old female seen for OT eval s/p admit to [facility] on 11/14/2024 with L ankle wound s/p Left ankle debridement w/ harvesting of split-thickness skin graft left lower leg for application to left ankle wound - NWB to LLE.  OT consulted to assess ADLs/IADLs/functional mobility and assist w/ D/C planning. Patient demonstrates the following deficits impacting occupational performance: decreased strength , decreased balance, decreased activity tolerance, limited functional reach, impaired GMC, impaired problem solving, decreased safety awareness, increased pain, orthopedic restrictions, increased body habitus , impaired coordination, decreased cardiovascular endurance, and decreased skin integrity . These impairments, as well at pt’s KATERINA home environment, limited home support, difficulty performing ADLs, difficulty performing IADLs, difficulty performing transfers/mobility, limited insight into deficits, compliance, decreased initiation and engagement, fall risk , functional decline , new use of AD for functional transfers/mobility, multiple admissions , inability to perform caregiver duties , and Inability to perform current job functions , limit pt’s ability to safely engage in all baseline areas of occupation. Pt's CLOF as follows: eating/grooming: Danay and supervision , UB ADLs: supervision , LB ADLs: Misty and modA, toileting: modA, bed mobility: supervision , functional transfers: modAx2, functional mobility: modAx2. Pt would benefit from continued skilled OT while in acute setting to address deficits as defined above and to maximize (I) w/ ADLs/functional mobility.  Occupational performance areas to address include: grooming, bathing/shower, toilet hygiene, dressing, medication management, health maintenance, functional mobility, community mobility, clothing management, cleaning, meal prep, and household maintenance. Based on the aforementioned evaluation, functional performance deficits, and assessments, pt has been identified as a high complexity evaluation. At this time, recommendation for pt to receive post-acute rehabilitation services at a Level I (maximum resource intensity) due to above deficits and CLOF. OT will continue to follow pt 3-5x/wk to address the goals listed below to  w/in 10-14 days.   Goals   Patient Goals to return home, to walk   LTG Time Frame 10-   Plan   Treatment Interventions ADL retraining;Functional transfer training;UE strengthening/ROM;Endurance training;Cognitive reorientation;Equipment evaluation/education;Patient/family training;Neuromuscular reeducation;Compensatory technique education;Continued evaluation;Energy conservation;Activityengagement   Goal Expiration Date 24   OT Treatment Day 0   OT Frequency 3-5x/wk   Discharge Recommendation   Rehab Resource Intensity Level, OT I (Maximum Resource Intensity)   Equipment Recommended Bedside commode;Other (comment)  (w/c)   Commode Type Standard w/ drop arm   Additional Comments  Co treatment with PT secondary to complex medical condition of pt, possible A of 2 required to achieve and maintain transitional movements, requiring the need of skilled therapeutic intervention of 2 therapists to achieve delivery of services.   AM-PAC Daily Activity Inpatient   Lower Body Dressing 2   Bathing 3   Toileting 2   Upper Body Dressing 3   Grooming 3   Eating 4   Daily Activity Raw Score 17   Daily Activity Standardized Score (Calc for Raw Score >=11) 37.26   AM-PAC Applied Cognition Inpatient   Following a Speech/Presentation 4   Understanding Ordinary Conversation 4   Taking Medications 4    Remembering Where Things Are Placed or Put Away 4   Remembering List of 4-5 Errands 4   Taking Care of Complicated Tasks 4   Applied Cognition Raw Score 24   Applied Cognition Standardized Score 62.21     Occupational Therapy goals: In 7-14 days:     1- Patient will verbalize and demonstrate use of energy conservation/deep breathing technique and work simplification skills during functional activity with no verbal cues.   2- Patient will verbalize and demonstrate good body mechanics and joint protection techniques during ADLs/IADLs with no verbal cues   3- Pt will complete bed mobility at a Mod I level w/ G balance/safety demonstrated to decrease caregiver assistance required   4- Patient will increase OOB/ sitting tolerance to 2-4 hours per day for increased participation in self care and leisure tasks with no s/s of exertion.   5-Patient will increase standing tolerance time to 5 minutes with unilateral UE support to complete sink level ADLs@ mod I level    6- Pt will improve functional transfers to Mod I on/off all surfaces using DME as needed w/ G balance/safety   7- Patient will complete UB ADLs with Devyn utilizing appropriate DME/AE PRN   8- Patient will complete LB ADLs with Devyn utilizing appropriate DME/AE PRN   9- Patient will complete toileting tasks with Devyn with G hygiene/thoroughness utilizing appropriate DME/AE PRN   10- Pt will improve functional mobility during ADL/IADL/leisure tasks to Mod I using DME as needed w/ G balance/safety    11- Pt will be attentive 100% of the time during ongoing cognitive assessment w/ G participation to assist w/ safe d/c planning/recommendations   12- Pt will participate in simulated IADL management task to increase independence to Mod I w/ G safety and endurance   13- Pt will increase BUE strength by 1MM grade via AROM/AAROM/PROM exercises to increase independence in ADLs and transfers     14- Pt will demonstrate 100% adherence to WBS (NWB) during all functional  activities w/o cues from therapist      Ginette Black, OTR/L

## 2024-11-16 NOTE — ANESTHESIA POSTPROCEDURE EVALUATION
Post-Op Assessment Note    CV Status:  Stable    Pain management: adequate       Mental Status:  Alert and awake   Hydration Status:  Euvolemic   PONV Controlled:  Controlled   Airway Patency:  Patent     Post Op Vitals Reviewed: Yes    No anethesia notable event occurred.    Staff: Anesthesiologist           Last Filed PACU Vitals:  Vitals Value Taken Time   Temp 98.3 °F (36.8 °C) 11/15/24 1917   Pulse 78 11/15/24 1924   /78 11/15/24 1917   Resp 23 11/15/24 1924   SpO2 94 % 11/15/24 1924   Vitals shown include unfiled device data.    Modified Dina:  Activity: 2 (11/15/2024  7:17 PM)  Respiration: 2 (11/15/2024  7:17 PM)  Circulation: 2 (11/15/2024  7:17 PM)  Consciousness: 1 (11/15/2024  7:17 PM)  Oxygen Saturation: 2 (11/15/2024  7:17 PM)  Modified Dina Score: 9 (11/15/2024  7:17 PM)

## 2024-11-17 VITALS
SYSTOLIC BLOOD PRESSURE: 154 MMHG | RESPIRATION RATE: 18 BRPM | OXYGEN SATURATION: 96 % | HEIGHT: 66 IN | TEMPERATURE: 97.4 F | HEART RATE: 57 BPM | DIASTOLIC BLOOD PRESSURE: 82 MMHG | WEIGHT: 211 LBS | BODY MASS INDEX: 33.91 KG/M2

## 2024-11-17 LAB
ANION GAP SERPL CALCULATED.3IONS-SCNC: 5 MMOL/L (ref 4–13)
BASOPHILS # BLD AUTO: 0.04 THOUSANDS/ÂΜL (ref 0–0.1)
BASOPHILS NFR BLD AUTO: 1 % (ref 0–1)
BUN SERPL-MCNC: 26 MG/DL (ref 5–25)
CALCIUM SERPL-MCNC: 9.1 MG/DL (ref 8.4–10.2)
CHLORIDE SERPL-SCNC: 113 MMOL/L (ref 96–108)
CO2 SERPL-SCNC: 19 MMOL/L (ref 21–32)
CREAT SERPL-MCNC: 0.9 MG/DL (ref 0.6–1.3)
EOSINOPHIL # BLD AUTO: 0.19 THOUSAND/ÂΜL (ref 0–0.61)
EOSINOPHIL NFR BLD AUTO: 2 % (ref 0–6)
ERYTHROCYTE [DISTWIDTH] IN BLOOD BY AUTOMATED COUNT: 15.3 % (ref 11.6–15.1)
GFR SERPL CREATININE-BSD FRML MDRD: 66 ML/MIN/1.73SQ M
GLUCOSE SERPL-MCNC: 161 MG/DL (ref 65–140)
GLUCOSE SERPL-MCNC: 83 MG/DL (ref 65–140)
GLUCOSE SERPL-MCNC: 96 MG/DL (ref 65–140)
HCT VFR BLD AUTO: 34.9 % (ref 34.8–46.1)
HGB BLD-MCNC: 10 G/DL (ref 11.5–15.4)
IMM GRANULOCYTES # BLD AUTO: 0.02 THOUSAND/UL (ref 0–0.2)
IMM GRANULOCYTES NFR BLD AUTO: 0 % (ref 0–2)
LYMPHOCYTES # BLD AUTO: 1.39 THOUSANDS/ÂΜL (ref 0.6–4.47)
LYMPHOCYTES NFR BLD AUTO: 17 % (ref 14–44)
MCH RBC QN AUTO: 30.1 PG (ref 26.8–34.3)
MCHC RBC AUTO-ENTMCNC: 28.7 G/DL (ref 31.4–37.4)
MCV RBC AUTO: 105 FL (ref 82–98)
MONOCYTES # BLD AUTO: 0.49 THOUSAND/ÂΜL (ref 0.17–1.22)
MONOCYTES NFR BLD AUTO: 6 % (ref 4–12)
NEUTROPHILS # BLD AUTO: 6.07 THOUSANDS/ÂΜL (ref 1.85–7.62)
NEUTS SEG NFR BLD AUTO: 74 % (ref 43–75)
NRBC BLD AUTO-RTO: 0 /100 WBCS
PLATELET # BLD AUTO: 221 THOUSANDS/UL (ref 149–390)
PMV BLD AUTO: 10 FL (ref 8.9–12.7)
POTASSIUM SERPL-SCNC: 4.7 MMOL/L (ref 3.5–5.3)
RBC # BLD AUTO: 3.32 MILLION/UL (ref 3.81–5.12)
SODIUM SERPL-SCNC: 137 MMOL/L (ref 135–147)
WBC # BLD AUTO: 8.2 THOUSAND/UL (ref 4.31–10.16)

## 2024-11-17 PROCEDURE — 85025 COMPLETE CBC W/AUTO DIFF WBC: CPT

## 2024-11-17 PROCEDURE — 97116 GAIT TRAINING THERAPY: CPT

## 2024-11-17 PROCEDURE — 80048 BASIC METABOLIC PNL TOTAL CA: CPT

## 2024-11-17 PROCEDURE — 82948 REAGENT STRIP/BLOOD GLUCOSE: CPT

## 2024-11-17 PROCEDURE — NC001 PR NO CHARGE: Performed by: PODIATRIST

## 2024-11-17 PROCEDURE — 99232 SBSQ HOSP IP/OBS MODERATE 35: CPT | Performed by: PHYSICIAN ASSISTANT

## 2024-11-17 RX ORDER — OXYCODONE AND ACETAMINOPHEN 5; 325 MG/1; MG/1
1 TABLET ORAL EVERY 4 HOURS PRN
Qty: 5 TABLET | Refills: 0 | Status: SHIPPED | OUTPATIENT
Start: 2024-11-17

## 2024-11-17 RX ORDER — GABAPENTIN 100 MG/1
100 CAPSULE ORAL 3 TIMES DAILY
Qty: 90 CAPSULE | Refills: 0 | Status: SHIPPED | OUTPATIENT
Start: 2024-11-17

## 2024-11-17 RX ORDER — OXYCODONE AND ACETAMINOPHEN 2.5; 325 MG/1; MG/1
1 TABLET ORAL EVERY 4 HOURS PRN
Qty: 5 TABLET | Refills: 0 | Status: SHIPPED | OUTPATIENT
Start: 2024-11-17 | End: 2024-11-17

## 2024-11-17 RX ADMIN — DABIGATRAN ETEXILATE MESYLATE 150 MG: 150 CAPSULE ORAL at 08:58

## 2024-11-17 RX ADMIN — LOSARTAN POTASSIUM 25 MG: 25 TABLET, FILM COATED ORAL at 08:59

## 2024-11-17 RX ADMIN — LEVOTHYROXINE SODIUM 75 MCG: 75 TABLET ORAL at 05:21

## 2024-11-17 RX ADMIN — INSULIN GLARGINE 10 UNITS: 100 INJECTION, SOLUTION SUBCUTANEOUS at 09:01

## 2024-11-17 RX ADMIN — OXYCODONE 5 MG: 5 TABLET ORAL at 09:00

## 2024-11-17 RX ADMIN — PANTOPRAZOLE SODIUM 20 MG: 20 TABLET, DELAYED RELEASE ORAL at 09:00

## 2024-11-17 RX ADMIN — GABAPENTIN 100 MG: 100 CAPSULE ORAL at 08:59

## 2024-11-17 RX ADMIN — OXYCODONE 5 MG: 5 TABLET ORAL at 13:15

## 2024-11-17 NOTE — PROGRESS NOTES
Patient:    MRN:  [id number]    Rocky Request ID:  [id number]    Level of care reserved:  Home Health Agency    Partner Reserved:  [facility] [place] [address] [phone]    Clinical needs requested:    Geography searched:  20 miles around [phone]    Start of Service:    Request sent:  10:37am EST on 11/17/2024 by Bella Cornell    Partner reserved:  12:49pm EST on 11/17/2024 by Bella Cornell    Choice list shared:  12:07pm EST on 11/17/2024 by Bella Cornell

## 2024-11-17 NOTE — PHYSICAL THERAPY NOTE
"   Physical Therapy Treatment Session:       11/17/24 1025   PT Last Visit   PT Visit Date 11/17/24   Note Type   Note Type Treatment   Pain Assessment   Pain Assessment Tool 0-10   Pain Score 6   Pain Location/Orientation Orientation: Left;Location: Leg  (pt reports burning pain during PWB)   Hospital Pain Intervention(s) Repositioned;Ambulation/increased activity;Elevated;Emotional support;Rest   Restrictions/Precautions   Weight Bearing Precautions Per Order Yes   LLE Weight Bearing Per Order PWB   Braces or Orthoses CAM Boot  (LLE)   Other Precautions WBS;Multiple lines;Fall Risk;Pain  (body habitus)   General   Chart Reviewed Yes   Family/Caregiver Present No   Cognition   Overall Cognitive Status WFL   Arousal/Participation Cooperative   Attention Attends with cues to redirect   Orientation Level Oriented X4  (pt reports \"feeling woozy\" during positional changes->pt reports taking pain meds recently)   Following Commands Follows one step commands with increased time or repetition   Comments pain and reports of wooziness   Subjective   Subjective pt supine in bed with LLE elevated underneath pillow; pt willing and agreeable to work with PT and to participate in therapy intervention; \"I just want to get home\".   Bed Mobility   Supine to Sit 5  Supervision   Additional items Assist x 1;HOB elevated;Bedrails;Verbal cues;Impulsive   Transfers   Sit to Stand 5  Supervision   Additional items Assist x 1;Bedrails;Impulsive;Verbal cues  (education and instruction for placement of BLE and B hands prior to transfer)   Stand to Sit 5  Supervision   Additional items Assist x 1;Armrests;Impulsive;Verbal cues   Additional Comments pt currently PWB LLE with use of CAM boot per podiatry   Ambulation/Elevation   Gait pattern Poor UE support;Improper Weight shift;Antalgic;Narrow LIANET;Forward Flexion;Decreased L stance;Inconsistent kylie;Foward flexed;Short stride;Ataxia;Step to   Gait Assistance 5  Supervision   Additional " items Assist x 1;Verbal cues;Tactile cues   Assistive Device Rolling walker   Distance 40 feet x2 with use of RW and LLE CAM boot. pt able to maintain PWB % of the time; no LOB noted and/or observed during upright mobility   Stair Management Assistance 5  Supervision   Additional items Assist x 1;Verbal cues   Stair Management Technique Two rails;Step to pattern;Foreward;Nonreciprocal   Number of Stairs 5   Balance   Static Sitting Fair +   Dynamic Sitting Fair   Static Standing Fair   Dynamic Standing Fair   Ambulatory Fair   Endurance Deficit   Endurance Deficit Yes   Endurance Deficit Description pain, reports of wooziness   Activity Tolerance   Activity Tolerance Patient limited by fatigue;Patient limited by pain  (fair)   Nurse Made Aware yes   Assessment   Prognosis Fair   Problem List Decreased strength;Decreased endurance;Impaired balance;Decreased mobility;Impaired judgement;Decreased safety awareness;Obesity;Decreased skin integrity;Pain  (PWB LLE with CAM boot)   Assessment Pt able to perform bed mobility, TT and ST needing S level of A. Pt able to ambulate 40 feet x2 with use of RW and LLE CAM boot needing S level of A. Pt reports buring pain during WB and walking. Education and instruction for elevation of LLE in sitting and proper placement of BLE and B hands prior ot transfers. Pt would cont to benefit from skilled inpt PT services to maximize functional independence and to dec caregiver burden upon being DC from the hospital.   Barriers to Discharge   (pt reports  works during the day, (+)KATERINA)   Goals   Patient Goals to go home   STG Expiration Date 11/26/24   PT Treatment Day 1   Plan   Treatment/Interventions Functional transfer training;LE strengthening/ROM;Elevations;Therapeutic exercise;Endurance training;Patient/family training;Equipment eval/education;Bed mobility;Gait training;Continued evaluation;Spoke to nursing   Progress Progressing toward goals   PT Frequency 3-5x/wk    Discharge Recommendation   Rehab Resource Intensity Level, PT III (Minimum Resource Intensity)   Equipment Recommended Walker  (pt reports owning RW and knee scooter at home for use; recommend current use of RW for all upright mobility, pt agreed)   AM-PAC Basic Mobility Inpatient   Turning in Flat Bed Without Bedrails 4   Lying on Back to Sitting on Edge of Flat Bed Without Bedrails 4   Moving Bed to Chair 3   Standing Up From Chair Using Arms 3   Walk in Room 3   Climb 3-5 Stairs With Railing 3   Basic Mobility Inpatient Raw Score 20   Basic Mobility Standardized Score 43.99   [facility] Highest Level Of Mobility   -HLM Goal 6: Walk 10 steps or more   -HLM Achieved 7: Walk 25 feet or more

## 2024-11-17 NOTE — PROGRESS NOTES
Progress Note - Hospitalist   Name: Jillian Arango 69 y.o. female I MRN: [id number]  Unit/Bed#: E2 -01 I Date of Admission: 11/14/2024   Date of Service: 11/17/2024 I Hospital Day: 3    Assessment & Plan  Ankle wound, left, initial encounter  Patient admitted to the podiatry service for scheduled split thickness skin grafting to left ankle wound  S/p preop clerance  Status post skin grafting with podiatry on 11/15/2023, without complications  Transition back to Pradaxa  PT/OT-level 3  Stable for discharge from internal medicine standpoint  Diabetes mellitus ([facility])  Lab Results   Component Value Date    HGBA1C 6.7 (H) 11/14/2024     Patient with fairly well-controlled type 2 diabetes with most recent hemoglobin A1c 6.8  Maintain outpatient on only metformin  Will hold metformin while inpatient  Will decrease Lantus to 10 units at bedtime and discontinue standing insulin with meals as she does not use insulin at home and is high risk for hypoglycemia while n.p.o.  Will place on sliding scale insulin  Restart metformin on discharge  (P) 137    History of urostomy  Ileal conduit in place  Thyroid disorder  Continue daily levothyroxine  Normocytic anemia  History of normocytic anemia  Hemoglobin relatively stable at 9.2  Maintained outpatient on iron every other day  Stable  HTN (hypertension)  BP slightly elevated at time of admission  Maintained outpatient on losartan 25 mg daily, continue while inpatient  Will place IV as needed hydralazine  Class 2 severe obesity with serious comorbidity and body mass index (BMI) of 35.0 to 35.9 in adult ([facility])  Body mass index is 34.06 kg/m².    Chronic deep vein thrombosis (DVT) of femoral vein of right lower extremity ([facility])  Maintain outpatient on Pradaxa for history of DVTs  Provoked DVT 2000 postoperatively-started on Xarelto, additional DVT April 2023 reported as Xarelto failure and was switched to Pradaxa  Given high risk for recurrent DVT, will start heparin drip  preoperatively  Pradaxa resumed  PAD (peripheral artery disease) (HCC)  On Pradaxa    VTE Pharmacologic Prophylaxis:   Moderate Risk (Score 3-4) - Pharmacological DVT Prophylaxis Ordered: dabigatran (Pradaxa).    Mobility:   Basic Mobility Inpatient Raw Score: 20  JH-HLM Goal: 6: Walk 10 steps or more  JH-HLM Achieved: 7: Walk 25 feet or more  JH-HLM Goal achieved. Continue to encourage appropriate mobility.    Patient Centered Rounds: I performed bedside rounds with nursing staff today.   Discussions with Specialists or Other Care Team Provider: None    Education and Discussions with Family / Patient: Per primary team    Current Length of Stay: 3 day(s)  Current Patient Status: Inpatient   Certification Statement:  Stable for discharge from internal medicine standpoint  Discharge Plan: [facility] is following this patient on consult. They are medically stable for discharge when deemed appropriate by primary service.    Code Status: Level 1 - Full Code    Subjective   No acute events overnight.    Objective :  Temp:  [97.4 °F (36.3 °C)-98.3 °F (36.8 °C)] 97.4 °F (36.3 °C)  HR:  [57-69] 57  BP: (134-161)/(65-83) 154/82  Resp:  [16-18] 18  SpO2:  [93 %-99 %] 96 %  O2 Device: None (Room air)    Body mass index is 34.06 kg/m².     Input and Output Summary (last 24 hours):     Intake/Output Summary (Last 24 hours) at 11/17/2024 1050  Last data filed at 11/17/2024 0916  Gross per 24 hour   Intake 720 ml   Output 1800 ml   Net -1080 ml       Physical Exam  Vitals and nursing note reviewed.   Constitutional:       Appearance: Normal appearance.   HENT:      Head: Normocephalic and atraumatic.      Mouth/Throat:      Mouth: Mucous membranes are moist.      Pharynx: Oropharynx is clear. No oropharyngeal exudate.   Eyes:      Extraocular Movements: Extraocular movements intact.   Cardiovascular:      Rate and Rhythm: Normal rate and regular rhythm.      Pulses: Normal pulses.      Heart sounds: Normal heart sounds. No murmur  heard.     No friction rub. No gallop.   Pulmonary:      Effort: Pulmonary effort is normal. No respiratory distress.      Breath sounds: Normal breath sounds. No stridor. No wheezing or rales.   Abdominal:      General: Abdomen is flat. Bowel sounds are normal. There is no distension.      Palpations: Abdomen is soft.      Tenderness: There is no abdominal tenderness.   Musculoskeletal:      Right lower leg: No edema.      Left lower leg: No edema.      Comments: Cam boot   Skin:     General: Skin is warm and dry.   Neurological:      General: No focal deficit present.      Mental Status: She is alert and oriented to person, place, and time.           Lines/Drains:  Lines/Drains/Airways       Active Status       Name Placement date Placement time Site Days    Urostomy Ureterostomy right RUQ 03/23/21  0900  RUQ  1335                            Lab Results: I have reviewed the following results:   Results from last 7 days   Lab Units 11/17/24  1002   WBC Thousand/uL 8.20   HEMOGLOBIN g/dL 10.0*   HEMATOCRIT % 34.9   PLATELETS Thousands/uL 221   SEGS PCT % 74   LYMPHO PCT % 17   MONO PCT % 6   EOS PCT % 2     Results from last 7 days   Lab Units 11/17/24  1002 11/15/24  0505 11/14/24  1435   SODIUM mmol/L 137   < > 142   POTASSIUM mmol/L 4.7   < > 4.4   CHLORIDE mmol/L 113*   < > 112*   CO2 mmol/L 19*   < > 24   BUN mg/dL 26*   < > 27*   CREATININE mg/dL 0.90   < > 0.87   ANION GAP mmol/L 5   < > 6   CALCIUM mg/dL 9.1   < > 9.5   ALBUMIN g/dL  --   --  3.4*   TOTAL BILIRUBIN mg/dL  --   --  0.45   ALK PHOS U/L  --   --  88   ALT U/L  --   --  13   AST U/L  --   --  12*   GLUCOSE RANDOM mg/dL 161*   < > 128    < > = values in this interval not displayed.         Results from last 7 days   Lab Units 11/17/24  0725 11/16/24  2050 11/16/24  1556 11/16/24  1123 11/16/24  0615 11/15/24  2104 11/15/24  1909 11/15/24  1538 11/15/24  1118 11/15/24  0611 11/14/24  2106 11/14/24  1545   POC GLUCOSE mg/dl 83 160* 146* 159* 156*  258* 126 87 89 119 162* 99     Results from last 7 days   Lab Units 11/14/24  1435   HEMOGLOBIN A1C % 6.7*           Recent Cultures (last 7 days):         Imaging Results Review: No pertinent imaging studies reviewed.  Other Study Results Review: No additional pertinent studies reviewed.    Last 24 Hours Medication List:     Current Facility-Administered Medications:     acetaminophen (TYLENOL) tablet 650 mg, Q6H PRN    ALPRAZolam (XANAX) tablet 0.25 mg, HS PRN    chlorhexidine (PERIDEX) 0.12 % oral rinse 15 mL, Once    dabigatran etexilate (PRADAXA) capsule 150 mg, Q12H BRETT    gabapentin (NEURONTIN) capsule 100 mg, TID    hydrALAZINE (APRESOLINE) injection 5 mg, Q6H PRN    insulin glargine (LANTUS) subcutaneous injection 10 Units 0.1 mL, Daily With Breakfast    insulin lispro (HumALOG/ADMELOG) 100 units/mL subcutaneous injection 1-6 Units, TID AC **AND** Fingerstick Glucose (POCT), TID AC    levothyroxine tablet 75 mcg, Early Morning    lidocaine (LIDODERM) 5 % patch 1 patch, Daily    losartan (COZAAR) tablet 25 mg, Daily    naloxone (NARCAN) 0.04 mg/mL syringe 0.04 mg, Q1MIN PRN    oxyCODONE (ROXICODONE) split tablet 2.5 mg, Q4H PRN **OR** oxyCODONE (ROXICODONE) IR tablet 5 mg, Q4H PRN    pantoprazole (PROTONIX) EC tablet 20 mg, Daily    pramipexole (MIRAPEX) tablet 1 mg, HS    Administrative Statements   Today, Patient Was Seen By: Garry Deleon PA-C      **Please Note: This note may have been constructed using a voice recognition system.**

## 2024-11-17 NOTE — ASSESSMENT & PLAN NOTE
Patient admitted to the podiatry service for scheduled split thickness skin grafting to left ankle wound  S/p preop clerance  Status post skin grafting with podiatry on 11/15/2023, without complications  Transition back to [facility]  PT/OT-level 3  Stable for discharge from internal medicine standpoint

## 2024-11-17 NOTE — ASSESSMENT & PLAN NOTE
History of normocytic anemia  Hemoglobin relatively stable at 9.2  Maintained outpatient on iron every other day  Stable

## 2024-11-17 NOTE — CASE MANAGEMENT
Case Management Discharge Planning Note    Patient name Jillian Arango  Location East 2 /E2 -* MRN [id number]  : 1955 Date 2024       Current Admission Date: 2024  Current Admission Diagnosis:Ankle wound, left, initial encounter   Patient Active Problem List    Diagnosis Date Noted Date Diagnosed    Ankle wound, left, initial encounter 2024     CHARY (iron deficiency anemia) 10/30/2024     Upper respiratory infection 10/30/2024     Baker's cyst, ruptured 10/16/2024     PAD (peripheral artery disease) ([facility]) 2024     Depression, recurrent ([facility]) 2024     History of smoking 10/10/2023     Anti-cardiolipin antibody positive 2023     Encounter for attention to other artificial openings of urinary tract ([facility]) 2023     Ureteral-ileal loop anastomotic stricture 2021     Headache 2021     Class 2 severe obesity with serious comorbidity and body mass index (BMI) of 35.0 to 35.9 in adult ([facility]) 2021     Abdominal adhesions 2019     HTN (hypertension) 2017     Migraine 2017     Hydroureteronephrosis 2017     Normocytic anemia 2017     Chronic deep vein thrombosis (DVT) of femoral vein of right lower extremity ([facility]) 2017     Cholelithiasis 2017     Small bowel obstruction ([facility]) 2016     Diabetes mellitus (HCC) 2016     History of urostomy 2016     Endometrial cancer (HCC) 2016     Thyroid disorder 2016       LOS (days): 3  Geometric Mean LOS (GMLOS) (days): 2.6  Days to GMLOS:-0.4     OBJECTIVE:  Risk of Unplanned Readmission Score: 15.08         Current admission status: Inpatient   Preferred Pharmacy:   [id number] - [place] - [address] [address]  Phone: [phone] Fax: [phone]    [id number] [place] [place] [facility] [address]  [facility]  [address] [address]  Phone:  [phone] Fax: [phone]    [facility] - [place], [place] - [address]  [address] [facility] [address]  Phone: [phone] Fax: [phone]    Primary Care Provider: Augustine Lloyd MD    Primary Insurance: BLUE CROSS  Secondary Insurance: MEDICARE    DISCHARGE DETAILS:    Discharge planning discussed with:: Patient and spouse  Freedom of Choice: Yes  Comments - Freedom of Choice: Agreeable to [facility] for home PT/OT  CM contacted family/caregiver?: Yes (spouse at bedside)  Were Treatment Team discharge recommendations reviewed with patient/caregiver?: Yes  Did patient/caregiver verbalize understanding of patient care needs?: Yes       Contacts  Patient Contacts: Ari Arango  Relationship to Patient:: Family  Contact Method: In Person  Reason/Outcome: Discharge Planning    Requested Home Health Care         Is the patient interested in HHC at discharge?: Yes  Home Health Discipline requested:: Occupational Therapy, Physical Therapy  Home Health Agency Name:: [facility]  HHA External Referral Reason (only applicable if external HHA name selected): Services not provided in network or near patient location  Home Health Follow-Up Provider:: Referring Provider  Home Health Services Needed:: Evaluate Functional Status and Safety, Gait/ADL Training, Strengthening/Theraputic Exercises to Improve Function  Homebound Criteria Met:: Requires the Assistance of Another Person for Safe Ambulation or to Leave the Home  Supporting Clincal Findings:: Limited Endurance    DME Referral Provided  Referral made for DME?: No    Other Referral/Resources/Interventions Provided:  Interventions: HHC  Referral Comments: Referrals placed via aidin         Treatment Team Recommendation: Home with Home Health Care  Discharge Destination Plan:: Home with Home Health Care  Transport at Discharge : Family

## 2024-11-17 NOTE — CASE MANAGEMENT
Case Management Discharge Planning Note    Patient name Jillian Arango  Location [facility] 2 /E2 -* MRN [id number]  : 1955 Date 2024       Current Admission Date: 2024  Current Admission Diagnosis:Ankle wound, left, initial encounter   Patient Active Problem List    Diagnosis Date Noted Date Diagnosed    Ankle wound, left, initial encounter 2024     CHARY (iron deficiency anemia) 10/30/2024     Upper respiratory infection 10/30/2024     Baker's cyst, ruptured 10/16/2024     PAD (peripheral artery disease) ([facility]) 2024     Depression, recurrent ([facility]) 2024     History of smoking 10/10/2023     Anti-cardiolipin antibody positive 2023     Encounter for attention to other artificial openings of urinary tract ([facility]) 2023     Ureteral-ileal loop anastomotic stricture 2021     Headache 2021     Class 2 severe obesity with serious comorbidity and body mass index (BMI) of 35.0 to 35.9 in adult ([facility]) 2021     Abdominal adhesions 2019     HTN (hypertension) 2017     Migraine 2017     Hydroureteronephrosis 2017     Normocytic anemia 2017     Chronic deep vein thrombosis (DVT) of femoral vein of right lower extremity ([facility]) 2017     Cholelithiasis 2017     Small bowel obstruction ([facility]) 2016     Diabetes mellitus (HCC) 2016     History of urostomy 2016     Endometrial cancer (HCC) 2016     Thyroid disorder 2016       LOS (days): 3  Geometric Mean LOS (GMLOS) (days): 2.6  Days to GMLOS:-0.3     OBJECTIVE:  Risk of Unplanned Readmission Score: 13.3         Current admission status: Inpatient   Preferred Pharmacy:   [id number] - [place] - [address] [address]  Phone: [phone] Fax: [phone]    [id number] [place] [place] [facility] [address]  [facility]  [address] [address]  Phone:  [phone] Fax: [phone]    [facility] - [place], [place] - [address] [facility]  [address] [facility] [address]  Phone: [phone] Fax: [phone]    Primary Care Provider: Augustine Lloyd MD    Primary Insurance: BLUE CROSS  Secondary Insurance: MEDICARE    DISCHARGE DETAILS:    Discharge planning discussed with:: Patient  Freedom of Choice: Yes  Comments - Freedom of Choice: Patient informed she has choice of HHC Agency  CM contacted family/caregiver?: No- see comments (Spoke with patient)  Were Treatment Team discharge recommendations reviewed with patient/caregiver?: Yes  Did patient/caregiver verbalize understanding of patient care needs?: Yes  Were patient/caregiver advised of the risks associated with not following Treatment Team discharge recommendations?: Yes     CM spoke with patient at bedside and introduced self and role. CM informed PT recs Home w/ HHC. Patient agreeable to [facility] if available or other options as well. CM will provide choice list and continue to follow.

## 2024-11-17 NOTE — DISCHARGE INSTR - AVS FIRST PAGE
Discharge Instructions - Podiatry    Weight Bearing Status:  Partial weightbearing to left heel  Please use your CAM boot and walker for assistance                   Pain: Continue analgesics as needed     Follow-up appointment instructions: Please make an appointment within one week of discharge with Dr. Rodriguez. Contact sooner if any increase in pain, or signs of infection occur    Wound Care: Leave dressings clean, dry, and intact until first follow up clinic visit

## 2024-11-17 NOTE — ASSESSMENT & PLAN NOTE
Lab Results   Component Value Date    HGBA1C 6.7 (H) 11/14/2024     Patient with fairly well-controlled type 2 diabetes with most recent hemoglobin A1c 6.8  Maintain outpatient on only metformin  Will hold metformin while inpatient  Will decrease Lantus to 10 units at bedtime and discontinue standing insulin with meals as she does not use insulin at home and is high risk for hypoglycemia while n.p.o.  Will place on sliding scale insulin  Restart metformin on discharge  (P) 137

## 2024-11-17 NOTE — DISCHARGE SUMMARY
PODIATRY DISCHARGE SUMMARY     Patient Name: Jillian Arango   Age & Sex: 69 y.o. female   MRN: [id number]  Unit/Bed#: E2 -01   Encounter: [id number]  Length of Stay: 3 days    ASSESSMENT:    Jillian Arango is a 69 y.o. female with:    L posterior medial ankle wound   - s/p left lower extremity wound debridement, STSG placement (DOS: 11/15/24)  T2DM  Chronic DVT Right lower extremity  PAD  HTN    PLAN:    POD2 s/p LLE wound debridement with STSG. Patient has transitioned from posterior splint to WBAT to heel in CAM boot. Per PT/OT, patient is ok to discharge home with PT.   Patient already has a post op appointment set up with Dr. Rodriguez.   No need for antibiotics.   Elevation and offloading on green foam wedges or pillows when non-ambulatory.  Appreciate consulting services for recommendations and management.     Antibiotics: none  Pharmacologic VTE Prophylaxis: pradaxa  Mechanical VTE Prophylaxis: sequential compression device   Weightbearing status: WBAT to LLE heel in CAM boot    Disposition:  Patient stable for discharge today.    SUBJECTIVE     The patient was seen, evaluated, and assessed at bedside today. The patient was awake, alert, and in no acute distress. No acute events overnight. The patient reports some pain to skin graft donor site. Patient denies N/V/F/chills/SOB/CP.    Review of Systems  Constitutional: Negative.    HENT: Negative.    Eyes: Negative.    Respiratory: Negative.    Cardiovascular: Negative.    Gastrointestinal: Negative.    Musculoskeletal: negative   Skin:s/p LLE STSG   Neurological: negative  Psych: Negative.    OBJECTIVE     Physical Exam:     General: Alert, cooperative and no distress  Lungs: Non labored breathing  Abdomen: Soft, non-tender.  Lower extremity exam:  Cardiovascular status at baseline.  Neurological status at baseline.  Musculoskeletal status at baseline. No calf tenderness noted bilaterally.   - Dressings remain clean, dry, intact, no  strikethrough    DETAILS OF HOSPITAL COURSE     Jillian Arango is a 69 y.o. female who was admitted on 11/14/2024 due to left lower extremity wound in need of debridement and skin graft application. She has past medical history of diabetes mellitus, HTN, obesity, chronic DVT, and PAD.   Underwent LLE wound debridement with STSG application on 11/15/24 with Dr. Rodriguez. Received Ancef pre-operatively for surgical prophylaxis. Tolerated procedure well. Weight bearing status to be WBAT to heel in CAM boot to LLE. Per PT/OT, she is ok to discharge to home with PT.   [facility] assisted with medical management. She was started on gabapentin by medicine as she had neuropathic pain, likely secondary to her diabetes. This will be continued outpatient.     New Medications:  - gabapentin 100mg TID    DISCHARGE INFORMATION     PCP at Discharge: Augustine Lloyd MD    Admitting Provider:  Jennifer  Admission Date: 11/14/2024     Discharge Provider:  Jennifer  Discharge Date: 11/17/24    Discharge Disposition: Home with PT  Discharge Condition: Stable  Discharge with Lines: ileal conduit in place  Activity Restrictions: PWB to left heel only  Test Results Pending at Discharge: none  Medications at Discharge: See after visit summary for reconciled discharge medications provided to patient and family.      Discharge Diagnoses:  Principal Problem:    Ankle wound, left, initial encounter  Active Problems:    Diabetes mellitus (HCC)    History of urostomy    Thyroid disorder    Normocytic anemia    HTN (hypertension)    Class 2 severe obesity with serious comorbidity and body mass index (BMI) of 35.0 to 35.9 in adult ([facility])    Chronic deep vein thrombosis (DVT) of femoral vein of right lower extremity ([facility])    PAD (peripheral artery disease) ([facility])      Consulting Providers:  - Internal Medicine    Diagnostic & Therapeutic Procedures Performed:  No results found.    Code Status: Level 1 - Full Code  Advance Directive and Living Will:     "  Power of :    POLST:      FOLLOW-UP     PCP Outpatient Follow-up: within 1 week of discharge    Consulting Providers Follow-up: none    Active Issues Requiring Follow-Up: post operative care with Dr. Rodriguez in outpatient setting.     Discharge Statement:  I spent 25 minutes discharging the patient. This time was spent on the day of discharge. I had direct contact with the patient on the day of discharge. Additional documentation is required if more than 30 minutes were spent on discharge.       Portions of the record may have been created with voice recognition software.  Occasional wrong word or \"sound a like\" substitutions may have occurred due to the inherent limitations of voice recognition software.  Read the chart carefully and recognize, using context, where substitutions have occurred.  ==  Lydia Lindsay DPM   [facility]  Podiatric Medicine & Surgery    "

## 2024-11-17 NOTE — PLAN OF CARE
Problem: PAIN - ADULT  Goal: Verbalizes/displays adequate comfort level or baseline comfort level  Description: Interventions:  - Encourage patient to monitor pain and request assistance  - Assess pain using appropriate pain scale  - Administer analgesics based on type and severity of pain and evaluate response  - Implement non-pharmacological measures as appropriate and evaluate response  - Consider cultural and social influences on pain and pain management  - Notify physician/advanced practitioner if interventions unsuccessful or patient reports new pain  Outcome: Progressing     Problem: INFECTION - ADULT  Goal: Absence or prevention of progression during hospitalization  Description: INTERVENTIONS:  - Assess and monitor for signs and symptoms of infection  - Monitor lab/diagnostic results  - Monitor all insertion sites, i.e. indwelling lines, tubes, and drains  - Monitor endotracheal if appropriate and nasal secretions for changes in amount and color  - [place] appropriate cooling/warming therapies per order  - Administer medications as ordered  - Instruct and encourage patient and family to use good hand hygiene technique  - Identify and instruct in appropriate isolation precautions for identified infection/condition  Outcome: Progressing     Problem: SAFETY ADULT  Goal: Patient will remain free of falls  Description: INTERVENTIONS:  - Educate patient/family on patient safety including physical limitations  - Instruct patient to call for assistance with activity   - Consult OT/PT to assist with strengthening/mobility   - Keep Call bell within reach  - Keep bed low and locked with side rails adjusted as appropriate  - Keep care items and personal belongings within reach  - Initiate and maintain comfort rounds  - Make Fall Risk Sign visible to staff  - Offer Toileting every 2 Hours, in advance of need  - Initiate/Maintain bed alarm  - Obtain necessary fall risk management equipment: non slip socks, call bell    - Apply yellow socks and bracelet for high fall risk patients  - Consider moving patient to room near nurses station  Outcome: Progressing     Problem: DISCHARGE PLANNING  Goal: Discharge to home or other facility with appropriate resources  Description: INTERVENTIONS:  - Identify barriers to discharge w/patient and caregiver  - Arrange for needed discharge resources and transportation as appropriate  - Identify discharge learning needs (meds, wound care, etc.)  - Arrange for interpretive services to assist at discharge as needed  - Refer to Case Management Department for coordinating discharge planning if the patient needs post-hospital services based on physician/advanced practitioner order or complex needs related to functional status, cognitive ability, or social support system  Outcome: Progressing

## 2024-11-17 NOTE — ASSESSMENT & PLAN NOTE
BP slightly elevated at time of admission  Maintained outpatient on losartan 25 mg daily, continue while inpatient  Will place IV as needed hydralazine   antibiotic therapy

## 2024-11-17 NOTE — PLAN OF CARE
Problem: PHYSICAL THERAPY ADULT  Goal: Performs mobility at highest level of function for planned discharge setting.  See evaluation for individualized goals.  Description: Treatment/Interventions: Functional transfer training, LE strengthening/ROM, Therapeutic exercise, Endurance training, Patient/family training, Equipment eval/education, Bed mobility, Gait training, Continued evaluation, Spoke to nursing, OT  Equipment Recommended: Wheelchair       See flowsheet documentation for full assessment, interventions and recommendations.  Note: Prognosis: Fair  Problem List: Decreased strength, Decreased endurance, Impaired balance, Decreased mobility, Impaired judgement, Decreased safety awareness, Obesity, Decreased skin integrity, Pain (PWB LLE with CAM boot)  Assessment: Pt able to perform bed mobility, TT and ST needing S level of A. Pt able to ambulate 40 feet x2 with use of RW and LLE CAM boot needing S level of A. Pt reports buring pain during WB and walking. Education and instruction for elevation of LLE in sitting and proper placement of BLE and B hands prior ot transfers. Pt would cont to benefit from skilled inpt PT services to maximize functional independence and to dec caregiver burden upon being DC from the hospital.  Barriers to Discharge:  (pt reports  works during the day, (+)[place])     Rehab Resource Intensity Level, PT: III (Minimum Resource Intensity)    See flowsheet documentation for full assessment.

## 2024-11-18 NOTE — UTILIZATION REVIEW
NOTIFICATION OF ADMISSION DISCHARGE   This is a Notification of Discharge from [facility]. Please be advised that this patient has been discharge from our facility. Below you will find the admission and discharge date and time including the patient’s disposition.   UTILIZATION REVIEW CONTACT:  Yanelis Gupta MA  Utilization   Network Utilization Review Department  Phone: [phone] x carefully listen to the prompts. All voicemails are confidential.  Email: [email or web address]     ADMISSION INFORMATION  PRESENTATION DATE: 11/14/2024  1:13 PM  OBERVATION ADMISSION DATE: N/A  INPATIENT ADMISSION DATE: 11/14/24  1:13 PM   DISCHARGE DATE: 11/17/2024  4:34 PM   DISPOSITION:Home/Self Care    Network Utilization Review Department  ATTENTION: Please call with any questions or concerns to [phone] and carefully listen to the prompts so that you are directed to the right person. All voicemails are confidential.   For Discharge needs, contact Care Management DC Support Team at [phone] opt. [phone]  Send all requests for admission clinical reviews, approved or denied determinations and any other requests to dedicated fax number below belonging to the campus where the patient is receiving treatment. List of dedicated fax numbers for the Facilities:  FACILITY NAME UR FAX NUMBER   ADMISSION DENIALS (Administrative/Medical Necessity) [phone]   DISCHARGE SUPPORT TEAM ([facility]) [phone]   PARENT CHILD HEALTH (Maternity/NICU/Pediatrics) [phone]   [facility] [phone]   General acute hospital [phone]   [facility] [phone]   General acute hospital [phone]   [facility] [phone]   [facility] [phone]   [facility] [phone]   [facility]  [phone]   [facility] [phone]   [facility] [phone]   [facility] [phone]   [facility] [phone]         normal...

## 2024-11-21 ENCOUNTER — OFFICE VISIT (OUTPATIENT)
Dept: PODIATRY | Facility: CLINIC | Age: 69
End: 2024-11-21

## 2024-11-21 ENCOUNTER — TELEPHONE (OUTPATIENT)
Age: 69
End: 2024-11-21

## 2024-11-21 VITALS
BODY MASS INDEX: 33.91 KG/M2 | HEIGHT: 66 IN | HEART RATE: 65 BPM | DIASTOLIC BLOOD PRESSURE: 79 MMHG | WEIGHT: 211 LBS | SYSTOLIC BLOOD PRESSURE: 130 MMHG

## 2024-11-21 DIAGNOSIS — E11.69 TYPE 2 DIABETES MELLITUS WITH OTHER SPECIFIED COMPLICATION, WITH LONG-TERM CURRENT USE OF INSULIN (HCC): Primary | ICD-10-CM

## 2024-11-21 DIAGNOSIS — E66.01 CLASS 2 SEVERE OBESITY WITH SERIOUS COMORBIDITY AND BODY MASS INDEX (BMI) OF 35.0 TO 35.9 IN ADULT, UNSPECIFIED OBESITY TYPE (HCC): ICD-10-CM

## 2024-11-21 DIAGNOSIS — Z98.890 POSTOPERATIVE STATE: Primary | ICD-10-CM

## 2024-11-21 DIAGNOSIS — Z79.4 TYPE 2 DIABETES MELLITUS WITH OTHER SPECIFIED COMPLICATION, WITH LONG-TERM CURRENT USE OF INSULIN (HCC): Primary | ICD-10-CM

## 2024-11-21 DIAGNOSIS — E66.812 CLASS 2 SEVERE OBESITY WITH SERIOUS COMORBIDITY AND BODY MASS INDEX (BMI) OF 35.0 TO 35.9 IN ADULT, UNSPECIFIED OBESITY TYPE (HCC): ICD-10-CM

## 2024-11-21 PROCEDURE — 99024 POSTOP FOLLOW-UP VISIT: CPT | Performed by: PODIATRIST

## 2024-11-21 RX ORDER — TIRZEPATIDE 2.5 MG/.5ML
2.5 INJECTION, SOLUTION SUBCUTANEOUS WEEKLY
Qty: 2 ML | Refills: 2 | Status: SHIPPED | OUTPATIENT
Start: 2024-11-21 | End: 2025-02-07

## 2024-11-21 RX ORDER — GENTAMICIN SULFATE 1 MG/G
OINTMENT TOPICAL
Qty: 15 G | Refills: 2 | Status: SHIPPED | OUTPATIENT
Start: 2024-11-21

## 2024-11-21 NOTE — TELEPHONE ENCOUNTER
Pt called. Was recently hospitalized, 11/14-11/17. Pt would like to know why mounjaro is no longer on her active medication list? Last noted discontinued by Danyelle Brunson DPM on 11/17/2024 16:34 .     Please inform PCP and further advise. Thank you

## 2024-11-21 NOTE — PROGRESS NOTES
Name: Jillian Arango      : 1955      MRN: [id number]  Encounter Provider: Aram Rodriguez DPM  Encounter Date: 2024   Encounter department: [facility] PODIATRY [place]    Assessment & Plan     1. Postoperative state  -     gentamicin (GARAMYCIN) 0.1 % ointment; Apply to wound at dressing change.  Use xeroform and dry sterile dressing. Change MWF by visiting nurses.  -     [facility]  -     Ambulatory Referral to Wound Care; Future; Expected date: 2024      Recommend gentamicin changes  with Xeroform clean up by the visiting nurses.    Leave Xeroform dressing in place to the lower leg harvesting site.  May replace the gauze on this area.    Be cautious when changing the dressing to the skin graft site on the back of the heel.  Rewrapped with adequate protection and padding to the heel to prevent motion and movement at this area.  May wash with soap and water gently do not scrub the heel.    Staples were left in place today.    Weightbearing restrictions will remain the same until seen for follow-up and reevaluation.  Plan for return evaluation 1 to 2 weeks for consideration of suture removal.  Patient should not place moisturizing lotions around the surgical incision.  VNA may wash the area with antibacterial soap and water.  I do not recommend soaking of the foot or ankle.    Notify the office immediately of any signs of infection including but not limited to: redness around incision, streaking red line up foot or leg, drainage, increased pain, fever, chills, sweats, nausea, vomiting, shortness of breath and/or chest pain.  If you are concerned for any reason, we have a physician on call 24 hours a day 7 days a week that can answer your questions.  Please call [phone]    Subjective      HPI    11/15/2024- STSG left leg to left achilles    Patient is wearing a boot at all times keeping it protected has a bunch of  padding on this area.  Denies any fevers chills nausea vomiting or other issues.    Review of Systems      Constitutional: Negative for fever.   Respiratory: Negative for shortness of breath.    Cardiovascular: Negative for chest pain.  Gastrointestinal: Negative for nausea and vomiting.     Current Outpatient Medications on File Prior to Visit   Medication Sig    ALPRAZolam (XANAX) 0.5 mg tablet TAKE 1/2 TABLET BY MOUTH ONCE DAILY AT BEDTIME AS NEEDED FOR ANXIETY    BIOTIN PO Take 1 capsule by mouth daily    dabigatran etexilate (PRADAXA) 150 mg capsu TAKE ONE CAPSULE BY MOUTH TWO TIMES A DAY    ferrous sulfate 324 (65 Fe) mg Take 1 tablet (324 mg total) by mouth every other day    FREESTYLE LITE test strip Check blood sugar 1-2 times daily    gabapentin (NEURONTIN) 100 mg capsule Take 1 capsule (100 mg total) by mouth 3 (three) times a day    levothyroxine 75 mcg tablet TAKE 1 TABLET DAILY EARLY IN THE MORNING    losartan (COZAAR) 25 mg tablet Take 1 tablet (25 mg total) by mouth daily    metFORMIN (GLUCOPHAGE) 500 mg tablet TAKE ONE TABLET BY MOUTH TWICE A DAY WITH MEALS    oxyCODONE-acetaminophen (Percocet) 5-325 mg per tablet Take 1 tablet by mouth every 4 (four) hours as needed for moderate pain for up to 5 doses Max Daily Amount: 6 tablets    pantoprazole (PROTONIX) 20 mg tablet Take 1 tablet (20 mg total) by mouth daily    pramipexole (MIRAPEX) 0.5 mg tablet Take 1 mg by mouth daily at bedtime    Probiotic Product (Probiotic Daily) CAPS Take 1 capsule by mouth in the morning    sodium chloride (OCEAN) 0.65 % nasal spray 1 spray into each nostril as needed for congestion or rhinitis    acyclovir (Zovirax) 5 % ointment Apply topically every 3 (three) hours for 5 days (Patient taking differently: Apply topically every 8 (eight) hours as needed)    meclizine (ANTIVERT) 12.5 MG tablet Take 1 tablet (12.5 mg total) by mouth every 8 (eight) hours as needed for nausea or dizziness for up to 14 days    traMADol  "(Ultram) 50 mg tablet Take 1 tablet (50 mg total) by mouth every 8 (eight) hours as needed for moderate pain (Patient not taking: Reported on 11/14/2024)    valACYclovir (VALTREX) 1,000 mg tablet Take 1 tablet (1,000 mg total) by mouth 2 (two) times a day for 1 day (Patient taking differently: Take 1,000 mg by mouth 2 (two) times a day as needed)       Objective     /79   Pulse 65   Ht 5' 6\" (1.676 m)   Wt 95.7 kg (211 lb)   BMI 34.06 kg/m²     Physical Exam      Harvesting site appears stable.  Xeroform left in place today.    The surgical site does have some maceration noted around it with some moisture noted around the periphery of the wound.  The central portion does appear to be stable with graft showing some moisture and maceration there is some blood on the dressing that was dry.  I do not see any ascending erythema noted currently.  No other areas of acute open ulcerations or lesions noted at this time.  "

## 2024-11-24 DIAGNOSIS — I10 PRIMARY HYPERTENSION: ICD-10-CM

## 2024-11-25 ENCOUNTER — TELEPHONE (OUTPATIENT)
Dept: INTERNAL MEDICINE CLINIC | Facility: CLINIC | Age: 69
End: 2024-11-25

## 2024-11-25 NOTE — TELEPHONE ENCOUNTER
[facility] pharmacy called requesting a refill for famotidine 20mg. Informed pharmacy that medication is not on patients current medication list. Pharmacy then stated that it has not been refilled since 02/26/24 and is going to contact the patient to confirm if that is the medication they are requesting.

## 2024-11-26 ENCOUNTER — TELEPHONE (OUTPATIENT)
Dept: INTERNAL MEDICINE CLINIC | Facility: CLINIC | Age: 69
End: 2024-11-26

## 2024-11-26 RX ORDER — LOSARTAN POTASSIUM 25 MG/1
25 TABLET ORAL DAILY
Qty: 90 TABLET | Refills: 1 | Status: SHIPPED | OUTPATIENT
Start: 2024-11-26

## 2024-11-26 NOTE — TELEPHONE ENCOUNTER
Zana Walsh, the Mirapex prescription was refused yesterday since it looks like another Provider ordered. Can you come in to discuss the medication with the Provider here when you can. Thank you.

## 2024-11-29 ENCOUNTER — OFFICE VISIT (OUTPATIENT)
Dept: WOUND CARE | Facility: HOSPITAL | Age: 69
End: 2024-11-29
Payer: COMMERCIAL

## 2024-11-29 VITALS
RESPIRATION RATE: 18 BRPM | DIASTOLIC BLOOD PRESSURE: 71 MMHG | HEART RATE: 64 BPM | TEMPERATURE: 96.9 F | SYSTOLIC BLOOD PRESSURE: 164 MMHG

## 2024-11-29 DIAGNOSIS — E11.9 TYPE 2 DIABETES MELLITUS TREATED WITHOUT INSULIN (HCC): Primary | ICD-10-CM

## 2024-11-29 DIAGNOSIS — L97.322 NON-PRESSURE CHRONIC ULCER OF LEFT ANKLE WITH FAT LAYER EXPOSED (HCC): ICD-10-CM

## 2024-11-29 DIAGNOSIS — G25.81: Primary | ICD-10-CM

## 2024-11-29 PROCEDURE — 99024 POSTOP FOLLOW-UP VISIT: CPT | Performed by: PODIATRIST

## 2024-11-29 PROCEDURE — 99213 OFFICE O/P EST LOW 20 MIN: CPT | Performed by: PODIATRIST

## 2024-11-29 PROCEDURE — G0463 HOSPITAL OUTPT CLINIC VISIT: HCPCS | Performed by: PODIATRIST

## 2024-11-29 RX ORDER — PRAMIPEXOLE DIHYDROCHLORIDE 0.5 MG/1
1 TABLET ORAL
Qty: 60 TABLET | Refills: 0 | Status: SHIPPED | OUTPATIENT
Start: 2024-11-29

## 2024-11-29 NOTE — PATIENT INSTRUCTIONS
Orders Placed This Encounter   Procedures    Wound cleansing and dressings Diabetic Ulcer Left;Posterior Ankle     Wound location Left posterior ankle.      Change dressing 3 times a week     Ok to shower with cast cover     Apply Gentamicin ointment to wound followed by Xeroform   Cover with ABD.    Secure with roll gauze and tape.      Leave Left Calf Dressing IN PLACE unless xeroform becomes dislodged, may change ABD with ankle dressing changes          Off-loading Instructions:  Wear CAM Boot as directed by your physician. ( May use shorter boot if CAM is uncomfortable )  Put on immediately when rising in the morning and remove when going to bed.          ACE WRAP;  Apply from base of toes to behind the knee. Apply in AM, may remove for sleep or leave intact   Avoid prolonged standing in one place.  Elevate leg(s) above the level of the heart when sitting or as much as possible.     Standing Status:   Future     Expiration Date:   12/6/2024

## 2024-11-29 NOTE — PROGRESS NOTES
Patient ID: Jillian Arango is a 69 y.o. female Date of Birth 1955     Diagnosis:  1. Type 2 diabetes mellitus treated without insulin ([facility])  -     Wound cleansing and dressings Diabetic Ulcer Left;Posterior Ankle; Future  2. Non-pressure chronic ulcer of left ankle with fat layer exposed (HCC)  -     Wound cleansing and dressings Diabetic Ulcer Left;Posterior Ankle; Future     Diagnosis ICD-10-CM Associated Orders   1. Type 2 diabetes mellitus treated without insulin (HCC)  E11.9 Wound cleansing and dressings Diabetic Ulcer Left;Posterior Ankle      2. Non-pressure chronic ulcer of left ankle with fat layer exposed (HCC)  L97.322 Wound cleansing and dressings Diabetic Ulcer Left;Posterior Ankle           Assessment & Plan:  Skin graft continues to improve.  There is one central area that did not take, but otherwise looks well.  Minimal drainage today.  Will plan on taking migue out next week.  Will have gentamicin ointment continue to be applied to the recipient site by home health nurse 3 times per week.  Xeroform gauze intact to the donor site.  Will see patient back in 1 week.  Patient to continue weight-bear as tolerated in cam boot with walker.    If the patient notices any systemic signs of infection including but not limited to fever, chills, nausea, vomiting or significant worsening of wound with increased drainage, redness or streaking up the foot or leg the patient should notify the office or present to the emergency room.      If wound debridement was completed today this was done in an attempt to promote healing, decrease risk of infection and for limb salvage efforts.     Goal of treatment: wound healing     Efforts to decrease pressure on the wound(s), evaluation and discussion of nutritional status, peripheral vascular status, and infection control have all been addressed with this patient.    Return in about 1 week (around 12/6/2024) for Recheck, wound assessment.      Chief Complaint    Patient presents with    Follow Up Wound Care Visit           Subjective:   Patient following up in clinic today for split-thickness skin graft to left posterior Achilles/heel.  DOS: 11/15.  Patient has been wearing her cam boot and ambulating after with a rolling walker.  Patient has been having dressing changes performed at home, and having gentamicin ointment applied by today treated with home health nurse.  She denies nausea, fever or chills.    The following portions of the patient's history were reviewed and updated as appropriate:   Patient Active Problem List   Diagnosis    Small bowel obstruction (HCC)    Diabetes mellitus (HCC)    History of urostomy    Endometrial cancer (HCC)    Thyroid disorder    Normocytic anemia    Hydroureteronephrosis    HTN (hypertension)    Migraine    Abdominal adhesions    Class 2 severe obesity with serious comorbidity and body mass index (BMI) of 35.0 to 35.9 in adult (HCC)    Headache    Ureteral-ileal loop anastomotic stricture    Chronic deep vein thrombosis (DVT) of femoral vein of right lower extremity (HCC)    Anti-cardiolipin antibody positive    Encounter for attention to other artificial openings of urinary tract (HCC)    History of smoking    Depression, recurrent (HCC)    PAD (peripheral artery disease) (HCC)    Baker's cyst, ruptured    Cholelithiasis    CHARY (iron deficiency anemia)    Upper respiratory infection    Ankle wound, left, initial encounter     Past Medical History:   Diagnosis Date    Achilles tendinitis of left lower extremity 02/08/2024    Achilles tendinosis of left ankle 11/27/2023    Anemia     Back pain     Bone infarction ([facility]) 06/24/2023    Bowel obstruction ([facility]) 2017,2018    Bursitis of posterior heel, left 11/27/2023    Cancer (HCC)     uterine CA radiation destoried urethra.   Patient has urostomy    Cholelithiasis     Colon polyp     Deep vein thrombosis of left lower extremity (HCC) 01/11/2004    on blood thinner for 3 years.     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    Hypercalcemia 10/10/2023    Hypertension     Hypothyroid     In vitro fertilization     Kidney stone     Migraines     Muscle weakness     abdominal    Nausea and vomiting 10/05/2023    Personal history of irradiation     Postoperative wound dehiscence, subsequent encounter 02/08/2024    Renal cyst     Sleep apnea     Resolved due to Weight Loss    Vertigo 10/10/2023     Past Surgical History:   Procedure Laterality Date    ACHILLES TENDON REPAIR Right     APPENDECTOMY      COLONOSCOPY      6/6/22    COLONOSCOPY W/ BIOPSIES N/A 12/2015    EGD      HEEL SPUR SURGERY Right 1996    HYSTERECTOMY      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    IR NEPHROSTOMY TUBE PLACEMENT  01/18/2021    LAPAROSCOPIC GASTRIC BANDING N/A 2006    [place], [place]    OTHER SURGICAL HISTORY  2004    Urine shunt to intestine, transverse colon    PCNL Left 02/16/2021    Procedure: NEPHROLITHOTOMY  PERCUTANEOUS (PCNL);  Surgeon: Ziggy Roldan MD;  Location: [facility];  Service: Urology    ND CYSTO/URETERO W/LITHOTRIPSY &INDWELL STENT INSRT Left 02/16/2021    Procedure: anterograde  URETEROSCOPY with dilation of ureteral stricture, INSERTION STENT URETERAL, exchange  of nephrostomy tube;  Surgeon: Ziggy Roldan MD;  Location: [facility];  Service: Urology    ND DEBRIDEMENT SUBCUTANEOUS TISSUE 1ST 20 SQ CM/< Left 01/29/2024    Procedure: DEBRIDEMENT LOWER EXTREMITY (WASH OUT) with PHOENIX GRAFT APPLICATION, foot wound;  Surgeon: Darron Frias DPM;  Location: [facility];  Service: Podiatry    ND LAPAROSCOPY ENTEROLYSIS SEPARATE  PROCEDURE N/A 2019    Procedure: LAPAROSCOPIC LYSIS OF ADHESIONS;  Surgeon: Omar Jack MD;  Location:  MAIN OR;  Service: General    FL PREP SITE F/S/N/H/F/G/M/D GT 1ST 100 SQ CM/1PCT Left 11/15/2024    Procedure: Left ankle debridement w/ harvesting of split-thickness skin graft left lower leg for application to left ankle wound.;  Surgeon: Aram Rodriguez DPM;  Location: [facility];  Service: Podiatry    FL REPAIR SECONDARY ACHILLES TENDON W/WO GRAFT Left 2023    Procedure: REMOVAL OF POSTERIOR HEEL SPUR WITH REPAIR TENDON ACHILLES;  Surgeon: Darron Frias DPM;  Location:  MAIN OR;  Service: Podiatry    FL SPLIT AGRFT T/A/L 1ST 100 CM/&/1% BDY INFT/CHLD Left 11/15/2024    Procedure: Left ankle debridement w/ harvesting of split-thickness skin graft left lower leg for application to left ankle wound.;  Surgeon: Aram Rodriguez DPM;  Location: [facility];  Service: Podiatry    [facility]    SLEEVE GASTROPLASTY N/A 2015    Dr. Jack, [facility]    UPPER GASTROINTESTINAL ENDOSCOPY      URETER REVISION      WEILBY THUMB ARTHROPLASTY      WEILBY THUMB ARTHROPLASTY       Social History     Socioeconomic History    Marital status: /Civil Union     Spouse name: Ari    Number of children: 0    Years of education: 14    Highest education level: Not on file   Occupational History    Occupation: [employer]    Tobacco Use    Smoking status: Former     Current packs/day: 0.00     Average packs/day: 1.5 packs/day for 12.0 years (18.0 ttl pk-yrs)     Types: Cigarettes     Start date: 1974     Quit date: 1986     Years since quittin.9     Passive exposure: Never    Smokeless tobacco: Never   Vaping Use    Vaping status: Never Used   Substance and Sexual Activity    Alcohol use: Yes     Comment: rarely    Drug use: Never    Sexual activity: Never     Comment: s/p hysterectomy   Other Topics Concern    Not on  file   Social History Narrative    Not on file     Social Drivers of Health     Financial Resource Strain: Not on file   Food Insecurity: No Food Insecurity (2024)    Nursing - Inadequate Food Risk Classification     Worried About Running Out of Food in the Last Year: Not on file     Ran Out of Food in the Last Year: Not on file     Ran Out of Food in the Last Year: 1   Transportation Needs: No Transportation Needs (2024)    Nursing - Transportation Risk Classification     Lack of Transportation: Not on file     Lack of Transportation: 2   Physical Activity: Inactive (2024)    Exercise Vital Sign     Days of Exercise per Week: 0 days     Minutes of Exercise per Session: 0 min   Stress: Not on file   Social Connections: Not on file   Intimate Partner Violence: Unknown (2024)    Nursing IPS     Feels Physically and Emotionally Safe: Not on file     Physically Hurt by Someone: Not on file     Humiliated or Emotionally Abused by Someone: Not on file     Physically Hurt by Someone: 2     Hurt or Threatened by Someone: 2   Housing Stability: Unknown (2024)    Nursing: Inadequate Housing Risk Classification     Has Housing: Not on file     Worried About Losing Housing: Not on file     Unable to Get Utilities: Not on file     Unable to Pay for Housing in the Last Year: 2     Has Housin        Current Outpatient Medications:     ALPRAZolam (XANAX) 0.5 mg tablet, TAKE 1/2 TABLET BY MOUTH ONCE DAILY AT BEDTIME AS NEEDED FOR ANXIETY, Disp: 30 tablet, Rfl: 2    BIOTIN PO, Take 1 capsule by mouth daily, Disp: , Rfl:     dabigatran etexilate (PRADAXA) 150 mg capsu, TAKE ONE CAPSULE BY MOUTH TWO TIMES A DAY, Disp: 200 capsule, Rfl: 1    ferrous sulfate 324 (65 Fe) mg, Take 1 tablet (324 mg total) by mouth every other day, Disp: 15 tablet, Rfl: 0    FREESTYLE LITE test strip, Check blood sugar 1-2 times daily, Disp: 100 each, Rfl: 4    gabapentin (NEURONTIN) 100 mg capsule, Take 1 capsule (100 mg  total) by mouth 3 (three) times a day, Disp: 90 capsule, Rfl: 0    gentamicin (GARAMYCIN) 0.1 % ointment, Apply to wound at dressing change.  Use xeroform and dry sterile dressing. Change MWF by visiting nurses., Disp: 15 g, Rfl: 2    levothyroxine 75 mcg tablet, TAKE 1 TABLET DAILY EARLY IN THE MORNING, Disp: 90 tablet, Rfl: 0    losartan (COZAAR) 25 mg tablet, TAKE ONE TABLET BY MOUTH EVERY DAY, Disp: 90 tablet, Rfl: 1    meclizine (ANTIVERT) 12.5 MG tablet, Take 1 tablet (12.5 mg total) by mouth every 8 (eight) hours as needed for nausea or dizziness for up to 14 days, Disp: 40 tablet, Rfl: 0    metFORMIN (GLUCOPHAGE) 500 mg tablet, TAKE ONE TABLET BY MOUTH TWICE A DAY WITH MEALS, Disp: 180 tablet, Rfl: 1    oxyCODONE-acetaminophen (Percocet) 5-325 mg per tablet, Take 1 tablet by mouth every 4 (four) hours as needed for moderate pain for up to 5 doses Max Daily Amount: 6 tablets, Disp: 5 tablet, Rfl: 0    pantoprazole (PROTONIX) 20 mg tablet, Take 1 tablet (20 mg total) by mouth daily, Disp: 90 tablet, Rfl: 3    pramipexole (MIRAPEX) 0.5 mg tablet, Take 1 mg by mouth daily at bedtime, Disp: , Rfl:     Probiotic Product (Probiotic Daily) CAPS, Take 1 capsule by mouth in the morning, Disp: 10 capsule, Rfl: 0    sodium chloride (OCEAN) 0.65 % nasal spray, 1 spray into each nostril as needed for congestion or rhinitis, Disp: 15 mL, Rfl: 0    Tirzepatide (Mounjaro) 2.5 MG/0.5ML SOAJ, Inject 2.5 mg under the skin once a week for 12 doses, Disp: 2 mL, Rfl: 2    valACYclovir (VALTREX) 1,000 mg tablet, Take 1 tablet (1,000 mg total) by mouth 2 (two) times a day for 1 day (Patient taking differently: Take 1,000 mg by mouth 2 (two) times a day as needed), Disp: 2 tablet, Rfl: 0  Family History   Problem Relation Age of Onset    No Known Problems Mother     Kidney failure Father     Brain cancer Father     Kidney cancer Father     Diabetes Sister     Alcohol abuse Sister     Diabetes Brother       Review of  Systems  Allergies:  Amoxicillin, Penicillins, and Adhesive [medical tape]      Objective:  /71   Pulse 64   Temp (!) 96.9 °F (36.1 °C)   Resp 18     Physical Exam      Wound 08/02/24 Diabetic Ulcer Ankle Left;Posterior (Active)   Wound Image Images linked 11/29/24 0921   Wound Description Pink;Yellow 11/29/24 0923   Dorina-wound Assessment Maceration 11/29/24 0923   Wound Length (cm) 3 cm 11/29/24 0923   Wound Width (cm) 1.5 cm 11/29/24 0923   Wound Depth (cm) 0.1 cm 11/29/24 0923   Wound Surface Area (cm^2) 4.5 cm^2 11/29/24 0923   Wound Volume (cm^3) 0.45 cm^3 11/29/24 0923   Calculated Wound Volume (cm^3) 0.45 cm^3 11/29/24 0923   Change in Wound Size % 55 11/29/24 0923   Wound Site Closure Staples 11/29/24 0923   Drainage Amount Large 11/29/24 0923   Drainage Description Serosanguineous;Jesus;Green 11/29/24 0923   Non-staged Wound Description Full thickness 11/29/24 0923   Dressing Status Intact 11/29/24 0923         No images are attached to the encounter.         Procedures             Wound Instructions:  Orders Placed This Encounter   Procedures    Wound cleansing and dressings Diabetic Ulcer Left;Posterior Ankle     Wound location Left posterior ankle.      Change dressing 3 times a week     Ok to shower with cast cover     Apply Gentamicin ointment to wound followed by Xeroform   Cover with ABD.    Secure with roll gauze and tape.      Leave Left Calf Dressing IN PLACE unless xeroform becomes dislodged, may change ABD with ankle dressing changes          Off-loading Instructions:  Wear CAM Boot as directed by your physician. ( May use shorter boot if CAM is uncomfortable )  Put on immediately when rising in the morning and remove when going to bed.          ACE WRAP;  Apply from base of toes to behind the knee. Apply in AM, may remove for sleep or leave intact   Avoid prolonged standing in one place.  Elevate leg(s) above the level of the heart when sitting or as much as possible.     Standing  "Status:   Future     Expiration Date:   12/6/2024         Barber Cannon DPM      Portions of the record may have been created with voice recognition software. Occasional wrong word or \"sound a like\" substitutions may have occurred due to the inherent limitations of voice recognition software. Read the chart carefully and recognize, using context, where substitutions have occurred.    "

## 2024-11-29 NOTE — PROGRESS NOTES
Wound Procedure Treatment Diabetic Ulcer Left;Posterior Ankle    Performed by: Emily Garrison RN  Authorized by: Aram Rodriguez DPM    Associated wounds:   Wound 08/02/24 Diabetic Ulcer Ankle Left;Posterior  Wound cleansed with:  NSS  Applied Topical: Mupirocin ointment    Applied primary dressing:  Other  Applied secondary dressing:  ABD  Dressing secured with:  Christa and Tape  Offloading device appllied:  CAM    Applied mupirocin ointment followed by xeroform    Ace wrap applied for compression

## 2024-12-03 ENCOUNTER — OFFICE VISIT (OUTPATIENT)
Dept: INTERNAL MEDICINE CLINIC | Facility: CLINIC | Age: 69
End: 2024-12-03
Payer: COMMERCIAL

## 2024-12-03 VITALS
BODY MASS INDEX: 33.91 KG/M2 | HEIGHT: 66 IN | DIASTOLIC BLOOD PRESSURE: 68 MMHG | HEART RATE: 66 BPM | WEIGHT: 211 LBS | RESPIRATION RATE: 16 BRPM | OXYGEN SATURATION: 99 % | TEMPERATURE: 98 F | SYSTOLIC BLOOD PRESSURE: 120 MMHG

## 2024-12-03 DIAGNOSIS — H10.31 ACUTE BACTERIAL CONJUNCTIVITIS OF RIGHT EYE: Primary | ICD-10-CM

## 2024-12-03 DIAGNOSIS — Z98.890 POST-OPERATIVE STATE: ICD-10-CM

## 2024-12-03 DIAGNOSIS — Z23 ENCOUNTER FOR IMMUNIZATION: ICD-10-CM

## 2024-12-03 DIAGNOSIS — Z11.59 ENCOUNTER FOR HEPATITIS C SCREENING TEST FOR LOW RISK PATIENT: ICD-10-CM

## 2024-12-03 DIAGNOSIS — E11.9 TYPE 2 DIABETES MELLITUS TREATED WITHOUT INSULIN (HCC): ICD-10-CM

## 2024-12-03 PROCEDURE — 90662 IIV NO PRSV INCREASED AG IM: CPT

## 2024-12-03 PROCEDURE — 90471 IMMUNIZATION ADMIN: CPT

## 2024-12-03 PROCEDURE — 99214 OFFICE O/P EST MOD 30 MIN: CPT

## 2024-12-03 RX ORDER — CIPROFLOXACIN HYDROCHLORIDE 3.5 MG/ML
1 SOLUTION/ DROPS TOPICAL
Qty: 4.2 ML | Refills: 0 | Status: SHIPPED | OUTPATIENT
Start: 2024-12-03 | End: 2024-12-10

## 2024-12-03 NOTE — PROGRESS NOTES
Name: Jillian Arango      : 1955      MRN: [id number]  Encounter Provider: Raquel Gonzales MD  Encounter Date: 12/3/2024   Encounter department: [facility] INTERNAL MEDICINE [place]  :  Assessment & Plan  Acute bacterial conjunctivitis of right eye  Complaining of R eye redness, drainage, and pus with crust onset 2 days ago  Denies pain with EOM, vision changes, sick contacts, HA, fevers, URI sx    Plan-  Ciprofloxacin ophthalmic sln for 7 days   RTO if any worsening sx   Educated on hand hygiene    Orders:    ciprofloxacin (CILOXAN) 0.3 % ophthalmic solution; Administer 1 drop to the right eye every 2 (two) hours for 7 days    Post-operative state  S/p Skin graft to the L heel/ achilles   Healing well no complaints   Denies pain, swelling, numbness, weakness, fevers, chills, pus/drainage   Follows Podiatry and Wound Care w/ [facility]     Plan-   Continue wound care and podiatry follow up   RTO if any sx of fevers, pain, pus/drainage, bleeding, numbness, tingling, weakness          Type 2 diabetes mellitus treated without insulin (HCC)    Lab Results   Component Value Date    HGBA1C 6.7 (H) 2024       Orders:    Basic metabolic panel; Future    Albumin / creatinine urine ratio; Future    Encounter for hepatitis C screening test for low risk patient    Orders:    Hepatitis C antibody; Future    Encounter for immunization    Orders:    influenza vaccine, high-dose, PF 0.5 mL (Fluzone High Dose)         History of Present Illness     Ms. Arango is a 68 yo F with a PMH of T2DM, Obesity, DVT on Pradaxa, Ileal conduit diversion, Chronic non-healing ulcer s/p skin graft who presents to the office for a follow up visit. Patient endorses that she feels well however over the past 2 days she has been experiencing R eye redness, pus/drainage, and wakes up with crust around her eye. Patient denies fevers, chills, HA, pain with EOM, vision changes, pruritus, new lotions/perfumes, congestion, sore throat, cough, CP, SOB,  "abdominal pain, hematuria, flank pain, calf pain, overt leg pain, edema, erythema, numbness, tingling, weakness, or any other sx at this time.       Review of Systems   Constitutional:  Negative for activity change, appetite change, chills, fever and unexpected weight change.   HENT:  Negative for congestion, ear pain, rhinorrhea, sinus pressure, sinus pain, sore throat, trouble swallowing and voice change.    Eyes:  Positive for discharge. Negative for pain and visual disturbance.        Redness, drainage, crust   Respiratory:  Negative for cough, chest tightness, shortness of breath and wheezing.    Cardiovascular:  Negative for chest pain and palpitations.   Gastrointestinal:  Negative for abdominal distention, abdominal pain, constipation, diarrhea, nausea and vomiting.   Endocrine: Negative for polyuria.   Genitourinary:  Negative for decreased urine volume, difficulty urinating, dysuria, flank pain, hematuria and urgency.   Musculoskeletal:  Negative for arthralgias, back pain, myalgias and neck pain.   Skin:  Negative for color change and rash.   Neurological:  Negative for dizziness, seizures, syncope, weakness, light-headedness and headaches.   Psychiatric/Behavioral:  Negative for agitation and confusion.    All other systems reviewed and are negative.    Medical History Reviewed by provider this encounter:  Meds  Problems     .     Objective   /68 (BP Location: Left arm, Patient Position: Sitting, Cuff Size: Large)   Pulse 66   Temp 98 °F (36.7 °C) (Tympanic)   Resp 16   Ht 5' 6\" (1.676 m)   Wt 95.7 kg (211 lb)   SpO2 99%   BMI 34.06 kg/m²      Physical Exam  Vitals and nursing note reviewed.   Constitutional:       General: She is not in acute distress.     Appearance: She is well-developed. She is obese. She is not ill-appearing, toxic-appearing or diaphoretic.   HENT:      Head: Normocephalic and atraumatic.      Mouth/Throat:      Mouth: Mucous membranes are moist.   Eyes:      General: " Lids are normal. Lids are everted, no foreign bodies appreciated. Vision grossly intact. No visual field deficit.        Right eye: Discharge present.      Extraocular Movements: Extraocular movements intact.      Conjunctiva/sclera: Conjunctivae normal.      Right eye: Right conjunctiva is not injected. No hemorrhage.     Left eye: Left conjunctiva is not injected. No hemorrhage.     Pupils: Pupils are equal, round, and reactive to light.      Comments: Conjunctivitis to the R eye    Cardiovascular:      Rate and Rhythm: Normal rate and regular rhythm.      Pulses: Normal pulses.      Heart sounds: Normal heart sounds. No murmur heard.     No friction rub. No gallop.   Pulmonary:      Effort: Pulmonary effort is normal. No respiratory distress.      Breath sounds: Normal breath sounds. No wheezing or rhonchi.   Abdominal:      Palpations: Abdomen is soft.      Tenderness: There is no abdominal tenderness.   Musculoskeletal:         General: No swelling. Normal range of motion.      Cervical back: Normal range of motion and neck supple.      Comments: L leg in boot with ace bandage, no pus/drainage, pulses intact, extremities warm   Skin:     General: Skin is warm and dry.      Capillary Refill: Capillary refill takes less than 2 seconds.   Neurological:      Mental Status: She is alert.   Psychiatric:         Mood and Affect: Mood normal.       Administrative Statements   I have spent a total time of 35 minutes in caring for this patient on the day of the visit/encounter including Diagnostic results, Prognosis, Risks and benefits of tx options, Counseling / Coordination of care, Documenting in the medical record, Reviewing / ordering tests, medicine, procedures  , and Obtaining or reviewing history  .

## 2024-12-03 NOTE — ASSESSMENT & PLAN NOTE
Complaining of R eye redness, drainage, and pus with crust onset 2 days ago  Denies pain with EOM, vision changes, sick contacts, HA, fevers, URI sx    Plan-  Ciprofloxacin ophthalmic sln for 7 days   RTO if any worsening sx   Educated on hand hygiene    Orders:    ciprofloxacin (CILOXAN) 0.3 % ophthalmic solution; Administer 1 drop to the right eye every 2 (two) hours for 7 days

## 2024-12-03 NOTE — ASSESSMENT & PLAN NOTE
S/p Skin graft to the L heel/ achilles   Healing well no complaints   Denies pain, swelling, numbness, weakness, fevers, chills, pus/drainage   Follows Podiatry and Wound Care w/ [facility]     Plan-   Continue wound care and podiatry follow up   RTO if any sx of fevers, pain, pus/drainage, bleeding, numbness, tingling, weakness

## 2024-12-03 NOTE — ASSESSMENT & PLAN NOTE
Lab Results   Component Value Date    HGBA1C 6.7 (H) 11/14/2024       Orders:    Basic metabolic panel; Future    Albumin / creatinine urine ratio; Future

## 2024-12-04 ENCOUNTER — TELEPHONE (OUTPATIENT)
Age: 69
End: 2024-12-04

## 2024-12-04 NOTE — TELEPHONE ENCOUNTER
Caller: Laura- [facility]    Doctor and/or Office: /GRZEGORZ    #: [phone]    Escalation: Care Visiting nurse is currently with the patient. Her heel is blue in color, and skin is very soft. She thinks between the dressing and the boot 24/7 this is causing it. She would like to speak to someone. Thank you

## 2024-12-04 NOTE — TELEPHONE ENCOUNTER
"Caller: Laura / Jillian Arango / visisting nurse    Doctor: Jennifer/ wound care    Reason for call: jillian is wearing her boot.  When she looked at her heel today she saw that the heel was blue and \"loose\". .  There is no airflow in the boot.  Should she take a little break from the boot?  Please advise.     Call back#: [phone]   "

## 2024-12-05 ENCOUNTER — TELEPHONE (OUTPATIENT)
Dept: WOUND CARE | Facility: HOSPITAL | Age: 69
End: 2024-12-05

## 2024-12-05 NOTE — TELEPHONE ENCOUNTER
I rec'd In Basket message: pt called podiatry office - notes that heel looks soft and bluish, wants to take off boot.   I called back and l/m stating the following message from Dr. Rodriguez:  I would not recommend her removing the boot because I do not want motion or movement at the site.    I do not want this open to air or allowing any contamination to get into the wound.     The patient has an appointment in the wound center tomorrow, 12/6 at which time we will apply cast padding to the heel to help cushion the area if necessary.

## 2024-12-06 ENCOUNTER — OFFICE VISIT (OUTPATIENT)
Dept: WOUND CARE | Facility: HOSPITAL | Age: 69
End: 2024-12-06
Payer: COMMERCIAL

## 2024-12-06 VITALS
HEART RATE: 67 BPM | SYSTOLIC BLOOD PRESSURE: 159 MMHG | DIASTOLIC BLOOD PRESSURE: 74 MMHG | RESPIRATION RATE: 16 BRPM | TEMPERATURE: 95.9 F

## 2024-12-06 DIAGNOSIS — L97.322 NON-PRESSURE CHRONIC ULCER OF LEFT ANKLE WITH FAT LAYER EXPOSED (HCC): Primary | ICD-10-CM

## 2024-12-06 PROCEDURE — G0463 HOSPITAL OUTPT CLINIC VISIT: HCPCS | Performed by: PODIATRIST

## 2024-12-06 PROCEDURE — 99213 OFFICE O/P EST LOW 20 MIN: CPT | Performed by: PODIATRIST

## 2024-12-06 PROCEDURE — 99024 POSTOP FOLLOW-UP VISIT: CPT | Performed by: PODIATRIST

## 2024-12-06 NOTE — PATIENT INSTRUCTIONS
Orders Placed This Encounter   Procedures    Wound cleansing and dressings     Wound location Left posterior ankle.       Change dressing 3 times a week      Ok to shower with cast cover      Apply Gentamicin ointment to wound followed by Xeroform   Cover with ABD.    Secure with roll gauze and tape.       Leave Left Calf Dressing IN PLACE unless xeroform becomes dislodged, may change ABD with ankle dressing changes            Off-loading Instructions:  Wear CAM Boot as directed by your physician. ( May use shorter boot if CAM is uncomfortable )  Put on immediately when rising in the morning and remove when going to bed.   Dr. Rodriguez is giving you a script for a new CAM boot.             ACE WRAP;  Apply from base of toes to behind the knee. Apply in AM, may remove for sleep or leave intact   Avoid prolonged standing in one place.      [facility] for continued wound care at home.     Standing Status:   Future     Expiration Date:   12/13/2024

## 2024-12-06 NOTE — LETTER
[facility]  [address] [facility] [address]  Phone#  [phone]  Fax#  [phone]    Patient:  Jillian Arango  YOB: 1955  Phone:  [phone]  Date of Visit:  12/6/2024    Orders Placed This Encounter   Procedures   • Wound cleansing and dressings     Wound location Left posterior ankle.       Change dressing 3 times a week      Ok to shower with cast cover      Apply Gentamicin ointment to wound followed by Xeroform   Cover with ABD.    Secure with roll gauze and tape.       Leave Left Calf Dressing IN PLACE unless xeroform becomes dislodged, may change ABD with ankle dressing changes            Off-loading Instructions:  Wear CAM Boot as directed by your physician. ( May use shorter boot if CAM is uncomfortable )  Put on immediately when rising in the morning and remove when going to bed.   Dr. Rodriguez is giving you a script for a new CAM boot.             ACE WRAP;  Apply from base of toes to behind the knee. Apply in AM, may remove for sleep or leave intact   Avoid prolonged standing in one place.      [facility] for continued wound care at home.     Standing Status:   Future     Expiration Date:   12/13/2024         Electronically signed by Aram Rodriguez DPM

## 2024-12-06 NOTE — PROGRESS NOTES
Patient ID: Jillian Arango is a 69 y.o. female Date of Birth 1955     Diagnosis:  1. Non-pressure chronic ulcer of left ankle with fat layer exposed (HCC)  -     Wound cleansing and dressings; Future  -     Wound Procedure Treatment Diabetic Ulcer Left;Posterior Ankle  -     Cam Boot     Diagnosis ICD-10-CM Associated Orders   1. Non-pressure chronic ulcer of left ankle with fat layer exposed (HCC)  L97.322 Wound cleansing and dressings     Wound Procedure Treatment Diabetic Ulcer Left;Posterior Ankle     Cam Boot           Assessment & Plan:  Evidence of epithelization in the wound area where the skin split-thickness skin graft was applied.  Periwound skin was cleaned up, but no debridement performed today.  Secondary to the donor site dressing was left intact.  Will keep this intact until after it comes off on its own.  In her case that it was admitted patient will need to continue with CAM Walker boot to protect the skin graft.  Will have her follow-up in 1 to 2 weeks.      If the patient notices any systemic signs of infection including but not limited to fever, chills, nausea, vomiting or significant worsening of wound with increased drainage, redness or streaking up the foot or leg the patient should notify the office or present to the emergency room.      If wound debridement was completed today this was done in an attempt to promote healing, decrease risk of infection and for limb salvage efforts.     Goal of treatment: wound healing     Efforts to decrease pressure on the wound(s), evaluation and discussion of nutritional status, peripheral vascular status, and infection control have all been addressed with this patient.    Return in about 1 week (around 12/13/2024) for Next scheduled follow up, Wound Assessment, recheck.      Chief Complaint   Patient presents with    Follow Up Wound Care Visit     Left leg wound           Subjective:   Patient following up today status post split-thickness skin  graft to left heel posteriorly.  DOS: 11/15/2024.  Patient is doing well.  Has been ambulating in a cam walking boot.  Denies pain to the recipient site.  Reports mild pain to the donor site.  Patient states that she is planning on going on a cruise soon.  She denies nausea, fever or chills.    The following portions of the patient's history were reviewed and updated as appropriate:   Patient Active Problem List   Diagnosis    Small bowel obstruction (HCC)    Type 2 diabetes mellitus treated without insulin (HCC)    Post-operative state    Endometrial cancer (HCC)    Thyroid disorder    Encounter for hepatitis C screening test for low risk patient    Normocytic anemia    Hydroureteronephrosis    HTN (hypertension)    Migraine    Abdominal adhesions    Class 2 severe obesity with serious comorbidity and body mass index (BMI) of 35.0 to 35.9 in adult (HCC)    Headache    Ureteral-ileal loop anastomotic stricture    Chronic deep vein thrombosis (DVT) of femoral vein of right lower extremity (HCC)    Anti-cardiolipin antibody positive    Encounter for attention to other artificial openings of urinary tract (HCC)    History of smoking    Depression, recurrent (HCC)    PAD (peripheral artery disease) (HCC)    Baker's cyst, ruptured    Cholelithiasis    CHARY (iron deficiency anemia)    Upper respiratory infection    Ankle wound, left, initial encounter    Acute bacterial conjunctivitis of right eye    Non-pressure chronic ulcer of left ankle with fat layer exposed (HCC)     Past Medical History:   Diagnosis Date    Achilles tendinitis of left lower extremity 02/08/2024    Achilles tendinosis of left ankle 11/27/2023    Anemia     Back pain     Bone infarction (HCC) 06/24/2023    Bowel obstruction (HCC) 2017,2018    Bursitis of posterior heel, left 11/27/2023    Cancer (HCC)     uterine CA radiation destoried urethra.   Patient has urostomy    Cholelithiasis     Colon polyp     Deep vein thrombosis of left lower extremity  (HCC) 01/11/2004    on blood thinner for 3 years.    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    Hypercalcemia 10/10/2023    Hypertension     Hypothyroid     In vitro fertilization     Kidney stone     Migraines     Muscle weakness     abdominal    Nausea and vomiting 10/05/2023    Personal history of irradiation     Postoperative wound dehiscence, subsequent encounter 02/08/2024    Renal cyst     Sleep apnea     Resolved due to Weight Loss    Vertigo 10/10/2023     Past Surgical History:   Procedure Laterality Date    ACHILLES TENDON REPAIR Right     APPENDECTOMY      COLONOSCOPY      6/6/22    COLONOSCOPY W/ BIOPSIES N/A 12/2015    EGD      HEEL SPUR SURGERY Right 1996    HYSTERECTOMY      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    IR NEPHROSTOMY TUBE PLACEMENT  01/18/2021    LAPAROSCOPIC GASTRIC BANDING N/A 2006    [place], [place]    OTHER SURGICAL HISTORY  2004    Urine shunt to intestine, transverse colon    PCNL Left 02/16/2021    Procedure: NEPHROLITHOTOMY  PERCUTANEOUS (PCNL);  Surgeon: Ziggy Roldan MD;  [facility];  Service: Urology    OR CYSTO/URETERO W/LITHOTRIPSY &INDWELL STENT INSRT Left 02/16/2021    Procedure: anterograde  URETEROSCOPY with dilation of ureteral stricture, INSERTION STENT URETERAL, exchange  of nephrostomy tube;  Surgeon: Ziggy Roldan MD;  [facility];  Service: Urology    OR DEBRIDEMENT SUBCUTANEOUS TISSUE 1ST 20 SQ CM/< Left 01/29/2024    Procedure: DEBRIDEMENT LOWER EXTREMITY (WASH OUT) with PHOENIX GRAFT APPLICATION, foot wound;  Surgeon: Darron Frias DPM;  Location: [facility];  Service:  Podiatry    IL LAPAROSCOPY ENTEROLYSIS SEPARATE PROCEDURE N/A 2019    Procedure: LAPAROSCOPIC LYSIS OF ADHESIONS;  Surgeon: Omar Jack MD;  Location:  MAIN OR;  Service: General    IL PREP SITE F/S/N/H/F/G/M/D GT 1ST 100 SQ CM/1PCT Left 11/15/2024    Procedure: Left ankle debridement w/ harvesting of split-thickness skin graft left lower leg for application to left ankle wound.;  Surgeon: Aram Rodriguez DPM;  Location: [facility];  Service: Podiatry    IL REPAIR SECONDARY ACHILLES TENDON W/WO GRAFT Left 2023    Procedure: REMOVAL OF POSTERIOR HEEL SPUR WITH REPAIR TENDON ACHILLES;  Surgeon: Darron Frias DPM;  Location:  MAIN OR;  Service: Podiatry    IL SPLIT AGRFT T/A/L 1ST 100 CM/&/1% BDY INFT/CHLD Left 11/15/2024    Procedure: Left ankle debridement w/ harvesting of split-thickness skin graft left lower leg for application to left ankle wound.;  Surgeon: Aram Rodriguez DPM;  [facility];  Service: Podiatry    [facility]    SLEEVE GA[facility]OPLASTY N/A 2015    Dr. Jack, [facility]    UPPER GASTROINTESTINAL ENDOSCOPY      URETER REVISION      WEILBY THUMB ARTHROPLASTY      WEILBY THUMB ARTHROPLASTY       Social History     Socioeconomic History    Marital status: /Civil Union     Spouse name: Ari    Number of children: 0    Years of education: 14    Highest education level: Not on file   Occupational History    Occupation: [employer]    Tobacco Use    Smoking status: Former     Current packs/day: 0.00     Average packs/day: 1.5 packs/day for 12.0 years (18.0 ttl pk-yrs)     Types: Cigarettes     Start date: 1974     Quit date: 1986     Years since quittin.9     Passive exposure: Never    Smokeless tobacco: Never   Vaping Use    Vaping status: Never Used   Substance and Sexual Activity    Alcohol use: Yes     Comment: rarely    Drug use: Never    Sexual activity: Never     Comment: s/p  hysterectomy   Other Topics Concern    Not on file   Social History Narrative    Not on file     Social Drivers of Health     Financial Resource Strain: Not on file   Food Insecurity: No Food Insecurity (2024)    Nursing - Inadequate Food Risk Classification     Worried About Running Out of Food in the Last Year: Not on file     Ran Out of Food in the Last Year: Not on file     Ran Out of Food in the Last Year: 1   Transportation Needs: No Transportation Needs (2024)    Nursing - Transportation Risk Classification     Lack of Transportation: Not on file     Lack of Transportation: 2   Physical Activity: Inactive (2024)    Exercise Vital Sign     Days of Exercise per Week: 0 days     Minutes of Exercise per Session: 0 min   Stress: Stress Concern Present (12/3/2024)    [employer] [employer] - Occupational Stress Questionnaire     Feeling of Stress : To some extent   Social Connections: Not on file   Intimate Partner Violence: Unknown (2024)    Nursing IPS     Feels Physically and Emotionally Safe: Not on file     Physically Hurt by Someone: Not on file     Humiliated or Emotionally Abused by Someone: Not on file     Physically Hurt by Someone: 2     Hurt or Threatened by Someone: 2   Housing Stability: Unknown (2024)    Nursing: Inadequate Housing Risk Classification     Has Housing: Not on file     Worried About Losing Housing: Not on file     Unable to Get Utilities: Not on file     Unable to Pay for Housing in the Last Year: 2     Has Housin        Current Outpatient Medications:     ALPRAZolam (XANAX) 0.5 mg tablet, TAKE 1/2 TABLET BY MOUTH ONCE DAILY AT BEDTIME AS NEEDED FOR ANXIETY, Disp: 30 tablet, Rfl: 2    BIOTIN PO, Take 1 capsule by mouth daily, Disp: , Rfl:     ciprofloxacin (CILOXAN) 0.3 % ophthalmic solution, Administer 1 drop to the right eye every 2 (two) hours for 7 days, Disp: 4.2 mL, Rfl: 0    dabigatran etexilate (PRADAXA) 150 mg capsu, TAKE ONE  CAPSULE BY MOUTH TWO TIMES A DAY, Disp: 200 capsule, Rfl: 1    ferrous sulfate 324 (65 Fe) mg, Take 1 tablet (324 mg total) by mouth every other day, Disp: 15 tablet, Rfl: 0    FREESTYLE LITE test strip, Check blood sugar 1-2 times daily, Disp: 100 each, Rfl: 4    gabapentin (NEURONTIN) 100 mg capsule, Take 1 capsule (100 mg total) by mouth 3 (three) times a day, Disp: 90 capsule, Rfl: 0    gentamicin (GARAMYCIN) 0.1 % ointment, Apply to wound at dressing change.  Use xeroform and dry sterile dressing. Change MWF by visiting nurses., Disp: 15 g, Rfl: 2    levothyroxine 75 mcg tablet, TAKE 1 TABLET DAILY EARLY IN THE MORNING, Disp: 90 tablet, Rfl: 0    losartan (COZAAR) 25 mg tablet, TAKE ONE TABLET BY MOUTH EVERY DAY, Disp: 90 tablet, Rfl: 1    meclizine (ANTIVERT) 12.5 MG tablet, Take 1 tablet (12.5 mg total) by mouth every 8 (eight) hours as needed for nausea or dizziness for up to 14 days, Disp: 40 tablet, Rfl: 0    metFORMIN (GLUCOPHAGE) 500 mg tablet, TAKE ONE TABLET BY MOUTH TWICE A DAY WITH MEALS, Disp: 180 tablet, Rfl: 1    oxyCODONE-acetaminophen (Percocet) 5-325 mg per tablet, Take 1 tablet by mouth every 4 (four) hours as needed for moderate pain for up to 5 doses Max Daily Amount: 6 tablets, Disp: 5 tablet, Rfl: 0    pantoprazole (PROTONIX) 20 mg tablet, Take 1 tablet (20 mg total) by mouth daily, Disp: 90 tablet, Rfl: 3    pramipexole (MIRAPEX) 0.5 mg tablet, Take 2 tablets (1 mg total) by mouth daily at bedtime, Disp: 60 tablet, Rfl: 0    Probiotic Product (Probiotic Daily) CAPS, Take 1 capsule by mouth in the morning, Disp: 10 capsule, Rfl: 0    sodium chloride (OCEAN) 0.65 % nasal spray, 1 spray into each nostril as needed for congestion or rhinitis, Disp: 15 mL, Rfl: 0    Tirzepatide (Mounjaro) 2.5 MG/0.5ML SOAJ, Inject 2.5 mg under the skin once a week for 12 doses, Disp: 2 mL, Rfl: 2  Family History   Problem Relation Age of Onset    No Known Problems Mother     Kidney failure Father     Brain  cancer Father     Kidney cancer Father     Diabetes Sister     Alcohol abuse Sister     Diabetes Brother       Review of Systems  Allergies:  Amoxicillin, Penicillins, and Adhesive [medical tape]      Objective:  /74   Pulse 67   Temp (!) 95.9 °F (35.5 °C)   Resp 16     Physical Exam      Wound 08/02/24 Diabetic Ulcer Ankle Left;Posterior (Active)   Enter Katz score: Katz Grade 1: Partial or full-thickness ulcer (superficial) 12/06/24 1033   Wound Image Images linked 12/06/24 1046   Wound Description Pink;Granulation tissue 12/06/24 1033   Dorina-wound Assessment Maceration;Scar Tissue;Pink 12/06/24 1033   Wound Length (cm) 2.4 cm 12/06/24 1033   Wound Width (cm) 1.2 cm 12/06/24 1033   Wound Depth (cm) 0.1 cm 12/06/24 1033   Wound Surface Area (cm^2) 2.88 cm^2 12/06/24 1033   Wound Volume (cm^3) 0.288 cm^3 12/06/24 1033   Calculated Wound Volume (cm^3) 0.29 cm^3 12/06/24 1033   Change in Wound Size % 71 12/06/24 1033   Wound Site Closure Staples 12/06/24 1033   Drainage Amount Small 12/06/24 1033   Drainage Description Serosanguineous 12/06/24 1033   Non-staged Wound Description Full thickness 12/06/24 1033   Dressing Status Intact 12/06/24 1033                    Procedures             Wound Instructions:  Orders Placed This Encounter   Procedures    Wound cleansing and dressings     Wound location Left posterior ankle.       Change dressing 3 times a week      Ok to shower with cast cover      Apply Gentamicin ointment to wound followed by Xeroform   Cover with ABD.    Secure with roll gauze and tape.       Leave Left Calf Dressing IN PLACE unless xeroform becomes dislodged, may change ABD with ankle dressing changes            Off-loading Instructions:  Wear CAM Boot as directed by your physician. ( May use shorter boot if CAM is uncomfortable )  Put on immediately when rising in the morning and remove when going to bed.   Dr. Rodriguez is giving you a script for a new CAM boot.             ACE  "WRAP;  Apply from base of toes to behind the knee. Apply in AM, may remove for sleep or leave intact   Avoid prolonged standing in one place.      [facility] for continued wound care at home.     Standing Status:   Future     Expiration Date:   12/13/2024    Wound Procedure Treatment Diabetic Ulcer Left;Posterior Ankle     This order was created via procedure documentation    Cam Boot     Size:   Medium              fit to size     Type:   High Stacie Cannon DPM      Portions of the record may have been created with voice recognition software. Occasional wrong word or \"sound a like\" substitutions may have occurred due to the inherent limitations of voice recognition software. Read the chart carefully and recognize, using context, where substitutions have occurred.      "

## 2024-12-06 NOTE — PROGRESS NOTES
Wound Procedure Treatment Diabetic Ulcer Left;Posterior Ankle    Performed by: Graciela Bang RN  Authorized by: Aram Rodriguez DPM    Associated wounds:   Wound 08/02/24 Diabetic Ulcer Ankle Left;Posterior  Wound cleansed with:  NSS  Applied Topical: Mupirocin ointment    Applied primary dressing:  Other  Applied secondary dressing:  ABD  Dressing secured with:  Christa, Tape and Compression wrap  Offloading device appllied:  CAM    Xeroform to wound.     CAM boot.,

## 2024-12-11 ENCOUNTER — OFFICE VISIT (OUTPATIENT)
Dept: WOUND CARE | Facility: HOSPITAL | Age: 69
End: 2024-12-11
Payer: COMMERCIAL

## 2024-12-11 VITALS
RESPIRATION RATE: 16 BRPM | DIASTOLIC BLOOD PRESSURE: 83 MMHG | SYSTOLIC BLOOD PRESSURE: 168 MMHG | HEART RATE: 71 BPM | TEMPERATURE: 97.9 F

## 2024-12-11 DIAGNOSIS — L97.322 NON-PRESSURE CHRONIC ULCER OF LEFT ANKLE WITH FAT LAYER EXPOSED (HCC): Primary | ICD-10-CM

## 2024-12-11 DIAGNOSIS — E11.9 TYPE 2 DIABETES MELLITUS TREATED WITHOUT INSULIN (HCC): ICD-10-CM

## 2024-12-11 PROCEDURE — 99213 OFFICE O/P EST LOW 20 MIN: CPT | Performed by: PODIATRIST

## 2024-12-11 PROCEDURE — 99024 POSTOP FOLLOW-UP VISIT: CPT | Performed by: PODIATRIST

## 2024-12-11 PROCEDURE — G0463 HOSPITAL OUTPT CLINIC VISIT: HCPCS | Performed by: PODIATRIST

## 2024-12-11 RX ORDER — LIDOCAINE 40 MG/G
CREAM TOPICAL ONCE
Status: COMPLETED | OUTPATIENT
Start: 2024-12-11 | End: 2024-12-11

## 2024-12-11 RX ADMIN — LIDOCAINE: 40 CREAM TOPICAL at 14:37

## 2024-12-11 NOTE — PROGRESS NOTES
Patient ID: Jillian Arango is a 69 y.o. female Date of Birth 1955     Diagnosis:  1. Non-pressure chronic ulcer of left ankle with fat layer exposed (HCC)  -     Wound cleansing and dressings; Future  -     lidocaine (LMX) 4 % cream  -     Wound Procedure Treatment Diabetic Ulcer Left;Posterior Ankle  -     Wound Procedure Treatment Left Leg  2. Type 2 diabetes mellitus treated without insulin (HCC)  -     Wound cleansing and dressings; Future  -     lidocaine (LMX) 4 % cream  -     Wound Procedure Treatment Diabetic Ulcer Left;Posterior Ankle  -     Wound Procedure Treatment Left Leg     Diagnosis ICD-10-CM Associated Orders   1. Non-pressure chronic ulcer of left ankle with fat layer exposed (HCC)  L97.322 Wound cleansing and dressings     lidocaine (LMX) 4 % cream     Wound Procedure Treatment Diabetic Ulcer Left;Posterior Ankle     Wound Procedure Treatment Left Leg      2. Type 2 diabetes mellitus treated without insulin (HCC)  E11.9 Wound cleansing and dressings     lidocaine (LMX) 4 % cream     Wound Procedure Treatment Diabetic Ulcer Left;Posterior Ankle     Wound Procedure Treatment Left Leg           Assessment & Plan:  Recommend switching her to Adaptic and dry sterile dressing to the area.    Will plan on having patient return for reevaluation on the 20th.  We will see how her wound is progressing.  Continue with boot immobilization for protection of the area.    Notify office for any new acute changes that occur.    [place] site dressing was removed today which appears healing well she did have some burning to this area however overall appearance is stable without signs of infection.    If the patient notices any systemic signs of infection including but not limited to fever, chills, nausea, vomiting or significant worsening of wound with increased drainage, redness or streaking up the foot or leg the patient should notify the office or present to the emergency room.      If wound debridement was  completed today this was done in an attempt to promote healing, decrease risk of infection and for limb salvage efforts.     Goal of treatment: wound healing     Efforts to decrease pressure on the wound(s), evaluation and discussion of nutritional status, peripheral vascular status, and infection control have all been addressed with this patient.    Return in about 9 days (around 12/20/2024) for Next scheduled follow up, Wound Assessment, recheck.      Chief Complaint   Patient presents with   • Follow Up Wound Care Visit     LLE wounds.            Subjective:   Patient has split-thickness skin graft 11/15/2024 she is doing well at this point, she is wearing the boot.  Denies any other new acute changes.    The following portions of the patient's history were reviewed and updated as appropriate:   Patient Active Problem List   Diagnosis   • Small bowel obstruction (HCC)   • Type 2 diabetes mellitus treated without insulin (HCC)   • Post-operative state   • Endometrial cancer (HCC)   • Thyroid disorder   • Encounter for hepatitis C screening test for low risk patient   • Normocytic anemia   • Hydroureteronephrosis   • HTN (hypertension)   • Migraine   • Abdominal adhesions   • Class 2 severe obesity with serious comorbidity and body mass index (BMI) of 35.0 to 35.9 in adult (HCC)   • Headache   • Ureteral-ileal loop anastomotic stricture   • Chronic deep vein thrombosis (DVT) of femoral vein of right lower extremity (HCC)   • Anti-cardiolipin antibody positive   • Encounter for attention to other artificial openings of urinary tract (HCC)   • History of smoking   • Depression, recurrent (HCC)   • PAD (peripheral artery disease) (HCC)   • Baker's cyst, ruptured   • Cholelithiasis   • CHARY (iron deficiency anemia)   • Upper respiratory infection   • Ankle wound, left, initial encounter   • Acute bacterial conjunctivitis of right eye   • Non-pressure chronic ulcer of left ankle with fat layer exposed (HCC)     Past  Medical History:   Diagnosis Date   • Achilles tendinitis of left lower extremity 02/08/2024   • Achilles tendinosis of left ankle 11/27/2023   • Anemia    • Back pain    • Bone infarction (HCC) 06/24/2023   • Bowel obstruction (HCC) 2017,2018   • Bursitis of posterior heel, left 11/27/2023   • Cancer (HCC)     uterine CA radiation destoried urethra.   Patient has urostomy   • Cholelithiasis    • Colon polyp    • Deep vein thrombosis of left lower extremity (HCC) 01/11/2004    on blood thinner for 3 years.   • Diabetes mellitus (HCC)     type 2 BS 6/6/22 @ 0600 was 145   • Disease of thyroid gland    • Elevated lactic acid level 01/16/2021   • Endometrial cancer (HCC) 1999   • GERD (gastroesophageal reflux disease)    • Gross hematuria    • Hiatal hernia    • History of transfusion 1999    Post Hysterectomy   • History of urostomy 01/11/2004    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.   • Hypercalcemia 10/10/2023   • Hypertension    • Hypothyroid    • In vitro fertilization    • Kidney stone    • Migraines    • Muscle weakness     abdominal   • Nausea and vomiting 10/05/2023   • Personal history of irradiation    • Postoperative wound dehiscence, subsequent encounter 02/08/2024   • Renal cyst    • Sleep apnea     Resolved due to Weight Loss   • Vertigo 10/10/2023     Past Surgical History:   Procedure Laterality Date   • ACHILLES TENDON REPAIR Right    • APPENDECTOMY     • COLONOSCOPY      6/6/22   • COLONOSCOPY W/ BIOPSIES N/A 12/2015   • EGD     • HEEL SPUR SURGERY Right 1996   • HYSTERECTOMY     • 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   • IR NEPHROSTOMY TUBE PLACEMENT  01/18/2021   • LAPAROSCOPIC GASTRIC BANDING N/A 2006    [place], [place]   • OTHER SURGICAL HISTORY  2004    Urine shunt to intestine, transverse colon   • PCNL Left 02/16/2021    Procedure: NEPHROLITHOTOMY  PERCUTANEOUS (PCNL);  Surgeon: Ziggy Roldan MD;  [facility];  Service: Urology   • TN  CYSTO/URETERO W/LITHOTRIPSY &INDWELL STENT INSRT Left 02/16/2021    Procedure: anterograde  URETEROSCOPY with dilation of ureteral stricture, INSERTION STENT URETERAL, exchange  of nephrostomy tube;  Surgeon: Ziggy Roldan MD;  [facility];  Service: Urology   • UT DEBRIDEMENT SUBCUTANEOUS TISSUE 1ST 20 SQ CM/< Left 01/29/2024    Procedure: DEBRIDEMENT LOWER EXTREMITY (WASH OUT) with [place] GRAFT APPLICATION, foot wound;  Surgeon: Darron Frias DPM;  Location: [facility];  Service: Podiatry   • UT LAPAROSCOPY ENTEROLYSIS SEPARATE PROCEDURE N/A 01/17/2019    Procedure: LAPAROSCOPIC LYSIS OF ADHESIONS;  Surgeon: Omar Jack MD;  Location:  MAIN OR;  Service: General   • UT PREP SITE F/S/N/H/F/G/M/D GT 1ST 100 SQ CM/1PCT Left 11/15/2024    Procedure: Left ankle debridement w/ harvesting of split-thickness skin graft left lower leg for application to left ankle wound.;  Surgeon: Aram Rodriguez DPM;  [facility];  Service: Podiatry   • UT REPAIR SECONDARY ACHILLES TENDON W/WO GRAFT Left 11/24/2023    Procedure: REMOVAL OF POSTERIOR HEEL SPUR WITH REPAIR TENDON ACHILLES;  Surgeon: Darron Frias DPM;  Location: [facility];  Service: Podiatry   • UT SPLIT AGRFT T/A/L 1ST 100 CM/&/1% BDY INFT/CHLD Left 11/15/2024    Procedure: Left ankle debridement w/ harvesting of split-thickness skin graft left lower leg for application to left ankle wound.;  Surgeon: Aram Rodriguez DPM;  [facility];  Service: Podiatry   • [facility]   • SLEEVE GASTROPLASTY N/A 09/2015    Dr. Jack, [facility]   • UPPER GASTROINTESTINAL ENDOSCOPY     • URETER REVISION  2010   • WEILBY THUMB ARTHROPLASTY     • WEILBY THUMB ARTHROPLASTY       Social History     Socioeconomic History   • Marital status: /Civil Union     Spouse name: Ari   • Number of children: 0   • Years of education: 14   • Highest education level: Not on file    Occupational History   • Occupation: Hair styist    Tobacco Use   • Smoking status: Former     Current packs/day: 0.00     Average packs/day: 1.5 packs/day for 12.0 years (18.0 ttl pk-yrs)     Types: Cigarettes     Start date: 1974     Quit date: 1986     Years since quittin.9     Passive exposure: Never   • Smokeless tobacco: Never   Vaping Use   • Vaping status: Never Used   Substance and Sexual Activity   • Alcohol use: Yes     Comment: rarely   • Drug use: Never   • Sexual activity: Never     Comment: s/p hysterectomy   Other Topics Concern   • Not on file   Social History Narrative   • Not on file     Social Drivers of Health     Financial Resource Strain: Not on file   Food Insecurity: No Food Insecurity (2024)    Nursing - Inadequate Food Risk Classification    • Worried About Running Out of Food in the Last Year: Not on file    • Ran Out of Food in the Last Year: Not on file    • Ran Out of Food in the Last Year: 1   Transportation Needs: No Transportation Needs (2024)    Nursing - Transportation Risk Classification    • Lack of Transportation: Not on file    • Lack of Transportation: 2   Physical Activity: Inactive (2024)    Exercise Vital Sign    • Days of Exercise per Week: 0 days    • Minutes of Exercise per Session: 0 min   Stress: Stress Concern Present (12/3/2024)    [employer] - Occupational Stress Questionnaire    • Feeling of Stress : To some extent   Social Connections: Not on file   Intimate Partner Violence: Unknown (2024)    Nursing IPS    • Feels Physically and Emotionally Safe: Not on file    • Physically Hurt by Someone: Not on file    • Humiliated or Emotionally Abused by Someone: Not on file    • Physically Hurt by Someone: 2    • Hurt or Threatened by Someone: 2   Housing Stability: Unknown (2024)    Nursing: Inadequate Housing Risk Classification    • Has Housing: Not on file    • Worried About Losing Housing: Not  on file    • Unable to Get Utilities: Not on file    • Unable to Pay for Housing in the Last Year: 2    • Has Housin        Current Outpatient Medications:   •  ALPRAZolam (XANAX) 0.5 mg tablet, TAKE 1/2 TABLET BY MOUTH ONCE DAILY AT BEDTIME AS NEEDED FOR ANXIETY, Disp: 30 tablet, Rfl: 2  •  BIOTIN PO, Take 1 capsule by mouth daily, Disp: , Rfl:   •  dabigatran etexilate (PRADAXA) 150 mg capsu, TAKE ONE CAPSULE BY MOUTH TWO TIMES A DAY, Disp: 200 capsule, Rfl: 1  •  ferrous sulfate 324 (65 Fe) mg, Take 1 tablet (324 mg total) by mouth every other day, Disp: 15 tablet, Rfl: 0  •  FREESTYLE LITE test strip, Check blood sugar 1-2 times daily, Disp: 100 each, Rfl: 4  •  gabapentin (NEURONTIN) 100 mg capsule, Take 1 capsule (100 mg total) by mouth 3 (three) times a day, Disp: 90 capsule, Rfl: 0  •  gentamicin (GARAMYCIN) 0.1 % ointment, Apply to wound at dressing change.  Use xeroform and dry sterile dressing. Change MWF by visiting nurses., Disp: 15 g, Rfl: 2  •  levothyroxine 75 mcg tablet, TAKE 1 TABLET DAILY EARLY IN THE MORNING, Disp: 90 tablet, Rfl: 0  •  losartan (COZAAR) 25 mg tablet, TAKE ONE TABLET BY MOUTH EVERY DAY, Disp: 90 tablet, Rfl: 1  •  meclizine (ANTIVERT) 12.5 MG tablet, Take 1 tablet (12.5 mg total) by mouth every 8 (eight) hours as needed for nausea or dizziness for up to 14 days, Disp: 40 tablet, Rfl: 0  •  metFORMIN (GLUCOPHAGE) 500 mg tablet, TAKE ONE TABLET BY MOUTH TWICE A DAY WITH MEALS, Disp: 180 tablet, Rfl: 1  •  oxyCODONE-acetaminophen (Percocet) 5-325 mg per tablet, Take 1 tablet by mouth every 4 (four) hours as needed for moderate pain for up to 5 doses Max Daily Amount: 6 tablets, Disp: 5 tablet, Rfl: 0  •  pantoprazole (PROTONIX) 20 mg tablet, Take 1 tablet (20 mg total) by mouth daily, Disp: 90 tablet, Rfl: 3  •  pramipexole (MIRAPEX) 0.5 mg tablet, Take 2 tablets (1 mg total) by mouth daily at bedtime, Disp: 60 tablet, Rfl: 0  •  Probiotic Product (Probiotic Daily) CAPS, Take 1  capsule by mouth in the morning, Disp: 10 capsule, Rfl: 0  •  sodium chloride (OCEAN) 0.65 % nasal spray, 1 spray into each nostril as needed for congestion or rhinitis, Disp: 15 mL, Rfl: 0  •  Tirzepatide (Mounjaro) 2.5 MG/0.5ML SOAJ, Inject 2.5 mg under the skin once a week for 12 doses, Disp: 2 mL, Rfl: 2  No current facility-administered medications for this visit.  Family History   Problem Relation Age of Onset   • No Known Problems Mother    • Kidney failure Father    • Brain cancer Father    • Kidney cancer Father    • Diabetes Sister    • Alcohol abuse Sister    • Diabetes Brother       Review of Systems  Allergies:  Amoxicillin, Penicillins, and Adhesive [medical tape]      Objective:  /83   Pulse 71   Temp 97.9 °F (36.6 °C)   Resp 16     Physical Exam      Wound 08/02/24 Diabetic Ulcer Ankle Left;Posterior (Active)   Enter Katz score: Katz Grade 1: Partial or full-thickness ulcer (superficial) 12/11/24 1431   Wound Description Pink;Granulation tissue 12/11/24 1431   Wound Length (cm) 1.6 cm 12/11/24 1431   Wound Width (cm) 1 cm 12/11/24 1431   Wound Depth (cm) 0.1 cm 12/11/24 1431   Wound Surface Area (cm^2) 1.6 cm^2 12/11/24 1431   Wound Volume (cm^3) 0.16 cm^3 12/11/24 1431   Calculated Wound Volume (cm^3) 0.16 cm^3 12/11/24 1431   Change in Wound Size % 84 12/11/24 1431   Drainage Amount Small 12/11/24 1431   Drainage Description Serosanguineous 12/11/24 1431   Non-staged Wound Description Full thickness 12/11/24 1431   Dressing Status Intact 12/11/24 1431       Wound 11/15/24 Leg Left (Active)   Wound Image Images linked 12/11/24 1432                    Procedures             Wound Instructions:  Orders Placed This Encounter   Procedures   • Wound cleansing and dressings     Wound location: Left posterior ankle and left lateral leg donor site.    Change dressing daily.    You may remove the dressing and shower. Do not leave wound open to air, apply new dressing immediately.  Cleanse the  "wound with wound cleanser or mild soap and water, rinse, pat dry.  Apply Betadine to maceration around post ankle wound.  This is available over the counter.    Apply Adaptic to cover the wound.  Cover with dry dressing.    Secure with roll gauze and tape.      Stop the Gentamicin and Xeroform to the ankle wound.              Off-loading Instructions:  Wear CAM Boot as directed by your physician. ( May use shorter boot if CAM is uncomfortable )  Put on immediately when rising in the morning and remove when going to bed. May remove CAM boot if you are sitting down and not walking.               ACE WRAP;  Apply from base of toes to behind the knee. Apply in AM, may remove for sleep or leave intact   Avoid prolonged standing in one place.        [facility] for continued wound care at home.  Please see patient one more time then may discharge if patient requests.  Please ensure that patient has enough supplies to get her through until her next appt at wound center on 12/20/24.  Please see patient 12/13/24 for return demonstration of wound care.     Standing Status:   Future     Expiration Date:   12/18/2024   • Wound Procedure Treatment Diabetic Ulcer Left;Posterior Ankle     This order was created via procedure documentation   • Wound Procedure Treatment Left Leg     This order was created via procedure documentation         Aram Rodriguez DPM      Portions of the record may have been created with voice recognition software. Occasional wrong word or \"sound a like\" substitutions may have occurred due to the inherent limitations of voice recognition software. Read the chart carefully and recognize, using context, where substitutions have occurred.      "

## 2024-12-11 NOTE — LETTER
[facility]  [address] [facility] [address]  Phone#  [phone]  Fax#  [phone]    Patient:  Jillian Arango  YOB: 1955  Phone:  [phone]  Date of Visit:  12/11/2024    Orders Placed This Encounter   Procedures   • Wound cleansing and dressings     Wound location: Left posterior ankle and left lateral leg donor site.    Change dressing daily.    You may remove the dressing and shower. Do not leave wound open to air, apply new dressing immediately.  Cleanse the wound with wound cleanser or mild soap and water, rinse, pat dry.  Apply Betadine to maceration around post ankle wound.  This is available over the counter.    Apply Adaptic to cover the wound.  Cover with dry dressing.    Secure with roll gauze and tape.      Stop the Gentamicin and Xeroform to the ankle wound.              Off-loading Instructions:  Wear CAM Boot as directed by your physician. ( May use shorter boot if CAM is uncomfortable )  Put on immediately when rising in the morning and remove when going to bed. May remove CAM boot if you are sitting down and not walking.               ACE WRAP;  Apply from base of toes to behind the knee. Apply in AM, may remove for sleep or leave intact   Avoid prolonged standing in one place.        [facility] for continued wound care at home.  Please see patient one more time then may discharge if patient requests.  Please ensure that patient has enough supplies to get her through until her next appt at wound center on 12/20/24.  Please see patient 12/13/24 for return demonstration of wound care.     Standing Status:   Future     Expiration Date:   12/18/2024   • Wound Procedure Treatment Diabetic Ulcer Left;Posterior Ankle     This order was created via procedure documentation   • Wound Procedure Treatment Left Leg     This order was created via procedure documentation         Electronically signed by Aram Rodriguez DPM

## 2024-12-11 NOTE — LETTER
[facility]  [address] [facility] [address]  Phone#  [phone]  Fax#  [phone]    Patient:  Jillian Arango  YOB: 1955  Phone:  [phone]  Date of Visit:  12/11/2024    Orders Placed This Encounter   Procedures   • Wound cleansing and dressings     Wound location: Left posterior ankle and left lateral leg donor site.    Change dressing daily.    You may remove the dressing and shower. Do not leave wound open to air, apply new dressing immediately.  Cleanse the wound with wound cleanser or mild soap and water, rinse, pat dry.  Apply Betadine to maceration around post ankle wound.  This is available over the counter.    Apply Adaptic to cover the wound.  Cover with dry dressing.    Secure with roll gauze and tape.      Stop the Gentamicin and Xeroform to the ankle wound.              Off-loading Instructions:  Wear CAM Boot as directed by your physician. ( May use shorter boot if CAM is uncomfortable )  Put on immediately when rising in the morning and remove when going to bed. May remove CAM boot if you are sitting down and not walking.               ACE WRAP;  Apply from base of toes to behind the knee. Apply in AM, may remove for sleep or leave intact   Avoid prolonged standing in one place.        [facility] for continued wound care at home.  Please see patient one more time then may discharge if patient requests.  Please ensure that patient has enough supplies to get her through until her next appt at wound center on 12/20/24.  Please see patient 12/13/24 for return demonstration of wound care.     Standing Status:   Future     Expiration Date:   12/18/2024         Electronically signed by Aram Rodriguez DPM

## 2024-12-11 NOTE — PATIENT INSTRUCTIONS
Orders Placed This Encounter   Procedures    Wound cleansing and dressings     Wound location: Left posterior ankle and left lateral leg donor site.    Change dressing daily.    You may remove the dressing and shower. Do not leave wound open to air, apply new dressing immediately.  Cleanse the wound with wound cleanser or mild soap and water, rinse, pat dry.  Apply Betadine to maceration around post ankle wound.  This is available over the counter.    Apply Adaptic to cover the wound.  Cover with dry dressing.    Secure with roll gauze and tape.      Stop the Gentamicin and Xeroform to the ankle wound.              Off-loading Instructions:  Wear CAM Boot as directed by your physician. ( May use shorter boot if CAM is uncomfortable )  Put on immediately when rising in the morning and remove when going to bed. May remove CAM boot if you are sitting down and not walking.               ACE WRAP;  Apply from base of toes to behind the knee. Apply in AM, may remove for sleep or leave intact   Avoid prolonged standing in one place.        [facility] for continued wound care at home.  Please see patient one more time then may discharge if patient requests.  Please ensure that patient has enough supplies to get her through until her next appt at wound center on 12/20/24.  Please see patient 12/13/24 for return demonstration of wound care.     Standing Status:   Future     Expiration Date:   12/18/2024

## 2024-12-20 ENCOUNTER — OFFICE VISIT (OUTPATIENT)
Dept: WOUND CARE | Facility: HOSPITAL | Age: 69
End: 2024-12-20
Payer: COMMERCIAL

## 2024-12-20 VITALS
TEMPERATURE: 98.3 F | DIASTOLIC BLOOD PRESSURE: 76 MMHG | HEART RATE: 56 BPM | RESPIRATION RATE: 18 BRPM | SYSTOLIC BLOOD PRESSURE: 170 MMHG

## 2024-12-20 DIAGNOSIS — L97.322 NON-PRESSURE CHRONIC ULCER OF LEFT ANKLE WITH FAT LAYER EXPOSED (HCC): Primary | ICD-10-CM

## 2024-12-20 PROCEDURE — G0463 HOSPITAL OUTPT CLINIC VISIT: HCPCS | Performed by: PODIATRIST

## 2024-12-20 PROCEDURE — 99024 POSTOP FOLLOW-UP VISIT: CPT | Performed by: PODIATRIST

## 2024-12-20 PROCEDURE — 99213 OFFICE O/P EST LOW 20 MIN: CPT | Performed by: PODIATRIST

## 2024-12-20 RX ORDER — LIDOCAINE 40 MG/G
CREAM TOPICAL ONCE
Status: COMPLETED | OUTPATIENT
Start: 2024-12-20 | End: 2024-12-20

## 2024-12-20 RX ORDER — LIDOCAINE 40 MG/G
CREAM TOPICAL AS NEEDED
Qty: 60 G | Refills: 5 | Status: SHIPPED | OUTPATIENT
Start: 2024-12-20

## 2024-12-20 RX ADMIN — LIDOCAINE: 40 CREAM TOPICAL at 10:58

## 2024-12-20 NOTE — PROGRESS NOTES
Wound Procedure Treatment    Performed by: Emilia Cruz RN  Authorized by: Aram Rodriguez DPM    Associated wounds:   Wound 08/02/24 Diabetic Ulcer Ankle Left;Posterior  Wound 11/15/24 Leg Left  Wound cleansed with:  NSS  Applied primary dressing:  Non adherent contact layer  Applied secondary dressing:  Gauze  Dressing secured with:  Christa and Tape

## 2024-12-20 NOTE — PROGRESS NOTES
Patient ID: Jillian Arango is a 69 y.o. female Date of Birth 1955     Diagnosis:  1. Non-pressure chronic ulcer of left ankle with fat layer exposed (HCC)  -     lidocaine (LMX) 4 % cream  -     Wound cleansing and dressings; Future  -     Wound Procedure Treatment  -     lidocaine (LMX) 4 % cream; Apply topically as needed for mild pain     Diagnosis ICD-10-CM Associated Orders   1. 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  Will have her use Adaptic and dry sterile dressing to both areas.    Will switch her to a backless shoe for protection of the area.    Will recheck her in 2 weeks or so for continued follow-up.    If the patient notices any systemic signs of infection including but not limited to fever, chills, nausea, vomiting or significant worsening of wound with increased drainage, redness or streaking up the foot or leg the patient should notify the office or present to the emergency room.      If wound debridement was completed today this was done in an attempt to promote healing, decrease risk of infection and for limb salvage efforts.     Goal of treatment: wound healing     Efforts to decrease pressure on the wound(s), evaluation and discussion of nutritional status, peripheral vascular status, and infection control have all been addressed with this patient.    Return in about 2 weeks (around 1/3/2025) for Recheck.      Chief Complaint   Patient presents with   • Follow Up Wound Care Visit     Left leg wound           Subjective:   Patient returns for follow-up on split-thickness skin grafting of the left leg to left Achilles tendon area.  She is doing well denies any new acute changes does have some pain to the lower leg.    The following portions of the patient's history were  reviewed and updated as appropriate:   Patient Active Problem List   Diagnosis   • Small bowel obstruction (HCC)   • Type 2 diabetes mellitus treated without insulin (HCC)   • Post-operative state   • Endometrial cancer (HCC)   • Thyroid disorder   • Encounter for hepatitis C screening test for low risk patient   • Normocytic anemia   • Hydroureteronephrosis   • HTN (hypertension)   • Migraine   • Abdominal adhesions   • Class 2 severe obesity with serious comorbidity and body mass index (BMI) of 35.0 to 35.9 in adult (HCC)   • Headache   • Ureteral-ileal loop anastomotic stricture   • Chronic deep vein thrombosis (DVT) of femoral vein of right lower extremity (HCC)   • Anti-cardiolipin antibody positive   • Encounter for attention to other artificial openings of urinary tract (HCC)   • History of smoking   • Depression, recurrent (HCC)   • PAD (peripheral artery disease) (HCC)   • Baker's cyst, ruptured   • Cholelithiasis   • CHARY (iron deficiency anemia)   • Upper respiratory infection   • Ankle wound, left, initial encounter   • Acute bacterial conjunctivitis of right eye   • Non-pressure chronic ulcer of left ankle with fat layer exposed (HCC)     Past Medical History:   Diagnosis Date   • Achilles tendinitis of left lower extremity 02/08/2024   • Achilles tendinosis of left ankle 11/27/2023   • Anemia    • Back pain    • Bone infarction (HCC) 06/24/2023   • Bowel obstruction (HCC) 2017,2018   • Bursitis of posterior heel, left 11/27/2023   • Cancer (HCC)     uterine CA radiation destoried urethra.   Patient has urostomy   • Cholelithiasis    • Colon polyp    • Deep vein thrombosis of left lower extremity (HCC) 01/11/2004    on blood thinner for 3 years.   • Diabetes mellitus (HCC)     type 2 BS 6/6/22 @ 0600 was 145   • Disease of thyroid gland    • Elevated lactic acid level 01/16/2021   • Endometrial cancer (HCC) 1999   • GERD (gastroesophageal reflux disease)    • Gross hematuria    • Hiatal hernia    •  History of transfusion 1999    Post Hysterectomy   • History of urostomy 01/11/2004    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.   • Hypercalcemia 10/10/2023   • Hypertension    • Hypothyroid    • In vitro fertilization    • Kidney stone    • Migraines    • Muscle weakness     abdominal   • Nausea and vomiting 10/05/2023   • Personal history of irradiation    • Postoperative wound dehiscence, subsequent encounter 02/08/2024   • Renal cyst    • Sleep apnea     Resolved due to Weight Loss   • Vertigo 10/10/2023     Past Surgical History:   Procedure Laterality Date   • ACHILLES TENDON REPAIR Right    • APPENDECTOMY     • COLONOSCOPY      6/6/22   • COLONOSCOPY W/ BIOPSIES N/A 12/2015   • EGD     • HEEL SPUR SURGERY Right 1996   • HYSTERECTOMY     • 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   • IR NEPHROSTOMY TUBE PLACEMENT  01/18/2021   • LAPAROSCOPIC GASTRIC BANDING N/A 2006    [place]   • OTHER SURGICAL HISTORY  2004    Urine shunt to intestine, transverse colon   • PCNL Left 02/16/2021    Procedure: NEPHROLITHOTOMY  PERCUTANEOUS (PCNL);  Surgeon: Ziggy Roldan MD;  Location: [facility];  Service: Urology   • CO CYSTO/URETERO W/LITHOTRIPSY &INDWELL STENT INSRT Left 02/16/2021    Procedure: anterograde  URETEROSCOPY with dilation of ureteral stricture, INSERTION STENT URETERAL, exchange  of nephrostomy tube;  Surgeon: Ziggy Roldan MD;  [facility];  Service: Urology   • CO DEBRIDEMENT SUBCUTANEOUS TISSUE 1ST 20 SQ CM/< Left 01/29/2024    Procedure: DEBRIDEMENT LOWER EXTREMITY (WASH OUT) with PHOENIX GRAFT APPLICATION, foot wound;  Surgeon: Darron Frias DPM;  Location:  MAIN OR;  Service: Podiatry   • CO LAPAROSCOPY ENTEROLYSIS SEPARATE PROCEDURE N/A 01/17/2019    Procedure: LAPAROSCOPIC LYSIS OF ADHESIONS;  Surgeon: Omar Jack MD;  Location:  MAIN OR;  Service: General   • CO PREP SITE F/S/N/H/F/G/M/D GT 1ST 100 SQ CM/1PCT Left 11/15/2024     Procedure: Left ankle debridement w/ harvesting of split-thickness skin graft left lower leg for application to left ankle wound.;  Surgeon: Aram Rodriguez DPM;  Location: [facility];  Service: Podiatry   • KS REPAIR SECONDARY ACHILLES TENDON W/WO GRAFT Left 2023    Procedure: REMOVAL OF POSTERIOR HEEL SPUR WITH REPAIR TENDON ACHILLES;  Surgeon: Darron Frias DPM;  Location: [facility];  Service: Podiatry   • KS SPLIT AGRFT T/A/L 1ST 100 CM/&/1% BDY INFT/CHLD Left 11/15/2024    Procedure: Left ankle debridement w/ harvesting of split-thickness skin graft left lower leg for application to left ankle wound.;  Surgeon: Aram Rodriguez DPM;  [facility];  Service: Podiatry   [facility]   • SLEEVE GASTROPLASTY N/A 2015    Dr. Jack, [facility]   • UPPER GASTROINTESTINAL ENDOSCOPY     • URETER REVISION     • WEILBY THUMB ARTHROPLASTY     • WEILBY THUMB ARTHROPLASTY       Social History     Socioeconomic History   • Marital status: /Civil Union     Spouse name: Ari   • Number of children: 0   • Years of education: 14   • Highest education level: Not on file   Occupational History   • Occupation: [employer]    Tobacco Use   • Smoking status: Former     Current packs/day: 0.00     Average packs/day: 1.5 packs/day for 12.0 years (18.0 ttl pk-yrs)     Types: Cigarettes     Start date: 1974     Quit date: 1986     Years since quittin.9     Passive exposure: Never   • Smokeless tobacco: Never   Vaping Use   • Vaping status: Never Used   Substance and Sexual Activity   • Alcohol use: Yes     Comment: rarely   • Drug use: Never   • Sexual activity: Never     Comment: s/p hysterectomy   Other Topics Concern   • Not on file   Social History Narrative   • Not on file     Social Drivers of Health     Financial Resource Strain: Not on file   Food Insecurity: No Food Insecurity (2024)    Nursing - Inadequate  Food Risk Classification    • Worried About Running Out of Food in the Last Year: Not on file    • Ran Out of Food in the Last Year: Not on file    • Ran Out of Food in the Last Year: 1   Transportation Needs: No Transportation Needs (2024)    Nursing - Transportation Risk Classification    • Lack of Transportation: Not on file    • Lack of Transportation: 2   Physical Activity: Inactive (2024)    Exercise Vital Sign    • Days of Exercise per Week: 0 days    • Minutes of Exercise per Session: 0 min   Stress: Stress Concern Present (12/3/2024)    [employer] - Occupational Stress Questionnaire    • Feeling of Stress : To some extent   Social Connections: Not on file   Intimate Partner Violence: Unknown (2024)    Nursing IPS    • Feels Physically and Emotionally Safe: Not on file    • Physically Hurt by Someone: Not on file    • Humiliated or Emotionally Abused by Someone: Not on file    • Physically Hurt by Someone: 2    • Hurt or Threatened by Someone: 2   Housing Stability: Unknown (2024)    Nursing: Inadequate Housing Risk Classification    • Has Housing: Not on file    • Worried About Losing Housing: Not on file    • Unable to Get Utilities: Not on file    • Unable to Pay for Housing in the Last Year: 2    • Has Housin        Current Outpatient Medications:   •  lidocaine (LMX) 4 % cream, Apply topically as needed for mild pain, Disp: 60 g, Rfl: 5  •  ALPRAZolam (XANAX) 0.5 mg tablet, TAKE 1/2 TABLET BY MOUTH ONCE DAILY AT BEDTIME AS NEEDED FOR ANXIETY, Disp: 30 tablet, Rfl: 2  •  BIOTIN PO, Take 1 capsule by mouth daily, Disp: , Rfl:   •  dabigatran etexilate (PRADAXA) 150 mg capsu, TAKE ONE CAPSULE BY MOUTH TWO TIMES A DAY, Disp: 200 capsule, Rfl: 1  •  ferrous sulfate 324 (65 Fe) mg, Take 1 tablet (324 mg total) by mouth every other day, Disp: 15 tablet, Rfl: 0  •  FREESTYLE LITE test strip, Check blood sugar 1-2 times daily, Disp: 100 each, Rfl: 4  •   gabapentin (NEURONTIN) 100 mg capsule, Take 1 capsule (100 mg total) by mouth 3 (three) times a day, Disp: 90 capsule, Rfl: 0  •  gentamicin (GARAMYCIN) 0.1 % ointment, Apply to wound at dressing change.  Use xeroform and dry sterile dressing. Change MWF by visiting nurses., Disp: 15 g, Rfl: 2  •  levothyroxine 75 mcg tablet, TAKE 1 TABLET DAILY EARLY IN THE MORNING, Disp: 90 tablet, Rfl: 0  •  losartan (COZAAR) 25 mg tablet, TAKE ONE TABLET BY MOUTH EVERY DAY, Disp: 90 tablet, Rfl: 1  •  meclizine (ANTIVERT) 12.5 MG tablet, Take 1 tablet (12.5 mg total) by mouth every 8 (eight) hours as needed for nausea or dizziness for up to 14 days, Disp: 40 tablet, Rfl: 0  •  metFORMIN (GLUCOPHAGE) 500 mg tablet, TAKE ONE TABLET BY MOUTH TWICE A DAY WITH MEALS, Disp: 180 tablet, Rfl: 1  •  oxyCODONE-acetaminophen (Percocet) 5-325 mg per tablet, Take 1 tablet by mouth every 4 (four) hours as needed for moderate pain for up to 5 doses Max Daily Amount: 6 tablets, Disp: 5 tablet, Rfl: 0  •  pantoprazole (PROTONIX) 20 mg tablet, Take 1 tablet (20 mg total) by mouth daily, Disp: 90 tablet, Rfl: 3  •  pramipexole (MIRAPEX) 0.5 mg tablet, Take 2 tablets (1 mg total) by mouth daily at bedtime, Disp: 60 tablet, Rfl: 0  •  Probiotic Product (Probiotic Daily) CAPS, Take 1 capsule by mouth in the morning, Disp: 10 capsule, Rfl: 0  •  sodium chloride (OCEAN) 0.65 % nasal spray, 1 spray into each nostril as needed for congestion or rhinitis, Disp: 15 mL, Rfl: 0  •  Tirzepatide (Mounjaro) 2.5 MG/0.5ML SOAJ, Inject 2.5 mg under the skin once a week for 12 doses, Disp: 2 mL, Rfl: 2  No current facility-administered medications for this visit.  Family History   Problem Relation Age of Onset   • No Known Problems Mother    • Kidney failure Father    • Brain cancer Father    • Kidney cancer Father    • Diabetes Sister    • Alcohol abuse Sister    • Diabetes Brother       Review of Systems  Allergies:  Amoxicillin, Penicillins, and Adhesive  [medical tape]      Objective:  /76   Pulse 56   Temp 98.3 °F (36.8 °C)   Resp 18     Physical Exam      Wound 08/02/24 Diabetic Ulcer Ankle Left;Posterior (Active)   Wound Image Images linked 12/20/24 1043   Wound Description Pink;Granulation tissue 12/20/24 1043   Dorina-wound Assessment Scar Tissue;Pink;Scaly;Dry 12/20/24 1043   Wound Length (cm) 1.8 cm 12/20/24 1043   Wound Width (cm) 0.8 cm 12/20/24 1043   Wound Depth (cm) 0.1 cm 12/20/24 1043   Wound Surface Area (cm^2) 1.44 cm^2 12/20/24 1043   Wound Volume (cm^3) 0.144 cm^3 12/20/24 1043   Calculated Wound Volume (cm^3) 0.14 cm^3 12/20/24 1043   Change in Wound Size % 86 12/20/24 1043   Drainage Amount Small 12/20/24 1043   Drainage Description Serosanguineous 12/20/24 1043   Non-staged Wound Description Full thickness 12/20/24 1043   Dressing Status Intact (upon arrival) 12/20/24 1043       Wound 11/15/24 Leg Left (Active)   Wound Image Images linked 12/20/24 1041   Wound Description Granulation tissue;Pink 12/20/24 1041   Dorina-wound Assessment Intact;Dry 12/20/24 1041   Wound Length (cm) 4 cm 12/20/24 1041   Wound Width (cm) 0.8 cm 12/20/24 1041   Wound Depth (cm) 0.1 cm 12/20/24 1041   Wound Surface Area (cm^2) 3.2 cm^2 12/20/24 1041   Wound Volume (cm^3) 0.32 cm^3 12/20/24 1041   Calculated Wound Volume (cm^3) 0.32 cm^3 12/20/24 1041   Drainage Amount Scant 12/20/24 1041   Drainage Description Serosanguineous 12/20/24 1041   Non-staged Wound Description Full thickness (donor site) 12/20/24 1041   Dressing Status Intact (upon arrival) 12/20/24 1041                    Procedures             Wound Instructions:  Orders Placed This Encounter   Procedures   • Wound cleansing and dressings     Wound cleansing and dressings    Wound location: Left posterior ankle and left lateral leg donor site.    Change dressing daily.    You may remove the dressing and shower. Do not leave wound open to air, apply new dressing immediately.  Cleanse the wound with  "wound cleanser or mild soap and water, rinse, pat dry.  Apply Adaptic to cover the wound.  Cover with dry dressing.    Secure with roll gauze and tape.              Off-loading Instructions:  You may discontinue to Wear CAM Boot.Wear comfortable shoes that does not touch the wound if possible.         ACE WRAP;  Apply from base of toes to behind the knee. Apply in AM, may remove for sleep or leave intact   Avoid prolonged standing in one place.      A prescription was called in your pharmacy for Lidocaine cream.Please pick this up as soon as possible.         Wound infection:  If you have signs of infection please call the wound center.  If the wound center is closed- please go to the Emergency department.  Some signs of infection:  fever, chills, increased redness, red streaks, increase in pain, increased drainage.  Drainage with an odor, Change in drainage color: white/milky/green/tan/yellow,  an increase in swelling, chest pain and/or shortness of breath.     Protein: Eat protein with each meal to promote healing.  Examples of protein are fish, meat, chicken, nuts, peanut butter, eggs, lentils, edamame or a protein shake.     Standing Status:   Future     Expiration Date:   12/27/2024   • Wound Procedure Treatment     This order was created via procedure documentation         Aram Rodriguez DPM      Portions of the record may have been created with voice recognition software. Occasional wrong word or \"sound a like\" substitutions may have occurred due to the inherent limitations of voice recognition software. Read the chart carefully and recognize, using context, where substitutions have occurred.      "

## 2024-12-20 NOTE — PATIENT INSTRUCTIONS
Orders Placed This Encounter   Procedures    Wound cleansing and dressings     Wound cleansing and dressings    Wound location: Left posterior ankle and left lateral leg donor site.    Change dressing daily.    You may remove the dressing and shower. Do not leave wound open to air, apply new dressing immediately.  Cleanse the wound with wound cleanser or mild soap and water, rinse, pat dry.  Apply Adaptic to cover the wound.  Cover with dry dressing.    Secure with roll gauze and tape.              Off-loading Instructions:  You may discontinue to Wear CAM Boot.Wear comfortable shoes that does not touch the wound if possible.         ACE WRAP;  Apply from base of toes to behind the knee. Apply in AM, may remove for sleep or leave intact   Avoid prolonged standing in one place.      A prescription was called in your pharmacy for Lidocaine cream.Please pick this up as soon as possible.         Wound infection:  If you have signs of infection please call the wound center.  If the wound center is closed- please go to the Emergency department.  Some signs of infection:  fever, chills, increased redness, red streaks, increase in pain, increased drainage.  Drainage with an odor, Change in drainage color: white/milky/green/tan/yellow,  an increase in swelling, chest pain and/or shortness of breath.     Protein: Eat protein with each meal to promote healing.  Examples of protein are fish, meat, chicken, nuts, peanut butter, eggs, lentils, edamame or a protein shake.     Standing Status:   Future     Expiration Date:   12/27/2024

## 2024-12-29 DIAGNOSIS — G25.81: ICD-10-CM

## 2024-12-30 DIAGNOSIS — Z98.890 POST-OPERATIVE STATE: ICD-10-CM

## 2024-12-30 RX ORDER — GABAPENTIN 100 MG/1
100 CAPSULE ORAL 3 TIMES DAILY
Qty: 90 CAPSULE | Refills: 1 | Status: SHIPPED | OUTPATIENT
Start: 2024-12-30

## 2024-12-30 RX ORDER — PRAMIPEXOLE DIHYDROCHLORIDE 0.5 MG/1
1 TABLET ORAL
Qty: 60 TABLET | Refills: 0 | Status: SHIPPED | OUTPATIENT
Start: 2024-12-30

## 2024-12-30 NOTE — TELEPHONE ENCOUNTER
Reason for call:   [x] Refill   [] Prior Auth  [] Other:     Office:   [x] PCP/Provider - was ordered in patient/wants PCP to follow up  [] Specialty/Provider -     Medication:     gabapentin (NEURONTIN) 100 mg capsule       Dose/Frequency:     Take 1 capsule (100 mg total) by mouth 3 (three) times a day       Quantity: 90    Pharmacy: [id number] [place] [place] - [address] [phone]     Does the patient have enough for 3 days?   [] Yes   [x] No - Send as HP to POD

## 2024-12-30 NOTE — TELEPHONE ENCOUNTER
Patient called to request a refill for their      pramipexole (MIRAPEX) 0.5 mg tablet   advised a refill was requested on 12/29/2024 and is pending approval. Patient verbalized understanding and is in agreement.    Pt is out of medication

## 2025-01-02 PROBLEM — H10.31 ACUTE BACTERIAL CONJUNCTIVITIS OF RIGHT EYE: Status: RESOLVED | Noted: 2024-12-03 | Resolved: 2025-01-02

## 2025-01-03 ENCOUNTER — OFFICE VISIT (OUTPATIENT)
Dept: WOUND CARE | Facility: HOSPITAL | Age: 70
End: 2025-01-03
Payer: COMMERCIAL

## 2025-01-03 VITALS
DIASTOLIC BLOOD PRESSURE: 78 MMHG | SYSTOLIC BLOOD PRESSURE: 156 MMHG | RESPIRATION RATE: 16 BRPM | TEMPERATURE: 97.4 F | HEART RATE: 78 BPM

## 2025-01-03 DIAGNOSIS — E11.9 TYPE 2 DIABETES MELLITUS TREATED WITHOUT INSULIN (HCC): ICD-10-CM

## 2025-01-03 DIAGNOSIS — M79.89 LEFT LEG SWELLING: ICD-10-CM

## 2025-01-03 DIAGNOSIS — L97.322 NON-PRESSURE CHRONIC ULCER OF LEFT ANKLE WITH FAT LAYER EXPOSED (HCC): Primary | ICD-10-CM

## 2025-01-03 PROCEDURE — 97597 DBRDMT OPN WND 1ST 20 CM/<: CPT | Performed by: PODIATRIST

## 2025-01-03 RX ORDER — OXYCODONE AND ACETAMINOPHEN 5; 325 MG/1; MG/1
1 TABLET ORAL EVERY 6 HOURS PRN
Qty: 30 TABLET | Refills: 0 | Status: SHIPPED | OUTPATIENT
Start: 2025-01-03

## 2025-01-03 RX ORDER — LIDOCAINE 40 MG/G
CREAM TOPICAL ONCE
Status: COMPLETED | OUTPATIENT
Start: 2025-01-03 | End: 2025-01-05

## 2025-01-03 RX ORDER — LIDOCAINE 40 MG/G
CREAM TOPICAL AS NEEDED
Qty: 60 G | Refills: 5 | Status: SHIPPED | OUTPATIENT
Start: 2025-01-03

## 2025-01-03 NOTE — PATIENT INSTRUCTIONS
Orders Placed This Encounter   Procedures    Wound cleansing and dressings     Wound location: Left posterior ankle and left lateral leg donor site. You may remove the dressing and shower. Do not leave wound open to air, apply new dressing immediately.  Cleanse the wound with mild soap and water, rinse, pat dry.      Apply puracol ag to open areas   Cover with 4x4 gauze   Secure with roll gauze and tape.    Change dressing every other day        Off-loading Instructions:  Keep weight and pressure off wound at all times. and Wear off-loading device as directed by your physician (surgical shoe). Put on immediately when rising in the morning and remove when going to bed.        LLE ACE WRAP: Apply from base of toes to behind the knee. Apply in AM, may remove for sleep or leave intact   Avoid prolonged standing in one place.      Call your pharmacy regarding your prescription that was sent in 12/20/24      Wound infection:  If you have signs of infection please call the wound center.  If the wound center is closed- please go to the Emergency department.  Some signs of infection:  fever, chills, increased redness, red streaks, increase in pain, increased drainage.  Drainage with an odor, Change in drainage color: white/milky/green/tan/yellow,  an increase in swelling, chest pain and/or shortness of breath.      Protein: Eat protein with each meal to promote healing.  Examples of protein are fish, meat, chicken, nuts, peanut butter, eggs, lentils, edamame or a protein shake.     Standing Status:   Future     Expiration Date:   1/17/2025

## 2025-01-03 NOTE — PROGRESS NOTES
Patient ID: Jillian Arango is a 69 y.o. female Date of Birth 1955     Diagnosis:  1. Non-pressure chronic ulcer of left ankle with fat layer exposed ([facility])  -     lidocaine (LMX) 4 % cream  -     Wound cleansing and dressings; Future  -     oxyCODONE-acetaminophen (Percocet) 5-325 mg per tablet; Take 1 tablet by mouth every 6 (six) hours as needed for moderate pain for up to 30 doses Max Daily Amount: 4 tablets  -     naloxone (NARCAN) 4 mg/0.1 mL nasal spray; Administer 1 spray into a nostril. If no response after 2-3 minutes, give another dose in the other nostril using a new spray.  -     lidocaine (LMX) 4 % cream; Apply topically as needed for mild pain  -     Debridement Diabetic Ulcer Left;Posterior Ankle  2. Type 2 diabetes mellitus treated without insulin ([facility])  -     lidocaine (LMX) 4 % cream  -     Wound cleansing and dressings; Future  -     naloxone (NARCAN) 4 mg/0.1 mL nasal spray; Administer 1 spray into a nostril. If no response after 2-3 minutes, give another dose in the other nostril using a new spray.  -     lidocaine (LMX) 4 % cream; Apply topically as needed for mild pain  3. Left leg swelling  -     VAS VENOUS DUPLEX -LOWER LIMB UNILATERAL; Future; Expected date: 01/03/2025  -     lidocaine (LMX) 4 % cream; Apply topically as needed for mild pain     Diagnosis ICD-10-CM Associated Orders   1. Non-pressure chronic ulcer of left ankle with fat layer exposed ([facility])  L97.322 lidocaine (LMX) 4 % cream     Wound cleansing and dressings     oxyCODONE-acetaminophen (Percocet) 5-325 mg per tablet     naloxone (NARCAN) 4 mg/0.1 mL nasal spray     lidocaine (LMX) 4 % cream     Debridement Diabetic Ulcer Left;Posterior Ankle      2. Type 2 diabetes mellitus treated without insulin ([facility])  E11.9 lidocaine (LMX) 4 % cream     Wound cleansing and dressings     naloxone (NARCAN) 4 mg/0.1 mL nasal spray     lidocaine (LMX) 4 % cream      3. Left leg swelling  M79.89 VAS VENOUS DUPLEX -LOWER LIMB UNILATERAL      lidocaine (LMX) 4 % cream           Assessment & Plan:  Will order venous duplex for the left lower leg today, although she is on anticoagulation currently.    Will send over pain medication.    Will switch her to collagen and dry sterile dressing to the area.    Selective debridement to heel.        If the patient notices any systemic signs of infection including but not limited to fever, chills, nausea, vomiting or significant worsening of wound with increased drainage, redness or streaking up the foot or leg the patient should notify the office or present to the emergency room.      If wound debridement was completed today this was done in an attempt to promote healing, decrease risk of infection and for limb salvage efforts.     Goal of treatment: wound healing     Efforts to decrease pressure on the wound(s), evaluation and discussion of nutritional status, peripheral vascular status, and infection control have all been addressed with this patient.    Return in about 2 weeks (around 1/17/2025) for Recheck, wound assessment.      Chief Complaint   Patient presents with   • Follow Up Wound Care Visit     Left leg and left ankle wounds            Subjective:   Patient returns after STSG left leg.  Doing well at this point and has no new acute issues.      The following portions of the patient's history were reviewed and updated as appropriate:   Patient Active Problem List   Diagnosis   • Small bowel obstruction (HCC)   • Type 2 diabetes mellitus treated without insulin (HCC)   • Post-operative state   • Endometrial cancer (HCC)   • Thyroid disorder   • Normocytic anemia   • Hydroureteronephrosis   • HTN (hypertension)   • Migraine   • Abdominal adhesions   • Class 2 severe obesity with serious comorbidity and body mass index (BMI) of 35.0 to 35.9 in adult (HCC)   • Headache   • Ureteral-ileal loop anastomotic stricture   • Chronic deep vein thrombosis (DVT) of femoral vein of right lower extremity (HCC)   •  Anti-cardiolipin antibody positive   • Encounter for attention to other artificial openings of urinary tract (HCC)   • History of smoking   • Depression, recurrent (HCC)   • PAD (peripheral artery disease) (HCC)   • Baker's cyst, ruptured   • Cholelithiasis   • CHARY (iron deficiency anemia)   • Upper respiratory infection   • Ankle wound, left, initial encounter   • Non-pressure chronic ulcer of left ankle with fat layer exposed (HCC)     Past Medical History:   Diagnosis Date   • Achilles tendinitis of left lower extremity 02/08/2024   • Achilles tendinosis of left ankle 11/27/2023   • Anemia    • Back pain    • Bone infarction (HCC) 06/24/2023   • Bowel obstruction (HCC) 2017,2018   • Bursitis of posterior heel, left 11/27/2023   • Cancer (HCC)     uterine CA radiation destoried urethra.   Patient has urostomy   • Cholelithiasis    • Colon polyp    • Deep vein thrombosis of left lower extremity (HCC) 01/11/2004    on blood thinner for 3 years.   • Diabetes mellitus (HCC)     type 2 BS 6/6/22 @ 0600 was 145   • Disease of thyroid gland    • Elevated lactic acid level 01/16/2021   • Endometrial cancer (HCC) 1999   • GERD (gastroesophageal reflux disease)    • Gross hematuria    • Hiatal hernia    • History of transfusion 1999    Post Hysterectomy   • History of urostomy 01/11/2004    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.   • Hypercalcemia 10/10/2023   • Hypertension    • Hypothyroid    • In vitro fertilization    • Kidney stone    • Migraines    • Muscle weakness     abdominal   • Nausea and vomiting 10/05/2023   • Personal history of irradiation    • Postoperative wound dehiscence, subsequent encounter 02/08/2024   • Renal cyst    • Sleep apnea     Resolved due to Weight Loss   • Vertigo 10/10/2023     Past Surgical History:   Procedure Laterality Date   • ACHILLES TENDON REPAIR Right    • APPENDECTOMY     • COLONOSCOPY      6/6/22   • COLONOSCOPY W/ BIOPSIES N/A 12/2015   • EGD     • HEEL SPUR  SURGERY Right 1996   • HYSTERECTOMY     • 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   • IR NEPHROSTOMY TUBE PLACEMENT  01/18/2021   • LAPAROSCOPIC GASTRIC BANDING N/A 2006    [place], [place]   • OTHER SURGICAL HISTORY  2004    Urine shunt to intestine, transverse colon   • PCNL Left 02/16/2021    Procedure: NEPHROLITHOTOMY  PERCUTANEOUS (PCNL);  Surgeon: Ziggy Roldan MD;  Location: [facility];  Service: Urology   • AK CYSTO/URETERO W/LITHOTRIPSY &INDWELL STENT INSRT Left 02/16/2021    Procedure: anterograde  URETEROSCOPY with dilation of ureteral stricture, INSERTION STENT URETERAL, exchange  of nephrostomy tube;  Surgeon: Ziggy Roldan MD;  Location[facility];  Service: Urology   • AK DEBRIDEMENT SUBCUTANEOUS TISSUE 1ST 20 SQ CM/< Left 01/29/2024    Procedure: DEBRIDEMENT LOWER EXTREMITY (WASH OUT) with PHOENIX GRAFT APPLICATION, foot wound;  Surgeon: Darron Frias DPM;  Location:  MAIN OR;  Service: Podiatry   • AK LAPAROSCOPY ENTEROLYSIS SEPARATE PROCEDURE N/A 01/17/2019    Procedure: LAPAROSCOPIC LYSIS OF ADHESIONS;  Surgeon: Omar Jack MD;  Location:  MAIN OR;  Service: General   • AK PREP SITE F/S/N/H/F/G/M/D GT 1ST 100 SQ CM/1PCT Left 11/15/2024    Procedure: Left ankle debridement w/ harvesting of split-thickness skin graft left lower leg for application to left ankle wound.;  Surgeon: Aram Rodriguez DPM;  Location: [facility];  Service: Podiatry   • AK REPAIR SECONDARY ACHILLES TENDON W/WO GRAFT Left 11/24/2023    Procedure: REMOVAL OF POSTERIOR HEEL SPUR WITH REPAIR TENDON ACHILLES;  Surgeon: Darron Frias DPM;  Location:  MAIN OR;  Service: Podiatry   • AK SPLIT AGRFT T/A/L 1ST 100 CM/&/1% BDY INFT/CHLD Left 11/15/2024    Procedure: Left ankle debridement w/ harvesting of split-thickness skin graft left lower leg for application to left ankle wound.;  Surgeon: Aram Rodriguez DPM;  [facility];  Service: Podiatry   • [facility]   • SLEEVE GASTROPLASTY N/A 2015    Dr. Jack, [facility]   • UPPER GASTROINTESTINAL ENDOSCOPY     • URETER REVISION     • WEILBY THUMB ARTHROPLASTY     • WEILBY THUMB ARTHROPLASTY       Social History     Socioeconomic History   • Marital status: /Civil Union     Spouse name: Ari   • Number of children: 0   • Years of education: 14   • Highest education level: Not on file   Occupational History   • Occupation: Hair styist    Tobacco Use   • Smoking status: Former     Current packs/day: 0.00     Average packs/day: 1.5 packs/day for 12.0 years (18.0 ttl pk-yrs)     Types: Cigarettes     Start date: 1974     Quit date: 1986     Years since quittin.0     Passive exposure: Never   • Smokeless tobacco: Never   Vaping Use   • Vaping status: Never Used   Substance and Sexual Activity   • Alcohol use: Yes     Comment: rarely   • Drug use: Never   • Sexual activity: Never     Comment: s/p hysterectomy   Other Topics Concern   • Not on file   Social History Narrative   • Not on file     Social Drivers of Health     Financial Resource Strain: Not on file   Food Insecurity: No Food Insecurity (2024)    Nursing - Inadequate Food Risk Classification    • Worried About Running Out of Food in the Last Year: Not on file    • Ran Out of Food in the Last Year: Not on file    • Ran Out of Food in the Last Year: Never true   Transportation Needs: No Transportation Needs (2024)    Nursing - Transportation Risk Classification    • Lack of Transportation: Not on file    • Lack of Transportation: No   Physical Activity: Inactive (2024)    Exercise Vital Sign    • Days of Exercise per Week: 0 days    • Minutes of Exercise per Session: 0 min   Stress: Stress Concern Present (12/3/2024)    [place] [employer] - Occupational Stress Questionnaire    • Feeling of Stress : To some extent   Social Connections: Not on file   Intimate Partner  Violence: Unknown (2024)    Nursing IPS    • Feels Physically and Emotionally Safe: Not on file    • Physically Hurt by Someone: Not on file    • Humiliated or Emotionally Abused by Someone: Not on file    • Physically Hurt by Someone: No    • Hurt or Threatened by Someone: No   Housing Stability: Unknown (2024)    Nursing: Inadequate Housing Risk Classification    • Has Housing: Not on file    • Worried About Losing Housing: Not on file    • Unable to Get Utilities: Not on file    • Unable to Pay for Housing in the Last Year: No    • Has Housin        Current Outpatient Medications:   •  lidocaine (LMX) 4 % cream, Apply topically as needed for mild pain, Disp: 60 g, Rfl: 5  •  naloxone (NARCAN) 4 mg/0.1 mL nasal spray, Administer 1 spray into a nostril. If no response after 2-3 minutes, give another dose in the other nostril using a new spray., Disp: 1 each, Rfl: 1  •  oxyCODONE-acetaminophen (Percocet) 5-325 mg per tablet, Take 1 tablet by mouth every 6 (six) hours as needed for moderate pain for up to 30 doses Max Daily Amount: 4 tablets, Disp: 30 tablet, Rfl: 0  •  ALPRAZolam (XANAX) 0.5 mg tablet, TAKE 1/2 TABLET BY MOUTH ONCE DAILY AT BEDTIME AS NEEDED FOR ANXIETY, Disp: 30 tablet, Rfl: 2  •  BIOTIN PO, Take 1 capsule by mouth daily, Disp: , Rfl:   •  dabigatran etexilate (PRADAXA) 150 mg capsu, TAKE ONE CAPSULE BY MOUTH TWO TIMES A DAY, Disp: 200 capsule, Rfl: 1  •  ferrous sulfate 324 (65 Fe) mg, Take 1 tablet (324 mg total) by mouth every other day, Disp: 15 tablet, Rfl: 0  •  FREESTYLE LITE test strip, Check blood sugar 1-2 times daily, Disp: 100 each, Rfl: 4  •  gabapentin (NEURONTIN) 100 mg capsule, Take 1 capsule (100 mg total) by mouth 3 (three) times a day, Disp: 90 capsule, Rfl: 1  •  gentamicin (GARAMYCIN) 0.1 % ointment, Apply to wound at dressing change.  Use xeroform and dry sterile dressing. Change MWF by visiting nurses., Disp: 15 g, Rfl: 2  •  levothyroxine 75 mcg tablet, TAKE  1 TABLET DAILY EARLY IN THE MORNING, Disp: 90 tablet, Rfl: 0  •  losartan (COZAAR) 25 mg tablet, TAKE ONE TABLET BY MOUTH EVERY DAY, Disp: 90 tablet, Rfl: 1  •  meclizine (ANTIVERT) 12.5 MG tablet, Take 1 tablet (12.5 mg total) by mouth every 8 (eight) hours as needed for nausea or dizziness for up to 14 days, Disp: 40 tablet, Rfl: 0  •  metFORMIN (GLUCOPHAGE) 500 mg tablet, TAKE ONE TABLET BY MOUTH TWICE A DAY WITH MEALS, Disp: 180 tablet, Rfl: 1  •  oxyCODONE-acetaminophen (Percocet) 5-325 mg per tablet, Take 1 tablet by mouth every 4 (four) hours as needed for moderate pain for up to 5 doses Max Daily Amount: 6 tablets, Disp: 5 tablet, Rfl: 0  •  pantoprazole (PROTONIX) 20 mg tablet, Take 1 tablet (20 mg total) by mouth daily, Disp: 90 tablet, Rfl: 3  •  pramipexole (MIRAPEX) 0.5 mg tablet, TAKE TWO TABLETS BY MOUTH EVERY DAY AT BEDTIME, Disp: 60 tablet, Rfl: 0  •  Probiotic Product (Probiotic Daily) CAPS, Take 1 capsule by mouth in the morning, Disp: 10 capsule, Rfl: 0  •  sodium chloride (OCEAN) 0.65 % nasal spray, 1 spray into each nostril as needed for congestion or rhinitis, Disp: 15 mL, Rfl: 0  •  Tirzepatide (Mounjaro) 2.5 MG/0.5ML SOAJ, Inject 2.5 mg under the skin once a week for 12 doses, Disp: 2 mL, Rfl: 2  No current facility-administered medications for this visit.  Family History   Problem Relation Age of Onset   • No Known Problems Mother    • Kidney failure Father    • Brain cancer Father    • Kidney cancer Father    • Diabetes Sister    • Alcohol abuse Sister    • Diabetes Brother       Review of Systems  Allergies:  Amoxicillin, Penicillins, and Adhesive [medical tape]      Objective:  /78   Pulse 78   Temp (!) 97.4 °F (36.3 °C)   Resp 16     Physical Exam      Wound 08/02/24 Diabetic Ulcer Ankle Left;Posterior (Active)   Enter Katz score: Katz Grade 1: Partial or full-thickness ulcer (superficial) 01/03/25 1117   Wound Image Images linked 01/03/25 1130   Wound Description  "Pink;Granulation tissue;Yellow;Slough 01/03/25 1117   Dorina-wound Assessment Scar Tissue;Pink;Scaly;Dry 01/03/25 1117   Wound Length (cm) 2.4 cm 01/03/25 1117   Wound Width (cm) 0.9 cm 01/03/25 1117   Wound Depth (cm) 0.1 cm 01/03/25 1117   Wound Surface Area (cm^2) 2.16 cm^2 01/03/25 1117   Wound Volume (cm^3) 0.216 cm^3 01/03/25 1117   Calculated Wound Volume (cm^3) 0.22 cm^3 01/03/25 1117   Change in Wound Size % 78 01/03/25 1117   Drainage Amount Moderate 01/03/25 1117   Drainage Description Serosanguineous;Yellow 01/03/25 1117   Dressing Status Intact 01/03/25 1117       Wound 11/15/24 Leg Left (Active)   Wound Image Images linked 01/03/25 1111   Wound Description Granulation tissue;Yellow;Slough 01/03/25 1115   Dorina-wound Assessment Pink;Edema 01/03/25 1115   Wound Length (cm) 4 cm 01/03/25 1115   Wound Width (cm) 1.3 cm 01/03/25 1115   Wound Depth (cm) 0.1 cm 01/03/25 1115   Wound Surface Area (cm^2) 5.2 cm^2 01/03/25 1115   Wound Volume (cm^3) 0.52 cm^3 01/03/25 1115   Calculated Wound Volume (cm^3) 0.52 cm^3 01/03/25 1115   Change in Wound Size % -62.5 01/03/25 1115   Drainage Amount Moderate 01/03/25 1115   Drainage Description Serosanguineous;Yellow 01/03/25 1115   Dressing Status Intact 01/03/25 1115                      Debridement   Wound 08/02/24 Diabetic Ulcer Ankle Left;Posterior    Universal Protocol:  procedure performed by consultantConsent: Verbal consent obtained.  Risks and benefits: risks, benefits and alternatives were discussed  Consent given by: patient  Time out: Immediately prior to procedure a \"time out\" was called to verify the correct patient, procedure, equipment, support staff and site/side marked as required.  Patient understanding: patient states understanding of the procedure being performed  Patient identity confirmed: verbally with patient    Debridement Details  Performed by: physician  Debridement type: selective  Pain control: lidocaine 4%      Post-debridement " measurements  Length (cm): 2.4  Width (cm): 0.9  Depth (cm): 0.1  Percent debrided: 100%  Surface Area (cm^2): 2.16  Area Debrided (cm^2): 2.16  Volume (cm^3): 0.22    Devitalized tissue debrided: biofilm, callus and fibrin  Instrument(s) utilized: blade  Bleeding: small  Hemostasis obtained with: pressure  Procedural pain (0-10): insensate  Post-procedural pain: insensate   Response to treatment: procedure was tolerated well                 Wound Instructions:  Orders Placed This Encounter   Procedures   • Wound cleansing and dressings     Wound location: Left posterior ankle and left lateral leg donor site. You may remove the dressing and shower. Do not leave wound open to air, apply new dressing immediately.  Cleanse the wound with mild soap and water, rinse, pat dry.      Apply puracol ag to open areas   Cover with 4x4 gauze   Secure with roll gauze and tape.    Change dressing every other day        Off-loading Instructions:  Keep weight and pressure off wound at all times. and Wear off-loading device as directed by your physician (surgical shoe). Put on immediately when rising in the morning and remove when going to bed.        LLE ACE WRAP: Apply from base of toes to behind the knee. Apply in AM, may remove for sleep or leave intact   Avoid prolonged standing in one place.      Call your pharmacy regarding your prescription that was sent in 12/20/24      Wound infection:  If you have signs of infection please call the wound center.  If the wound center is closed- please go to the Emergency department.  Some signs of infection:  fever, chills, increased redness, red streaks, increase in pain, increased drainage.  Drainage with an odor, Change in drainage color: white/milky/green/tan/yellow,  an increase in swelling, chest pain and/or shortness of breath.      Protein: Eat protein with each meal to promote healing.  Examples of protein are fish, meat, chicken, nuts, peanut butter, eggs, lentils, edamame or a  "protein shake.     Standing Status:   Future     Expiration Date:   1/17/2025   • Debridement Diabetic Ulcer Left;Posterior Ankle     This order was created via procedure documentation         Aram Rodriguez DPM      Portions of the record may have been created with voice recognition software. Occasional wrong word or \"sound a like\" substitutions may have occurred due to the inherent limitations of voice recognition software. Read the chart carefully and recognize, using context, where substitutions have occurred.    "

## 2025-01-05 DIAGNOSIS — E06.3 HYPOTHYROIDISM DUE TO HASHIMOTO'S THYROIDITIS: ICD-10-CM

## 2025-01-05 RX ADMIN — LIDOCAINE: 40 CREAM TOPICAL at 16:05

## 2025-01-07 ENCOUNTER — APPOINTMENT (OUTPATIENT)
Dept: LAB | Facility: CLINIC | Age: 70
End: 2025-01-07
Payer: COMMERCIAL

## 2025-01-07 DIAGNOSIS — Z11.59 ENCOUNTER FOR HEPATITIS C SCREENING TEST FOR LOW RISK PATIENT: ICD-10-CM

## 2025-01-07 DIAGNOSIS — D50.9 IRON DEFICIENCY ANEMIA, UNSPECIFIED IRON DEFICIENCY ANEMIA TYPE: ICD-10-CM

## 2025-01-07 DIAGNOSIS — E11.9 TYPE 2 DIABETES MELLITUS TREATED WITHOUT INSULIN (HCC): ICD-10-CM

## 2025-01-07 LAB
ANION GAP SERPL CALCULATED.3IONS-SCNC: 5 MMOL/L (ref 4–13)
BUN SERPL-MCNC: 31 MG/DL (ref 5–25)
CALCIUM SERPL-MCNC: 9.8 MG/DL (ref 8.4–10.2)
CHLORIDE SERPL-SCNC: 111 MMOL/L (ref 96–108)
CO2 SERPL-SCNC: 25 MMOL/L (ref 21–32)
CREAT SERPL-MCNC: 0.91 MG/DL (ref 0.6–1.3)
CREAT UR-MCNC: 87.5 MG/DL
FERRITIN SERPL-MCNC: 15 NG/ML (ref 11–307)
GFR SERPL CREATININE-BSD FRML MDRD: 65 ML/MIN/1.73SQ M
GLUCOSE P FAST SERPL-MCNC: 123 MG/DL (ref 65–99)
HCV AB SER QL: NORMAL
IRON SATN MFR SERPL: 34 % (ref 15–50)
IRON SERPL-MCNC: 123 UG/DL (ref 50–212)
MICROALBUMIN UR-MCNC: 255.3 MG/L
MICROALBUMIN/CREAT 24H UR: 292 MG/G CREATININE (ref 0–30)
POTASSIUM SERPL-SCNC: 5.2 MMOL/L (ref 3.5–5.3)
SODIUM SERPL-SCNC: 141 MMOL/L (ref 135–147)
TIBC SERPL-MCNC: 365.4 UG/DL (ref 250–450)
TRANSFERRIN SERPL-MCNC: 261 MG/DL (ref 203–362)
UIBC SERPL-MCNC: 242 UG/DL (ref 155–355)

## 2025-01-07 PROCEDURE — 86803 HEPATITIS C AB TEST: CPT

## 2025-01-07 PROCEDURE — 82570 ASSAY OF URINE CREATININE: CPT

## 2025-01-07 PROCEDURE — 83540 ASSAY OF IRON: CPT

## 2025-01-07 PROCEDURE — 82043 UR ALBUMIN QUANTITATIVE: CPT

## 2025-01-07 PROCEDURE — 82728 ASSAY OF FERRITIN: CPT

## 2025-01-07 PROCEDURE — 36415 COLL VENOUS BLD VENIPUNCTURE: CPT

## 2025-01-07 PROCEDURE — 83550 IRON BINDING TEST: CPT

## 2025-01-07 PROCEDURE — 80048 BASIC METABOLIC PNL TOTAL CA: CPT

## 2025-01-07 RX ORDER — LEVOTHYROXINE SODIUM 75 UG/1
75 TABLET ORAL
Qty: 90 TABLET | Refills: 0 | Status: SHIPPED | OUTPATIENT
Start: 2025-01-07

## 2025-01-09 ENCOUNTER — TELEPHONE (OUTPATIENT)
Age: 70
End: 2025-01-09

## 2025-01-19 DIAGNOSIS — I82.511 CHRONIC DEEP VEIN THROMBOSIS (DVT) OF FEMORAL VEIN OF RIGHT LOWER EXTREMITY (HCC): ICD-10-CM

## 2025-01-20 RX ORDER — DABIGATRAN ETEXILATE 150 MG/1
150 CAPSULE ORAL 2 TIMES DAILY
Qty: 180 CAPSULE | Refills: 1 | Status: SHIPPED | OUTPATIENT
Start: 2025-01-20

## 2025-01-22 ENCOUNTER — OFFICE VISIT (OUTPATIENT)
Dept: INTERNAL MEDICINE CLINIC | Facility: CLINIC | Age: 70
End: 2025-01-22
Payer: COMMERCIAL

## 2025-01-22 VITALS
HEIGHT: 66 IN | BODY MASS INDEX: 33.91 KG/M2 | HEART RATE: 76 BPM | SYSTOLIC BLOOD PRESSURE: 130 MMHG | TEMPERATURE: 99.1 F | RESPIRATION RATE: 16 BRPM | WEIGHT: 211 LBS | OXYGEN SATURATION: 99 % | DIASTOLIC BLOOD PRESSURE: 76 MMHG

## 2025-01-22 DIAGNOSIS — R60.0 EDEMA OF BOTH FEET: Primary | ICD-10-CM

## 2025-01-22 PROCEDURE — 99214 OFFICE O/P EST MOD 30 MIN: CPT | Performed by: HOSPITALIST

## 2025-01-22 RX ORDER — FUROSEMIDE 20 MG/1
20 TABLET ORAL AS NEEDED
Qty: 30 TABLET | Refills: 0 | Status: SHIPPED | OUTPATIENT
Start: 2025-01-22

## 2025-01-22 NOTE — PROGRESS NOTES
INTERNAL MEDICINE FOLLOW-UP OFFICE VISIT  [facility] - [facility] INTERNAL MEDICINE [place]    NAME: Jillian Arango  AGE: 69 y.o. SEX: female  : 1955     DATE: 2025     Assessment and Plan:     1. Edema of both feet (Primary) left more than right.  Lasix as needed.  - furosemide (LASIX) 20 mg tablet; Take 1 tablet (20 mg total) by mouth as needed (leg swelling)  Dispense: 30 tablet; Refill: 0    2-Non-pressure chronic ulcer of left ankle with fat layer exposed   See my notes under HPI  No follow-ups on file.     Chief Complaint:     Chief Complaint   Patient presents with   • Follow-up     Wants Referral for bake's cyst to left leg        History of Present Illness:     Jillian presents for a follow-up.  She has a chronic ulcer of the left ankle which is being managed by podiatry.  Has chronic swelling of the left lower extremity.  Dopplers that revealed no arterial occlusions.  Has a suspected Baker's cyst versus venous insufficiency from multiple DVTs in the lower extremity..  Patient wanted to know if that could be aspirated however given that she is on anticoagulation for recurrent DVTs putting a needle while on anticoagulation entails the risk of bleeding into the system causing issues.  Would be reluctant to take her off anticoagulation even for a short duration given that she has had DVTs whenever she has been taken off anticoagulation.  Since it is not causing her much discomfort I recommend that we just monitor it and doing nothing for now.  If it causes significant discomfort then may need admission induction of heparin while anticoagulation has been stopped and then aspiration of the Baker's cyst.  Has venous insufficiency secondary to the ulcer and recommended that she use Lasix as needed.    The following portions of the patient's history were reviewed and updated as appropriate: allergies, current medications, past family history, past medical history, past social history,  "past surgical history and problem list.     Review of Systems:     Review of Systems all other review of symptoms negative unless specified otherwise     Problem List:     Patient Active Problem List   Diagnosis   • Small bowel obstruction (HCC)   • Type 2 diabetes mellitus treated without insulin (HCC)   • Post-operative state   • Endometrial cancer (HCC)   • Thyroid disorder   • Normocytic anemia   • Hydroureteronephrosis   • HTN (hypertension)   • Migraine   • Abdominal adhesions   • Class 2 severe obesity with serious comorbidity and body mass index (BMI) of 35.0 to 35.9 in adult (HCC)   • Headache   • Ureteral-ileal loop anastomotic stricture   • Chronic deep vein thrombosis (DVT) of femoral vein of right lower extremity (HCC)   • Anti-cardiolipin antibody positive   • Encounter for attention to other artificial openings of urinary tract (HCC)   • History of smoking   • Depression, recurrent (HCC)   • PAD (peripheral artery disease) (HCC)   • Baker's cyst, ruptured   • Cholelithiasis   • CHARY (iron deficiency anemia)   • Upper respiratory infection   • Ankle wound, left, initial encounter   • Non-pressure chronic ulcer of left ankle with fat layer exposed (HCC)        Objective:     /76 (BP Location: Left arm, Patient Position: Sitting, Cuff Size: Large)   Pulse 76   Temp 99.1 °F (37.3 °C) (Tympanic)   Resp 16   Ht 5' 6\" (1.676 m)   Wt 95.7 kg (211 lb)   SpO2 99%   BMI 34.06 kg/m²     Physical Exam  General Appearance:    Alert, cooperative, no distress   Head:    Normocephalic, without obvious abnormality, atraumatic   Eyes:    PERRL, conjunctiva/corneas clear, EOM's intact, fundi     benign, both eyes        Neck:   Supple, no adenopathy, no JVD   Back:     Symmetric, no curvature, ROM normal, no CVA tenderness   Lungs:     Clear to auscultation bilaterally, no wheezing or rhonchi   Heart:    Regular rate and rhythm, S1 and S2 normal, no murmur, rub   or gallop   Abdomen:     Soft, non-tender, " bowel sounds active    Extremities: Left leg in boot with Ace bandage.  No drainage.  Pulses intact, extremities warm.   Psych:   Normal Affect   Neurologic:   CNII-XII intact. Normal strength, sensation and reflexes       Throughout       Pertinent Laboratory/Diagnostic Studies:    Radiology/Other Diagnostic Testing Results:     Augustine Lloyd MD  [facility] INTERNAL MEDICINE [place]

## 2025-01-24 ENCOUNTER — OFFICE VISIT (OUTPATIENT)
Dept: WOUND CARE | Facility: HOSPITAL | Age: 70
End: 2025-01-24
Payer: COMMERCIAL

## 2025-01-24 VITALS
SYSTOLIC BLOOD PRESSURE: 172 MMHG | TEMPERATURE: 97.5 F | HEART RATE: 67 BPM | RESPIRATION RATE: 18 BRPM | DIASTOLIC BLOOD PRESSURE: 74 MMHG

## 2025-01-24 DIAGNOSIS — L97.322 NON-PRESSURE CHRONIC ULCER OF LEFT ANKLE WITH FAT LAYER EXPOSED (HCC): Primary | ICD-10-CM

## 2025-01-24 PROCEDURE — 11042 DBRDMT SUBQ TIS 1ST 20SQCM/<: CPT | Performed by: STUDENT IN AN ORGANIZED HEALTH CARE EDUCATION/TRAINING PROGRAM

## 2025-01-24 PROCEDURE — 11042 DBRDMT SUBQ TIS 1ST 20SQCM/<: CPT | Performed by: PODIATRIST

## 2025-01-24 NOTE — PROGRESS NOTES
Wound Procedure Treatment    Performed by: Chuyita Crisostomo RN  Authorized by: Aram Rodriguez DPM    Associated wounds:   Wound 08/02/24 Diabetic Ulcer Ankle Left;Posterior  Wound 11/15/24 Leg Left  Wound cleansed with:  Wound aggrssively cleansed with NSS and gauze  Applied primary dressing:  Collagen dressing, Silver and Non adherent contact layer  Applied secondary dressing:  Gauze  Dressing secured with:  Christa, Tape and Compression wrap  Offloading device appllied:  Surgical shoe    Ace wrap

## 2025-01-24 NOTE — PROGRESS NOTES
Patient ID: Jillian Arango is a 69 y.o. female Date of Birth 1955     Diagnosis:  1. Non-pressure chronic ulcer of left ankle with fat layer exposed (HCC)  -     Debridement  -     Wound Procedure Treatment  -     Copper Level; Future  -     Folate; Future  -     Iron; Future  -     Prealbumin; Future  -     Selenium; Future  -     Vitamin A; Future  -     Zinc; Future  -     Vitamin E; Future  -     Vitamin C; Future  -     Vitamin B12; Future  -     Vitamin D 25 hydroxy; Future  -     Wound cleansing and dressings; Future     Diagnosis ICD-10-CM Associated Orders   1. 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  Wound stable, no local signs of infection.  The skin graft donor site ulcer is stable, hold off on debriding for now.    We will start having the patient with the collagen with saline before applying to output be more effective in the wound.  Will continue with other dry sterile dressings and Ace wrap for compression. Will consider switching to Dermagran next visit if no significant improvement.  Labs ordered to determine if there are any insufficiencies. Will see patient back in 1 week.    If the patient notices any systemic signs of infection including but not limited to fever, chills, nausea, vomiting or significant worsening of wound with increased drainage, redness or streaking up the foot or leg the patient should notify the office or present to the emergency room.      If wound debridement was completed today this was done in an attempt to promote healing, decrease risk of infection and for limb salvage efforts.     Goal of treatment: wound healing      Efforts to decrease pressure on the wound(s), evaluation and discussion of nutritional status, peripheral vascular status, and infection control have all been addressed with this patient.    Return in about 1 week (around 1/31/2025) for Recheck, Next scheduled follow up.      Chief Complaint   Patient presents with   • Follow Up Wound Care Visit     STEPHANIE, left posterior heel wound           Subjective:   Patient following up today for left posterior heel wound.  Patient is also status post skin graft to the wound.  The skin graft in that area does not seem to have resolved entirely.  The patient is been applying collagen to the wound, but she feels like it falls out dry from the wound when she does dressing changes.  She is not sure if it is helping.  She denies nausea, fever or chills.      The following portions of the patient's history were reviewed and updated as appropriate:   Patient Active Problem List   Diagnosis   • Small bowel obstruction (HCC)   • Type 2 diabetes mellitus treated without insulin (HCC)   • Post-operative state   • Endometrial cancer (HCC)   • Thyroid disorder   • Normocytic anemia   • Hydroureteronephrosis   • HTN (hypertension)   • Migraine   • Abdominal adhesions   • Class 2 severe obesity with serious comorbidity and body mass index (BMI) of 35.0 to 35.9 in adult (HCC)   • Headache   • Ureteral-ileal loop anastomotic stricture   • Chronic deep vein thrombosis (DVT) of femoral vein of right lower extremity (HCC)   • Anti-cardiolipin antibody positive   • Encounter for attention to other artificial openings of urinary tract (HCC)   • History of smoking   • Depression, recurrent (HCC)   • PAD (peripheral artery disease) (HCC)   • Baker's cyst, ruptured   • Cholelithiasis   • CHARY (iron deficiency anemia)   • Upper respiratory infection   • Ankle wound, left, initial encounter   • Non-pressure chronic ulcer of left ankle with fat layer exposed (HCC)     Past Medical History:   Diagnosis Date    • Achilles tendinitis of left lower extremity 02/08/2024   • Achilles tendinosis of left ankle 11/27/2023   • Anemia    • Back pain    • Bone infarction (HCC) 06/24/2023   • Bowel obstruction (HCC) 2017,2018   • Bursitis of posterior heel, left 11/27/2023   • Cancer (HCC)     uterine CA radiation destoried urethra.   Patient has urostomy   • Cholelithiasis    • Colon polyp    • Deep vein thrombosis of left lower extremity (HCC) 01/11/2004    on blood thinner for 3 years.   • Diabetes mellitus (HCC)     type 2 BS 6/6/22 @ 0600 was 145   • Disease of thyroid gland    • Elevated lactic acid level 01/16/2021   • Endometrial cancer (HCC) 1999   • GERD (gastroesophageal reflux disease)    • Gross hematuria    • Hiatal hernia    • History of transfusion 1999    Post Hysterectomy   • History of urostomy 01/11/2004    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.   • Hypercalcemia 10/10/2023   • Hypertension    • Hypothyroid    • In vitro fertilization    • Kidney stone    • Migraines    • Muscle weakness     abdominal   • Nausea and vomiting 10/05/2023   • Personal history of irradiation    • Postoperative wound dehiscence, subsequent encounter 02/08/2024   • Renal cyst    • Sleep apnea     Resolved due to Weight Loss   • Vertigo 10/10/2023     Past Surgical History:   Procedure Laterality Date   • ACHILLES TENDON REPAIR Right    • APPENDECTOMY     • COLONOSCOPY      6/6/22   • COLONOSCOPY W/ BIOPSIES N/A 12/2015   • EGD     • HEEL SPUR SURGERY Right 1996   • HYSTERECTOMY     • 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   • IR NEPHROSTOMY TUBE PLACEMENT  01/18/2021   • LAPAROSCOPIC GASTRIC BANDING N/A 2006    [place], [place]   • OTHER SURGICAL HISTORY  2004    Urine shunt to intestine, transverse colon   • PCNL Left 02/16/2021    Procedure: NEPHROLITHOTOMY  PERCUTANEOUS (PCNL);  Surgeon: Ziggy Roldan MD;  [facility];  Service: Urology   • [place] CYSTO/URETERO W/LITHOTRIPSY &INDWELL STENT  INSRT Left 02/16/2021    Procedure: anterograde  URETEROSCOPY with dilation of ureteral stricture, INSERTION STENT URETERAL, exchange  of nephrostomy tube;  Surgeon: Ziggy Roldan MD;  [facility];  Service: Urology   • VA DEBRIDEMENT SUBCUTANEOUS TISSUE 1ST 20 SQ CM/< Left 01/29/2024    Procedure: DEBRIDEMENT LOWER EXTREMITY (WASH OUT) with PHOENIX GRAFT APPLICATION, foot wound;  Surgeon: Darron Frias DPM;  Location: [facility];  Service: Podiatry   • VA LAPAROSCOPY ENTEROLYSIS SEPARATE PROCEDURE N/A 01/17/2019    Procedure: LAPAROSCOPIC LYSIS OF ADHESIONS;  Surgeon: Omar Jack MD;  Location:  MAIN OR;  Service: General   • VA PREP SITE F/S/N/H/F/G/M/D GT 1ST 100 SQ CM/1PCT Left 11/15/2024    Procedure: Left ankle debridement w/ harvesting of split-thickness skin graft left lower leg for application to left ankle wound.;  Surgeon: Aram Rodriguez DPM;  [facility];  Service: Podiatry   • VA REPAIR SECONDARY ACHILLES TENDON W/WO GRAFT Left 11/24/2023    Procedure: REMOVAL OF POSTERIOR HEEL SPUR WITH REPAIR TENDON ACHILLES;  Surgeon: Darron Frias DPM;  [facility];  Service: Podiatry   • VA SPLT AGRFT T/A/L 1ST 100 SQCM/</1% BDY INFT/CHLD Left 11/15/2024    Procedure: Left ankle debridement w/ harvesting of split-thickness skin graft left lower leg for application to left ankle wound.;  Surgeon: Aram Rodriguez DPM;  [facility];  Service: Podiatry   • [facility]   • SLEEVE GASTROPLASTY N/A 09/2015    Dr. Jack, [facility]   • UPPER GASTROINTESTINAL ENDOSCOPY     • URETER REVISION  2010   • WEILBY THUMB ARTHROPLASTY     • WEILBY THUMB ARTHROPLASTY       Social History     Socioeconomic History   • Marital status: /Civil Union     Spouse name: Ari   • Number of children: 0   • Years of education: 14   • Highest education level: Not on file   Occupational History   • Occupation: Hair  [employer]    Tobacco Use   • Smoking status: Former     Current packs/day: 0.00     Average packs/day: 1.5 packs/day for 12.0 years (18.0 ttl pk-yrs)     Types: Cigarettes     Start date: 1974     Quit date: 1986     Years since quittin.0     Passive exposure: Never   • Smokeless tobacco: Never   Vaping Use   • Vaping status: Never Used   Substance and Sexual Activity   • Alcohol use: Yes     Comment: rarely   • Drug use: Never   • Sexual activity: Never     Comment: s/p hysterectomy   Other Topics Concern   • Not on file   Social History Narrative   • Not on file     Social Drivers of Health     Financial Resource Strain: Not on file   Food Insecurity: No Food Insecurity (2024)    Nursing - Inadequate Food Risk Classification    • Worried About Running Out of Food in the Last Year: Not on file    • Ran Out of Food in the Last Year: Not on file    • Ran Out of Food in the Last Year: Never true   Transportation Needs: No Transportation Needs (2024)    Nursing - Transportation Risk Classification    • Lack of Transportation: Not on file    • Lack of Transportation: No   Physical Activity: Inactive (2024)    Exercise Vital Sign    • Days of Exercise per Week: 0 days    • Minutes of Exercise per Session: 0 min   Stress: Stress Concern Present (12/3/2024)    [employer] [place] [employer] - Occupational Stress Questionnaire    • Feeling of Stress : To some extent   Social Connections: Not on file   Intimate Partner Violence: Unknown (2024)    Nursing IPS    • Feels Physically and Emotionally Safe: Not on file    • Physically Hurt by Someone: Not on file    • Humiliated or Emotionally Abused by Someone: Not on file    • Physically Hurt by Someone: No    • Hurt or Threatened by Someone: No   Housing Stability: Unknown (2024)    Nursing: Inadequate Housing Risk Classification    • Has Housing: Not on file    • Worried About Losing Housing: Not on file    • Unable to Get  Utilities: Not on file    • Unable to Pay for Housing in the Last Year: No    • Has Housin        Current Outpatient Medications:   •  ALPRAZolam (XANAX) 0.5 mg tablet, TAKE 1/2 TABLET BY MOUTH ONCE DAILY AT BEDTIME AS NEEDED FOR ANXIETY, Disp: 30 tablet, Rfl: 2  •  BIOTIN PO, Take 1 capsule by mouth daily, Disp: , Rfl:   •  dabigatran etexilate (PRADAXA) 150 mg capsu, TAKE ONE CAPSULE BY MOUTH TWICE A DAY, Disp: 180 capsule, Rfl: 1  •  ferrous sulfate 324 (65 Fe) mg, Take 1 tablet (324 mg total) by mouth every other day, Disp: 15 tablet, Rfl: 0  •  FREESTYLE LITE test strip, Check blood sugar 1-2 times daily, Disp: 100 each, Rfl: 4  •  furosemide (LASIX) 20 mg tablet, Take 1 tablet (20 mg total) by mouth as needed (leg swelling), Disp: 30 tablet, Rfl: 0  •  gabapentin (NEURONTIN) 100 mg capsule, Take 1 capsule (100 mg total) by mouth 3 (three) times a day, Disp: 90 capsule, Rfl: 1  •  gentamicin (GARAMYCIN) 0.1 % ointment, Apply to wound at dressing change.  Use xeroform and dry sterile dressing. Change MWF by visiting nurses. (Patient not taking: Reported on 2025), Disp: 15 g, Rfl: 2  •  levothyroxine 75 mcg tablet, TAKE 1 TABLET DAILY EARLY IN THE MORNING, Disp: 90 tablet, Rfl: 0  •  lidocaine (LMX) 4 % cream, Apply topically as needed for mild pain (Patient not taking: Reported on 2025), Disp: 60 g, Rfl: 5  •  losartan (COZAAR) 25 mg tablet, TAKE ONE TABLET BY MOUTH EVERY DAY, Disp: 90 tablet, Rfl: 1  •  meclizine (ANTIVERT) 12.5 MG tablet, Take 1 tablet (12.5 mg total) by mouth every 8 (eight) hours as needed for nausea or dizziness for up to 14 days, Disp: 40 tablet, Rfl: 0  •  metFORMIN (GLUCOPHAGE) 500 mg tablet, TAKE ONE TABLET BY MOUTH TWICE A DAY WITH MEALS, Disp: 180 tablet, Rfl: 1  •  naloxone (NARCAN) 4 mg/0.1 mL nasal spray, Administer 1 spray into a nostril. If no response after 2-3 minutes, give another dose in the other nostril using a new spray., Disp: 1 each, Rfl: 1  •   oxyCODONE-acetaminophen (Percocet) 5-325 mg per tablet, Take 1 tablet by mouth every 4 (four) hours as needed for moderate pain for up to 5 doses Max Daily Amount: 6 tablets (Patient not taking: Reported on 1/22/2025), Disp: 5 tablet, Rfl: 0  •  oxyCODONE-acetaminophen (Percocet) 5-325 mg per tablet, Take 1 tablet by mouth every 6 (six) hours as needed for moderate pain for up to 30 doses Max Daily Amount: 4 tablets, Disp: 30 tablet, Rfl: 0  •  pantoprazole (PROTONIX) 20 mg tablet, Take 1 tablet (20 mg total) by mouth daily, Disp: 90 tablet, Rfl: 3  •  pramipexole (MIRAPEX) 0.5 mg tablet, TAKE TWO TABLETS BY MOUTH EVERY DAY AT BEDTIME, Disp: 60 tablet, Rfl: 0  •  Probiotic Product (Probiotic Daily) CAPS, Take 1 capsule by mouth in the morning, Disp: 10 capsule, Rfl: 0  •  sodium chloride (OCEAN) 0.65 % nasal spray, 1 spray into each nostril as needed for congestion or rhinitis, Disp: 15 mL, Rfl: 0  •  Tirzepatide (Mounjaro) 2.5 MG/0.5ML SOAJ, Inject 2.5 mg under the skin once a week for 12 doses, Disp: 2 mL, Rfl: 2  Family History   Problem Relation Age of Onset   • No Known Problems Mother    • Kidney failure Father    • Brain cancer Father    • Kidney cancer Father    • Diabetes Sister    • Alcohol abuse Sister    • Diabetes Brother       Review of Systems  Allergies:  Amoxicillin, Penicillins, and Adhesive [medical tape]      Objective:  BP (!) 172/74   Pulse 67   Temp 97.5 °F (36.4 °C)   Resp 18     Physical Exam      Wound 08/02/24 Diabetic Ulcer Ankle Left;Posterior (Active)   Enter Katz score: Katz Grade 1: Partial or full-thickness ulcer (superficial) 01/24/25 0945   Wound Image Images linked 01/24/25 1008   Wound Description Pink;Granulation tissue;Yellow;Slough;Epithelialization 01/24/25 0945   Dorina-wound Assessment Scar Tissue;Pink;Dry 01/24/25 0945   Wound Length (cm) 2.2 cm 01/24/25 0945   Wound Width (cm) 0.8 cm 01/24/25 0945   Wound Depth (cm) 0.1 cm 01/24/25 0945   Wound Surface Area (cm^2)  "1.76 cm^2 01/24/25 0945   Wound Volume (cm^3) 0.176 cm^3 01/24/25 0945   Calculated Wound Volume (cm^3) 0.18 cm^3 01/24/25 0945   Change in Wound Size % 82 01/24/25 0945   Drainage Amount Moderate 01/24/25 0945   Drainage Description Serosanguineous;Yellow 01/24/25 0945   Non-staged Wound Description Full thickness 01/24/25 0945   Dressing Status Intact 01/24/25 0945       Wound 11/15/24 Leg Left (Active)   Wound Image Images linked 01/24/25 0938   Wound Description Granulation tissue;Yellow;Slough;Epithelialization 01/24/25 0944   Dorina-wound Assessment Pink;Edema;Purple 01/24/25 0944   Wound Length (cm) 4.8 cm 01/24/25 0944   Wound Width (cm) 0.7 cm 01/24/25 0944   Wound Depth (cm) 0.1 cm 01/24/25 0944   Wound Surface Area (cm^2) 3.36 cm^2 01/24/25 0944   Wound Volume (cm^3) 0.336 cm^3 01/24/25 0944   Calculated Wound Volume (cm^3) 0.34 cm^3 01/24/25 0944   Change in Wound Size % -6.25 01/24/25 0944   Drainage Amount Moderate 01/24/25 0944   Drainage Description Serosanguineous;Yellow 01/24/25 0944   Non-staged Wound Description Full thickness 01/24/25 0944   Dressing Status Intact 01/24/25 0944                    Debridement    Universal Protocol:  procedure performed by consultantConsent: Verbal consent obtained. Written consent obtained.  Risks and benefits: risks, benefits and alternatives were discussed  Consent given by: patient  Time out: Immediately prior to procedure a \"time out\" was called to verify the correct patient, procedure, equipment, support staff and site/side marked as required.  Patient understanding: patient states understanding of the procedure being performed  Patient identity confirmed: verbally with patient    Debridement Details  Performed by: resident  Debridement type: surgical  Level of debridement: subcutaneous tissue  Pain control: lidocaine 4%      Post-debridement measurements  Length (cm): 2.3  Width (cm): 0.9  Depth (cm): 0.3  Percent debrided: 100%  Surface Area (cm^2): " 2.07  Area Debrided (cm^2): 2.07  Volume (cm^3): 0.62    Tissue and other material debrided: subcutaneous tissue  Devitalized tissue debrided: biofilm, callus and fibrin  Instrument(s) utilized: blade  Technique utilized: excisionalBleeding: small  Hemostasis obtained with: pressure  Procedural pain (0-10): insensate  Post-procedural pain: insensate   Response to treatment: procedure was tolerated well  Debridement Comments: Attending was present in room for minor procedure.                 Wound Instructions:  Orders Placed This Encounter   Procedures   • Debridement     This order was created via procedure documentation   • Wound Procedure Treatment     This order was created via procedure documentation   • Wound cleansing and dressings     Wound location: Left posterior ankle and left lateral leg donor site.   You may remove the dressing and shower on dressing change days. Do not leave wound open to air, apply new dressing immediately.  Cleanse the wound with mild soap and water, rinse, pat dry.  Apply puracol ag to open areas followed by drop of NSS followed by adaptic. ( Rest of supplies sent home with patient. )  Cover with 4x4 gauze   Secure with roll gauze and tape.    Change dressing every other day     Dr. Rodriguez has ordered labs. Please have those done ASAP.        Off-loading Instructions:  Keep weight and pressure off wound at all times. and Wear off-loading device as directed by your physician (surgical shoe). Put on immediately when rising in the morning and remove when going to bed.           LLE ACE WRAP: Apply from base of toes to behind the knee. Apply in AM, may remove for sleep or leave intact   Avoid prolonged standing in one place.           Wound infection:  If you have signs of infection please call the wound center.  If the wound center is closed- please go to the Emergency department.  Some signs of infection:  fever, chills, increased redness, red streaks, increase in pain, increased  "drainage.  Drainage with an odor, Change in drainage color: white/milky/green/tan/yellow,  an increase in swelling, chest pain and/or shortness of breath.      Protein: Eat protein with each meal to promote healing.  Examples of protein are fish, meat, chicken, nuts, peanut butter, eggs, lentils, edamame or a protein shake.     Standing Status:   Future     Expiration Date:   1/31/2025   • Copper Level     Standing Status:   Future     Expiration Date:   1/24/2026   • Folate     Standing Status:   Future     Expiration Date:   1/24/2026   • Iron     Standing Status:   Future     Expiration Date:   1/24/2026   • Prealbumin     Standing Status:   Future     Expiration Date:   1/24/2026   • Selenium     Standing Status:   Future     Expiration Date:   1/24/2026   • Vitamin A     Standing Status:   Future     Expiration Date:   1/24/2026   • Zinc     Standing Status:   Future     Expiration Date:   1/24/2026   • Vitamin E     Standing Status:   Future     Expiration Date:   1/24/2026   • Vitamin C     Standing Status:   Future     Expiration Date:   1/24/2026   • Vitamin B12     Standing Status:   Future     Expiration Date:   1/24/2026   • Vitamin D 25 hydroxy     Standing Status:   Future     Expiration Date:   1/24/2026           Barber Cannon DPM      Portions of the record may have been created with voice recognition software. Occasional wrong word or \"sound a like\" substitutions may have occurred due to the inherent limitations of voice recognition software. Read the chart carefully and recognize, using context, where substitutions have occurred.    "

## 2025-01-24 NOTE — PATIENT INSTRUCTIONS
Orders Placed This Encounter   Procedures    Debridement     This order was created via procedure documentation    Wound Procedure Treatment     This order was created via procedure documentation    Wound cleansing and dressings     Wound location: Left posterior ankle and left lateral leg donor site.   You may remove the dressing and shower on dressing change days. Do not leave wound open to air, apply new dressing immediately.  Cleanse the wound with mild soap and water, rinse, pat dry.  Apply puracol ag to open areas followed by drop of NSS followed by adaptic. ( Rest of supplies sent home with patient. )  Cover with 4x4 gauze   Secure with roll gauze and tape.    Change dressing every other day     Dr. Rodriguez has ordered labs. Please have those done ASAP.        Off-loading Instructions:  Keep weight and pressure off wound at all times. and Wear off-loading device as directed by your physician (surgical shoe). Put on immediately when rising in the morning and remove when going to bed.           LLE ACE WRAP: Apply from base of toes to behind the knee. Apply in AM, may remove for sleep or leave intact   Avoid prolonged standing in one place.           Wound infection:  If you have signs of infection please call the wound center.  If the wound center is closed- please go to the Emergency department.  Some signs of infection:  fever, chills, increased redness, red streaks, increase in pain, increased drainage.  Drainage with an odor, Change in drainage color: white/milky/green/tan/yellow,  an increase in swelling, chest pain and/or shortness of breath.      Protein: Eat protein with each meal to promote healing.  Examples of protein are fish, meat, chicken, nuts, peanut butter, eggs, lentils, edamame or a protein shake.     Standing Status:   Future     Expiration Date:   1/31/2025    Copper Level     Standing Status:   Future     Expiration Date:   1/24/2026    Folate     Standing Status:   Future     Expiration  Date:   1/24/2026    Iron     Standing Status:   Future     Expiration Date:   1/24/2026    Prealbumin     Standing Status:   Future     Expiration Date:   1/24/2026    Selenium     Standing Status:   Future     Expiration Date:   1/24/2026    Vitamin A     Standing Status:   Future     Expiration Date:   1/24/2026    Zinc     Standing Status:   Future     Expiration Date:   1/24/2026    Vitamin E     Standing Status:   Future     Expiration Date:   1/24/2026    Vitamin C     Standing Status:   Future     Expiration Date:   1/24/2026    Vitamin B12     Standing Status:   Future     Expiration Date:   1/24/2026    Vitamin D 25 hydroxy     Standing Status:   Future     Expiration Date:   1/24/2026

## 2025-01-26 DIAGNOSIS — Z79.4 TYPE 2 DIABETES MELLITUS WITH OTHER SPECIFIED COMPLICATION, WITH LONG-TERM CURRENT USE OF INSULIN (HCC): ICD-10-CM

## 2025-01-26 DIAGNOSIS — E11.69 TYPE 2 DIABETES MELLITUS WITH OTHER SPECIFIED COMPLICATION, WITH LONG-TERM CURRENT USE OF INSULIN (HCC): ICD-10-CM

## 2025-01-26 DIAGNOSIS — G25.81: ICD-10-CM

## 2025-01-27 RX ORDER — PRAMIPEXOLE DIHYDROCHLORIDE 0.5 MG/1
1 TABLET ORAL
Qty: 60 TABLET | Refills: 5 | Status: SHIPPED | OUTPATIENT
Start: 2025-01-27

## 2025-01-28 ENCOUNTER — NURSE TRIAGE (OUTPATIENT)
Age: 70
End: 2025-01-28

## 2025-01-28 ENCOUNTER — TELEPHONE (OUTPATIENT)
Age: 70
End: 2025-01-28

## 2025-01-28 NOTE — TELEPHONE ENCOUNTER
"Answer Assessment - Initial Assessment Questions  1. REASON FOR CALL: \"What is the main reason for your call?\" or \"How can I best help you?\"    SPOKE WITH PT, ANSWERED QUESTIONS ABOUT EGD AFTERCARE. ALL QUESTIONS ANSWERED.    Protocols used: Information Only Call - No Triage-Adult-[place]    "

## 2025-01-28 NOTE — TELEPHONE ENCOUNTER
Pt called stated she has a procedure scheduled on 2- and going to a wedding on 2-15-25 and would like to know if her throat is going to be ok. Warm transferred over to triage nurse for further assistance

## 2025-01-29 ENCOUNTER — APPOINTMENT (OUTPATIENT)
Dept: LAB | Facility: CLINIC | Age: 70
End: 2025-01-29
Payer: COMMERCIAL

## 2025-01-29 DIAGNOSIS — L97.322 NON-PRESSURE CHRONIC ULCER OF LEFT ANKLE WITH FAT LAYER EXPOSED (HCC): ICD-10-CM

## 2025-01-29 LAB
25(OH)D3 SERPL-MCNC: 77.5 NG/ML (ref 30–100)
FOLATE SERPL-MCNC: >22.3 NG/ML
IRON SERPL-MCNC: 39 UG/DL (ref 50–212)
PREALB SERPL-MCNC: 16.3 MG/DL (ref 17–34)
VIT B12 SERPL-MCNC: 273 PG/ML (ref 180–914)

## 2025-01-29 PROCEDURE — 84630 ASSAY OF ZINC: CPT

## 2025-01-29 PROCEDURE — 84134 ASSAY OF PREALBUMIN: CPT

## 2025-01-29 PROCEDURE — 82525 ASSAY OF COPPER: CPT

## 2025-01-29 PROCEDURE — 82746 ASSAY OF FOLIC ACID SERUM: CPT

## 2025-01-29 PROCEDURE — 82306 VITAMIN D 25 HYDROXY: CPT

## 2025-01-29 PROCEDURE — 84255 ASSAY OF SELENIUM: CPT

## 2025-01-29 PROCEDURE — 82180 ASSAY OF ASCORBIC ACID: CPT

## 2025-01-29 PROCEDURE — 36415 COLL VENOUS BLD VENIPUNCTURE: CPT

## 2025-01-29 PROCEDURE — 82607 VITAMIN B-12: CPT

## 2025-01-29 PROCEDURE — 83540 ASSAY OF IRON: CPT

## 2025-01-29 PROCEDURE — 84590 ASSAY OF VITAMIN A: CPT

## 2025-01-29 PROCEDURE — 84446 ASSAY OF VITAMIN E: CPT

## 2025-01-30 NOTE — PROGRESS NOTES
Patient ID: Jillian Arango is a 69 y.o. female Date of Birth 1955       Chief Complaint   Patient presents with   • Follow Up Wound Care Visit     LLE, Left posterior heel wounds       Allergies:  Amoxicillin, Penicillins, and Adhesive [medical tape]    Diagnosis:      Diagnosis ICD-10-CM Associated Orders   1. 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 Type 2 diabetes mellitus treated without insulin ([facility])  E11.9       3. Nonhealing surgical wound LLE donor site, subsequent encounter  T81.89XD Wound cleansing and dressings     Wound Procedure Treatment                Assessment & Plan:  Ulcer left ankle in patient with history of diabetes who is s/p STSG with delayed nonhealing surgical wound at donor site: Left ankle ulceration with adherent biofilm layer requiring selective debridement.  Donor site appears predominantly granular though this location also has biofilm layer which was able to be removed via mechanical debridement with gauze.  Periwound of donor site with scarring and hyperpigmentation. To either wound no acute erythema, increased warmth, lymphangitic streaking.  No purulence or malodor.  No induration, fluctuance, crepitus.   Patient had labs ordered by Dr. Rodriguez to assess for possible deficiencies which could contribute to slow healing.  Majority of labs though collected, remain pending at this time and were not reviewed during appointment today.  Selective debridement of left ankle ulcer only  Continue current wound care with Puracol, see full wound care orders and details below  Formal arterial studies reviewed and detailed below  Reviewed importance of regular lower extremity elevation above the level of heart when resting and avoid prolonged periods of time with legs in dependent position  T2DM:  A1C results reviewed with the patient  "today. 6.7 November 2024. Controlled.   Elevated BP: Noted to be elevated.  See below for details.  Though asymptomatic, encouraged her to keep a very close eye on this and reviewed alarm signs/symptoms.  She expressed understanding and states she has not had these but will continue to monitor closely at home.  She reports it was most recently 130 systolic this a.m. and is otherwise very controlled.    Reviewed extreme importance of very close monitoring of wounds. Discussed that diabetes places patients at increased risk for wound complications despite adequate cleansing and care.  Should patient experience any acute change or any of the following symptoms including but not limited to fever, chills, increased pain at the wound, swelling, purulent drainage, foul odor, etc... to immediately proceed to emergency department. Pt expresses understanding.   Follow-up in 1 week w/ Dr. Rodriguez or sooner if needed     Arterial studies August 2024  \"Impression:  RIGHT LOWER LIMB LIMITED:  Ankle/Brachial index: 1.27, which is in the normal category.  Metatarsal pressure of 194 mmHg.  Great toe pressure of 140 mmHg, within healing range.  PVR/ PPG tracings are normal.  LEFT LOWER LIMB:  This resting evaluation shows no evidence of significant lower extremity  arterial occlusive disease.  Ankle/Brachial index: 1.06, which is in the normal category.  Metatarsal pressure of 150 mmHg.  Great toe pressure of 86mHg, within healing range.  PVR/ PPG tracings are normal.\"    Portions of the record may have been created with voice recognition software. Occasional wrong word or \"sound alike\" substitutions may have occurred due to the inherent limitations of voice recognition software. Read the chart carefully and recognize, using context, where substitutions have occurred.    Subjective:   1/31/2025: Roxanne is a 69-year-old female with a past medical history including but not limited to type 2 diabetes, hypertension, chronic DVT femoral " "vein right lower extremity, PAD who presents to the wound center today for follow-up of left ankle ulcer and open wound of her leg secondary to donor site from skin graft.  She primarily follows with podiatry/Dr. Rodriguez and was last seen on 1/24/2025.  Per chart review at that visit:   \"Surgical debridement performed of wound today.  Wound stable, no local signs of infection.  The skin graft donor site ulcer is stable, hold off on debriding for now. We will start having the patient with the collagen with saline before applying to output be more effective in the wound.  Will continue with other dry sterile dressings and Ace wrap for compression. Will consider switching to Dermagran next visit if no significant improvement.  Labs ordered to determine if there are any insufficiencies. Will see patient back in 1 week.\"    Today she reports she feels she is doing well.  She got her labs done but not all results are back yet.  She denies fever, chills.  Of note her BP was noted to be elevated on arrival today however she states she was nervous driving in the ice.  Otherwise she has no other symptoms and regularly checks BP at home, this a.m. states it was 130 systolic.  See below for full ROS      The following portions of the patient's history were reviewed and updated as appropriate:   Patient Active Problem List   Diagnosis   • Small bowel obstruction (HCC)   • Type 2 diabetes mellitus treated without insulin (HCC)   • Post-operative state   • Endometrial cancer (HCC)   • Thyroid disorder   • Normocytic anemia   • Hydroureteronephrosis   • HTN (hypertension)   • Migraine   • Abdominal adhesions   • Class 2 severe obesity with serious comorbidity and body mass index (BMI) of 35.0 to 35.9 in adult (HCC)   • Headache   • Ureteral-ileal loop anastomotic stricture   • Chronic deep vein thrombosis (DVT) of femoral vein of right lower extremity (HCC)   • Anti-cardiolipin antibody positive   • Encounter for attention to other " artificial openings of urinary tract (HCC)   • History of smoking   • Depression, recurrent (HCC)   • PAD (peripheral artery disease) (HCC)   • Baker's cyst, ruptured   • Cholelithiasis   • CHARY (iron deficiency anemia)   • Upper respiratory infection   • Ankle wound, left, initial encounter   • Non-pressure chronic ulcer of left ankle with fat layer exposed (HCC)     Past Medical History:   Diagnosis Date   • Achilles tendinitis of left lower extremity 02/08/2024   • Achilles tendinosis of left ankle 11/27/2023   • Anemia    • Back pain    • Bone infarction (HCC) 06/24/2023   • Bowel obstruction (HCC) 2017,2018   • Bursitis of posterior heel, left 11/27/2023   • Cancer (HCC)     uterine CA radiation destoried urethra.   Patient has urostomy   • Cholelithiasis    • Colon polyp    • Deep vein thrombosis of left lower extremity (HCC) 01/11/2004    on blood thinner for 3 years.   • Diabetes mellitus (HCC)     type 2 BS 6/6/22 @ 0600 was 145   • Disease of thyroid gland    • Elevated lactic acid level 01/16/2021   • Endometrial cancer (HCC) 1999   • GERD (gastroesophageal reflux disease)    • Gross hematuria    • Hiatal hernia    • History of transfusion 1999    Post Hysterectomy   • History of urostomy 01/11/2004    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.   • Hypercalcemia 10/10/2023   • Hypertension    • Hypothyroid    • In vitro fertilization    • Kidney stone    • Migraines    • Muscle weakness     abdominal   • Nausea and vomiting 10/05/2023   • Personal history of irradiation    • Postoperative wound dehiscence, subsequent encounter 02/08/2024   • Renal cyst    • Sleep apnea     Resolved due to Weight Loss   • Vertigo 10/10/2023     Past Surgical History:   Procedure Laterality Date   • ACHILLES TENDON REPAIR Right    • APPENDECTOMY     • COLONOSCOPY      6/6/22   • COLONOSCOPY W/ BIOPSIES N/A 12/2015   • EGD     • HEEL SPUR SURGERY Right 1996   • HYSTERECTOMY     • ILEO CONDUIT      due to  urinary radiation injury   • IR NEPHROSTOMY TUBE PLACEMENT  01/18/2021   • LAPAROSCOPIC GASTRIC BANDING N/A 2006    [place], [place]   • OTHER SURGICAL HISTORY  2004    Urine shunt to intestine, transverse colon   • PCNL Left 02/16/2021    Procedure: NEPHROLITHOTOMY  PERCUTANEOUS (PCNL);  Surgeon: Ziggy Roldan MD;  Location: [facility];  Service: Urology   • VT CYSTO/URETERO W/LITHOTRIPSY &INDWELL STENT INSRT Left 02/16/2021    Procedure: anterograde  URETEROSCOPY with dilation of ureteral stricture, INSERTION STENT URETERAL, exchange  of nephrostomy tube;  Surgeon: Ziggy Roldan MD;  [facility];  Service: Urology   • VT DEBRIDEMENT SUBCUTANEOUS TISSUE 1ST 20 SQ CM/< Left 01/29/2024    Procedure: DEBRIDEMENT LOWER EXTREMITY (WASH OUT) with PHOENIX GRAFT APPLICATION, foot wound;  Surgeon: Darron Frias DPM;  Location:  MAIN OR;  Service: Podiatry   • VT LAPAROSCOPY ENTEROLYSIS SEPARATE PROCEDURE N/A 01/17/2019    Procedure: LAPAROSCOPIC LYSIS OF ADHESIONS;  Surgeon: Omar Jack MD;  Location:  MAIN OR;  Service: General   • VT PREP SITE F/S/N/H/F/G/M/D GT 1ST 100 SQ CM/1PCT Left 11/15/2024    Procedure: Left ankle debridement w/ harvesting of split-thickness skin graft left lower leg for application to left ankle wound.;  Surgeon: Aram Rodriguez DPM;  Location: [facility];  Service: Podiatry   • VT REPAIR SECONDARY ACHILLES TENDON W/WO GRAFT Left 11/24/2023    Procedure: REMOVAL OF POSTERIOR HEEL SPUR WITH REPAIR TENDON ACHILLES;  Surgeon: Darron Frias DPM;  Location:  MAIN OR;  Service: Podiatry   • VT SPLT AGRFT T/A/L 1ST 100 SQCM/</1% BDY INFT/CHLD Left 11/15/2024    Procedure: Left ankle debridement w/ harvesting of split-thickness skin graft left lower leg for application to left ankle wound.;  Surgeon: Aram Rodriguez DPM;  [facility];  Service: Podiatry   [facility]   • SLEEVE  GASTROPLASTY N/A 2015    Dr. Jack, [facility]   • UPPER GASTROINTESTINAL ENDOSCOPY     • URETER REVISION     • WEILBY THUMB ARTHROPLASTY     • WEILBY THUMB ARTHROPLASTY       Family History   Problem Relation Age of Onset   • No Known Problems Mother    • Kidney failure Father    • Brain cancer Father    • Kidney cancer Father    • Diabetes Sister    • Alcohol abuse Sister    • Diabetes Brother       Social History     Socioeconomic History   • Marital status: /Civil Union     Spouse name: Ari   • Number of children: 0   • Years of education: 14   • Highest education level: Not on file   Occupational History   • Occupation: Hair styist    Tobacco Use   • Smoking status: Former     Current packs/day: 0.00     Average packs/day: 1.5 packs/day for 12.0 years (18.0 ttl pk-yrs)     Types: Cigarettes     Start date: 1974     Quit date: 1986     Years since quittin.0     Passive exposure: Never   • Smokeless tobacco: Never   Vaping Use   • Vaping status: Never Used   Substance and Sexual Activity   • Alcohol use: Yes     Comment: rarely   • Drug use: Never   • Sexual activity: Never     Comment: s/p hysterectomy   Other Topics Concern   • Not on file   Social History Narrative   • Not on file     Social Drivers of Health     Financial Resource Strain: Not on file   Food Insecurity: No Food Insecurity (2024)    Nursing - Inadequate Food Risk Classification    • Worried About Running Out of Food in the Last Year: Not on file    • Ran Out of Food in the Last Year: Not on file    • Ran Out of Food in the Last Year: Never true   Transportation Needs: No Transportation Needs (2024)    Nursing - Transportation Risk Classification    • Lack of Transportation: Not on file    • Lack of Transportation: No   Physical Activity: Inactive (2024)    Exercise Vital Sign    • Days of Exercise per Week: 0 days    • Minutes of Exercise per Session: 0 min   Stress: Stress Concern Present (12/3/2024)     [facility]ational [facility] - Occupational Stress Questionnaire    • Feeling of Stress : To some extent   Social Connections: Not on file   Intimate Partner Violence: Unknown (2024)    Nursing IPS    • Feels Physically and Emotionally Safe: Not on file    • Physically Hurt by Someone: Not on file    • Humiliated or Emotionally Abused by Someone: Not on file    • Physically Hurt by Someone: No    • Hurt or Threatened by Someone: No   Housing Stability: Unknown (2024)    Nursing: Inadequate Housing Risk Classification    • Has Housing: Not on file    • Worried About Losing Housing: Not on file    • Unable to Get Utilities: Not on file    • Unable to Pay for Housing in the Last Year: No    • Has Housin        Current Outpatient Medications:   •  ALPRAZolam (XANAX) 0.5 mg tablet, TAKE 1/2 TABLET BY MOUTH ONCE DAILY AT BEDTIME AS NEEDED FOR ANXIETY, Disp: 30 tablet, Rfl: 2  •  BIOTIN PO, Take 1 capsule by mouth daily, Disp: , Rfl:   •  dabigatran etexilate (PRADAXA) 150 mg capsu, TAKE ONE CAPSULE BY MOUTH TWICE A DAY, Disp: 180 capsule, Rfl: 1  •  ferrous sulfate 324 (65 Fe) mg, Take 1 tablet (324 mg total) by mouth every other day, Disp: 15 tablet, Rfl: 0  •  FREESTYLE LITE test strip, Check blood sugar 1-2 times daily, Disp: 100 each, Rfl: 4  •  furosemide (LASIX) 20 mg tablet, Take 1 tablet (20 mg total) by mouth as needed (leg swelling), Disp: 30 tablet, Rfl: 0  •  gabapentin (NEURONTIN) 100 mg capsule, Take 1 capsule (100 mg total) by mouth 3 (three) times a day (Patient taking differently: Take 100 mg by mouth 3 (three) times a day Takes as needed), Disp: 90 capsule, Rfl: 1  •  gentamicin (GARAMYCIN) 0.1 % ointment, Apply to wound at dressing change.  Use xeroform and dry sterile dressing. Change MWF by visiting nurses. (Patient not taking: Reported on 2025), Disp: 15 g, Rfl: 2  •  levothyroxine 75 mcg tablet, TAKE 1 TABLET DAILY EARLY IN THE MORNING, Disp: 90 tablet, Rfl: 0  •   lidocaine (LMX) 4 % cream, Apply topically as needed for mild pain (Patient not taking: Reported on 1/22/2025), Disp: 60 g, Rfl: 5  •  losartan (COZAAR) 25 mg tablet, TAKE ONE TABLET BY MOUTH EVERY DAY, Disp: 90 tablet, Rfl: 1  •  meclizine (ANTIVERT) 12.5 MG tablet, Take 1 tablet (12.5 mg total) by mouth every 8 (eight) hours as needed for nausea or dizziness for up to 14 days, Disp: 40 tablet, Rfl: 0  •  metFORMIN (GLUCOPHAGE) 500 mg tablet, TAKE ONE TABLET BY MOUTH TWICE A DAY WITH MEALS, Disp: 180 tablet, Rfl: 1  •  naloxone (NARCAN) 4 mg/0.1 mL nasal spray, Administer 1 spray into a nostril. If no response after 2-3 minutes, give another dose in the other nostril using a new spray., Disp: 1 each, Rfl: 1  •  oxyCODONE-acetaminophen (Percocet) 5-325 mg per tablet, Take 1 tablet by mouth every 6 (six) hours as needed for moderate pain for up to 30 doses Max Daily Amount: 4 tablets, Disp: 30 tablet, Rfl: 0  •  pantoprazole (PROTONIX) 20 mg tablet, Take 1 tablet (20 mg total) by mouth daily, Disp: 90 tablet, Rfl: 3  •  pramipexole (MIRAPEX) 0.5 mg tablet, TAKE TWO TABLETS BY MOUTH EVERY DAY AT BEDTIME, Disp: 60 tablet, Rfl: 5  •  Probiotic Product (Probiotic Daily) CAPS, Take 1 capsule by mouth in the morning, Disp: 10 capsule, Rfl: 0  •  sodium chloride (OCEAN) 0.65 % nasal spray, 1 spray into each nostril as needed for congestion or rhinitis, Disp: 15 mL, Rfl: 0  •  Tirzepatide (Mounjaro) 2.5 MG/0.5ML SOAJ, Inject 2.5 mg under the skin once a week for 12 doses, Disp: 2 mL, Rfl: 2    Review of Systems   Constitutional:  Negative for chills and fever.   Eyes:  Negative for visual disturbance.   Respiratory:  Negative for shortness of breath.    Cardiovascular:  Negative for chest pain, palpitations and leg swelling.   Musculoskeletal:  Positive for gait problem.   Skin:  Positive for wound.   Neurological:  Positive for numbness (hx of neuropathy). Negative for dizziness and light-headedness.    Psychiatric/Behavioral:  The patient is not nervous/anxious.        Objective:  BP (!) 177/96   Pulse 70   Temp (!) 97 °F (36.1 °C)   Resp 18   Pain Score:   3     Physical Exam  Vitals (BP noted 177/96 -she is asymptomatic, states she was nervous driving in this now but otherwise has no symptoms.  Checks BP regularly at home and this a.m. was 130 systolic) and nursing note reviewed.   Constitutional:       General: She is not in acute distress.     Appearance: She is not ill-appearing, toxic-appearing or diaphoretic.   Eyes:      General: No scleral icterus.  Cardiovascular:      Rate and Rhythm: Normal rate.      Comments: Palpable DP/PT pulses bilaterally  Mild nonpitting edema of bilateral lower extremities  Pulmonary:      Effort: Pulmonary effort is normal. No respiratory distress.   Skin:     General: Skin is warm.      Findings: Wound present.             Comments: 1/2 - Ulcer left ankle in patient with history of diabetes who is s/p STSG with delayed nonhealing surgical wound at donor site: Left ankle ulceration with adherent biofilm layer requiring selective debridement.  Donor site appears predominantly granular though this location also has biofilm layer which was able to be removed via mechanical debridement with gauze.  Periwound of donor site with scarring and hyperpigmentation. To either wound no acute erythema, increased warmth, lymphangitic streaking.  No purulence or malodor.  No induration, fluctuance, crepitus.     See full wound assessments   Neurological:      Mental Status: She is alert and oriented to person, place, and time.   Psychiatric:         Mood and Affect: Mood normal.         Behavior: Behavior normal.             Wound 08/02/24 Diabetic Ulcer Ankle Left;Posterior (Active)   Enter Katz score: Katz Grade 1: Partial or full-thickness ulcer (superficial) 01/31/25 0922   Wound Image   01/31/25 0915   Wound Description Pink;Granulation tissue;Pale 01/31/25 0922   Dorina-wound  "Assessment Scar Tissue;Pink;Dry 01/31/25 0922   Wound Length (cm) 2.2 cm 01/31/25 0922   Wound Width (cm) 0.6 cm 01/31/25 0922   Wound Depth (cm) 0.1 cm 01/31/25 0922   Wound Surface Area (cm^2) 1.32 cm^2 01/31/25 0922   Wound Volume (cm^3) 0.132 cm^3 01/31/25 0922   Calculated Wound Volume (cm^3) 0.13 cm^3 01/31/25 0922   Change in Wound Size % 87 01/31/25 0922   Wound Site Closure Staples 12/06/24 1033   Drainage Amount Moderate 01/31/25 0922   Drainage Description Serosanguineous;Yellow 01/31/25 0922   Non-staged Wound Description Full thickness 01/31/25 0922   Dressing Status Intact 01/31/25 0922       Wound 11/15/24 Leg Left (Active)   Wound Image   01/31/25 0916   Wound Description Granulation tissue;Yellow;Slough 01/31/25 0921   Dorina-wound Assessment Pink;Edema;Purple;Dry 01/31/25 0921   Wound Length (cm) 2.9 cm 01/31/25 0921   Wound Width (cm) 0.6 cm 01/31/25 0921   Wound Depth (cm) 0.1 cm 01/31/25 0921   Wound Surface Area (cm^2) 1.74 cm^2 01/31/25 0921   Wound Volume (cm^3) 0.174 cm^3 01/31/25 0921   Calculated Wound Volume (cm^3) 0.17 cm^3 01/31/25 0921   Change in Wound Size % 46.88 01/31/25 0921   Drainage Amount Moderate 01/31/25 0921   Drainage Description Serosanguineous;Yellow 01/31/25 0921   Non-staged Wound Description Full thickness 01/31/25 0921   Dressing Status Intact 01/31/25 0921         Debridement   Wound 08/02/24 Diabetic Ulcer Ankle Left;Posterior    Universal Protocol:  procedure performed by consultantConsent: Verbal consent obtained. Written consent obtained.  Consent given by: patient  Time out: Immediately prior to procedure a \"time out\" was called to verify the correct patient, procedure, equipment, support staff and site/side marked as required.  Patient understanding: patient states understanding of the procedure being performed  Patient identity confirmed: verbally with patient    Debridement Details  Performed by: physician  Debridement type: selective  Local anesthetic: Pt " declined.      Post-debridement measurements  Length (cm): 2.2  Width (cm): 0.6  Depth (cm): 0.1  Percent debrided: 85%  Surface Area (cm^2): 1.32  Area Debrided (cm^2): 1.12  Volume (cm^3): 0.13    Devitalized tissue debrided: biofilm  Instrument(s) utilized: blade  Bleeding: small  Hemostasis obtained with: pressure  Procedural pain (0-10): 0  Post-procedural pain: 0   Response to treatment: procedure was tolerated well               Lab Results   Component Value Date    HGBA1C 6.7 (H) 11/14/2024       Wound Instructions:  Orders Placed This Encounter   Procedures   • Wound cleansing and dressings     Wound location: Left posterior ankle and left lateral leg donor site.   You may remove the dressing and shower on dressing change days. Do not leave wound open to air, apply new dressing immediately.  Cleanse the wound with mild soap and water, rinse, pat dry.  Apply puracol ag to open areas followed by drop of NSS followed by adaptic. ( Rest of supplies sent home with patient. )  Cover with 4x4 gauze   Secure with roll gauze and tape.    Change dressing every other day              Off-loading Instructions:  Keep weight and pressure off wound at all times. and Wear off-loading device as directed by your physician (surgical shoe). Put on immediately when rising in the morning and remove when going to bed.           LLE ACE WRAP: Apply from base of toes to behind the knee. Apply in AM, may remove for sleep or leave intact   Avoid prolonged standing in one place.           Wound infection:  If you have signs of infection please call the wound center.  If the wound center is closed- please go to the Emergency department.  Some signs of infection:  fever, chills, increased redness, red streaks, increase in pain, increased drainage.  Drainage with an odor, Change in drainage color: white/milky/green/tan/yellow,  an increase in swelling, chest pain and/or shortness of breath.      Protein: Eat protein with each meal to  promote healing.  Examples of protein are fish, meat, chicken, nuts, peanut butter, eggs, lentils, edamame or a protein shake.    Request for [facility] supplies will be faxed once doctor signs note.     Standing Status:   Future     Expiration Date:   2/7/2025   • Debridement Diabetic Ulcer Left;Posterior Ankle     This order was created via procedure documentation   • Wound Procedure Treatment     This order was created via procedure documentation       Manisha Rod MD

## 2025-01-31 ENCOUNTER — OFFICE VISIT (OUTPATIENT)
Dept: WOUND CARE | Facility: HOSPITAL | Age: 70
End: 2025-01-31
Payer: COMMERCIAL

## 2025-01-31 VITALS
HEART RATE: 70 BPM | SYSTOLIC BLOOD PRESSURE: 177 MMHG | TEMPERATURE: 97 F | DIASTOLIC BLOOD PRESSURE: 96 MMHG | RESPIRATION RATE: 18 BRPM

## 2025-01-31 DIAGNOSIS — E11.9 TYPE 2 DIABETES MELLITUS TREATED WITHOUT INSULIN (HCC): ICD-10-CM

## 2025-01-31 DIAGNOSIS — T81.89XD NONHEALING SURGICAL WOUND, SUBSEQUENT ENCOUNTER: ICD-10-CM

## 2025-01-31 DIAGNOSIS — L97.322 NON-PRESSURE CHRONIC ULCER OF LEFT ANKLE WITH FAT LAYER EXPOSED (HCC): Primary | ICD-10-CM

## 2025-01-31 LAB — ZINC SERPL-MCNC: 64 UG/DL (ref 44–115)

## 2025-01-31 PROCEDURE — 99214 OFFICE O/P EST MOD 30 MIN: CPT | Performed by: FAMILY MEDICINE

## 2025-01-31 PROCEDURE — 97597 DBRDMT OPN WND 1ST 20 CM/<: CPT | Performed by: FAMILY MEDICINE

## 2025-01-31 NOTE — PROGRESS NOTES
Wound Procedure Treatment    Performed by: Chuyita Crisostomo RN  Authorized by: Manisha Rod MD    Associated wounds:   Wound 08/02/24 Diabetic Ulcer Ankle Left;Posterior  Wound 11/15/24 Leg Left  Wound cleansed with:  Wound aggrssively cleansed with NSS and gauze  Applied primary dressing:  Collagen dressing, Silver and Non adherent contact layer  Applied secondary dressing:  Gauze  Dressing secured with:  Christa, Tape and Compression wrap  Offloading device appllied:  Surgical shoe    Ace wrap

## 2025-01-31 NOTE — PATIENT INSTRUCTIONS
Orders Placed This Encounter   Procedures    Wound cleansing and dressings     Wound location: Left posterior ankle and left lateral leg donor site.   You may remove the dressing and shower on dressing change days. Do not leave wound open to air, apply new dressing immediately.  Cleanse the wound with mild soap and water, rinse, pat dry.  Apply puracol ag to open areas followed by drop of NSS followed by adaptic. ( Rest of supplies sent home with patient. )  Cover with 4x4 gauze   Secure with roll gauze and tape.    Change dressing every other day              Off-loading Instructions:  Keep weight and pressure off wound at all times. and Wear off-loading device as directed by your physician (surgical shoe). Put on immediately when rising in the morning and remove when going to bed.           LLE ACE WRAP: Apply from base of toes to behind the knee. Apply in AM, may remove for sleep or leave intact   Avoid prolonged standing in one place.           Wound infection:  If you have signs of infection please call the wound center.  If the wound center is closed- please go to the Emergency department.  Some signs of infection:  fever, chills, increased redness, red streaks, increase in pain, increased drainage.  Drainage with an odor, Change in drainage color: white/milky/green/tan/yellow,  an increase in swelling, chest pain and/or shortness of breath.      Protein: Eat protein with each meal to promote healing.  Examples of protein are fish, meat, chicken, nuts, peanut butter, eggs, lentils, edamame or a protein shake.    Request for [facility] supplies will be faxed once doctor signs note.     Standing Status:   Future     Expiration Date:   2/7/2025    Debridement Diabetic Ulcer Left;Posterior Ankle     This order was created via procedure documentation

## 2025-02-01 LAB
A-TOCOPHEROL VIT E SERPL-MCNC: 11.8 MG/L (ref 9–29)
COPPER SERPL-MCNC: 119 UG/DL (ref 80–158)
GAMMA TOCOPHEROL SERPL-MCNC: 0.5 MG/L (ref 0.5–4.9)
VIT C SERPL-MCNC: 1.2 MG/DL (ref 0.4–2)

## 2025-02-03 LAB — VIT A SERPL-MCNC: 32.9 UG/DL (ref 22–69.5)

## 2025-02-04 LAB — SELENIUM SERPL-MCNC: 97 UG/L (ref 93–198)

## 2025-02-05 ENCOUNTER — ANESTHESIA (OUTPATIENT)
Dept: ANESTHESIOLOGY | Facility: HOSPITAL | Age: 70
End: 2025-02-05

## 2025-02-05 ENCOUNTER — ANESTHESIA EVENT (OUTPATIENT)
Dept: ANESTHESIOLOGY | Facility: HOSPITAL | Age: 70
End: 2025-02-05

## 2025-02-05 NOTE — ANESTHESIA PREPROCEDURE EVALUATION
Procedure:  PRE-OP ONLY    Fe deficient anemia and GERD - EGD    TTE: LVEF 60% G1DD, RV wnl  ECG: NSR    A1c 6.7  Hbg 10.0  Mounjaro  Relevant Problems   CARDIO   (+) Chronic deep vein thrombosis (DVT) of femoral vein of right lower extremity (HCC)   (+) HTN (hypertension)   (+) Migraine   (+) PAD (peripheral artery disease) (HCC)      ENDO   (+) Type 2 diabetes mellitus treated without insulin (HCC)      GI/HEPATIC   (+) Small bowel obstruction (HCC)      /RENAL   (+) Hydroureteronephrosis      GYN   (+) Endometrial cancer (HCC)      HEMATOLOGY   (+) CHARY (iron deficiency anemia)   (+) Normocytic anemia      NEURO/PSYCH   (+) Depression, recurrent (HCC)   (+) Headache   (+) Migraine      PULMONARY   (+) Upper respiratory infection             Anesthesia Plan  ASA Score- 2     Anesthesia Type- IV sedation with anesthesia with ASA Monitors.         Additional Monitors:     Airway Plan:            Plan Factors-    Chart reviewed. EKG reviewed.  Existing labs reviewed. Patient summary reviewed.                  Induction-     Postoperative Plan-         Informed Consent-       NPO Status:  No vitals data found for the desired time range.

## 2025-02-07 ENCOUNTER — OFFICE VISIT (OUTPATIENT)
Dept: WOUND CARE | Facility: HOSPITAL | Age: 70
End: 2025-02-07
Payer: COMMERCIAL

## 2025-02-07 VITALS
SYSTOLIC BLOOD PRESSURE: 159 MMHG | RESPIRATION RATE: 18 BRPM | HEART RATE: 69 BPM | DIASTOLIC BLOOD PRESSURE: 77 MMHG | TEMPERATURE: 97 F

## 2025-02-07 DIAGNOSIS — R21 RASH: ICD-10-CM

## 2025-02-07 DIAGNOSIS — L97.322 NON-PRESSURE CHRONIC ULCER OF LEFT ANKLE WITH FAT LAYER EXPOSED (HCC): Primary | ICD-10-CM

## 2025-02-07 DIAGNOSIS — T81.89XD NONHEALING SURGICAL WOUND, SUBSEQUENT ENCOUNTER: ICD-10-CM

## 2025-02-07 PROCEDURE — 97597 DBRDMT OPN WND 1ST 20 CM/<: CPT | Performed by: PODIATRIST

## 2025-02-07 RX ORDER — TRIAMCINOLONE ACETONIDE 1 MG/G
CREAM TOPICAL DAILY
Qty: 15 G | Refills: 1 | Status: SHIPPED | OUTPATIENT
Start: 2025-02-07

## 2025-02-07 NOTE — PROGRESS NOTES
Patient ID: Jillian Arango is a 69 y.o. female Date of Birth 1955     Diagnosis:  1. Non-pressure chronic ulcer of left ankle with fat layer exposed (HCC)  -     Wound cleansing and dressings; Future  -     Debridement Diabetic Ulcer Left;Posterior Ankle  -     Wound Procedure Treatment  2. Nonhealing surgical wound, subsequent encounter  -     Wound cleansing and dressings; Future  -     Wound Procedure Treatment  3. Rash  -     triamcinolone (KENALOG) 0.1 % cream; Apply topically in the morning At dressing change to area around wound that is irritated.     Diagnosis ICD-10-CM Associated Orders   1. Non-pressure chronic ulcer of left ankle with fat layer exposed (HCC)  L97.322 Wound cleansing and dressings     Debridement Diabetic Ulcer Left;Posterior Ankle     Wound Procedure Treatment      2. Nonhealing surgical wound, subsequent encounter  T81.89XD Wound cleansing and dressings     Wound Procedure Treatment      3. Rash  R21 triamcinolone (KENALOG) 0.1 % cream           Assessment & Plan:  Discussed with patient to increase her protein and iron intake.  She is speaking with her primary care doctor regarding her iron supplementation.  She states she has not been taking it due to constipation issues with it.    Selective debridement completed to the posterior heel wound.  There has been improvement.  Lower leg wound harvesting site appears almost healed.  Will send her over topical triamcinolone to use around the wound to help with the itching.      If the patient notices any systemic signs of infection including but not limited to fever, chills, nausea, vomiting or significant worsening of wound with increased drainage, redness or streaking up the foot or leg the patient should notify the office or present to the emergency room.      If wound debridement was completed today this was done in an attempt to promote healing, decrease risk of infection and for limb salvage efforts.     Goal of treatment: wound  healing     Efforts to decrease pressure on the wound(s), evaluation and discussion of nutritional status, peripheral vascular status, and infection control have all been addressed with this patient.    Return in about 2 weeks (around 2/21/2025) for Recheck, Next scheduled follow up.      Chief Complaint   Patient presents with   • Follow Up Wound Care Visit     Left leg, left posterior ankle wounds             Subjective:   Patient had surgical procedure completed 11/15/2024 for split-thickness skin graft of the left lower leg to left Achilles tendon.    Patient was seen at the last visit by Dr. Rod.  I did order labs for evaluation of possible deficiencies which can contribute to slow healing.  Labs reviewed showed vitamin D normal vitamin B12 normal vitamin D normal.  Prealbumin was abnormal with low prealbumin, recommend supplementation.  Patient also had low iron.  Folate was normal.  Discussed with patient to speak with her primary care doctor regarding iron deficiency.        The following portions of the patient's history were reviewed and updated as appropriate:   Patient Active Problem List   Diagnosis   • Small bowel obstruction (HCC)   • Type 2 diabetes mellitus treated without insulin (HCC)   • Post-operative state   • Endometrial cancer (HCC)   • Thyroid disorder   • Normocytic anemia   • Hydroureteronephrosis   • HTN (hypertension)   • Migraine   • Abdominal adhesions   • Class 2 severe obesity with serious comorbidity and body mass index (BMI) of 35.0 to 35.9 in adult (HCC)   • Headache   • Ureteral-ileal loop anastomotic stricture   • Chronic deep vein thrombosis (DVT) of femoral vein of right lower extremity (HCC)   • Anti-cardiolipin antibody positive   • Encounter for attention to other artificial openings of urinary tract (HCC)   • History of smoking   • Depression, recurrent (HCC)   • PAD (peripheral artery disease) (HCC)   • Baker's cyst, ruptured   • Cholelithiasis   • CHARY (iron deficiency  anemia)   • Upper respiratory infection   • Ankle wound, left, initial encounter   • Non-pressure chronic ulcer of left ankle with fat layer exposed (HCC)     Past Medical History:   Diagnosis Date   • Achilles tendinitis of left lower extremity 02/08/2024   • Achilles tendinosis of left ankle 11/27/2023   • Anemia    • Back pain    • Bone infarction (HCC) 06/24/2023   • Bowel obstruction (HCC) 2017,2018   • Bursitis of posterior heel, left 11/27/2023   • Cancer (HCC)     uterine CA radiation destoried urethra.   Patient has urostomy   • Cholelithiasis    • Colon polyp    • Deep vein thrombosis of left lower extremity (HCC) 01/11/2004    on blood thinner for 3 years.   • Diabetes mellitus (HCC)     type 2 BS 6/6/22 @ 0600 was 145   • Disease of thyroid gland    • Elevated lactic acid level 01/16/2021   • Endometrial cancer (HCC) 1999   • GERD (gastroesophageal reflux disease)    • Gross hematuria    • Hiatal hernia    • History of transfusion 1999    Post Hysterectomy   • History of urostomy 01/11/2004    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.   • Hypercalcemia 10/10/2023   • Hypertension    • Hypothyroid    • In vitro fertilization    • Kidney stone    • Migraines    • Muscle weakness     abdominal   • Nausea and vomiting 10/05/2023   • Personal history of irradiation    • Postoperative wound dehiscence, subsequent encounter 02/08/2024   • Renal cyst    • Sleep apnea     Resolved due to Weight Loss   • Vertigo 10/10/2023     Past Surgical History:   Procedure Laterality Date   • ACHILLES TENDON REPAIR Right    • APPENDECTOMY     • COLONOSCOPY      6/6/22   • COLONOSCOPY W/ BIOPSIES N/A 12/2015   • EGD     • HEEL SPUR SURGERY Right 1996   • HYSTERECTOMY     • 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   • IR NEPHROSTOMY TUBE PLACEMENT  01/18/2021   • LAPAROSCOPIC GASTRIC BANDING N/A 2006    [place]   • OTHER SURGICAL HISTORY  2004    Urine shunt to intestine, transverse colon   •  PCNL Left 02/16/2021    Procedure: NEPHROLITHOTOMY  PERCUTANEOUS (PCNL);  Surgeon: Ziggy Roldan MD;  [facility];  Service: Urology   • IA CYSTO/URETERO W/LITHOTRIPSY &INDWELL STENT INSRT Left 02/16/2021    Procedure: anterograde  URETEROSCOPY with dilation of ureteral stricture, INSERTION STENT URETERAL, exchange  of nephrostomy tube;  Surgeon: Ziggy Roldan MD;  [facility];  Service: Urology   • IA DEBRIDEMENT SUBCUTANEOUS TISSUE 1ST 20 SQ CM/< Left 01/29/2024    Procedure: DEBRIDEMENT LOWER EXTREMITY (WASH OUT) with PHOENIX GRAFT APPLICATION, foot wound;  Surgeon: Darron Frias DPM;  Location: [facility];  Service: Podiatry   • IA LAPAROSCOPY ENTEROLYSIS SEPARATE PROCEDURE N/A 01/17/2019    Procedure: LAPAROSCOPIC LYSIS OF ADHESIONS;  Surgeon: Omar Jack MD;  Location:  MAIN OR;  Service: General   • IA PREP SITE F/S/N/H/F/G/M/D GT 1ST 100 SQ CM/1PCT Left 11/15/2024    Procedure: Left ankle debridement w/ harvesting of split-thickness skin graft left lower leg for application to left ankle wound.;  Surgeon: Aram Rodriguez DPM;  [facility];  Service: Podiatry   • IA REPAIR SECONDARY ACHILLES TENDON W/WO GRAFT Left 11/24/2023    Procedure: REMOVAL OF POSTERIOR HEEL SPUR WITH REPAIR TENDON ACHILLES;  Surgeon: Darron Frias DPM;  Location: [facility];  Service: Podiatry   • IA SPLT AGRFT T/A/L 1ST 100 SQCM/</1% BDY INFT/CHLD Left 11/15/2024    Procedure: Left ankle debridement w/ harvesting of split-thickness skin graft left lower leg for application to left ankle wound.;  Surgeon: Aram Rodriguez DPM;  [facility];  Service: Podiatry   • [facility]   • SLEEVE GA[facility]OPLASTY N/A 09/2015    Dr. Jack, [facility]   • UPPER GASTROINTESTINAL ENDOSCOPY     • URETER REVISION  2010   • WEILBY THUMB ARTHROPLASTY     • WEILBY THUMB ARTHROPLASTY       Social History     Socioeconomic History   •  Marital status: /Civil Union     Spouse name: Ari   • Number of children: 0   • Years of education: 14   • Highest education level: Not on file   Occupational History   • Occupation: [employer]    Tobacco Use   • Smoking status: Former     Current packs/day: 0.00     Average packs/day: 1.5 packs/day for 12.0 years (18.0 ttl pk-yrs)     Types: Cigarettes     Start date: 1974     Quit date: 1986     Years since quittin.1     Passive exposure: Never   • Smokeless tobacco: Never   Vaping Use   • Vaping status: Never Used   Substance and Sexual Activity   • Alcohol use: Yes     Comment: rarely   • Drug use: Never   • Sexual activity: Never     Comment: s/p hysterectomy   Other Topics Concern   • Not on file   Social History Narrative   • Not on file     Social Drivers of Health     Financial Resource Strain: Not on file   Food Insecurity: No Food Insecurity (2024)    Nursing - Inadequate Food Risk Classification    • Worried About Running Out of Food in the Last Year: Not on file    • Ran Out of Food in the Last Year: Not on file    • Ran Out of Food in the Last Year: Never true   Transportation Needs: No Transportation Needs (2024)    Nursing - Transportation Risk Classification    • Lack of Transportation: Not on file    • Lack of Transportation: No   Physical Activity: Inactive (2024)    Exercise Vital Sign    • Days of Exercise per Week: 0 days    • Minutes of Exercise per Session: 0 min   Stress: Stress Concern Present (12/3/2024)    [employer] [place] [employer] - Occupational Stress Questionnaire    • Feeling of Stress : To some extent   Social Connections: Not on file   Intimate Partner Violence: Unknown (2024)    Nursing IPS    • Feels Physically and Emotionally Safe: Not on file    • Physically Hurt by Someone: Not on file    • Humiliated or Emotionally Abused by Someone: Not on file    • Physically Hurt by Someone: No    • Hurt or Threatened by Someone:  No   Housing Stability: Unknown (2024)    Nursing: Inadequate Housing Risk Classification    • Has Housing: Not on file    • Worried About Losing Housing: Not on file    • Unable to Get Utilities: Not on file    • Unable to Pay for Housing in the Last Year: No    • Has Housin        Current Outpatient Medications:   •  triamcinolone (KENALOG) 0.1 % cream, Apply topically in the morning At dressing change to area around wound that is irritated., Disp: 15 g, Rfl: 1  •  ALPRAZolam (XANAX) 0.5 mg tablet, TAKE 1/2 TABLET BY MOUTH ONCE DAILY AT BEDTIME AS NEEDED FOR ANXIETY, Disp: 30 tablet, Rfl: 2  •  BIOTIN PO, Take 1 capsule by mouth daily, Disp: , Rfl:   •  dabigatran etexilate (PRADAXA) 150 mg capsu, TAKE ONE CAPSULE BY MOUTH TWICE A DAY, Disp: 180 capsule, Rfl: 1  •  ferrous sulfate 324 (65 Fe) mg, Take 1 tablet (324 mg total) by mouth every other day, Disp: 15 tablet, Rfl: 0  •  FREESTYLE LITE test strip, Check blood sugar 1-2 times daily, Disp: 100 each, Rfl: 4  •  furosemide (LASIX) 20 mg tablet, Take 1 tablet (20 mg total) by mouth as needed (leg swelling), Disp: 30 tablet, Rfl: 0  •  gabapentin (NEURONTIN) 100 mg capsule, Take 1 capsule (100 mg total) by mouth 3 (three) times a day (Patient taking differently: Take 100 mg by mouth 3 (three) times a day Takes as needed), Disp: 90 capsule, Rfl: 1  •  gentamicin (GARAMYCIN) 0.1 % ointment, Apply to wound at dressing change.  Use xeroform and dry sterile dressing. Change MWF by visiting nurses. (Patient not taking: Reported on 2025), Disp: 15 g, Rfl: 2  •  levothyroxine 75 mcg tablet, TAKE 1 TABLET DAILY EARLY IN THE MORNING, Disp: 90 tablet, Rfl: 0  •  lidocaine (LMX) 4 % cream, Apply topically as needed for mild pain (Patient not taking: Reported on 2025), Disp: 60 g, Rfl: 5  •  losartan (COZAAR) 25 mg tablet, TAKE ONE TABLET BY MOUTH EVERY DAY, Disp: 90 tablet, Rfl: 1  •  meclizine (ANTIVERT) 12.5 MG tablet, Take 1 tablet (12.5 mg total) by  mouth every 8 (eight) hours as needed for nausea or dizziness for up to 14 days, Disp: 40 tablet, Rfl: 0  •  metFORMIN (GLUCOPHAGE) 500 mg tablet, TAKE ONE TABLET BY MOUTH TWICE A DAY WITH MEALS, Disp: 180 tablet, Rfl: 1  •  naloxone (NARCAN) 4 mg/0.1 mL nasal spray, Administer 1 spray into a nostril. If no response after 2-3 minutes, give another dose in the other nostril using a new spray., Disp: 1 each, Rfl: 1  •  oxyCODONE-acetaminophen (Percocet) 5-325 mg per tablet, Take 1 tablet by mouth every 6 (six) hours as needed for moderate pain for up to 30 doses Max Daily Amount: 4 tablets, Disp: 30 tablet, Rfl: 0  •  pantoprazole (PROTONIX) 20 mg tablet, Take 1 tablet (20 mg total) by mouth daily, Disp: 90 tablet, Rfl: 3  •  pramipexole (MIRAPEX) 0.5 mg tablet, TAKE TWO TABLETS BY MOUTH EVERY DAY AT BEDTIME, Disp: 60 tablet, Rfl: 5  •  Probiotic Product (Probiotic Daily) CAPS, Take 1 capsule by mouth in the morning, Disp: 10 capsule, Rfl: 0  •  sodium chloride (OCEAN) 0.65 % nasal spray, 1 spray into each nostril as needed for congestion or rhinitis, Disp: 15 mL, Rfl: 0  •  Tirzepatide (Mounjaro) 2.5 MG/0.5ML SOAJ, Inject 2.5 mg under the skin once a week for 12 doses, Disp: 2 mL, Rfl: 2  Family History   Problem Relation Age of Onset   • No Known Problems Mother    • Kidney failure Father    • Brain cancer Father    • Kidney cancer Father    • Diabetes Sister    • Alcohol abuse Sister    • Diabetes Brother       Review of Systems  Allergies:  Amoxicillin, Penicillins, and Adhesive [medical tape]      Objective:  /77   Pulse 69   Temp (!) 97 °F (36.1 °C)   Resp 18     Physical Exam      Wound 08/02/24 Diabetic Ulcer Ankle Left;Posterior (Active)   Enter Katz score: Katz Grade 1: Partial or full-thickness ulcer (superficial) 02/07/25 1041   Wound Image Images linked 02/07/25 1036   Wound Description Pink;Pale 02/07/25 1041   Dorina-wound Assessment Scar Tissue;Pink;Dry 02/07/25 1041   Wound Length (cm) 2 cm  "02/07/25 1041   Wound Width (cm) 0.5 cm 02/07/25 1041   Wound Depth (cm) 0.1 cm 02/07/25 1041   Wound Surface Area (cm^2) 1 cm^2 02/07/25 1041   Wound Volume (cm^3) 0.1 cm^3 02/07/25 1041   Calculated Wound Volume (cm^3) 0.1 cm^3 02/07/25 1041   Change in Wound Size % 90 02/07/25 1041   Drainage Amount Small 02/07/25 1041   Drainage Description Serosanguineous 02/07/25 1041   Non-staged Wound Description Full thickness 02/07/25 1041   Dressing Status Intact 02/07/25 1041       Wound 11/15/24 Leg Left (Active)   Wound Image Images linked 02/07/25 1035   Wound Description Granulation tissue;Yellow;Slough;Epithelialization 02/07/25 1040   Dorina-wound Assessment Pink;Edema;Purple;Excoriated 02/07/25 1040   Wound Length (cm) 2 cm 02/07/25 1040   Wound Width (cm) 0.4 cm 02/07/25 1040   Wound Depth (cm) 0.1 cm 02/07/25 1040   Wound Surface Area (cm^2) 0.8 cm^2 02/07/25 1040   Wound Volume (cm^3) 0.08 cm^3 02/07/25 1040   Calculated Wound Volume (cm^3) 0.08 cm^3 02/07/25 1040   Change in Wound Size % 75 02/07/25 1040   Drainage Amount Moderate 02/07/25 1040   Drainage Description Serosanguineous;Yellow 02/07/25 1040   Non-staged Wound Description Full thickness 02/07/25 1040   Dressing Status Intact 02/07/25 1040         No images are attached to the encounter.         Debridement   Wound 08/02/24 Diabetic Ulcer Ankle Left;Posterior    Universal Protocol:  procedure performed by consultantConsent: Verbal consent obtained.  Risks and benefits: risks, benefits and alternatives were discussed  Consent given by: patient  Time out: Immediately prior to procedure a \"time out\" was called to verify the correct patient, procedure, equipment, support staff and site/side marked as required.  Patient understanding: patient states understanding of the procedure being performed  Patient identity confirmed: verbally with patient    Debridement Details  Performed by: physician  Debridement type: selective  Pain control: lidocaine " 4%      Post-debridement measurements  Length (cm): 2  Width (cm): 0.5  Depth (cm): 0.1  Percent debrided: 100%  Surface Area (cm^2): 1  Area Debrided (cm^2): 1  Volume (cm^3): 0.1    Devitalized tissue debrided: biofilm, callus and fibrin  Instrument(s) utilized: blade  Bleeding: small  Hemostasis obtained with: pressure  Procedural pain (0-10): insensate  Post-procedural pain: insensate   Response to treatment: procedure was tolerated well                 Wound Instructions:  Orders Placed This Encounter   Procedures   • Wound cleansing and dressings     Wound location: Left posterior ankle and left lateral leg donor site.   You may remove the dressing and shower on dressing change days. Do not leave wound open to air, apply new dressing immediately.  Cleanse the wound with mild soap and water, rinse, pat dry.  Apply puracol ag to open areas followed by drop of NSS followed by adaptic. ( Rest of supplies sent home with patient. )  Cover with 4x4 gauze   Secure with roll gauze and tape.    Change dressing every other day     Dr. Rodriguez will put in a script for Triamcinolone to apply to left posterior heel skin irritation. Apply with dressing changes. ( Follow directions on script. ) Please  ASAP.              Off-loading Instructions:  Keep weight and pressure off wound at all times. Wear off-loading device as directed by your physician (surgical shoe). Put on immediately when rising in the morning and remove when going to bed.           LLE ACE WRAP: Apply from base of toes to behind the knee. Apply in AM, may remove for sleep or leave intact   Avoid prolonged standing in one place.        Wound infection:  If you have signs of infection please call the wound center.  If the wound center is closed- please go to the Emergency department.  Some signs of infection:  fever, chills, increased redness, red streaks, increase in pain, increased drainage.  Drainage with an odor, Change in drainage color:  "white/milky/green/tan/yellow,  an increase in swelling, chest pain and/or shortness of breath.      Protein: Please increase. Eat protein with each meal to promote healing.  Examples of protein are fish, meat, chicken, nuts, peanut butter, eggs, lentils, edamame or a protein shake.     Standing Status:   Future     Expiration Date:   2/21/2025   • Debridement Diabetic Ulcer Left;Posterior Ankle     This order was created via procedure documentation   • Wound Procedure Treatment     This order was created via procedure documentation           Aram Rodriguez DPM      Portions of the record may have been created with voice recognition software. Occasional wrong word or \"sound a like\" substitutions may have occurred due to the inherent limitations of voice recognition software. Read the chart carefully and recognize, using context, where substitutions have occurred.    "

## 2025-02-07 NOTE — PATIENT INSTRUCTIONS
Orders Placed This Encounter   Procedures    Wound cleansing and dressings     Wound location: Left posterior ankle and left lateral leg donor site.   You may remove the dressing and shower on dressing change days. Do not leave wound open to air, apply new dressing immediately.  Cleanse the wound with mild soap and water, rinse, pat dry.  Apply puracol ag to open areas followed by drop of NSS followed by adaptic. ( Rest of supplies sent home with patient. )  Cover with 4x4 gauze   Secure with roll gauze and tape.    Change dressing every other day     Dr. Rodriguez will put in a script for Triamcinolone to apply to left posterior heel skin irritation. Apply with dressing changes. ( Follow directions on script. ) Please  ASAP.              Off-loading Instructions:  Keep weight and pressure off wound at all times. Wear off-loading device as directed by your physician (surgical shoe). Put on immediately when rising in the morning and remove when going to bed.           LLE ACE WRAP: Apply from base of toes to behind the knee. Apply in AM, may remove for sleep or leave intact   Avoid prolonged standing in one place.        Wound infection:  If you have signs of infection please call the wound center.  If the wound center is closed- please go to the Emergency department.  Some signs of infection:  fever, chills, increased redness, red streaks, increase in pain, increased drainage.  Drainage with an odor, Change in drainage color: white/milky/green/tan/yellow,  an increase in swelling, chest pain and/or shortness of breath.      Protein: Please increase. Eat protein with each meal to promote healing.  Examples of protein are fish, meat, chicken, nuts, peanut butter, eggs, lentils, edamame or a protein shake.     Standing Status:   Future     Expiration Date:   2/21/2025

## 2025-02-07 NOTE — PROGRESS NOTES
Wound Procedure Treatment    Performed by: Chuyita Crisostomo RN  Authorized by: Aram Rodriguez DPM    Associated wounds:   Wound 08/02/24 Diabetic Ulcer Ankle Left;Posterior  Wound 11/15/24 Leg Left  Wound cleansed with:  Wound aggrssively cleansed with NSS and gauze  Applied to periwound:  Steroid cream / ointment  Applied primary dressing:  Collagen dressing, Silver and Non adherent contact layer  Applied secondary dressing:  ABD  Dressing secured with:  Christa, Tape and Compression wrap  Offloading device appllied:  Surgical shoe    Betamethasone to posterior ankle skin irritation   Ace wrap

## 2025-02-11 ENCOUNTER — ANESTHESIA (OUTPATIENT)
Dept: GASTROENTEROLOGY | Facility: HOSPITAL | Age: 70
End: 2025-02-11
Payer: COMMERCIAL

## 2025-02-11 ENCOUNTER — ANESTHESIA EVENT (OUTPATIENT)
Dept: GASTROENTEROLOGY | Facility: HOSPITAL | Age: 70
End: 2025-02-11
Payer: COMMERCIAL

## 2025-02-11 ENCOUNTER — HOSPITAL ENCOUNTER (OUTPATIENT)
Dept: GASTROENTEROLOGY | Facility: HOSPITAL | Age: 70
Setting detail: OUTPATIENT SURGERY
Discharge: HOME | End: 2025-02-11
Payer: COMMERCIAL

## 2025-02-11 VITALS
DIASTOLIC BLOOD PRESSURE: 66 MMHG | BODY MASS INDEX: 35.61 KG/M2 | WEIGHT: 226.9 LBS | TEMPERATURE: 97.6 F | RESPIRATION RATE: 18 BRPM | SYSTOLIC BLOOD PRESSURE: 127 MMHG | HEIGHT: 67 IN | HEART RATE: 67 BPM | OXYGEN SATURATION: 97 %

## 2025-02-11 DIAGNOSIS — K21.9 GASTROESOPHAGEAL REFLUX DISEASE, UNSPECIFIED WHETHER ESOPHAGITIS PRESENT: ICD-10-CM

## 2025-02-11 DIAGNOSIS — D50.0 IRON DEFICIENCY ANEMIA DUE TO CHRONIC BLOOD LOSS: ICD-10-CM

## 2025-02-11 LAB — GLUCOSE SERPL-MCNC: 141 MG/DL (ref 65–140)

## 2025-02-11 PROCEDURE — 88305 TISSUE EXAM BY PATHOLOGIST: CPT | Performed by: PATHOLOGY

## 2025-02-11 PROCEDURE — 43239 EGD BIOPSY SINGLE/MULTIPLE: CPT | Performed by: INTERNAL MEDICINE

## 2025-02-11 PROCEDURE — 82948 REAGENT STRIP/BLOOD GLUCOSE: CPT

## 2025-02-11 RX ORDER — PROPOFOL 10 MG/ML
INJECTION, EMULSION INTRAVENOUS AS NEEDED
Status: DISCONTINUED | OUTPATIENT
Start: 2025-02-11 | End: 2025-02-11

## 2025-02-11 RX ORDER — SODIUM CHLORIDE, SODIUM LACTATE, POTASSIUM CHLORIDE, CALCIUM CHLORIDE 600; 310; 30; 20 MG/100ML; MG/100ML; MG/100ML; MG/100ML
INJECTION, SOLUTION INTRAVENOUS CONTINUOUS PRN
Status: DISCONTINUED | OUTPATIENT
Start: 2025-02-11 | End: 2025-02-11

## 2025-02-11 RX ORDER — ALBUTEROL SULFATE 0.83 MG/ML
2.5 SOLUTION RESPIRATORY (INHALATION) ONCE AS NEEDED
Status: CANCELLED | OUTPATIENT
Start: 2025-02-11

## 2025-02-11 RX ORDER — LIDOCAINE HYDROCHLORIDE 20 MG/ML
INJECTION, SOLUTION EPIDURAL; INFILTRATION; INTRACAUDAL; PERINEURAL AS NEEDED
Status: DISCONTINUED | OUTPATIENT
Start: 2025-02-11 | End: 2025-02-11

## 2025-02-11 RX ORDER — ONDANSETRON 2 MG/ML
4 INJECTION INTRAMUSCULAR; INTRAVENOUS ONCE AS NEEDED
Status: CANCELLED | OUTPATIENT
Start: 2025-02-11

## 2025-02-11 RX ADMIN — PROPOFOL 150 MG: 10 INJECTION, EMULSION INTRAVENOUS at 07:35

## 2025-02-11 RX ADMIN — PROPOFOL 25 MG: 10 INJECTION, EMULSION INTRAVENOUS at 07:41

## 2025-02-11 RX ADMIN — PROPOFOL 25 MG: 10 INJECTION, EMULSION INTRAVENOUS at 07:38

## 2025-02-11 RX ADMIN — PROPOFOL 25 MG: 10 INJECTION, EMULSION INTRAVENOUS at 07:39

## 2025-02-11 RX ADMIN — PROPOFOL 25 MG: 10 INJECTION, EMULSION INTRAVENOUS at 07:37

## 2025-02-11 RX ADMIN — LIDOCAINE HYDROCHLORIDE 100 MG: 20 INJECTION, SOLUTION EPIDURAL; INFILTRATION; INTRACAUDAL; PERINEURAL at 07:35

## 2025-02-11 RX ADMIN — PROPOFOL 25 MG: 10 INJECTION, EMULSION INTRAVENOUS at 07:40

## 2025-02-11 RX ADMIN — SODIUM CHLORIDE, SODIUM LACTATE, POTASSIUM CHLORIDE, AND CALCIUM CHLORIDE: .6; .31; .03; .02 INJECTION, SOLUTION INTRAVENOUS at 07:30

## 2025-02-11 NOTE — ANESTHESIA PREPROCEDURE EVALUATION
Procedure:  EGD  Fe deficient anemia and GERD - EGD     TTE: LVEF 60% G1DD, RV wnl  ECG: NSR     A1c 6.7  Hbg 10.0  Mounjaro - last took last Sunday    Denies the following: CP/SOB with exertion, asthma, COPD, JANNET, stroke/TIA, seizure    Relevant Problems   CARDIO   (+) Chronic deep vein thrombosis (DVT) of femoral vein of right lower extremity (HCC)   (+) HTN (hypertension)   (+) Migraine   (+) PAD (peripheral artery disease) (HCC)      ENDO   (+) Type 2 diabetes mellitus treated without insulin (HCC)      GI/HEPATIC   (+) Small bowel obstruction (HCC)      /RENAL   (+) Hydroureteronephrosis      GYN   (+) Endometrial cancer (HCC)      HEMATOLOGY   (+) CHARY (iron deficiency anemia)   (+) Normocytic anemia      NEURO/PSYCH   (+) Depression, recurrent (HCC)   (+) Headache   (+) Migraine      PULMONARY   (+) Upper respiratory infection        Physical Exam    Airway    Mallampati score: II  TM Distance: >3 FB  Neck ROM: full     Dental       Cardiovascular      Pulmonary      Other Findings  post-pubertal.      Anesthesia Plan  ASA Score- 3     Anesthesia Type- IV sedation with anesthesia with ASA Monitors.         Additional Monitors:     Airway Plan:            Plan Factors-Exercise tolerance (METS): >4 METS.    Chart reviewed. EKG reviewed.  Existing labs reviewed. Patient summary reviewed.    Patient is not a current smoker.      Obstructive sleep apnea risk education given perioperatively.        Induction-     Postoperative Plan-         Informed Consent- Anesthetic plan and risks discussed with patient.  I personally reviewed this patient with the CRNA. Discussed and agreed on the Anesthesia Plan with the CRNA..      NPO Status:  No vitals data found for the desired time range.

## 2025-02-11 NOTE — ANESTHESIA POSTPROCEDURE EVALUATION
Post-Op Assessment Note    CV Status:  Stable  Pain Score: 0    Pain management: adequate       Mental Status:  Awake, sleepy and arousable   Hydration Status:  Euvolemic   PONV Controlled:  Controlled   Airway Patency:  Patent     Post Op Vitals Reviewed: Yes    No anethesia notable event occurred.    Staff: CRNA           Last Filed PACU Vitals:  Vitals Value Taken Time   Temp 97    Pulse 67    /54    Resp 16    SpO2 98

## 2025-02-11 NOTE — H&P
History and Physical - [facility]  Jillian Arango 69 y.o. female MRN: [id number]                  HPI: Jillian Arango is a 69 y.o. year old female who presents for anemia      REVIEW OF SYSTEMS: Per the HPI, and otherwise unremarkable.    Historical Information   Past Medical History:   Diagnosis Date    Achilles tendinitis of left lower extremity 02/08/2024    Achilles tendinosis of left ankle 11/27/2023    Anemia     Back pain     Bone infarction (HCC) 06/24/2023    Bowel obstruction (HCC) 2017,2018    Bursitis of posterior heel, left 11/27/2023    Cancer (HCC)     uterine CA radiation destoried urethra.   Patient has urostomy    Cholelithiasis     Colon polyp     Deep vein thrombosis of left lower extremity (HCC) 01/11/2004    on blood thinner for 3 years.    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    Hypercalcemia 10/10/2023    Hypertension     Hypothyroid     In vitro fertilization     Kidney stone     Migraines     Muscle weakness     abdominal    Nausea and vomiting 10/05/2023    Personal history of irradiation     Postoperative wound dehiscence, subsequent encounter 02/08/2024    Renal cyst     Sleep apnea     Resolved due to Weight Loss    Vertigo 10/10/2023     Past Surgical History:   Procedure Laterality Date    ACHILLES TENDON REPAIR Right     APPENDECTOMY      COLONOSCOPY      6/6/22    COLONOSCOPY W/ BIOPSIES N/A 12/2015    EGD      HEEL SPUR SURGERY Right 1996    HYSTERECTOMY      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    IR NEPHROSTOMY TUBE PLACEMENT  01/18/2021    LAPAROSCOPIC GASTRIC BANDING N/A 2006    [place], [place]     OTHER SURGICAL HISTORY  2004    Urine shunt to intestine, transverse colon    PCNL Left 02/16/2021    Procedure: NEPHROLITHOTOMY  PERCUTANEOUS (PCNL);  Surgeon: Ziggy Roldan MD;  Location: [facility];  Service: Urology    NJ CYSTO/URETERO W/LITHOTRIPSY &INDWELL STENT INSRT Left 02/16/2021    Procedure: anterograde  URETEROSCOPY with dilation of ureteral stricture, INSERTION STENT URETERAL, exchange  of nephrostomy tube;  Surgeon: Ziggy Roldan MD;  Location: [facility];  Service: Urology    NJ DEBRIDEMENT SUBCUTANEOUS TISSUE 1ST 20 SQ CM/< Left 01/29/2024    Procedure: DEBRIDEMENT LOWER EXTREMITY (WASH OUT) with PHOENIX GRAFT APPLICATION, foot wound;  Surgeon: Darron Frias DPM;  Location: [facility];  Service: Podiatry    NJ LAPAROSCOPY ENTEROLYSIS SEPARATE PROCEDURE N/A 01/17/2019    Procedure: LAPAROSCOPIC LYSIS OF ADHESIONS;  Surgeon: Omar Jack MD;  Location:  MAIN OR;  Service: General    NJ PREP SITE F/S/N/H/F/G/M/D GT 1ST 100 SQ CM/1PCT Left 11/15/2024    Procedure: Left ankle debridement w/ harvesting of split-thickness skin graft left lower leg for application to left ankle wound.;  Surgeon: Aram Rodriguez DPM;  Location: [facility];  Service: Podiatry    NJ REPAIR SECONDARY ACHILLES TENDON W/WO GRAFT Left 11/24/2023    Procedure: REMOVAL OF POSTERIOR HEEL SPUR WITH REPAIR TENDON ACHILLES;  Surgeon: Darron Frias DPM;  Location: [facility];  Service: Podiatry    NJ SPLT AGRFT T/A/L 1ST 100 SQCM/</1% BDY INFT/CHLD Left 11/15/2024    Procedure: Left ankle debridement w/ harvesting of split-thickness skin graft left lower leg for application to left ankle wound.;  Surgeon: Aram Rodriguez DPM;  [facility];  Service: Podiatry    RADICAL HYSTERECTOMY N/A 1999    [facility] endometrial cancer.    SLEEVE GASTROPLASTY N/A 09/2015    Dr. Jack, [facility]    UPPER GASTROINTESTINAL ENDOSCOPY      URETER REVISION  2010    WEILBY THUMB ARTHROPLASTY      WEILBY  "THUMB ARTHROPLASTY       Social History   Social History     Substance and Sexual Activity   Alcohol Use Yes    Comment: rarely     Social History     Substance and Sexual Activity   Drug Use Never     Social History     Tobacco Use   Smoking Status Former    Current packs/day: 0.00    Average packs/day: 1.5 packs/day for 12.0 years (18.0 ttl pk-yrs)    Types: Cigarettes    Start date: 1974    Quit date: 1986    Years since quittin.1    Passive exposure: Never   Smokeless Tobacco Never     Family History   Problem Relation Age of Onset    No Known Problems Mother     Kidney failure Father     Brain cancer Father     Kidney cancer Father     Diabetes Sister     Alcohol abuse Sister     Diabetes Brother        Meds/Allergies       Current Outpatient Medications:     ALPRAZolam (XANAX) 0.5 mg tablet    BIOTIN PO    dabigatran etexilate (PRADAXA) 150 mg capsu    ferrous sulfate 324 (65 Fe) mg    furosemide (LASIX) 20 mg tablet    gabapentin (NEURONTIN) 100 mg capsule    levothyroxine 75 mcg tablet    losartan (COZAAR) 25 mg tablet    meclizine (ANTIVERT) 12.5 MG tablet    metFORMIN (GLUCOPHAGE) 500 mg tablet    pantoprazole (PROTONIX) 20 mg tablet    pramipexole (MIRAPEX) 0.5 mg tablet    Probiotic Product (Probiotic Daily) CAPS    FREESTYLE LITE test strip    gentamicin (GARAMYCIN) 0.1 % ointment    lidocaine (LMX) 4 % cream    naloxone (NARCAN) 4 mg/0.1 mL nasal spray    oxyCODONE-acetaminophen (Percocet) 5-325 mg per tablet    sodium chloride (OCEAN) 0.65 % nasal spray    triamcinolone (KENALOG) 0.1 % cream    Allergies   Allergen Reactions    Amoxicillin Rash    Penicillins Rash    Adhesive [Medical Tape] Rash     \"Paper Tape\" Causes the rash       Objective     /71   Pulse 70   Temp (!) 97.4 °F (36.3 °C) (Temporal)   Resp 20   Ht 5' 6.5\" (1.689 m)   Wt 103 kg (226 lb 14.4 oz)   SpO2 99%   BMI 36.07 kg/m²       PHYSICAL EXAM    Gen: NAD  Head: NCAT  CV: RRR  CHEST: Clear  ABD: soft, " NT/ND  EXT: no edema      ASSESSMENT/PLAN:  This is a 69 y.o. year old female here for EGD, and she is stable and optimized for her procedure.

## 2025-02-12 PROCEDURE — 88305 TISSUE EXAM BY PATHOLOGIST: CPT | Performed by: PATHOLOGY

## 2025-02-16 DIAGNOSIS — E66.812 CLASS 2 SEVERE OBESITY WITH SERIOUS COMORBIDITY AND BODY MASS INDEX (BMI) OF 35.0 TO 35.9 IN ADULT, UNSPECIFIED OBESITY TYPE (HCC): ICD-10-CM

## 2025-02-16 DIAGNOSIS — E11.69 TYPE 2 DIABETES MELLITUS WITH OTHER SPECIFIED COMPLICATION, WITH LONG-TERM CURRENT USE OF INSULIN (HCC): ICD-10-CM

## 2025-02-16 DIAGNOSIS — Z79.4 TYPE 2 DIABETES MELLITUS WITH OTHER SPECIFIED COMPLICATION, WITH LONG-TERM CURRENT USE OF INSULIN (HCC): ICD-10-CM

## 2025-02-16 DIAGNOSIS — E66.01 CLASS 2 SEVERE OBESITY WITH SERIOUS COMORBIDITY AND BODY MASS INDEX (BMI) OF 35.0 TO 35.9 IN ADULT, UNSPECIFIED OBESITY TYPE (HCC): ICD-10-CM

## 2025-02-18 ENCOUNTER — OFFICE VISIT (OUTPATIENT)
Dept: WOUND CARE | Facility: HOSPITAL | Age: 70
End: 2025-02-18
Payer: COMMERCIAL

## 2025-02-18 VITALS
RESPIRATION RATE: 18 BRPM | SYSTOLIC BLOOD PRESSURE: 165 MMHG | HEART RATE: 60 BPM | TEMPERATURE: 97.1 F | DIASTOLIC BLOOD PRESSURE: 82 MMHG

## 2025-02-18 DIAGNOSIS — L97.322 NON-PRESSURE CHRONIC ULCER OF LEFT ANKLE WITH FAT LAYER EXPOSED (HCC): Primary | ICD-10-CM

## 2025-02-18 DIAGNOSIS — E11.9 TYPE 2 DIABETES MELLITUS TREATED WITHOUT INSULIN (HCC): ICD-10-CM

## 2025-02-18 DIAGNOSIS — K21.9 GASTROESOPHAGEAL REFLUX DISEASE WITHOUT ESOPHAGITIS: ICD-10-CM

## 2025-02-18 DIAGNOSIS — T81.89XD NONHEALING SURGICAL WOUND, SUBSEQUENT ENCOUNTER: ICD-10-CM

## 2025-02-18 PROCEDURE — 97597 DBRDMT OPN WND 1ST 20 CM/<: CPT

## 2025-02-18 RX ORDER — LIDOCAINE 40 MG/G
CREAM TOPICAL ONCE
Status: COMPLETED | OUTPATIENT
Start: 2025-02-18 | End: 2025-02-18

## 2025-02-18 RX ADMIN — LIDOCAINE: 40 CREAM TOPICAL at 15:19

## 2025-02-18 NOTE — PATIENT INSTRUCTIONS
Orders Placed This Encounter   Procedures    Wound cleansing and dressings Diabetic Ulcer Left;Posterior Ankle     Left lateral leg wound is healed. There was a 0.5x0.5 open area proximal to the wound that is superficial, keep covered with dry dressing (bandaid) until no drainage. Today silicone bordered foam applied.     Wound location: Left posterior ankle  Change every other day  You may remove the dressing and shower on dressing change days. Do not leave wound open to air, apply new dressing immediately.  Apply puracol ag covered with hydrogel (given)  Cover with 4x4 gauze   Secure with roll gauze and tape.       Off-loading Instructions:  Keep weight and pressure off wound at all times.   Wear surgical shoe  Put on immediately when rising in the morning and remove when going to bed.     LLE ACE WRAP: Apply from base of toes to behind the knee.   Apply in AM, may remove for sleep  Avoid prolonged standing in one place.     Standing Status:   Future     Expiration Date:   3/4/2025

## 2025-02-18 NOTE — PROGRESS NOTES
Wound Procedure Treatment Diabetic Ulcer Left;Posterior Ankle    Performed by: Verenice Subramanian RN  Authorized by: TRACY Mast    Associated wounds:   Wound 08/02/24 Diabetic Ulcer Ankle Left;Posterior  Wound cleansed with:  Wound aggrssively cleansed with NSS and gauze   Applied topical:  Hydrogel   Applied primary dressing:  Silver and Collagen dressing   Applied secondary dressing:  Gauze   Dressing secured with:  Christa and Tape     ACE

## 2025-02-19 RX ORDER — PANTOPRAZOLE SODIUM 40 MG/1
TABLET, DELAYED RELEASE ORAL
Qty: 90 TABLET | Refills: 0 | OUTPATIENT
Start: 2025-02-19

## 2025-02-19 NOTE — PROGRESS NOTES
Patient ID: Jillian Arango is a 69 y.o. female Date of Birth 1955       Chief Complaint   Patient presents with    Follow Up Wound Care Visit     Left posterior ankle/heel       Allergies:  Amoxicillin, Penicillins, and Adhesive [medical tape]    Diagnosis:      Diagnosis ICD-10-CM Associated Orders   1. Non-pressure chronic ulcer of left ankle with fat layer exposed ([facility])  L97.322 lidocaine (LMX) 4 % cream     Wound cleansing and dressings Diabetic Ulcer Left;Posterior Ankle     Wound Procedure Treatment Diabetic Ulcer Left;Posterior Ankle      2. Type 2 diabetes mellitus treated without insulin ([facility])  E11.9 lidocaine (LMX) 4 % cream     Wound cleansing and dressings Diabetic Ulcer Left;Posterior Ankle     Wound Procedure Treatment Diabetic Ulcer Left;Posterior Ankle      3. Nonhealing surgical wound, subsequent encounter  T81.89XD               Assessment & Plan:  Left lateral leg - original wound area has closed, small partial thickness open aspect noted superiorly, will recommend wound management of foam dressing.  Left posterior ankle wound is stable. Selectively debrided today. Will recommend to add small amount of hydrogel to collagen to help it dissolve into the wound bed.   Continue with ace wrap and offloading as below.   See wound orders below  Wound is not showing any clinical s/s of infection  Debrided as below.   Pressure relief, offload pressure to wound as much as possible  Elevate lower extremities and avoid prolonged standing/keeping legs in dependent position for edema management.   Counseled patient on the importance of tight glycemic control and adequate protein intake (3-4 servings per day) to promote wound healing.   A1C results reviewed with the patient today.   Patient states she is going on a cruise. Educated patient to keep wounds covered at all times, and do not leave wounds open to air and to not submerge wound in any body of water including ocean/lake water or pool/bathtub - as  this is an infection risk.   Follow-up in 2 week(s). Call sooner with questions or concerns or any signs of infection such as redness, swelling, increased/purulent drainage, fever or chills, and increased pain.  Patient verbalized understanding and agrees with plan of care.          Subjective:   2/18/25: Patient presents to the wound care center for follow-up visit of left lower extremity wounds.  Patient has been using collagen and Adaptic to the wounds.  Patient offers no wound related complaints, denies increased pain or drainage.  No fever or chills.        The following portions of the patient's history were reviewed and updated as appropriate:   Patient Active Problem List   Diagnosis    Small bowel obstruction (HCC)    Type 2 diabetes mellitus treated without insulin (HCC)    Post-operative state    Endometrial cancer (HCC)    Thyroid disorder    Normocytic anemia    Hydroureteronephrosis    HTN (hypertension)    Migraine    Abdominal adhesions    Class 2 severe obesity with serious comorbidity and body mass index (BMI) of 35.0 to 35.9 in adult (HCC)    Headache    Ureteral-ileal loop anastomotic stricture    Chronic deep vein thrombosis (DVT) of femoral vein of right lower extremity ([facility])    Anti-cardiolipin antibody positive    Encounter for attention to other artificial openings of urinary tract ([facility])    History of smoking    Depression, recurrent (HCC)    PAD (peripheral artery disease) ([facility])    Baker's cyst, ruptured    Cholelithiasis    CHARY (iron deficiency anemia)    Upper respiratory infection    Ankle wound, left, initial encounter    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   Patient has urostomy    Cholelithiasis     Colon polyp     Deep vein thrombosis of left lower extremity (HCC) 01/11/2004    on blood thinner for 3 years.    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    Hypercalcemia 10/10/2023    Hypertension     Hypothyroid     In vitro fertilization     Kidney stone     Migraines     Muscle weakness     abdominal    Nausea and vomiting 10/05/2023    Personal history of irradiation     Postoperative wound dehiscence, subsequent encounter 02/08/2024    Renal cyst     Sleep apnea     Resolved due to Weight Loss    Vertigo 10/10/2023     Past Surgical History:   Procedure Laterality Date    ACHILLES TENDON REPAIR Right     APPENDECTOMY      COLONOSCOPY      6/6/22    COLONOSCOPY W/ BIOPSIES N/A 12/2015    EGD      HEEL SPUR SURGERY Right 1996    HYSTERECTOMY      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    IR NEPHROSTOMY TUBE PLACEMENT  01/18/2021    LAPAROSCOPIC GASTRIC BANDING N/A 2006    [place], [place]    OTHER SURGICAL HISTORY  2004    Urine shunt to intestine, transverse colon    PCNL Left 02/16/2021    Procedure: NEPHROLITHOTOMY  PERCUTANEOUS (PCNL);  Surgeon: Ziggy Roldan MD;  Location: [facility];  Service: Urology    IN CYSTO/URETERO W/LITHOTRIPSY &INDWELL STENT INSRT Left 02/16/2021    Procedure: anterograde  URETEROSCOPY with dilation of ureteral stricture, INSERTION STENT URETERAL, exchange  of nephrostomy tube;  Surgeon: Ziggy Roldan MD;  Location[facility];  Service: Urology    IN DEBRIDEMENT SUBCUTANEOUS TISSUE 1ST 20 SQ CM/< Left 01/29/2024     Procedure: DEBRIDEMENT LOWER EXTREMITY (WASH OUT) with PHOENIX GRAFT APPLICATION, foot wound;  Surgeon: Darron Frias DPM;  Location: [facility];  Service: Podiatry    WI LAPAROSCOPY ENTEROLYSIS SEPARATE PROCEDURE N/A 01/17/2019    Procedure: LAPAROSCOPIC LYSIS OF ADHESIONS;  Surgeon: Omar Jack MD;  Location:  MAIN OR;  Service: General    WI PREP SITE F/S/N/H/F/G/M/D GT 1ST 100 SQ CM/1PCT Left 11/15/2024    Procedure: Left ankle debridement w/ harvesting of split-thickness skin graft left lower leg for application to left ankle wound.;  Surgeon: Aram Rodriguez DPM;  Location: [facility];  Service: Podiatry    WI REPAIR SECONDARY ACHILLES TENDON W/WO GRAFT Left 11/24/2023    Procedure: REMOVAL OF POSTERIOR HEEL SPUR WITH REPAIR TENDON ACHILLES;  Surgeon: Darron Frias DPM;  Location: [facility];  Service: Podiatry    WI SPLT AGRFT T/A/L 1ST 100 SQCM/</1% BDY INFT/CHLD Left 11/15/2024    Procedure: Left ankle debridement w/ harvesting of split-thickness skin graft left lower leg for application to left ankle wound.;  Surgeon: Aram Rodriguez DPM;  Location: [facility];  Service: Podiatry    [facility]    SLEEVE GASTROPLASTY N/A 09/2015    Dr. Jack, [facility]    UPPER GASTROINTESTINAL ENDOSCOPY      URETER REVISION  2010    WEILBY THUMB ARTHROPLASTY      WEILBY THUMB ARTHROPLASTY       Family History   Problem Relation Age of Onset    No Known Problems Mother     Kidney failure Father     Brain cancer Father     Kidney cancer Father     Diabetes Sister     Alcohol abuse Sister     Diabetes Brother       Social History     Socioeconomic History    Marital status: /Civil Union     Spouse name: Ari    Number of children: 0    Years of education: 14    Highest education level: Not on file   Occupational History    Occupation: Hair styist    Tobacco Use    Smoking status: Former     Current packs/day: 0.00     Average  packs/day: 1.5 packs/day for 12.0 years (18.0 ttl pk-yrs)     Types: Cigarettes     Start date: 1974     Quit date: 1986     Years since quittin.1     Passive exposure: Never    Smokeless tobacco: Never   Vaping Use    Vaping status: Never Used   Substance and Sexual Activity    Alcohol use: Yes     Comment: rarely    Drug use: Never    Sexual activity: Never     Comment: s/p hysterectomy   Other Topics Concern    Not on file   Social History Narrative    Not on file     Social Drivers of Health     Financial Resource Strain: Not on file   Food Insecurity: No Food Insecurity (2024)    Nursing - Inadequate Food Risk Classification     Worried About Running Out of Food in the Last Year: Not on file     Ran Out of Food in the Last Year: Not on file     Ran Out of Food in the Last Year: Never true   Transportation Needs: No Transportation Needs (2024)    Nursing - Transportation Risk Classification     Lack of Transportation: Not on file     Lack of Transportation: No   Physical Activity: Inactive (2024)    Exercise Vital Sign     Days of Exercise per Week: 0 days     Minutes of Exercise per Session: 0 min   Stress: Stress Concern Present (12/3/2024)    [employer] - Occupational Stress Questionnaire     Feeling of Stress : To some extent   Social Connections: Not on file   Intimate Partner Violence: Unknown (2024)    Nursing IPS     Feels Physically and Emotionally Safe: Not on file     Physically Hurt by Someone: Not on file     Humiliated or Emotionally Abused by Someone: Not on file     Physically Hurt by Someone: No     Hurt or Threatened by Someone: No   Housing Stability: Unknown (2024)    Nursing: Inadequate Housing Risk Classification     Has Housing: Not on file     Worried About Losing Housing: Not on file     Unable to Get Utilities: Not on file     Unable to Pay for Housing in the Last Year: No     Has Housin        Current  Outpatient Medications:     ALPRAZolam (XANAX) 0.5 mg tablet, TAKE 1/2 TABLET BY MOUTH ONCE DAILY AT BEDTIME AS NEEDED FOR ANXIETY, Disp: 30 tablet, Rfl: 2    BIOTIN PO, Take 1 capsule by mouth daily, Disp: , Rfl:     dabigatran etexilate (PRADAXA) 150 mg capsu, TAKE ONE CAPSULE BY MOUTH TWICE A DAY, Disp: 180 capsule, Rfl: 1    ferrous sulfate 324 (65 Fe) mg, Take 1 tablet (324 mg total) by mouth every other day, Disp: 15 tablet, Rfl: 0    FREESTYLE LITE test strip, Check blood sugar 1-2 times daily, Disp: 100 each, Rfl: 4    furosemide (LASIX) 20 mg tablet, Take 1 tablet (20 mg total) by mouth as needed (leg swelling), Disp: 30 tablet, Rfl: 0    gabapentin (NEURONTIN) 100 mg capsule, Take 1 capsule (100 mg total) by mouth 3 (three) times a day (Patient taking differently: Take 100 mg by mouth 3 (three) times a day Takes as needed), Disp: 90 capsule, Rfl: 1    gentamicin (GARAMYCIN) 0.1 % ointment, Apply to wound at dressing change.  Use xeroform and dry sterile dressing. Change MWF by visiting nurses. (Patient not taking: Reported on 1/22/2025), Disp: 15 g, Rfl: 2    levothyroxine 75 mcg tablet, TAKE 1 TABLET DAILY EARLY IN THE MORNING, Disp: 90 tablet, Rfl: 0    lidocaine (LMX) 4 % cream, Apply topically as needed for mild pain (Patient not taking: Reported on 1/22/2025), Disp: 60 g, Rfl: 5    losartan (COZAAR) 25 mg tablet, TAKE ONE TABLET BY MOUTH EVERY DAY, Disp: 90 tablet, Rfl: 1    meclizine (ANTIVERT) 12.5 MG tablet, Take 1 tablet (12.5 mg total) by mouth every 8 (eight) hours as needed for nausea or dizziness for up to 14 days, Disp: 40 tablet, Rfl: 0    metFORMIN (GLUCOPHAGE) 500 mg tablet, TAKE ONE TABLET BY MOUTH TWICE A DAY WITH MEALS, Disp: 180 tablet, Rfl: 1    naloxone (NARCAN) 4 mg/0.1 mL nasal spray, Administer 1 spray into a nostril. If no response after 2-3 minutes, give another dose in the other nostril using a new spray., Disp: 1 each, Rfl: 1    oxyCODONE-acetaminophen (Percocet) 5-325 mg per  tablet, Take 1 tablet by mouth every 6 (six) hours as needed for moderate pain for up to 30 doses Max Daily Amount: 4 tablets, Disp: 30 tablet, Rfl: 0    pantoprazole (PROTONIX) 20 mg tablet, Take 1 tablet (20 mg total) by mouth daily, Disp: 90 tablet, Rfl: 3    pramipexole (MIRAPEX) 0.5 mg tablet, TAKE TWO TABLETS BY MOUTH EVERY DAY AT BEDTIME, Disp: 60 tablet, Rfl: 5    Probiotic Product (Probiotic Daily) CAPS, Take 1 capsule by mouth in the morning, Disp: 10 capsule, Rfl: 0    sodium chloride (OCEAN) 0.65 % nasal spray, 1 spray into each nostril as needed for congestion or rhinitis, Disp: 15 mL, Rfl: 0    triamcinolone (KENALOG) 0.1 % cream, Apply topically in the morning At dressing change to area around wound that is irritated., Disp: 15 g, Rfl: 1  No current facility-administered medications for this visit.    Review of Systems   Constitutional:  Negative for chills and fever.   HENT:  Negative for congestion and sneezing.    Respiratory:  Negative for cough and shortness of breath.    Cardiovascular:  Positive for leg swelling.   Musculoskeletal:  Positive for gait problem.   Skin:  Positive for wound.        LLE   Psychiatric/Behavioral:  Negative for agitation.        Objective:  /82   Pulse 60   Temp (!) 97.1 °F (36.2 °C)   Resp 18   Pain Score: 0-No pain     Physical Exam  Constitutional:       General: She is awake. She is not in acute distress.     Appearance: She is obese. She is not ill-appearing, toxic-appearing or diaphoretic.   HENT:      Head: Normocephalic and atraumatic.      Right Ear: External ear normal.      Left Ear: External ear normal.   Eyes:      Conjunctiva/sclera: Conjunctivae normal.   Cardiovascular:      Pulses:           Dorsalis pedis pulses are 1+ on the left side.   Pulmonary:      Effort: Pulmonary effort is normal. No respiratory distress.   Musculoskeletal:      Left lower leg: Edema present.   Skin:     General: Skin is warm and dry.      Findings: Wound present.       Comments: 1. Left lateral lower extremity original wound site is resolved.  Noted small area of partial-thickness skin loss to the superior aspect.  Scant amount of serosanguineous drainage.  2.  Left posterior ankle with mixed pink/red tissue and yellow slough.  No purulent drainage or malodor.  Periwound is dry and intact with scar tissue. No erythema, warmth or lymphangitic streaking to suggest cellulitis.  Refer to wound assessment for additional details.   Neurological:      Mental Status: She is alert.      Gait: Gait abnormal.   Psychiatric:         Mood and Affect: Mood normal.         Behavior: Behavior normal. Behavior is cooperative.         Wound 08/02/24 Diabetic Ulcer Ankle Left;Posterior (Active)   Enter Katz score: Katz Grade 1: Partial or full-thickness ulcer (superficial) 02/07/25 1041   Wound Image   02/18/25 1515   Wound Description Pink;Granulation tissue;Yellow;Slough 02/18/25 1516   Dorina-wound Assessment Intact 02/18/25 1516   Wound Length (cm) 2 cm 02/18/25 1516   Wound Width (cm) 0.6 cm 02/18/25 1516   Wound Depth (cm) 0.1 cm 02/18/25 1516   Wound Surface Area (cm^2) 1.2 cm^2 02/18/25 1516   Wound Volume (cm^3) 0.12 cm^3 02/18/25 1516   Calculated Wound Volume (cm^3) 0.12 cm^3 02/18/25 1516   Change in Wound Size % 88 02/18/25 1516   Wound Site Closure Staples 12/06/24 1033   Drainage Amount Small 02/18/25 1516   Drainage Description Serosanguineous 02/18/25 1516   Non-staged Wound Description Full thickness 02/18/25 1516   Dressing Status Intact 02/18/25 1516       Wound 11/15/24 Leg Left (Active)   Wound Image   02/18/25 1515   Wound Description Granulation tissue;Yellow;Slough;Epithelialization 02/07/25 1040   Dorina-wound Assessment Pink;Edema;Purple;Excoriated 02/07/25 1040   Wound Length (cm) 2 cm 02/07/25 1040   Wound Width (cm) 0.4 cm 02/07/25 1040   Wound Depth (cm) 0.1 cm 02/07/25 1040   Wound Surface Area (cm^2) 0.8 cm^2 02/07/25 1040   Wound Volume (cm^3) 0.08 cm^3  "02/07/25 1040   Calculated Wound Volume (cm^3) 0.08 cm^3 02/07/25 1040   Change in Wound Size % 75 02/07/25 1040   Drainage Amount Moderate 02/07/25 1040   Drainage Description Serosanguineous;Yellow 02/07/25 1040   Non-staged Wound Description Full thickness 02/07/25 1040   Dressing Status Intact 02/07/25 1040           Debridement   Wound 08/02/24 Diabetic Ulcer Ankle Left;Posterior    Universal Protocol:  Consent: Verbal consent obtained.  Risks and benefits: risks, benefits and alternatives were discussed  Consent given by: patient  Time out: Immediately prior to procedure a \"time out\" was called to verify the correct patient, procedure, equipment, support staff and site/side marked as required.  Timeout called at: 2/18/2025 3:30 PM.  Patient understanding: patient states understanding of the procedure being performed  Patient identity confirmed: verbally with patient    Debridement Details  Performed by: NP  Debridement type: selective  Pain control: lidocaine 4%      Post-debridement measurements  Length (cm): 2  Width (cm): 0.6  Depth (cm): 0.1  Percent debrided: 100%  Surface Area (cm^2): 1.2  Area Debrided (cm^2): 1.2  Volume (cm^3): 0.12    Devitalized tissue debrided: biofilm, exudate and slough  Instrument(s) utilized: curette  Bleeding: small  Hemostasis obtained with: pressure  Procedural pain (0-10): 0  Post-procedural pain: 0   Response to treatment: procedure was tolerated well               Lab Results   Component Value Date    HGBA1C 6.7 (H) 11/14/2024       Wound Instructions:  Orders Placed This Encounter   Procedures    Wound cleansing and dressings Diabetic Ulcer Left;Posterior Ankle     Left lateral leg wound is healed. There was a 0.5x0.5 open area proximal to the wound that is superficial, keep covered with dry dressing (bandaid) until no drainage. Today silicone bordered foam applied.     Wound location: Left posterior ankle  Change every other day  You may remove the dressing and shower " "on dressing change days. Do not leave wound open to air, apply new dressing immediately.  Apply puracol ag covered with hydrogel (given)  Cover with 4x4 gauze   Secure with roll gauze and tape.       Off-loading Instructions:  Keep weight and pressure off wound at all times.   Wear surgical shoe  Put on immediately when rising in the morning and remove when going to bed.     LLE ACE WRAP: Apply from base of toes to behind the knee.   Apply in AM, may remove for sleep  Avoid prolonged standing in one place.     Standing Status:   Future     Expiration Date:   3/4/2025    Wound Procedure Treatment Diabetic Ulcer Left;Posterior Ankle     This order was created via procedure documentation           TRACY Wheatley, KOBY-DELANEY, JADEN    Portions of the record may have been created with voice recognition software. Occasional wrong word or \"sound alike\" substitutions may have occurred due to the inherent limitations of voice recognition software. Read the chart carefully and recognize, using context, where substitutions have occurred.          Total time spent today:    Total time (face-to-face and non-face-to-face) spent on today's visit was 11 minutes. This includes preparation for the visits (i.e. reviewing test results from date recent hospitalizations, ER/Urgent Care/primary care visits and recent consultant office visits), performance of a medically appropriate history and examination, and orders for medications or testing.     "

## 2025-02-20 ENCOUNTER — TELEPHONE (OUTPATIENT)
Dept: INTERNAL MEDICINE CLINIC | Facility: CLINIC | Age: 70
End: 2025-02-20

## 2025-02-20 DIAGNOSIS — E66.812 CLASS 2 SEVERE OBESITY DUE TO EXCESS CALORIES WITH SERIOUS COMORBIDITY AND BODY MASS INDEX (BMI) OF 35.0 TO 35.9 IN ADULT (HCC): Primary | ICD-10-CM

## 2025-02-20 DIAGNOSIS — I10 PRIMARY HYPERTENSION: ICD-10-CM

## 2025-02-20 DIAGNOSIS — E66.01 CLASS 2 SEVERE OBESITY DUE TO EXCESS CALORIES WITH SERIOUS COMORBIDITY AND BODY MASS INDEX (BMI) OF 35.0 TO 35.9 IN ADULT (HCC): Primary | ICD-10-CM

## 2025-02-20 DIAGNOSIS — K21.9 GASTROESOPHAGEAL REFLUX DISEASE, UNSPECIFIED WHETHER ESOPHAGITIS PRESENT: ICD-10-CM

## 2025-02-20 RX ORDER — TIRZEPATIDE 2.5 MG/.5ML
INJECTION, SOLUTION SUBCUTANEOUS
Qty: 2 ML | Refills: 2 | Status: SHIPPED | OUTPATIENT
Start: 2025-02-20

## 2025-02-20 RX ORDER — PANTOPRAZOLE SODIUM 40 MG/1
40 TABLET, DELAYED RELEASE ORAL DAILY
COMMUNITY
Start: 2025-01-19 | End: 2025-02-20 | Stop reason: SDUPTHER

## 2025-02-20 RX ORDER — TIRZEPATIDE 5 MG/.5ML
5 INJECTION, SOLUTION SUBCUTANEOUS WEEKLY
Qty: 2 ML | Refills: 1 | Status: SHIPPED | OUTPATIENT
Start: 2025-02-20 | End: 2025-04-21

## 2025-02-20 RX ORDER — PANTOPRAZOLE SODIUM 40 MG/1
40 TABLET, DELAYED RELEASE ORAL DAILY
Qty: 90 TABLET | Refills: 1 | Status: SHIPPED | OUTPATIENT
Start: 2025-02-20

## 2025-02-20 NOTE — TELEPHONE ENCOUNTER
Patient called the RX Refill Line. Message is being forwarded to the office.     Patient is requesting refill of mounjaro 2.5 mg and is requesting next does up.    Last injection Sunday 02.16.2025, refill needed for Sunday 02.23.2025    Please review not on active medication list     Pharmacy [facility] on file     Please contact patient at [phone] with any questions or concerns

## 2025-02-20 NOTE — TELEPHONE ENCOUNTER
Patient called requesting refill for pantoprazole. Patient made aware medication was refilled on 11.08.24 for 90 with 3 refills to [facility] pharmacy. Patient instructed to contact the pharmacy to obtain refills of medication.     Patient state she is on pantoprazole 40 mg not 20 mg    Patient advised I will check send a message to the office to verify strength      Patient also requested refill for losartan. Patient made aware medication was refilled on 11.26.2024 for 90 with 1 refills to [facility] pharmacy. Patient instructed to contact the pharmacy to obtain refills of medication. Patient state she was at the pharmacy yesterday and they told her there is no refills    Outgoing call to [facility] spoke with pharmacist who advised patient requested refill of pantoprazole 40 mg but they only have 20 mg for her, he stated he sent a request to the office but it was denied.    Pharmacist also advised that they do have a script for losartan and he will have it ready for the patient to .    Please contact patient and pharmacy to clarify/verify patient pantoprazole dosage .    Outgoing call to patient -Left detailed message for patient advising her of message obtained from pharmacy as well as I am sending this information to her doctor and the staff for someone to contact her to clarify the dosage of pantoprazole.

## 2025-02-22 DIAGNOSIS — Z98.890 POST-OPERATIVE STATE: ICD-10-CM

## 2025-02-22 RX ORDER — GABAPENTIN 100 MG/1
100 CAPSULE ORAL 3 TIMES DAILY
Qty: 90 CAPSULE | Refills: 1 | Status: SHIPPED | OUTPATIENT
Start: 2025-02-22

## 2025-03-06 ENCOUNTER — RESULTS FOLLOW-UP (OUTPATIENT)
Age: 70
End: 2025-03-06

## 2025-03-06 ENCOUNTER — TELEPHONE (OUTPATIENT)
Age: 70
End: 2025-03-06

## 2025-03-06 DIAGNOSIS — K29.80 DUODENITIS DETERMINED BY BIOPSY: ICD-10-CM

## 2025-03-06 DIAGNOSIS — E61.1 IRON DEFICIENCY: Primary | ICD-10-CM

## 2025-03-06 NOTE — RESULT ENCOUNTER NOTE
Please call the patient regarding results.  Small bowel biopsies showed mild patchy inflammation.  This is nonspecific.  I will order further blood work to evaluate for the possibility of celiac disease, though this seems unlikely.  Stomach biopsies were benign.  No H. pylori.

## 2025-03-06 NOTE — TELEPHONE ENCOUNTER
Patient called to notify PCP dates of cancelled vacation.  She stated it was 2/23/25-3/2/25.  Please notify PCP.

## 2025-03-07 ENCOUNTER — OFFICE VISIT (OUTPATIENT)
Dept: WOUND CARE | Facility: HOSPITAL | Age: 70
End: 2025-03-07
Payer: COMMERCIAL

## 2025-03-07 VITALS
RESPIRATION RATE: 18 BRPM | DIASTOLIC BLOOD PRESSURE: 88 MMHG | TEMPERATURE: 97.1 F | HEART RATE: 80 BPM | SYSTOLIC BLOOD PRESSURE: 162 MMHG

## 2025-03-07 DIAGNOSIS — L97.322 NON-PRESSURE CHRONIC ULCER OF LEFT ANKLE WITH FAT LAYER EXPOSED (HCC): Primary | ICD-10-CM

## 2025-03-07 PROCEDURE — 97597 DBRDMT OPN WND 1ST 20 CM/<: CPT | Performed by: PODIATRIST

## 2025-03-07 NOTE — PROGRESS NOTES
Wound Procedure Treatment Diabetic Ulcer Left;Posterior Ankle    Performed by: Chuyita Crisostomo RN  Authorized by: Aram Rodriguez DPM  Associated wounds:   Wound 08/02/24 Diabetic Ulcer Ankle Left;Posterior    Wound cleansed with:  Wound aggrssively cleansed with NSS and gauze   Applied topical:  Hydrogel   Applied primary dressing:  Collagen dressing, Silver and Non adherent contact layer   Applied secondary dressing:  Gauze   Dressing secured with:  Christa, Tape and Compression wrap   Offloading device appllied:  Surgical shoe     Ace wrap

## 2025-03-07 NOTE — PATIENT INSTRUCTIONS
Orders Placed This Encounter   Procedures    Wound cleansing and dressings     Left lateral leg wound is healed. Apply non scented moisturizer to dry skin.      Wound location: Left posterior ankle  Change every other day  You may remove the dressing and shower on dressing change days. Do not leave wound open to air, apply new dressing immediately.  Apply puracol ag covered with hydrogel (given) followed by adaptic.  Cover with 4x4 gauze   Secure with roll gauze and tape.       Off-loading Instructions:  Keep weight and pressure off wound at all times.   Wear surgical shoe  Put on immediately when rising in the morning and remove when going to bed.     LLE ACE WRAP: Apply from base of toes to behind the knee.   Apply in AM, may remove for sleep  Avoid prolonged standing in one place     Standing Status:   Future     Expiration Date:   3/21/2025

## 2025-03-07 NOTE — PROGRESS NOTES
Patient ID: Jillian Arango is a 69 y.o. female Date of Birth 1955     Diagnosis:  1. Non-pressure chronic ulcer of left ankle with fat layer exposed (HCC)  -     Wound cleansing and dressings; Future  -     Debridement Diabetic Ulcer Left;Posterior Ankle     Diagnosis ICD-10-CM Associated Orders   1. Non-pressure chronic ulcer of left ankle with fat layer exposed (HCC)  L97.322 Wound cleansing and dressings     Debridement Diabetic Ulcer Left;Posterior Ankle           Assessment & Plan:  Selective debridement completed to the left heel wound.    Continue offloading and protection.  Her skin grafting harvesting site appears stable.        Return for reevaluation 2 weeks.    If the patient notices any systemic signs of infection including but not limited to fever, chills, nausea, vomiting or significant worsening of wound with increased drainage, redness or streaking up the foot or leg the patient should notify the office or present to the emergency room.      If wound debridement was completed today this was done in an attempt to promote healing, decrease risk of infection and for limb salvage efforts.     Goal of treatment: wound healing     Efforts to decrease pressure on the wound(s), evaluation and discussion of nutritional status, peripheral vascular status, and infection control have all been addressed with this patient.    Return in about 2 weeks (around 3/21/2025) for Recheck, Next scheduled follow up.      Chief Complaint   Patient presents with   • Follow Up Wound Care Visit     Left heel, LLE wounds           Subjective:   Left heel wound is improved from previous evaluation.  She has no signs of infection noted her harvesting site appears to be improved as well.  No new acute issues otherwise.    The following portions of the patient's history were reviewed and updated as appropriate:   Patient Active Problem List   Diagnosis   • Small bowel obstruction (HCC)   • Type 2 diabetes mellitus treated  without insulin (HCC)   • Post-operative state   • Endometrial cancer (HCC)   • Thyroid disorder   • Normocytic anemia   • Hydroureteronephrosis   • HTN (hypertension)   • Migraine   • Abdominal adhesions   • Class 2 severe obesity with serious comorbidity and body mass index (BMI) of 35.0 to 35.9 in adult (HCC)   • Headache   • Ureteral-ileal loop anastomotic stricture   • Chronic deep vein thrombosis (DVT) of femoral vein of right lower extremity (HCC)   • Anti-cardiolipin antibody positive   • Encounter for attention to other artificial openings of urinary tract (HCC)   • History of smoking   • Depression, recurrent (HCC)   • PAD (peripheral artery disease) (HCC)   • Baker's cyst, ruptured   • Cholelithiasis   • CHRAY (iron deficiency anemia)   • Upper respiratory infection   • Ankle wound, left, initial encounter   • Non-pressure chronic ulcer of left ankle with fat layer exposed (HCC)     Past Medical History:   Diagnosis Date   • Achilles tendinitis of left lower extremity 02/08/2024   • Achilles tendinosis of left ankle 11/27/2023   • Anemia    • Back pain    • Bone infarction (HCC) 06/24/2023   • Bowel obstruction (HCC) 2017,2018   • Bursitis of posterior heel, left 11/27/2023   • Cancer (HCC)     uterine CA radiation destoried urethra.   Patient has urostomy   • Cholelithiasis    • Colon polyp    • Deep vein thrombosis of left lower extremity (HCC) 01/11/2004    on blood thinner for 3 years.   • Diabetes mellitus (HCC)     type 2 BS 6/6/22 @ 0600 was 145   • Disease of thyroid gland    • Elevated lactic acid level 01/16/2021   • Endometrial cancer (HCC) 1999   • GERD (gastroesophageal reflux disease)    • Gross hematuria    • Hiatal hernia    • History of transfusion 1999    Post Hysterectomy   • History of urostomy 01/11/2004    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.   • Hypercalcemia 10/10/2023   • Hypertension    • Hypothyroid    • In vitro fertilization    • Kidney stone    • Migraines     • Muscle weakness     abdominal   • Nausea and vomiting 10/05/2023   • Personal history of irradiation    • Postoperative wound dehiscence, subsequent encounter 02/08/2024   • Renal cyst    • Sleep apnea     Resolved due to Weight Loss   • Vertigo 10/10/2023     Past Surgical History:   Procedure Laterality Date   • ACHILLES TENDON REPAIR Right    • APPENDECTOMY     • COLONOSCOPY      6/6/22   • COLONOSCOPY W/ BIOPSIES N/A 12/2015   • EGD     • HEEL SPUR SURGERY Right 1996   • HYSTERECTOMY     • 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   • IR NEPHROSTOMY TUBE PLACEMENT  01/18/2021   • LAPAROSCOPIC GASTRIC BANDING N/A 2006    [place], [place]   • OTHER SURGICAL HISTORY  2004    Urine shunt to intestine, transverse colon   • PCNL Left 02/16/2021    Procedure: NEPHROLITHOTOMY  PERCUTANEOUS (PCNL);  Surgeon: Ziggy Roldan MD;  Location: [facility];  Service: Urology   • TX CYSTO/URETERO W/LITHOTRIPSY &INDWELL STENT INSRT Left 02/16/2021    Procedure: anterograde  URETEROSCOPY with dilation of ureteral stricture, INSERTION STENT URETERAL, exchange  of nephrostomy tube;  Surgeon: Ziggy Roldan MD;  Location[facility];  Service: Urology   • TX DEBRIDEMENT SUBCUTANEOUS TISSUE 1ST 20 SQ CM/< Left 01/29/2024    Procedure: DEBRIDEMENT LOWER EXTREMITY (WASH OUT) with PHOENIX GRAFT APPLICATION, foot wound;  Surgeon: Darron Frias DPM;  Location:  MAIN OR;  Service: Podiatry   • TX LAPAROSCOPY ENTEROLYSIS SEPARATE PROCEDURE N/A 01/17/2019    Procedure: LAPAROSCOPIC LYSIS OF ADHESIONS;  Surgeon: Omar Jack MD;  Location:  MAIN OR;  Service: General   • TX PREP SITE F/S/N/H/F/G/M/D GT 1ST 100 SQ CM/1PCT Left 11/15/2024    Procedure: Left ankle debridement w/ harvesting of split-thickness skin graft left lower leg for application to left ankle wound.;  Surgeon: Aram Rodriguez DPM;  Location: [facility];  Service: Podiatry   • TX REPAIR SECONDARY ACHILLES TENDON W/WO GRAFT Left 11/24/2023     Procedure: REMOVAL OF POSTERIOR HEEL SPUR WITH REPAIR TENDON ACHILLES;  Surgeon: Darron Frias DPM;  Location: [facility];  Service: Podiatry   • [place] SPLT AGRFT T/A/L 1ST 100 SQCM/</1% BDY INFT/CHLD Left 11/15/2024    Procedure: Left ankle debridement w/ harvesting of split-thickness skin graft left lower leg for application to left ankle wound.;  Surgeon: Aram Rodriguez DPM;  [facility];  Service: Podiatry   • [facility]   • SLEEVE GASTROPLASTY N/A 2015    Dr. Jack, [facility]   • UPPER GASTROINTESTINAL ENDOSCOPY     • URETER REVISION     • WEILBY THUMB ARTHROPLASTY     • WEILBY THUMB ARTHROPLASTY       Social History     Socioeconomic History   • Marital status: /Civil Union     Spouse name: Ari   • Number of children: 0   • Years of education: 14   • Highest education level: Not on file   Occupational History   • Occupation: [employer]    Tobacco Use   • Smoking status: Former     Current packs/day: 0.00     Average packs/day: 1.5 packs/day for 12.0 years (18.0 ttl pk-yrs)     Types: Cigarettes     Start date: 1974     Quit date: 1986     Years since quittin.1     Passive exposure: Never   • Smokeless tobacco: Never   Vaping Use   • Vaping status: Never Used   Substance and Sexual Activity   • Alcohol use: Yes     Comment: rarely   • Drug use: Never   • Sexual activity: Never     Comment: s/p hysterectomy   Other Topics Concern   • Not on file   Social History Narrative   • Not on file     Social Drivers of Health     Financial Resource Strain: Not on file   Food Insecurity: No Food Insecurity (2024)    Nursing - Inadequate Food Risk Classification    • Worried About Running Out of Food in the Last Year: Not on file    • Ran Out of Food in the Last Year: Not on file    • Ran Out of Food in the Last Year: Never true   Transportation Needs: No Transportation Needs (2024)    Nursing -  Transportation Risk Classification    • Lack of Transportation: Not on file    • Lack of Transportation: No   Physical Activity: Inactive (2024)    Exercise Vital Sign    • Days of Exercise per Week: 0 days    • Minutes of Exercise per Session: 0 min   Stress: Stress Concern Present (12/3/2024)    [employer] - Occupational Stress Questionnaire    • Feeling of Stress : To some extent   Social Connections: Not on file   Intimate Partner Violence: Unknown (2024)    Nursing IPS    • Feels Physically and Emotionally Safe: Not on file    • Physically Hurt by Someone: Not on file    • Humiliated or Emotionally Abused by Someone: Not on file    • Physically Hurt by Someone: No    • Hurt or Threatened by Someone: No   Housing Stability: Unknown (2024)    Nursing: Inadequate Housing Risk Classification    • Has Housing: Not on file    • Worried About Losing Housing: Not on file    • Unable to Get Utilities: Not on file    • Unable to Pay for Housing in the Last Year: No    • Has Housin        Current Outpatient Medications:   •  ALPRAZolam (XANAX) 0.5 mg tablet, TAKE 1/2 TABLET BY MOUTH ONCE DAILY AT BEDTIME AS NEEDED FOR ANXIETY, Disp: 30 tablet, Rfl: 2  •  BIOTIN PO, Take 1 capsule by mouth daily, Disp: , Rfl:   •  dabigatran etexilate (PRADAXA) 150 mg capsu, TAKE ONE CAPSULE BY MOUTH TWICE A DAY, Disp: 180 capsule, Rfl: 1  •  ferrous sulfate 324 (65 Fe) mg, Take 1 tablet (324 mg total) by mouth every other day, Disp: 15 tablet, Rfl: 0  •  FREESTYLE LITE test strip, Check blood sugar 1-2 times daily, Disp: 100 each, Rfl: 4  •  furosemide (LASIX) 20 mg tablet, Take 1 tablet (20 mg total) by mouth as needed (leg swelling), Disp: 30 tablet, Rfl: 0  •  gabapentin (NEURONTIN) 100 mg capsule, TAKE ONE CAPSULE BY MOUTH THREE TIMES A DAY, Disp: 90 capsule, Rfl: 1  •  gentamicin (GARAMYCIN) 0.1 % ointment, Apply to wound at dressing change.  Use xeroform and dry sterile dressing.  Change MWF by visiting nurses. (Patient not taking: Reported on 1/22/2025), Disp: 15 g, Rfl: 2  •  levothyroxine 75 mcg tablet, TAKE 1 TABLET DAILY EARLY IN THE MORNING, Disp: 90 tablet, Rfl: 0  •  lidocaine (LMX) 4 % cream, Apply topically as needed for mild pain (Patient not taking: Reported on 1/22/2025), Disp: 60 g, Rfl: 5  •  losartan (COZAAR) 25 mg tablet, TAKE ONE TABLET BY MOUTH EVERY DAY, Disp: 90 tablet, Rfl: 1  •  meclizine (ANTIVERT) 12.5 MG tablet, Take 1 tablet (12.5 mg total) by mouth every 8 (eight) hours as needed for nausea or dizziness for up to 14 days, Disp: 40 tablet, Rfl: 0  •  metFORMIN (GLUCOPHAGE) 500 mg tablet, TAKE ONE TABLET BY MOUTH TWICE A DAY WITH MEALS, Disp: 180 tablet, Rfl: 1  •  Mounjaro 2.5 MG/0.5ML SOAJ, INJECT 2.5 MG (0.5 ML) UNDER THE SKIN ONCE WEEKLY, Disp: 2 mL, Rfl: 2  •  naloxone (NARCAN) 4 mg/0.1 mL nasal spray, Administer 1 spray into a nostril. If no response after 2-3 minutes, give another dose in the other nostril using a new spray., Disp: 1 each, Rfl: 1  •  pantoprazole (PROTONIX) 40 mg tablet, Take 1 tablet (40 mg total) by mouth daily, Disp: 90 tablet, Rfl: 1  •  pramipexole (MIRAPEX) 0.5 mg tablet, TAKE TWO TABLETS BY MOUTH EVERY DAY AT BEDTIME, Disp: 60 tablet, Rfl: 5  •  Probiotic Product (Probiotic Daily) CAPS, Take 1 capsule by mouth in the morning, Disp: 10 capsule, Rfl: 0  •  sodium chloride (OCEAN) 0.65 % nasal spray, 1 spray into each nostril as needed for congestion or rhinitis, Disp: 15 mL, Rfl: 0  •  Tirzepatide (Mounjaro) 5 MG/0.5ML SOAJ, Inject 5 mg under the skin once a week, Disp: 2 mL, Rfl: 1  •  triamcinolone (KENALOG) 0.1 % cream, Apply topically in the morning At dressing change to area around wound that is irritated., Disp: 15 g, Rfl: 1  Family History   Problem Relation Age of Onset   • No Known Problems Mother    • Kidney failure Father    • Brain cancer Father    • Kidney cancer Father    • Diabetes Sister    • Alcohol abuse Sister    •  Diabetes Brother       Review of Systems  Allergies:  Amoxicillin, Penicillins, and Adhesive [medical tape]      Objective:  /88   Pulse 80   Temp (!) 97.1 °F (36.2 °C)   Resp 18     Physical Exam      Wound 08/02/24 Diabetic Ulcer Ankle Left;Posterior (Active)   Enter Katz score: Katz Grade 1: Partial or full-thickness ulcer (superficial) 03/07/25 1002   Wound Image Images linked 03/07/25 0955   Wound Description Yellow;[place] 03/07/25 1002   Dorina-wound Assessment Intact 03/07/25 1002   Wound Length (cm) 1.5 cm 03/07/25 1002   Wound Width (cm) 0.3 cm 03/07/25 1002   Wound Depth (cm) 0.1 cm 03/07/25 1002   Wound Surface Area (cm^2) 0.45 cm^2 03/07/25 1002   Wound Volume (cm^3) 0.045 cm^3 03/07/25 1002   Calculated Wound Volume (cm^3) 0.05 cm^3 03/07/25 1002   Change in Wound Size % 95 03/07/25 1002   Drainage Amount Small 03/07/25 1002   Drainage Description Serosanguineous 03/07/25 1002   Non-staged Wound Description Full thickness 03/07/25 1002   Dressing Status Intact 03/07/25 1002       Wound 11/15/24 Leg Left (Active)   Wound Image Images linked 03/07/25 0955   Wound Description Epithelialization;Dry 03/07/25 1001   Dorina-wound Assessment Pink;Edema;Purple;Excoriated 03/07/25 1001   Wound Length (cm) 0.1 cm 03/07/25 1001   Wound Width (cm) 0.1 cm 03/07/25 1001   Wound Depth (cm) 0.1 cm 03/07/25 1001   Wound Surface Area (cm^2) 0.01 cm^2 03/07/25 1001   Wound Volume (cm^3) 0.001 cm^3 03/07/25 1001   Calculated Wound Volume (cm^3) 0 cm^3 03/07/25 1001   Change in Wound Size % 100 03/07/25 1001   Drainage Amount None 03/07/25 1001   Non-staged Wound Description Full thickness 03/07/25 1001   Dressing Status Intact 03/07/25 1001                    Debridement   Wound 08/02/24 Diabetic Ulcer Ankle Left;Posterior    Universal Protocol:  Procedure performed by:  Consent: Verbal consent obtained.  Risks and benefits: risks, benefits and alternatives were discussed  Consent given by: patient  Time out:  "Immediately prior to procedure a \"time out\" was called to verify the correct patient, procedure, equipment, support staff and site/side marked as required.  Patient understanding: patient states understanding of the procedure being performed  Patient identity confirmed: verbally with patient    Debridement Details  Performed by: physician  Debridement type: selective  Pain control: lidocaine 4%      Post-debridement measurements  Length (cm): 1.5  Width (cm): 0.3  Depth (cm): 0.1  Percent debrided: 100%  Surface Area (cm^2): 0.45  Area Debrided (cm^2): 0.45  Volume (cm^3): 0.05    Devitalized tissue debrided: biofilm, callus and fibrin  Instrument(s) utilized: blade  Bleeding: small  Hemostasis obtained with: pressure  Procedural pain (0-10): insensate  Post-procedural pain: insensate   Response to treatment: procedure was tolerated well               Wound Instructions:  Orders Placed This Encounter   Procedures   • Wound cleansing and dressings     Left lateral leg wound is healed. Apply non scented moisturizer to dry skin.      Wound location: Left posterior ankle  Change every other day  You may remove the dressing and shower on dressing change days. Do not leave wound open to air, apply new dressing immediately.  Apply puracol ag covered with hydrogel (given) followed by adaptic.  Cover with 4x4 gauze   Secure with roll gauze and tape.       Off-loading Instructions:  Keep weight and pressure off wound at all times.   Wear surgical shoe  Put on immediately when rising in the morning and remove when going to bed.     LLE ACE WRAP: Apply from base of toes to behind the knee.   Apply in AM, may remove for sleep  Avoid prolonged standing in one place     Standing Status:   Future     Expiration Date:   3/21/2025   • Debridement Diabetic Ulcer Left;Posterior Ankle     This order was created via procedure documentation         Aram Rodriguez DPM      Portions of the record may have been created with voice " "recognition software. Occasional wrong word or \"sound a like\" substitutions may have occurred due to the inherent limitations of voice recognition software. Read the chart carefully and recognize, using context, where substitutions have occurred.    "

## 2025-03-10 ENCOUNTER — APPOINTMENT (OUTPATIENT)
Dept: LAB | Facility: CLINIC | Age: 70
End: 2025-03-10
Payer: COMMERCIAL

## 2025-03-10 DIAGNOSIS — K29.80 DUODENITIS DETERMINED BY BIOPSY: ICD-10-CM

## 2025-03-10 DIAGNOSIS — E61.1 IRON DEFICIENCY: ICD-10-CM

## 2025-03-10 LAB — IGA SERPL-MCNC: 339 MG/DL (ref 66–433)

## 2025-03-10 PROCEDURE — 86364 TISS TRNSGLTMNASE EA IG CLAS: CPT

## 2025-03-10 PROCEDURE — 36415 COLL VENOUS BLD VENIPUNCTURE: CPT

## 2025-03-10 PROCEDURE — 82784 ASSAY IGA/IGD/IGG/IGM EACH: CPT

## 2025-03-14 LAB — TTG IGA SER IA-ACNC: 1.1 U/ML (ref ?–10)

## 2025-03-15 DIAGNOSIS — K21.9 GASTROESOPHAGEAL REFLUX DISEASE, UNSPECIFIED WHETHER ESOPHAGITIS PRESENT: ICD-10-CM

## 2025-03-17 RX ORDER — PANTOPRAZOLE SODIUM 40 MG/1
TABLET, DELAYED RELEASE ORAL
Qty: 90 TABLET | Refills: 1 | Status: SHIPPED | OUTPATIENT
Start: 2025-03-17

## 2025-03-21 ENCOUNTER — OFFICE VISIT (OUTPATIENT)
Dept: WOUND CARE | Facility: HOSPITAL | Age: 70
End: 2025-03-21
Payer: COMMERCIAL

## 2025-03-21 VITALS
DIASTOLIC BLOOD PRESSURE: 67 MMHG | SYSTOLIC BLOOD PRESSURE: 161 MMHG | HEART RATE: 75 BPM | TEMPERATURE: 96.6 F | RESPIRATION RATE: 18 BRPM

## 2025-03-21 DIAGNOSIS — L97.322 NON-PRESSURE CHRONIC ULCER OF LEFT ANKLE WITH FAT LAYER EXPOSED (HCC): Primary | ICD-10-CM

## 2025-03-21 PROCEDURE — 99212 OFFICE O/P EST SF 10 MIN: CPT | Performed by: PODIATRIST

## 2025-03-21 PROCEDURE — G0463 HOSPITAL OUTPT CLINIC VISIT: HCPCS | Performed by: PODIATRIST

## 2025-03-21 NOTE — PATIENT INSTRUCTIONS
Orders Placed This Encounter   Procedures    Wound Procedure Treatment Diabetic Ulcer Left;Posterior Ankle     This order was created via procedure documentation    Wound cleansing and dressings     Your wound is healed. Continue good daily skin care. Skin is fragile. Silicone bordered foam dressing applied at wound care center today for protection. Change dressing every 2 -3 days.  Follow up with Dr Rodriguez in his office in 4-6 weeks  Call wound care center immediately if wound re-opens  Thank you for choosing [facility] wound care     Standing Status:   Future     Expiration Date:   3/28/2025     Patient Education     Ankle Strengthening Exercises   About this topic   The ankle is a commonly injured joint in your body. It supports the full weight of your body. Your chance of hurting your ankle is less if the muscles in your ankle are strong. Doing exercises for the ankle can help with balance and keep some injuries from happening.  General   Before starting with a program, ask your doctor if you are healthy enough to do these exercises. Your doctor may have you work with a  or physical therapist to make a safe exercise program to meet your needs.  Strengthening Exercises   Strengthening exercises keep your muscles firm and strong. Stand or sit up tall on a chair without arms for your exercises. Start by repeating each exercise 2 to 3 times. Work up to doing each exercise 10 times. Try to do the exercises 2 to 3 times each day. Do all exercises slowly.  Ankle pumps seated ? Move each foot up and down like you are pressing down and lifting up on a gas pedal.  Ankle alphabet seated ? Act like you are writing the alphabet with each foot. Do not move your whole leg to do this, just move your ankle. Do all of the alphabet. Take short rests if you get tired.  Exercise band exercises ? Sit on the floor with your legs out in front of you for these. You can also sit in a chair.  Pulling foot up ? You will have  to have someone hold the band for this exercise. Otherwise, you can tie a large knot into each end of the band and close the ends of the band in the bottom of a door. Have the band around the top of your foot. Pull your foot up towards your head. Bring your foot back to the starting position. Switch feet and repeat.  Pushing foot down ? Loop the band around the ball of the foot. Push down as if you were pressing on a gas pedal. Bring the foot back to the starting position. Switch feet and repeat.  Pulling foot in ? Loop the band around the ball of one foot. Cross your other leg over the leg with the band around it. Put the top foot on top of the band as if you were stepping on it. Pull the bottom foot in. Bring the foot back to the starting position. Switch feet and repeat.  Pulling foot out ? Loop the band around the ball of one foot. Step on the band with your other foot and straighten the leg. Pull the bottom foot out. Bring the foot back to the starting position. Switch feet and repeat.  Heel raises ? Hold on to a counter. Lift your heels up and rise up onto your toes. Lower yourself back down.  Toe lifts ? Lift your toes up and put your weight onto your heels. Hold 3 to 5 seconds.  Single leg balance ? Lift one leg up and balance on the other leg for 10 to 30 seconds. Repeat 5 times. To make this exercise harder, try putting a thin pillow under your foot.               What will the results be?   Ankle is stronger and more stable  More range of motion  Better balance  Less chance of falling  Less chance of hurting your ankle  Easier to walk and do other activities  Helpful tips   Stay active and work out to keep your muscles strong and flexible.  Keep a healthy weight so there is not extra stress on your joints. Eat a healthy diet to keep your muscles healthy.  Be sure you do not hold your breath when exercising. This can raise your blood pressure. If you tend to hold your breath, try counting out loud when  exercising. If any exercise bothers you, stop right away.  Try walking or cycling at an easy pace for a few minutes to warm up your muscles. Do this again after exercising.  If you have trouble with balance, be careful with the standing exercises. You may want to have someone with you when you are doing these. You may want to hold on to something stable while doing the exercises. If you don't feel safe, don't do them.  Exercise may be slightly uncomfortable, but you should not have sharp pains. If you do get sharp pains, stop what you are doing. If the sharp pains continue, call your doctor.  Wear shoes with good support. Do not go barefoot.  Use proper footwear when playing sports or exercising. This may include athletic supports, shoes, or shoe inserts that keep your foot stable.  Last Reviewed Date   2021-07-26  Consumer Information Use and Disclaimer   This generalized information is a limited summary of diagnosis, treatment, and/or medication information. It is not meant to be comprehensive and should be used as a tool to help the user understand and/or assess potential diagnostic and treatment options. It does NOT include all information about conditions, treatments, medications, side effects, or risks that may apply to a specific patient. It is not intended to be medical advice or a substitute for the medical advice, diagnosis, or treatment of a health care provider based on the health care provider's examination and assessment of a patient’s specific and unique circumstances. Patients must speak with a health care provider for complete information about their health, medical questions, and treatment options, including any risks or benefits regarding use of medications. This information does not endorse any treatments or medications as safe, effective, or approved for treating a specific patient. Up[employer], Inc[employer] and its affiliates disclaim any warranty or liability relating to this information or the use  thereof. The use of this information is governed by the Terms of Use, available at https://www.w[employer]Paragon Wirelessuwer.com/en/know/clinical-effectiveness-terms   Copyright   Copyright © 2024 UpToDate, Inc. and its affiliates and/or licensors. All rights reserved.

## 2025-03-21 NOTE — PROGRESS NOTES
Wound Procedure Treatment Diabetic Ulcer Left;Posterior Ankle    Performed by: Emily Garrison RN  Authorized by: Aram Rodriguez DPM  Associated wounds:   Wound 08/02/24 Diabetic Ulcer Ankle Left;Posterior    Wound cleansed with:  Soap and water   Applied primary dressing:  Silicone bordered foam

## 2025-03-21 NOTE — PROGRESS NOTES
Patient ID: Jillian Arango is a 69 y.o. female Date of Birth 1955     Diagnosis:  1. Non-pressure chronic ulcer of left ankle with fat layer exposed (HCC)  -     Wound Procedure Treatment Diabetic Ulcer Left;Posterior Ankle  -     Wound cleansing and dressings; Future     Diagnosis ICD-10-CM Associated Orders   1. Non-pressure chronic ulcer of left ankle with fat layer exposed (HCC)  L97.322 Wound Procedure Treatment Diabetic Ulcer Left;Posterior Ankle     Wound cleansing and dressings           Assessment & Plan:  Patient's wound is healed today.    Will have her follow-up in the podiatry office in about 4 to 6 weeks.    Notify office of any new acute changes.    Will give patient stretching exercises for her Achilles tendon.    If the patient notices any systemic signs of infection including but not limited to fever, chills, nausea, vomiting or significant worsening of wound with increased drainage, redness or streaking up the foot or leg the patient should notify the office or present to the emergency room.      If wound debridement was completed today this was done in an attempt to promote healing, decrease risk of infection and for limb salvage efforts.    Goal of treatment: wound healing     Efforts to decrease pressure on the wound(s), evaluation and discussion of nutritional status, peripheral vascular status, and infection control have all been addressed with this patient.    No follow-ups on file.      Chief Complaint   Patient presents with   • Follow Up Wound Care Visit     Left ankle wound            Subjective:   Examination today shows patient has good improvement in her wound.  She denies any pain or discomfort to the area.  She has been using collagen.  She has not noticed any drainage to the area.  No other areas of acute issues noted currently.  Her harvesting site is doing quite well.    The following portions of the patient's history were reviewed and updated as appropriate:   Patient  Active Problem List   Diagnosis   • Small bowel obstruction (HCC)   • Type 2 diabetes mellitus treated without insulin (HCC)   • Post-operative state   • Endometrial cancer (HCC)   • Thyroid disorder   • Normocytic anemia   • Hydroureteronephrosis   • HTN (hypertension)   • Migraine   • Abdominal adhesions   • Class 2 severe obesity with serious comorbidity and body mass index (BMI) of 35.0 to 35.9 in adult (HCC)   • Headache   • Ureteral-ileal loop anastomotic stricture   • Chronic deep vein thrombosis (DVT) of femoral vein of right lower extremity (HCC)   • Anti-cardiolipin antibody positive   • Encounter for attention to other artificial openings of urinary tract (HCC)   • History of smoking   • Depression, recurrent (HCC)   • PAD (peripheral artery disease) (HCC)   • Baker's cyst, ruptured   • Cholelithiasis   • CHARY (iron deficiency anemia)   • Upper respiratory infection   • Ankle wound, left, initial encounter   • Non-pressure chronic ulcer of left ankle with fat layer exposed (HCC)     Past Medical History:   Diagnosis Date   • Achilles tendinitis of left lower extremity 02/08/2024   • Achilles tendinosis of left ankle 11/27/2023   • Anemia    • Back pain    • Bone infarction (HCC) 06/24/2023   • Bowel obstruction (HCC) 2017,2018   • Bursitis of posterior heel, left 11/27/2023   • Cancer (HCC)     uterine CA radiation destoried urethra.   Patient has urostomy   • Cholelithiasis    • Colon polyp    • Deep vein thrombosis of left lower extremity (HCC) 01/11/2004    on blood thinner for 3 years.   • Diabetes mellitus (HCC)     type 2 BS 6/6/22 @ 0600 was 145   • Disease of thyroid gland    • Elevated lactic acid level 01/16/2021   • Endometrial cancer (HCC) 1999   • GERD (gastroesophageal reflux disease)    • Gross hematuria    • Hiatal hernia    • History of transfusion 1999    Post Hysterectomy   • History of urostomy 01/11/2004    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.   • Hypercalcemia  10/10/2023   • Hypertension    • Hypothyroid    • In vitro fertilization    • Kidney stone    • Migraines    • Muscle weakness     abdominal   • Nausea and vomiting 10/05/2023   • Personal history of irradiation    • Postoperative wound dehiscence, subsequent encounter 02/08/2024   • Renal cyst    • Sleep apnea     Resolved due to Weight Loss   • Vertigo 10/10/2023     Past Surgical History:   Procedure Laterality Date   • ACHILLES TENDON REPAIR Right    • APPENDECTOMY     • COLONOSCOPY      6/6/22   • COLONOSCOPY W/ BIOPSIES N/A 12/2015   • EGD     • HEEL SPUR SURGERY Right 1996   • HYSTERECTOMY     • 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   • IR NEPHROSTOMY TUBE PLACEMENT  01/18/2021   • LAPAROSCOPIC GASTRIC BANDING N/A 2006    [place], [place]   • OTHER SURGICAL HISTORY  2004    Urine shunt to intestine, transverse colon   • PCNL Left 02/16/2021    Procedure: NEPHROLITHOTOMY  PERCUTANEOUS (PCNL);  Surgeon: Ziggy Roldan MD;  Location:  Main OR;  Service: Urology   • OK CYSTO/URETERO W/LITHOTRIPSY &INDWELL STENT INSRT Left 02/16/2021    Procedure: anterograde  URETEROSCOPY with dilation of ureteral stricture, INSERTION STENT URETERAL, exchange  of nephrostomy tube;  Surgeon: Ziggy Roldan MD;  Location:  Main OR;  Service: Urology   • OK DEBRIDEMENT SUBCUTANEOUS TISSUE 1ST 20 SQ CM/< Left 01/29/2024    Procedure: DEBRIDEMENT LOWER EXTREMITY (WASH OUT) with PHOENIX GRAFT APPLICATION, foot wound;  Surgeon: Darron Frias DPM;  Location:  MAIN OR;  Service: Podiatry   • OK LAPAROSCOPY ENTEROLYSIS SEPARATE PROCEDURE N/A 01/17/2019    Procedure: LAPAROSCOPIC LYSIS OF ADHESIONS;  Surgeon: Omar Jack MD;  Location:  MAIN OR;  Service: General   • OK PREP SITE F/S/N/H/F/G/M/D GT 1ST 100 SQ CM/1PCT Left 11/15/2024    Procedure: Left ankle debridement w/ harvesting of split-thickness skin graft left lower leg for application to left ankle wound.;  Surgeon: Aram Rodriguez DPM;   Location: [facility];  Service: Podiatry   • NM REPAIR SECONDARY ACHILLES TENDON W/WO GRAFT Left 2023    Procedure: REMOVAL OF POSTERIOR HEEL SPUR WITH REPAIR TENDON ACHILLES;  Surgeon: Darron Frias DPM;  Location: [facility];  Service: Podiatry   • NM SPLT AGRFT T/A/L 1ST 100 SQCM/</1% BDY INFT/CHLD Left 11/15/2024    Procedure: Left ankle debridement w/ harvesting of split-thickness skin graft left lower leg for application to left ankle wound.;  Surgeon: Aram Rodriguez DPM;  Location: [facility];  Service: Podiatry   [facility]   • SLEEVE GASTROPLASTY N/A 2015    Dr. Jack, [facility]   • UPPER GASTROINTESTINAL ENDOSCOPY     • URETER REVISION     • WEILBY THUMB ARTHROPLASTY     • WEILBY THUMB ARTHROPLASTY       Social History     Socioeconomic History   • Marital status: /Civil Union     Spouse name: Ari   • Number of children: 0   • Years of education: 14   • Highest education level: Not on file   Occupational History   • Occupation: Hair styist    Tobacco Use   • Smoking status: Former     Current packs/day: 0.00     Average packs/day: 1.5 packs/day for 12.0 years (18.0 ttl pk-yrs)     Types: Cigarettes     Start date: 1974     Quit date: 1986     Years since quittin.2     Passive exposure: Never   • Smokeless tobacco: Never   Vaping Use   • Vaping status: Never Used   Substance and Sexual Activity   • Alcohol use: Yes     Comment: rarely   • Drug use: Never   • Sexual activity: Never     Comment: s/p hysterectomy   Other Topics Concern   • Not on file   Social History Narrative   • Not on file     Social Drivers of Health     Financial Resource Strain: Not on file   Food Insecurity: No Food Insecurity (2024)    Nursing - Inadequate Food Risk Classification    • Worried About Running Out of Food in the Last Year: Not on file    • Ran Out of Food in the Last Year: Not on file    • Ran Out of Food in  the Last Year: Never true   Transportation Needs: No Transportation Needs (2024)    Nursing - Transportation Risk Classification    • Lack of Transportation: Not on file    • Lack of Transportation: No   Physical Activity: Inactive (2024)    Exercise Vital Sign    • Days of Exercise per Week: 0 days    • Minutes of Exercise per Session: 0 min   Stress: Stress Concern Present (12/3/2024)    [place] [employer] - Occupational Stress Questionnaire    • Feeling of Stress : To some extent   Social Connections: Not on file   Intimate Partner Violence: Unknown (2024)    Nursing IPS    • Feels Physically and Emotionally Safe: Not on file    • Physically Hurt by Someone: Not on file    • Humiliated or Emotionally Abused by Someone: Not on file    • Physically Hurt by Someone: No    • Hurt or Threatened by Someone: No   Housing Stability: Unknown (2024)    Nursing: Inadequate Housing Risk Classification    • Has Housing: Not on file    • Worried About Losing Housing: Not on file    • Unable to Get Utilities: Not on file    • Unable to Pay for Housing in the Last Year: No    • Has Housin        Current Outpatient Medications:   •  ALPRAZolam (XANAX) 0.5 mg tablet, TAKE 1/2 TABLET BY MOUTH ONCE DAILY AT BEDTIME AS NEEDED FOR ANXIETY, Disp: 30 tablet, Rfl: 2  •  BIOTIN PO, Take 1 capsule by mouth daily, Disp: , Rfl:   •  dabigatran etexilate (PRADAXA) 150 mg capsu, TAKE ONE CAPSULE BY MOUTH TWICE A DAY, Disp: 180 capsule, Rfl: 1  •  ferrous sulfate 324 (65 Fe) mg, Take 1 tablet (324 mg total) by mouth every other day, Disp: 15 tablet, Rfl: 0  •  FREESTYLE LITE test strip, Check blood sugar 1-2 times daily, Disp: 100 each, Rfl: 4  •  furosemide (LASIX) 20 mg tablet, Take 1 tablet (20 mg total) by mouth as needed (leg swelling), Disp: 30 tablet, Rfl: 0  •  gabapentin (NEURONTIN) 100 mg capsule, TAKE ONE CAPSULE BY MOUTH THREE TIMES A DAY, Disp: 90 capsule, Rfl: 1  •  gentamicin  (GARAMYCIN) 0.1 % ointment, Apply to wound at dressing change.  Use xeroform and dry sterile dressing. Change MWF by visiting nurses. (Patient not taking: Reported on 1/22/2025), Disp: 15 g, Rfl: 2  •  levothyroxine 75 mcg tablet, TAKE 1 TABLET DAILY EARLY IN THE MORNING, Disp: 90 tablet, Rfl: 0  •  lidocaine (LMX) 4 % cream, Apply topically as needed for mild pain (Patient not taking: Reported on 1/22/2025), Disp: 60 g, Rfl: 5  •  losartan (COZAAR) 25 mg tablet, TAKE ONE TABLET BY MOUTH EVERY DAY, Disp: 90 tablet, Rfl: 1  •  meclizine (ANTIVERT) 12.5 MG tablet, Take 1 tablet (12.5 mg total) by mouth every 8 (eight) hours as needed for nausea or dizziness for up to 14 days, Disp: 40 tablet, Rfl: 0  •  metFORMIN (GLUCOPHAGE) 500 mg tablet, TAKE ONE TABLET BY MOUTH TWICE A DAY WITH MEALS, Disp: 180 tablet, Rfl: 1  •  Mounjaro 2.5 MG/0.5ML SOAJ, INJECT 2.5 MG (0.5 ML) UNDER THE SKIN ONCE WEEKLY, Disp: 2 mL, Rfl: 2  •  naloxone (NARCAN) 4 mg/0.1 mL nasal spray, Administer 1 spray into a nostril. If no response after 2-3 minutes, give another dose in the other nostril using a new spray., Disp: 1 each, Rfl: 1  •  pantoprazole (PROTONIX) 40 mg tablet, TAKE ONE TABLET BY MOUTH EVERY DAY IN THE EARLY MORNING, Disp: 90 tablet, Rfl: 1  •  pramipexole (MIRAPEX) 0.5 mg tablet, TAKE TWO TABLETS BY MOUTH EVERY DAY AT BEDTIME, Disp: 60 tablet, Rfl: 5  •  Probiotic Product (Probiotic Daily) CAPS, Take 1 capsule by mouth in the morning, Disp: 10 capsule, Rfl: 0  •  sodium chloride (OCEAN) 0.65 % nasal spray, 1 spray into each nostril as needed for congestion or rhinitis, Disp: 15 mL, Rfl: 0  •  Tirzepatide (Mounjaro) 5 MG/0.5ML SOAJ, Inject 5 mg under the skin once a week, Disp: 2 mL, Rfl: 1  •  triamcinolone (KENALOG) 0.1 % cream, Apply topically in the morning At dressing change to area around wound that is irritated., Disp: 15 g, Rfl: 1  Family History   Problem Relation Age of Onset   • No Known Problems Mother    • Kidney  failure Father    • Brain cancer Father    • Kidney cancer Father    • Diabetes Sister    • Alcohol abuse Sister    • Diabetes Brother       Review of Systems  Allergies:  Amoxicillin, Penicillins, and Adhesive [medical tape]      Objective:  /67   Pulse 75   Temp (!) 96.6 °F (35.9 °C)   Resp 18     Physical Exam      [REMOVED] Wound 08/02/24 Diabetic Ulcer Ankle Left;Posterior (Removed)   Wound Image Images linked 03/21/25 0956   Wound Description Epithelialization 03/21/25 0958   Wound Length (cm) 0 cm 03/21/25 0958   Wound Width (cm) 0 cm 03/21/25 0958   Wound Depth (cm) 0 cm 03/21/25 0958   Wound Surface Area (cm^2) 0 cm^2 03/21/25 0958   Wound Volume (cm^3) 0 cm^3 03/21/25 0958   Calculated Wound Volume (cm^3) 0 cm^3 03/21/25 0958   Change in Wound Size % 100 03/21/25 0958   Drainage Amount None 03/21/25 0958   Dressing Status Intact 03/21/25 0958       [REMOVED] Wound 11/15/24 Leg Left (Removed)   Wound Image Images linked 03/21/25 0956   Wound Description Epithelialization 03/21/25 0957   Wound Length (cm) 0 cm 03/21/25 0957   Wound Width (cm) 0 cm 03/21/25 0957   Wound Depth (cm) 0 cm 03/21/25 0957   Wound Surface Area (cm^2) 0 cm^2 03/21/25 0957   Wound Volume (cm^3) 0 cm^3 03/21/25 0957   Calculated Wound Volume (cm^3) 0 cm^3 03/21/25 0957   Change in Wound Size % 100 03/21/25 0957   Drainage Amount None 03/21/25 0957                    Procedures             Wound Instructions:  Orders Placed This Encounter   Procedures   • Wound Procedure Treatment Diabetic Ulcer Left;Posterior Ankle     This order was created via procedure documentation   • Wound cleansing and dressings     Your wound is healed. Continue good daily skin care. Skin is fragile. Silicone bordered foam dressing applied at wound care center today for protection. Change dressing every 2 -3 days.  Follow up with Dr Rodriguez in his office in 4-6 weeks  Call wound care center immediately if wound re-opens  Thank you for choosing [facility]  "[facility]     Standing Status:   Future     Expiration Date:   3/28/2025         Aram Rodriguez, DPRAMÍREZ      Portions of the record may have been created with voice recognition software. Occasional wrong word or \"sound a like\" substitutions may have occurred due to the inherent limitations of voice recognition software. Read the chart carefully and recognize, using context, where substitutions have occurred.      "

## 2025-03-23 DIAGNOSIS — R60.0 EDEMA OF BOTH FEET: ICD-10-CM

## 2025-03-24 ENCOUNTER — TELEPHONE (OUTPATIENT)
Dept: INTERNAL MEDICINE CLINIC | Facility: CLINIC | Age: 70
End: 2025-03-24

## 2025-03-24 NOTE — TELEPHONE ENCOUNTER
Patient will call pharmacy for refill on Pramipexole 0.5 mg also warn transferred to office patient needed to speak with office staff

## 2025-03-25 RX ORDER — FUROSEMIDE 20 MG/1
TABLET ORAL
Qty: 90 TABLET | Refills: 1 | Status: SHIPPED | OUTPATIENT
Start: 2025-03-25

## 2025-04-08 ENCOUNTER — RESULTS FOLLOW-UP (OUTPATIENT)
Age: 70
End: 2025-04-08

## 2025-04-11 DIAGNOSIS — E06.3 HYPOTHYROIDISM DUE TO HASHIMOTO'S THYROIDITIS: ICD-10-CM

## 2025-04-14 RX ORDER — LEVOTHYROXINE SODIUM 75 UG/1
75 TABLET ORAL
Qty: 90 TABLET | Refills: 3 | Status: SHIPPED | OUTPATIENT
Start: 2025-04-14

## 2025-04-16 DIAGNOSIS — E66.812 CLASS 2 SEVERE OBESITY DUE TO EXCESS CALORIES WITH SERIOUS COMORBIDITY AND BODY MASS INDEX (BMI) OF 35.0 TO 35.9 IN ADULT (HCC): ICD-10-CM

## 2025-04-16 DIAGNOSIS — E66.01 CLASS 2 SEVERE OBESITY DUE TO EXCESS CALORIES WITH SERIOUS COMORBIDITY AND BODY MASS INDEX (BMI) OF 35.0 TO 35.9 IN ADULT (HCC): ICD-10-CM

## 2025-04-21 RX ORDER — TIRZEPATIDE 5 MG/.5ML
INJECTION, SOLUTION SUBCUTANEOUS
Qty: 2 ML | Refills: 0 | Status: SHIPPED | OUTPATIENT
Start: 2025-04-21

## 2025-04-21 RX ORDER — TIRZEPATIDE 5 MG/.5ML
5 INJECTION, SOLUTION SUBCUTANEOUS WEEKLY
Qty: 2 ML | Refills: 0 | OUTPATIENT
Start: 2025-04-21 | End: 2025-06-20

## 2025-04-23 ENCOUNTER — OFFICE VISIT (OUTPATIENT)
Dept: INTERNAL MEDICINE CLINIC | Facility: CLINIC | Age: 70
End: 2025-04-23
Payer: COMMERCIAL

## 2025-04-23 VITALS
OXYGEN SATURATION: 98 % | BODY MASS INDEX: 34.37 KG/M2 | SYSTOLIC BLOOD PRESSURE: 110 MMHG | HEIGHT: 67 IN | WEIGHT: 219 LBS | HEART RATE: 76 BPM | TEMPERATURE: 98.9 F | DIASTOLIC BLOOD PRESSURE: 70 MMHG | RESPIRATION RATE: 16 BRPM

## 2025-04-23 DIAGNOSIS — Z78.0 POSTMENOPAUSAL: ICD-10-CM

## 2025-04-23 DIAGNOSIS — I82.511 CHRONIC DEEP VEIN THROMBOSIS (DVT) OF FEMORAL VEIN OF RIGHT LOWER EXTREMITY (HCC): ICD-10-CM

## 2025-04-23 DIAGNOSIS — I15.9 SECONDARY HYPERTENSION: ICD-10-CM

## 2025-04-23 DIAGNOSIS — E11.9 TYPE 2 DIABETES MELLITUS TREATED WITHOUT INSULIN (HCC): ICD-10-CM

## 2025-04-23 DIAGNOSIS — R60.0 EDEMA OF BOTH FEET: Primary | ICD-10-CM

## 2025-04-23 PROCEDURE — 99214 OFFICE O/P EST MOD 30 MIN: CPT | Performed by: HOSPITALIST

## 2025-04-23 NOTE — PROGRESS NOTES
INTERNAL MEDICINE FOLLOW-UP OFFICE VISIT  [facility] INTERNAL MEDICINE [place]    NAME: Jillian Arango  AGE: 69 y.o. SEX: female  : 1955     DATE: 2025     Assessment and Plan:     1. Edema of both feet (Primary)  Secondary to venous stasis.  On furosemide 20 mg every alternate day.  Recommended that she take 20 mg every day for the next 3 days and then go back to alternate day.  Keep legs elevated.  Use compression stockings.    2. Chronic deep vein thrombosis (DVT) of femoral vein of right lower extremity (HCC)  Stable.  On Pradaxa    3. hypertension  Blood pressure stable.  Continue current regimen    4. Type 2 diabetes mellitus treated without insulin (HCC)  Blood sugars under good control.  Last glycosylated hemoglobin in November was 6.7.  Will order another level.    3 months     Chief Complaint:     Chief Complaint   Patient presents with   • Follow-up     Heavy feeling to legs chronic        History of Present Illness:     Roxanne is here for a follow-up visit.  She has been doing well since her last visit.  Has lost a few pounds of weight.  Was busy with the care of her  who recently had a pacemaker implantation.  Planning to go on a cruise next week for 9 days.  Has won a bit of money and is afraid that she will have to pay taxes this year.  Feels that her leg is heavy.  On furosemide 20 mg every alternate day.  No shortness of breath.  Blood sugars are good.  On Pradaxa    The following portions of the patient's history were reviewed and updated as appropriate: allergies, current medications, past family history, past medical history, past social history, past surgical history and problem list.     Review of Systems:     Review of Systems all other review of symptoms negative unless specified otherwise     Problem List:     Patient Active Problem List   Diagnosis   • Small bowel obstruction (HCC)   • Type 2 diabetes mellitus treated without insulin (HCC)  "  • Post-operative state   • Endometrial cancer (HCC)   • Thyroid disorder   • Normocytic anemia   • Hydroureteronephrosis   • HTN (hypertension)   • Migraine   • Abdominal adhesions   • Class 2 severe obesity with serious comorbidity and body mass index (BMI) of 35.0 to 35.9 in adult (HCC)   • Headache   • Ureteral-ileal loop anastomotic stricture   • Chronic deep vein thrombosis (DVT) of femoral vein of right lower extremity (HCC)   • Anti-cardiolipin antibody positive   • Encounter for attention to other artificial openings of urinary tract (HCC)   • History of smoking   • Depression, recurrent (HCC)   • PAD (peripheral artery disease) (HCC)   • Baker's cyst, ruptured   • Cholelithiasis   • CHARY (iron deficiency anemia)   • Upper respiratory infection   • Ankle wound, left, initial encounter   • Non-pressure chronic ulcer of left ankle with fat layer exposed (HCC)        Objective:     /70 (BP Location: Left arm, Patient Position: Sitting, Cuff Size: Large)   Pulse 76   Temp 98.9 °F (37.2 °C) (Tympanic)   Resp 16   Ht 5' 6.5\" (1.689 m)   Wt 99.3 kg (219 lb)   SpO2 98%   BMI 34.82 kg/m²     Physical Exam  General Appearance:    Alert, cooperative, no distress   Head:    Normocephalic, without obvious abnormality, atraumatic   Eyes:    PERRL, conjunctiva/corneas clear, EOM's intact, fundi     benign, both eyes        Neck:   Supple, no adenopathy, no JVD   Back:     Symmetric, no curvature, ROM normal, no CVA tenderness   Lungs:     Clear to auscultation bilaterally, no wheezing or rhonchi   Heart:    Regular rate and rhythm, S1 and S2 normal, no murmur, rub   or gallop   Abdomen:     Soft, non-tender, bowel sounds active    Extremities: +1 pitting edema both lower extremities.  Left leg in boot with Ace bandage.  Pulses intact   Psych:    Neurologic:   CNII-XII intact. Normal strength, sensation and reflexes       Throughout     PHQ-9 Depression Screening          PHQ-9 Depression Screening        "   Pertinent Laboratory/Diagnostic Studies:    Radiology/Other Diagnostic Testing Results:     Augustine Lloyd MD  [facility] INTERNAL MEDICINE [place]

## 2025-05-12 DIAGNOSIS — F41.9 ANXIETY: ICD-10-CM

## 2025-05-12 RX ORDER — ALPRAZOLAM 0.5 MG
TABLET ORAL
Qty: 30 TABLET | Refills: 2 | Status: SHIPPED | OUTPATIENT
Start: 2025-05-12

## 2025-05-17 DIAGNOSIS — E66.812 CLASS 2 SEVERE OBESITY DUE TO EXCESS CALORIES WITH SERIOUS COMORBIDITY AND BODY MASS INDEX (BMI) OF 35.0 TO 35.9 IN ADULT (HCC): ICD-10-CM

## 2025-05-17 DIAGNOSIS — E66.01 CLASS 2 SEVERE OBESITY DUE TO EXCESS CALORIES WITH SERIOUS COMORBIDITY AND BODY MASS INDEX (BMI) OF 35.0 TO 35.9 IN ADULT (HCC): ICD-10-CM

## 2025-05-19 DIAGNOSIS — I10 PRIMARY HYPERTENSION: ICD-10-CM

## 2025-05-19 RX ORDER — LOSARTAN POTASSIUM 25 MG/1
25 TABLET ORAL DAILY
Qty: 90 TABLET | Refills: 1 | Status: SHIPPED | OUTPATIENT
Start: 2025-05-19

## 2025-05-20 ENCOUNTER — APPOINTMENT (OUTPATIENT)
Dept: LAB | Facility: CLINIC | Age: 70
End: 2025-05-20
Payer: COMMERCIAL

## 2025-05-20 DIAGNOSIS — E11.9 TYPE 2 DIABETES MELLITUS TREATED WITHOUT INSULIN (HCC): ICD-10-CM

## 2025-05-20 DIAGNOSIS — E66.812 CLASS 2 SEVERE OBESITY DUE TO EXCESS CALORIES WITH SERIOUS COMORBIDITY AND BODY MASS INDEX (BMI) OF 35.0 TO 35.9 IN ADULT (HCC): ICD-10-CM

## 2025-05-20 DIAGNOSIS — E66.01 CLASS 2 SEVERE OBESITY DUE TO EXCESS CALORIES WITH SERIOUS COMORBIDITY AND BODY MASS INDEX (BMI) OF 35.0 TO 35.9 IN ADULT (HCC): ICD-10-CM

## 2025-05-20 LAB
EST. AVERAGE GLUCOSE BLD GHB EST-MCNC: 154 MG/DL
HBA1C MFR BLD: 7 %

## 2025-05-20 PROCEDURE — 36415 COLL VENOUS BLD VENIPUNCTURE: CPT

## 2025-05-20 PROCEDURE — 83036 HEMOGLOBIN GLYCOSYLATED A1C: CPT

## 2025-05-21 RX ORDER — TIRZEPATIDE 5 MG/.5ML
INJECTION, SOLUTION SUBCUTANEOUS
Qty: 2 ML | Refills: 0 | Status: SHIPPED | OUTPATIENT
Start: 2025-05-21

## 2025-05-21 RX ORDER — TIRZEPATIDE 5 MG/.5ML
INJECTION, SOLUTION SUBCUTANEOUS
Qty: 2 ML | Refills: 0 | OUTPATIENT
Start: 2025-05-21

## 2025-05-22 ENCOUNTER — TELEPHONE (OUTPATIENT)
Dept: INTERNAL MEDICINE CLINIC | Facility: CLINIC | Age: 70
End: 2025-05-22

## 2025-05-22 DIAGNOSIS — B00.1 RECURRENT HERPES LABIALIS: Primary | ICD-10-CM

## 2025-05-22 RX ORDER — VALACYCLOVIR HYDROCHLORIDE 1 G/1
1000 TABLET, FILM COATED ORAL 2 TIMES DAILY
Qty: 2 TABLET | Refills: 0 | Status: SHIPPED | OUTPATIENT
Start: 2025-05-22 | End: 2025-05-23

## 2025-05-22 NOTE — TELEPHONE ENCOUNTER
Patient called the RX Refill Line. Message is being forwarded to the office.     Patient is requesting a refill on Valacyclovir  1 mg - Take 1 tablet BID for 1 day for a cold sore. Not active on medication list. Please send script to [facility] in [place], [place]     Please contact patient at [phone]

## 2025-06-12 DIAGNOSIS — E66.812 CLASS 2 SEVERE OBESITY DUE TO EXCESS CALORIES WITH SERIOUS COMORBIDITY AND BODY MASS INDEX (BMI) OF 35.0 TO 35.9 IN ADULT (HCC): ICD-10-CM

## 2025-06-12 DIAGNOSIS — E66.01 CLASS 2 SEVERE OBESITY DUE TO EXCESS CALORIES WITH SERIOUS COMORBIDITY AND BODY MASS INDEX (BMI) OF 35.0 TO 35.9 IN ADULT (HCC): ICD-10-CM

## 2025-06-12 RX ORDER — TIRZEPATIDE 5 MG/.5ML
INJECTION, SOLUTION SUBCUTANEOUS
Qty: 2 ML | Refills: 0 | OUTPATIENT
Start: 2025-06-12

## 2025-06-12 RX ORDER — TIRZEPATIDE 5 MG/.5ML
INJECTION, SOLUTION SUBCUTANEOUS
Qty: 2 ML | Refills: 0 | Status: SHIPPED | OUTPATIENT
Start: 2025-06-12

## 2025-06-30 DIAGNOSIS — E66.01 CLASS 2 SEVERE OBESITY DUE TO EXCESS CALORIES WITH SERIOUS COMORBIDITY AND BODY MASS INDEX (BMI) OF 35.0 TO 35.9 IN ADULT (HCC): ICD-10-CM

## 2025-06-30 DIAGNOSIS — E66.812 CLASS 2 SEVERE OBESITY DUE TO EXCESS CALORIES WITH SERIOUS COMORBIDITY AND BODY MASS INDEX (BMI) OF 35.0 TO 35.9 IN ADULT (HCC): ICD-10-CM

## 2025-06-30 DIAGNOSIS — F41.9 ANXIETY: ICD-10-CM

## 2025-06-30 DIAGNOSIS — I10 PRIMARY HYPERTENSION: ICD-10-CM

## 2025-06-30 DIAGNOSIS — E06.3 HYPOTHYROIDISM DUE TO HASHIMOTO'S THYROIDITIS: ICD-10-CM

## 2025-06-30 DIAGNOSIS — D50.9 IRON DEFICIENCY ANEMIA, UNSPECIFIED IRON DEFICIENCY ANEMIA TYPE: ICD-10-CM

## 2025-06-30 DIAGNOSIS — G25.81: ICD-10-CM

## 2025-06-30 DIAGNOSIS — I82.511 CHRONIC DEEP VEIN THROMBOSIS (DVT) OF FEMORAL VEIN OF RIGHT LOWER EXTREMITY (HCC): ICD-10-CM

## 2025-06-30 DIAGNOSIS — R60.0 EDEMA OF BOTH FEET: ICD-10-CM

## 2025-06-30 DIAGNOSIS — E11.69 TYPE 2 DIABETES MELLITUS WITH OTHER SPECIFIED COMPLICATION, WITH LONG-TERM CURRENT USE OF INSULIN (HCC): ICD-10-CM

## 2025-06-30 DIAGNOSIS — Z79.4 TYPE 2 DIABETES MELLITUS WITH OTHER SPECIFIED COMPLICATION, WITH LONG-TERM CURRENT USE OF INSULIN (HCC): ICD-10-CM

## 2025-06-30 DIAGNOSIS — K21.9 GASTROESOPHAGEAL REFLUX DISEASE, UNSPECIFIED WHETHER ESOPHAGITIS PRESENT: ICD-10-CM

## 2025-06-30 NOTE — TELEPHONE ENCOUNTER
Medication: Alprazolam; dabigartran; Ferrous Sulfate; Freestyle Lite test strips; Furosemide; Losartan; Mounjaro; Pantoprazole; Pramipexole;     Dose/Frequency: 0.5 mg; 150 mg; 65 Fe; 20 mg; 75 mcg; 25 mg; 0.5mL; 40 mg; 0.5 mg    Quantity:     Pharmacy: [facility]     Office:   [x] PCP/Provider -   [] Speciality/Provider -     Does the patient have enough for 3 days?   [] Yes   [x] No - Send as HP to POD

## 2025-06-30 NOTE — TELEPHONE ENCOUNTER
Patient called to say she did not have cards for her medicare information  but was told she had [employer] medicare advantage. She is unsure though. And it doesn't go into effect until 7/1/25.  She says she had called to request prescriptions earlier and was told to call back to provide her member information.    Member ID- [id number]  [id number]  [id number]  [id number]    I asked patient if this information was given at the pharmacy but she states she was told to call us back to provide it. Please advise back to patient to further assist.

## 2025-07-02 RX ORDER — LOSARTAN POTASSIUM 25 MG/1
25 TABLET ORAL DAILY
Qty: 90 TABLET | Refills: 1 | Status: SHIPPED | OUTPATIENT
Start: 2025-07-02

## 2025-07-02 RX ORDER — FERROUS SULFATE 324(65)MG
324 TABLET, DELAYED RELEASE (ENTERIC COATED) ORAL EVERY OTHER DAY
Qty: 15 TABLET | Refills: 5 | Status: SHIPPED | OUTPATIENT
Start: 2025-07-02 | End: 2025-08-01

## 2025-07-02 RX ORDER — TIRZEPATIDE 5 MG/.5ML
5 INJECTION, SOLUTION SUBCUTANEOUS WEEKLY
Qty: 2 ML | Refills: 0 | Status: SHIPPED | OUTPATIENT
Start: 2025-07-02

## 2025-07-02 RX ORDER — LEVOTHYROXINE SODIUM 75 UG/1
75 TABLET ORAL
Qty: 90 TABLET | Refills: 3 | Status: SHIPPED | OUTPATIENT
Start: 2025-07-02

## 2025-07-02 RX ORDER — PANTOPRAZOLE SODIUM 40 MG/1
TABLET, DELAYED RELEASE ORAL
Qty: 90 TABLET | Refills: 1 | Status: SHIPPED | OUTPATIENT
Start: 2025-07-02

## 2025-07-02 RX ORDER — PRAMIPEXOLE DIHYDROCHLORIDE 0.5 MG/1
1 TABLET ORAL
Qty: 60 TABLET | Refills: 5 | Status: SHIPPED | OUTPATIENT
Start: 2025-07-02

## 2025-07-02 RX ORDER — ALPRAZOLAM 0.5 MG
0.5 TABLET ORAL
Qty: 30 TABLET | Refills: 2 | Status: SHIPPED | OUTPATIENT
Start: 2025-07-02

## 2025-07-02 RX ORDER — FUROSEMIDE 20 MG/1
TABLET ORAL
Qty: 90 TABLET | Refills: 1 | Status: SHIPPED | OUTPATIENT
Start: 2025-07-02

## 2025-07-02 RX ORDER — BLOOD-GLUCOSE METER
KIT MISCELLANEOUS
Qty: 200 EACH | Refills: 1 | Status: SHIPPED | OUTPATIENT
Start: 2025-07-02

## 2025-07-02 RX ORDER — DABIGATRAN ETEXILATE 150 MG/1
150 CAPSULE ORAL 2 TIMES DAILY
Qty: 180 CAPSULE | Refills: 1 | Status: SHIPPED | OUTPATIENT
Start: 2025-07-02

## 2025-07-25 LAB
LEFT EYE DIABETIC RETINOPATHY: POSITIVE
RIGHT EYE DIABETIC RETINOPATHY: POSITIVE

## 2025-07-27 DIAGNOSIS — G25.81: ICD-10-CM

## 2025-07-29 RX ORDER — PRAMIPEXOLE DIHYDROCHLORIDE 0.5 MG/1
1 TABLET ORAL
Qty: 60 TABLET | Refills: 5 | Status: SHIPPED | OUTPATIENT
Start: 2025-07-29

## 2025-07-30 DIAGNOSIS — E66.01 CLASS 2 SEVERE OBESITY DUE TO EXCESS CALORIES WITH SERIOUS COMORBIDITY AND BODY MASS INDEX (BMI) OF 35.0 TO 35.9 IN ADULT (HCC): ICD-10-CM

## 2025-07-30 DIAGNOSIS — E66.812 CLASS 2 SEVERE OBESITY DUE TO EXCESS CALORIES WITH SERIOUS COMORBIDITY AND BODY MASS INDEX (BMI) OF 35.0 TO 35.9 IN ADULT (HCC): ICD-10-CM

## 2025-07-31 RX ORDER — TIRZEPATIDE 5 MG/.5ML
5 INJECTION, SOLUTION SUBCUTANEOUS WEEKLY
Qty: 2 ML | Refills: 0 | Status: SHIPPED | OUTPATIENT
Start: 2025-07-31

## 2025-08-05 DIAGNOSIS — E11.69 TYPE 2 DIABETES MELLITUS WITH OTHER SPECIFIED COMPLICATION, WITH LONG-TERM CURRENT USE OF INSULIN (HCC): ICD-10-CM

## 2025-08-05 DIAGNOSIS — Z79.4 TYPE 2 DIABETES MELLITUS WITH OTHER SPECIFIED COMPLICATION, WITH LONG-TERM CURRENT USE OF INSULIN (HCC): ICD-10-CM

## 2025-08-13 ENCOUNTER — TELEPHONE (OUTPATIENT)
Dept: OTHER | Facility: OTHER | Age: 70
End: 2025-08-13

## 2025-08-13 ENCOUNTER — HOSPITAL ENCOUNTER (OUTPATIENT)
Dept: VASCULAR ULTRASOUND | Facility: HOSPITAL | Age: 70
Discharge: HOME | End: 2025-08-13
Payer: COMMERCIAL

## 2025-08-20 ENCOUNTER — OFFICE VISIT (OUTPATIENT)
Dept: INTERNAL MEDICINE CLINIC | Facility: CLINIC | Age: 70
End: 2025-08-20
Payer: COMMERCIAL

## 2025-08-20 ENCOUNTER — NURSE TRIAGE (OUTPATIENT)
Age: 70
End: 2025-08-20

## 2025-08-20 VITALS
OXYGEN SATURATION: 97 % | RESPIRATION RATE: 14 BRPM | HEIGHT: 66 IN | HEART RATE: 67 BPM | WEIGHT: 217 LBS | TEMPERATURE: 98.7 F | SYSTOLIC BLOOD PRESSURE: 130 MMHG | BODY MASS INDEX: 34.87 KG/M2 | DIASTOLIC BLOOD PRESSURE: 80 MMHG

## 2025-08-20 DIAGNOSIS — N18.31 CHRONIC KIDNEY DISEASE, STAGE 3A (HCC): ICD-10-CM

## 2025-08-20 DIAGNOSIS — E11.9 TYPE 2 DIABETES MELLITUS TREATED WITHOUT INSULIN (HCC): ICD-10-CM

## 2025-08-20 DIAGNOSIS — E66.812 OBESITY, CLASS 2: ICD-10-CM

## 2025-08-20 DIAGNOSIS — Z12.31 ENCOUNTER FOR SCREENING MAMMOGRAM FOR BREAST CANCER: ICD-10-CM

## 2025-08-20 DIAGNOSIS — E11.42 DIABETIC POLYNEUROPATHY ASSOCIATED WITH TYPE 2 DIABETES MELLITUS (HCC): ICD-10-CM

## 2025-08-20 DIAGNOSIS — Z00.00 MEDICARE ANNUAL WELLNESS VISIT, INITIAL: ICD-10-CM

## 2025-08-20 DIAGNOSIS — R39.9 UTI SYMPTOMS: Primary | ICD-10-CM

## 2025-08-20 DIAGNOSIS — Z13.6 SCREENING FOR CARDIOVASCULAR CONDITION: ICD-10-CM

## 2025-08-20 DIAGNOSIS — R19.7 DIARRHEA, UNSPECIFIED TYPE: ICD-10-CM

## 2025-08-20 LAB
SL AMB  POCT GLUCOSE, UA: NEGATIVE
SL AMB LEUKOCYTE ESTERASE,UA: ABNORMAL
SL AMB POCT BILIRUBIN,UA: ABNORMAL
SL AMB POCT BLOOD,UA: ABNORMAL
SL AMB POCT CLARITY,UA: ABNORMAL
SL AMB POCT COLOR,UA: YELLOW
SL AMB POCT KETONES,UA: NEGATIVE
SL AMB POCT NITRITE,UA: POSITIVE
SL AMB POCT PH,UA: 6.5
SL AMB POCT SPECIFIC GRAVITY,UA: 1
SL AMB POCT URINE PROTEIN: ABNORMAL
SL AMB POCT UROBILINOGEN: 2

## 2025-08-20 PROCEDURE — 99214 OFFICE O/P EST MOD 30 MIN: CPT | Performed by: INTERNAL MEDICINE

## 2025-08-20 PROCEDURE — G2211 COMPLEX E/M VISIT ADD ON: HCPCS | Performed by: INTERNAL MEDICINE

## 2025-08-20 PROCEDURE — G0438 PPPS, INITIAL VISIT: HCPCS | Performed by: INTERNAL MEDICINE

## 2025-08-20 PROCEDURE — 81002 URINALYSIS NONAUTO W/O SCOPE: CPT | Performed by: INTERNAL MEDICINE

## 2025-08-20 RX ORDER — TIRZEPATIDE 7.5 MG/.5ML
7.5 INJECTION, SOLUTION SUBCUTANEOUS WEEKLY
Qty: 2 ML | Refills: 0 | Status: SHIPPED | OUTPATIENT
Start: 2025-08-20

## 2025-08-20 RX ORDER — DIPHENOXYLATE HYDROCHLORIDE AND ATROPINE SULFATE 2.5; .025 MG/1; MG/1
1 TABLET ORAL 4 TIMES DAILY PRN
Qty: 30 TABLET | Refills: 2 | Status: SHIPPED | OUTPATIENT
Start: 2025-08-20

## 2025-08-20 RX ORDER — DOXYCYCLINE HYCLATE 100 MG
100 TABLET ORAL 2 TIMES DAILY
Qty: 14 TABLET | Refills: 0 | Status: SHIPPED | OUTPATIENT
Start: 2025-08-20 | End: 2025-08-27

## 2025-08-22 LAB — BACTERIA UR CULT: ABNORMAL

## (undated) DEVICE — NEEDLE 25G X 1 1/2

## (undated) DEVICE — PREMIUM DRY TRAY LF: Brand: MEDLINE INDUSTRIES, INC.

## (undated) DEVICE — STERILE POLYISOPRENE POWDER-FREE SURGICAL GLOVES: Brand: PROTEXIS

## (undated) DEVICE — TROCAR: Brand: KII® SLEEVE

## (undated) DEVICE — 3M™ TEGADERM™ TRANSPARENT FILM DRESSING FRAME STYLE, 1626W, 4 IN X 4-3/4 IN (10 CM X 12 CM), 50/CT 4CT/CASE: Brand: 3M™ TEGADERM™

## (undated) DEVICE — SUT MONOCRYL 4-0 PS-2 18 IN Y496G

## (undated) DEVICE — ACE WRAP 6 IN UNSTERILE

## (undated) DEVICE — SUT VICRYL 2-0 SH 27 IN UNDYED J417H

## (undated) DEVICE — 3M™ STERI-STRIP™ REINFORCED ADHESIVE SKIN CLOSURES, R1547, 1/2 IN X 4 IN (12 MM X 100 MM), 6 STRIPS/ENVELOPE: Brand: 3M™ STERI-STRIP™

## (undated) DEVICE — ACE WRAP 4 IN STERILE

## (undated) DEVICE — TROCAR: Brand: KII FIOS FIRST ENTRY

## (undated) DEVICE — TUBING SUCTION 5MM X 12 FT

## (undated) DEVICE — HANDPIECE SET WITH RETRACTABLE COAXIAL FAN SPRAY TIP AND SUCTION TUBE: Brand: INTERPULSE

## (undated) DEVICE — ACE WRAP 4 IN UNSTERILE

## (undated) DEVICE — CHLORHEXIDINE 4PCT 4 OZ

## (undated) DEVICE — NEPTUNE E-SEP SMOKE EVACUATION PENCIL, COATED, 70MM BLADE, PUSH BUTTON SWITCH: Brand: NEPTUNE E-SEP

## (undated) DEVICE — SYRINGE 10ML LL

## (undated) DEVICE — SUT VICRYL 3-0 SH 27 IN J416H

## (undated) DEVICE — GUIDEWIRE AMPLATZ SS .038 180CM

## (undated) DEVICE — INTENDED FOR TISSUE SEPARATION, AND OTHER PROCEDURES THAT REQUIRE A SHARP SURGICAL BLADE TO PUNCTURE OR CUT.: Brand: BARD-PARKER SAFETY BLADES SIZE 15, STERILE

## (undated) DEVICE — STRETCH BANDAGE: Brand: CURITY

## (undated) DEVICE — INTENDED FOR TISSUE SEPARATION, AND OTHER PROCEDURES THAT REQUIRE A SHARP SURGICAL BLADE TO PUNCTURE OR CUT.: Brand: BARD-PARKER ® CARBON RIB-BACK BLADES

## (undated) DEVICE — SPECIMEN CONTAINER STERILE PEEL PACK

## (undated) DEVICE — MEDI-VAC YANK SUCT HNDL W/TPRD BULBOUS TIP: Brand: CARDINAL HEALTH

## (undated) DEVICE — GLOVE SRG BIOGEL 7.5

## (undated) DEVICE — BETHLEHEM UNIVERSAL  MIONR EXT: Brand: CARDINAL HEALTH

## (undated) DEVICE — NEEDLE BLUNT 18 G X 1 1/2IN

## (undated) DEVICE — SUT ETHILON 3-0 PS-1 18 IN 1663H

## (undated) DEVICE — GLOVE INDICATOR PI UNDERGLOVE SZ 8.5 BLUE

## (undated) DEVICE — DERMATOME BLADES: Brand: DERMATOME

## (undated) DEVICE — INTENDED FOR TISSUE SEPARATION, AND OTHER PROCEDURES THAT REQUIRE A SHARP SURGICAL BLADE TO PUNCTURE OR CUT.: Brand: BARD-PARKER SAFETY BLADES SIZE 11, STERILE

## (undated) DEVICE — PAD GROUNDING ADULT

## (undated) DEVICE — DRAPE CRANI

## (undated) DEVICE — DRAPE SHEET THREE QUARTER

## (undated) DEVICE — INVIEW CLEAR LEGGINGS: Brand: CONVERTORS

## (undated) DEVICE — ABDOMINAL PAD: Brand: DERMACEA

## (undated) DEVICE — 3M™ DURAPORE™ SURGICAL TAPE 2 INCHES X 10YARDS (5.0CM X 9.1M) 6ROLLS/CARTON 10CARTONS/CASE 1538-2: Brand: 3M™ DURAPORE™

## (undated) DEVICE — SUT MONOCRYL 4-0 PS-2 27 IN Y426H

## (undated) DEVICE — GARMENT,MEDLINE,DVT,INT,CALF,FOAM,MED: Brand: MEDLINE

## (undated) DEVICE — CURITY NON-ADHERENT STRIPS: Brand: CURITY

## (undated) DEVICE — ACE WRAP 6 IN STERILE

## (undated) DEVICE — GLOVE INDICATOR PI UNDERGLOVE SZ 7.5 BLUE

## (undated) DEVICE — GLOVE INDICATOR PI UNDERGLOVE SZ 7 BLUE

## (undated) DEVICE — SWITCH BLADE TIP CURVED METZ

## (undated) DEVICE — UNDERGLOVE PROTEXIS  BLUE SZ 7

## (undated) DEVICE — SUT SILK 0 CT-1 30 IN 424H

## (undated) DEVICE — NEEDLE INSUFF STEP VERRES 14G

## (undated) DEVICE — CHLORAPREP HI-LITE 26ML ORANGE

## (undated) DEVICE — U-DRAPE: Brand: CONVERTORS

## (undated) DEVICE — SHEATH URETERAL ACCESS 10/12FR 45CM PROXIS

## (undated) DEVICE — GAUZE SPONGES,16 PLY: Brand: CURITY

## (undated) DEVICE — BETHLEHEM UNIVERSAL MINOR GEN: Brand: CARDINAL HEALTH

## (undated) DEVICE — CATH URETERAL 5FR X 70 CM FLEX TIP POLYUR BARD

## (undated) DEVICE — ANTIBACTERIAL UNDYED BRAIDED (POLYGLACTIN 910), SYNTHETIC ABSORBABLE SUTURE: Brand: COATED VICRYL

## (undated) DEVICE — SUT FIBERWIRE #2 1/2 CIRCLE T-5 38IN AR-7200

## (undated) DEVICE — SYRINGE 30ML LL

## (undated) DEVICE — ZIMMER SKIN GRAFT CARRIER 8 INCH LENGTH: Brand: DERMACARRIERS

## (undated) DEVICE — SWABSTCK, BENZOIN TINCTURE, 1/PK, STRL: Brand: APLICARE

## (undated) DEVICE — SPONGE LAP 18 X 18 IN

## (undated) DEVICE — CAST PADDING 6IN UNSTERILE

## (undated) DEVICE — SURGICAL CLIPPER BLADE GENERAL USE

## (undated) DEVICE — GLOVE SRG BIOGEL ORTHOPEDIC 8.5

## (undated) DEVICE — PACK TUR

## (undated) DEVICE — SPONGE STICK WITH PVP-I: Brand: KENDALL

## (undated) DEVICE — PENCIL ELECTROSURG E-Z CLEAN -0035H

## (undated) DEVICE — 3L THIN WALL CAN: Brand: CRD

## (undated) DEVICE — OCCLUSIVE GAUZE STRIP,3% BISMUTH TRIBROMOPHENATE IN PETROLATUM BLEND: Brand: XEROFORM

## (undated) DEVICE — GLOVE SRG BIOGEL ECLIPSE 7

## (undated) DEVICE — STANDARD SURGICAL GOWN, L: Brand: CONVERTORS

## (undated) DEVICE — PROBE CYBERWAND ULTRASONIC

## (undated) DEVICE — KERLIX BANDAGE ROLL: Brand: KERLIX

## (undated) DEVICE — LIGHT HANDLE COVER SLEEVE DISP BLUE STELLAR

## (undated) DEVICE — GLOVE SRG BIOGEL 7

## (undated) DEVICE — STERILE BETHLEHEM PLASTIC HAND: Brand: CARDINAL HEALTH

## (undated) DEVICE — SUT CHROMIC 3-0 SH 27 IN G122H

## (undated) DEVICE — HYGIENE-FILTER  FOR SINGLE USE

## (undated) DEVICE — STERILE SURGICAL LUBRICANT,  TUBE: Brand: SURGILUBE

## (undated) DEVICE — PADDING CAST 4 IN  COTTON STRL

## (undated) DEVICE — 10FR FRAZIER SUCTION HANDLE: Brand: CARDINAL HEALTH

## (undated) DEVICE — CYSTO TUBING TUR Y IRRIGATION

## (undated) DEVICE — INFLATION DEVICE BASIX 30ATM

## (undated) DEVICE — GUIDEWIRE STRGHT TIP 0.035 IN  SOLO PLUS

## (undated) DEVICE — CUFF TOURNIQUET 30 X 4 IN QUICK CONNECT DISP 1BLA

## (undated) DEVICE — ARTHROSCOPY FLOOR MAT

## (undated) DEVICE — ADHESIVE SKIN HIGH VISCOSITY EXOFIN 1ML

## (undated) DEVICE — SUT PROLENE 3-0 SH 36 IN 8522H

## (undated) DEVICE — UROCATCH BAG

## (undated) DEVICE — CATH URET .038 10FR 50CM DUAL LUMEN

## (undated) DEVICE — SUT VICRYL 2-0 SHB 27 IN JB417

## (undated) DEVICE — INFLATION DEVICE 6FR X 4CM

## (undated) DEVICE — ALLENTOWN LAP CHOLE APP PACK: Brand: CARDINAL HEALTH

## (undated) DEVICE — Device